# Patient Record
Sex: FEMALE | Race: BLACK OR AFRICAN AMERICAN | Employment: OTHER | URBAN - METROPOLITAN AREA
[De-identification: names, ages, dates, MRNs, and addresses within clinical notes are randomized per-mention and may not be internally consistent; named-entity substitution may affect disease eponyms.]

---

## 2017-01-01 ENCOUNTER — APPOINTMENT (OUTPATIENT)
Dept: GENERAL RADIOLOGY | Age: 82
DRG: 871 | End: 2017-01-01
Attending: HOSPITALIST
Payer: MEDICARE

## 2017-01-01 ENCOUNTER — APPOINTMENT (OUTPATIENT)
Dept: GENERAL RADIOLOGY | Age: 82
DRG: 871 | End: 2017-01-01
Attending: INTERNAL MEDICINE
Payer: MEDICARE

## 2017-01-01 ENCOUNTER — OFFICE VISIT (OUTPATIENT)
Dept: CARDIOLOGY CLINIC | Age: 82
End: 2017-01-01

## 2017-01-01 ENCOUNTER — HOME CARE VISIT (OUTPATIENT)
Dept: HOSPICE | Facility: HOSPICE | Age: 82
End: 2017-01-01
Payer: MEDICARE

## 2017-01-01 ENCOUNTER — CLINICAL SUPPORT (OUTPATIENT)
Dept: CARDIOLOGY CLINIC | Age: 82
End: 2017-01-01

## 2017-01-01 ENCOUNTER — APPOINTMENT (OUTPATIENT)
Dept: ULTRASOUND IMAGING | Age: 82
DRG: 871 | End: 2017-01-01
Attending: SPECIALIST
Payer: MEDICARE

## 2017-01-01 ENCOUNTER — APPOINTMENT (OUTPATIENT)
Dept: ULTRASOUND IMAGING | Age: 82
DRG: 871 | End: 2017-01-01
Attending: EMERGENCY MEDICINE
Payer: MEDICARE

## 2017-01-01 ENCOUNTER — APPOINTMENT (OUTPATIENT)
Dept: CT IMAGING | Age: 82
DRG: 871 | End: 2017-01-01
Attending: NURSE PRACTITIONER
Payer: MEDICARE

## 2017-01-01 ENCOUNTER — HOSPITAL ENCOUNTER (INPATIENT)
Age: 82
LOS: 3 days | DRG: 291 | End: 2017-04-17
Attending: INTERNAL MEDICINE | Admitting: INTERNAL MEDICINE
Payer: OTHER MISCELLANEOUS

## 2017-01-01 ENCOUNTER — APPOINTMENT (OUTPATIENT)
Dept: CT IMAGING | Age: 82
DRG: 871 | End: 2017-01-01
Attending: EMERGENCY MEDICINE
Payer: MEDICARE

## 2017-01-01 ENCOUNTER — APPOINTMENT (OUTPATIENT)
Dept: GENERAL RADIOLOGY | Age: 82
DRG: 871 | End: 2017-01-01
Attending: RADIOLOGY
Payer: MEDICARE

## 2017-01-01 ENCOUNTER — HOSPITAL ENCOUNTER (INPATIENT)
Age: 82
LOS: 37 days | Discharge: HOSPICE | DRG: 871 | End: 2017-04-14
Attending: EMERGENCY MEDICINE | Admitting: INTERNAL MEDICINE
Payer: MEDICARE

## 2017-01-01 ENCOUNTER — APPOINTMENT (OUTPATIENT)
Dept: GENERAL RADIOLOGY | Age: 82
DRG: 871 | End: 2017-01-01
Attending: NURSE PRACTITIONER
Payer: MEDICARE

## 2017-01-01 ENCOUNTER — APPOINTMENT (OUTPATIENT)
Dept: ULTRASOUND IMAGING | Age: 82
DRG: 871 | End: 2017-01-01
Attending: HOSPITALIST
Payer: MEDICARE

## 2017-01-01 ENCOUNTER — HOSPICE ADMISSION (OUTPATIENT)
Dept: HOSPICE | Facility: HOSPICE | Age: 82
End: 2017-01-01
Payer: MEDICARE

## 2017-01-01 ENCOUNTER — APPOINTMENT (OUTPATIENT)
Dept: GENERAL RADIOLOGY | Age: 82
DRG: 871 | End: 2017-01-01
Attending: STUDENT IN AN ORGANIZED HEALTH CARE EDUCATION/TRAINING PROGRAM
Payer: MEDICARE

## 2017-01-01 ENCOUNTER — APPOINTMENT (OUTPATIENT)
Dept: GENERAL RADIOLOGY | Age: 82
DRG: 871 | End: 2017-01-01
Attending: SPECIALIST
Payer: MEDICARE

## 2017-01-01 ENCOUNTER — APPOINTMENT (OUTPATIENT)
Dept: GENERAL RADIOLOGY | Age: 82
DRG: 871 | End: 2017-01-01
Attending: ANESTHESIOLOGY
Payer: MEDICARE

## 2017-01-01 ENCOUNTER — APPOINTMENT (OUTPATIENT)
Dept: ULTRASOUND IMAGING | Age: 82
DRG: 871 | End: 2017-01-01
Attending: INTERNAL MEDICINE
Payer: MEDICARE

## 2017-01-01 VITALS
DIASTOLIC BLOOD PRESSURE: 76 MMHG | HEIGHT: 66 IN | WEIGHT: 132.06 LBS | BODY MASS INDEX: 21.22 KG/M2 | RESPIRATION RATE: 24 BRPM | TEMPERATURE: 97.6 F | HEART RATE: 117 BPM | OXYGEN SATURATION: 100 % | SYSTOLIC BLOOD PRESSURE: 125 MMHG

## 2017-01-01 VITALS
HEART RATE: 68 BPM | DIASTOLIC BLOOD PRESSURE: 70 MMHG | BODY MASS INDEX: 24.11 KG/M2 | SYSTOLIC BLOOD PRESSURE: 102 MMHG | WEIGHT: 150 LBS | HEIGHT: 66 IN

## 2017-01-01 VITALS
WEIGHT: 146.2 LBS | BODY MASS INDEX: 23.5 KG/M2 | RESPIRATION RATE: 16 BRPM | HEIGHT: 66 IN | DIASTOLIC BLOOD PRESSURE: 70 MMHG | SYSTOLIC BLOOD PRESSURE: 122 MMHG | HEART RATE: 60 BPM

## 2017-01-01 VITALS
SYSTOLIC BLOOD PRESSURE: 109 MMHG | HEART RATE: 82 BPM | TEMPERATURE: 97.1 F | RESPIRATION RATE: 16 BRPM | DIASTOLIC BLOOD PRESSURE: 61 MMHG | OXYGEN SATURATION: 91 %

## 2017-01-01 DIAGNOSIS — I50.22 CHRONIC SYSTOLIC HEART FAILURE (HCC): Primary | ICD-10-CM

## 2017-01-01 DIAGNOSIS — Z95.810 PRESENCE OF AUTOMATIC CARDIOVERTER/DEFIBRILLATOR (AICD): Primary | ICD-10-CM

## 2017-01-01 DIAGNOSIS — R06.02 SHORTNESS OF BREATH: ICD-10-CM

## 2017-01-01 DIAGNOSIS — R60.9 EDEMA, UNSPECIFIED TYPE: ICD-10-CM

## 2017-01-01 DIAGNOSIS — Z95.810 BIVENTRICULAR ICD (IMPLANTABLE CARDIOVERTER-DEFIBRILLATOR) IN PLACE: Primary | ICD-10-CM

## 2017-01-01 DIAGNOSIS — I50.43 ACUTE ON CHRONIC COMBINED SYSTOLIC AND DIASTOLIC ACC/AHA STAGE C CONGESTIVE HEART FAILURE (HCC): ICD-10-CM

## 2017-01-01 DIAGNOSIS — D72.819 LEUKOPENIA, UNSPECIFIED TYPE: ICD-10-CM

## 2017-01-01 DIAGNOSIS — Z79.01 LONG TERM (CURRENT) USE OF ANTICOAGULANTS: ICD-10-CM

## 2017-01-01 DIAGNOSIS — I50.22 CHRONIC SYSTOLIC CHF, NYHA CLASS 2 AND ACA/AHA STAGE C: ICD-10-CM

## 2017-01-01 DIAGNOSIS — I48.0 PAF (PAROXYSMAL ATRIAL FIBRILLATION) (HCC): Primary | ICD-10-CM

## 2017-01-01 DIAGNOSIS — I36.1 NON-RHEUMATIC TRICUSPID VALVE INSUFFICIENCY: ICD-10-CM

## 2017-01-01 DIAGNOSIS — I50.22 CHRONIC SYSTOLIC HEART FAILURE (HCC): ICD-10-CM

## 2017-01-01 DIAGNOSIS — R63.30 FEEDING DIFFICULTIES: ICD-10-CM

## 2017-01-01 DIAGNOSIS — I48.0 PAF (PAROXYSMAL ATRIAL FIBRILLATION) (HCC): ICD-10-CM

## 2017-01-01 DIAGNOSIS — I95.9 HYPOTENSION, UNSPECIFIED HYPOTENSION TYPE: ICD-10-CM

## 2017-01-01 DIAGNOSIS — E87.70 HYPERVOLEMIA, UNSPECIFIED HYPERVOLEMIA TYPE: ICD-10-CM

## 2017-01-01 DIAGNOSIS — G93.41 METABOLIC ENCEPHALOPATHY: ICD-10-CM

## 2017-01-01 DIAGNOSIS — Z98.890 S/P MITRAL VALVE REPAIR: ICD-10-CM

## 2017-01-01 DIAGNOSIS — Z95.0 CARDIAC PACEMAKER: ICD-10-CM

## 2017-01-01 DIAGNOSIS — Z95.810 BIVENTRICULAR ICD (IMPLANTABLE CARDIOVERTER-DEFIBRILLATOR) IN PLACE: ICD-10-CM

## 2017-01-01 DIAGNOSIS — R10.13 ABDOMINAL PAIN, EPIGASTRIC: Primary | ICD-10-CM

## 2017-01-01 DIAGNOSIS — E16.2 HYPOGLYCEMIA: ICD-10-CM

## 2017-01-01 DIAGNOSIS — I34.0 NON-RHEUMATIC MITRAL REGURGITATION: ICD-10-CM

## 2017-01-01 DIAGNOSIS — I47.1 PAT (PAROXYSMAL ATRIAL TACHYCARDIA) (HCC): ICD-10-CM

## 2017-01-01 DIAGNOSIS — I11.0 MALIGNANT HYPERTENSIVE HEART DISEASE WITH CHF (HCC): ICD-10-CM

## 2017-01-01 DIAGNOSIS — I44.2 THIRD DEGREE AV BLOCK (HCC): ICD-10-CM

## 2017-01-01 DIAGNOSIS — Z71.89 GOALS OF CARE, COUNSELING/DISCUSSION: ICD-10-CM

## 2017-01-01 DIAGNOSIS — T68.XXXA HYPOTHERMIA, INITIAL ENCOUNTER: ICD-10-CM

## 2017-01-01 DIAGNOSIS — I51.1: ICD-10-CM

## 2017-01-01 DIAGNOSIS — R53.81 DEBILITY: ICD-10-CM

## 2017-01-01 DIAGNOSIS — Z79.01 ANTICOAGULATED ON COUMADIN: ICD-10-CM

## 2017-01-01 DIAGNOSIS — R53.83 FATIGUE, UNSPECIFIED TYPE: ICD-10-CM

## 2017-01-01 DIAGNOSIS — R53.83 OTHER FATIGUE: ICD-10-CM

## 2017-01-01 DIAGNOSIS — R53.83 LETHARGY: ICD-10-CM

## 2017-01-01 LAB
ABO + RH BLD: NORMAL
ABO + RH BLD: NORMAL
ALBUMIN SERPL BCP-MCNC: 2.1 G/DL (ref 3.5–5)
ALBUMIN SERPL BCP-MCNC: 2.2 G/DL (ref 3.5–5)
ALBUMIN SERPL BCP-MCNC: 2.2 G/DL (ref 3.5–5)
ALBUMIN SERPL BCP-MCNC: 2.3 G/DL (ref 3.5–5)
ALBUMIN SERPL BCP-MCNC: 2.4 G/DL (ref 3.5–5)
ALBUMIN SERPL BCP-MCNC: 2.6 G/DL (ref 3.5–5)
ALBUMIN SERPL BCP-MCNC: 2.7 G/DL (ref 3.5–5)
ALBUMIN SERPL BCP-MCNC: 3 G/DL (ref 3.5–5)
ALBUMIN SERPL BCP-MCNC: 3 G/DL (ref 3.5–5)
ALBUMIN SERPL BCP-MCNC: 3.3 G/DL (ref 3.5–5)
ALBUMIN SERPL BCP-MCNC: 3.4 G/DL (ref 3.5–5)
ALBUMIN/GLOB SERPL: 0.5 {RATIO} (ref 1.1–2.2)
ALBUMIN/GLOB SERPL: 0.6 {RATIO} (ref 1.1–2.2)
ALBUMIN/GLOB SERPL: 0.7 {RATIO} (ref 1.1–2.2)
ALBUMIN/GLOB SERPL: 0.8 {RATIO} (ref 1.1–2.2)
ALBUMIN/GLOB SERPL: 0.9 {RATIO} (ref 1.1–2.2)
ALP SERPL-CCNC: 42 U/L (ref 45–117)
ALP SERPL-CCNC: 45 U/L (ref 45–117)
ALP SERPL-CCNC: 47 U/L (ref 45–117)
ALP SERPL-CCNC: 47 U/L (ref 45–117)
ALP SERPL-CCNC: 50 U/L (ref 45–117)
ALP SERPL-CCNC: 50 U/L (ref 45–117)
ALP SERPL-CCNC: 51 U/L (ref 45–117)
ALP SERPL-CCNC: 52 U/L (ref 45–117)
ALP SERPL-CCNC: 55 U/L (ref 45–117)
ALP SERPL-CCNC: 55 U/L (ref 45–117)
ALP SERPL-CCNC: 57 U/L (ref 45–117)
ALP SERPL-CCNC: 60 U/L (ref 45–117)
ALP SERPL-CCNC: 60 U/L (ref 45–117)
ALP SERPL-CCNC: 61 U/L (ref 45–117)
ALP SERPL-CCNC: 63 U/L (ref 45–117)
ALP SERPL-CCNC: 65 U/L (ref 45–117)
ALP SERPL-CCNC: 66 U/L (ref 45–117)
ALP SERPL-CCNC: 70 U/L (ref 45–117)
ALP SERPL-CCNC: 87 U/L (ref 45–117)
ALT SERPL-CCNC: 11 U/L (ref 12–78)
ALT SERPL-CCNC: 11 U/L (ref 12–78)
ALT SERPL-CCNC: 112 U/L (ref 12–78)
ALT SERPL-CCNC: 12 U/L (ref 12–78)
ALT SERPL-CCNC: 121 U/L (ref 12–78)
ALT SERPL-CCNC: 13 U/L (ref 12–78)
ALT SERPL-CCNC: 135 U/L (ref 12–78)
ALT SERPL-CCNC: 14 U/L (ref 12–78)
ALT SERPL-CCNC: 15 U/L (ref 12–78)
ALT SERPL-CCNC: 153 U/L (ref 12–78)
ALT SERPL-CCNC: 16 U/L (ref 12–78)
ALT SERPL-CCNC: 172 U/L (ref 12–78)
ALT SERPL-CCNC: 173 U/L (ref 12–78)
ALT SERPL-CCNC: 22 U/L (ref 12–78)
ALT SERPL-CCNC: 34 U/L (ref 12–78)
ALT SERPL-CCNC: 65 U/L (ref 12–78)
ALT SERPL-CCNC: 92 U/L (ref 12–78)
AMMONIA PLAS-SCNC: 88 UMOL/L
AMORPH CRY URNS QL MICRO: ABNORMAL
ANION GAP BLD CALC-SCNC: 0 MMOL/L (ref 5–15)
ANION GAP BLD CALC-SCNC: 1 MMOL/L (ref 5–15)
ANION GAP BLD CALC-SCNC: 10 MMOL/L (ref 5–15)
ANION GAP BLD CALC-SCNC: 11 MMOL/L (ref 5–15)
ANION GAP BLD CALC-SCNC: 3 MMOL/L (ref 5–15)
ANION GAP BLD CALC-SCNC: 4 MMOL/L (ref 5–15)
ANION GAP BLD CALC-SCNC: 5 MMOL/L (ref 5–15)
ANION GAP BLD CALC-SCNC: 6 MMOL/L (ref 5–15)
ANION GAP BLD CALC-SCNC: 7 MMOL/L (ref 5–15)
ANION GAP BLD CALC-SCNC: 8 MMOL/L (ref 5–15)
ANION GAP BLD CALC-SCNC: 9 MMOL/L (ref 5–15)
ANTI-COMPLEMENT (C3B,C3D): NORMAL
APPEARANCE FLD: CLEAR
APPEARANCE UR: ABNORMAL
APPEARANCE UR: ABNORMAL
APTT PPP: 33.5 SEC (ref 22.1–32.5)
ARTERIAL PATENCY WRIST A: ABNORMAL
ARTERIAL PATENCY WRIST A: ABNORMAL
ARTERIAL PATENCY WRIST A: YES
AST SERPL W P-5'-P-CCNC: 115 U/L (ref 15–37)
AST SERPL W P-5'-P-CCNC: 16 U/L (ref 15–37)
AST SERPL W P-5'-P-CCNC: 168 U/L (ref 15–37)
AST SERPL W P-5'-P-CCNC: 17 U/L (ref 15–37)
AST SERPL W P-5'-P-CCNC: 19 U/L (ref 15–37)
AST SERPL W P-5'-P-CCNC: 19 U/L (ref 15–37)
AST SERPL W P-5'-P-CCNC: 20 U/L (ref 15–37)
AST SERPL W P-5'-P-CCNC: 21 U/L (ref 15–37)
AST SERPL W P-5'-P-CCNC: 217 U/L (ref 15–37)
AST SERPL W P-5'-P-CCNC: 22 U/L (ref 15–37)
AST SERPL W P-5'-P-CCNC: 22 U/L (ref 15–37)
AST SERPL W P-5'-P-CCNC: 24 U/L (ref 15–37)
AST SERPL W P-5'-P-CCNC: 26 U/L (ref 15–37)
AST SERPL W P-5'-P-CCNC: 306 U/L (ref 15–37)
AST SERPL W P-5'-P-CCNC: 384 U/L (ref 15–37)
AST SERPL W P-5'-P-CCNC: 40 U/L (ref 15–37)
AST SERPL W P-5'-P-CCNC: 402 U/L (ref 15–37)
AST SERPL W P-5'-P-CCNC: 50 U/L (ref 15–37)
AST SERPL W P-5'-P-CCNC: 69 U/L (ref 15–37)
ATRIAL RATE: 75 BPM
BACTERIA SPEC CULT: NORMAL
BACTERIA URNS QL MICRO: NEGATIVE /HPF
BACTERIA URNS QL MICRO: NEGATIVE /HPF
BASE DEFICIT BLD-SCNC: 2 MMOL/L
BASE DEFICIT BLD-SCNC: 2 MMOL/L
BASE DEFICIT BLD-SCNC: 3 MMOL/L
BASE DEFICIT BLD-SCNC: 5 MMOL/L
BASE EXCESS BLD CALC-SCNC: 0 MMOL/L
BASE EXCESS BLD CALC-SCNC: 1 MMOL/L
BASE EXCESS BLD CALC-SCNC: 1 MMOL/L
BASE EXCESS BLD CALC-SCNC: 10 MMOL/L
BASE EXCESS BLD CALC-SCNC: 12 MMOL/L
BASE EXCESS BLD CALC-SCNC: 13 MMOL/L
BASE EXCESS BLD CALC-SCNC: 14 MMOL/L
BASE EXCESS BLD CALC-SCNC: 16 MMOL/L
BASE EXCESS BLD CALC-SCNC: 17 MMOL/L
BASE EXCESS BLD CALC-SCNC: 18 MMOL/L
BASE EXCESS BLD CALC-SCNC: 18 MMOL/L
BASE EXCESS BLD CALC-SCNC: 21 MMOL/L
BASE EXCESS BLD CALC-SCNC: 3 MMOL/L
BASE EXCESS BLD CALC-SCNC: 5 MMOL/L
BASE EXCESS BLD CALC-SCNC: 6 MMOL/L
BASE EXCESS BLD CALC-SCNC: 6 MMOL/L
BASE EXCESS BLD CALC-SCNC: 8 MMOL/L
BASOPHILS # BLD AUTO: 0 K/UL (ref 0–0.1)
BASOPHILS # BLD: 0 % (ref 0–1)
BASOPHILS # BLD: 1 % (ref 0–1)
BASOPHILS # BLD: 2 % (ref 0–1)
BDY SITE: ABNORMAL
BILIRUB DIRECT SERPL-MCNC: 1.1 MG/DL (ref 0–0.2)
BILIRUB DIRECT SERPL-MCNC: 2.1 MG/DL (ref 0–0.2)
BILIRUB SERPL-MCNC: 0.8 MG/DL (ref 0.2–1)
BILIRUB SERPL-MCNC: 0.9 MG/DL (ref 0.2–1)
BILIRUB SERPL-MCNC: 1 MG/DL (ref 0.2–1)
BILIRUB SERPL-MCNC: 1.8 MG/DL (ref 0.2–1)
BILIRUB SERPL-MCNC: 1.9 MG/DL (ref 0.2–1)
BILIRUB SERPL-MCNC: 1.9 MG/DL (ref 0.2–1)
BILIRUB SERPL-MCNC: 2 MG/DL (ref 0.2–1)
BILIRUB SERPL-MCNC: 2.1 MG/DL (ref 0.2–1)
BILIRUB SERPL-MCNC: 2.2 MG/DL (ref 0.2–1)
BILIRUB SERPL-MCNC: 2.3 MG/DL (ref 0.2–1)
BILIRUB SERPL-MCNC: 2.5 MG/DL (ref 0.2–1)
BILIRUB SERPL-MCNC: 2.5 MG/DL (ref 0.2–1)
BILIRUB SERPL-MCNC: 2.7 MG/DL (ref 0.2–1)
BILIRUB SERPL-MCNC: 2.7 MG/DL (ref 0.2–1)
BILIRUB SERPL-MCNC: 3 MG/DL (ref 0.2–1)
BILIRUB SERPL-MCNC: 3.2 MG/DL (ref 0.2–1)
BILIRUB SERPL-MCNC: 3.5 MG/DL (ref 0.2–1)
BILIRUB UR QL: NEGATIVE
BILIRUB UR QL: NEGATIVE
BLD PROD TYP BPU: NORMAL
BLOOD BANK CMNT PATIENT-IMP: NORMAL
BLOOD GROUP ANTIBODIES SERPL: NORMAL
BLOOD GROUP ANTIBODIES SERPL: NORMAL
BNP SERPL-MCNC: 1006 PG/ML (ref 0–100)
BNP SERPL-MCNC: 1198 PG/ML (ref 0–100)
BNP SERPL-MCNC: 1238 PG/ML (ref 0–100)
BNP SERPL-MCNC: 1306 PG/ML (ref 0–100)
BNP SERPL-MCNC: 1335 PG/ML (ref 0–100)
BNP SERPL-MCNC: 1636 PG/ML (ref 0–100)
BNP SERPL-MCNC: 2321 PG/ML (ref 0–100)
BNP SERPL-MCNC: 380 PG/ML (ref 0–100)
BNP SERPL-MCNC: 602 PG/ML (ref 0–100)
BNP SERPL-MCNC: 770 PG/ML (ref 0–100)
BNP SERPL-MCNC: 782 PG/ML (ref 0–100)
BNP SERPL-MCNC: 972 PG/ML (ref 0–100)
BPU ID: NORMAL
BUN SERPL-MCNC: 10 MG/DL (ref 6–20)
BUN SERPL-MCNC: 11 MG/DL (ref 6–20)
BUN SERPL-MCNC: 12 MG/DL (ref 6–20)
BUN SERPL-MCNC: 13 MG/DL (ref 6–20)
BUN SERPL-MCNC: 14 MG/DL (ref 6–20)
BUN SERPL-MCNC: 15 MG/DL (ref 6–20)
BUN SERPL-MCNC: 15 MG/DL (ref 6–20)
BUN SERPL-MCNC: 16 MG/DL (ref 6–20)
BUN SERPL-MCNC: 18 MG/DL (ref 6–20)
BUN SERPL-MCNC: 21 MG/DL (ref 6–20)
BUN SERPL-MCNC: 22 MG/DL (ref 6–20)
BUN SERPL-MCNC: 23 MG/DL (ref 6–20)
BUN SERPL-MCNC: 26 MG/DL (ref 6–20)
BUN SERPL-MCNC: 26 MG/DL (ref 6–20)
BUN SERPL-MCNC: 27 MG/DL (ref 6–20)
BUN SERPL-MCNC: 28 MG/DL (ref 6–20)
BUN SERPL-MCNC: 30 MG/DL (ref 6–20)
BUN SERPL-MCNC: 30 MG/DL (ref 6–20)
BUN SERPL-MCNC: 35 MG/DL (ref 6–20)
BUN SERPL-MCNC: 45 MG/DL (ref 6–20)
BUN SERPL-MCNC: 58 MG/DL (ref 6–20)
BUN SERPL-MCNC: 8 MG/DL (ref 6–20)
BUN/CREAT SERPL: 15 (ref 12–20)
BUN/CREAT SERPL: 15 (ref 12–20)
BUN/CREAT SERPL: 18 (ref 12–20)
BUN/CREAT SERPL: 19 (ref 12–20)
BUN/CREAT SERPL: 19 (ref 12–20)
BUN/CREAT SERPL: 20 (ref 12–20)
BUN/CREAT SERPL: 21 (ref 12–20)
BUN/CREAT SERPL: 22 (ref 12–20)
BUN/CREAT SERPL: 23 (ref 12–20)
BUN/CREAT SERPL: 24 (ref 12–20)
BUN/CREAT SERPL: 25 (ref 12–20)
BUN/CREAT SERPL: 27 (ref 12–20)
BUN/CREAT SERPL: 30 (ref 12–20)
BUN/CREAT SERPL: 31 (ref 12–20)
BUN/CREAT SERPL: 31 (ref 12–20)
BUN/CREAT SERPL: 34 (ref 12–20)
BUN/CREAT SERPL: 36 (ref 12–20)
BUN/CREAT SERPL: 37 (ref 12–20)
BUN/CREAT SERPL: 38 (ref 12–20)
CA-I BLD-SCNC: 1.46 MMOL/L (ref 1.12–1.32)
CALCIUM SERPL-MCNC: 10 MG/DL (ref 8.5–10.1)
CALCIUM SERPL-MCNC: 10.4 MG/DL (ref 8.5–10.1)
CALCIUM SERPL-MCNC: 10.4 MG/DL (ref 8.5–10.1)
CALCIUM SERPL-MCNC: 10.5 MG/DL (ref 8.5–10.1)
CALCIUM SERPL-MCNC: 10.7 MG/DL (ref 8.5–10.1)
CALCIUM SERPL-MCNC: 10.8 MG/DL (ref 8.5–10.1)
CALCIUM SERPL-MCNC: 11.3 MG/DL (ref 8.5–10.1)
CALCIUM SERPL-MCNC: 12.4 MG/DL (ref 8.5–10.1)
CALCIUM SERPL-MCNC: 13.4 MG/DL (ref 8.5–10.1)
CALCIUM SERPL-MCNC: 8 MG/DL (ref 8.5–10.1)
CALCIUM SERPL-MCNC: 8 MG/DL (ref 8.5–10.1)
CALCIUM SERPL-MCNC: 8.1 MG/DL (ref 8.5–10.1)
CALCIUM SERPL-MCNC: 8.1 MG/DL (ref 8.5–10.1)
CALCIUM SERPL-MCNC: 8.2 MG/DL (ref 8.5–10.1)
CALCIUM SERPL-MCNC: 8.4 MG/DL (ref 8.5–10.1)
CALCIUM SERPL-MCNC: 8.5 MG/DL (ref 8.5–10.1)
CALCIUM SERPL-MCNC: 8.6 MG/DL (ref 8.5–10.1)
CALCIUM SERPL-MCNC: 8.7 MG/DL (ref 8.5–10.1)
CALCIUM SERPL-MCNC: 8.9 MG/DL (ref 8.5–10.1)
CALCIUM SERPL-MCNC: 9.1 MG/DL (ref 8.5–10.1)
CALCIUM SERPL-MCNC: 9.1 MG/DL (ref 8.5–10.1)
CALCIUM SERPL-MCNC: 9.2 MG/DL (ref 8.5–10.1)
CALCIUM SERPL-MCNC: 9.3 MG/DL (ref 8.5–10.1)
CALCIUM SERPL-MCNC: 9.4 MG/DL (ref 8.5–10.1)
CALCIUM SERPL-MCNC: 9.5 MG/DL (ref 8.5–10.1)
CALCIUM SERPL-MCNC: 9.6 MG/DL (ref 8.5–10.1)
CALCIUM SERPL-MCNC: 9.6 MG/DL (ref 8.5–10.1)
CALCIUM SERPL-MCNC: 9.8 MG/DL (ref 8.5–10.1)
CALCIUM SERPL-MCNC: 9.9 MG/DL (ref 8.5–10.1)
CALCIUM UR-MCNC: 9.9 MG/DL
CALCULATED R AXIS, ECG10: 158 DEGREES
CALCULATED T AXIS, ECG11: 5 DEGREES
CC UR VC: NORMAL
CHLORIDE SERPL-SCNC: 100 MMOL/L (ref 97–108)
CHLORIDE SERPL-SCNC: 100 MMOL/L (ref 97–108)
CHLORIDE SERPL-SCNC: 101 MMOL/L (ref 97–108)
CHLORIDE SERPL-SCNC: 102 MMOL/L (ref 97–108)
CHLORIDE SERPL-SCNC: 103 MMOL/L (ref 97–108)
CHLORIDE SERPL-SCNC: 104 MMOL/L (ref 97–108)
CHLORIDE SERPL-SCNC: 105 MMOL/L (ref 97–108)
CHLORIDE SERPL-SCNC: 105 MMOL/L (ref 97–108)
CHLORIDE SERPL-SCNC: 106 MMOL/L (ref 97–108)
CHLORIDE SERPL-SCNC: 107 MMOL/L (ref 97–108)
CHLORIDE SERPL-SCNC: 108 MMOL/L (ref 97–108)
CHLORIDE SERPL-SCNC: 108 MMOL/L (ref 97–108)
CHLORIDE SERPL-SCNC: 110 MMOL/L (ref 97–108)
CHLORIDE SERPL-SCNC: 110 MMOL/L (ref 97–108)
CHLORIDE SERPL-SCNC: 111 MMOL/L (ref 97–108)
CHLORIDE SERPL-SCNC: 120 MMOL/L (ref 97–108)
CHLORIDE SERPL-SCNC: 98 MMOL/L (ref 97–108)
CHLORIDE SERPL-SCNC: 98 MMOL/L (ref 97–108)
CHLORIDE SERPL-SCNC: 99 MMOL/L (ref 97–108)
CO2 SERPL-SCNC: 22 MMOL/L (ref 21–32)
CO2 SERPL-SCNC: 24 MMOL/L (ref 21–32)
CO2 SERPL-SCNC: 28 MMOL/L (ref 21–32)
CO2 SERPL-SCNC: 28 MMOL/L (ref 21–32)
CO2 SERPL-SCNC: 29 MMOL/L (ref 21–32)
CO2 SERPL-SCNC: 29 MMOL/L (ref 21–32)
CO2 SERPL-SCNC: 30 MMOL/L (ref 21–32)
CO2 SERPL-SCNC: 31 MMOL/L (ref 21–32)
CO2 SERPL-SCNC: 32 MMOL/L (ref 21–32)
CO2 SERPL-SCNC: 33 MMOL/L (ref 21–32)
CO2 SERPL-SCNC: 34 MMOL/L (ref 21–32)
CO2 SERPL-SCNC: 35 MMOL/L (ref 21–32)
CO2 SERPL-SCNC: 36 MMOL/L (ref 21–32)
CO2 SERPL-SCNC: 36 MMOL/L (ref 21–32)
CO2 SERPL-SCNC: 37 MMOL/L (ref 21–32)
CO2 SERPL-SCNC: 38 MMOL/L (ref 21–32)
CO2 SERPL-SCNC: 43 MMOL/L (ref 21–32)
COLOR FLD: YELLOW
COLOR UR: ABNORMAL
COLOR UR: ABNORMAL
CORTIS AM PEAK SERPL-MCNC: 40.1 UG/DL (ref 4.3–22.4)
CREAT SERPL-MCNC: 0.49 MG/DL (ref 0.55–1.02)
CREAT SERPL-MCNC: 0.49 MG/DL (ref 0.55–1.02)
CREAT SERPL-MCNC: 0.5 MG/DL (ref 0.55–1.02)
CREAT SERPL-MCNC: 0.51 MG/DL (ref 0.55–1.02)
CREAT SERPL-MCNC: 0.53 MG/DL (ref 0.55–1.02)
CREAT SERPL-MCNC: 0.53 MG/DL (ref 0.55–1.02)
CREAT SERPL-MCNC: 0.56 MG/DL (ref 0.55–1.02)
CREAT SERPL-MCNC: 0.56 MG/DL (ref 0.55–1.02)
CREAT SERPL-MCNC: 0.57 MG/DL (ref 0.55–1.02)
CREAT SERPL-MCNC: 0.58 MG/DL (ref 0.55–1.02)
CREAT SERPL-MCNC: 0.59 MG/DL (ref 0.55–1.02)
CREAT SERPL-MCNC: 0.6 MG/DL (ref 0.55–1.02)
CREAT SERPL-MCNC: 0.61 MG/DL (ref 0.55–1.02)
CREAT SERPL-MCNC: 0.61 MG/DL (ref 0.55–1.02)
CREAT SERPL-MCNC: 0.63 MG/DL (ref 0.55–1.02)
CREAT SERPL-MCNC: 0.72 MG/DL (ref 0.55–1.02)
CREAT SERPL-MCNC: 0.73 MG/DL (ref 0.55–1.02)
CREAT SERPL-MCNC: 0.75 MG/DL (ref 0.55–1.02)
CREAT SERPL-MCNC: 0.77 MG/DL (ref 0.55–1.02)
CREAT SERPL-MCNC: 0.79 MG/DL (ref 0.55–1.02)
CREAT SERPL-MCNC: 0.8 MG/DL (ref 0.55–1.02)
CREAT SERPL-MCNC: 0.83 MG/DL (ref 0.55–1.02)
CREAT SERPL-MCNC: 0.94 MG/DL (ref 0.55–1.02)
CREAT SERPL-MCNC: 0.96 MG/DL (ref 0.55–1.02)
CREAT SERPL-MCNC: 1.01 MG/DL (ref 0.55–1.02)
CREAT SERPL-MCNC: 1.05 MG/DL (ref 0.55–1.02)
CREAT SERPL-MCNC: 1.09 MG/DL (ref 0.55–1.02)
CREAT SERPL-MCNC: 1.14 MG/DL (ref 0.55–1.02)
CREAT SERPL-MCNC: 1.14 MG/DL (ref 0.55–1.02)
CREAT SERPL-MCNC: 1.17 MG/DL (ref 0.55–1.02)
CREAT SERPL-MCNC: 1.45 MG/DL (ref 0.55–1.02)
CREAT SERPL-MCNC: 1.69 MG/DL (ref 0.55–1.02)
CREAT SERPL-MCNC: 1.88 MG/DL (ref 0.55–1.02)
CROSSMATCH RESULT,%XM: NORMAL
CROSSMATCH RESULT,%XM: NORMAL
DAT IGG-SP REAG RBC QL: NORMAL
DAT POLY-SP REAG RBC QL: NORMAL
DATE LAST DOSE: ABNORMAL
DIAGNOSIS, 93000: NORMAL
DIFFERENTIAL METHOD BLD: ABNORMAL
EOSINOPHIL # BLD: 0 K/UL (ref 0–0.4)
EOSINOPHIL # BLD: 0.1 K/UL (ref 0–0.4)
EOSINOPHIL # BLD: 0.2 K/UL (ref 0–0.4)
EOSINOPHIL # BLD: 0.2 K/UL (ref 0–0.4)
EOSINOPHIL NFR BLD: 0 % (ref 0–7)
EOSINOPHIL NFR BLD: 0 % (ref 0–7)
EOSINOPHIL NFR BLD: 1 % (ref 0–7)
EOSINOPHIL NFR BLD: 2 % (ref 0–7)
EOSINOPHIL NFR BLD: 3 % (ref 0–7)
EOSINOPHIL NFR BLD: 4 % (ref 0–7)
EOSINOPHIL NFR BLD: 7 % (ref 0–7)
EPITH CASTS URNS QL MICRO: ABNORMAL /LPF
EPITH CASTS URNS QL MICRO: ABNORMAL /LPF
ERYTHROCYTE [DISTWIDTH] IN BLOOD BY AUTOMATED COUNT: 16.2 % (ref 11.5–14.5)
ERYTHROCYTE [DISTWIDTH] IN BLOOD BY AUTOMATED COUNT: 16.5 % (ref 11.5–14.5)
ERYTHROCYTE [DISTWIDTH] IN BLOOD BY AUTOMATED COUNT: 16.7 % (ref 11.5–14.5)
ERYTHROCYTE [DISTWIDTH] IN BLOOD BY AUTOMATED COUNT: 16.7 % (ref 11.5–14.5)
ERYTHROCYTE [DISTWIDTH] IN BLOOD BY AUTOMATED COUNT: 16.8 % (ref 11.5–14.5)
ERYTHROCYTE [DISTWIDTH] IN BLOOD BY AUTOMATED COUNT: 16.8 % (ref 11.5–14.5)
ERYTHROCYTE [DISTWIDTH] IN BLOOD BY AUTOMATED COUNT: 17 % (ref 11.5–14.5)
ERYTHROCYTE [DISTWIDTH] IN BLOOD BY AUTOMATED COUNT: 17 % (ref 11.5–14.5)
ERYTHROCYTE [DISTWIDTH] IN BLOOD BY AUTOMATED COUNT: 17.1 % (ref 11.5–14.5)
ERYTHROCYTE [DISTWIDTH] IN BLOOD BY AUTOMATED COUNT: 17.2 % (ref 11.5–14.5)
ERYTHROCYTE [DISTWIDTH] IN BLOOD BY AUTOMATED COUNT: 17.9 % (ref 11.5–14.5)
ERYTHROCYTE [DISTWIDTH] IN BLOOD BY AUTOMATED COUNT: 18.1 % (ref 11.5–14.5)
ERYTHROCYTE [DISTWIDTH] IN BLOOD BY AUTOMATED COUNT: 18.6 % (ref 11.5–14.5)
ERYTHROCYTE [DISTWIDTH] IN BLOOD BY AUTOMATED COUNT: 19 % (ref 11.5–14.5)
ERYTHROCYTE [DISTWIDTH] IN BLOOD BY AUTOMATED COUNT: 19.2 % (ref 11.5–14.5)
ERYTHROCYTE [DISTWIDTH] IN BLOOD BY AUTOMATED COUNT: 19.3 % (ref 11.5–14.5)
ERYTHROCYTE [DISTWIDTH] IN BLOOD BY AUTOMATED COUNT: 19.3 % (ref 11.5–14.5)
ERYTHROCYTE [DISTWIDTH] IN BLOOD BY AUTOMATED COUNT: 19.7 % (ref 11.5–14.5)
ERYTHROCYTE [DISTWIDTH] IN BLOOD BY AUTOMATED COUNT: 19.7 % (ref 11.5–14.5)
ERYTHROCYTE [DISTWIDTH] IN BLOOD BY AUTOMATED COUNT: 19.8 % (ref 11.5–14.5)
ERYTHROCYTE [DISTWIDTH] IN BLOOD BY AUTOMATED COUNT: 20 % (ref 11.5–14.5)
ERYTHROCYTE [DISTWIDTH] IN BLOOD BY AUTOMATED COUNT: 20.1 % (ref 11.5–14.5)
ERYTHROCYTE [DISTWIDTH] IN BLOOD BY AUTOMATED COUNT: 20.2 % (ref 11.5–14.5)
ERYTHROCYTE [DISTWIDTH] IN BLOOD BY AUTOMATED COUNT: 20.3 % (ref 11.5–14.5)
FOLATE SERPL-MCNC: 14.5 NG/ML (ref 5–21)
GAS FLOW.O2 O2 DELIVERY SYS: ABNORMAL L/MIN
GAS FLOW.O2 SETTING OXYMISER: 10 BPM
GAS FLOW.O2 SETTING OXYMISER: 10 L/M
GAS FLOW.O2 SETTING OXYMISER: 12 L/M
GAS FLOW.O2 SETTING OXYMISER: 14 BPM
GAS FLOW.O2 SETTING OXYMISER: 16 BPM
GAS FLOW.O2 SETTING OXYMISER: 18 BPM
GAS FLOW.O2 SETTING OXYMISER: 2 L/M
GAS FLOW.O2 SETTING OXYMISER: 2 L/M
GAS FLOW.O2 SETTING OXYMISER: 26 BPM
GAS FLOW.O2 SETTING OXYMISER: 26 BPM
GAS FLOW.O2 SETTING OXYMISER: 3 L/M
GAS FLOW.O2 SETTING OXYMISER: 6 L/M
GAS FLOW.O2 SETTING OXYMISER: 8 BPM
GAS FLOW.O2 SETTING OXYMISER: 8 BPM
GLOBULIN SER CALC-MCNC: 3.1 G/DL (ref 2–4)
GLOBULIN SER CALC-MCNC: 3.2 G/DL (ref 2–4)
GLOBULIN SER CALC-MCNC: 3.3 G/DL (ref 2–4)
GLOBULIN SER CALC-MCNC: 3.3 G/DL (ref 2–4)
GLOBULIN SER CALC-MCNC: 3.4 G/DL (ref 2–4)
GLOBULIN SER CALC-MCNC: 3.5 G/DL (ref 2–4)
GLOBULIN SER CALC-MCNC: 3.6 G/DL (ref 2–4)
GLOBULIN SER CALC-MCNC: 3.7 G/DL (ref 2–4)
GLOBULIN SER CALC-MCNC: 3.8 G/DL (ref 2–4)
GLOBULIN SER CALC-MCNC: 4.2 G/DL (ref 2–4)
GLUCOSE BLD STRIP.AUTO-MCNC: 101 MG/DL (ref 65–100)
GLUCOSE BLD STRIP.AUTO-MCNC: 101 MG/DL (ref 65–100)
GLUCOSE BLD STRIP.AUTO-MCNC: 102 MG/DL (ref 65–100)
GLUCOSE BLD STRIP.AUTO-MCNC: 102 MG/DL (ref 65–100)
GLUCOSE BLD STRIP.AUTO-MCNC: 103 MG/DL (ref 65–100)
GLUCOSE BLD STRIP.AUTO-MCNC: 105 MG/DL (ref 65–100)
GLUCOSE BLD STRIP.AUTO-MCNC: 105 MG/DL (ref 65–100)
GLUCOSE BLD STRIP.AUTO-MCNC: 106 MG/DL (ref 65–100)
GLUCOSE BLD STRIP.AUTO-MCNC: 108 MG/DL (ref 65–100)
GLUCOSE BLD STRIP.AUTO-MCNC: 110 MG/DL (ref 65–100)
GLUCOSE BLD STRIP.AUTO-MCNC: 114 MG/DL (ref 65–100)
GLUCOSE BLD STRIP.AUTO-MCNC: 115 MG/DL (ref 65–100)
GLUCOSE BLD STRIP.AUTO-MCNC: 115 MG/DL (ref 65–100)
GLUCOSE BLD STRIP.AUTO-MCNC: 116 MG/DL (ref 65–100)
GLUCOSE BLD STRIP.AUTO-MCNC: 118 MG/DL (ref 65–100)
GLUCOSE BLD STRIP.AUTO-MCNC: 119 MG/DL (ref 65–100)
GLUCOSE BLD STRIP.AUTO-MCNC: 121 MG/DL (ref 65–100)
GLUCOSE BLD STRIP.AUTO-MCNC: 122 MG/DL (ref 65–100)
GLUCOSE BLD STRIP.AUTO-MCNC: 122 MG/DL (ref 65–100)
GLUCOSE BLD STRIP.AUTO-MCNC: 124 MG/DL (ref 65–100)
GLUCOSE BLD STRIP.AUTO-MCNC: 124 MG/DL (ref 65–100)
GLUCOSE BLD STRIP.AUTO-MCNC: 128 MG/DL (ref 65–100)
GLUCOSE BLD STRIP.AUTO-MCNC: 129 MG/DL (ref 65–100)
GLUCOSE BLD STRIP.AUTO-MCNC: 129 MG/DL (ref 65–100)
GLUCOSE BLD STRIP.AUTO-MCNC: 130 MG/DL (ref 65–100)
GLUCOSE BLD STRIP.AUTO-MCNC: 131 MG/DL (ref 65–100)
GLUCOSE BLD STRIP.AUTO-MCNC: 132 MG/DL (ref 65–100)
GLUCOSE BLD STRIP.AUTO-MCNC: 133 MG/DL (ref 65–100)
GLUCOSE BLD STRIP.AUTO-MCNC: 134 MG/DL (ref 65–100)
GLUCOSE BLD STRIP.AUTO-MCNC: 134 MG/DL (ref 65–100)
GLUCOSE BLD STRIP.AUTO-MCNC: 136 MG/DL (ref 65–100)
GLUCOSE BLD STRIP.AUTO-MCNC: 137 MG/DL (ref 65–100)
GLUCOSE BLD STRIP.AUTO-MCNC: 138 MG/DL (ref 65–100)
GLUCOSE BLD STRIP.AUTO-MCNC: 139 MG/DL (ref 65–100)
GLUCOSE BLD STRIP.AUTO-MCNC: 139 MG/DL (ref 65–100)
GLUCOSE BLD STRIP.AUTO-MCNC: 140 MG/DL (ref 65–100)
GLUCOSE BLD STRIP.AUTO-MCNC: 140 MG/DL (ref 65–100)
GLUCOSE BLD STRIP.AUTO-MCNC: 142 MG/DL (ref 65–100)
GLUCOSE BLD STRIP.AUTO-MCNC: 144 MG/DL (ref 65–100)
GLUCOSE BLD STRIP.AUTO-MCNC: 145 MG/DL (ref 65–100)
GLUCOSE BLD STRIP.AUTO-MCNC: 145 MG/DL (ref 65–100)
GLUCOSE BLD STRIP.AUTO-MCNC: 146 MG/DL (ref 65–100)
GLUCOSE BLD STRIP.AUTO-MCNC: 146 MG/DL (ref 65–100)
GLUCOSE BLD STRIP.AUTO-MCNC: 147 MG/DL (ref 65–100)
GLUCOSE BLD STRIP.AUTO-MCNC: 148 MG/DL (ref 65–100)
GLUCOSE BLD STRIP.AUTO-MCNC: 149 MG/DL (ref 65–100)
GLUCOSE BLD STRIP.AUTO-MCNC: 149 MG/DL (ref 65–100)
GLUCOSE BLD STRIP.AUTO-MCNC: 150 MG/DL (ref 65–100)
GLUCOSE BLD STRIP.AUTO-MCNC: 151 MG/DL (ref 65–100)
GLUCOSE BLD STRIP.AUTO-MCNC: 152 MG/DL (ref 65–100)
GLUCOSE BLD STRIP.AUTO-MCNC: 152 MG/DL (ref 65–100)
GLUCOSE BLD STRIP.AUTO-MCNC: 153 MG/DL (ref 65–100)
GLUCOSE BLD STRIP.AUTO-MCNC: 154 MG/DL (ref 65–100)
GLUCOSE BLD STRIP.AUTO-MCNC: 156 MG/DL (ref 65–100)
GLUCOSE BLD STRIP.AUTO-MCNC: 160 MG/DL (ref 65–100)
GLUCOSE BLD STRIP.AUTO-MCNC: 161 MG/DL (ref 65–100)
GLUCOSE BLD STRIP.AUTO-MCNC: 162 MG/DL (ref 65–100)
GLUCOSE BLD STRIP.AUTO-MCNC: 163 MG/DL (ref 65–100)
GLUCOSE BLD STRIP.AUTO-MCNC: 164 MG/DL (ref 65–100)
GLUCOSE BLD STRIP.AUTO-MCNC: 165 MG/DL (ref 65–100)
GLUCOSE BLD STRIP.AUTO-MCNC: 165 MG/DL (ref 65–100)
GLUCOSE BLD STRIP.AUTO-MCNC: 169 MG/DL (ref 65–100)
GLUCOSE BLD STRIP.AUTO-MCNC: 170 MG/DL (ref 65–100)
GLUCOSE BLD STRIP.AUTO-MCNC: 170 MG/DL (ref 65–100)
GLUCOSE BLD STRIP.AUTO-MCNC: 171 MG/DL (ref 65–100)
GLUCOSE BLD STRIP.AUTO-MCNC: 172 MG/DL (ref 65–100)
GLUCOSE BLD STRIP.AUTO-MCNC: 173 MG/DL (ref 65–100)
GLUCOSE BLD STRIP.AUTO-MCNC: 175 MG/DL (ref 65–100)
GLUCOSE BLD STRIP.AUTO-MCNC: 176 MG/DL (ref 65–100)
GLUCOSE BLD STRIP.AUTO-MCNC: 178 MG/DL (ref 65–100)
GLUCOSE BLD STRIP.AUTO-MCNC: 179 MG/DL (ref 65–100)
GLUCOSE BLD STRIP.AUTO-MCNC: 181 MG/DL (ref 65–100)
GLUCOSE BLD STRIP.AUTO-MCNC: 181 MG/DL (ref 65–100)
GLUCOSE BLD STRIP.AUTO-MCNC: 187 MG/DL (ref 65–100)
GLUCOSE BLD STRIP.AUTO-MCNC: 187 MG/DL (ref 65–100)
GLUCOSE BLD STRIP.AUTO-MCNC: 191 MG/DL (ref 65–100)
GLUCOSE BLD STRIP.AUTO-MCNC: 192 MG/DL (ref 65–100)
GLUCOSE BLD STRIP.AUTO-MCNC: 193 MG/DL (ref 65–100)
GLUCOSE BLD STRIP.AUTO-MCNC: 193 MG/DL (ref 65–100)
GLUCOSE BLD STRIP.AUTO-MCNC: 195 MG/DL (ref 65–100)
GLUCOSE BLD STRIP.AUTO-MCNC: 196 MG/DL (ref 65–100)
GLUCOSE BLD STRIP.AUTO-MCNC: 198 MG/DL (ref 65–100)
GLUCOSE BLD STRIP.AUTO-MCNC: 201 MG/DL (ref 65–100)
GLUCOSE BLD STRIP.AUTO-MCNC: 203 MG/DL (ref 65–100)
GLUCOSE BLD STRIP.AUTO-MCNC: 205 MG/DL (ref 65–100)
GLUCOSE BLD STRIP.AUTO-MCNC: 205 MG/DL (ref 65–100)
GLUCOSE BLD STRIP.AUTO-MCNC: 210 MG/DL (ref 65–100)
GLUCOSE BLD STRIP.AUTO-MCNC: 215 MG/DL (ref 65–100)
GLUCOSE BLD STRIP.AUTO-MCNC: 216 MG/DL (ref 65–100)
GLUCOSE BLD STRIP.AUTO-MCNC: 224 MG/DL (ref 65–100)
GLUCOSE BLD STRIP.AUTO-MCNC: 24 MG/DL (ref 65–100)
GLUCOSE BLD STRIP.AUTO-MCNC: 286 MG/DL (ref 65–100)
GLUCOSE BLD STRIP.AUTO-MCNC: 45 MG/DL (ref 65–100)
GLUCOSE BLD STRIP.AUTO-MCNC: 46 MG/DL (ref 65–100)
GLUCOSE BLD STRIP.AUTO-MCNC: 47 MG/DL (ref 65–100)
GLUCOSE BLD STRIP.AUTO-MCNC: 60 MG/DL (ref 65–100)
GLUCOSE BLD STRIP.AUTO-MCNC: 71 MG/DL (ref 65–100)
GLUCOSE BLD STRIP.AUTO-MCNC: 76 MG/DL (ref 65–100)
GLUCOSE BLD STRIP.AUTO-MCNC: 79 MG/DL (ref 65–100)
GLUCOSE BLD STRIP.AUTO-MCNC: 79 MG/DL (ref 65–100)
GLUCOSE BLD STRIP.AUTO-MCNC: 81 MG/DL (ref 65–100)
GLUCOSE BLD STRIP.AUTO-MCNC: 83 MG/DL (ref 65–100)
GLUCOSE BLD STRIP.AUTO-MCNC: 83 MG/DL (ref 65–100)
GLUCOSE BLD STRIP.AUTO-MCNC: 86 MG/DL (ref 65–100)
GLUCOSE BLD STRIP.AUTO-MCNC: 87 MG/DL (ref 65–100)
GLUCOSE BLD STRIP.AUTO-MCNC: 88 MG/DL (ref 65–100)
GLUCOSE BLD STRIP.AUTO-MCNC: 88 MG/DL (ref 65–100)
GLUCOSE BLD STRIP.AUTO-MCNC: 89 MG/DL (ref 65–100)
GLUCOSE BLD STRIP.AUTO-MCNC: 90 MG/DL (ref 65–100)
GLUCOSE BLD STRIP.AUTO-MCNC: 92 MG/DL (ref 65–100)
GLUCOSE BLD STRIP.AUTO-MCNC: 96 MG/DL (ref 65–100)
GLUCOSE BLD STRIP.AUTO-MCNC: 96 MG/DL (ref 65–100)
GLUCOSE BLD STRIP.AUTO-MCNC: 97 MG/DL (ref 65–100)
GLUCOSE BLD STRIP.AUTO-MCNC: 98 MG/DL (ref 65–100)
GLUCOSE FLD-MCNC: 92 MG/DL
GLUCOSE SERPL-MCNC: 102 MG/DL (ref 65–100)
GLUCOSE SERPL-MCNC: 109 MG/DL (ref 65–100)
GLUCOSE SERPL-MCNC: 111 MG/DL (ref 65–100)
GLUCOSE SERPL-MCNC: 120 MG/DL (ref 65–100)
GLUCOSE SERPL-MCNC: 123 MG/DL (ref 65–100)
GLUCOSE SERPL-MCNC: 124 MG/DL (ref 65–100)
GLUCOSE SERPL-MCNC: 126 MG/DL (ref 65–100)
GLUCOSE SERPL-MCNC: 127 MG/DL (ref 65–100)
GLUCOSE SERPL-MCNC: 128 MG/DL (ref 65–100)
GLUCOSE SERPL-MCNC: 133 MG/DL (ref 65–100)
GLUCOSE SERPL-MCNC: 133 MG/DL (ref 65–100)
GLUCOSE SERPL-MCNC: 137 MG/DL (ref 65–100)
GLUCOSE SERPL-MCNC: 139 MG/DL (ref 65–100)
GLUCOSE SERPL-MCNC: 143 MG/DL (ref 65–100)
GLUCOSE SERPL-MCNC: 148 MG/DL (ref 65–100)
GLUCOSE SERPL-MCNC: 151 MG/DL (ref 65–100)
GLUCOSE SERPL-MCNC: 151 MG/DL (ref 65–100)
GLUCOSE SERPL-MCNC: 156 MG/DL (ref 65–100)
GLUCOSE SERPL-MCNC: 164 MG/DL (ref 65–100)
GLUCOSE SERPL-MCNC: 167 MG/DL (ref 65–100)
GLUCOSE SERPL-MCNC: 167 MG/DL (ref 65–100)
GLUCOSE SERPL-MCNC: 181 MG/DL (ref 65–100)
GLUCOSE SERPL-MCNC: 181 MG/DL (ref 65–100)
GLUCOSE SERPL-MCNC: 193 MG/DL (ref 65–100)
GLUCOSE SERPL-MCNC: 205 MG/DL (ref 65–100)
GLUCOSE SERPL-MCNC: 220 MG/DL (ref 65–100)
GLUCOSE SERPL-MCNC: 222 MG/DL (ref 65–100)
GLUCOSE SERPL-MCNC: 27 MG/DL (ref 65–100)
GLUCOSE SERPL-MCNC: 37 MG/DL (ref 65–100)
GLUCOSE SERPL-MCNC: 54 MG/DL (ref 65–100)
GLUCOSE SERPL-MCNC: 80 MG/DL (ref 65–100)
GLUCOSE SERPL-MCNC: 84 MG/DL (ref 65–100)
GLUCOSE SERPL-MCNC: 88 MG/DL (ref 65–100)
GLUCOSE SERPL-MCNC: 90 MG/DL (ref 65–100)
GLUCOSE SERPL-MCNC: 90 MG/DL (ref 65–100)
GLUCOSE SERPL-MCNC: 92 MG/DL (ref 65–100)
GLUCOSE SERPL-MCNC: 96 MG/DL (ref 65–100)
GLUCOSE UR STRIP.AUTO-MCNC: NEGATIVE MG/DL
GLUCOSE UR STRIP.AUTO-MCNC: NEGATIVE MG/DL
GRAM STN SPEC: NORMAL
GRAM STN SPEC: NORMAL
HAV IGM SERPL QL IA: NONREACTIVE
HBV CORE IGM SER QL: NONREACTIVE
HBV SURFACE AG SER QL: <0.1 INDEX
HBV SURFACE AG SER QL: NEGATIVE
HCO3 BLD-SCNC: 20.1 MMOL/L (ref 22–26)
HCO3 BLD-SCNC: 22.6 MMOL/L (ref 22–26)
HCO3 BLD-SCNC: 23.9 MMOL/L (ref 22–26)
HCO3 BLD-SCNC: 26.1 MMOL/L (ref 22–26)
HCO3 BLD-SCNC: 26.2 MMOL/L (ref 22–26)
HCO3 BLD-SCNC: 26.3 MMOL/L (ref 22–26)
HCO3 BLD-SCNC: 26.3 MMOL/L (ref 22–26)
HCO3 BLD-SCNC: 28.4 MMOL/L (ref 22–26)
HCO3 BLD-SCNC: 30 MMOL/L (ref 22–26)
HCO3 BLD-SCNC: 30.6 MMOL/L (ref 22–26)
HCO3 BLD-SCNC: 32.4 MMOL/L (ref 22–26)
HCO3 BLD-SCNC: 32.7 MMOL/L (ref 22–26)
HCO3 BLD-SCNC: 34.7 MMOL/L (ref 22–26)
HCO3 BLD-SCNC: 36.7 MMOL/L (ref 22–26)
HCO3 BLD-SCNC: 36.8 MMOL/L (ref 22–26)
HCO3 BLD-SCNC: 37.3 MMOL/L (ref 22–26)
HCO3 BLD-SCNC: 38.9 MMOL/L (ref 22–26)
HCO3 BLD-SCNC: 40.3 MMOL/L (ref 22–26)
HCO3 BLD-SCNC: 41.1 MMOL/L (ref 22–26)
HCO3 BLD-SCNC: 42.7 MMOL/L (ref 22–26)
HCO3 BLD-SCNC: 43.2 MMOL/L (ref 22–26)
HCT VFR BLD AUTO: 25.4 % (ref 35–47)
HCT VFR BLD AUTO: 26.2 % (ref 35–47)
HCT VFR BLD AUTO: 26.4 % (ref 35–47)
HCT VFR BLD AUTO: 27.2 % (ref 35–47)
HCT VFR BLD AUTO: 27.3 % (ref 35–47)
HCT VFR BLD AUTO: 27.6 % (ref 35–47)
HCT VFR BLD AUTO: 27.8 % (ref 35–47)
HCT VFR BLD AUTO: 28.1 % (ref 35–47)
HCT VFR BLD AUTO: 28.2 % (ref 35–47)
HCT VFR BLD AUTO: 29.2 % (ref 35–47)
HCT VFR BLD AUTO: 29.5 % (ref 35–47)
HCT VFR BLD AUTO: 29.8 % (ref 35–47)
HCT VFR BLD AUTO: 29.9 % (ref 35–47)
HCT VFR BLD AUTO: 30 % (ref 35–47)
HCT VFR BLD AUTO: 30 % (ref 35–47)
HCT VFR BLD AUTO: 30.2 % (ref 35–47)
HCT VFR BLD AUTO: 30.7 % (ref 35–47)
HCT VFR BLD AUTO: 30.9 % (ref 35–47)
HCT VFR BLD AUTO: 31 % (ref 35–47)
HCT VFR BLD AUTO: 31.1 % (ref 35–47)
HCT VFR BLD AUTO: 31.2 % (ref 35–47)
HCT VFR BLD AUTO: 31.6 % (ref 35–47)
HCT VFR BLD AUTO: 31.7 % (ref 35–47)
HCT VFR BLD AUTO: 31.7 % (ref 35–47)
HCT VFR BLD AUTO: 31.8 % (ref 35–47)
HCT VFR BLD AUTO: 32.3 % (ref 35–47)
HCT VFR BLD AUTO: 32.7 % (ref 35–47)
HCT VFR BLD AUTO: 33 % (ref 35–47)
HCT VFR BLD AUTO: 34.7 % (ref 35–47)
HCV AB SERPL QL IA: NONREACTIVE
HCV COMMENT,HCGAC: NORMAL
HGB BLD-MCNC: 10 G/DL (ref 11.5–16)
HGB BLD-MCNC: 10.1 G/DL (ref 11.5–16)
HGB BLD-MCNC: 10.1 G/DL (ref 11.5–16)
HGB BLD-MCNC: 10.2 G/DL (ref 11.5–16)
HGB BLD-MCNC: 10.4 G/DL (ref 11.5–16)
HGB BLD-MCNC: 10.7 G/DL (ref 11.5–16)
HGB BLD-MCNC: 7.8 G/DL (ref 11.5–16)
HGB BLD-MCNC: 8.2 G/DL (ref 11.5–16)
HGB BLD-MCNC: 8.4 G/DL (ref 11.5–16)
HGB BLD-MCNC: 8.5 G/DL (ref 11.5–16)
HGB BLD-MCNC: 8.5 G/DL (ref 11.5–16)
HGB BLD-MCNC: 8.6 G/DL (ref 11.5–16)
HGB BLD-MCNC: 8.8 G/DL (ref 11.5–16)
HGB BLD-MCNC: 9 G/DL (ref 11.5–16)
HGB BLD-MCNC: 9.1 G/DL (ref 11.5–16)
HGB BLD-MCNC: 9.4 G/DL (ref 11.5–16)
HGB BLD-MCNC: 9.5 G/DL (ref 11.5–16)
HGB BLD-MCNC: 9.6 G/DL (ref 11.5–16)
HGB BLD-MCNC: 9.6 G/DL (ref 11.5–16)
HGB BLD-MCNC: 9.7 G/DL (ref 11.5–16)
HGB BLD-MCNC: 9.8 G/DL (ref 11.5–16)
HGB BLD-MCNC: 9.8 G/DL (ref 11.5–16)
HGB BLD-MCNC: 9.9 G/DL (ref 11.5–16)
HGB UR QL STRIP: ABNORMAL
HGB UR QL STRIP: ABNORMAL
HYALINE CASTS URNS QL MICRO: ABNORMAL /LPF (ref 0–5)
INR BLD: NORMAL
INR PPP: 1.2 (ref 0.9–1.1)
INR PPP: 1.3 (ref 0.9–1.1)
INR PPP: 1.4 (ref 0.9–1.1)
INR PPP: 1.5 (ref 0.9–1.1)
INR PPP: 1.6 (ref 0.9–1.1)
INR PPP: 1.6 (ref 0.9–1.1)
INR PPP: 1.8 (ref 0.9–1.1)
INR PPP: 1.8 (ref 0.9–1.1)
INR PPP: 2.2 (ref 0.9–1.1)
INR PPP: 2.3 (ref 0.9–1.1)
INR PPP: 4.4 (ref 0.9–1.1)
INR PPP: 4.4 (ref 0.9–1.1)
INR PPP: 5.5 (ref 0.9–1.1)
INR, EXTERNAL: 2 (ref 2–3)
INSPIRATION.DURATION SETTING TIME VENT: 0.55 SEC
INSPIRATION.DURATION SETTING TIME VENT: 1 SEC
KETONES UR QL STRIP.AUTO: NEGATIVE MG/DL
KETONES UR QL STRIP.AUTO: NEGATIVE MG/DL
LACTATE SERPL-SCNC: 0.8 MMOL/L (ref 0.4–2)
LACTATE SERPL-SCNC: 1 MMOL/L (ref 0.4–2)
LACTATE SERPL-SCNC: 1.7 MMOL/L (ref 0.4–2)
LDH FLD L TO P-CCNC: 86 U/L
LEUKOCYTE ESTERASE UR QL STRIP.AUTO: ABNORMAL
LEUKOCYTE ESTERASE UR QL STRIP.AUTO: NEGATIVE
LIPASE SERPL-CCNC: 100 U/L (ref 73–393)
LIPASE SERPL-CCNC: 1109 U/L (ref 73–393)
LYMPHOCYTES # BLD AUTO: 10 % (ref 12–49)
LYMPHOCYTES # BLD AUTO: 11 % (ref 12–49)
LYMPHOCYTES # BLD AUTO: 12 % (ref 12–49)
LYMPHOCYTES # BLD AUTO: 12 % (ref 12–49)
LYMPHOCYTES # BLD AUTO: 13 % (ref 12–49)
LYMPHOCYTES # BLD AUTO: 13 % (ref 12–49)
LYMPHOCYTES # BLD AUTO: 14 % (ref 12–49)
LYMPHOCYTES # BLD AUTO: 14 % (ref 12–49)
LYMPHOCYTES # BLD AUTO: 15 % (ref 12–49)
LYMPHOCYTES # BLD AUTO: 16 % (ref 12–49)
LYMPHOCYTES # BLD AUTO: 16 % (ref 12–49)
LYMPHOCYTES # BLD AUTO: 17 % (ref 12–49)
LYMPHOCYTES # BLD AUTO: 20 % (ref 12–49)
LYMPHOCYTES # BLD AUTO: 21 % (ref 12–49)
LYMPHOCYTES # BLD AUTO: 21 % (ref 12–49)
LYMPHOCYTES # BLD AUTO: 23 % (ref 12–49)
LYMPHOCYTES # BLD AUTO: 27 % (ref 12–49)
LYMPHOCYTES # BLD AUTO: 7 % (ref 12–49)
LYMPHOCYTES # BLD AUTO: 7 % (ref 12–49)
LYMPHOCYTES # BLD AUTO: 9 % (ref 12–49)
LYMPHOCYTES # BLD: 0.2 K/UL (ref 0.8–3.5)
LYMPHOCYTES # BLD: 0.3 K/UL (ref 0.8–3.5)
LYMPHOCYTES # BLD: 0.4 K/UL (ref 0.8–3.5)
LYMPHOCYTES # BLD: 0.5 K/UL (ref 0.8–3.5)
LYMPHOCYTES # BLD: 0.6 K/UL (ref 0.8–3.5)
LYMPHOCYTES # BLD: 0.7 K/UL (ref 0.8–3.5)
LYMPHOCYTES # BLD: 0.8 K/UL (ref 0.8–3.5)
LYMPHOCYTES # BLD: 0.8 K/UL (ref 0.8–3.5)
LYMPHOCYTES # BLD: 1.1 K/UL (ref 0.8–3.5)
LYMPHOCYTES NFR FLD: 50 %
MAGNESIUM SERPL-MCNC: 1.6 MG/DL (ref 1.6–2.4)
MAGNESIUM SERPL-MCNC: 1.8 MG/DL (ref 1.6–2.4)
MAGNESIUM SERPL-MCNC: 1.9 MG/DL (ref 1.6–2.4)
MAGNESIUM SERPL-MCNC: 2 MG/DL (ref 1.6–2.4)
MAGNESIUM SERPL-MCNC: 2.1 MG/DL (ref 1.6–2.4)
MAGNESIUM SERPL-MCNC: 2.1 MG/DL (ref 1.6–2.4)
MAGNESIUM SERPL-MCNC: 2.2 MG/DL (ref 1.6–2.4)
MAGNESIUM SERPL-MCNC: 2.3 MG/DL (ref 1.6–2.4)
MAGNESIUM SERPL-MCNC: 2.4 MG/DL (ref 1.6–2.4)
MAGNESIUM SERPL-MCNC: 2.5 MG/DL (ref 1.6–2.4)
MAGNESIUM SERPL-MCNC: 2.7 MG/DL (ref 1.6–2.4)
MCH RBC QN AUTO: 21.9 PG (ref 26–34)
MCH RBC QN AUTO: 22.1 PG (ref 26–34)
MCH RBC QN AUTO: 22.1 PG (ref 26–34)
MCH RBC QN AUTO: 22.3 PG (ref 26–34)
MCH RBC QN AUTO: 22.4 PG (ref 26–34)
MCH RBC QN AUTO: 22.5 PG (ref 26–34)
MCH RBC QN AUTO: 22.7 PG (ref 26–34)
MCH RBC QN AUTO: 22.7 PG (ref 26–34)
MCH RBC QN AUTO: 22.9 PG (ref 26–34)
MCH RBC QN AUTO: 23 PG (ref 26–34)
MCH RBC QN AUTO: 23 PG (ref 26–34)
MCH RBC QN AUTO: 23.1 PG (ref 26–34)
MCH RBC QN AUTO: 23.2 PG (ref 26–34)
MCH RBC QN AUTO: 23.3 PG (ref 26–34)
MCH RBC QN AUTO: 23.4 PG (ref 26–34)
MCHC RBC AUTO-ENTMCNC: 29.8 G/DL (ref 30–36.5)
MCHC RBC AUTO-ENTMCNC: 30.2 G/DL (ref 30–36.5)
MCHC RBC AUTO-ENTMCNC: 30.4 G/DL (ref 30–36.5)
MCHC RBC AUTO-ENTMCNC: 30.5 G/DL (ref 30–36.5)
MCHC RBC AUTO-ENTMCNC: 30.6 G/DL (ref 30–36.5)
MCHC RBC AUTO-ENTMCNC: 30.7 G/DL (ref 30–36.5)
MCHC RBC AUTO-ENTMCNC: 30.8 G/DL (ref 30–36.5)
MCHC RBC AUTO-ENTMCNC: 30.8 G/DL (ref 30–36.5)
MCHC RBC AUTO-ENTMCNC: 30.9 G/DL (ref 30–36.5)
MCHC RBC AUTO-ENTMCNC: 31.1 G/DL (ref 30–36.5)
MCHC RBC AUTO-ENTMCNC: 31.1 G/DL (ref 30–36.5)
MCHC RBC AUTO-ENTMCNC: 31.3 G/DL (ref 30–36.5)
MCHC RBC AUTO-ENTMCNC: 31.3 G/DL (ref 30–36.5)
MCHC RBC AUTO-ENTMCNC: 31.4 G/DL (ref 30–36.5)
MCHC RBC AUTO-ENTMCNC: 31.5 G/DL (ref 30–36.5)
MCHC RBC AUTO-ENTMCNC: 31.6 G/DL (ref 30–36.5)
MCHC RBC AUTO-ENTMCNC: 31.7 G/DL (ref 30–36.5)
MCHC RBC AUTO-ENTMCNC: 31.8 G/DL (ref 30–36.5)
MCHC RBC AUTO-ENTMCNC: 31.9 G/DL (ref 30–36.5)
MCHC RBC AUTO-ENTMCNC: 32 G/DL (ref 30–36.5)
MCHC RBC AUTO-ENTMCNC: 32.1 G/DL (ref 30–36.5)
MCHC RBC AUTO-ENTMCNC: 32.2 G/DL (ref 30–36.5)
MCHC RBC AUTO-ENTMCNC: 32.4 G/DL (ref 30–36.5)
MCHC RBC AUTO-ENTMCNC: 32.7 G/DL (ref 30–36.5)
MCV RBC AUTO: 69.8 FL (ref 80–99)
MCV RBC AUTO: 70.2 FL (ref 80–99)
MCV RBC AUTO: 70.5 FL (ref 80–99)
MCV RBC AUTO: 70.6 FL (ref 80–99)
MCV RBC AUTO: 70.7 FL (ref 80–99)
MCV RBC AUTO: 70.8 FL (ref 80–99)
MCV RBC AUTO: 71.1 FL (ref 80–99)
MCV RBC AUTO: 71.6 FL (ref 80–99)
MCV RBC AUTO: 71.7 FL (ref 80–99)
MCV RBC AUTO: 71.7 FL (ref 80–99)
MCV RBC AUTO: 71.8 FL (ref 80–99)
MCV RBC AUTO: 72.2 FL (ref 80–99)
MCV RBC AUTO: 72.2 FL (ref 80–99)
MCV RBC AUTO: 72.6 FL (ref 80–99)
MCV RBC AUTO: 73 FL (ref 80–99)
MCV RBC AUTO: 73.4 FL (ref 80–99)
MCV RBC AUTO: 73.5 FL (ref 80–99)
MCV RBC AUTO: 73.6 FL (ref 80–99)
MCV RBC AUTO: 73.6 FL (ref 80–99)
MCV RBC AUTO: 74 FL (ref 80–99)
MCV RBC AUTO: 74 FL (ref 80–99)
MCV RBC AUTO: 74.3 FL (ref 80–99)
MCV RBC AUTO: 74.4 FL (ref 80–99)
MCV RBC AUTO: 74.4 FL (ref 80–99)
MCV RBC AUTO: 74.8 FL (ref 80–99)
MCV RBC AUTO: 75 FL (ref 80–99)
MCV RBC AUTO: 75.1 FL (ref 80–99)
MCV RBC AUTO: 75.4 FL (ref 80–99)
MCV RBC AUTO: 76 FL (ref 80–99)
MESOTHL CELL NFR FLD: 10 %
MONOCYTES # BLD: 0.1 K/UL (ref 0–1)
MONOCYTES # BLD: 0.2 K/UL (ref 0–1)
MONOCYTES # BLD: 0.3 K/UL (ref 0–1)
MONOCYTES # BLD: 0.4 K/UL (ref 0–1)
MONOCYTES # BLD: 0.5 K/UL (ref 0–1)
MONOCYTES # BLD: 0.6 K/UL (ref 0–1)
MONOCYTES # BLD: 0.7 K/UL (ref 0–1)
MONOCYTES # BLD: 0.9 K/UL (ref 0–1)
MONOCYTES # BLD: 1.2 K/UL (ref 0–1)
MONOCYTES NFR BLD AUTO: 10 % (ref 5–13)
MONOCYTES NFR BLD AUTO: 11 % (ref 5–13)
MONOCYTES NFR BLD AUTO: 12 % (ref 5–13)
MONOCYTES NFR BLD AUTO: 12 % (ref 5–13)
MONOCYTES NFR BLD AUTO: 14 % (ref 5–13)
MONOCYTES NFR BLD AUTO: 14 % (ref 5–13)
MONOCYTES NFR BLD AUTO: 15 % (ref 5–13)
MONOCYTES NFR BLD AUTO: 15 % (ref 5–13)
MONOCYTES NFR BLD AUTO: 16 % (ref 5–13)
MONOCYTES NFR BLD AUTO: 16 % (ref 5–13)
MONOCYTES NFR BLD AUTO: 17 % (ref 5–13)
MONOCYTES NFR BLD AUTO: 17 % (ref 5–13)
MONOCYTES NFR BLD AUTO: 18 % (ref 5–13)
MONOCYTES NFR BLD AUTO: 18 % (ref 5–13)
MONOCYTES NFR BLD AUTO: 24 % (ref 5–13)
MONOCYTES NFR BLD AUTO: 5 % (ref 5–13)
MONOCYTES NFR BLD AUTO: 6 % (ref 5–13)
MONOCYTES NFR BLD AUTO: 7 % (ref 5–13)
MONOCYTES NFR BLD AUTO: 7 % (ref 5–13)
MONOCYTES NFR BLD AUTO: 8 % (ref 5–13)
MONOCYTES NFR BLD AUTO: 8 % (ref 5–13)
MONOCYTES NFR BLD AUTO: 9 % (ref 5–13)
MONOS+MACROS NFR FLD: 12 %
NEUTS BAND NFR BLD MANUAL: 1 % (ref 0–6)
NEUTS SEG # BLD: 1 K/UL (ref 1.8–8)
NEUTS SEG # BLD: 1.1 K/UL (ref 1.8–8)
NEUTS SEG # BLD: 1.3 K/UL (ref 1.8–8)
NEUTS SEG # BLD: 1.5 K/UL (ref 1.8–8)
NEUTS SEG # BLD: 1.6 K/UL (ref 1.8–8)
NEUTS SEG # BLD: 1.8 K/UL (ref 1.8–8)
NEUTS SEG # BLD: 1.8 K/UL (ref 1.8–8)
NEUTS SEG # BLD: 2 K/UL (ref 1.8–8)
NEUTS SEG # BLD: 2 K/UL (ref 1.8–8)
NEUTS SEG # BLD: 2.2 K/UL (ref 1.8–8)
NEUTS SEG # BLD: 2.3 K/UL (ref 1.8–8)
NEUTS SEG # BLD: 2.8 K/UL (ref 1.8–8)
NEUTS SEG # BLD: 2.8 K/UL (ref 1.8–8)
NEUTS SEG # BLD: 2.9 K/UL (ref 1.8–8)
NEUTS SEG # BLD: 3 K/UL (ref 1.8–8)
NEUTS SEG # BLD: 3 K/UL (ref 1.8–8)
NEUTS SEG # BLD: 3.1 K/UL (ref 1.8–8)
NEUTS SEG # BLD: 3.5 K/UL (ref 1.8–8)
NEUTS SEG # BLD: 4.2 K/UL (ref 1.8–8)
NEUTS SEG # BLD: 4.9 K/UL (ref 1.8–8)
NEUTS SEG NFR BLD AUTO: 58 % (ref 32–75)
NEUTS SEG NFR BLD AUTO: 59 % (ref 32–75)
NEUTS SEG NFR BLD AUTO: 59 % (ref 32–75)
NEUTS SEG NFR BLD AUTO: 60 % (ref 32–75)
NEUTS SEG NFR BLD AUTO: 61 % (ref 32–75)
NEUTS SEG NFR BLD AUTO: 64 % (ref 32–75)
NEUTS SEG NFR BLD AUTO: 65 % (ref 32–75)
NEUTS SEG NFR BLD AUTO: 67 % (ref 32–75)
NEUTS SEG NFR BLD AUTO: 69 % (ref 32–75)
NEUTS SEG NFR BLD AUTO: 69 % (ref 32–75)
NEUTS SEG NFR BLD AUTO: 70 % (ref 32–75)
NEUTS SEG NFR BLD AUTO: 71 % (ref 32–75)
NEUTS SEG NFR BLD AUTO: 73 % (ref 32–75)
NEUTS SEG NFR BLD AUTO: 74 % (ref 32–75)
NEUTS SEG NFR BLD AUTO: 75 % (ref 32–75)
NEUTS SEG NFR BLD AUTO: 76 % (ref 32–75)
NEUTS SEG NFR BLD AUTO: 77 % (ref 32–75)
NEUTS SEG NFR BLD AUTO: 79 % (ref 32–75)
NEUTS SEG NFR BLD AUTO: 84 % (ref 32–75)
NEUTS SEG NFR BLD AUTO: 84 % (ref 32–75)
NEUTS SEG NFR FLD: 28 %
NITRITE UR QL STRIP.AUTO: NEGATIVE
NITRITE UR QL STRIP.AUTO: NEGATIVE
NRBC # BLD: 0.02 K/UL (ref 0–0.01)
NRBC # BLD: 0.03 K/UL (ref 0–0.01)
NRBC # BLD: 0.03 K/UL (ref 0–0.01)
NRBC # BLD: 0.04 K/UL (ref 0–0.01)
NRBC # BLD: 0.05 K/UL (ref 0–0.01)
NRBC # BLD: 0.06 K/UL (ref 0–0.01)
NRBC # BLD: 0.07 K/UL (ref 0–0.01)
NRBC # BLD: 0.08 K/UL (ref 0–0.01)
NRBC # BLD: 0.1 K/UL (ref 0–0.01)
NRBC # BLD: 0.13 K/UL (ref 0–0.01)
NRBC # BLD: 0.14 K/UL (ref 0–0.01)
NRBC # BLD: 0.15 K/UL (ref 0–0.01)
NRBC # BLD: 0.16 K/UL (ref 0–0.01)
NRBC # BLD: 0.17 K/UL (ref 0–0.01)
NRBC # BLD: 0.17 K/UL (ref 0–0.01)
NRBC # BLD: 0.19 K/UL (ref 0–0.01)
NRBC # BLD: 0.23 K/UL (ref 0–0.01)
NRBC # BLD: 0.26 K/UL (ref 0–0.01)
NRBC # BLD: 0.31 K/UL (ref 0–0.01)
NRBC # BLD: 0.99 K/UL (ref 0–0.01)
NRBC BLD-RTO: 0.5 PER 100 WBC
NRBC BLD-RTO: 0.6 PER 100 WBC
NRBC BLD-RTO: 0.8 PER 100 WBC
NRBC BLD-RTO: 1.1 PER 100 WBC
NRBC BLD-RTO: 1.2 PER 100 WBC
NRBC BLD-RTO: 1.3 PER 100 WBC
NRBC BLD-RTO: 1.3 PER 100 WBC
NRBC BLD-RTO: 1.5 PER 100 WBC
NRBC BLD-RTO: 1.6 PER 100 WBC
NRBC BLD-RTO: 14.3 PER 100 WBC
NRBC BLD-RTO: 2 PER 100 WBC
NRBC BLD-RTO: 2 PER 100 WBC
NRBC BLD-RTO: 2.7 PER 100 WBC
NRBC BLD-RTO: 3.2 PER 100 WBC
NRBC BLD-RTO: 3.4 PER 100 WBC
NRBC BLD-RTO: 3.4 PER 100 WBC
NRBC BLD-RTO: 3.5 PER 100 WBC
NRBC BLD-RTO: 4.5 PER 100 WBC
NRBC BLD-RTO: 4.9 PER 100 WBC
NRBC BLD-RTO: 5.6 PER 100 WBC
NRBC BLD-RTO: 5.8 PER 100 WBC
NRBC BLD-RTO: 6.2 PER 100 WBC
NRBC BLD-RTO: 6.5 PER 100 WBC
NRBC BLD-RTO: 6.6 PER 100 WBC
NRBC BLD-RTO: 9 PER 100 WBC
NUC CELL # FLD: 80 /CU MM (ref 0–5)
O2/TOTAL GAS SETTING VFR VENT: 0.5 %
O2/TOTAL GAS SETTING VFR VENT: 100 %
O2/TOTAL GAS SETTING VFR VENT: 100 %
O2/TOTAL GAS SETTING VFR VENT: 30 %
O2/TOTAL GAS SETTING VFR VENT: 35 %
O2/TOTAL GAS SETTING VFR VENT: 40 %
O2/TOTAL GAS SETTING VFR VENT: 50 %
O2/TOTAL GAS SETTING VFR VENT: 50 %
O2/TOTAL GAS SETTING VFR VENT: 60 %
PCO2 BLD: 26.2 MMHG (ref 35–45)
PCO2 BLD: 31.3 MMHG (ref 35–45)
PCO2 BLD: 32.5 MMHG (ref 35–45)
PCO2 BLD: 35.4 MMHG (ref 35–45)
PCO2 BLD: 37.2 MMHG (ref 35–45)
PCO2 BLD: 43 MMHG (ref 35–45)
PCO2 BLD: 44.9 MMHG (ref 35–45)
PCO2 BLD: 47.5 MMHG (ref 35–45)
PCO2 BLD: 53.2 MMHG (ref 35–45)
PCO2 BLD: 55.5 MMHG (ref 35–45)
PCO2 BLD: 55.9 MMHG (ref 35–45)
PCO2 BLD: 56.7 MMHG (ref 35–45)
PCO2 BLD: 57.2 MMHG (ref 35–45)
PCO2 BLD: 59.7 MMHG (ref 35–45)
PCO2 BLD: 61.6 MMHG (ref 35–45)
PCO2 BLD: 62 MMHG (ref 35–45)
PCO2 BLD: 62.5 MMHG (ref 35–45)
PCO2 BLD: 65.2 MMHG (ref 35–45)
PCO2 BLD: 69.8 MMHG (ref 35–45)
PCO2 BLD: 77 MMHG (ref 35–45)
PCO2 BLD: 78.3 MMHG (ref 35–45)
PCO2 BLD: >99 MMHG (ref 35–45)
PEEP RESPIRATORY: 5 CMH2O
PEEP RESPIRATORY: 6 CMH2O
PH BLD: 7 [PH] (ref 7.35–7.45)
PH BLD: 7.17 [PH] (ref 7.35–7.45)
PH BLD: 7.18 [PH] (ref 7.35–7.45)
PH BLD: 7.27 [PH] (ref 7.35–7.45)
PH BLD: 7.28 [PH] (ref 7.35–7.45)
PH BLD: 7.33 [PH] (ref 7.35–7.45)
PH BLD: 7.34 [PH] (ref 7.35–7.45)
PH BLD: 7.34 [PH] (ref 7.35–7.45)
PH BLD: 7.36 [PH] (ref 7.35–7.45)
PH BLD: 7.38 [PH] (ref 7.35–7.45)
PH BLD: 7.39 [PH] (ref 7.35–7.45)
PH BLD: 7.42 [PH] (ref 7.35–7.45)
PH BLD: 7.42 [PH] (ref 7.35–7.45)
PH BLD: 7.43 [PH] (ref 7.35–7.45)
PH BLD: 7.45 [PH] (ref 7.35–7.45)
PH BLD: 7.49 [PH] (ref 7.35–7.45)
PH BLD: 7.53 [PH] (ref 7.35–7.45)
PH BLD: 7.54 [PH] (ref 7.35–7.45)
PH BLD: 7.66 [PH] (ref 7.35–7.45)
PH BLD: 7.67 [PH] (ref 7.35–7.45)
PH UR STRIP: 5.5 [PH] (ref 5–8)
PH UR STRIP: 6 [PH] (ref 5–8)
PHOSPHATE SERPL-MCNC: 1.6 MG/DL (ref 2.6–4.7)
PHOSPHATE SERPL-MCNC: 1.6 MG/DL (ref 2.6–4.7)
PHOSPHATE SERPL-MCNC: 1.8 MG/DL (ref 2.6–4.7)
PHOSPHATE SERPL-MCNC: 1.9 MG/DL (ref 2.6–4.7)
PHOSPHATE SERPL-MCNC: 1.9 MG/DL (ref 2.6–4.7)
PHOSPHATE SERPL-MCNC: 2 MG/DL (ref 2.6–4.7)
PHOSPHATE SERPL-MCNC: 2.1 MG/DL (ref 2.6–4.7)
PHOSPHATE SERPL-MCNC: 2.2 MG/DL (ref 2.6–4.7)
PHOSPHATE SERPL-MCNC: 2.2 MG/DL (ref 2.6–4.7)
PHOSPHATE SERPL-MCNC: 2.3 MG/DL (ref 2.6–4.7)
PHOSPHATE SERPL-MCNC: 2.4 MG/DL (ref 2.6–4.7)
PHOSPHATE SERPL-MCNC: 2.5 MG/DL (ref 2.6–4.7)
PHOSPHATE SERPL-MCNC: 2.8 MG/DL (ref 2.6–4.7)
PHOSPHATE SERPL-MCNC: 3.2 MG/DL (ref 2.6–4.7)
PHOSPHATE SERPL-MCNC: 3.7 MG/DL (ref 2.6–4.7)
PHOSPHATE SERPL-MCNC: 4.4 MG/DL (ref 2.6–4.7)
PHOSPHATE SERPL-MCNC: 4.6 MG/DL (ref 2.6–4.7)
PIP ISTAT,IPIP: 10
PIP ISTAT,IPIP: 13
PIP ISTAT,IPIP: 14
PIP ISTAT,IPIP: 15
PIP ISTAT,IPIP: 20
PIP ISTAT,IPIP: 22
PIP ISTAT,IPIP: 24
PIP ISTAT,IPIP: 28
PIP ISTAT,IPIP: 28
PLATELET # BLD AUTO: 101 K/UL (ref 150–400)
PLATELET # BLD AUTO: 106 K/UL (ref 150–400)
PLATELET # BLD AUTO: 113 K/UL (ref 150–400)
PLATELET # BLD AUTO: 116 K/UL (ref 150–400)
PLATELET # BLD AUTO: 118 K/UL (ref 150–400)
PLATELET # BLD AUTO: 121 K/UL (ref 150–400)
PLATELET # BLD AUTO: 121 K/UL (ref 150–400)
PLATELET # BLD AUTO: 122 K/UL (ref 150–400)
PLATELET # BLD AUTO: 123 K/UL (ref 150–400)
PLATELET # BLD AUTO: 126 K/UL (ref 150–400)
PLATELET # BLD AUTO: 139 K/UL (ref 150–400)
PLATELET # BLD AUTO: 141 K/UL (ref 150–400)
PLATELET # BLD AUTO: 157 K/UL (ref 150–400)
PLATELET # BLD AUTO: 44 K/UL (ref 150–400)
PLATELET # BLD AUTO: 44 K/UL (ref 150–400)
PLATELET # BLD AUTO: 47 K/UL (ref 150–400)
PLATELET # BLD AUTO: 51 K/UL (ref 150–400)
PLATELET # BLD AUTO: 54 K/UL (ref 150–400)
PLATELET # BLD AUTO: 54 K/UL (ref 150–400)
PLATELET # BLD AUTO: 61 K/UL (ref 150–400)
PLATELET # BLD AUTO: 67 K/UL (ref 150–400)
PLATELET # BLD AUTO: 71 K/UL (ref 150–400)
PLATELET # BLD AUTO: 74 K/UL (ref 150–400)
PLATELET # BLD AUTO: 76 K/UL (ref 150–400)
PLATELET # BLD AUTO: 80 K/UL (ref 150–400)
PLATELET # BLD AUTO: 88 K/UL (ref 150–400)
PLATELET # BLD AUTO: 97 K/UL (ref 150–400)
PLATELET COMMENTS,PCOM: ABNORMAL
PO2 BLD: 105 MMHG (ref 80–100)
PO2 BLD: 107 MMHG (ref 80–100)
PO2 BLD: 139 MMHG (ref 80–100)
PO2 BLD: 188 MMHG (ref 80–100)
PO2 BLD: 227 MMHG (ref 80–100)
PO2 BLD: 342 MMHG (ref 80–100)
PO2 BLD: 38 MMHG (ref 80–100)
PO2 BLD: 58 MMHG (ref 80–100)
PO2 BLD: 69 MMHG (ref 80–100)
PO2 BLD: 70 MMHG (ref 80–100)
PO2 BLD: 70 MMHG (ref 80–100)
PO2 BLD: 73 MMHG (ref 80–100)
PO2 BLD: 75 MMHG (ref 80–100)
PO2 BLD: 75 MMHG (ref 80–100)
PO2 BLD: 78 MMHG (ref 80–100)
PO2 BLD: 81 MMHG (ref 80–100)
PO2 BLD: 85 MMHG (ref 80–100)
PO2 BLD: 87 MMHG (ref 80–100)
PO2 BLD: 94 MMHG (ref 80–100)
PO2 BLD: 95 MMHG (ref 80–100)
PO2 BLD: 97 MMHG (ref 80–100)
PO2 BLD: 98 MMHG (ref 80–100)
POTASSIUM SERPL-SCNC: 2.8 MMOL/L (ref 3.5–5.1)
POTASSIUM SERPL-SCNC: 2.9 MMOL/L (ref 3.5–5.1)
POTASSIUM SERPL-SCNC: 3 MMOL/L (ref 3.5–5.1)
POTASSIUM SERPL-SCNC: 3.1 MMOL/L (ref 3.5–5.1)
POTASSIUM SERPL-SCNC: 3.2 MMOL/L (ref 3.5–5.1)
POTASSIUM SERPL-SCNC: 3.2 MMOL/L (ref 3.5–5.1)
POTASSIUM SERPL-SCNC: 3.3 MMOL/L (ref 3.5–5.1)
POTASSIUM SERPL-SCNC: 3.4 MMOL/L (ref 3.5–5.1)
POTASSIUM SERPL-SCNC: 3.5 MMOL/L (ref 3.5–5.1)
POTASSIUM SERPL-SCNC: 3.7 MMOL/L (ref 3.5–5.1)
POTASSIUM SERPL-SCNC: 3.8 MMOL/L (ref 3.5–5.1)
POTASSIUM SERPL-SCNC: 3.8 MMOL/L (ref 3.5–5.1)
POTASSIUM SERPL-SCNC: 3.9 MMOL/L (ref 3.5–5.1)
POTASSIUM SERPL-SCNC: 4.1 MMOL/L (ref 3.5–5.1)
POTASSIUM SERPL-SCNC: 4.3 MMOL/L (ref 3.5–5.1)
POTASSIUM SERPL-SCNC: 4.4 MMOL/L (ref 3.5–5.1)
POTASSIUM SERPL-SCNC: 4.4 MMOL/L (ref 3.5–5.1)
POTASSIUM SERPL-SCNC: 4.5 MMOL/L (ref 3.5–5.1)
POTASSIUM SERPL-SCNC: 4.7 MMOL/L (ref 3.5–5.1)
POTASSIUM SERPL-SCNC: 5.7 MMOL/L (ref 3.5–5.1)
PRESSURE SUPPORT SETTING VENT: 5 CMH2O
PRESSURE SUPPORT SETTING VENT: 7 CMH2O
PRESSURE SUPPORT SETTING VENT: 8 CMH2O
PROCALCITONIN SERPL-MCNC: 0.06 NG/ML (ref 0–0.08)
PROT FLD-MCNC: 1.3 G/DL
PROT SERPL-MCNC: 5.4 G/DL (ref 6.4–8.2)
PROT SERPL-MCNC: 5.4 G/DL (ref 6.4–8.2)
PROT SERPL-MCNC: 5.5 G/DL (ref 6.4–8.2)
PROT SERPL-MCNC: 5.6 G/DL (ref 6.4–8.2)
PROT SERPL-MCNC: 5.7 G/DL (ref 6.4–8.2)
PROT SERPL-MCNC: 5.8 G/DL (ref 6.4–8.2)
PROT SERPL-MCNC: 5.8 G/DL (ref 6.4–8.2)
PROT SERPL-MCNC: 6 G/DL (ref 6.4–8.2)
PROT SERPL-MCNC: 6.1 G/DL (ref 6.4–8.2)
PROT SERPL-MCNC: 6.1 G/DL (ref 6.4–8.2)
PROT SERPL-MCNC: 6.2 G/DL (ref 6.4–8.2)
PROT SERPL-MCNC: 6.4 G/DL (ref 6.4–8.2)
PROT SERPL-MCNC: 6.4 G/DL (ref 6.4–8.2)
PROT SERPL-MCNC: 6.5 G/DL (ref 6.4–8.2)
PROT SERPL-MCNC: 6.9 G/DL (ref 6.4–8.2)
PROT SERPL-MCNC: 7 G/DL (ref 6.4–8.2)
PROT UR STRIP-MCNC: 100 MG/DL
PROT UR STRIP-MCNC: 100 MG/DL
PROTHROMBIN TIME: 12.6 SEC (ref 9–11.1)
PROTHROMBIN TIME: 12.9 SEC (ref 9–11.1)
PROTHROMBIN TIME: 13 SEC (ref 9–11.1)
PROTHROMBIN TIME: 13.4 SEC (ref 9–11.1)
PROTHROMBIN TIME: 13.6 SEC (ref 9–11.1)
PROTHROMBIN TIME: 14.2 SEC (ref 9–11.1)
PROTHROMBIN TIME: 14.4 SEC (ref 9–11.1)
PROTHROMBIN TIME: 14.8 SEC (ref 9–11.1)
PROTHROMBIN TIME: 14.8 SEC (ref 9–11.1)
PROTHROMBIN TIME: 16.4 SEC (ref 9–11.1)
PROTHROMBIN TIME: 16.5 SEC (ref 9–11.1)
PROTHROMBIN TIME: 17.9 SEC (ref 9–11.1)
PROTHROMBIN TIME: 18 SEC (ref 9–11.1)
PROTHROMBIN TIME: 22 SEC (ref 9–11.1)
PROTHROMBIN TIME: 23.1 SEC (ref 9–11.1)
PROTHROMBIN TIME: 46.7 SEC (ref 9–11.1)
PROTHROMBIN TIME: 46.9 SEC (ref 9–11.1)
PROTHROMBIN TIME: 58.7 SEC (ref 9–11.1)
PT POC: NORMAL SEC
PTH-INTACT SERPL-MCNC: 361.2 PG/ML (ref 14–72)
Q-T INTERVAL, ECG07: 480 MS
QRS DURATION, ECG06: 146 MS
QTC CALCULATION (BEZET), ECG08: 553 MS
RBC # BLD AUTO: 3.5 M/UL (ref 3.8–5.2)
RBC # BLD AUTO: 3.66 M/UL (ref 3.8–5.2)
RBC # BLD AUTO: 3.74 M/UL (ref 3.8–5.2)
RBC # BLD AUTO: 3.81 M/UL (ref 3.8–5.2)
RBC # BLD AUTO: 3.85 M/UL (ref 3.8–5.2)
RBC # BLD AUTO: 3.86 M/UL (ref 3.8–5.2)
RBC # BLD AUTO: 3.88 M/UL (ref 3.8–5.2)
RBC # BLD AUTO: 3.89 M/UL (ref 3.8–5.2)
RBC # BLD AUTO: 3.91 M/UL (ref 3.8–5.2)
RBC # BLD AUTO: 4.01 M/UL (ref 3.8–5.2)
RBC # BLD AUTO: 4.04 M/UL (ref 3.8–5.2)
RBC # BLD AUTO: 4.06 M/UL (ref 3.8–5.2)
RBC # BLD AUTO: 4.07 M/UL (ref 3.8–5.2)
RBC # BLD AUTO: 4.11 M/UL (ref 3.8–5.2)
RBC # BLD AUTO: 4.17 M/UL (ref 3.8–5.2)
RBC # BLD AUTO: 4.18 M/UL (ref 3.8–5.2)
RBC # BLD AUTO: 4.18 M/UL (ref 3.8–5.2)
RBC # BLD AUTO: 4.19 M/UL (ref 3.8–5.2)
RBC # BLD AUTO: 4.24 M/UL (ref 3.8–5.2)
RBC # BLD AUTO: 4.26 M/UL (ref 3.8–5.2)
RBC # BLD AUTO: 4.3 M/UL (ref 3.8–5.2)
RBC # BLD AUTO: 4.36 M/UL (ref 3.8–5.2)
RBC # BLD AUTO: 4.37 M/UL (ref 3.8–5.2)
RBC # BLD AUTO: 4.39 M/UL (ref 3.8–5.2)
RBC # BLD AUTO: 4.43 M/UL (ref 3.8–5.2)
RBC # BLD AUTO: 4.46 M/UL (ref 3.8–5.2)
RBC # BLD AUTO: 4.62 M/UL (ref 3.8–5.2)
RBC # FLD: >100 /CU MM
RBC #/AREA URNS HPF: >100 /HPF (ref 0–5)
RBC #/AREA URNS HPF: ABNORMAL /HPF (ref 0–5)
RBC MORPH BLD: ABNORMAL
REPORTED DOSE,DOSE: ABNORMAL UNITS
REPORTED DOSE/TIME,TMG: ABNORMAL
SAO2 % BLD: 100 % (ref 92–97)
SAO2 % BLD: 72 % (ref 92–97)
SAO2 % BLD: 90 % (ref 92–97)
SAO2 % BLD: 91 % (ref 92–97)
SAO2 % BLD: 93 % (ref 92–97)
SAO2 % BLD: 94 % (ref 92–97)
SAO2 % BLD: 95 % (ref 92–97)
SAO2 % BLD: 96 % (ref 92–97)
SAO2 % BLD: 97 % (ref 92–97)
SAO2 % BLD: 98 % (ref 92–97)
SAO2 % BLD: 99 % (ref 92–97)
SERVICE CMNT-IMP: ABNORMAL
SERVICE CMNT-IMP: NORMAL
SODIUM SERPL-SCNC: 135 MMOL/L (ref 136–145)
SODIUM SERPL-SCNC: 136 MMOL/L (ref 136–145)
SODIUM SERPL-SCNC: 136 MMOL/L (ref 136–145)
SODIUM SERPL-SCNC: 137 MMOL/L (ref 136–145)
SODIUM SERPL-SCNC: 139 MMOL/L (ref 136–145)
SODIUM SERPL-SCNC: 139 MMOL/L (ref 136–145)
SODIUM SERPL-SCNC: 140 MMOL/L (ref 136–145)
SODIUM SERPL-SCNC: 141 MMOL/L (ref 136–145)
SODIUM SERPL-SCNC: 141 MMOL/L (ref 136–145)
SODIUM SERPL-SCNC: 142 MMOL/L (ref 136–145)
SODIUM SERPL-SCNC: 143 MMOL/L (ref 136–145)
SODIUM SERPL-SCNC: 144 MMOL/L (ref 136–145)
SODIUM SERPL-SCNC: 145 MMOL/L (ref 136–145)
SODIUM SERPL-SCNC: 148 MMOL/L (ref 136–145)
SODIUM SERPL-SCNC: 149 MMOL/L (ref 136–145)
SODIUM SERPL-SCNC: 150 MMOL/L (ref 136–145)
SODIUM SERPL-SCNC: 150 MMOL/L (ref 136–145)
SODIUM SERPL-SCNC: 151 MMOL/L (ref 136–145)
SP GR UR REFRACTOMETRY: 1.02 (ref 1–1.03)
SP GR UR REFRACTOMETRY: 1.02 (ref 1–1.03)
SP1: NORMAL
SP2: NORMAL
SP3: NORMAL
SPECIMEN EXP DATE BLD: NORMAL
SPECIMEN SOURCE FLD: ABNORMAL
SPECIMEN SOURCE FLD: NORMAL
SPECIMEN TYPE: ABNORMAL
SPONTANEOUS TIMED, IST: YES
STATUS OF UNIT,%ST: NORMAL
T4 FREE SERPL-MCNC: 2.4 NG/DL (ref 0.8–1.5)
THERAPEUTIC RANGE,PTTT: ABNORMAL SECS (ref 58–77)
TOTAL RESP. RATE, ITRR: 12
TOTAL RESP. RATE, ITRR: 16
TOTAL RESP. RATE, ITRR: 18
TOTAL RESP. RATE, ITRR: 19
TOTAL RESP. RATE, ITRR: 19
TOTAL RESP. RATE, ITRR: 21
TOTAL RESP. RATE, ITRR: 22
TOTAL RESP. RATE, ITRR: 24
TOTAL RESP. RATE, ITRR: 26
TOTAL RESP. RATE, ITRR: 32
TOTAL RESP. RATE, ITRR: 33
TOTAL RESP. RATE, ITRR: 35
TOTAL RESP. RATE, ITRR: 8.6
TROPONIN I SERPL-MCNC: 0.11 NG/ML
TROPONIN I SERPL-MCNC: 0.12 NG/ML
TROPONIN I SERPL-MCNC: 0.2 NG/ML
TROPONIN I SERPL-MCNC: 0.26 NG/ML
TSH SERPL DL<=0.05 MIU/L-ACNC: 0.07 UIU/ML (ref 0.36–3.74)
TSH SERPL DL<=0.05 MIU/L-ACNC: 0.71 UIU/ML (ref 0.36–3.74)
UA: UC IF INDICATED,UAUC: ABNORMAL
UA: UC IF INDICATED,UAUC: ABNORMAL
UNIT DIVISION, %UDIV: 0
UROBILINOGEN UR QL STRIP.AUTO: 1 EU/DL (ref 0.2–1)
UROBILINOGEN UR QL STRIP.AUTO: 1 EU/DL (ref 0.2–1)
VALID INTERNAL CONTROL?: YES
VANCOMYCIN TROUGH SERPL-MCNC: 16 UG/ML (ref 5–10)
VANCOMYCIN TROUGH SERPL-MCNC: 18.1 UG/ML (ref 5–10)
VANCOMYCIN TROUGH SERPL-MCNC: 20.9 UG/ML (ref 5–10)
VENTILATION MODE VENT: ABNORMAL
VENTRICULAR RATE, ECG03: 80 BPM
VIT B12 SERPL-MCNC: 1023 PG/ML (ref 211–911)
VT SETTING VENT: 350 ML
VT SETTING VENT: 350 ML
VT SETTING VENT: 400 ML
VT SETTING VENT: 450 ML
VT SETTING VENT: 450 ML
WBC # BLD AUTO: 1.4 K/UL (ref 3.6–11)
WBC # BLD AUTO: 1.7 K/UL (ref 3.6–11)
WBC # BLD AUTO: 2.2 K/UL (ref 3.6–11)
WBC # BLD AUTO: 2.3 K/UL (ref 3.6–11)
WBC # BLD AUTO: 2.4 K/UL (ref 3.6–11)
WBC # BLD AUTO: 2.5 K/UL (ref 3.6–11)
WBC # BLD AUTO: 2.8 K/UL (ref 3.6–11)
WBC # BLD AUTO: 2.8 K/UL (ref 3.6–11)
WBC # BLD AUTO: 2.9 K/UL (ref 3.6–11)
WBC # BLD AUTO: 3.1 K/UL (ref 3.6–11)
WBC # BLD AUTO: 3.3 K/UL (ref 3.6–11)
WBC # BLD AUTO: 3.5 K/UL (ref 3.6–11)
WBC # BLD AUTO: 3.5 K/UL (ref 3.6–11)
WBC # BLD AUTO: 3.6 K/UL (ref 3.6–11)
WBC # BLD AUTO: 3.6 K/UL (ref 3.6–11)
WBC # BLD AUTO: 3.7 K/UL (ref 3.6–11)
WBC # BLD AUTO: 3.8 K/UL (ref 3.6–11)
WBC # BLD AUTO: 3.8 K/UL (ref 3.6–11)
WBC # BLD AUTO: 3.9 K/UL (ref 3.6–11)
WBC # BLD AUTO: 4 K/UL (ref 3.6–11)
WBC # BLD AUTO: 4.1 K/UL (ref 3.6–11)
WBC # BLD AUTO: 4.1 K/UL (ref 3.6–11)
WBC # BLD AUTO: 4.2 K/UL (ref 3.6–11)
WBC # BLD AUTO: 4.2 K/UL (ref 3.6–11)
WBC # BLD AUTO: 4.4 K/UL (ref 3.6–11)
WBC # BLD AUTO: 6.2 K/UL (ref 3.6–11)
WBC # BLD AUTO: 6.9 K/UL (ref 3.6–11)
WBC MORPH BLD: ABNORMAL
WBC NRBC COR # BLD: ABNORMAL 10*3/UL
WBC URNS QL MICRO: ABNORMAL /HPF (ref 0–4)
WBC URNS QL MICRO: ABNORMAL /HPF (ref 0–4)

## 2017-01-01 PROCEDURE — 65660000000 HC RM CCU STEPDOWN

## 2017-01-01 PROCEDURE — 74011000250 HC RX REV CODE- 250: Performed by: INTERNAL MEDICINE

## 2017-01-01 PROCEDURE — 74011250637 HC RX REV CODE- 250/637: Performed by: SURGERY

## 2017-01-01 PROCEDURE — 94003 VENT MGMT INPAT SUBQ DAY: CPT

## 2017-01-01 PROCEDURE — 74011250636 HC RX REV CODE- 250/636: Performed by: INTERNAL MEDICINE

## 2017-01-01 PROCEDURE — 97530 THERAPEUTIC ACTIVITIES: CPT

## 2017-01-01 PROCEDURE — 74011000250 HC RX REV CODE- 250: Performed by: NURSE PRACTITIONER

## 2017-01-01 PROCEDURE — 74011250636 HC RX REV CODE- 250/636: Performed by: NURSE PRACTITIONER

## 2017-01-01 PROCEDURE — 74011000258 HC RX REV CODE- 258: Performed by: INTERNAL MEDICINE

## 2017-01-01 PROCEDURE — 86880 COOMBS TEST DIRECT: CPT | Performed by: NURSE PRACTITIONER

## 2017-01-01 PROCEDURE — 80202 ASSAY OF VANCOMYCIN: CPT | Performed by: INTERNAL MEDICINE

## 2017-01-01 PROCEDURE — 94660 CPAP INITIATION&MGMT: CPT

## 2017-01-01 PROCEDURE — 94640 AIRWAY INHALATION TREATMENT: CPT

## 2017-01-01 PROCEDURE — 83735 ASSAY OF MAGNESIUM: CPT | Performed by: INTERNAL MEDICINE

## 2017-01-01 PROCEDURE — 71010 XR CHEST PORT: CPT

## 2017-01-01 PROCEDURE — 77030012879 HC MSK CPAP FLL FAC PHIL -B

## 2017-01-01 PROCEDURE — 74011250636 HC RX REV CODE- 250/636: Performed by: HOSPITALIST

## 2017-01-01 PROCEDURE — 74011250637 HC RX REV CODE- 250/637: Performed by: SPECIALIST

## 2017-01-01 PROCEDURE — 82962 GLUCOSE BLOOD TEST: CPT

## 2017-01-01 PROCEDURE — 74011636637 HC RX REV CODE- 636/637: Performed by: HOSPITALIST

## 2017-01-01 PROCEDURE — 83880 ASSAY OF NATRIURETIC PEPTIDE: CPT | Performed by: SPECIALIST

## 2017-01-01 PROCEDURE — 82803 BLOOD GASES ANY COMBINATION: CPT

## 2017-01-01 PROCEDURE — 74011250637 HC RX REV CODE- 250/637: Performed by: HOSPITALIST

## 2017-01-01 PROCEDURE — 85610 PROTHROMBIN TIME: CPT | Performed by: INTERNAL MEDICINE

## 2017-01-01 PROCEDURE — 77030004952 HC CATH ENTRL RADPQ VIAS -B

## 2017-01-01 PROCEDURE — 36415 COLL VENOUS BLD VENIPUNCTURE: CPT | Performed by: STUDENT IN AN ORGANIZED HEALTH CARE EDUCATION/TRAINING PROGRAM

## 2017-01-01 PROCEDURE — 86900 BLOOD TYPING SEROLOGIC ABO: CPT | Performed by: SPECIALIST

## 2017-01-01 PROCEDURE — 80053 COMPREHEN METABOLIC PANEL: CPT | Performed by: FAMILY MEDICINE

## 2017-01-01 PROCEDURE — C9113 INJ PANTOPRAZOLE SODIUM, VIA: HCPCS | Performed by: INTERNAL MEDICINE

## 2017-01-01 PROCEDURE — 82945 GLUCOSE OTHER FLUID: CPT | Performed by: INTERNAL MEDICINE

## 2017-01-01 PROCEDURE — 74011250637 HC RX REV CODE- 250/637: Performed by: INTERNAL MEDICINE

## 2017-01-01 PROCEDURE — G0299 HHS/HOSPICE OF RN EA 15 MIN: HCPCS

## 2017-01-01 PROCEDURE — 65610000006 HC RM INTENSIVE CARE

## 2017-01-01 PROCEDURE — 36415 COLL VENOUS BLD VENIPUNCTURE: CPT | Performed by: INTERNAL MEDICINE

## 2017-01-01 PROCEDURE — 85025 COMPLETE CBC W/AUTO DIFF WBC: CPT | Performed by: STUDENT IN AN ORGANIZED HEALTH CARE EDUCATION/TRAINING PROGRAM

## 2017-01-01 PROCEDURE — 0W9930Z DRAINAGE OF RIGHT PLEURAL CAVITY WITH DRAINAGE DEVICE, PERCUTANEOUS APPROACH: ICD-10-PCS | Performed by: RADIOLOGY

## 2017-01-01 PROCEDURE — 80048 BASIC METABOLIC PNL TOTAL CA: CPT | Performed by: INTERNAL MEDICINE

## 2017-01-01 PROCEDURE — 77030011256 HC DRSG MEPILEX <16IN NO BORD MOLN -A

## 2017-01-01 PROCEDURE — 74011250636 HC RX REV CODE- 250/636: Performed by: STUDENT IN AN ORGANIZED HEALTH CARE EDUCATION/TRAINING PROGRAM

## 2017-01-01 PROCEDURE — 82607 VITAMIN B-12: CPT | Performed by: INTERNAL MEDICINE

## 2017-01-01 PROCEDURE — 74011000250 HC RX REV CODE- 250: Performed by: EMERGENCY MEDICINE

## 2017-01-01 PROCEDURE — 84100 ASSAY OF PHOSPHORUS: CPT | Performed by: INTERNAL MEDICINE

## 2017-01-01 PROCEDURE — 36600 WITHDRAWAL OF ARTERIAL BLOOD: CPT

## 2017-01-01 PROCEDURE — 77030018719 HC DRSG PTCH ANTIMIC J&J -A

## 2017-01-01 PROCEDURE — 93005 ELECTROCARDIOGRAM TRACING: CPT

## 2017-01-01 PROCEDURE — 02HV33Z INSERTION OF INFUSION DEVICE INTO SUPERIOR VENA CAVA, PERCUTANEOUS APPROACH: ICD-10-PCS | Performed by: ANESTHESIOLOGY

## 2017-01-01 PROCEDURE — 74011250636 HC RX REV CODE- 250/636: Performed by: SPECIALIST

## 2017-01-01 PROCEDURE — 97165 OT EVAL LOW COMPLEX 30 MIN: CPT

## 2017-01-01 PROCEDURE — 76450000000

## 2017-01-01 PROCEDURE — 3E0G76Z INTRODUCTION OF NUTRITIONAL SUBSTANCE INTO UPPER GI, VIA NATURAL OR ARTIFICIAL OPENING: ICD-10-PCS | Performed by: HOSPITALIST

## 2017-01-01 PROCEDURE — 65270000029 HC RM PRIVATE

## 2017-01-01 PROCEDURE — 83605 ASSAY OF LACTIC ACID: CPT | Performed by: EMERGENCY MEDICINE

## 2017-01-01 PROCEDURE — 80048 BASIC METABOLIC PNL TOTAL CA: CPT | Performed by: NURSE PRACTITIONER

## 2017-01-01 PROCEDURE — 0651 HSPC ROUTINE HOME CARE

## 2017-01-01 PROCEDURE — 51798 US URINE CAPACITY MEASURE: CPT

## 2017-01-01 PROCEDURE — 97110 THERAPEUTIC EXERCISES: CPT

## 2017-01-01 PROCEDURE — 74011000258 HC RX REV CODE- 258: Performed by: FAMILY MEDICINE

## 2017-01-01 PROCEDURE — 83735 ASSAY OF MAGNESIUM: CPT | Performed by: NURSE PRACTITIONER

## 2017-01-01 PROCEDURE — 87205 SMEAR GRAM STAIN: CPT | Performed by: INTERNAL MEDICINE

## 2017-01-01 PROCEDURE — G8987 SELF CARE CURRENT STATUS: HCPCS

## 2017-01-01 PROCEDURE — P9016 RBC LEUKOCYTES REDUCED: HCPCS | Performed by: SPECIALIST

## 2017-01-01 PROCEDURE — 74011250637 HC RX REV CODE- 250/637: Performed by: NURSE PRACTITIONER

## 2017-01-01 PROCEDURE — 93312 ECHO TRANSESOPHAGEAL: CPT

## 2017-01-01 PROCEDURE — 36415 COLL VENOUS BLD VENIPUNCTURE: CPT | Performed by: EMERGENCY MEDICINE

## 2017-01-01 PROCEDURE — 70450 CT HEAD/BRAIN W/O DYE: CPT

## 2017-01-01 PROCEDURE — 77010033678 HC OXYGEN DAILY

## 2017-01-01 PROCEDURE — 36592 COLLECT BLOOD FROM PICC: CPT

## 2017-01-01 PROCEDURE — 74011250637 HC RX REV CODE- 250/637: Performed by: PHYSICIAN ASSISTANT

## 2017-01-01 PROCEDURE — 80076 HEPATIC FUNCTION PANEL: CPT | Performed by: INTERNAL MEDICINE

## 2017-01-01 PROCEDURE — 80053 COMPREHEN METABOLIC PANEL: CPT | Performed by: INTERNAL MEDICINE

## 2017-01-01 PROCEDURE — 96375 TX/PRO/DX INJ NEW DRUG ADDON: CPT

## 2017-01-01 PROCEDURE — 85610 PROTHROMBIN TIME: CPT | Performed by: HOSPITALIST

## 2017-01-01 PROCEDURE — 77030029684 HC NEB SM VOL KT MONA -A

## 2017-01-01 PROCEDURE — 36415 COLL VENOUS BLD VENIPUNCTURE: CPT | Performed by: FAMILY MEDICINE

## 2017-01-01 PROCEDURE — 74011250637 HC RX REV CODE- 250/637: Performed by: STUDENT IN AN ORGANIZED HEALTH CARE EDUCATION/TRAINING PROGRAM

## 2017-01-01 PROCEDURE — 80048 BASIC METABOLIC PNL TOTAL CA: CPT | Performed by: HOSPITALIST

## 2017-01-01 PROCEDURE — 74000 XR ABD PORT  1 V: CPT

## 2017-01-01 PROCEDURE — 65620000000 HC RM CCU GENERAL

## 2017-01-01 PROCEDURE — 74011000250 HC RX REV CODE- 250: Performed by: SURGERY

## 2017-01-01 PROCEDURE — 80053 COMPREHEN METABOLIC PANEL: CPT | Performed by: HOSPITALIST

## 2017-01-01 PROCEDURE — 96361 HYDRATE IV INFUSION ADD-ON: CPT

## 2017-01-01 PROCEDURE — 74011000250 HC RX REV CODE- 250: Performed by: HOSPITALIST

## 2017-01-01 PROCEDURE — 74011000250 HC RX REV CODE- 250: Performed by: ANESTHESIOLOGY

## 2017-01-01 PROCEDURE — 83735 ASSAY OF MAGNESIUM: CPT | Performed by: HOSPITALIST

## 2017-01-01 PROCEDURE — 87116 MYCOBACTERIA CULTURE: CPT | Performed by: INTERNAL MEDICINE

## 2017-01-01 PROCEDURE — 82746 ASSAY OF FOLIC ACID SERUM: CPT | Performed by: INTERNAL MEDICINE

## 2017-01-01 PROCEDURE — 77030018798 HC PMP KT ENTRL FED COVD -A

## 2017-01-01 PROCEDURE — 77030004950 HC CATH ENTRL NG COVD -A

## 2017-01-01 PROCEDURE — 36415 COLL VENOUS BLD VENIPUNCTURE: CPT | Performed by: HOSPITALIST

## 2017-01-01 PROCEDURE — 90686 IIV4 VACC NO PRSV 0.5 ML IM: CPT | Performed by: HOSPITALIST

## 2017-01-01 PROCEDURE — 74011000250 HC RX REV CODE- 250: Performed by: SPECIALIST

## 2017-01-01 PROCEDURE — 85730 THROMBOPLASTIN TIME PARTIAL: CPT | Performed by: INTERNAL MEDICINE

## 2017-01-01 PROCEDURE — 85025 COMPLETE CBC W/AUTO DIFF WBC: CPT | Performed by: INTERNAL MEDICINE

## 2017-01-01 PROCEDURE — P9047 ALBUMIN (HUMAN), 25%, 50ML: HCPCS | Performed by: INTERNAL MEDICINE

## 2017-01-01 PROCEDURE — 99233 SBSQ HOSP IP/OBS HIGH 50: CPT | Performed by: INTERNAL MEDICINE

## 2017-01-01 PROCEDURE — 77030011229 HC DIL VESL COON COOK -A

## 2017-01-01 PROCEDURE — 76604 US EXAM CHEST: CPT

## 2017-01-01 PROCEDURE — 80048 BASIC METABOLIC PNL TOTAL CA: CPT | Performed by: STUDENT IN AN ORGANIZED HEALTH CARE EDUCATION/TRAINING PROGRAM

## 2017-01-01 PROCEDURE — 84484 ASSAY OF TROPONIN QUANT: CPT | Performed by: FAMILY MEDICINE

## 2017-01-01 PROCEDURE — 84157 ASSAY OF PROTEIN OTHER: CPT | Performed by: INTERNAL MEDICINE

## 2017-01-01 PROCEDURE — 85025 COMPLETE CBC W/AUTO DIFF WBC: CPT | Performed by: HOSPITALIST

## 2017-01-01 PROCEDURE — 76937 US GUIDE VASCULAR ACCESS: CPT

## 2017-01-01 PROCEDURE — 84100 ASSAY OF PHOSPHORUS: CPT | Performed by: SPECIALIST

## 2017-01-01 PROCEDURE — 77030029131 HC ADMN ST IV BLD N DEHP ICUM -B

## 2017-01-01 PROCEDURE — 77030011943

## 2017-01-01 PROCEDURE — 5A1945Z RESPIRATORY VENTILATION, 24-96 CONSECUTIVE HOURS: ICD-10-PCS | Performed by: INTERNAL MEDICINE

## 2017-01-01 PROCEDURE — 3336500001 HSPC ELECTION

## 2017-01-01 PROCEDURE — 36569 INSJ PICC 5 YR+ W/O IMAGING: CPT | Performed by: INTERNAL MEDICINE

## 2017-01-01 PROCEDURE — 74011250636 HC RX REV CODE- 250/636: Performed by: FAMILY MEDICINE

## 2017-01-01 PROCEDURE — 77030032490 HC SLV COMPR SCD KNE COVD -B

## 2017-01-01 PROCEDURE — 92526 ORAL FUNCTION THERAPY: CPT | Performed by: SPEECH-LANGUAGE PATHOLOGIST

## 2017-01-01 PROCEDURE — 83880 ASSAY OF NATRIURETIC PEPTIDE: CPT | Performed by: INTERNAL MEDICINE

## 2017-01-01 PROCEDURE — 88112 CYTOPATH CELL ENHANCE TECH: CPT | Performed by: INTERNAL MEDICINE

## 2017-01-01 PROCEDURE — 83690 ASSAY OF LIPASE: CPT | Performed by: EMERGENCY MEDICINE

## 2017-01-01 PROCEDURE — P9059 PLASMA, FRZ BETWEEN 8-24HOUR: HCPCS | Performed by: INTERNAL MEDICINE

## 2017-01-01 PROCEDURE — 97535 SELF CARE MNGMENT TRAINING: CPT

## 2017-01-01 PROCEDURE — 77030034850

## 2017-01-01 PROCEDURE — C1751 CATH, INF, PER/CENT/MIDLINE: HCPCS

## 2017-01-01 PROCEDURE — 74000 XR ABD (KUB): CPT

## 2017-01-01 PROCEDURE — 89050 BODY FLUID CELL COUNT: CPT | Performed by: INTERNAL MEDICINE

## 2017-01-01 PROCEDURE — 97161 PT EVAL LOW COMPLEX 20 MIN: CPT

## 2017-01-01 PROCEDURE — 85027 COMPLETE CBC AUTOMATED: CPT | Performed by: HOSPITALIST

## 2017-01-01 PROCEDURE — 77030020365 HC SOL INJ SOD CL 0.9% 50ML

## 2017-01-01 PROCEDURE — 36430 TRANSFUSION BLD/BLD COMPNT: CPT

## 2017-01-01 PROCEDURE — 77030012793 HC CIRC VNTLTR FISP -B

## 2017-01-01 PROCEDURE — 80053 COMPREHEN METABOLIC PANEL: CPT | Performed by: SPECIALIST

## 2017-01-01 PROCEDURE — 84100 ASSAY OF PHOSPHORUS: CPT | Performed by: HOSPITALIST

## 2017-01-01 PROCEDURE — 77030018729 HC ELECTRD DEFIB PAD CARD -B

## 2017-01-01 PROCEDURE — 74011250636 HC RX REV CODE- 250/636: Performed by: ANESTHESIOLOGY

## 2017-01-01 PROCEDURE — 74011636320 HC RX REV CODE- 636/320: Performed by: EMERGENCY MEDICINE

## 2017-01-01 PROCEDURE — 81001 URINALYSIS AUTO W/SCOPE: CPT | Performed by: FAMILY MEDICINE

## 2017-01-01 PROCEDURE — 92610 EVALUATE SWALLOWING FUNCTION: CPT

## 2017-01-01 PROCEDURE — 65660000001 HC RM ICU INTERMED STEPDOWN

## 2017-01-01 PROCEDURE — 99223 1ST HOSP IP/OBS HIGH 75: CPT | Performed by: INTERNAL MEDICINE

## 2017-01-01 PROCEDURE — 97168 OT RE-EVAL EST PLAN CARE: CPT

## 2017-01-01 PROCEDURE — 74011250636 HC RX REV CODE- 250/636

## 2017-01-01 PROCEDURE — 85610 PROTHROMBIN TIME: CPT | Performed by: FAMILY MEDICINE

## 2017-01-01 PROCEDURE — 77030008477 HC STYL SATN SLP COVD -A

## 2017-01-01 PROCEDURE — 85027 COMPLETE CBC AUTOMATED: CPT | Performed by: INTERNAL MEDICINE

## 2017-01-01 PROCEDURE — 93306 TTE W/DOPPLER COMPLETE: CPT

## 2017-01-01 PROCEDURE — 77030008683 HC TU ET CUF COVD -A

## 2017-01-01 PROCEDURE — 85025 COMPLETE CBC W/AUTO DIFF WBC: CPT | Performed by: EMERGENCY MEDICINE

## 2017-01-01 PROCEDURE — 85025 COMPLETE CBC W/AUTO DIFF WBC: CPT | Performed by: FAMILY MEDICINE

## 2017-01-01 PROCEDURE — 74011000258 HC RX REV CODE- 258: Performed by: EMERGENCY MEDICINE

## 2017-01-01 PROCEDURE — 83880 ASSAY OF NATRIURETIC PEPTIDE: CPT | Performed by: NURSE PRACTITIONER

## 2017-01-01 PROCEDURE — 84100 ASSAY OF PHOSPHORUS: CPT | Performed by: NURSE PRACTITIONER

## 2017-01-01 PROCEDURE — 74177 CT ABD & PELVIS W/CONTRAST: CPT

## 2017-01-01 PROCEDURE — 83605 ASSAY OF LACTIC ACID: CPT | Performed by: FAMILY MEDICINE

## 2017-01-01 PROCEDURE — 80053 COMPREHEN METABOLIC PANEL: CPT | Performed by: EMERGENCY MEDICINE

## 2017-01-01 PROCEDURE — 80076 HEPATIC FUNCTION PANEL: CPT | Performed by: NURSE PRACTITIONER

## 2017-01-01 PROCEDURE — 32557 INSERT CATH PLEURA W/ IMAGE: CPT

## 2017-01-01 PROCEDURE — 99152 MOD SED SAME PHYS/QHP 5/>YRS: CPT

## 2017-01-01 PROCEDURE — 77030013140 HC MSK NEB VYRM -A

## 2017-01-01 PROCEDURE — 31500 INSERT EMERGENCY AIRWAY: CPT

## 2017-01-01 PROCEDURE — 86900 BLOOD TYPING SEROLOGIC ABO: CPT | Performed by: INTERNAL MEDICINE

## 2017-01-01 PROCEDURE — 84484 ASSAY OF TROPONIN QUANT: CPT | Performed by: NURSE PRACTITIONER

## 2017-01-01 PROCEDURE — 80048 BASIC METABOLIC PNL TOTAL CA: CPT | Performed by: SPECIALIST

## 2017-01-01 PROCEDURE — 87040 BLOOD CULTURE FOR BACTERIA: CPT | Performed by: EMERGENCY MEDICINE

## 2017-01-01 PROCEDURE — 74011636320 HC RX REV CODE- 636/320: Performed by: HOSPITALIST

## 2017-01-01 PROCEDURE — 90471 IMMUNIZATION ADMIN: CPT

## 2017-01-01 PROCEDURE — 87040 BLOOD CULTURE FOR BACTERIA: CPT | Performed by: INTERNAL MEDICINE

## 2017-01-01 PROCEDURE — 83690 ASSAY OF LIPASE: CPT | Performed by: INTERNAL MEDICINE

## 2017-01-01 PROCEDURE — C1769 GUIDE WIRE: HCPCS

## 2017-01-01 PROCEDURE — 85027 COMPLETE CBC AUTOMATED: CPT | Performed by: SPECIALIST

## 2017-01-01 PROCEDURE — 93970 EXTREMITY STUDY: CPT

## 2017-01-01 PROCEDURE — 83970 ASSAY OF PARATHORMONE: CPT | Performed by: NURSE PRACTITIONER

## 2017-01-01 PROCEDURE — 36415 COLL VENOUS BLD VENIPUNCTURE: CPT | Performed by: SPECIALIST

## 2017-01-01 PROCEDURE — 77030020847 HC STATLOK BARD -A

## 2017-01-01 PROCEDURE — 94002 VENT MGMT INPAT INIT DAY: CPT

## 2017-01-01 PROCEDURE — 82140 ASSAY OF AMMONIA: CPT | Performed by: INTERNAL MEDICINE

## 2017-01-01 PROCEDURE — 02HV33Z INSERTION OF INFUSION DEVICE INTO SUPERIOR VENA CAVA, PERCUTANEOUS APPROACH: ICD-10-PCS | Performed by: HOSPITALIST

## 2017-01-01 PROCEDURE — 84145 PROCALCITONIN (PCT): CPT | Performed by: INTERNAL MEDICINE

## 2017-01-01 PROCEDURE — 92610 EVALUATE SWALLOWING FUNCTION: CPT | Performed by: SPEECH-LANGUAGE PATHOLOGIST

## 2017-01-01 PROCEDURE — 30233K1 TRANSFUSION OF NONAUTOLOGOUS FROZEN PLASMA INTO PERIPHERAL VEIN, PERCUTANEOUS APPROACH: ICD-10-PCS | Performed by: INTERNAL MEDICINE

## 2017-01-01 PROCEDURE — 71275 CT ANGIOGRAPHY CHEST: CPT

## 2017-01-01 PROCEDURE — 36415 COLL VENOUS BLD VENIPUNCTURE: CPT | Performed by: NURSE PRACTITIONER

## 2017-01-01 PROCEDURE — 77030012390 HC DRN CHST BTL GTNG -B

## 2017-01-01 PROCEDURE — 86923 COMPATIBILITY TEST ELECTRIC: CPT | Performed by: SPECIALIST

## 2017-01-01 PROCEDURE — 99285 EMERGENCY DEPT VISIT HI MDM: CPT

## 2017-01-01 PROCEDURE — 82533 TOTAL CORTISOL: CPT | Performed by: INTERNAL MEDICINE

## 2017-01-01 PROCEDURE — 84443 ASSAY THYROID STIM HORMONE: CPT | Performed by: NURSE PRACTITIONER

## 2017-01-01 PROCEDURE — P9045 ALBUMIN (HUMAN), 5%, 250 ML: HCPCS | Performed by: EMERGENCY MEDICINE

## 2017-01-01 PROCEDURE — 82310 ASSAY OF CALCIUM: CPT | Performed by: NURSE PRACTITIONER

## 2017-01-01 PROCEDURE — 74011250636 HC RX REV CODE- 250/636: Performed by: EMERGENCY MEDICINE

## 2017-01-01 PROCEDURE — 99232 SBSQ HOSP IP/OBS MODERATE 35: CPT | Performed by: INTERNAL MEDICINE

## 2017-01-01 PROCEDURE — 87086 URINE CULTURE/COLONY COUNT: CPT | Performed by: FAMILY MEDICINE

## 2017-01-01 PROCEDURE — 74011000258 HC RX REV CODE- 258: Performed by: HOSPITALIST

## 2017-01-01 PROCEDURE — C1729 CATH, DRAINAGE: HCPCS

## 2017-01-01 PROCEDURE — 74011000250 HC RX REV CODE- 250

## 2017-01-01 PROCEDURE — 84443 ASSAY THYROID STIM HORMONE: CPT | Performed by: INTERNAL MEDICINE

## 2017-01-01 PROCEDURE — 77030018836 HC SOL IRR NACL ICUM -A

## 2017-01-01 PROCEDURE — 96374 THER/PROPH/DIAG INJ IV PUSH: CPT

## 2017-01-01 PROCEDURE — 76705 ECHO EXAM OF ABDOMEN: CPT

## 2017-01-01 PROCEDURE — 81001 URINALYSIS AUTO W/SCOPE: CPT | Performed by: EMERGENCY MEDICINE

## 2017-01-01 PROCEDURE — 83615 LACTATE (LD) (LDH) ENZYME: CPT | Performed by: INTERNAL MEDICINE

## 2017-01-01 PROCEDURE — 84484 ASSAY OF TROPONIN QUANT: CPT | Performed by: INTERNAL MEDICINE

## 2017-01-01 PROCEDURE — 83735 ASSAY OF MAGNESIUM: CPT | Performed by: SPECIALIST

## 2017-01-01 PROCEDURE — 0BH17EZ INSERTION OF ENDOTRACHEAL AIRWAY INTO TRACHEA, VIA NATURAL OR ARTIFICIAL OPENING: ICD-10-PCS | Performed by: ANESTHESIOLOGY

## 2017-01-01 PROCEDURE — G8988 SELF CARE GOAL STATUS: HCPCS

## 2017-01-01 PROCEDURE — 84439 ASSAY OF FREE THYROXINE: CPT | Performed by: NURSE PRACTITIONER

## 2017-01-01 PROCEDURE — 77030005208 HC CATH HLDR GLWC -A

## 2017-01-01 PROCEDURE — 83605 ASSAY OF LACTIC ACID: CPT | Performed by: NURSE PRACTITIONER

## 2017-01-01 PROCEDURE — 80074 ACUTE HEPATITIS PANEL: CPT | Performed by: INTERNAL MEDICINE

## 2017-01-01 PROCEDURE — 30233N1 TRANSFUSION OF NONAUTOLOGOUS RED BLOOD CELLS INTO PERIPHERAL VEIN, PERCUTANEOUS APPROACH: ICD-10-PCS | Performed by: SPECIALIST

## 2017-01-01 PROCEDURE — 83880 ASSAY OF NATRIURETIC PEPTIDE: CPT | Performed by: HOSPITALIST

## 2017-01-01 PROCEDURE — B24BZZ4 ULTRASONOGRAPHY OF HEART WITH AORTA, TRANSESOPHAGEAL: ICD-10-PCS | Performed by: SPECIALIST

## 2017-01-01 RX ORDER — SODIUM CHLORIDE 0.9 % (FLUSH) 0.9 %
5-10 SYRINGE (ML) INJECTION EVERY 8 HOURS
Status: DISCONTINUED | OUTPATIENT
Start: 2017-01-01 | End: 2017-01-01

## 2017-01-01 RX ORDER — IPRATROPIUM BROMIDE AND ALBUTEROL SULFATE 2.5; .5 MG/3ML; MG/3ML
SOLUTION RESPIRATORY (INHALATION)
Status: DISPENSED
Start: 2017-01-01 | End: 2017-01-01

## 2017-01-01 RX ORDER — BUMETANIDE 0.25 MG/ML
2 INJECTION INTRAMUSCULAR; INTRAVENOUS 3 TIMES DAILY
Status: DISCONTINUED | OUTPATIENT
Start: 2017-01-01 | End: 2017-01-01

## 2017-01-01 RX ORDER — SUCCINYLCHOLINE CHLORIDE 20 MG/ML
INJECTION INTRAMUSCULAR; INTRAVENOUS
Status: COMPLETED
Start: 2017-01-01 | End: 2017-01-01

## 2017-01-01 RX ORDER — NALOXONE HYDROCHLORIDE 0.4 MG/ML
INJECTION, SOLUTION INTRAMUSCULAR; INTRAVENOUS; SUBCUTANEOUS
Status: DISPENSED
Start: 2017-01-01 | End: 2017-01-01

## 2017-01-01 RX ORDER — FACIAL-BODY WIPES
10 EACH TOPICAL DAILY PRN
Status: DISCONTINUED | OUTPATIENT
Start: 2017-01-01 | End: 2017-01-01 | Stop reason: HOSPADM

## 2017-01-01 RX ORDER — ONDANSETRON 2 MG/ML
4 INJECTION INTRAMUSCULAR; INTRAVENOUS
Status: DISCONTINUED | OUTPATIENT
Start: 2017-01-01 | End: 2017-01-01

## 2017-01-01 RX ORDER — GLYCOPYRROLATE 0.2 MG/ML
0.2 INJECTION INTRAMUSCULAR; INTRAVENOUS
Status: DISCONTINUED | OUTPATIENT
Start: 2017-01-01 | End: 2017-01-01 | Stop reason: HOSPADM

## 2017-01-01 RX ORDER — FENTANYL CITRATE 50 UG/ML
25-200 INJECTION, SOLUTION INTRAMUSCULAR; INTRAVENOUS
Status: DISCONTINUED | OUTPATIENT
Start: 2017-01-01 | End: 2017-01-01

## 2017-01-01 RX ORDER — DOCUSATE SODIUM 100 MG/1
100 CAPSULE, LIQUID FILLED ORAL 2 TIMES DAILY
Status: DISCONTINUED | OUTPATIENT
Start: 2017-01-01 | End: 2017-01-01

## 2017-01-01 RX ORDER — BUMETANIDE 0.25 MG/ML
2 INJECTION INTRAMUSCULAR; INTRAVENOUS 2 TIMES DAILY
Status: DISCONTINUED | OUTPATIENT
Start: 2017-01-01 | End: 2017-01-01

## 2017-01-01 RX ORDER — SODIUM CHLORIDE 0.9 % (FLUSH) 0.9 %
5-10 SYRINGE (ML) INJECTION
Status: DISCONTINUED | OUTPATIENT
Start: 2017-01-01 | End: 2017-01-01 | Stop reason: HOSPADM

## 2017-01-01 RX ORDER — ATROPINE SULFATE 0.1 MG/ML
.5-1 INJECTION INTRAVENOUS
Status: DISCONTINUED | OUTPATIENT
Start: 2017-01-01 | End: 2017-01-01

## 2017-01-01 RX ORDER — IPRATROPIUM BROMIDE AND ALBUTEROL SULFATE 2.5; .5 MG/3ML; MG/3ML
3 SOLUTION RESPIRATORY (INHALATION) 3 TIMES DAILY
Status: DISCONTINUED | OUTPATIENT
Start: 2017-01-01 | End: 2017-01-01

## 2017-01-01 RX ORDER — SODIUM CHLORIDE 0.9 % (FLUSH) 0.9 %
5 SYRINGE (ML) INJECTION AS NEEDED
Status: DISCONTINUED | OUTPATIENT
Start: 2017-01-01 | End: 2017-01-01 | Stop reason: HOSPADM

## 2017-01-01 RX ORDER — MORPHINE SULFATE 2 MG/ML
2 INJECTION, SOLUTION INTRAMUSCULAR; INTRAVENOUS EVERY 4 HOURS
Status: DISCONTINUED | OUTPATIENT
Start: 2017-01-01 | End: 2017-01-01 | Stop reason: HOSPADM

## 2017-01-01 RX ORDER — FUROSEMIDE 10 MG/ML
40 INJECTION INTRAMUSCULAR; INTRAVENOUS DAILY
Status: COMPLETED | OUTPATIENT
Start: 2017-01-01 | End: 2017-01-01

## 2017-01-01 RX ORDER — SODIUM CHLORIDE 0.9 % (FLUSH) 0.9 %
5-10 SYRINGE (ML) INJECTION AS NEEDED
Status: DISCONTINUED | OUTPATIENT
Start: 2017-01-01 | End: 2017-01-01 | Stop reason: HOSPADM

## 2017-01-01 RX ORDER — ACETAMINOPHEN 325 MG/1
650 TABLET ORAL
Status: DISCONTINUED | OUTPATIENT
Start: 2017-01-01 | End: 2017-01-01 | Stop reason: HOSPADM

## 2017-01-01 RX ORDER — POTASSIUM CHLORIDE 29.8 MG/ML
20 INJECTION INTRAVENOUS ONCE
Status: COMPLETED | OUTPATIENT
Start: 2017-01-01 | End: 2017-01-01

## 2017-01-01 RX ORDER — SODIUM CHLORIDE 0.9 % (FLUSH) 0.9 %
10 SYRINGE (ML) INJECTION
Status: COMPLETED | OUTPATIENT
Start: 2017-01-01 | End: 2017-01-01

## 2017-01-01 RX ORDER — POTASSIUM CHLORIDE 29.8 MG/ML
20 INJECTION INTRAVENOUS
Status: COMPLETED | OUTPATIENT
Start: 2017-01-01 | End: 2017-01-01

## 2017-01-01 RX ORDER — PANTOPRAZOLE SODIUM 40 MG/1
40 TABLET, DELAYED RELEASE ORAL
Status: DISCONTINUED | OUTPATIENT
Start: 2017-01-01 | End: 2017-01-01

## 2017-01-01 RX ORDER — IPRATROPIUM BROMIDE AND ALBUTEROL SULFATE 2.5; .5 MG/3ML; MG/3ML
3 SOLUTION RESPIRATORY (INHALATION)
Status: DISCONTINUED | OUTPATIENT
Start: 2017-01-01 | End: 2017-01-01

## 2017-01-01 RX ORDER — POTASSIUM CHLORIDE 750 MG/1
20 TABLET, FILM COATED, EXTENDED RELEASE ORAL ONCE
Status: COMPLETED | OUTPATIENT
Start: 2017-01-01 | End: 2017-01-01

## 2017-01-01 RX ORDER — DEXTROSE MONOHYDRATE 100 MG/ML
30 INJECTION, SOLUTION INTRAVENOUS CONTINUOUS
Status: DISCONTINUED | OUTPATIENT
Start: 2017-01-01 | End: 2017-01-01

## 2017-01-01 RX ORDER — POTASSIUM CHLORIDE 7.45 MG/ML
10 INJECTION INTRAVENOUS
Status: COMPLETED | OUTPATIENT
Start: 2017-01-01 | End: 2017-01-01

## 2017-01-01 RX ORDER — CHLORHEXIDINE GLUCONATE 1.2 MG/ML
15 RINSE ORAL EVERY 12 HOURS
Status: DISCONTINUED | OUTPATIENT
Start: 2017-01-01 | End: 2017-01-01

## 2017-01-01 RX ORDER — POTASSIUM CHLORIDE 20MEQ/15ML
20 LIQUID (ML) ORAL ONCE
Status: COMPLETED | OUTPATIENT
Start: 2017-01-01 | End: 2017-01-01

## 2017-01-01 RX ORDER — MIDAZOLAM HYDROCHLORIDE 1 MG/ML
1-10 INJECTION, SOLUTION INTRAMUSCULAR; INTRAVENOUS
Status: DISCONTINUED | OUTPATIENT
Start: 2017-01-01 | End: 2017-01-01

## 2017-01-01 RX ORDER — POTASSIUM CHLORIDE 7.45 MG/ML
10 INJECTION INTRAVENOUS
Status: DISCONTINUED | OUTPATIENT
Start: 2017-01-01 | End: 2017-01-01

## 2017-01-01 RX ORDER — BUMETANIDE 0.25 MG/ML
2 INJECTION INTRAMUSCULAR; INTRAVENOUS ONCE
Status: COMPLETED | OUTPATIENT
Start: 2017-01-01 | End: 2017-01-01

## 2017-01-01 RX ORDER — ALBUMIN HUMAN 250 G/1000ML
12.5 SOLUTION INTRAVENOUS
Status: DISCONTINUED | OUTPATIENT
Start: 2017-01-01 | End: 2017-01-01

## 2017-01-01 RX ORDER — ETOMIDATE 2 MG/ML
INJECTION INTRAVENOUS
Status: COMPLETED
Start: 2017-01-01 | End: 2017-01-01

## 2017-01-01 RX ORDER — ETOMIDATE 2 MG/ML
10 INJECTION INTRAVENOUS ONCE
Status: COMPLETED | OUTPATIENT
Start: 2017-01-01 | End: 2017-01-01

## 2017-01-01 RX ORDER — ACETAMINOPHEN 650 MG/1
650 SUPPOSITORY RECTAL
Status: DISCONTINUED | OUTPATIENT
Start: 2017-01-01 | End: 2017-01-01 | Stop reason: HOSPADM

## 2017-01-01 RX ORDER — LORAZEPAM 0.5 MG/1
0.5 TABLET ORAL ONCE
Status: COMPLETED | OUTPATIENT
Start: 2017-01-01 | End: 2017-01-01

## 2017-01-01 RX ORDER — INSULIN LISPRO 100 [IU]/ML
INJECTION, SOLUTION INTRAVENOUS; SUBCUTANEOUS EVERY 6 HOURS
Status: DISCONTINUED | OUTPATIENT
Start: 2017-01-01 | End: 2017-01-01

## 2017-01-01 RX ORDER — MORPHINE SULFATE 2 MG/ML
2 INJECTION, SOLUTION INTRAMUSCULAR; INTRAVENOUS
Status: DISCONTINUED | OUTPATIENT
Start: 2017-01-01 | End: 2017-01-01 | Stop reason: HOSPADM

## 2017-01-01 RX ORDER — HALOPERIDOL 5 MG/ML
2 INJECTION INTRAMUSCULAR
Status: DISCONTINUED | OUTPATIENT
Start: 2017-01-01 | End: 2017-01-01 | Stop reason: HOSPADM

## 2017-01-01 RX ORDER — ENALAPRIL MALEATE 5 MG/1
2.5 TABLET ORAL 2 TIMES DAILY
Status: DISCONTINUED | OUTPATIENT
Start: 2017-01-01 | End: 2017-01-01

## 2017-01-01 RX ORDER — ONDANSETRON 2 MG/ML
4 INJECTION INTRAMUSCULAR; INTRAVENOUS
Status: DISCONTINUED | OUTPATIENT
Start: 2017-01-01 | End: 2017-01-01 | Stop reason: HOSPADM

## 2017-01-01 RX ORDER — PROPOFOL 10 MG/ML
5-50 VIAL (ML) INTRAVENOUS
Status: DISCONTINUED | OUTPATIENT
Start: 2017-01-01 | End: 2017-01-01

## 2017-01-01 RX ORDER — BUMETANIDE 0.25 MG/ML
1 INJECTION INTRAMUSCULAR; INTRAVENOUS 2 TIMES DAILY
Status: DISCONTINUED | OUTPATIENT
Start: 2017-01-01 | End: 2017-01-01 | Stop reason: SDUPTHER

## 2017-01-01 RX ORDER — DEXTROSE MONOHYDRATE AND SODIUM CHLORIDE 5; .45 G/100ML; G/100ML
55 INJECTION, SOLUTION INTRAVENOUS CONTINUOUS
Status: DISCONTINUED | OUTPATIENT
Start: 2017-01-01 | End: 2017-01-01

## 2017-01-01 RX ORDER — POTASSIUM CHLORIDE 750 MG/1
40 TABLET, FILM COATED, EXTENDED RELEASE ORAL 2 TIMES DAILY
Status: COMPLETED | OUTPATIENT
Start: 2017-01-01 | End: 2017-01-01

## 2017-01-01 RX ORDER — POTASSIUM CHLORIDE 1500 MG/1
TABLET, EXTENDED RELEASE ORAL
Qty: 60 TAB | Refills: 0 | Status: SHIPPED | OUTPATIENT
Start: 2017-01-01 | End: 2017-01-01 | Stop reason: SDUPTHER

## 2017-01-01 RX ORDER — ALBUMIN HUMAN 50 G/1000ML
25 SOLUTION INTRAVENOUS ONCE
Status: DISCONTINUED | OUTPATIENT
Start: 2017-01-01 | End: 2017-01-01

## 2017-01-01 RX ORDER — MILRINONE LACTATE 0.2 MG/ML
0.3 INJECTION, SOLUTION INTRAVENOUS CONTINUOUS
Status: DISCONTINUED | OUTPATIENT
Start: 2017-01-01 | End: 2017-01-01

## 2017-01-01 RX ORDER — MAGNESIUM SULFATE HEPTAHYDRATE 40 MG/ML
2 INJECTION, SOLUTION INTRAVENOUS ONCE
Status: COMPLETED | OUTPATIENT
Start: 2017-01-01 | End: 2017-01-01

## 2017-01-01 RX ORDER — POLYVINYL ALCOHOL 14 MG/ML
2 SOLUTION/ DROPS OPHTHALMIC 3 TIMES DAILY
Status: DISCONTINUED | OUTPATIENT
Start: 2017-01-01 | End: 2017-01-01

## 2017-01-01 RX ORDER — BUMETANIDE 0.25 MG/ML
1 INJECTION INTRAMUSCULAR; INTRAVENOUS 2 TIMES DAILY
Status: DISCONTINUED | OUTPATIENT
Start: 2017-01-01 | End: 2017-01-01

## 2017-01-01 RX ORDER — POTASSIUM CHLORIDE 1500 MG/1
TABLET, EXTENDED RELEASE ORAL
Qty: 60 TAB | Refills: 0 | Status: SHIPPED | OUTPATIENT
Start: 2017-01-01

## 2017-01-01 RX ORDER — SPIRONOLACTONE 25 MG/1
25 TABLET ORAL DAILY
Status: DISCONTINUED | OUTPATIENT
Start: 2017-01-01 | End: 2017-01-01

## 2017-01-01 RX ORDER — PANTOPRAZOLE SODIUM 40 MG/10ML
40 INJECTION, POWDER, LYOPHILIZED, FOR SOLUTION INTRAVENOUS DAILY
Status: DISCONTINUED | OUTPATIENT
Start: 2017-01-01 | End: 2017-01-01 | Stop reason: CLARIF

## 2017-01-01 RX ORDER — MILRINONE LACTATE 0.2 MG/ML
0.2 INJECTION, SOLUTION INTRAVENOUS CONTINUOUS
Status: DISCONTINUED | OUTPATIENT
Start: 2017-01-01 | End: 2017-01-01

## 2017-01-01 RX ORDER — SODIUM CHLORIDE 9 MG/ML
250 INJECTION, SOLUTION INTRAVENOUS AS NEEDED
Status: DISCONTINUED | OUTPATIENT
Start: 2017-01-01 | End: 2017-01-01 | Stop reason: ALTCHOICE

## 2017-01-01 RX ORDER — ALBUMIN HUMAN 250 G/1000ML
12.5 SOLUTION INTRAVENOUS ONCE
Status: COMPLETED | OUTPATIENT
Start: 2017-01-01 | End: 2017-01-01

## 2017-01-01 RX ORDER — FENTANYL CITRATE 50 UG/ML
INJECTION, SOLUTION INTRAMUSCULAR; INTRAVENOUS
Status: COMPLETED
Start: 2017-01-01 | End: 2017-01-01

## 2017-01-01 RX ORDER — ATROPINE SULFATE 0.1 MG/ML
INJECTION INTRAVENOUS
Status: DISPENSED
Start: 2017-01-01 | End: 2017-01-01

## 2017-01-01 RX ORDER — VANCOMYCIN/0.9 % SOD CHLORIDE 1.5G/250ML
1500 PLASTIC BAG, INJECTION (ML) INTRAVENOUS ONCE
Status: COMPLETED | OUTPATIENT
Start: 2017-01-01 | End: 2017-01-01

## 2017-01-01 RX ORDER — FENTANYL CITRATE 50 UG/ML
50 INJECTION, SOLUTION INTRAMUSCULAR; INTRAVENOUS ONCE
Status: COMPLETED | OUTPATIENT
Start: 2017-01-01 | End: 2017-01-01

## 2017-01-01 RX ORDER — FUROSEMIDE 10 MG/ML
80 INJECTION INTRAMUSCULAR; INTRAVENOUS ONCE
Status: COMPLETED | OUTPATIENT
Start: 2017-01-01 | End: 2017-01-01

## 2017-01-01 RX ORDER — LORAZEPAM 2 MG/ML
0.5 INJECTION INTRAMUSCULAR
Status: DISCONTINUED | OUTPATIENT
Start: 2017-01-01 | End: 2017-01-01 | Stop reason: HOSPADM

## 2017-01-01 RX ORDER — MAGNESIUM SULFATE 1 G/100ML
1 INJECTION INTRAVENOUS ONCE
Status: COMPLETED | OUTPATIENT
Start: 2017-01-01 | End: 2017-01-01

## 2017-01-01 RX ORDER — FLUMAZENIL 0.1 MG/ML
INJECTION INTRAVENOUS
Status: DISPENSED
Start: 2017-01-01 | End: 2017-01-01

## 2017-01-01 RX ORDER — BUMETANIDE 0.25 MG/ML
2 INJECTION INTRAMUSCULAR; INTRAVENOUS EVERY 8 HOURS
Status: DISCONTINUED | OUTPATIENT
Start: 2017-01-01 | End: 2017-01-01

## 2017-01-01 RX ORDER — SODIUM CHLORIDE 0.9 % (FLUSH) 0.9 %
5-10 SYRINGE (ML) INJECTION
Status: DISCONTINUED | OUTPATIENT
Start: 2017-01-01 | End: 2017-01-01

## 2017-01-01 RX ORDER — MORPHINE SULFATE 10 MG/ML
10 INJECTION, SOLUTION INTRAMUSCULAR; INTRAVENOUS
Status: DISCONTINUED | OUTPATIENT
Start: 2017-01-01 | End: 2017-01-01 | Stop reason: HOSPADM

## 2017-01-01 RX ORDER — POTASSIUM CHLORIDE 14.9 MG/ML
10 INJECTION INTRAVENOUS
Status: DISCONTINUED | OUTPATIENT
Start: 2017-01-01 | End: 2017-01-01

## 2017-01-01 RX ORDER — DEXTROSE 50 % IN WATER (D50W) INTRAVENOUS SYRINGE
12.5-25 AS NEEDED
Status: DISCONTINUED | OUTPATIENT
Start: 2017-01-01 | End: 2017-01-01

## 2017-01-01 RX ORDER — BUMETANIDE 0.25 MG/ML
2 INJECTION INTRAMUSCULAR; INTRAVENOUS EVERY 12 HOURS
Status: DISCONTINUED | OUTPATIENT
Start: 2017-01-01 | End: 2017-01-01

## 2017-01-01 RX ORDER — IPRATROPIUM BROMIDE AND ALBUTEROL SULFATE 2.5; .5 MG/3ML; MG/3ML
3 SOLUTION RESPIRATORY (INHALATION)
Status: DISCONTINUED | OUTPATIENT
Start: 2017-01-01 | End: 2017-01-01 | Stop reason: HOSPADM

## 2017-01-01 RX ORDER — BUMETANIDE 0.25 MG/ML
0.5 INJECTION INTRAMUSCULAR; INTRAVENOUS 2 TIMES DAILY
Status: DISPENSED | OUTPATIENT
Start: 2017-01-01 | End: 2017-01-01

## 2017-01-01 RX ORDER — FUROSEMIDE 80 MG/1
TABLET ORAL
Qty: 60 TAB | Refills: 0 | Status: SHIPPED | OUTPATIENT
Start: 2017-01-01

## 2017-01-01 RX ORDER — MAGNESIUM SULFATE 100 %
4 CRYSTALS MISCELLANEOUS AS NEEDED
Status: DISCONTINUED | OUTPATIENT
Start: 2017-01-01 | End: 2017-01-01

## 2017-01-01 RX ORDER — BACITRACIN 500 UNIT/G
1 PACKET (EA) TOPICAL AS NEEDED
Status: DISCONTINUED | OUTPATIENT
Start: 2017-01-01 | End: 2017-01-01 | Stop reason: HOSPADM

## 2017-01-01 RX ORDER — ALBUMIN HUMAN 50 G/1000ML
25 SOLUTION INTRAVENOUS ONCE
Status: COMPLETED | OUTPATIENT
Start: 2017-01-01 | End: 2017-01-01

## 2017-01-01 RX ORDER — LORAZEPAM 2 MG/ML
1 INJECTION INTRAMUSCULAR
Status: DISCONTINUED | OUTPATIENT
Start: 2017-01-01 | End: 2017-01-01 | Stop reason: HOSPADM

## 2017-01-01 RX ORDER — FUROSEMIDE 10 MG/ML
40 INJECTION INTRAMUSCULAR; INTRAVENOUS DAILY
Status: DISCONTINUED | OUTPATIENT
Start: 2017-01-01 | End: 2017-01-01

## 2017-01-01 RX ORDER — INSULIN LISPRO 100 [IU]/ML
INJECTION, SOLUTION INTRAVENOUS; SUBCUTANEOUS
Status: DISCONTINUED | OUTPATIENT
Start: 2017-01-01 | End: 2017-01-01

## 2017-01-01 RX ORDER — POTASSIUM CHLORIDE 29.8 MG/ML
20 INJECTION INTRAVENOUS
Status: DISCONTINUED | OUTPATIENT
Start: 2017-01-01 | End: 2017-01-01

## 2017-01-01 RX ORDER — SODIUM CHLORIDE 0.9 % (FLUSH) 0.9 %
10 SYRINGE (ML) INJECTION EVERY 24 HOURS
Status: DISCONTINUED | OUTPATIENT
Start: 2017-01-01 | End: 2017-01-01

## 2017-01-01 RX ORDER — NOREPINEPHRINE BITARTRATE/D5W 8 MG/250ML
2-30 PLASTIC BAG, INJECTION (ML) INTRAVENOUS
Status: DISCONTINUED | OUTPATIENT
Start: 2017-01-01 | End: 2017-01-01

## 2017-01-01 RX ORDER — SCOLOPAMINE TRANSDERMAL SYSTEM 1 MG/1
1.5 PATCH, EXTENDED RELEASE TRANSDERMAL
Status: DISCONTINUED | OUTPATIENT
Start: 2017-01-01 | End: 2017-01-01 | Stop reason: HOSPADM

## 2017-01-01 RX ORDER — ALBUTEROL SULFATE 0.83 MG/ML
2.5 SOLUTION RESPIRATORY (INHALATION)
Status: COMPLETED | OUTPATIENT
Start: 2017-01-01 | End: 2017-01-01

## 2017-01-01 RX ORDER — BUMETANIDE 0.25 MG/ML
1 INJECTION INTRAMUSCULAR; INTRAVENOUS 3 TIMES DAILY
Status: DISCONTINUED | OUTPATIENT
Start: 2017-01-01 | End: 2017-01-01

## 2017-01-01 RX ORDER — POTASSIUM CHLORIDE 20MEQ/15ML
40 LIQUID (ML) ORAL 2 TIMES DAILY WITH MEALS
Status: DISCONTINUED | OUTPATIENT
Start: 2017-01-01 | End: 2017-01-01

## 2017-01-01 RX ORDER — DEXTROSE 50 % IN WATER (D50W) INTRAVENOUS SYRINGE
Status: DISPENSED
Start: 2017-01-01 | End: 2017-01-01

## 2017-01-01 RX ORDER — CHLORHEXIDINE GLUCONATE 1.2 MG/ML
10 RINSE ORAL EVERY 12 HOURS
Status: DISCONTINUED | OUTPATIENT
Start: 2017-01-01 | End: 2017-01-01

## 2017-01-01 RX ORDER — POTASSIUM CHLORIDE 29.8 MG/ML
INJECTION INTRAVENOUS
Status: DISPENSED
Start: 2017-01-01 | End: 2017-01-01

## 2017-01-01 RX ORDER — SODIUM CHLORIDE 9 MG/ML
50 INJECTION, SOLUTION INTRAVENOUS CONTINUOUS
Status: DISCONTINUED | OUTPATIENT
Start: 2017-01-01 | End: 2017-01-01

## 2017-01-01 RX ORDER — SODIUM CHLORIDE 0.9 % (FLUSH) 0.9 %
10-40 SYRINGE (ML) INJECTION EVERY 8 HOURS
Status: DISCONTINUED | OUTPATIENT
Start: 2017-01-01 | End: 2017-01-01

## 2017-01-01 RX ORDER — PROPOFOL 10 MG/ML
INJECTION, EMULSION INTRAVENOUS
Status: DISPENSED
Start: 2017-01-01 | End: 2017-01-01

## 2017-01-01 RX ORDER — DEXTROSE 50 % IN WATER (D50W) INTRAVENOUS SYRINGE
50
Status: COMPLETED | OUTPATIENT
Start: 2017-01-01 | End: 2017-01-01

## 2017-01-01 RX ORDER — LANOLIN ALCOHOL/MO/W.PET/CERES
100 CREAM (GRAM) TOPICAL DAILY
Status: DISPENSED | OUTPATIENT
Start: 2017-01-01 | End: 2017-01-01

## 2017-01-01 RX ORDER — ALBUTEROL SULFATE 0.83 MG/ML
SOLUTION RESPIRATORY (INHALATION)
Status: COMPLETED
Start: 2017-01-01 | End: 2017-01-01

## 2017-01-01 RX ORDER — FUROSEMIDE 40 MG/1
80 TABLET ORAL 2 TIMES DAILY
Status: DISCONTINUED | OUTPATIENT
Start: 2017-01-01 | End: 2017-01-01

## 2017-01-01 RX ORDER — DILTIAZEM HYDROCHLORIDE 5 MG/ML
20 INJECTION INTRAVENOUS ONCE
Status: COMPLETED | OUTPATIENT
Start: 2017-01-01 | End: 2017-01-01

## 2017-01-01 RX ORDER — HEPARIN SODIUM 5000 [USP'U]/ML
5000 INJECTION, SOLUTION INTRAVENOUS; SUBCUTANEOUS EVERY 8 HOURS
Status: DISCONTINUED | OUTPATIENT
Start: 2017-01-01 | End: 2017-01-01

## 2017-01-01 RX ORDER — POTASSIUM CHLORIDE 750 MG/1
20 TABLET, FILM COATED, EXTENDED RELEASE ORAL DAILY
Status: DISCONTINUED | OUTPATIENT
Start: 2017-01-01 | End: 2017-01-01

## 2017-01-01 RX ORDER — POTASSIUM CHLORIDE 20MEQ/15ML
40 LIQUID (ML) ORAL DAILY
Status: DISCONTINUED | OUTPATIENT
Start: 2017-01-01 | End: 2017-01-01

## 2017-01-01 RX ORDER — SODIUM CHLORIDE 0.9 % (FLUSH) 0.9 %
10 SYRINGE (ML) INJECTION AS NEEDED
Status: DISCONTINUED | OUTPATIENT
Start: 2017-01-01 | End: 2017-01-01 | Stop reason: HOSPADM

## 2017-01-01 RX ORDER — SUCCINYLCHOLINE CHLORIDE 20 MG/ML
100 INJECTION INTRAMUSCULAR; INTRAVENOUS
Status: COMPLETED | OUTPATIENT
Start: 2017-01-01 | End: 2017-01-01

## 2017-01-01 RX ORDER — BUMETANIDE 0.25 MG/ML
2 INJECTION INTRAMUSCULAR; INTRAVENOUS 2 TIMES DAILY
Status: DISCONTINUED | OUTPATIENT
Start: 2017-01-01 | End: 2017-01-01 | Stop reason: CLARIF

## 2017-01-01 RX ORDER — PROPOFOL 10 MG/ML
INJECTION, EMULSION INTRAVENOUS
Status: COMPLETED
Start: 2017-01-01 | End: 2017-01-01

## 2017-01-01 RX ADMIN — IPRATROPIUM BROMIDE AND ALBUTEROL SULFATE 3 ML: .5; 3 SOLUTION RESPIRATORY (INHALATION) at 14:32

## 2017-01-01 RX ADMIN — CASTOR OIL AND BALSAM, PERU: 788; 87 OINTMENT TOPICAL at 13:05

## 2017-01-01 RX ADMIN — Medication 1 TABLET: at 17:06

## 2017-01-01 RX ADMIN — POTASSIUM CHLORIDE 10 MEQ: 10 INJECTION, SOLUTION INTRAVENOUS at 12:54

## 2017-01-01 RX ADMIN — POTASSIUM CHLORIDE 20 MEQ: 750 TABLET, FILM COATED, EXTENDED RELEASE ORAL at 08:41

## 2017-01-01 RX ADMIN — PHENYLEPHRINE HYDROCHLORIDE 250 MCG/MIN: 10 INJECTION INTRAVENOUS at 21:00

## 2017-01-01 RX ADMIN — FUROSEMIDE 40 MG: 10 INJECTION, SOLUTION INTRAMUSCULAR; INTRAVENOUS at 08:30

## 2017-01-01 RX ADMIN — Medication 10 ML: at 13:30

## 2017-01-01 RX ADMIN — Medication 1 TABLET: at 08:31

## 2017-01-01 RX ADMIN — BUMETANIDE 2 MG: 0.25 INJECTION INTRAMUSCULAR; INTRAVENOUS at 09:37

## 2017-01-01 RX ADMIN — Medication 10 ML: at 10:27

## 2017-01-01 RX ADMIN — APIXABAN 2.5 MG: 2.5 TABLET, FILM COATED ORAL at 20:21

## 2017-01-01 RX ADMIN — ACETAZOLAMIDE 250 MG: 500 INJECTION, POWDER, LYOPHILIZED, FOR SOLUTION INTRAVENOUS at 13:20

## 2017-01-01 RX ADMIN — IPRATROPIUM BROMIDE AND ALBUTEROL SULFATE 3 ML: .5; 3 SOLUTION RESPIRATORY (INHALATION) at 01:14

## 2017-01-01 RX ADMIN — Medication 10 ML: at 11:54

## 2017-01-01 RX ADMIN — DOCUSATE SODIUM 100 MG: 100 CAPSULE, LIQUID FILLED ORAL at 17:56

## 2017-01-01 RX ADMIN — Medication 1 TABLET: at 17:55

## 2017-01-01 RX ADMIN — PROPOFOL 15 MCG/KG/MIN: 10 INJECTION, EMULSION INTRAVENOUS at 08:53

## 2017-01-01 RX ADMIN — Medication 10 ML: at 01:02

## 2017-01-01 RX ADMIN — Medication 10 ML: at 06:35

## 2017-01-01 RX ADMIN — BUMETANIDE 2 MG: 0.25 INJECTION INTRAMUSCULAR; INTRAVENOUS at 17:50

## 2017-01-01 RX ADMIN — SPIRONOLACTONE 25 MG: 25 TABLET, FILM COATED ORAL at 09:34

## 2017-01-01 RX ADMIN — PIPERACILLIN SODIUM,TAZOBACTAM SODIUM 3.38 G: 3; .375 INJECTION, POWDER, FOR SOLUTION INTRAVENOUS at 05:41

## 2017-01-01 RX ADMIN — ALBUMIN (HUMAN) 12.5 G: 0.25 INJECTION, SOLUTION INTRAVENOUS at 16:51

## 2017-01-01 RX ADMIN — POLYVINYL ALCOHOL 2 DROP: 14 SOLUTION/ DROPS OPHTHALMIC at 10:34

## 2017-01-01 RX ADMIN — ENALAPRIL MALEATE 2.5 MG: 5 TABLET ORAL at 17:41

## 2017-01-01 RX ADMIN — POLYVINYL ALCOHOL 2 DROP: 14 SOLUTION/ DROPS OPHTHALMIC at 09:08

## 2017-01-01 RX ADMIN — IPRATROPIUM BROMIDE AND ALBUTEROL SULFATE 3 ML: .5; 3 SOLUTION RESPIRATORY (INHALATION) at 09:07

## 2017-01-01 RX ADMIN — BUMETANIDE 2 MG: 0.25 INJECTION INTRAMUSCULAR; INTRAVENOUS at 21:18

## 2017-01-01 RX ADMIN — POLYVINYL ALCOHOL 2 DROP: 14 SOLUTION/ DROPS OPHTHALMIC at 23:21

## 2017-01-01 RX ADMIN — POLYVINYL ALCOHOL 2 DROP: 14 SOLUTION/ DROPS OPHTHALMIC at 21:22

## 2017-01-01 RX ADMIN — Medication 10 ML: at 13:34

## 2017-01-01 RX ADMIN — Medication 10 ML: at 05:42

## 2017-01-01 RX ADMIN — DOCUSATE SODIUM 100 MG: 100 CAPSULE, LIQUID FILLED ORAL at 17:41

## 2017-01-01 RX ADMIN — BUMETANIDE 2 MG: 0.25 INJECTION INTRAMUSCULAR; INTRAVENOUS at 09:43

## 2017-01-01 RX ADMIN — Medication 10 ML: at 22:27

## 2017-01-01 RX ADMIN — PHENYLEPHRINE HYDROCHLORIDE 275 MCG/MIN: 10 INJECTION INTRAVENOUS at 14:40

## 2017-01-01 RX ADMIN — MILRINONE LACTATE 0.38 MCG/KG/MIN: 200 INJECTION, SOLUTION INTRAVENOUS at 12:19

## 2017-01-01 RX ADMIN — CHLORHEXIDINE GLUCONATE 10 ML: 1.2 RINSE ORAL at 23:45

## 2017-01-01 RX ADMIN — POLYVINYL ALCOHOL 2 DROP: 14 SOLUTION/ DROPS OPHTHALMIC at 16:07

## 2017-01-01 RX ADMIN — POTASSIUM CHLORIDE 20 MEQ: 400 INJECTION, SOLUTION INTRAVENOUS at 12:42

## 2017-01-01 RX ADMIN — PIPERACILLIN SODIUM,TAZOBACTAM SODIUM 3.38 G: 3; .375 INJECTION, POWDER, FOR SOLUTION INTRAVENOUS at 21:36

## 2017-01-01 RX ADMIN — Medication 10 ML: at 07:22

## 2017-01-01 RX ADMIN — FUROSEMIDE 80 MG: 40 TABLET ORAL at 18:25

## 2017-01-01 RX ADMIN — POLYVINYL ALCOHOL 2 DROP: 14 SOLUTION/ DROPS OPHTHALMIC at 16:00

## 2017-01-01 RX ADMIN — IPRATROPIUM BROMIDE AND ALBUTEROL SULFATE 3 ML: .5; 3 SOLUTION RESPIRATORY (INHALATION) at 07:51

## 2017-01-01 RX ADMIN — APIXABAN 2.5 MG: 2.5 TABLET, FILM COATED ORAL at 10:03

## 2017-01-01 RX ADMIN — POTASSIUM CHLORIDE 40 MEQ: 40 SOLUTION ORAL at 08:17

## 2017-01-01 RX ADMIN — POTASSIUM CHLORIDE 20 MEQ: 750 TABLET, FILM COATED, EXTENDED RELEASE ORAL at 15:41

## 2017-01-01 RX ADMIN — Medication 10 ML: at 05:12

## 2017-01-01 RX ADMIN — Medication 1 TABLET: at 09:44

## 2017-01-01 RX ADMIN — PHENYLEPHRINE HYDROCHLORIDE 200 MCG/MIN: 10 INJECTION INTRAVENOUS at 05:52

## 2017-01-01 RX ADMIN — CASTOR OIL AND BALSAM, PERU: 788; 87 OINTMENT TOPICAL at 17:51

## 2017-01-01 RX ADMIN — CHLORHEXIDINE GLUCONATE 10 ML: 1.2 RINSE ORAL at 09:08

## 2017-01-01 RX ADMIN — IPRATROPIUM BROMIDE AND ALBUTEROL SULFATE 3 ML: .5; 3 SOLUTION RESPIRATORY (INHALATION) at 13:38

## 2017-01-01 RX ADMIN — POLYVINYL ALCOHOL 2 DROP: 14 SOLUTION/ DROPS OPHTHALMIC at 21:40

## 2017-01-01 RX ADMIN — Medication 10 ML: at 22:17

## 2017-01-01 RX ADMIN — Medication 10 ML: at 06:52

## 2017-01-01 RX ADMIN — MILRINONE LACTATE 0.38 MCG/KG/MIN: 200 INJECTION, SOLUTION INTRAVENOUS at 05:37

## 2017-01-01 RX ADMIN — APIXABAN 2.5 MG: 2.5 TABLET, FILM COATED ORAL at 09:06

## 2017-01-01 RX ADMIN — Medication 20 ML: at 22:38

## 2017-01-01 RX ADMIN — IPRATROPIUM BROMIDE AND ALBUTEROL SULFATE 3 ML: .5; 3 SOLUTION RESPIRATORY (INHALATION) at 13:02

## 2017-01-01 RX ADMIN — DOCUSATE SODIUM 100 MG: 100 CAPSULE, LIQUID FILLED ORAL at 09:11

## 2017-01-01 RX ADMIN — SODIUM CHLORIDE 10 ML/HR: 900 INJECTION, SOLUTION INTRAVENOUS at 01:32

## 2017-01-01 RX ADMIN — Medication 100 MG: at 09:01

## 2017-01-01 RX ADMIN — BUMETANIDE 2 MG: 0.25 INJECTION, SOLUTION INTRAMUSCULAR; INTRAVENOUS at 02:55

## 2017-01-01 RX ADMIN — IPRATROPIUM BROMIDE AND ALBUTEROL SULFATE 3 ML: .5; 3 SOLUTION RESPIRATORY (INHALATION) at 17:22

## 2017-01-01 RX ADMIN — APIXABAN 2.5 MG: 2.5 TABLET, FILM COATED ORAL at 18:25

## 2017-01-01 RX ADMIN — CASTOR OIL AND BALSAM, PERU: 788; 87 OINTMENT TOPICAL at 09:04

## 2017-01-01 RX ADMIN — Medication 2 MG: at 14:13

## 2017-01-01 RX ADMIN — APIXABAN 2.5 MG: 2.5 TABLET, FILM COATED ORAL at 08:07

## 2017-01-01 RX ADMIN — Medication 10 ML: at 05:25

## 2017-01-01 RX ADMIN — MILRINONE LACTATE 0.2 MCG/KG/MIN: 200 INJECTION, SOLUTION INTRAVENOUS at 06:55

## 2017-01-01 RX ADMIN — PANTOPRAZOLE SODIUM 40 MG: 40 TABLET, DELAYED RELEASE ORAL at 07:09

## 2017-01-01 RX ADMIN — BUMETANIDE 1 MG: 0.25 INJECTION, SOLUTION INTRAMUSCULAR; INTRAVENOUS at 08:46

## 2017-01-01 RX ADMIN — Medication 1 TABLET: at 08:42

## 2017-01-01 RX ADMIN — POLYVINYL ALCOHOL 2 DROP: 14 SOLUTION/ DROPS OPHTHALMIC at 08:35

## 2017-01-01 RX ADMIN — PANTOPRAZOLE SODIUM 40 MG: 40 TABLET, DELAYED RELEASE ORAL at 06:35

## 2017-01-01 RX ADMIN — Medication 10 ML: at 21:05

## 2017-01-01 RX ADMIN — POLYVINYL ALCOHOL 2 DROP: 14 SOLUTION/ DROPS OPHTHALMIC at 21:42

## 2017-01-01 RX ADMIN — Medication 10 ML: at 14:00

## 2017-01-01 RX ADMIN — ENALAPRIL MALEATE 2.5 MG: 5 TABLET ORAL at 17:19

## 2017-01-01 RX ADMIN — CASTOR OIL AND BALSAM, PERU: 788; 87 OINTMENT TOPICAL at 09:09

## 2017-01-01 RX ADMIN — POTASSIUM CHLORIDE 20 MEQ: 400 INJECTION, SOLUTION INTRAVENOUS at 05:45

## 2017-01-01 RX ADMIN — POLYVINYL ALCOHOL 2 DROP: 14 SOLUTION/ DROPS OPHTHALMIC at 22:03

## 2017-01-01 RX ADMIN — ALBUMIN (HUMAN) 25 G: 12.5 INJECTION, SOLUTION INTRAVENOUS at 22:35

## 2017-01-01 RX ADMIN — PANTOPRAZOLE SODIUM 40 MG: 40 TABLET, DELAYED RELEASE ORAL at 06:52

## 2017-01-01 RX ADMIN — POLYVINYL ALCOHOL 2 DROP: 14 SOLUTION/ DROPS OPHTHALMIC at 17:26

## 2017-01-01 RX ADMIN — Medication 10 ML: at 09:04

## 2017-01-01 RX ADMIN — Medication 10 ML: at 21:10

## 2017-01-01 RX ADMIN — VANCOMYCIN HYDROCHLORIDE 1000 MG: 1 INJECTION, POWDER, LYOPHILIZED, FOR SOLUTION INTRAVENOUS at 10:23

## 2017-01-01 RX ADMIN — Medication 10 ML: at 15:12

## 2017-01-01 RX ADMIN — CHLORHEXIDINE GLUCONATE 10 ML: 1.2 RINSE ORAL at 21:00

## 2017-01-01 RX ADMIN — BUMETANIDE 2 MG: 0.25 INJECTION INTRAMUSCULAR; INTRAVENOUS at 00:12

## 2017-01-01 RX ADMIN — BUMETANIDE 2 MG: 0.25 INJECTION, SOLUTION INTRAMUSCULAR; INTRAVENOUS at 21:23

## 2017-01-01 RX ADMIN — POTASSIUM CHLORIDE 40 MEQ: 40 SOLUTION ORAL at 08:00

## 2017-01-01 RX ADMIN — Medication 10 ML: at 20:23

## 2017-01-01 RX ADMIN — PIPERACILLIN SODIUM,TAZOBACTAM SODIUM 3.38 G: 3; .375 INJECTION, POWDER, FOR SOLUTION INTRAVENOUS at 22:22

## 2017-01-01 RX ADMIN — IPRATROPIUM BROMIDE AND ALBUTEROL SULFATE 3 ML: .5; 3 SOLUTION RESPIRATORY (INHALATION) at 08:32

## 2017-01-01 RX ADMIN — DOCUSATE SODIUM 100 MG: 100 CAPSULE, LIQUID FILLED ORAL at 17:27

## 2017-01-01 RX ADMIN — ENALAPRIL MALEATE 2.5 MG: 5 TABLET ORAL at 10:03

## 2017-01-01 RX ADMIN — PIPERACILLIN SODIUM,TAZOBACTAM SODIUM 3.38 G: 3; .375 INJECTION, POWDER, FOR SOLUTION INTRAVENOUS at 05:22

## 2017-01-01 RX ADMIN — CASTOR OIL AND BALSAM, PERU: 788; 87 OINTMENT TOPICAL at 08:43

## 2017-01-01 RX ADMIN — POTASSIUM CHLORIDE 40 MEQ: 40 SOLUTION ORAL at 16:54

## 2017-01-01 RX ADMIN — SODIUM CHLORIDE 100 ML: 900 INJECTION, SOLUTION INTRAVENOUS at 21:10

## 2017-01-01 RX ADMIN — APIXABAN 2.5 MG: 2.5 TABLET, FILM COATED ORAL at 08:32

## 2017-01-01 RX ADMIN — MILRINONE LACTATE 0.3 MCG/KG/MIN: 200 INJECTION, SOLUTION INTRAVENOUS at 18:30

## 2017-01-01 RX ADMIN — MILRINONE LACTATE 0.38 MCG/KG/MIN: 200 INJECTION, SOLUTION INTRAVENOUS at 14:56

## 2017-01-01 RX ADMIN — PANTOPRAZOLE SODIUM 40 MG: 40 TABLET, DELAYED RELEASE ORAL at 06:08

## 2017-01-01 RX ADMIN — Medication 10 ML: at 14:24

## 2017-01-01 RX ADMIN — POTASSIUM CHLORIDE 40 MEQ: 750 TABLET, FILM COATED, EXTENDED RELEASE ORAL at 17:24

## 2017-01-01 RX ADMIN — IOPAMIDOL 100 ML: 755 INJECTION, SOLUTION INTRAVENOUS at 21:10

## 2017-01-01 RX ADMIN — Medication 10 ML: at 15:24

## 2017-01-01 RX ADMIN — POLYVINYL ALCOHOL 2 DROP: 14 SOLUTION/ DROPS OPHTHALMIC at 22:16

## 2017-01-01 RX ADMIN — DILTIAZEM HYDROCHLORIDE 20 MG: 5 INJECTION INTRAVENOUS at 19:07

## 2017-01-01 RX ADMIN — CHLORHEXIDINE GLUCONATE 10 ML: 1.2 RINSE ORAL at 08:17

## 2017-01-01 RX ADMIN — MILRINONE LACTATE 0.38 MCG/KG/MIN: 200 INJECTION, SOLUTION INTRAVENOUS at 01:05

## 2017-01-01 RX ADMIN — DEXTROSE MONOHYDRATE 50 ML/HR: 10 INJECTION, SOLUTION INTRAVENOUS at 09:26

## 2017-01-01 RX ADMIN — CHLORHEXIDINE GLUCONATE 10 ML: 1.2 RINSE ORAL at 22:07

## 2017-01-01 RX ADMIN — SODIUM CHLORIDE 40 MG: 9 INJECTION INTRAMUSCULAR; INTRAVENOUS; SUBCUTANEOUS at 01:02

## 2017-01-01 RX ADMIN — Medication 10 ML: at 23:38

## 2017-01-01 RX ADMIN — POLYVINYL ALCOHOL 2 DROP: 14 SOLUTION/ DROPS OPHTHALMIC at 09:58

## 2017-01-01 RX ADMIN — BUMETANIDE 2 MG: 0.25 INJECTION INTRAMUSCULAR; INTRAVENOUS at 10:06

## 2017-01-01 RX ADMIN — VANCOMYCIN HYDROCHLORIDE 1000 MG: 1 INJECTION, POWDER, LYOPHILIZED, FOR SOLUTION INTRAVENOUS at 22:22

## 2017-01-01 RX ADMIN — DOCUSATE SODIUM 100 MG: 100 CAPSULE, LIQUID FILLED ORAL at 08:41

## 2017-01-01 RX ADMIN — POLYVINYL ALCOHOL 2 DROP: 14 SOLUTION/ DROPS OPHTHALMIC at 15:29

## 2017-01-01 RX ADMIN — BISACODYL 10 MG: 10 SUPPOSITORY RECTAL at 15:28

## 2017-01-01 RX ADMIN — POTASSIUM CHLORIDE 40 MEQ: 40 SOLUTION ORAL at 09:07

## 2017-01-01 RX ADMIN — POLYVINYL ALCOHOL 2 DROP: 14 SOLUTION/ DROPS OPHTHALMIC at 17:59

## 2017-01-01 RX ADMIN — CHLORHEXIDINE GLUCONATE 10 ML: 1.2 RINSE ORAL at 10:34

## 2017-01-01 RX ADMIN — DOCUSATE SODIUM 100 MG: 100 CAPSULE, LIQUID FILLED ORAL at 10:08

## 2017-01-01 RX ADMIN — POTASSIUM CHLORIDE 40 MEQ: 40 SOLUTION ORAL at 16:25

## 2017-01-01 RX ADMIN — DOCUSATE SODIUM 100 MG: 100 CAPSULE, LIQUID FILLED ORAL at 19:01

## 2017-01-01 RX ADMIN — MILRINONE LACTATE 0.38 MCG/KG/MIN: 200 INJECTION, SOLUTION INTRAVENOUS at 19:23

## 2017-01-01 RX ADMIN — APIXABAN 2.5 MG: 2.5 TABLET, FILM COATED ORAL at 18:39

## 2017-01-01 RX ADMIN — POTASSIUM PHOSPHATE, MONOBASIC AND POTASSIUM PHOSPHATE, DIBASIC: 224; 236 INJECTION, SOLUTION INTRAVENOUS at 08:19

## 2017-01-01 RX ADMIN — BUMETANIDE 2 MG: 0.25 INJECTION, SOLUTION INTRAMUSCULAR; INTRAVENOUS at 08:00

## 2017-01-01 RX ADMIN — IPRATROPIUM BROMIDE AND ALBUTEROL SULFATE 3 ML: .5; 3 SOLUTION RESPIRATORY (INHALATION) at 14:49

## 2017-01-01 RX ADMIN — POTASSIUM CHLORIDE 20 MEQ: 400 INJECTION, SOLUTION INTRAVENOUS at 03:52

## 2017-01-01 RX ADMIN — Medication 100 MG: at 10:33

## 2017-01-01 RX ADMIN — FUROSEMIDE 80 MG: 40 TABLET ORAL at 17:57

## 2017-01-01 RX ADMIN — POTASSIUM CHLORIDE 40 MEQ: 40 SOLUTION ORAL at 10:32

## 2017-01-01 RX ADMIN — DEXTROSE MONOHYDRATE 75 ML/HR: 10 INJECTION, SOLUTION INTRAVENOUS at 14:46

## 2017-01-01 RX ADMIN — MIDAZOLAM HYDROCHLORIDE 0.5 MG: 1 INJECTION, SOLUTION INTRAMUSCULAR; INTRAVENOUS at 13:22

## 2017-01-01 RX ADMIN — POLYVINYL ALCOHOL 2 DROP: 14 SOLUTION/ DROPS OPHTHALMIC at 21:36

## 2017-01-01 RX ADMIN — ETOMIDATE 10 MG: 2 INJECTION INTRAVENOUS at 00:00

## 2017-01-01 RX ADMIN — Medication 6 MCG/MIN: at 16:22

## 2017-01-01 RX ADMIN — HEPARIN SODIUM 5000 UNITS: 5000 INJECTION, SOLUTION INTRAVENOUS; SUBCUTANEOUS at 14:41

## 2017-01-01 RX ADMIN — WATER 500 MG: 1 INJECTION INTRAMUSCULAR; INTRAVENOUS; SUBCUTANEOUS at 12:35

## 2017-01-01 RX ADMIN — Medication 1 TABLET: at 17:27

## 2017-01-01 RX ADMIN — PANTOPRAZOLE SODIUM 40 MG: 40 TABLET, DELAYED RELEASE ORAL at 08:05

## 2017-01-01 RX ADMIN — DEXTROSE MONOHYDRATE AND SODIUM CHLORIDE 55 ML/HR: 5; .45 INJECTION, SOLUTION INTRAVENOUS at 06:23

## 2017-01-01 RX ADMIN — Medication 1 TABLET: at 19:58

## 2017-01-01 RX ADMIN — DOCUSATE SODIUM 100 MG: 100 CAPSULE, LIQUID FILLED ORAL at 10:32

## 2017-01-01 RX ADMIN — CHLORHEXIDINE GLUCONATE 10 ML: 1.2 RINSE ORAL at 08:01

## 2017-01-01 RX ADMIN — Medication 1 TABLET: at 09:06

## 2017-01-01 RX ADMIN — PHENYLEPHRINE HYDROCHLORIDE 240 MCG/MIN: 10 INJECTION INTRAVENOUS at 18:44

## 2017-01-01 RX ADMIN — BUMETANIDE 2 MG: 0.25 INJECTION INTRAMUSCULAR; INTRAVENOUS at 16:06

## 2017-01-01 RX ADMIN — Medication 10 ML: at 22:00

## 2017-01-01 RX ADMIN — Medication 10 ML: at 05:48

## 2017-01-01 RX ADMIN — IPRATROPIUM BROMIDE AND ALBUTEROL SULFATE 3 ML: .5; 3 SOLUTION RESPIRATORY (INHALATION) at 07:53

## 2017-01-01 RX ADMIN — ALTEPLASE 1 MG: 2.2 INJECTION, POWDER, LYOPHILIZED, FOR SOLUTION INTRAVENOUS at 10:42

## 2017-01-01 RX ADMIN — Medication 10 ML: at 10:00

## 2017-01-01 RX ADMIN — APIXABAN 2.5 MG: 2.5 TABLET, FILM COATED ORAL at 12:29

## 2017-01-01 RX ADMIN — POLYVINYL ALCOHOL 2 DROP: 14 SOLUTION/ DROPS OPHTHALMIC at 18:15

## 2017-01-01 RX ADMIN — Medication 10 ML: at 06:31

## 2017-01-01 RX ADMIN — BUMETANIDE 2 MG: 0.25 INJECTION INTRAMUSCULAR; INTRAVENOUS at 10:32

## 2017-01-01 RX ADMIN — POTASSIUM CHLORIDE 20 MEQ: 400 INJECTION, SOLUTION INTRAVENOUS at 11:04

## 2017-01-01 RX ADMIN — SODIUM PHOSPHATE, MONOBASIC, MONOHYDRATE: 276; 142 INJECTION, SOLUTION INTRAVENOUS at 05:47

## 2017-01-01 RX ADMIN — HEPARIN SODIUM 5000 UNITS: 5000 INJECTION, SOLUTION INTRAVENOUS; SUBCUTANEOUS at 21:24

## 2017-01-01 RX ADMIN — Medication 10 ML: at 05:18

## 2017-01-01 RX ADMIN — POTASSIUM CHLORIDE 10 MEQ: 10 INJECTION, SOLUTION INTRAVENOUS at 10:46

## 2017-01-01 RX ADMIN — IPRATROPIUM BROMIDE AND ALBUTEROL SULFATE 3 ML: .5; 3 SOLUTION RESPIRATORY (INHALATION) at 01:19

## 2017-01-01 RX ADMIN — POLYVINYL ALCOHOL 2 DROP: 14 SOLUTION/ DROPS OPHTHALMIC at 17:57

## 2017-01-01 RX ADMIN — Medication 10 ML: at 10:41

## 2017-01-01 RX ADMIN — HEPARIN SODIUM 5000 UNITS: 5000 INJECTION, SOLUTION INTRAVENOUS; SUBCUTANEOUS at 22:39

## 2017-01-01 RX ADMIN — POTASSIUM CHLORIDE 20 MEQ: 750 TABLET, FILM COATED, EXTENDED RELEASE ORAL at 10:21

## 2017-01-01 RX ADMIN — Medication 10 ML: at 22:26

## 2017-01-01 RX ADMIN — LORAZEPAM 0.5 MG: 2 INJECTION INTRAMUSCULAR; INTRAVENOUS at 21:21

## 2017-01-01 RX ADMIN — BUMETANIDE 0.5 MG: 0.25 INJECTION, SOLUTION INTRAMUSCULAR; INTRAVENOUS at 11:52

## 2017-01-01 RX ADMIN — PIPERACILLIN SODIUM,TAZOBACTAM SODIUM 3.38 G: 3; .375 INJECTION, POWDER, FOR SOLUTION INTRAVENOUS at 14:01

## 2017-01-01 RX ADMIN — Medication 2 MG: at 23:17

## 2017-01-01 RX ADMIN — CASTOR OIL AND BALSAM, PERU: 788; 87 OINTMENT TOPICAL at 09:58

## 2017-01-01 RX ADMIN — IPRATROPIUM BROMIDE AND ALBUTEROL SULFATE 3 ML: .5; 3 SOLUTION RESPIRATORY (INHALATION) at 19:59

## 2017-01-01 RX ADMIN — MILRINONE LACTATE 0.3 MCG/KG/MIN: 200 INJECTION, SOLUTION INTRAVENOUS at 22:34

## 2017-01-01 RX ADMIN — Medication 10 ML: at 21:06

## 2017-01-01 RX ADMIN — Medication 1 TABLET: at 17:57

## 2017-01-01 RX ADMIN — PHENYLEPHRINE HYDROCHLORIDE 210 MCG/MIN: 10 INJECTION INTRAVENOUS at 03:20

## 2017-01-01 RX ADMIN — APIXABAN 2.5 MG: 2.5 TABLET, FILM COATED ORAL at 21:06

## 2017-01-01 RX ADMIN — Medication 1 TABLET: at 19:07

## 2017-01-01 RX ADMIN — POLYVINYL ALCOHOL 2 DROP: 14 SOLUTION/ DROPS OPHTHALMIC at 10:15

## 2017-01-01 RX ADMIN — DOCUSATE SODIUM 100 MG: 100 CAPSULE, LIQUID FILLED ORAL at 17:55

## 2017-01-01 RX ADMIN — DOCUSATE SODIUM 100 MG: 100 CAPSULE, LIQUID FILLED ORAL at 19:58

## 2017-01-01 RX ADMIN — FENTANYL CITRATE 50 MCG: 50 INJECTION, SOLUTION INTRAMUSCULAR; INTRAVENOUS at 19:50

## 2017-01-01 RX ADMIN — DEXTROSE MONOHYDRATE 30 ML/HR: 10 INJECTION, SOLUTION INTRAVENOUS at 11:10

## 2017-01-01 RX ADMIN — BUMETANIDE 2 MG: 0.25 INJECTION, SOLUTION INTRAMUSCULAR; INTRAVENOUS at 17:28

## 2017-01-01 RX ADMIN — BUMETANIDE 2 MG: 0.25 INJECTION INTRAMUSCULAR; INTRAVENOUS at 19:03

## 2017-01-01 RX ADMIN — BUMETANIDE 1 MG: 0.25 INJECTION, SOLUTION INTRAMUSCULAR; INTRAVENOUS at 17:47

## 2017-01-01 RX ADMIN — Medication 5 ML: at 01:12

## 2017-01-01 RX ADMIN — Medication 10 ML: at 05:55

## 2017-01-01 RX ADMIN — DOCUSATE SODIUM 100 MG: 100 CAPSULE, LIQUID FILLED ORAL at 17:57

## 2017-01-01 RX ADMIN — Medication 2 MG: at 18:13

## 2017-01-01 RX ADMIN — Medication 10 ML: at 14:41

## 2017-01-01 RX ADMIN — Medication 100 MG: at 09:09

## 2017-01-01 RX ADMIN — ACETAZOLAMIDE 500 MG: 500 INJECTION, POWDER, LYOPHILIZED, FOR SOLUTION INTRAVENOUS at 12:45

## 2017-01-01 RX ADMIN — CHLORHEXIDINE GLUCONATE 10 ML: 1.2 RINSE ORAL at 23:20

## 2017-01-01 RX ADMIN — GLYCOPYRROLATE 0.2 MG: 0.2 INJECTION, SOLUTION INTRAMUSCULAR; INTRAVENOUS at 14:13

## 2017-01-01 RX ADMIN — SUCCINYLCHOLINE CHLORIDE 10 MG: 20 INJECTION, SOLUTION INTRAMUSCULAR; INTRAVENOUS at 00:00

## 2017-01-01 RX ADMIN — IPRATROPIUM BROMIDE AND ALBUTEROL SULFATE 3 ML: .5; 3 SOLUTION RESPIRATORY (INHALATION) at 21:59

## 2017-01-01 RX ADMIN — POLYVINYL ALCOHOL 2 DROP: 14 SOLUTION/ DROPS OPHTHALMIC at 15:17

## 2017-01-01 RX ADMIN — Medication 1 TABLET: at 09:55

## 2017-01-01 RX ADMIN — PANTOPRAZOLE SODIUM 40 MG: 40 TABLET, DELAYED RELEASE ORAL at 07:23

## 2017-01-01 RX ADMIN — VANCOMYCIN HYDROCHLORIDE 1000 MG: 1 INJECTION, POWDER, LYOPHILIZED, FOR SOLUTION INTRAVENOUS at 10:43

## 2017-01-01 RX ADMIN — SODIUM CHLORIDE: 900 INJECTION, SOLUTION INTRAVENOUS at 09:07

## 2017-01-01 RX ADMIN — IPRATROPIUM BROMIDE AND ALBUTEROL SULFATE 3 ML: .5; 3 SOLUTION RESPIRATORY (INHALATION) at 15:47

## 2017-01-01 RX ADMIN — POTASSIUM CHLORIDE 20 MEQ: 400 INJECTION, SOLUTION INTRAVENOUS at 11:07

## 2017-01-01 RX ADMIN — SUCCINYLCHOLINE CHLORIDE 100 MG: 20 INJECTION, SOLUTION INTRAMUSCULAR; INTRAVENOUS at 23:21

## 2017-01-01 RX ADMIN — POLYVINYL ALCOHOL 2 DROP: 14 SOLUTION/ DROPS OPHTHALMIC at 17:17

## 2017-01-01 RX ADMIN — CASTOR OIL AND BALSAM, PERU: 788; 87 OINTMENT TOPICAL at 09:01

## 2017-01-01 RX ADMIN — FUROSEMIDE 80 MG: 40 TABLET ORAL at 10:03

## 2017-01-01 RX ADMIN — VANCOMYCIN HYDROCHLORIDE 1000 MG: 1 INJECTION, POWDER, LYOPHILIZED, FOR SOLUTION INTRAVENOUS at 09:00

## 2017-01-01 RX ADMIN — IPRATROPIUM BROMIDE AND ALBUTEROL SULFATE 3 ML: .5; 3 SOLUTION RESPIRATORY (INHALATION) at 09:59

## 2017-01-01 RX ADMIN — MILRINONE LACTATE 0.2 MCG/KG/MIN: 200 INJECTION, SOLUTION INTRAVENOUS at 19:36

## 2017-01-01 RX ADMIN — HEPARIN SODIUM 5000 UNITS: 5000 INJECTION, SOLUTION INTRAVENOUS; SUBCUTANEOUS at 22:40

## 2017-01-01 RX ADMIN — SPIRONOLACTONE 25 MG: 25 TABLET, FILM COATED ORAL at 10:32

## 2017-01-01 RX ADMIN — BUMETANIDE 2 MG: 0.25 INJECTION, SOLUTION INTRAMUSCULAR; INTRAVENOUS at 09:00

## 2017-01-01 RX ADMIN — POLYVINYL ALCOHOL 2 DROP: 14 SOLUTION/ DROPS OPHTHALMIC at 10:08

## 2017-01-01 RX ADMIN — DEXTROSE MONOHYDRATE 30 ML/HR: 10 INJECTION, SOLUTION INTRAVENOUS at 16:48

## 2017-01-01 RX ADMIN — PROPOFOL 15 MCG/KG/MIN: 10 INJECTION, EMULSION INTRAVENOUS at 21:35

## 2017-01-01 RX ADMIN — PROPOFOL 10 MCG/KG/MIN: 10 INJECTION, EMULSION INTRAVENOUS at 15:41

## 2017-01-01 RX ADMIN — BUMETANIDE 2 MG: 0.25 INJECTION INTRAMUSCULAR; INTRAVENOUS at 18:35

## 2017-01-01 RX ADMIN — PANTOPRAZOLE SODIUM 40 MG: 40 TABLET, DELAYED RELEASE ORAL at 10:07

## 2017-01-01 RX ADMIN — POLYVINYL ALCOHOL 2 DROP: 14 SOLUTION/ DROPS OPHTHALMIC at 17:55

## 2017-01-01 RX ADMIN — VANCOMYCIN HYDROCHLORIDE 1000 MG: 1 INJECTION, POWDER, LYOPHILIZED, FOR SOLUTION INTRAVENOUS at 10:27

## 2017-01-01 RX ADMIN — IPRATROPIUM BROMIDE AND ALBUTEROL SULFATE 3 ML: .5; 3 SOLUTION RESPIRATORY (INHALATION) at 13:44

## 2017-01-01 RX ADMIN — SODIUM CHLORIDE 1000 ML: 900 INJECTION, SOLUTION INTRAVENOUS at 20:54

## 2017-01-01 RX ADMIN — POTASSIUM CHLORIDE 20 MEQ: 400 INJECTION, SOLUTION INTRAVENOUS at 10:45

## 2017-01-01 RX ADMIN — Medication 10 ML: at 11:46

## 2017-01-01 RX ADMIN — POTASSIUM CHLORIDE 40 MEQ: 40 SOLUTION ORAL at 16:42

## 2017-01-01 RX ADMIN — MILRINONE LACTATE 0.38 MCG/KG/MIN: 200 INJECTION, SOLUTION INTRAVENOUS at 09:52

## 2017-01-01 RX ADMIN — ENALAPRIL MALEATE 2.5 MG: 5 TABLET ORAL at 19:17

## 2017-01-01 RX ADMIN — POLYVINYL ALCOHOL 2 DROP: 14 SOLUTION/ DROPS OPHTHALMIC at 21:50

## 2017-01-01 RX ADMIN — POLYVINYL ALCOHOL 2 DROP: 14 SOLUTION/ DROPS OPHTHALMIC at 01:10

## 2017-01-01 RX ADMIN — CHLORHEXIDINE GLUCONATE 10 ML: 1.2 RINSE ORAL at 08:09

## 2017-01-01 RX ADMIN — PROPOFOL 30 MCG/KG/MIN: 10 INJECTION, EMULSION INTRAVENOUS at 07:40

## 2017-01-01 RX ADMIN — PANTOPRAZOLE SODIUM 40 MG: 40 TABLET, DELAYED RELEASE ORAL at 09:36

## 2017-01-01 RX ADMIN — POTASSIUM CHLORIDE 20 MEQ: 400 INJECTION, SOLUTION INTRAVENOUS at 11:51

## 2017-01-01 RX ADMIN — BUMETANIDE 2 MG: 0.25 INJECTION, SOLUTION INTRAMUSCULAR; INTRAVENOUS at 22:38

## 2017-01-01 RX ADMIN — CASTOR OIL AND BALSAM, PERU: 788; 87 OINTMENT TOPICAL at 18:12

## 2017-01-01 RX ADMIN — CASTOR OIL AND BALSAM, PERU: 788; 87 OINTMENT TOPICAL at 09:42

## 2017-01-01 RX ADMIN — APIXABAN 2.5 MG: 2.5 TABLET, FILM COATED ORAL at 21:39

## 2017-01-01 RX ADMIN — Medication 10 ML: at 10:05

## 2017-01-01 RX ADMIN — POTASSIUM CHLORIDE 20 MEQ: 400 INJECTION, SOLUTION INTRAVENOUS at 06:31

## 2017-01-01 RX ADMIN — CHLORHEXIDINE GLUCONATE 10 ML: 1.2 RINSE ORAL at 21:39

## 2017-01-01 RX ADMIN — POLYVINYL ALCOHOL 2 DROP: 14 SOLUTION/ DROPS OPHTHALMIC at 22:29

## 2017-01-01 RX ADMIN — POLYVINYL ALCOHOL 2 DROP: 14 SOLUTION/ DROPS OPHTHALMIC at 20:22

## 2017-01-01 RX ADMIN — POLYVINYL ALCOHOL 2 DROP: 14 SOLUTION/ DROPS OPHTHALMIC at 15:44

## 2017-01-01 RX ADMIN — FUROSEMIDE 80 MG: 10 INJECTION, SOLUTION INTRAMUSCULAR; INTRAVENOUS at 01:11

## 2017-01-01 RX ADMIN — HEPARIN SODIUM 5000 UNITS: 5000 INJECTION, SOLUTION INTRAVENOUS; SUBCUTANEOUS at 14:33

## 2017-01-01 RX ADMIN — IPRATROPIUM BROMIDE AND ALBUTEROL SULFATE 3 ML: .5; 3 SOLUTION RESPIRATORY (INHALATION) at 03:14

## 2017-01-01 RX ADMIN — CASTOR OIL AND BALSAM, PERU: 788; 87 OINTMENT TOPICAL at 19:05

## 2017-01-01 RX ADMIN — Medication 10 ML: at 10:14

## 2017-01-01 RX ADMIN — DEXTROSE MONOHYDRATE AND SODIUM CHLORIDE 55 ML/HR: 5; .45 INJECTION, SOLUTION INTRAVENOUS at 02:34

## 2017-01-01 RX ADMIN — CHLORHEXIDINE GLUCONATE 10 ML: 1.2 RINSE ORAL at 08:15

## 2017-01-01 RX ADMIN — SPIRONOLACTONE 25 MG: 25 TABLET, FILM COATED ORAL at 08:17

## 2017-01-01 RX ADMIN — IPRATROPIUM BROMIDE AND ALBUTEROL SULFATE 3 ML: .5; 3 SOLUTION RESPIRATORY (INHALATION) at 08:40

## 2017-01-01 RX ADMIN — VANCOMYCIN HYDROCHLORIDE 1500 MG: 10 INJECTION, POWDER, LYOPHILIZED, FOR SOLUTION INTRAVENOUS at 23:34

## 2017-01-01 RX ADMIN — Medication 10 ML: at 22:07

## 2017-01-01 RX ADMIN — APIXABAN 2.5 MG: 2.5 TABLET, FILM COATED ORAL at 23:39

## 2017-01-01 RX ADMIN — HEPARIN SODIUM 5000 UNITS: 5000 INJECTION, SOLUTION INTRAVENOUS; SUBCUTANEOUS at 13:17

## 2017-01-01 RX ADMIN — DEXTROSE MONOHYDRATE 25 G: 25 INJECTION, SOLUTION INTRAVENOUS at 04:30

## 2017-01-01 RX ADMIN — VANCOMYCIN HYDROCHLORIDE 1000 MG: 1 INJECTION, POWDER, LYOPHILIZED, FOR SOLUTION INTRAVENOUS at 10:00

## 2017-01-01 RX ADMIN — BUMETANIDE 1 MG: 0.25 INJECTION, SOLUTION INTRAMUSCULAR; INTRAVENOUS at 08:33

## 2017-01-01 RX ADMIN — APIXABAN 2.5 MG: 2.5 TABLET, FILM COATED ORAL at 08:00

## 2017-01-01 RX ADMIN — Medication 10 ML: at 21:56

## 2017-01-01 RX ADMIN — PIPERACILLIN SODIUM,TAZOBACTAM SODIUM 3.38 G: 3; .375 INJECTION, POWDER, FOR SOLUTION INTRAVENOUS at 13:17

## 2017-01-01 RX ADMIN — DEXTROSE MONOHYDRATE 50 ML/HR: 10 INJECTION, SOLUTION INTRAVENOUS at 06:46

## 2017-01-01 RX ADMIN — IPRATROPIUM BROMIDE AND ALBUTEROL SULFATE 3 ML: .5; 3 SOLUTION RESPIRATORY (INHALATION) at 14:01

## 2017-01-01 RX ADMIN — BUMETANIDE 2 MG: 0.25 INJECTION, SOLUTION INTRAMUSCULAR; INTRAVENOUS at 01:36

## 2017-01-01 RX ADMIN — DEXTROSE MONOHYDRATE 75 ML/HR: 10 INJECTION, SOLUTION INTRAVENOUS at 00:01

## 2017-01-01 RX ADMIN — Medication 10 ML: at 14:29

## 2017-01-01 RX ADMIN — Medication 1 TABLET: at 09:34

## 2017-01-01 RX ADMIN — SODIUM CHLORIDE: 900 INJECTION, SOLUTION INTRAVENOUS at 10:04

## 2017-01-01 RX ADMIN — CHLORHEXIDINE GLUCONATE 15 ML: 1.2 RINSE ORAL at 20:28

## 2017-01-01 RX ADMIN — VANCOMYCIN HYDROCHLORIDE 1000 MG: 1 INJECTION, POWDER, LYOPHILIZED, FOR SOLUTION INTRAVENOUS at 10:38

## 2017-01-01 RX ADMIN — POLYVINYL ALCOHOL 2 DROP: 14 SOLUTION/ DROPS OPHTHALMIC at 22:20

## 2017-01-01 RX ADMIN — MIDAZOLAM HYDROCHLORIDE 0.5 MG: 1 INJECTION, SOLUTION INTRAMUSCULAR; INTRAVENOUS at 13:12

## 2017-01-01 RX ADMIN — CASTOR OIL AND BALSAM, PERU: 788; 87 OINTMENT TOPICAL at 18:38

## 2017-01-01 RX ADMIN — FUROSEMIDE 80 MG: 40 TABLET ORAL at 10:08

## 2017-01-01 RX ADMIN — Medication 5 ML: at 06:34

## 2017-01-01 RX ADMIN — INSULIN LISPRO 2 UNITS: 100 INJECTION, SOLUTION INTRAVENOUS; SUBCUTANEOUS at 12:15

## 2017-01-01 RX ADMIN — PHENYLEPHRINE HYDROCHLORIDE 30 MCG/MIN: 10 INJECTION INTRAVENOUS at 00:21

## 2017-01-01 RX ADMIN — CHLORHEXIDINE GLUCONATE 10 ML: 1.2 RINSE ORAL at 09:45

## 2017-01-01 RX ADMIN — Medication 10 ML: at 21:27

## 2017-01-01 RX ADMIN — Medication 1 TABLET: at 17:50

## 2017-01-01 RX ADMIN — Medication 1 TABLET: at 08:08

## 2017-01-01 RX ADMIN — Medication 10 ML: at 22:39

## 2017-01-01 RX ADMIN — POTASSIUM CHLORIDE 20 MEQ: 400 INJECTION, SOLUTION INTRAVENOUS at 13:43

## 2017-01-01 RX ADMIN — BUMETANIDE 2 MG: 0.25 INJECTION, SOLUTION INTRAMUSCULAR; INTRAVENOUS at 04:10

## 2017-01-01 RX ADMIN — POLYVINYL ALCOHOL 2 DROP: 14 SOLUTION/ DROPS OPHTHALMIC at 23:39

## 2017-01-01 RX ADMIN — CASTOR OIL AND BALSAM, PERU: 788; 87 OINTMENT TOPICAL at 08:32

## 2017-01-01 RX ADMIN — BUMETANIDE 2 MG: 0.25 INJECTION, SOLUTION INTRAMUSCULAR; INTRAVENOUS at 17:00

## 2017-01-01 RX ADMIN — Medication 2 MG: at 14:51

## 2017-01-01 RX ADMIN — Medication 10 ML: at 09:08

## 2017-01-01 RX ADMIN — Medication 10 ML: at 21:40

## 2017-01-01 RX ADMIN — Medication 10 ML: at 05:28

## 2017-01-01 RX ADMIN — Medication 10 ML: at 06:28

## 2017-01-01 RX ADMIN — BUMETANIDE 2 MG: 0.25 INJECTION, SOLUTION INTRAMUSCULAR; INTRAVENOUS at 03:44

## 2017-01-01 RX ADMIN — POTASSIUM CHLORIDE 20 MEQ: 400 INJECTION, SOLUTION INTRAVENOUS at 07:31

## 2017-01-01 RX ADMIN — DOCUSATE SODIUM 100 MG: 100 CAPSULE, LIQUID FILLED ORAL at 09:01

## 2017-01-01 RX ADMIN — CHLORHEXIDINE GLUCONATE 10 ML: 1.2 RINSE ORAL at 21:22

## 2017-01-01 RX ADMIN — POTASSIUM CHLORIDE 20 MEQ: 400 INJECTION, SOLUTION INTRAVENOUS at 12:00

## 2017-01-01 RX ADMIN — CASTOR OIL AND BALSAM, PERU: 788; 87 OINTMENT TOPICAL at 08:56

## 2017-01-01 RX ADMIN — POTASSIUM CHLORIDE 20 MEQ: 400 INJECTION, SOLUTION INTRAVENOUS at 10:42

## 2017-01-01 RX ADMIN — POLYVINYL ALCOHOL 2 DROP: 14 SOLUTION/ DROPS OPHTHALMIC at 08:05

## 2017-01-01 RX ADMIN — DOCUSATE SODIUM 100 MG: 100 CAPSULE, LIQUID FILLED ORAL at 17:31

## 2017-01-01 RX ADMIN — HEPARIN SODIUM 5000 UNITS: 5000 INJECTION, SOLUTION INTRAVENOUS; SUBCUTANEOUS at 15:24

## 2017-01-01 RX ADMIN — SODIUM CHLORIDE 1000 ML: 900 INJECTION, SOLUTION INTRAVENOUS at 20:06

## 2017-01-01 RX ADMIN — IPRATROPIUM BROMIDE AND ALBUTEROL SULFATE 3 ML: .5; 3 SOLUTION RESPIRATORY (INHALATION) at 19:51

## 2017-01-01 RX ADMIN — PHENYLEPHRINE HYDROCHLORIDE 250 MCG/MIN: 10 INJECTION INTRAVENOUS at 22:44

## 2017-01-01 RX ADMIN — IPRATROPIUM BROMIDE AND ALBUTEROL SULFATE 3 ML: .5; 3 SOLUTION RESPIRATORY (INHALATION) at 03:41

## 2017-01-01 RX ADMIN — PIPERACILLIN SODIUM,TAZOBACTAM SODIUM 3.38 G: 3; .375 INJECTION, POWDER, FOR SOLUTION INTRAVENOUS at 21:58

## 2017-01-01 RX ADMIN — VANCOMYCIN HYDROCHLORIDE 1000 MG: 1 INJECTION, POWDER, LYOPHILIZED, FOR SOLUTION INTRAVENOUS at 10:08

## 2017-01-01 RX ADMIN — BUMETANIDE 2 MG: 0.25 INJECTION, SOLUTION INTRAMUSCULAR; INTRAVENOUS at 12:32

## 2017-01-01 RX ADMIN — HEPARIN SODIUM 5000 UNITS: 5000 INJECTION, SOLUTION INTRAVENOUS; SUBCUTANEOUS at 05:53

## 2017-01-01 RX ADMIN — POTASSIUM CHLORIDE 40 MEQ: 40 SOLUTION ORAL at 08:32

## 2017-01-01 RX ADMIN — VANCOMYCIN HYDROCHLORIDE 1000 MG: 1 INJECTION, POWDER, LYOPHILIZED, FOR SOLUTION INTRAVENOUS at 09:05

## 2017-01-01 RX ADMIN — Medication 10 ML: at 16:18

## 2017-01-01 RX ADMIN — APIXABAN 2.5 MG: 2.5 TABLET, FILM COATED ORAL at 21:16

## 2017-01-01 RX ADMIN — DEXTROSE MONOHYDRATE 25 G: 25 INJECTION, SOLUTION INTRAVENOUS at 23:41

## 2017-01-01 RX ADMIN — POLYVINYL ALCOHOL 2 DROP: 14 SOLUTION/ DROPS OPHTHALMIC at 22:00

## 2017-01-01 RX ADMIN — POLYVINYL ALCOHOL 2 DROP: 14 SOLUTION/ DROPS OPHTHALMIC at 22:07

## 2017-01-01 RX ADMIN — POLYVINYL ALCOHOL 2 DROP: 14 SOLUTION/ DROPS OPHTHALMIC at 17:46

## 2017-01-01 RX ADMIN — PIPERACILLIN SODIUM,TAZOBACTAM SODIUM 3.38 G: 3; .375 INJECTION, POWDER, FOR SOLUTION INTRAVENOUS at 05:28

## 2017-01-01 RX ADMIN — CHLORHEXIDINE GLUCONATE 10 ML: 1.2 RINSE ORAL at 21:20

## 2017-01-01 RX ADMIN — PHENYLEPHRINE HYDROCHLORIDE 190 MCG/MIN: 10 INJECTION INTRAVENOUS at 02:56

## 2017-01-01 RX ADMIN — Medication 10 ML: at 13:38

## 2017-01-01 RX ADMIN — POTASSIUM CHLORIDE 10 MEQ: 10 INJECTION, SOLUTION INTRAVENOUS at 09:34

## 2017-01-01 RX ADMIN — POLYVINYL ALCOHOL 2 DROP: 14 SOLUTION/ DROPS OPHTHALMIC at 22:02

## 2017-01-01 RX ADMIN — APIXABAN 2.5 MG: 2.5 TABLET, FILM COATED ORAL at 10:22

## 2017-01-01 RX ADMIN — POTASSIUM CHLORIDE 20 MEQ: 400 INJECTION, SOLUTION INTRAVENOUS at 06:22

## 2017-01-01 RX ADMIN — POTASSIUM CHLORIDE 40 MEQ: 750 TABLET, FILM COATED, EXTENDED RELEASE ORAL at 08:51

## 2017-01-01 RX ADMIN — CHLORHEXIDINE GLUCONATE 10 ML: 1.2 RINSE ORAL at 20:00

## 2017-01-01 RX ADMIN — Medication 1 TABLET: at 18:10

## 2017-01-01 RX ADMIN — PHENYLEPHRINE HYDROCHLORIDE 200 MCG/MIN: 10 INJECTION INTRAVENOUS at 08:00

## 2017-01-01 RX ADMIN — SODIUM CHLORIDE 40 MG: 9 INJECTION INTRAMUSCULAR; INTRAVENOUS; SUBCUTANEOUS at 00:17

## 2017-01-01 RX ADMIN — POTASSIUM CHLORIDE 20 MEQ: 400 INJECTION, SOLUTION INTRAVENOUS at 07:25

## 2017-01-01 RX ADMIN — IPRATROPIUM BROMIDE AND ALBUTEROL SULFATE 3 ML: .5; 3 SOLUTION RESPIRATORY (INHALATION) at 20:13

## 2017-01-01 RX ADMIN — ENALAPRIL MALEATE 2.5 MG: 5 TABLET ORAL at 08:42

## 2017-01-01 RX ADMIN — Medication 10 ML: at 22:19

## 2017-01-01 RX ADMIN — Medication 1 TABLET: at 08:00

## 2017-01-01 RX ADMIN — Medication 2 MG: at 18:19

## 2017-01-01 RX ADMIN — DOCUSATE SODIUM 100 MG: 100 CAPSULE, LIQUID FILLED ORAL at 17:01

## 2017-01-01 RX ADMIN — BUMETANIDE 2 MG: 0.25 INJECTION, SOLUTION INTRAMUSCULAR; INTRAVENOUS at 17:25

## 2017-01-01 RX ADMIN — BUMETANIDE 1 MG: 0.25 INJECTION, SOLUTION INTRAMUSCULAR; INTRAVENOUS at 17:01

## 2017-01-01 RX ADMIN — Medication 10 ML: at 21:49

## 2017-01-01 RX ADMIN — Medication 1 TABLET: at 17:01

## 2017-01-01 RX ADMIN — Medication 1 TABLET: at 17:15

## 2017-01-01 RX ADMIN — PIPERACILLIN SODIUM,TAZOBACTAM SODIUM 3.38 G: 3; .375 INJECTION, POWDER, FOR SOLUTION INTRAVENOUS at 05:54

## 2017-01-01 RX ADMIN — POTASSIUM CHLORIDE 40 MEQ: 40 SOLUTION ORAL at 09:34

## 2017-01-01 RX ADMIN — PANTOPRAZOLE SODIUM 40 MG: 40 TABLET, DELAYED RELEASE ORAL at 06:45

## 2017-01-01 RX ADMIN — SODIUM CHLORIDE: 900 INJECTION, SOLUTION INTRAVENOUS at 10:08

## 2017-01-01 RX ADMIN — Medication 10 ML: at 10:59

## 2017-01-01 RX ADMIN — PHENYLEPHRINE HYDROCHLORIDE 250 MCG/MIN: 10 INJECTION INTRAVENOUS at 03:54

## 2017-01-01 RX ADMIN — POLYVINYL ALCOHOL 2 DROP: 14 SOLUTION/ DROPS OPHTHALMIC at 15:05

## 2017-01-01 RX ADMIN — POLYVINYL ALCOHOL 2 DROP: 14 SOLUTION/ DROPS OPHTHALMIC at 21:39

## 2017-01-01 RX ADMIN — IPRATROPIUM BROMIDE AND ALBUTEROL SULFATE 3 ML: .5; 3 SOLUTION RESPIRATORY (INHALATION) at 08:29

## 2017-01-01 RX ADMIN — POLYVINYL ALCOHOL 2 DROP: 14 SOLUTION/ DROPS OPHTHALMIC at 15:20

## 2017-01-01 RX ADMIN — PIPERACILLIN SODIUM,TAZOBACTAM SODIUM 3.38 G: 3; .375 INJECTION, POWDER, FOR SOLUTION INTRAVENOUS at 05:18

## 2017-01-01 RX ADMIN — POLYVINYL ALCOHOL 2 DROP: 14 SOLUTION/ DROPS OPHTHALMIC at 09:02

## 2017-01-01 RX ADMIN — ACETAZOLAMIDE 250 MG: 500 INJECTION, POWDER, LYOPHILIZED, FOR SOLUTION INTRAVENOUS at 11:38

## 2017-01-01 RX ADMIN — APIXABAN 2.5 MG: 2.5 TABLET, FILM COATED ORAL at 09:00

## 2017-01-01 RX ADMIN — PIPERACILLIN SODIUM,TAZOBACTAM SODIUM 3.38 G: 3; .375 INJECTION, POWDER, FOR SOLUTION INTRAVENOUS at 14:14

## 2017-01-01 RX ADMIN — PIPERACILLIN SODIUM,TAZOBACTAM SODIUM 3.38 G: 3; .375 INJECTION, POWDER, FOR SOLUTION INTRAVENOUS at 05:14

## 2017-01-01 RX ADMIN — IPRATROPIUM BROMIDE AND ALBUTEROL SULFATE 3 ML: .5; 3 SOLUTION RESPIRATORY (INHALATION) at 07:41

## 2017-01-01 RX ADMIN — POLYVINYL ALCOHOL 2 DROP: 14 SOLUTION/ DROPS OPHTHALMIC at 21:13

## 2017-01-01 RX ADMIN — Medication 10 ML: at 13:33

## 2017-01-01 RX ADMIN — IPRATROPIUM BROMIDE AND ALBUTEROL SULFATE 3 ML: .5; 3 SOLUTION RESPIRATORY (INHALATION) at 01:40

## 2017-01-01 RX ADMIN — ENALAPRIL MALEATE 2.5 MG: 5 TABLET ORAL at 11:42

## 2017-01-01 RX ADMIN — DEXTROSE MONOHYDRATE 12.5 G: 25 INJECTION, SOLUTION INTRAVENOUS at 07:27

## 2017-01-01 RX ADMIN — Medication 10 ML: at 22:33

## 2017-01-01 RX ADMIN — IPRATROPIUM BROMIDE AND ALBUTEROL SULFATE 3 ML: .5; 3 SOLUTION RESPIRATORY (INHALATION) at 03:02

## 2017-01-01 RX ADMIN — POLYVINYL ALCOHOL 2 DROP: 14 SOLUTION/ DROPS OPHTHALMIC at 16:31

## 2017-01-01 RX ADMIN — CASTOR OIL AND BALSAM, PERU: 788; 87 OINTMENT TOPICAL at 17:17

## 2017-01-01 RX ADMIN — PIPERACILLIN SODIUM,TAZOBACTAM SODIUM 3.38 G: 3; .375 INJECTION, POWDER, FOR SOLUTION INTRAVENOUS at 14:42

## 2017-01-01 RX ADMIN — Medication 30 ML: at 13:18

## 2017-01-01 RX ADMIN — APIXABAN 2.5 MG: 2.5 TABLET, FILM COATED ORAL at 09:07

## 2017-01-01 RX ADMIN — DEXTROSE MONOHYDRATE AND SODIUM CHLORIDE 55 ML/HR: 5; .45 INJECTION, SOLUTION INTRAVENOUS at 14:24

## 2017-01-01 RX ADMIN — APIXABAN 2.5 MG: 2.5 TABLET, FILM COATED ORAL at 08:17

## 2017-01-01 RX ADMIN — POLYVINYL ALCOHOL 2 DROP: 14 SOLUTION/ DROPS OPHTHALMIC at 22:28

## 2017-01-01 RX ADMIN — INSULIN LISPRO 2 UNITS: 100 INJECTION, SOLUTION INTRAVENOUS; SUBCUTANEOUS at 17:42

## 2017-01-01 RX ADMIN — POLYVINYL ALCOHOL 2 DROP: 14 SOLUTION/ DROPS OPHTHALMIC at 00:38

## 2017-01-01 RX ADMIN — CASTOR OIL AND BALSAM, PERU: 788; 87 OINTMENT TOPICAL at 11:58

## 2017-01-01 RX ADMIN — DEXTROSE MONOHYDRATE 30 ML/HR: 10 INJECTION, SOLUTION INTRAVENOUS at 05:15

## 2017-01-01 RX ADMIN — FUROSEMIDE 40 MG: 10 INJECTION, SOLUTION INTRAMUSCULAR; INTRAVENOUS at 09:10

## 2017-01-01 RX ADMIN — WATER 250 MG: 1 INJECTION INTRAMUSCULAR; INTRAVENOUS; SUBCUTANEOUS at 11:04

## 2017-01-01 RX ADMIN — POTASSIUM CHLORIDE 20 MEQ: 400 INJECTION, SOLUTION INTRAVENOUS at 11:32

## 2017-01-01 RX ADMIN — CHLORHEXIDINE GLUCONATE 15 ML: 1.2 RINSE ORAL at 20:41

## 2017-01-01 RX ADMIN — SODIUM CHLORIDE 10 ML/HR: 900 INJECTION, SOLUTION INTRAVENOUS at 16:18

## 2017-01-01 RX ADMIN — BUMETANIDE 2 MG: 0.25 INJECTION INTRAMUSCULAR; INTRAVENOUS at 16:09

## 2017-01-01 RX ADMIN — Medication 2 MG: at 09:39

## 2017-01-01 RX ADMIN — Medication 10 ML: at 14:39

## 2017-01-01 RX ADMIN — POTASSIUM CHLORIDE 20 MEQ: 400 INJECTION, SOLUTION INTRAVENOUS at 10:00

## 2017-01-01 RX ADMIN — SUCCINYLCHOLINE CHLORIDE 100 MG: 20 INJECTION, SOLUTION INTRAMUSCULAR; INTRAVENOUS at 21:41

## 2017-01-01 RX ADMIN — BACITRACIN ZINC 1 PACKET: 500 OINTMENT TOPICAL at 21:36

## 2017-01-01 RX ADMIN — PANTOPRAZOLE SODIUM 40 MG: 40 TABLET, DELAYED RELEASE ORAL at 06:34

## 2017-01-01 RX ADMIN — BUMETANIDE 2 MG: 0.25 INJECTION INTRAMUSCULAR; INTRAVENOUS at 08:39

## 2017-01-01 RX ADMIN — Medication 1 TABLET: at 17:24

## 2017-01-01 RX ADMIN — FUROSEMIDE 80 MG: 40 TABLET ORAL at 08:34

## 2017-01-01 RX ADMIN — POLYVINYL ALCOHOL 2 DROP: 14 SOLUTION/ DROPS OPHTHALMIC at 08:47

## 2017-01-01 RX ADMIN — MILRINONE LACTATE 0.38 MCG/KG/MIN: 200 INJECTION, SOLUTION INTRAVENOUS at 13:15

## 2017-01-01 RX ADMIN — Medication 1 TABLET: at 17:18

## 2017-01-01 RX ADMIN — IPRATROPIUM BROMIDE AND ALBUTEROL SULFATE 3 ML: .5; 3 SOLUTION RESPIRATORY (INHALATION) at 16:40

## 2017-01-01 RX ADMIN — POTASSIUM CHLORIDE 40 MEQ: 40 SOLUTION ORAL at 16:18

## 2017-01-01 RX ADMIN — POLYVINYL ALCOHOL 2 DROP: 14 SOLUTION/ DROPS OPHTHALMIC at 22:33

## 2017-01-01 RX ADMIN — Medication 10 ML: at 13:05

## 2017-01-01 RX ADMIN — LORAZEPAM 0.5 MG: 0.5 TABLET ORAL at 03:39

## 2017-01-01 RX ADMIN — ENALAPRIL MALEATE 2.5 MG: 5 TABLET ORAL at 09:12

## 2017-01-01 RX ADMIN — APIXABAN 2.5 MG: 2.5 TABLET, FILM COATED ORAL at 08:34

## 2017-01-01 RX ADMIN — Medication 10 ML: at 07:24

## 2017-01-01 RX ADMIN — IPRATROPIUM BROMIDE AND ALBUTEROL SULFATE 3 ML: .5; 3 SOLUTION RESPIRATORY (INHALATION) at 19:36

## 2017-01-01 RX ADMIN — APIXABAN 2.5 MG: 2.5 TABLET, FILM COATED ORAL at 19:56

## 2017-01-01 RX ADMIN — BUMETANIDE 2 MG: 0.25 INJECTION INTRAMUSCULAR; INTRAVENOUS at 23:24

## 2017-01-01 RX ADMIN — Medication 10 ML: at 09:25

## 2017-01-01 RX ADMIN — DEXTROSE MONOHYDRATE 12.5 G: 25 INJECTION, SOLUTION INTRAVENOUS at 01:00

## 2017-01-01 RX ADMIN — POTASSIUM CHLORIDE 10 MEQ: 10 INJECTION, SOLUTION INTRAVENOUS at 10:33

## 2017-01-01 RX ADMIN — Medication 2 MG: at 01:37

## 2017-01-01 RX ADMIN — POLYVINYL ALCOHOL 2 DROP: 14 SOLUTION/ DROPS OPHTHALMIC at 09:55

## 2017-01-01 RX ADMIN — ENALAPRIL MALEATE 2.5 MG: 5 TABLET ORAL at 13:03

## 2017-01-01 RX ADMIN — PIPERACILLIN SODIUM,TAZOBACTAM SODIUM 3.38 G: 3; .375 INJECTION, POWDER, FOR SOLUTION INTRAVENOUS at 06:11

## 2017-01-01 RX ADMIN — BUMETANIDE 2 MG: 0.25 INJECTION INTRAMUSCULAR; INTRAVENOUS at 09:56

## 2017-01-01 RX ADMIN — PANTOPRAZOLE SODIUM 40 MG: 40 TABLET, DELAYED RELEASE ORAL at 07:03

## 2017-01-01 RX ADMIN — PHENYLEPHRINE HYDROCHLORIDE 175 MCG/MIN: 10 INJECTION INTRAVENOUS at 08:33

## 2017-01-01 RX ADMIN — IPRATROPIUM BROMIDE AND ALBUTEROL SULFATE 3 ML: .5; 3 SOLUTION RESPIRATORY (INHALATION) at 13:15

## 2017-01-01 RX ADMIN — POTASSIUM CHLORIDE 20 MEQ: 40 SOLUTION ORAL at 08:08

## 2017-01-01 RX ADMIN — BUMETANIDE 1 MG: 0.25 INJECTION, SOLUTION INTRAMUSCULAR; INTRAVENOUS at 18:18

## 2017-01-01 RX ADMIN — PANTOPRAZOLE SODIUM 40 MG: 40 TABLET, DELAYED RELEASE ORAL at 07:14

## 2017-01-01 RX ADMIN — MAGNESIUM SULFATE HEPTAHYDRATE 1 G: 1 INJECTION, SOLUTION INTRAVENOUS at 07:30

## 2017-01-01 RX ADMIN — PROPOFOL 30 MCG/KG/MIN: 10 INJECTION, EMULSION INTRAVENOUS at 20:30

## 2017-01-01 RX ADMIN — SODIUM CHLORIDE 40 MG: 9 INJECTION INTRAMUSCULAR; INTRAVENOUS; SUBCUTANEOUS at 00:39

## 2017-01-01 RX ADMIN — Medication 10 ML: at 22:02

## 2017-01-01 RX ADMIN — Medication 2 MG: at 01:08

## 2017-01-01 RX ADMIN — IPRATROPIUM BROMIDE AND ALBUTEROL SULFATE 3 ML: .5; 3 SOLUTION RESPIRATORY (INHALATION) at 08:24

## 2017-01-01 RX ADMIN — APIXABAN 2.5 MG: 2.5 TABLET, FILM COATED ORAL at 10:06

## 2017-01-01 RX ADMIN — CHLORHEXIDINE GLUCONATE 10 ML: 1.2 RINSE ORAL at 22:16

## 2017-01-01 RX ADMIN — PANTOPRAZOLE SODIUM 40 MG: 40 TABLET, DELAYED RELEASE ORAL at 08:00

## 2017-01-01 RX ADMIN — Medication 6 MCG/MIN: at 11:24

## 2017-01-01 RX ADMIN — POTASSIUM CHLORIDE 20 MEQ: 400 INJECTION, SOLUTION INTRAVENOUS at 10:24

## 2017-01-01 RX ADMIN — POLYVINYL ALCOHOL 2 DROP: 14 SOLUTION/ DROPS OPHTHALMIC at 09:44

## 2017-01-01 RX ADMIN — MAGNESIUM SULFATE HEPTAHYDRATE 2 G: 40 INJECTION, SOLUTION INTRAVENOUS at 09:26

## 2017-01-01 RX ADMIN — PHENYLEPHRINE HYDROCHLORIDE 200 MCG/MIN: 10 INJECTION INTRAVENOUS at 10:03

## 2017-01-01 RX ADMIN — VANCOMYCIN HYDROCHLORIDE 1000 MG: 1 INJECTION, POWDER, LYOPHILIZED, FOR SOLUTION INTRAVENOUS at 10:34

## 2017-01-01 RX ADMIN — Medication 10 ML: at 21:35

## 2017-01-01 RX ADMIN — MILRINONE LACTATE 0.2 MCG/KG/MIN: 200 INJECTION, SOLUTION INTRAVENOUS at 05:11

## 2017-01-01 RX ADMIN — Medication 10 ML: at 05:22

## 2017-01-01 RX ADMIN — PANTOPRAZOLE SODIUM 40 MG: 40 TABLET, DELAYED RELEASE ORAL at 09:00

## 2017-01-01 RX ADMIN — POTASSIUM CHLORIDE 10 MEQ: 10 INJECTION, SOLUTION INTRAVENOUS at 14:45

## 2017-01-01 RX ADMIN — Medication 10 ML: at 14:52

## 2017-01-01 RX ADMIN — Medication 10 ML: at 10:15

## 2017-01-01 RX ADMIN — SODIUM CHLORIDE 100 ML: 900 INJECTION, SOLUTION INTRAVENOUS at 15:23

## 2017-01-01 RX ADMIN — SPIRONOLACTONE 25 MG: 25 TABLET, FILM COATED ORAL at 08:00

## 2017-01-01 RX ADMIN — Medication 10 ML: at 06:32

## 2017-01-01 RX ADMIN — POLYVINYL ALCOHOL 2 DROP: 14 SOLUTION/ DROPS OPHTHALMIC at 08:32

## 2017-01-01 RX ADMIN — PIPERACILLIN SODIUM,TAZOBACTAM SODIUM 3.38 G: 3; .375 INJECTION, POWDER, FOR SOLUTION INTRAVENOUS at 22:40

## 2017-01-01 RX ADMIN — ETOMIDATE 10 MG: 2 INJECTION INTRAVENOUS at 23:21

## 2017-01-01 RX ADMIN — PIPERACILLIN SODIUM,TAZOBACTAM SODIUM 3.38 G: 3; .375 INJECTION, POWDER, FOR SOLUTION INTRAVENOUS at 22:39

## 2017-01-01 RX ADMIN — PIPERACILLIN SODIUM,TAZOBACTAM SODIUM 3.38 G: 3; .375 INJECTION, POWDER, FOR SOLUTION INTRAVENOUS at 14:27

## 2017-01-01 RX ADMIN — Medication 2 MG: at 18:02

## 2017-01-01 RX ADMIN — PANTOPRAZOLE SODIUM 40 MG: 40 TABLET, DELAYED RELEASE ORAL at 08:17

## 2017-01-01 RX ADMIN — POTASSIUM CHLORIDE 40 MEQ: 40 SOLUTION ORAL at 09:37

## 2017-01-01 RX ADMIN — VANCOMYCIN HYDROCHLORIDE 1000 MG: 1 INJECTION, POWDER, LYOPHILIZED, FOR SOLUTION INTRAVENOUS at 10:21

## 2017-01-01 RX ADMIN — CASTOR OIL AND BALSAM, PERU: 788; 87 OINTMENT TOPICAL at 18:00

## 2017-01-01 RX ADMIN — MIDAZOLAM HYDROCHLORIDE 0.5 MG: 1 INJECTION, SOLUTION INTRAMUSCULAR; INTRAVENOUS at 13:18

## 2017-01-01 RX ADMIN — POLYVINYL ALCOHOL 2 DROP: 14 SOLUTION/ DROPS OPHTHALMIC at 21:14

## 2017-01-01 RX ADMIN — ETOMIDATE 5 MG: 2 INJECTION INTRAVENOUS at 21:40

## 2017-01-01 RX ADMIN — CHLORHEXIDINE GLUCONATE 10 ML: 1.2 RINSE ORAL at 08:00

## 2017-01-01 RX ADMIN — POTASSIUM CHLORIDE 20 MEQ: 400 INJECTION, SOLUTION INTRAVENOUS at 11:41

## 2017-01-01 RX ADMIN — IPRATROPIUM BROMIDE AND ALBUTEROL SULFATE 3 ML: .5; 3 SOLUTION RESPIRATORY (INHALATION) at 20:32

## 2017-01-01 RX ADMIN — POTASSIUM CHLORIDE 20 MEQ: 400 INJECTION, SOLUTION INTRAVENOUS at 12:08

## 2017-01-01 RX ADMIN — DOCUSATE SODIUM 100 MG: 100 CAPSULE, LIQUID FILLED ORAL at 08:14

## 2017-01-01 RX ADMIN — CASTOR OIL AND BALSAM, PERU: 788; 87 OINTMENT TOPICAL at 17:34

## 2017-01-01 RX ADMIN — PHENYLEPHRINE HYDROCHLORIDE 150 MCG/MIN: 10 INJECTION INTRAVENOUS at 06:53

## 2017-01-01 RX ADMIN — POLYVINYL ALCOHOL 2 DROP: 14 SOLUTION/ DROPS OPHTHALMIC at 10:20

## 2017-01-01 RX ADMIN — POLYVINYL ALCOHOL 2 DROP: 14 SOLUTION/ DROPS OPHTHALMIC at 11:42

## 2017-01-01 RX ADMIN — ALBUTEROL SULFATE 2.5 MG: 2.5 SOLUTION RESPIRATORY (INHALATION) at 00:03

## 2017-01-01 RX ADMIN — Medication 10 ML: at 06:00

## 2017-01-01 RX ADMIN — APIXABAN 2.5 MG: 2.5 TABLET, FILM COATED ORAL at 10:09

## 2017-01-01 RX ADMIN — MIDAZOLAM HYDROCHLORIDE 1.5 MG: 1 INJECTION, SOLUTION INTRAMUSCULAR; INTRAVENOUS at 13:57

## 2017-01-01 RX ADMIN — HEPARIN SODIUM 5000 UNITS: 5000 INJECTION, SOLUTION INTRAVENOUS; SUBCUTANEOUS at 06:52

## 2017-01-01 RX ADMIN — BUMETANIDE 2 MG: 0.25 INJECTION INTRAMUSCULAR; INTRAVENOUS at 23:18

## 2017-01-01 RX ADMIN — IPRATROPIUM BROMIDE AND ALBUTEROL SULFATE 3 ML: .5; 3 SOLUTION RESPIRATORY (INHALATION) at 19:39

## 2017-01-01 RX ADMIN — APIXABAN 2.5 MG: 2.5 TABLET, FILM COATED ORAL at 08:42

## 2017-01-01 RX ADMIN — Medication 1 TABLET: at 08:18

## 2017-01-01 RX ADMIN — POLYVINYL ALCOHOL 2 DROP: 14 SOLUTION/ DROPS OPHTHALMIC at 17:06

## 2017-01-01 RX ADMIN — APIXABAN 2.5 MG: 2.5 TABLET, FILM COATED ORAL at 20:20

## 2017-01-01 RX ADMIN — BUMETANIDE 2 MG: 0.25 INJECTION, SOLUTION INTRAMUSCULAR; INTRAVENOUS at 09:10

## 2017-01-01 RX ADMIN — CHLORHEXIDINE GLUCONATE 10 ML: 1.2 RINSE ORAL at 09:10

## 2017-01-01 RX ADMIN — CASTOR OIL AND BALSAM, PERU: 788; 87 OINTMENT TOPICAL at 17:59

## 2017-01-01 RX ADMIN — PANTOPRAZOLE SODIUM 40 MG: 40 TABLET, DELAYED RELEASE ORAL at 06:48

## 2017-01-01 RX ADMIN — POLYVINYL ALCOHOL 2 DROP: 14 SOLUTION/ DROPS OPHTHALMIC at 00:45

## 2017-01-01 RX ADMIN — ALBUMIN (HUMAN) 12.5 G: 0.25 INJECTION, SOLUTION INTRAVENOUS at 17:10

## 2017-01-01 RX ADMIN — PIPERACILLIN SODIUM,TAZOBACTAM SODIUM 3.38 G: 3; .375 INJECTION, POWDER, FOR SOLUTION INTRAVENOUS at 14:26

## 2017-01-01 RX ADMIN — CHLORHEXIDINE GLUCONATE 15 ML: 1.2 RINSE ORAL at 08:02

## 2017-01-01 RX ADMIN — POTASSIUM CHLORIDE 40 MEQ: 40 SOLUTION ORAL at 11:40

## 2017-01-01 RX ADMIN — BUMETANIDE 2 MG: 0.25 INJECTION, SOLUTION INTRAMUSCULAR; INTRAVENOUS at 09:01

## 2017-01-01 RX ADMIN — DOCUSATE SODIUM 100 MG: 100 CAPSULE, LIQUID FILLED ORAL at 08:00

## 2017-01-01 RX ADMIN — POTASSIUM CHLORIDE 20 MEQ: 400 INJECTION, SOLUTION INTRAVENOUS at 08:35

## 2017-01-01 RX ADMIN — Medication 2 MG: at 18:15

## 2017-01-01 RX ADMIN — BUMETANIDE 1 MG: 0.25 INJECTION, SOLUTION INTRAMUSCULAR; INTRAVENOUS at 18:48

## 2017-01-01 RX ADMIN — CHLORHEXIDINE GLUCONATE 10 ML: 1.2 RINSE ORAL at 20:15

## 2017-01-01 RX ADMIN — Medication 1 TABLET: at 10:33

## 2017-01-01 RX ADMIN — BUMETANIDE 1 MG: 0.25 INJECTION, SOLUTION INTRAMUSCULAR; INTRAVENOUS at 09:26

## 2017-01-01 RX ADMIN — BUMETANIDE 2 MG: 0.25 INJECTION INTRAMUSCULAR; INTRAVENOUS at 17:06

## 2017-01-01 RX ADMIN — FUROSEMIDE 80 MG: 40 TABLET ORAL at 17:18

## 2017-01-01 RX ADMIN — POTASSIUM CHLORIDE 20 MEQ: 400 INJECTION, SOLUTION INTRAVENOUS at 12:36

## 2017-01-01 RX ADMIN — PIPERACILLIN SODIUM,TAZOBACTAM SODIUM 3.38 G: 3; .375 INJECTION, POWDER, FOR SOLUTION INTRAVENOUS at 13:27

## 2017-01-01 RX ADMIN — DOCUSATE SODIUM 100 MG: 100 CAPSULE, LIQUID FILLED ORAL at 18:39

## 2017-01-01 RX ADMIN — POLYVINYL ALCOHOL 2 DROP: 14 SOLUTION/ DROPS OPHTHALMIC at 09:42

## 2017-01-01 RX ADMIN — FUROSEMIDE 80 MG: 40 TABLET ORAL at 09:11

## 2017-01-01 RX ADMIN — POTASSIUM CHLORIDE 20 MEQ: 400 INJECTION, SOLUTION INTRAVENOUS at 06:15

## 2017-01-01 RX ADMIN — INFLUENZA VIRUS VACCINE 0.5 ML: 15; 15; 15; 15 SUSPENSION INTRAMUSCULAR at 12:46

## 2017-01-01 RX ADMIN — CASTOR OIL AND BALSAM, PERU: 788; 87 OINTMENT TOPICAL at 09:11

## 2017-01-01 RX ADMIN — DEXTROSE MONOHYDRATE 25 G: 25 INJECTION, SOLUTION INTRAVENOUS at 09:17

## 2017-01-01 RX ADMIN — Medication 2 MG: at 19:04

## 2017-01-01 RX ADMIN — DOCUSATE SODIUM 100 MG: 100 CAPSULE, LIQUID FILLED ORAL at 09:07

## 2017-01-01 RX ADMIN — Medication 10 ML: at 13:45

## 2017-01-01 RX ADMIN — APIXABAN 2.5 MG: 2.5 TABLET, FILM COATED ORAL at 08:14

## 2017-01-01 RX ADMIN — PIPERACILLIN SODIUM,TAZOBACTAM SODIUM 3.38 G: 3; .375 INJECTION, POWDER, FOR SOLUTION INTRAVENOUS at 06:06

## 2017-01-01 RX ADMIN — POTASSIUM CHLORIDE 40 MEQ: 40 SOLUTION ORAL at 17:28

## 2017-01-01 RX ADMIN — POLYVINYL ALCOHOL 2 DROP: 14 SOLUTION/ DROPS OPHTHALMIC at 09:36

## 2017-01-01 RX ADMIN — POTASSIUM CHLORIDE 40 MEQ: 40 SOLUTION ORAL at 09:56

## 2017-01-01 RX ADMIN — POLYVINYL ALCOHOL 2 DROP: 14 SOLUTION/ DROPS OPHTHALMIC at 10:07

## 2017-01-01 RX ADMIN — POLYVINYL ALCOHOL 2 DROP: 14 SOLUTION/ DROPS OPHTHALMIC at 08:42

## 2017-01-01 RX ADMIN — APIXABAN 2.5 MG: 2.5 TABLET, FILM COATED ORAL at 22:16

## 2017-01-01 RX ADMIN — BUMETANIDE 2 MG: 0.25 INJECTION, SOLUTION INTRAMUSCULAR; INTRAVENOUS at 11:38

## 2017-01-01 RX ADMIN — INSULIN LISPRO 2 UNITS: 100 INJECTION, SOLUTION INTRAVENOUS; SUBCUTANEOUS at 21:16

## 2017-01-01 RX ADMIN — DOCUSATE SODIUM 100 MG: 100 CAPSULE, LIQUID FILLED ORAL at 09:59

## 2017-01-01 RX ADMIN — POLYVINYL ALCOHOL 2 DROP: 14 SOLUTION/ DROPS OPHTHALMIC at 09:11

## 2017-01-01 RX ADMIN — POLYVINYL ALCOHOL 2 DROP: 14 SOLUTION/ DROPS OPHTHALMIC at 08:18

## 2017-01-01 RX ADMIN — CHLORHEXIDINE GLUCONATE 10 ML: 1.2 RINSE ORAL at 20:22

## 2017-01-01 RX ADMIN — POTASSIUM CHLORIDE 20 MEQ: 400 INJECTION, SOLUTION INTRAVENOUS at 09:39

## 2017-01-01 RX ADMIN — FUROSEMIDE 80 MG: 40 TABLET ORAL at 08:41

## 2017-01-01 RX ADMIN — PANTOPRAZOLE SODIUM 40 MG: 40 TABLET, DELAYED RELEASE ORAL at 06:31

## 2017-01-01 RX ADMIN — Medication 10 ML: at 06:47

## 2017-01-01 RX ADMIN — Medication 10 ML: at 22:23

## 2017-01-01 RX ADMIN — PHENYLEPHRINE HYDROCHLORIDE 250 MCG/MIN: 10 INJECTION INTRAVENOUS at 18:57

## 2017-01-01 RX ADMIN — PHENYLEPHRINE HYDROCHLORIDE 210 MCG/MIN: 10 INJECTION INTRAVENOUS at 22:03

## 2017-01-01 RX ADMIN — PIPERACILLIN SODIUM,TAZOBACTAM SODIUM 3.38 G: 3; .375 INJECTION, POWDER, FOR SOLUTION INTRAVENOUS at 22:02

## 2017-01-01 RX ADMIN — POTASSIUM CHLORIDE 20 MEQ: 400 INJECTION, SOLUTION INTRAVENOUS at 03:45

## 2017-01-01 RX ADMIN — FUROSEMIDE 80 MG: 40 TABLET ORAL at 10:32

## 2017-01-01 RX ADMIN — PHENYLEPHRINE HYDROCHLORIDE 270 MCG/MIN: 10 INJECTION INTRAVENOUS at 16:34

## 2017-01-01 RX ADMIN — Medication 2 MG: at 22:20

## 2017-01-01 RX ADMIN — PHENYLEPHRINE HYDROCHLORIDE 250 MCG/MIN: 10 INJECTION INTRAVENOUS at 05:30

## 2017-01-01 RX ADMIN — SPIRONOLACTONE 25 MG: 25 TABLET, FILM COATED ORAL at 08:08

## 2017-01-01 RX ADMIN — ENALAPRIL MALEATE 2.5 MG: 5 TABLET ORAL at 17:31

## 2017-01-01 RX ADMIN — BUMETANIDE 2 MG: 0.25 INJECTION, SOLUTION INTRAMUSCULAR; INTRAVENOUS at 08:15

## 2017-01-01 RX ADMIN — DEXTROSE MONOHYDRATE 12.5 G: 25 INJECTION, SOLUTION INTRAVENOUS at 05:37

## 2017-01-01 RX ADMIN — ALBUTEROL SULFATE 2.5 MG: 2.5 SOLUTION RESPIRATORY (INHALATION) at 08:11

## 2017-01-01 RX ADMIN — Medication 10 ML: at 15:21

## 2017-01-01 RX ADMIN — POLYVINYL ALCOHOL 2 DROP: 14 SOLUTION/ DROPS OPHTHALMIC at 08:08

## 2017-01-01 RX ADMIN — DOCUSATE SODIUM 100 MG: 100 CAPSULE, LIQUID FILLED ORAL at 08:32

## 2017-01-01 RX ADMIN — APIXABAN 2.5 MG: 2.5 TABLET, FILM COATED ORAL at 17:40

## 2017-01-01 RX ADMIN — PANTOPRAZOLE SODIUM 40 MG: 40 TABLET, DELAYED RELEASE ORAL at 06:46

## 2017-01-01 RX ADMIN — Medication 1 TABLET: at 18:25

## 2017-01-01 RX ADMIN — PIPERACILLIN SODIUM,TAZOBACTAM SODIUM 3.38 G: 3; .375 INJECTION, POWDER, FOR SOLUTION INTRAVENOUS at 06:46

## 2017-01-01 RX ADMIN — HEPARIN SODIUM 5000 UNITS: 5000 INJECTION, SOLUTION INTRAVENOUS; SUBCUTANEOUS at 05:41

## 2017-01-01 RX ADMIN — IPRATROPIUM BROMIDE AND ALBUTEROL SULFATE 3 ML: .5; 3 SOLUTION RESPIRATORY (INHALATION) at 19:15

## 2017-01-01 RX ADMIN — SODIUM CHLORIDE 40 MG: 9 INJECTION INTRAMUSCULAR; INTRAVENOUS; SUBCUTANEOUS at 00:13

## 2017-01-01 RX ADMIN — PANTOPRAZOLE SODIUM 40 MG: 40 TABLET, DELAYED RELEASE ORAL at 06:23

## 2017-01-01 RX ADMIN — Medication 10 ML: at 09:05

## 2017-01-01 RX ADMIN — SODIUM CHLORIDE 50 ML/HR: 900 INJECTION, SOLUTION INTRAVENOUS at 04:46

## 2017-01-01 RX ADMIN — Medication 1 TABLET: at 08:32

## 2017-01-01 RX ADMIN — DOCUSATE SODIUM 100 MG: 100 CAPSULE, LIQUID FILLED ORAL at 18:06

## 2017-01-01 RX ADMIN — ONDANSETRON 4 MG: 2 INJECTION INTRAMUSCULAR; INTRAVENOUS at 15:26

## 2017-01-01 RX ADMIN — DOCUSATE SODIUM 100 MG: 100 CAPSULE, LIQUID FILLED ORAL at 08:31

## 2017-01-01 RX ADMIN — BUMETANIDE 2 MG: 0.25 INJECTION INTRAMUSCULAR; INTRAVENOUS at 08:07

## 2017-01-01 RX ADMIN — PANTOPRAZOLE SODIUM 40 MG: 40 TABLET, DELAYED RELEASE ORAL at 06:44

## 2017-01-01 RX ADMIN — VANCOMYCIN HYDROCHLORIDE 1000 MG: 1 INJECTION, POWDER, LYOPHILIZED, FOR SOLUTION INTRAVENOUS at 16:29

## 2017-01-01 RX ADMIN — POLYVINYL ALCOHOL 2 DROP: 14 SOLUTION/ DROPS OPHTHALMIC at 17:01

## 2017-01-01 RX ADMIN — CASTOR OIL AND BALSAM, PERU: 788; 87 OINTMENT TOPICAL at 10:15

## 2017-01-01 RX ADMIN — POLYVINYL ALCOHOL 2 DROP: 14 SOLUTION/ DROPS OPHTHALMIC at 16:14

## 2017-01-01 RX ADMIN — CASTOR OIL AND BALSAM, PERU: 788; 87 OINTMENT TOPICAL at 10:46

## 2017-01-01 RX ADMIN — DOCUSATE SODIUM 100 MG: 100 CAPSULE, LIQUID FILLED ORAL at 08:51

## 2017-01-01 RX ADMIN — CHLORHEXIDINE GLUCONATE 10 ML: 1.2 RINSE ORAL at 09:55

## 2017-01-01 RX ADMIN — Medication 10 ML: at 14:11

## 2017-01-01 RX ADMIN — APIXABAN 2.5 MG: 2.5 TABLET, FILM COATED ORAL at 21:41

## 2017-01-01 RX ADMIN — SPIRONOLACTONE 25 MG: 25 TABLET, FILM COATED ORAL at 15:21

## 2017-01-01 RX ADMIN — Medication 2 MG: at 19:36

## 2017-01-01 RX ADMIN — IPRATROPIUM BROMIDE AND ALBUTEROL SULFATE 3 ML: .5; 3 SOLUTION RESPIRATORY (INHALATION) at 03:43

## 2017-01-01 RX ADMIN — PANTOPRAZOLE SODIUM 40 MG: 40 TABLET, DELAYED RELEASE ORAL at 06:54

## 2017-01-01 RX ADMIN — MILRINONE LACTATE 0.2 MCG/KG/MIN: 200 INJECTION, SOLUTION INTRAVENOUS at 11:46

## 2017-01-01 RX ADMIN — IPRATROPIUM BROMIDE AND ALBUTEROL SULFATE 3 ML: .5; 3 SOLUTION RESPIRATORY (INHALATION) at 16:26

## 2017-01-01 RX ADMIN — HEPARIN SODIUM 5000 UNITS: 5000 INJECTION, SOLUTION INTRAVENOUS; SUBCUTANEOUS at 13:37

## 2017-01-01 RX ADMIN — POTASSIUM CHLORIDE 10 MEQ: 10 INJECTION, SOLUTION INTRAVENOUS at 11:58

## 2017-01-01 RX ADMIN — Medication 1 TABLET: at 17:42

## 2017-01-01 RX ADMIN — LORAZEPAM 0.5 MG: 2 INJECTION INTRAMUSCULAR; INTRAVENOUS at 19:35

## 2017-01-01 RX ADMIN — PROPOFOL 30 MCG/KG/MIN: 10 INJECTION, EMULSION INTRAVENOUS at 01:03

## 2017-01-01 RX ADMIN — GLYCOPYRROLATE 0.2 MG: 0.2 INJECTION, SOLUTION INTRAMUSCULAR; INTRAVENOUS at 23:26

## 2017-01-01 RX ADMIN — PHENYLEPHRINE HYDROCHLORIDE 150 MCG/MIN: 10 INJECTION INTRAVENOUS at 11:59

## 2017-01-01 RX ADMIN — IPRATROPIUM BROMIDE AND ALBUTEROL SULFATE 3 ML: .5; 3 SOLUTION RESPIRATORY (INHALATION) at 08:55

## 2017-01-01 RX ADMIN — CHLORHEXIDINE GLUCONATE 10 ML: 1.2 RINSE ORAL at 08:39

## 2017-01-01 RX ADMIN — BUMETANIDE 2 MG: 0.25 INJECTION INTRAMUSCULAR; INTRAVENOUS at 21:16

## 2017-01-01 RX ADMIN — POTASSIUM CHLORIDE 20 MEQ: 400 INJECTION, SOLUTION INTRAVENOUS at 06:25

## 2017-01-01 RX ADMIN — PIPERACILLIN SODIUM,TAZOBACTAM SODIUM 3.38 G: 3; .375 INJECTION, POWDER, FOR SOLUTION INTRAVENOUS at 06:51

## 2017-01-01 RX ADMIN — POLYVINYL ALCOHOL 2 DROP: 14 SOLUTION/ DROPS OPHTHALMIC at 18:36

## 2017-01-01 RX ADMIN — APIXABAN 2.5 MG: 2.5 TABLET, FILM COATED ORAL at 10:00

## 2017-01-01 RX ADMIN — IPRATROPIUM BROMIDE AND ALBUTEROL SULFATE 3 ML: .5; 3 SOLUTION RESPIRATORY (INHALATION) at 14:50

## 2017-01-01 RX ADMIN — INSULIN LISPRO 2 UNITS: 100 INJECTION, SOLUTION INTRAVENOUS; SUBCUTANEOUS at 12:27

## 2017-01-01 RX ADMIN — Medication 1 TABLET: at 08:14

## 2017-01-01 RX ADMIN — APIXABAN 2.5 MG: 2.5 TABLET, FILM COATED ORAL at 09:37

## 2017-01-01 RX ADMIN — CHLORHEXIDINE GLUCONATE 15 ML: 1.2 RINSE ORAL at 10:07

## 2017-01-01 RX ADMIN — Medication 1 TABLET: at 09:07

## 2017-01-01 RX ADMIN — DOCUSATE SODIUM 100 MG: 100 CAPSULE, LIQUID FILLED ORAL at 09:06

## 2017-01-01 RX ADMIN — POLYVINYL ALCOHOL 2 DROP: 14 SOLUTION/ DROPS OPHTHALMIC at 23:35

## 2017-01-01 RX ADMIN — DEXTROSE MONOHYDRATE AND SODIUM CHLORIDE 55 ML/HR: 5; .45 INJECTION, SOLUTION INTRAVENOUS at 20:00

## 2017-01-01 RX ADMIN — DOCUSATE SODIUM 100 MG: 100 CAPSULE, LIQUID FILLED ORAL at 08:17

## 2017-01-01 RX ADMIN — ENALAPRIL MALEATE 2.5 MG: 5 TABLET ORAL at 10:33

## 2017-01-01 RX ADMIN — Medication 100 MG: at 08:51

## 2017-01-01 RX ADMIN — CASTOR OIL AND BALSAM, PERU: 788; 87 OINTMENT TOPICAL at 10:03

## 2017-01-01 RX ADMIN — POTASSIUM CHLORIDE 20 MEQ: 400 INJECTION, SOLUTION INTRAVENOUS at 05:11

## 2017-01-01 RX ADMIN — IPRATROPIUM BROMIDE AND ALBUTEROL SULFATE 3 ML: .5; 3 SOLUTION RESPIRATORY (INHALATION) at 07:27

## 2017-01-01 RX ADMIN — Medication 10 ML: at 21:28

## 2017-01-01 RX ADMIN — PIPERACILLIN SODIUM,TAZOBACTAM SODIUM 3.38 G: 3; .375 INJECTION, POWDER, FOR SOLUTION INTRAVENOUS at 14:41

## 2017-01-01 RX ADMIN — Medication 10 ML: at 22:38

## 2017-01-01 RX ADMIN — CHLORHEXIDINE GLUCONATE 10 ML: 1.2 RINSE ORAL at 21:42

## 2017-01-01 RX ADMIN — DOCUSATE SODIUM 100 MG: 100 CAPSULE, LIQUID FILLED ORAL at 18:10

## 2017-01-01 RX ADMIN — IPRATROPIUM BROMIDE AND ALBUTEROL SULFATE 3 ML: .5; 3 SOLUTION RESPIRATORY (INHALATION) at 01:17

## 2017-01-01 RX ADMIN — DOCUSATE SODIUM 100 MG: 100 CAPSULE, LIQUID FILLED ORAL at 09:44

## 2017-01-01 RX ADMIN — DOCUSATE SODIUM 100 MG: 100 CAPSULE, LIQUID FILLED ORAL at 17:24

## 2017-01-01 RX ADMIN — SODIUM CHLORIDE 500 ML: 900 INJECTION, SOLUTION INTRAVENOUS at 03:35

## 2017-01-01 RX ADMIN — Medication 10 ML: at 06:08

## 2017-01-01 RX ADMIN — Medication 1 TABLET: at 09:10

## 2017-01-01 RX ADMIN — CHLORHEXIDINE GLUCONATE 10 ML: 1.2 RINSE ORAL at 09:42

## 2017-01-01 RX ADMIN — CHLORHEXIDINE GLUCONATE 10 ML: 1.2 RINSE ORAL at 01:10

## 2017-01-01 RX ADMIN — Medication 10 ML: at 23:45

## 2017-01-01 RX ADMIN — DOCUSATE SODIUM 100 MG: 100 CAPSULE, LIQUID FILLED ORAL at 18:12

## 2017-01-01 RX ADMIN — POLYVINYL ALCOHOL 2 DROP: 14 SOLUTION/ DROPS OPHTHALMIC at 12:00

## 2017-01-01 RX ADMIN — Medication 1 TABLET: at 09:00

## 2017-01-01 RX ADMIN — Medication 1 TABLET: at 10:08

## 2017-01-01 RX ADMIN — PANTOPRAZOLE SODIUM 40 MG: 40 TABLET, DELAYED RELEASE ORAL at 08:06

## 2017-01-01 RX ADMIN — HEPARIN SODIUM 5000 UNITS: 5000 INJECTION, SOLUTION INTRAVENOUS; SUBCUTANEOUS at 22:19

## 2017-01-01 RX ADMIN — IPRATROPIUM BROMIDE AND ALBUTEROL SULFATE 3 ML: .5; 3 SOLUTION RESPIRATORY (INHALATION) at 16:38

## 2017-01-01 RX ADMIN — HEPARIN SODIUM 5000 UNITS: 5000 INJECTION, SOLUTION INTRAVENOUS; SUBCUTANEOUS at 17:02

## 2017-01-01 RX ADMIN — GLYCOPYRROLATE 0.2 MG: 0.2 INJECTION, SOLUTION INTRAMUSCULAR; INTRAVENOUS at 19:05

## 2017-01-01 RX ADMIN — Medication 20 ML: at 06:00

## 2017-01-01 RX ADMIN — PHENYLEPHRINE HYDROCHLORIDE 20 MCG/MIN: 10 INJECTION INTRAVENOUS at 15:16

## 2017-01-01 RX ADMIN — SODIUM CHLORIDE 40 MG: 9 INJECTION INTRAMUSCULAR; INTRAVENOUS; SUBCUTANEOUS at 01:00

## 2017-01-01 RX ADMIN — SPIRONOLACTONE 25 MG: 25 TABLET, FILM COATED ORAL at 08:14

## 2017-01-01 RX ADMIN — APIXABAN 2.5 MG: 2.5 TABLET, FILM COATED ORAL at 21:23

## 2017-01-01 RX ADMIN — Medication 10 ML: at 18:11

## 2017-01-01 RX ADMIN — BUMETANIDE 2 MG: 0.25 INJECTION INTRAMUSCULAR; INTRAVENOUS at 22:16

## 2017-01-01 RX ADMIN — CHLORHEXIDINE GLUCONATE 10 ML: 1.2 RINSE ORAL at 10:20

## 2017-01-01 RX ADMIN — FUROSEMIDE 80 MG: 40 TABLET ORAL at 18:38

## 2017-01-01 RX ADMIN — POTASSIUM CHLORIDE 40 MEQ: 40 SOLUTION ORAL at 18:11

## 2017-01-01 RX ADMIN — BUMETANIDE 2 MG: 0.25 INJECTION INTRAMUSCULAR; INTRAVENOUS at 16:28

## 2017-01-01 RX ADMIN — Medication 1 TABLET: at 18:00

## 2017-01-01 RX ADMIN — Medication 1 TABLET: at 10:03

## 2017-01-01 RX ADMIN — DEXTROSE MONOHYDRATE 50 ML/HR: 10 INJECTION, SOLUTION INTRAVENOUS at 19:33

## 2017-01-01 RX ADMIN — DEXTROSE MONOHYDRATE 25 G: 25 INJECTION, SOLUTION INTRAVENOUS at 20:46

## 2017-01-01 RX ADMIN — POTASSIUM CHLORIDE 40 MEQ: 40 SOLUTION ORAL at 08:08

## 2017-01-01 RX ADMIN — POLYVINYL ALCOHOL 2 DROP: 14 SOLUTION/ DROPS OPHTHALMIC at 08:15

## 2017-01-01 RX ADMIN — Medication 10 MCG/MIN: at 22:47

## 2017-01-01 RX ADMIN — CASTOR OIL AND BALSAM, PERU: 788; 87 OINTMENT TOPICAL at 17:23

## 2017-01-01 RX ADMIN — IPRATROPIUM BROMIDE AND ALBUTEROL SULFATE 3 ML: .5; 3 SOLUTION RESPIRATORY (INHALATION) at 01:38

## 2017-01-01 RX ADMIN — POLYVINYL ALCOHOL 2 DROP: 14 SOLUTION/ DROPS OPHTHALMIC at 16:12

## 2017-01-01 RX ADMIN — BUMETANIDE 2 MG: 0.25 INJECTION INTRAMUSCULAR; INTRAVENOUS at 08:00

## 2017-01-01 RX ADMIN — PIPERACILLIN SODIUM,TAZOBACTAM SODIUM 3.38 G: 3; .375 INJECTION, POWDER, FOR SOLUTION INTRAVENOUS at 15:24

## 2017-01-01 RX ADMIN — Medication 1 TABLET: at 17:00

## 2017-01-01 RX ADMIN — POLYVINYL ALCOHOL 2 DROP: 14 SOLUTION/ DROPS OPHTHALMIC at 17:23

## 2017-01-01 RX ADMIN — HEPARIN SODIUM 5000 UNITS: 5000 INJECTION, SOLUTION INTRAVENOUS; SUBCUTANEOUS at 05:55

## 2017-01-01 RX ADMIN — Medication 10 ML: at 15:13

## 2017-01-01 RX ADMIN — IPRATROPIUM BROMIDE AND ALBUTEROL SULFATE 3 ML: .5; 3 SOLUTION RESPIRATORY (INHALATION) at 08:38

## 2017-01-01 RX ADMIN — POLYVINYL ALCOHOL 2 DROP: 14 SOLUTION/ DROPS OPHTHALMIC at 08:00

## 2017-01-01 RX ADMIN — Medication 1 TABLET: at 18:39

## 2017-01-01 RX ADMIN — POTASSIUM CHLORIDE 20 MEQ: 400 INJECTION, SOLUTION INTRAVENOUS at 09:26

## 2017-01-01 RX ADMIN — SODIUM CHLORIDE 40 MG: 9 INJECTION INTRAMUSCULAR; INTRAVENOUS; SUBCUTANEOUS at 00:04

## 2017-01-01 RX ADMIN — CASTOR OIL AND BALSAM, PERU: 788; 87 OINTMENT TOPICAL at 08:07

## 2017-01-01 RX ADMIN — MILRINONE LACTATE 0.2 MCG/KG/MIN: 200 INJECTION, SOLUTION INTRAVENOUS at 16:18

## 2017-01-01 RX ADMIN — PHENYLEPHRINE HYDROCHLORIDE 30 MCG/MIN: 10 INJECTION INTRAVENOUS at 13:11

## 2017-01-01 RX ADMIN — CASTOR OIL AND BALSAM, PERU: 788; 87 OINTMENT TOPICAL at 08:35

## 2017-01-01 RX ADMIN — Medication 10 ML: at 09:01

## 2017-01-01 RX ADMIN — CASTOR OIL AND BALSAM, PERU: 788; 87 OINTMENT TOPICAL at 17:57

## 2017-01-01 RX ADMIN — PIPERACILLIN SODIUM,TAZOBACTAM SODIUM 3.38 G: 3; .375 INJECTION, POWDER, FOR SOLUTION INTRAVENOUS at 21:50

## 2017-01-01 RX ADMIN — POLYVINYL ALCOHOL 2 DROP: 14 SOLUTION/ DROPS OPHTHALMIC at 17:00

## 2017-01-01 RX ADMIN — SPIRONOLACTONE 25 MG: 25 TABLET, FILM COATED ORAL at 09:59

## 2017-01-01 RX ADMIN — LORAZEPAM 0.5 MG: 2 INJECTION INTRAMUSCULAR; INTRAVENOUS at 19:05

## 2017-01-01 RX ADMIN — Medication 10 ML: at 06:36

## 2017-01-01 RX ADMIN — CASTOR OIL AND BALSAM, PERU: 788; 87 OINTMENT TOPICAL at 17:46

## 2017-01-01 RX ADMIN — PIPERACILLIN SODIUM,TAZOBACTAM SODIUM 3.38 G: 3; .375 INJECTION, POWDER, FOR SOLUTION INTRAVENOUS at 13:45

## 2017-01-01 RX ADMIN — Medication 1 TABLET: at 09:04

## 2017-01-01 RX ADMIN — HEPARIN SODIUM 5000 UNITS: 5000 INJECTION, SOLUTION INTRAVENOUS; SUBCUTANEOUS at 06:32

## 2017-01-01 RX ADMIN — IPRATROPIUM BROMIDE AND ALBUTEROL SULFATE 3 ML: .5; 3 SOLUTION RESPIRATORY (INHALATION) at 20:27

## 2017-01-01 RX ADMIN — IPRATROPIUM BROMIDE AND ALBUTEROL SULFATE 3 ML: .5; 3 SOLUTION RESPIRATORY (INHALATION) at 08:03

## 2017-01-01 RX ADMIN — Medication 1 TABLET: at 18:17

## 2017-01-01 RX ADMIN — POLYVINYL ALCOHOL 2 DROP: 14 SOLUTION/ DROPS OPHTHALMIC at 17:51

## 2017-01-01 RX ADMIN — POTASSIUM CHLORIDE 20 MEQ: 400 INJECTION, SOLUTION INTRAVENOUS at 10:09

## 2017-01-01 RX ADMIN — PIPERACILLIN SODIUM,TAZOBACTAM SODIUM 3.38 G: 3; .375 INJECTION, POWDER, FOR SOLUTION INTRAVENOUS at 21:38

## 2017-01-01 RX ADMIN — ENALAPRIL MALEATE 2.5 MG: 5 TABLET ORAL at 18:39

## 2017-01-01 RX ADMIN — APIXABAN 2.5 MG: 2.5 TABLET, FILM COATED ORAL at 17:18

## 2017-01-01 RX ADMIN — IPRATROPIUM BROMIDE AND ALBUTEROL SULFATE 3 ML: .5; 3 SOLUTION RESPIRATORY (INHALATION) at 20:55

## 2017-01-01 RX ADMIN — PHENYLEPHRINE HYDROCHLORIDE 20 MCG/MIN: 10 INJECTION INTRAVENOUS at 17:51

## 2017-01-01 RX ADMIN — IPRATROPIUM BROMIDE AND ALBUTEROL SULFATE 3 ML: .5; 3 SOLUTION RESPIRATORY (INHALATION) at 02:41

## 2017-01-01 RX ADMIN — Medication 10 ML: at 06:12

## 2017-01-01 RX ADMIN — Medication 10 ML: at 05:57

## 2017-01-01 RX ADMIN — Medication 10 ML: at 09:07

## 2017-01-01 RX ADMIN — DOCUSATE SODIUM 100 MG: 100 CAPSULE, LIQUID FILLED ORAL at 10:03

## 2017-01-01 RX ADMIN — ENALAPRIL MALEATE 2.5 MG: 5 TABLET ORAL at 18:25

## 2017-01-01 RX ADMIN — POLYVINYL ALCOHOL 2 DROP: 14 SOLUTION/ DROPS OPHTHALMIC at 09:10

## 2017-01-01 RX ADMIN — MILRINONE LACTATE 0.38 MCG/KG/MIN: 200 INJECTION, SOLUTION INTRAVENOUS at 02:16

## 2017-01-01 RX ADMIN — POLYVINYL ALCOHOL 2 DROP: 14 SOLUTION/ DROPS OPHTHALMIC at 18:12

## 2017-01-01 RX ADMIN — VANCOMYCIN HYDROCHLORIDE 1000 MG: 1 INJECTION, POWDER, LYOPHILIZED, FOR SOLUTION INTRAVENOUS at 10:06

## 2017-01-01 RX ADMIN — IPRATROPIUM BROMIDE AND ALBUTEROL SULFATE 3 ML: .5; 3 SOLUTION RESPIRATORY (INHALATION) at 19:20

## 2017-01-01 RX ADMIN — BUMETANIDE 2 MG: 0.25 INJECTION INTRAMUSCULAR; INTRAVENOUS at 09:35

## 2017-01-01 RX ADMIN — PIPERACILLIN SODIUM,TAZOBACTAM SODIUM 3.38 G: 3; .375 INJECTION, POWDER, FOR SOLUTION INTRAVENOUS at 13:37

## 2017-01-01 RX ADMIN — PIPERACILLIN SODIUM,TAZOBACTAM SODIUM 3.38 G: 3; .375 INJECTION, POWDER, FOR SOLUTION INTRAVENOUS at 22:18

## 2017-01-01 RX ADMIN — ENALAPRIL MALEATE 2.5 MG: 5 TABLET ORAL at 08:34

## 2017-01-01 RX ADMIN — CASTOR OIL AND BALSAM, PERU: 788; 87 OINTMENT TOPICAL at 09:00

## 2017-01-01 RX ADMIN — BUMETANIDE 1 MG: 0.25 INJECTION, SOLUTION INTRAMUSCULAR; INTRAVENOUS at 10:27

## 2017-01-01 RX ADMIN — PHENYLEPHRINE HYDROCHLORIDE 250 MCG/MIN: 10 INJECTION INTRAVENOUS at 01:33

## 2017-01-01 RX ADMIN — Medication 2 MCG/MIN: at 18:26

## 2017-01-01 RX ADMIN — IPRATROPIUM BROMIDE AND ALBUTEROL SULFATE 3 ML: .5; 3 SOLUTION RESPIRATORY (INHALATION) at 14:51

## 2017-01-01 RX ADMIN — CHLORHEXIDINE GLUCONATE 10 ML: 1.2 RINSE ORAL at 08:08

## 2017-01-01 RX ADMIN — Medication 10 ML: at 05:11

## 2017-01-01 RX ADMIN — DOCUSATE SODIUM 100 MG: 100 CAPSULE, LIQUID FILLED ORAL at 09:09

## 2017-01-01 RX ADMIN — MILRINONE LACTATE 0.2 MCG/KG/MIN: 200 INJECTION, SOLUTION INTRAVENOUS at 06:30

## 2017-01-01 RX ADMIN — CASTOR OIL AND BALSAM, PERU: 788; 87 OINTMENT TOPICAL at 10:08

## 2017-01-01 RX ADMIN — IPRATROPIUM BROMIDE AND ALBUTEROL SULFATE 3 ML: .5; 3 SOLUTION RESPIRATORY (INHALATION) at 19:42

## 2017-01-01 RX ADMIN — BUMETANIDE 1 MG: 0.25 INJECTION, SOLUTION INTRAMUSCULAR; INTRAVENOUS at 12:51

## 2017-01-01 RX ADMIN — DOCUSATE SODIUM 100 MG: 100 CAPSULE, LIQUID FILLED ORAL at 17:50

## 2017-01-01 RX ADMIN — SODIUM CHLORIDE 250 ML: 900 INJECTION, SOLUTION INTRAVENOUS at 17:20

## 2017-01-01 RX ADMIN — CHLORHEXIDINE GLUCONATE 15 ML: 1.2 RINSE ORAL at 12:00

## 2017-01-01 RX ADMIN — FUROSEMIDE 80 MG: 40 TABLET ORAL at 17:31

## 2017-01-01 RX ADMIN — CASTOR OIL AND BALSAM, PERU: 788; 87 OINTMENT TOPICAL at 17:55

## 2017-01-01 RX ADMIN — APIXABAN 2.5 MG: 2.5 TABLET, FILM COATED ORAL at 21:00

## 2017-01-01 RX ADMIN — Medication 10 ML: at 13:06

## 2017-01-01 RX ADMIN — POLYVINYL ALCOHOL 2 DROP: 14 SOLUTION/ DROPS OPHTHALMIC at 08:02

## 2017-01-01 RX ADMIN — PIPERACILLIN SODIUM,TAZOBACTAM SODIUM 3.38 G: 3; .375 INJECTION, POWDER, FOR SOLUTION INTRAVENOUS at 21:28

## 2017-01-01 RX ADMIN — BUMETANIDE 2 MG: 0.25 INJECTION, SOLUTION INTRAMUSCULAR; INTRAVENOUS at 10:08

## 2017-01-01 RX ADMIN — POLYVINYL ALCOHOL 2 DROP: 14 SOLUTION/ DROPS OPHTHALMIC at 10:05

## 2017-01-01 RX ADMIN — PIPERACILLIN SODIUM,TAZOBACTAM SODIUM 3.38 G: 3; .375 INJECTION, POWDER, FOR SOLUTION INTRAVENOUS at 05:42

## 2017-01-01 RX ADMIN — PIPERACILLIN SODIUM,TAZOBACTAM SODIUM 3.38 G: 3; .375 INJECTION, POWDER, FOR SOLUTION INTRAVENOUS at 21:05

## 2017-01-01 RX ADMIN — CHLORHEXIDINE GLUCONATE 15 ML: 1.2 RINSE ORAL at 20:50

## 2017-01-01 RX ADMIN — POTASSIUM CHLORIDE 40 MEQ: 40 SOLUTION ORAL at 10:06

## 2017-01-01 RX ADMIN — BUMETANIDE 2 MG: 0.25 INJECTION, SOLUTION INTRAMUSCULAR; INTRAVENOUS at 20:46

## 2017-01-01 RX ADMIN — CASTOR OIL AND BALSAM, PERU: 788; 87 OINTMENT TOPICAL at 20:30

## 2017-01-01 RX ADMIN — BUMETANIDE 2 MG: 0.25 INJECTION INTRAMUSCULAR; INTRAVENOUS at 16:42

## 2017-01-01 RX ADMIN — POTASSIUM CHLORIDE 20 MEQ: 400 INJECTION, SOLUTION INTRAVENOUS at 05:47

## 2017-01-01 RX ADMIN — BUMETANIDE 2 MG: 0.25 INJECTION, SOLUTION INTRAMUSCULAR; INTRAVENOUS at 17:53

## 2017-01-01 RX ADMIN — PIPERACILLIN SODIUM,TAZOBACTAM SODIUM 3.38 G: 3; .375 INJECTION, POWDER, FOR SOLUTION INTRAVENOUS at 06:34

## 2017-01-01 RX ADMIN — SODIUM CHLORIDE: 900 INJECTION, SOLUTION INTRAVENOUS at 09:21

## 2017-01-01 RX ADMIN — POTASSIUM CHLORIDE 20 MEQ: 400 INJECTION, SOLUTION INTRAVENOUS at 09:52

## 2017-01-01 RX ADMIN — POLYVINYL ALCOHOL 2 DROP: 14 SOLUTION/ DROPS OPHTHALMIC at 15:16

## 2017-01-01 RX ADMIN — SODIUM CHLORIDE 250 ML: 900 INJECTION, SOLUTION INTRAVENOUS at 14:44

## 2017-01-01 RX ADMIN — HEPARIN SODIUM 5000 UNITS: 5000 INJECTION, SOLUTION INTRAVENOUS; SUBCUTANEOUS at 21:57

## 2017-01-01 RX ADMIN — DOCUSATE SODIUM 100 MG: 100 CAPSULE, LIQUID FILLED ORAL at 09:34

## 2017-01-01 RX ADMIN — FUROSEMIDE 80 MG: 40 TABLET ORAL at 09:07

## 2017-01-01 RX ADMIN — POTASSIUM CHLORIDE 40 MEQ: 40 SOLUTION ORAL at 09:42

## 2017-01-01 RX ADMIN — POLYVINYL ALCOHOL 2 DROP: 14 SOLUTION/ DROPS OPHTHALMIC at 10:03

## 2017-01-01 RX ADMIN — IPRATROPIUM BROMIDE AND ALBUTEROL SULFATE 3 ML: .5; 3 SOLUTION RESPIRATORY (INHALATION) at 07:44

## 2017-01-01 RX ADMIN — DOCUSATE SODIUM 100 MG: 100 CAPSULE, LIQUID FILLED ORAL at 17:18

## 2017-01-01 RX ADMIN — BUMETANIDE 2 MG: 0.25 INJECTION, SOLUTION INTRAMUSCULAR; INTRAVENOUS at 19:01

## 2017-01-01 RX ADMIN — POTASSIUM CHLORIDE 40 MEQ: 40 SOLUTION ORAL at 16:57

## 2017-01-01 RX ADMIN — Medication 2 MG: at 10:42

## 2017-01-01 RX ADMIN — Medication 10 ML: at 06:29

## 2017-01-01 RX ADMIN — Medication 1 TABLET: at 09:37

## 2017-01-01 RX ADMIN — POLYVINYL ALCOHOL 2 DROP: 14 SOLUTION/ DROPS OPHTHALMIC at 16:43

## 2017-01-01 RX ADMIN — BUMETANIDE 2 MG: 0.25 INJECTION, SOLUTION INTRAMUSCULAR; INTRAVENOUS at 17:56

## 2017-01-01 RX ADMIN — POLYVINYL ALCOHOL 2 DROP: 14 SOLUTION/ DROPS OPHTHALMIC at 21:20

## 2017-01-01 RX ADMIN — POLYVINYL ALCOHOL 2 DROP: 14 SOLUTION/ DROPS OPHTHALMIC at 00:14

## 2017-01-01 RX ADMIN — Medication 10 ML: at 05:26

## 2017-01-01 RX ADMIN — Medication 7 MCG/MIN: at 09:56

## 2017-01-01 RX ADMIN — APIXABAN 2.5 MG: 2.5 TABLET, FILM COATED ORAL at 22:06

## 2017-01-01 RX ADMIN — APIXABAN 2.5 MG: 2.5 TABLET, FILM COATED ORAL at 09:11

## 2017-01-01 RX ADMIN — DOCUSATE SODIUM 100 MG: 100 CAPSULE, LIQUID FILLED ORAL at 18:25

## 2017-01-01 RX ADMIN — PHENYLEPHRINE HYDROCHLORIDE 200 MCG/MIN: 10 INJECTION INTRAVENOUS at 14:15

## 2017-01-01 RX ADMIN — POTASSIUM CHLORIDE 40 MEQ: 40 SOLUTION ORAL at 16:15

## 2017-01-01 RX ADMIN — DOCUSATE SODIUM 100 MG: 100 CAPSULE, LIQUID FILLED ORAL at 08:07

## 2017-01-01 RX ADMIN — IPRATROPIUM BROMIDE AND ALBUTEROL SULFATE 3 ML: .5; 3 SOLUTION RESPIRATORY (INHALATION) at 08:21

## 2017-01-01 RX ADMIN — Medication 1 TABLET: at 08:34

## 2017-01-01 RX ADMIN — DOCUSATE SODIUM 100 MG: 100 CAPSULE, LIQUID FILLED ORAL at 17:00

## 2017-01-01 RX ADMIN — POLYVINYL ALCOHOL 2 DROP: 14 SOLUTION/ DROPS OPHTHALMIC at 08:09

## 2017-01-01 RX ADMIN — PHENYLEPHRINE HYDROCHLORIDE 30 MCG/MIN: 10 INJECTION INTRAVENOUS at 00:31

## 2017-01-01 RX ADMIN — Medication 2 MG: at 06:46

## 2017-01-01 RX ADMIN — IOPAMIDOL 100 ML: 755 INJECTION, SOLUTION INTRAVENOUS at 15:23

## 2017-01-01 RX ADMIN — APIXABAN 2.5 MG: 2.5 TABLET, FILM COATED ORAL at 09:34

## 2017-01-01 RX ADMIN — SPIRONOLACTONE 25 MG: 25 TABLET, FILM COATED ORAL at 09:44

## 2017-01-01 RX ADMIN — Medication 10 ML: at 06:07

## 2017-01-01 RX ADMIN — IPRATROPIUM BROMIDE AND ALBUTEROL SULFATE 3 ML: .5; 3 SOLUTION RESPIRATORY (INHALATION) at 14:16

## 2017-01-01 RX ADMIN — DEXTROSE MONOHYDRATE: 5 INJECTION, SOLUTION INTRAVENOUS at 01:34

## 2017-01-01 RX ADMIN — BUMETANIDE 2 MG: 0.25 INJECTION, SOLUTION INTRAMUSCULAR; INTRAVENOUS at 08:47

## 2017-01-01 RX ADMIN — CHLORHEXIDINE GLUCONATE 10 ML: 1.2 RINSE ORAL at 10:06

## 2017-01-01 RX ADMIN — CASTOR OIL AND BALSAM, PERU: 788; 87 OINTMENT TOPICAL at 19:02

## 2017-01-01 RX ADMIN — DEXTROSE MONOHYDRATE 75 ML/HR: 10 INJECTION, SOLUTION INTRAVENOUS at 05:27

## 2017-01-01 RX ADMIN — Medication 1 TABLET: at 17:31

## 2017-01-01 RX ADMIN — SPIRONOLACTONE 25 MG: 25 TABLET, FILM COATED ORAL at 08:01

## 2017-01-01 RX ADMIN — POTASSIUM CHLORIDE 20 MEQ: 400 INJECTION, SOLUTION INTRAVENOUS at 07:59

## 2017-01-01 RX ADMIN — PHYTONADIONE 2.5 MG: 10 INJECTION, EMULSION INTRAMUSCULAR; INTRAVENOUS; SUBCUTANEOUS at 03:20

## 2017-01-01 RX ADMIN — PROPOFOL 15 MCG/KG/MIN: 10 INJECTION, EMULSION INTRAVENOUS at 00:00

## 2017-01-01 RX ADMIN — FUROSEMIDE 80 MG: 40 TABLET ORAL at 17:41

## 2017-01-01 RX ADMIN — IPRATROPIUM BROMIDE AND ALBUTEROL SULFATE 3 ML: .5; 3 SOLUTION RESPIRATORY (INHALATION) at 01:00

## 2017-01-01 RX ADMIN — PHENYLEPHRINE HYDROCHLORIDE 220 MCG/MIN: 10 INJECTION INTRAVENOUS at 16:47

## 2017-01-01 RX ADMIN — FUROSEMIDE 40 MG: 10 INJECTION, SOLUTION INTRAMUSCULAR; INTRAVENOUS at 09:06

## 2017-01-01 RX ADMIN — SODIUM CHLORIDE 40 MG: 9 INJECTION INTRAMUSCULAR; INTRAVENOUS; SUBCUTANEOUS at 00:26

## 2017-01-01 RX ADMIN — POTASSIUM CHLORIDE 40 MEQ: 40 SOLUTION ORAL at 08:14

## 2017-01-01 RX ADMIN — SODIUM CHLORIDE 500 ML: 900 INJECTION, SOLUTION INTRAVENOUS at 11:00

## 2017-01-01 RX ADMIN — SODIUM CHLORIDE 40 MG: 9 INJECTION INTRAMUSCULAR; INTRAVENOUS; SUBCUTANEOUS at 00:36

## 2017-01-01 RX ADMIN — CASTOR OIL AND BALSAM, PERU: 788; 87 OINTMENT TOPICAL at 09:08

## 2017-01-01 RX ADMIN — CHLORHEXIDINE GLUCONATE 10 ML: 1.2 RINSE ORAL at 21:14

## 2017-01-01 RX ADMIN — CASTOR OIL AND BALSAM, PERU: 788; 87 OINTMENT TOPICAL at 10:20

## 2017-01-01 RX ADMIN — Medication 10 ML: at 23:35

## 2017-01-01 RX ADMIN — POTASSIUM CHLORIDE 20 MEQ: 400 INJECTION, SOLUTION INTRAVENOUS at 08:42

## 2017-01-01 RX ADMIN — PANTOPRAZOLE SODIUM 40 MG: 40 TABLET, DELAYED RELEASE ORAL at 08:30

## 2017-01-01 RX ADMIN — Medication 10 ML: at 20:22

## 2017-01-01 RX ADMIN — SPIRONOLACTONE 25 MG: 25 TABLET, FILM COATED ORAL at 08:32

## 2017-01-01 RX ADMIN — Medication 1 TABLET: at 10:06

## 2017-01-01 RX ADMIN — IPRATROPIUM BROMIDE AND ALBUTEROL SULFATE 3 ML: .5; 3 SOLUTION RESPIRATORY (INHALATION) at 12:46

## 2017-01-01 RX ADMIN — IPRATROPIUM BROMIDE AND ALBUTEROL SULFATE 3 ML: .5; 3 SOLUTION RESPIRATORY (INHALATION) at 15:58

## 2017-01-01 RX ADMIN — PHENYLEPHRINE HYDROCHLORIDE 250 MCG/MIN: 10 INJECTION INTRAVENOUS at 23:21

## 2017-01-01 RX ADMIN — DOCUSATE SODIUM 100 MG: 100 CAPSULE, LIQUID FILLED ORAL at 08:35

## 2017-01-01 RX ADMIN — MILRINONE LACTATE 0.38 MCG/KG/MIN: 200 INJECTION, SOLUTION INTRAVENOUS at 16:39

## 2017-01-01 RX ADMIN — POTASSIUM CHLORIDE 20 MEQ: 750 TABLET, FILM COATED, EXTENDED RELEASE ORAL at 08:34

## 2017-01-01 RX ADMIN — APIXABAN 2.5 MG: 2.5 TABLET, FILM COATED ORAL at 17:31

## 2017-01-01 RX ADMIN — Medication 10 ML: at 10:16

## 2017-01-01 RX ADMIN — PIPERACILLIN SODIUM,TAZOBACTAM SODIUM 3.38 G: 3; .375 INJECTION, POWDER, FOR SOLUTION INTRAVENOUS at 13:33

## 2017-01-01 RX ADMIN — SODIUM CHLORIDE 40 MG: 9 INJECTION INTRAMUSCULAR; INTRAVENOUS; SUBCUTANEOUS at 00:40

## 2017-01-01 RX ADMIN — POLYVINYL ALCOHOL 2 DROP: 14 SOLUTION/ DROPS OPHTHALMIC at 17:56

## 2017-01-01 RX ADMIN — Medication 2 MG: at 21:12

## 2017-01-01 RX ADMIN — POTASSIUM CHLORIDE 20 MEQ: 400 INJECTION, SOLUTION INTRAVENOUS at 09:21

## 2017-01-01 RX ADMIN — Medication 1 TABLET: at 10:27

## 2017-01-01 RX ADMIN — APIXABAN 2.5 MG: 2.5 TABLET, FILM COATED ORAL at 17:57

## 2017-01-04 NOTE — MR AVS SNAPSHOT
Visit Information Date & Time Provider Department Dept. Phone Encounter #  
 1/4/2017 10:00 AM Leena Crane MD [facility] Stephanie Cervantes [phone] [id number] Follow-up Instructions Return in about 5 months (around 6/4/2017). Follow-up and Disposition History Your Appointments 1/10/2017 11:40 AM  
ESTABLISHED PATIENT with Ariel Canseco MD  
[facility] (JO-ANN SCHEDULING) Appt Note: 2 week follow up  
 [address]  
[address] [place] [address]  
  
    
 1/24/2017 10:40 AM  
COUMADIN CLINIC with KRISTOFER CROWE [facility] (JO-ANN SCHEDULING) Appt Note: 1 month  
 [address]  
[address] [place] Manuel  
  
    
 4/3/2017 10:40 AM  
ESTABLISHED PATIENT with Ariel Canseco MD  
[facility] (JO-ANN SCHEDULING) Appt Note: 6 month follow up per dr Severo Gell [address]  
[phone]  
  
    
 5/24/2017 10:40 AM  
ESTABLISHED PATIENT with Leena Crane MD  
[facility] ([facility]) Appt Note: one year follow up  
 [address] [facility] [address]  
[address] [address]  
  
    
 1/29/2018  1:00 AM  
PACEMAKER with Moon Funez [facility] (JO-ANN SCHEDULING) Appt Note: sjm bi-v icd, rc, annual Jake Melendrez  $45SP  sung 1/4/17  
 [address]  
[address] [place] Manuel  
  
    
 1/29/2018  1:00 PM  
ESTABLISHED PATIENT with Leena Crane MD  
[facility] ([facility]) Appt Note: annual  
 [address] [place] [address] [address] [phone]  
  
    
  
 4/11/2017 11:15 AM  
REMOTE OFFICE VISIT with [facility] (JO-ANN SCHEDULING) Appt Note: madalyn bi-v icd, rc  
 [address] [facility] [address] [address]  
  
    
 7/18/2017 10:15 AM  
REMOTE OFFICE VISIT with [facility] (JO-ANN SCHEDULING) Appt Note: bonniem icd, rc  
 [address] [facility] [address]  
[phone]  
  
    
 10/24/2017  9:45 AM  
REMOTE OFFICE VISIT with [facility] (JO-ANN SCHEDULING) Appt Note: madalyn icd, rc  
 [address] [facility] [address]  
[phone] Upcoming Health Maintenance Date Due HEMOGLOBIN A1C Q6M 2/25/1930 FOOT EXAM Q1 2/25/1940 MICROALBUMIN Q1 2/25/1940 EYE EXAM RETINAL OR DILATED Q1 2/25/1940 DTaP/Tdap/Td series (1 - Tdap) 2/25/1951 ZOSTER VACCINE AGE 60> 2/25/1990 GLAUCOMA SCREENING Q2Y 2/25/1995 OSTEOPOROSIS SCREENING (DEXA) 2/25/1995 Pneumococcal 65+ Low/Medium Risk (1 of 2 - PCV13) 2/25/1995 MEDICARE YEARLY EXAM 2/25/1995 LIPID PANEL Q1 9/28/2012 INFLUENZA AGE 9 TO ADULT 8/1/2016 Allergies as of 1/4/2017  Review Complete On: 1/4/2017 By: Trudy Lee LPN No Known Allergies Current Immunizations  Never Reviewed No immunizations on file. Not reviewed this visit You Were Diagnosed With   
  
 Codes Comments Biventricular ICD (implantable cardioverter-defibrillator) in place    -  Primary ICD-10-CM: Z95.810 ICD-9-CM: V45.02 Chronic systolic CHF, NYHA class 2 and CODY/AHA stage C (HCC)     ICD-10-CM: I50.22 ICD-9-CM: 428.22, 428.0 PAF (paroxysmal atrial fibrillation) (HCC)     ICD-10-CM: I48.0 ICD-9-CM: 427.31   
 PAT (paroxysmal atrial tachycardia) (HCC)     ICD-10-CM: I47.1 ICD-9-CM: 427.0 Third degree AV block (HCC)     ICD-10-CM: I44.2 ICD-9-CM: 426.0 Anticoagulated on Coumadin     ICD-10-CM: Z51.81, Z79.01 
ICD-9-CM: V58.83, V58.61 Vitals BP Pulse Height(growth percentile) Weight(growth percentile) BMI OB Status 102/70 (BP 1 Location: Left arm, BP Patient Position: Sitting) 68 5' 6\" (1.676 m) 150 lb (68 kg) 24.21 kg/m2 Postmenopausal  
 Smoking Status Never Smoker Vitals History BMI and BSA Data Body Mass Index Body Surface Area  
 24.21 kg/m 2 1.78 m 2 Preferred Pharmacy Pharmacy Name Phone [id number] [address] [phone] Your Updated Medication List  
  
   
This list is accurate as of: 1/4/17 11:29 AM.  Always use your most recent med list.  
  
  
  
  
 ACCU-CHEK MARQUITA PLUS TEST STRP strip Generic drug:  glucose blood VI test strips BD INSULIN PEN NEEDLE UF SHORT 31 gauge x 5/16\" Ndle Generic drug:  Insulin Needles (Disposable)  
  
 carvedilol 25 mg tablet Commonly known as:  COREG  
TAKE 1 TAB BY MOUTH TWO (2) TIMES DAILY (WITH MEALS). dicyclomine 10 mg capsule Commonly known as:  BENTYL Take 10 mg by mouth 4 times daily (before meals and nightly). enalapril 10 mg tablet Commonly known as:  Dagoberto Reginaldo Take 5 mg by mouth daily. furosemide 80 mg tablet Commonly known as:  LASIX Take 1 Tab by mouth two (2) times a day. LANTUS SOLOSTAR 100 unit/mL (3 mL) pen Generic drug:  insulin glargine 22 Units by SubCUTAneous route daily. potassium chloride 20 mEq tablet Commonly known as:  K-DUR, KLOR-CON Take 2 Tabs by mouth two (2) times a day. TYLENOL 325 mg tablet Generic drug:  acetaminophen Take  by mouth every four (4) hours as needed. warfarin 5 mg tablet Commonly known as:  COUMADIN Take half tablet or 2.5 on Tues and Thursdays, whole tab or 5 mg on all other days or as directed by Coumadin clinic Follow-up Instructions Return in about 5 months (around 6/4/2017). Patient Instructions Follow up with Dr. Caroline Jernigan as scheduled. Future Appointments Date Time Provider Delia Wills 1/10/2017 11:40 AM Herbert Eagle  [address]  
1/24/2017 10:40 AM COUMADIN, KRISTOFER CARPENTER JO-ANN SCHED  
4/3/2017 10:40 AM MD JAYDEN Alvarez JO-ANN SCHED  
4/11/2017 11:15 AM REMOTE1, KRISTOFER BUSCH SCHED  
5/24/2017 10:40 AM Martin Rodríguez  [address]  
7/18/2017 10:15 AM REMOTE1, [place] Estrada JO-ANN SCHED  
10/24/2017 9:45 AM REMOTE1, [address]  
1/29/2018 1:00 AM PACEMAKER3, [address]  
1/29/2018 1:00 PM Martin Rodríguez  [address] [facility]! [facility] introduces [employer] patient portal. Now you can access parts of your medical record, email your doctor's office, and request medication refills online. 1. In your internet browser, go to [email or web address] [email or web address] 2. Click on the First Time User? Click Here link in the Sign In box. You will see the New Member Sign Up page. 3. Enter your [employer] Access Code exactly as it appears below. You will not need to use this code after youve completed the sign-up process. If you do not sign up before the expiration date, you must request a new code. · [employer] Access Code: [email or web address] Expires: 3/27/2017  3:12 PM 
 
4. Enter the last four digits of your Social Security Number (xxxx) and Date of Birth (mm/dd/yyyy) as indicated and click Submit. You will be taken to the next sign-up page. 5. Create a [employer] ID. This will be your [employer] login ID and cannot be changed, so think of one that is secure and easy to remember. 6. Create a MyChart password. You can change your password at any time. 7. Enter your Password Reset Question and Answer.  This can be used at a later time if you forget your password. 8. Enter your e-mail address. You will receive e-mail notification when new information is available in [address] 9. Click Sign Up. You can now view and download portions of your medical record. 10. Click the Download Summary menu link to download a portable copy of your medical information. If you have questions, please visit the Frequently Asked Questions section of the [employer] website. Remember, [employer] is NOT to be used for urgent needs. For medical emergencies, dial 911. Now available from your iPhone and Android! Please provide this summary of care documentation to your next provider. Your primary care clinician is listed as Peter Necessary. If you have any questions after today's visit, please call [phone].

## 2017-01-04 NOTE — PATIENT INSTRUCTIONS
Follow up with Dr. Moon Lucas as scheduled.     Future Appointments  Date Time Provider Delia Wills   1/10/2017 11:40 AM Karine Hylton  [address]   1/24/2017 10:40 AM COUMADIN, KRISTOFER CARPENTER JO-ANN SCHED   4/3/2017 10:40 AM MD JAYDEN Hutchins SCHED   4/11/2017 11:15 AM REMOTE1, KRISTOFER BUSCH SCHED   5/24/2017 10:40 AM Mark Conn  [address]   7/18/2017 10:15 AM REMOTE1Disha JO-ANN SCHED   10/24/2017 9:45 AM REMOTE1, [address]   1/29/2018 1:00 AM PACEMAKER3, [address]   1/29/2018 1:00 PM Mark Conn  [address]

## 2017-01-04 NOTE — PROGRESS NOTES
Chief Complaint   Patient presents with    CHF    Valvular Heart Disease    Follow-up     3 month follow up     Denies chest pain, shortness of breath, and palpitations. Complains of LE edema.

## 2017-01-04 NOTE — PROGRESS NOTES
Cardiac Electrophysiology Office Note     Subjective:      Foreign Saldana is an 80 y. o.woman who had BIV ICD upgraded in September  She said she is feeling well, no DICK  Over the holidays she had more ankle edema but better now after PCP adjusted medication  She has atrial tachycardia and slow ventricular rate paroxysmal atrial fibrillation in the past.   Prior cardiac cath was done which showed no coronary disease,  The pacemaker was implanted on 10/25/12 was a [employer] Vivian CASSIDY, model # [id number], serial [id number]. Hx of chronic leg edema and arthritis. Prior cardiac testing    1/2014: LVEF 35 % to 40%. Mild diffuse hypokinesis. LA markedly dilated. Mitral valve: There was mild thickening. Mitral valve repair 7/29/1996 with a #32 Turner-Manuel ring. Prior repair procedures: surgical  repair A 32 mm Turner-Manuel ring prosthesis was present. Mild MR. Mild AR. Moderate to severeregurgitation. Moderate to severe pulmonary hypertension. CATH 10-24-12= Normal cors and EF 50 %. -- A annular ring mitral position. 1. Mitral valve repair on July 29, 1996, for mitral regurgitation with a #32 Turner-Manuel ring. Repeat echo 2016 showed LVEF 35%    Problem List  Date Reviewed: 1/4/2017          Codes Class Noted    Biventricular ICD (implantable cardioverter-defibrillator) in place ICD-10-CM: Z95.810  ICD-9-CM: V45.02  9/9/2016    Overview Signed 9/9/2016  5:30 PM by Sp Sam MD     Upgrade dual chamber pacemaker to BIV ICD  DFT <= 25J             Third degree AV block ([facility] [address] ICD-10-CM: I44.2  ICD-9-CM: 426.0  9/9/2016        Cardiac pacemaker ICD-10-CM: Z95.0  ICD-9-CM: V45.01  11/8/2012    Overview Signed 11/8/2012  9:58 PM by Sp Sam MD     The pacemaker is a [employer] Vivian CASSIDY, model # [id number], serial [id number].  DOI 10/25/2012 for sick sinus and AV block               Mobitz I AV block, second degree ICD-10-CM: I44.1  ICD-9-CM: 426.13  10/25/2012        PAT (paroxysmal atrial tachycardia) with slow ventricular response ICD-10-CM: I47.1  ICD-9-CM: 427.0  10/25/2012        Sick sinus syndrome ([email or web address] 75[facility] ICD-10-CM: I49.5  ICD-9-CM: 427.81  10/25/2012    Overview Signed 11/8/2012  9:57 PM by Mirna Fontana MD     4.95 second pause             Thyroid mass ICD-10-CM: E07.9  ICD-9-CM: 246.9  10/1/2012        Mitral regurgitation ICD-10-CM: I34.0  ICD-9-CM: 424.0  Unknown        S/P mitral valve repair ICD-10-CM: X61.134  ICD-9-CM: V45.89  Unknown    Overview Signed 9/27/2011 10:19 AM by Yusuf Luna MD     # 32 Turner-Manuel             TR (tricuspid regurgitation) ICD-10-CM: I07.1  ICD-9-CM: 397.0  Unknown    Overview Signed 9/27/2011 10:19 AM by Yusuf Luna MD     moderate to severe echo 2009             Malignant hypertensive heart disease with CHF ([facility] [[email or web address]] 75.) ICD-10-CM: I11.0  ICD-9-CM: 402.01, 428.0  Unknown        PAF (paroxysmal atrial fibrillation) ([facility] [[email or web address] 75.) ICD-10-CM: I48.0  ICD-9-CM: 427.31  Unknown        Diabetes mellitus type 2, insulin dependent ([facility] [[email or web address] 75.) ICD-10-CM: E11.9, Z79.4  ICD-9-CM: 250.00, V58.67  Unknown        Chronic systolic heart failure ([facility] [[email or web address]] 75.) ICD-10-CM: I50.22  ICD-9-CM: 428.22  9/24/2010        Rupture of chordae tendineae (HCC) ICD-10-CM: I51.1  ICD-9-CM: 429.5  9/24/2010                Current Outpatient Prescriptions   Medication Sig Dispense Refill    dicyclomine (BENTYL) 10 mg capsule Take 10 mg by mouth 4 times daily (before meals and nightly).  enalapril (VASOTEC) 10 mg tablet Take 5 mg by mouth daily.  furosemide (LASIX) 80 mg tablet Take 1 Tab by mouth two (2) times a day. 60 Tab 0    potassium chloride (K-DUR, KLOR-CON) 20 mEq tablet Take 2 Tabs by mouth two (2) times a day. 60 Tab 0    carvedilol (COREG) 25 mg tablet TAKE 1 TAB BY MOUTH TWO (2) TIMES DAILY (WITH MEALS).  180 Tab 1    warfarin (COUMADIN) 5 mg tablet Take half tablet or 2.5 on Tues and Thursdays, whole tab or 5 mg on all other days or as directed by Coumadin clinic 90 Tab 3    insulin glargine (LANTUS SOLOSTAR) 100 unit/mL (3 mL) pen 22 Units by SubCUTAneous route daily.  acetaminophen (TYLENOL) 325 mg tablet Take  by mouth every four (4) hours as needed.  ACCU-CHEK MARQUITA PLUS strip       BD INSULIN PEN NEEDLE UF SHORT 31 X 5/16 \" Ndle        No Known Allergies  Past Medical History   Diagnosis Date    Atrial fibrillation (HCC)     Chronic systolic heart failure (HCC)      ef 35-40%; hold ACE due to lower bp    Diabetes mellitus type 2, insulin dependent ([address]     Hypertension     Long term (current) use of anticoagulants     Malignant hypertensive heart disease with CHF ([facility] [address]     Mitral regurgitation     Pacemaker 10/25/2012     The pacemaker is a [employer] DR, model # [id number], serial [id number]. 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, 1996     # 28 Turner-Manuel    Thyroid disease     TR (tricuspid regurgitation)      moderate to severe echo 2009     Past Surgical History   Procedure Laterality Date    Hx heart valve surgery      Pr cardiac surg procedure unlist  1996     valve replacement    Ins ppm/icd led dual only  10/25/2012          Hx pacemaker      Hx heart catheterization  10/4/2012     normal cors, LVEF 50%    Ins ppm/icd led dual only  9/9/2016           Family History   Problem Relation Age of Onset    Cancer Sister      breast    Heart Disease Brother     Heart Disease Brother     Diabetes Son     Stroke Son      Social History   Substance Use Topics    Smoking status: Never Smoker    Smokeless tobacco: Never Used    Alcohol use No        Review of Systems:   Constitutional: Negative for fever, chills, weight loss, malaise/fatigue. HEENT: Negative for nosebleeds, vision changes. Respiratory: Negative for cough, hemoptysis, sputum production, and wheezing.    Cardiovascular: Negative for chest pain, palpitations, orthopnea, claudication, syncope, and PND. + leg edema  Gastrointestinal: Negative for nausea, vomiting, blood in stool and melena. Genitourinary: Negative for dysuria, and hematuria. Musculoskeletal: +arthralgia. Skin: Negative for rash. Heme: Does bruise easily. Neurological: Negative for speech change and focal weakness     Objective:     Visit Vitals    /70 (BP 1 Location: Left arm, BP Patient Position: Sitting)    Pulse 68    Ht 5' 6\" (1.676 m)    Wt 150 lb (68 kg)    BMI 24.21 kg/m2      Physical Exam:   Constitutional: well-developed and well-nourished. No distress. Head: Normocephalic and atraumatic. Eyes: Pupils are equal, round  Neck: supple. No JVD present. Cardiovascular: Normal rate, regular rhythm and 2/6 systolic LLSB murmur heard. Pulmonary/Chest: Effort normal and breath sounds normal. No wheezes. Abdominal: Soft, no tenderness. Musculoskeletal: 2 + edema L > R. she is using stokings  Neurological: alert,oriented. Skin: Skin is warm and dry, left BIV pacemaker AICD site is clean and healed  Psychiatric: normal mood and affect. Behavior is normal. Judgment and thought content normal.           Assessment/Plan:       ICD-10-CM ICD-9-CM    1. Biventricular ICD (implantable cardioverter-defibrillator) in place Z95.810 V45.02    2. Chronic systolic CHF, NYHA class 2 and CODY/AHA stage C ([facility]) I50.22 428.22      428.0    3. PAF (paroxysmal atrial fibrillation) ([facility]) I48.0 427.31    4. PAT (paroxysmal atrial tachycardia) ([facility]) I47.1 427.0    5. Third degree AV block ([facility]) I44.2 426.0    6. Anticoagulated on Coumadin Z51.81 V58.83     Z79.01 V58.61    NYHA class II. She is on appropriate medications for CHF. biv ventricular pacing is 94% and she has known PAT   [employer] BIV ICD was checked today. Leads functions are good   She wants to see coumadin clinic and Dr Blessing Ozuna next week rather than in April as her niece has to drive her     Follow-up Disposition:  Return in about 5 months (around 6/4/2017).   She is trying to sync clinics together as she is driven by family      Thank you for involving me in this patient's care and please call with further concerns or questions. Wolfgang Merlin, M.D.   Electrophysiology/Cardiology  [facility] [place]  [facility] [address], [facility] [address] [facility], [address] Duarte [address] [place] [address]  [phone]

## 2017-01-04 NOTE — PROGRESS NOTES
Chief Complaint   Patient presents with    CHF    Valvular Heart Disease    Follow-up     3 month follow up     Denies chest pain, shortness of breath, and palpitations. Tried to verify medications but did not bring a list or bottles. Notified to bring a list or bottles with next appointment.

## 2017-01-10 NOTE — PATIENT INSTRUCTIONS
Hold Coumadin for 3 days and have INR rechecked on Friday    Follow up with Dr. Jere Smith in 4 months

## 2017-01-10 NOTE — MR AVS SNAPSHOT
Visit Information Date & Time Provider Department Dept. Phone Encounter #  
 1/10/2017 11:40 AM Sarahi Saxena MD [facility]nell [phone] [id number] Your Appointments 4/3/2017 10:40 AM  
ESTABLISHED PATIENT with Sarahi Saxena MD  
[facility] ([facility]-ANN SCHEDULING) Appt Note: 6 month follow up per dr Shireen Molina [address]  
[address]  
  
    
 5/24/2017 10:40 AM  
ESTABLISHED PATIENT with Candy Banegas MD  
[facility] ([address]) Appt Note: one year follow up  
 [address] [facility] [address]  
[address] [address]  
  
    
 1/29/2018  1:00 AM  
PACEMAKER with Eve Cockayne [facility] (JO-ANN SCHEDULING) Appt Note: sjm bi-v icd, rc, annual Melita Loud  $45SP  sung 1/4/17  
 [address]  
[address]  
  
    
 1/29/2018  1:00 PM  
ESTABLISHED PATIENT with Candy Banegas MD  
[facility] ([address]) Appt Note: annual  
 [address]  
[phone]  
  
    
  
 4/11/2017 11:15 AM  
REMOTE OFFICE VISIT with Umm Rosas [facility] (JO-ANN SCHEDULING) Appt Note: sjm bi-v icd, rc  
 [address] [facility] [address]  
[address] [address]  
  
    
 7/18/2017 10:15 AM  
REMOTE OFFICE VISIT with Umm Rosas [facility] (JO-ANN SCHEDULING) Appt Note: sjm icd, rc  
 [address] [facility] [address]  
[phone]  
  
    
 10/24/2017  9:45 AM  
REMOTE OFFICE VISIT with Umm Rosas  
 [facility] (JO-ANN ELISEO) Appt Note: sjm icd, rc  
 [address] [facility] [address]  
[phone] Upcoming Health Maintenance Date Due HEMOGLOBIN A1C Q6M 2/25/1930 FOOT EXAM Q1 2/25/1940 MICROALBUMIN Q1 2/25/1940 EYE EXAM RETINAL OR DILATED Q1 2/25/1940 DTaP/Tdap/Td series (1 - Tdap) 2/25/1951 ZOSTER VACCINE AGE 60> 2/25/1990 GLAUCOMA SCREENING Q2Y 2/25/1995 OSTEOPOROSIS SCREENING (DEXA) 2/25/1995 Pneumococcal 65+ Low/Medium Risk (1 of 2 - PCV13) 2/25/1995 MEDICARE YEARLY EXAM 2/25/1995 LIPID PANEL Q1 9/28/2012 INFLUENZA AGE 9 TO ADULT 8/1/2016 Allergies as of 1/10/2017  Review Complete On: 1/10/2017 By: Jenifer Mahoney No Known Allergies Current Immunizations  Never Reviewed No immunizations on file. Not reviewed this visit Vitals BP Pulse Resp Height(growth percentile) Weight(growth percentile) BMI  
 122/70 (BP 1 Location: Right arm, BP Patient Position: Sitting) 60 16 5' 6\" (1.676 m) 146 lb 3.2 oz (66.3 kg) 23.6 kg/m2 OB Status Smoking Status Postmenopausal Never Smoker Vitals History BMI and BSA Data Body Mass Index Body Surface Area  
 23.6 kg/m 2 1.76 m 2 Preferred Pharmacy Pharmacy Name Phone [id number], [address] [place] [address] [phone] Your Updated Medication List  
  
   
This list is accurate as of: 1/10/17 11:50 AM.  Always use your most recent med list.  
  
  
  
  
 ACCU-CHEK MARQUITA PLUS TEST STRP strip Generic drug:  glucose blood VI test strips BD INSULIN PEN NEEDLE UF SHORT 31 gauge x 5/16\" Ndle Generic drug:  Insulin Needles (Disposable)  
  
 carvedilol 25 mg tablet Commonly known as:  COREG  
TAKE 1 TAB BY MOUTH TWO (2) TIMES DAILY (WITH MEALS). enalapril 10 mg tablet Commonly known as:  Francenia Big Take 5 mg by mouth daily. furosemide 80 mg tablet Commonly known as:  LASIX Take 1 Tab by mouth two (2) times a day. LANTUS SOLOSTAR 100 unit/mL (3 mL) pen Generic drug:  insulin glargine 22 Units by SubCUTAneous route daily. potassium chloride 20 mEq tablet Commonly known as:  K-DUR, KLOR-CON Take 2 Tabs by mouth two (2) times a day. TYLENOL 325 mg tablet Generic drug:  acetaminophen Take  by mouth every four (4) hours as needed. warfarin 5 mg tablet Commonly known as:  COUMADIN Take half tablet or 2.5 on Tues and Thursdays, whole tab or 5 mg on all other days or as directed by Coumadin clinic Patient Instructions Hold Coumadin for 3 days and have INR rechecked on Friday Follow up with Dr. Jere Smith in 4 months Introducing [facility]! Estelita Mayberry introduces [employer] patient portal. Now you can access parts of your medical record, email your doctor's office, and request medication refills online. 1. In your internet browser, go to [email or web address] 2. Click on the First Time User? Click Here link in the Sign In box. You will see the New Member Sign Up page. 3. Enter your Be[employer] Access Code exactly as it appears below. You will not need to use this code after youve completed the sign-up process. If you do not sign up before the expiration date, you must request a new code. · Be[employer] Access Code: [email or web address] Expires: 3/27/2017  3:12 PM 
 
4. Enter the last four digits of your Social Security Number (xxxx) and Date of Birth (mm/dd/yyyy) as indicated and click Submit. You will be taken to the next sign-up page. 5. Create a Send the [employer]rend[employer] ID. This will be your Be[employer] login ID and cannot be changed, so think of one that is secure and easy to remember. 6. Create a [employer] password. You can change your password at any time. 7. Enter your Password Reset Question and Answer. This can be used at a later time if you forget your password. 8. Enter your e-mail address. You will receive e-mail notification when new information is available in [address] 9. Click Sign Up. You can now view and download portions of your medical record. 10. Click the Download Summary menu link to download a portable copy of your medical information. If you have questions, please visit the Frequently Asked Questions section of the [employer] website. Remember, [employer] is NOT to be used for urgent needs. For medical emergencies, dial 911. Now available from your iPhone and Android! Please provide this summary of care documentation to your next provider. Your primary care clinician is listed as Wicho Flores. If you have any questions after today's visit, please call [phone].

## 2017-01-12 PROBLEM — I34.0 NON-RHEUMATIC MITRAL REGURGITATION: Status: ACTIVE | Noted: 2017-01-01

## 2017-02-03 NOTE — PROGRESS NOTES

## 2017-03-08 PROBLEM — T68.XXXA HYPOTHERMIA: Status: ACTIVE | Noted: 2017-01-01

## 2017-03-09 NOTE — ROUTINE PROCESS
TRANSFER - OUT REPORT:    Verbal report given to Linda Montoya RN(name) on Javon  being transferred to ICU 10(unit) for routine progression of care       Report consisted of patients Situation, Background, Assessment and   Recommendations(SBAR). Information from the following report(s) SBAR, Kardex and MAR was reviewed with the receiving nurse. Lines:   Peripheral IV 03/08/17 Left Antecubital (Active)       Peripheral IV 03/08/17 Right Antecubital (Active)   Site Assessment Clean, dry, & intact 3/8/2017  7:53 PM   Phlebitis Assessment 0 3/8/2017  7:53 PM   Infiltration Assessment 0 3/8/2017  7:53 PM   Dressing Status Clean, dry, & intact 3/8/2017  7:53 PM   Dressing Type Transparent 3/8/2017  7:53 PM   Hub Color/Line Status Pink;Patent;Capped 3/8/2017  7:53 PM   Action Taken Blood drawn 3/8/2017  7:53 PM        Opportunity for questions and clarification was provided.       Patient transported with:   Registered Nurse

## 2017-03-09 NOTE — PROGRESS NOTES
2325: TRANSFER - IN REPORT:    [facility] on Javon  being received from ED (unit) for routine progression of care      Report consisted of patients Situation, Background, Assessment and   Recommendations(SBAR). Information from the following report(s) SBAR, Kardex, ED Summary, Intake/Output, MAR, Accordion, Recent Results and Cardiac Rhythm NSR was reviewed with the receiving nurse. Opportunity for questions and clarification was provided. Assessment completed upon patients arrival to unit and care assumed. 3/9  0000: Assessment and bath completed. Pt A&Ox4. Rectal probe temp 94.4 F. Anatoliy hugger in place    0030: Maxi gtt started @ 30 mcg/min, goal SBP >90    0100: Maxi increased to 60 mcg/min. Pt's groin temp 96.1 F, rectal probe T 94.6 F    0300: Pt restless and confused, oriented to self only, continually removing anatoliy hugger. Rectal probe T 93.1. Sitter at bedside to keep warming blanket in place    0400: Reassessment completed. Rectal probe temp 93.8, groin T 94.1. Maxi maxed @100 mcg/min. Orders obtained from Dr. Ollie Douglas for central line placement, levo gtt, and increased maxi dosage via CL if BP does not improve    0423: Blood glucose 27 per  Misti (blood drawn 0323)    0430: POC BG test 24, 25 g IV D5 administered    0446: POC . Dr Ollie Douglas informed, no orders received. 36: Telephone consent for CL placement obtained from Giselle Jade (pt's daughter), witnessed by MECHELLE Lobato and MECHELLE Neumann    0600: Anesthesiologist at bedside for CL placement. Pt tolerated procedure well. CXR ordered to confirm placement and bilateral soft wrist restraints in place per MD order    0650: CL placement confirmed. Maxi infusing @150 mcg/min. Levo on standby      0730: Bedside and Verbal shift change report given to Ann Whitlock RN (oncoming nurse) by Carlene Lobato RN (offgoing nurse).  Report included the following information SBAR, Kardex, ED Summary, Procedure Summary, Intake/Output, MAR, Accordion, Recent Results, Med Rec Status, Cardiac Rhythm paced and Alarm Parameters .

## 2017-03-09 NOTE — H&P
[address]   [facility], [address] [place] [address]   HISTORY AND PHYSICAL       Name:  Ant Morelos   MR#:  [id number]   :  1930   Account #:  [de-identified]        Date of Adm:  2017       CHIEF COMPLAINT: Epigastric pain. HISTORY OF PRESENT ILLNESS: This is an 26-year-old female with   history of chronic systolic congestive heart failure, atrial fibrillation,   diabetes mellitus, hypertension, chronic anticoagulation use, history of   pacemaker and ICD. She was sent from her primary care physician to   the emergency room to be evaluated. History from the patient and her   daughters at the bedside, also from the records. Per the daughter, she   has been having on and off abdominal pain, epigastric pain for a while,   over the last 3 days has been mostly pronounced. She said her pain at   times is 9/10 in severity, it is nonradiating, associated with some   nausea, but no vomiting. She denies any diarrhea. At her PCP's office,   they could not get her temperature, so they sent her to the emergency   room. In the ER she has been hypothermic, her temperature was 91.4   on initial presentation. Also she was hypotensive. Her blood pressure   systolic was 51/33, so she was given about 1.5 liters of IV fluid, and   now she is getting 1 bottle of albumin. She was placed on warming   blanket, Lizzy Hugger. She denies any dysuria or frequency. Denies any   shortness of breath, chest pain or cough. No recent hospitalization. She has chronic leg edema, which is unchanged. In the ER, her CT of   the abdomen showed anasarca with bilateral pleural effusion and   ascites, cardiomegaly and cholelithiasis. She was ordered to have an   ultrasound, which showed cholelithiasis, no other   sonographic suggests acute cholecystitis. Also, she had   hematogenous liver with irregular border, raised possibility of cirrhosis. In the ER also she was hypoglycemic.  She was treated with   D50 glucose, and empirically she was started on IV Zosyn after blood   culture was obtained. Her lactic acid was 1. Lipase was normal.    PAST MEDICAL HISTORY   1. Atrial fibrillation. 2. Chronic systolic congestive heart failure. Her most recent EF is 30%   to 35%. 3. Type 2 diabetes mellitus. 4. Hypertension. 5. Chronic anticoagulation use. 6. Status post pacemaker and ICD. 7. Status post mitral valve repair. FAMILY HISTORY: Brother had heart disease. Sister had breast   cancer. SOCIAL HISTORY: She does not smoke or drink. She ambulates   without an aid. She lives with her family. REVIEW OF SYSTEMS   Pertinent review of systems as mentioned in the HPI, all other systems   are reviewed and were negative. HOME MEDICATIONS: She is on   1. Coreg. 2. Furosemide. 3. Lantus. 4. Potassium chloride. 5. Warfarin. ALLERGIES: NO KNOWN DRUG ALLERGY. PHYSICAL EXAMINATION   VITAL SIGNS: Temperature is 93.8, pulse is 74, respiratory rate 23,   blood pressure is 88/50, and saturation 98% on 2 liters nasal cannula. GENERAL APPEARANCE: Elderly -American female who is   alert, oriented. Does not appear to be in acute distress. HEENT: Head normocephalic, atraumatic. Pupils equal in size and   reactive to light. She had pink conjunctivae. Moist oral mucosa. NECK: Supple. No JVD appreciated. HEART: S1, S2 regular. No murmur or thrill appreciated. CHEST: Clear to auscultation bilateral anteriorly. ABDOMEN: Soft, tender in the epigastric area. No rebound or rigidity. Bowel sounds present. EXTREMITIES: She has 2-3+ bilateral pitting leg edema. Nontender   calves. NEUROLOGIC: She was following commands, moving extremities. PSYCHIATRY: The patient had fair insight. She was alert and   coherent. SKIN: No rash or erythema noted. LABORATORY DATA: INR 4.4. Urinalysis showed small blood,   moderate epithelial cells, 100 of protein.  Sodium 135, potassium 4.5,   chloride 99, CO2 of 36, glucose 37, BUN 30, creatinine 1.01, calcium   8.9, albumin 3.3. Rest of liver function tests within normal range. Lipase 100. Lactic acid 1. White cell count 4.7, hemoglobin 9.6, and   platelets 74. ASSESSMENT   1. Hypothermia and hypotension. 2. Hypoglycemia/diabetes mellitus. 3. Epigastric pain. 4. Coagulopathy. 5. Chronic systolic congestive heart failure/anasarca. 6. Pancytopenia. 7. Cholelithiasis. PLAN: Admit the patient to the hospital for further evaluation and   treatment. 1. Hypothermia and hypotension. Concern for sepsis. Will admit to the   intensive care unit. She is getting IV albumin. If no improvement, will   start her on pressor. Empiric IV antibiotics, started on Zosyn in the ER. Will add vancomycin. Followup on blood culture results. Continue Lizzy   Hugger for her hypothermia. Also will check TSH and cortisol level. Check chest X ray. 2. Hypoglycemia/diabetes mellitus. Hold her Lantus. Will put her on   hypoglycemia protocol. Her blood sugar improved after D50. Will   monitor. 3. Epigastric pain and tenderness. Ultrasound as mentioned above in the HPI. Question of any peptic ulcer disease. Her lipase was normal. I would   just keep her n.p.o. and consult Gastrointestinal. Will place her on IV   proton pump inhibitor for now. 4. Coagulopathy. Hold her Coumadin and monitor daily INR and restart   her Coumadin when INR improved. 5. Chronic systolic congestive heart failure with anasarca. I am holding   her Lasix  because of low blood pressure, and also her beta blocker. She   has anasarca on her imaging. Will consult her cardiologist for further   recommendation. 6. Pancytopenia. Appears to be chronic. However, patient and family   unaware of her previous lab results. I suspect this is from possibility of   liver cirrhosis noted on imaging, which could be related to her   congestive heart failure. Will monitor her labs closely.    7. Cholelithiasis: No signs of cholecystitis on her ultrasound scan. 8. For deep venous thrombosis prophylaxis, she is already   anticoagulated and her INR is 4.4. I have discussed the plan and recommendation with the patient and   her daughter at the bedside in the ER.         MD Jl Fagan / Jacquelin Lott   D:  03/08/2017   23:32   T:  03/09/2017   01:52   Job #:  [id number]

## 2017-03-09 NOTE — CONSULTS
Raynette Councilman MD   [facility] [address]    Office [phone]  Mobile [phone]   Cardiology Consultation:    Luis Mccarty is a 80 y.o. female admitted on 3/8/2017  7:11 PM for Hypothermia. I am asked by the hospitalist to evaluate for CHF, shock. IMPRESSION:   1: Progressive chronic CHF, systolic, POA:      Massive fluid overload with ascites, edema, pleural effusions      Possible element of third spacing related to sepsis. 2: Cardiomyopathy, non-ischemic, valvular heart disease s/p Mitral annuloplasty      Echo dec 2016: LVEF 35%, BiAE, mod MR, mod-sev TR and severe pulm HTN, pleural effusion. AF, BiVICD, anticoagulated, high INR (?from sepsis)  3: Hypotension, presumably from sepsis, possible cardiogenic element. Has not responded to fluids and in fact is fluid overloaded; now on Maxi drip. 4: Hypothermia without history of exposure, ?sepsis, no obvious source  5: Pancytopenia, ?sepsis  6: DM  7: Abdominal pain POA, ?ascites, ?other primary GI problem? RECOMMENDATIONS / PLAN:   Echo update reasonable. Getting antibiotics and pressors. Consider diuretics for fluid overload since does not appear volume depleted, even for presence of possible sepsis. History of present illness:  Hx cardiomyopathy, BiVICD. Recently ambulatory. Admitted with abdominal pain, leg edema hypothermia, patient not fully articulate on specifics. Has been hypotensive, now on pressors, has remained hypothermic. Past Medical History:   Diagnosis Date    Atrial fibrillation (HCC)     Chronic systolic heart failure (HCC)     ef 35-40%; hold ACE due to lower bp    Diabetes mellitus type 2, insulin dependent ([address]     Hypertension     Long term (current) use of anticoagulants     Malignant hypertensive heart disease with CHF ([address]     Mitral regurgitation     Pacemaker 10/25/2012    The pacemaker is a [employer] CASSIDY, model # [id number], serial [id number].    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, 1996    # 28 [place]    Thyroid disease     TR (tricuspid regurgitation)     moderate to severe echo 2009      Past Surgical History:   Procedure Laterality Date    CARDIAC SURG PROCEDURE UNLIST  1996    valve replacement    HX HEART CATHETERIZATION  10/4/2012    normal cors, LVEF 50%    HX HEART VALVE SURGERY      HX PACEMAKER      INS PPM/ICD LED DUAL ONLY  10/25/2012         INS PPM/ICD LED DUAL ONLY  9/9/2016          Family History   Problem Relation Age of Onset    Cancer Sister      breast    Heart Disease Brother     Heart Disease Brother     Diabetes Son     Stroke Son       Social History   Substance Use Topics    Smoking status: Never Smoker    Smokeless tobacco: Never Used    Alcohol use No       Primary care physician:  Emelyn May MD     Medications before admission:  Prescriptions Prior to Admission   Medication Sig    KLOR-CON M20 20 mEq tablet TAKE 2 TABS BY MOUTH TWO (2) TIMES A DAY.  furosemide (LASIX) 80 mg tablet TAKE 1 TAB BY MOUTH TWO (2) TIMES A DAY.  enalapril (VASOTEC) 10 mg tablet Take 5 mg by mouth daily.  carvedilol (COREG) 25 mg tablet TAKE 1 TAB BY MOUTH TWO (2) TIMES DAILY (WITH MEALS).  warfarin (COUMADIN) 5 mg tablet Take half tablet or 2.5 on Tues and Thursdays, whole tab or 5 mg on all other days or as directed by Coumadin clinic    insulin glargine (LANTUS SOLOSTAR) 100 unit/mL (3 mL) pen 22 Units by SubCUTAneous route daily.  acetaminophen (TYLENOL) 325 mg tablet Take  by mouth every four (4) hours as needed.     ACCU-CHEK MARQUITA PLUS strip     BD INSULIN PEN NEEDLE UF SHORT 31 X 5/16 \" Ndle        Review of systems:  Not reliably obtainable    Physical Exam:  Visit Vitals    BP (!) 85/62    Pulse 80    Temp (!) 93.8 °F (34.3 °C)    Resp (!) 32    Ht 5' 6\" (1.676 m)    Wt 169 lb 8.5 oz (76.9 kg)    SpO2 96%    BMI 27.36 kg/m2        Appearance: normal habitus    Cardiovascular: femoral pulses faint, other leg pulses not palpable. Heart sounds normal without murmurs    Lungs: decreased BS, limited exam    Abdomen: distended, ?tender    Extremities: moderate pitting edema       Laboratory and Imaging have been personally reviewed and are notable for:    EKG: V-paced, ? AF underlying    X Ray: bilateral pleural effusions    Labs:    Recent Labs      03/09/17   0323   NA  137   K  4.7   CL  103   BUN  30*   CREA  0.96   GLU  27*   PHOS  4.4   CA  8.5     Recent Labs      03/09/17   0323   WBC  1.4*   HGB  9.4*   HCT  30.7*   PLT  113*

## 2017-03-09 NOTE — PROGRESS NOTES
Spiritual Care Assessment/Progress Notes    Carissa Doe [id number]  [id number]    2/25/1930  80 y.o.  female    Patient Telephone Number: [phone] (home)   Christianity Affiliation: [facility]   Language: English   Extended Emergency Contact Information  Primary Emergency Contact: Bouchra Jj Phone: [phone]  Relation: Daughter  Secondary Emergency Contact: [address] [place] [address] Phone: [phone]  Relation: Daughter   Patient Active Problem List    Diagnosis Date Noted    Hypothermia 03/08/2017    Non-rheumatic mitral regurgitation 01/12/2017    Biventricular ICD (implantable cardioverter-defibrillator) in place 09/09/2016    Third degree AV block ([address] 09/09/2016    Cardiac pacemaker 11/08/2012    Mobitz I AV block, second degree 10/25/2012    PAT (paroxysmal atrial tachycardia) with slow ventricular response 10/25/2012    Sick sinus syndrome ([address] 10/25/2012    Thyroid mass 10/01/2012    Mitral regurgitation     S/P mitral valve repair     TR (tricuspid regurgitation)     Malignant hypertensive heart disease with CHF ([address]     PAF (paroxysmal atrial fibrillation) ([address]     Diabetes mellitus type 2, insulin dependent ([address]     Chronic systolic heart failure ([address] 09/24/2010    Rupture of chordae tendineae ([address] 09/24/2010        Date: 3/9/2017       Level of Christianity/Spiritual Activity Per daughter:  [x]         Involved in tien tradition/spiritual practice    []         Not involved in tien tradition/spiritual practice  [x]         Spiritually oriented    []         Claims no spiritual orientation    []         seeking spiritual identity  []         Feels alienated from Samaritan practice/tradition  []         Feels angry about Samaritan practice/tradition  [x]         Spirituality/Samaritan TIEN IS a resource for coping at this time.   []         Not able to assess due to medical condition    Services Provided Today:  [] crisis intervention    []         reading Scriptures  [x]         spiritual assessment    []         prayer  [x]         empathic listening/emotional support  []         rites and rituals (cite in comments)  []         life review     []         Rastafarian support  []         theological development   []         advocacy  []         ethical dialog     []         blessing  []         bereavement support    [x]         support to family  [x]         anticipatory grief support   []         help with AMD  []         spiritual guidance    []         meditation      Spiritual Care Needs  []         Emotional Support  []         Spiritual/Rastafari Care  []         Loss/Adjustment  []         Advocacy/Referral                /Ethics  []         No needs expressed at               this time  [x]         Other: (note in               comments)  Spiritual Care Plan  []         Follow up visits with               pt/family  []         Provide materials  []         Schedule sacraments  []         Contact Community               Clergy  [x]         Follow up as needed  []         Other: (note in               comments)     Comments: Responded to request fro RN for a  Visit. Miss Jose Gaona gave a big smile as I greeted her, a sitter was in the room with her as well as an RN. Her daughter Jude George was also present. We stepped out of the room and went to the Consult room. Yoly garrison was taken off guard when the Doctor mentioned intubation. Shared information about and the differences of an DNR, and withdraw of medical support. Explained that those were Doctor's orders made in conversation with the family. Encouraged family to ask the doctor questions as they consider the options. The family has very strong Adventist beliefs. Jude George  shared how she knows that God is right here with her, her mother and her family.  Daughter shared she has 5 siblings, two of her sisters are on the way to the hospital.  She shared some anticipatory grief at the thought of her mother's death. Provided spiritual presence and assurance of prayer.   Visited by: [id number] [facility] Tania ([id number])

## 2017-03-09 NOTE — ED TRIAGE NOTES
Pt is coming from a medical centre and was sent here by her Dr for a distended abdomen, pain to her epigastric area. Pt states that she feels nauseated and had a formed BM today. Unable to obtain an oral temperature.

## 2017-03-09 NOTE — PROGRESS NOTES
Central Line Procedure Note    Indication: Need for vasopressors    Risks, benefits, and alternatives explained and patient agrees to proceed. Patient positioned in Trendelenburg position. 7-Step Sterility Protocol followed. (cap, mask, sterile gown, sterile gloves, large sterile sheet, hand hygiene, 2% chlorhexidine for cutaneous antisepsis)  Local with 5 mL 1% Lidocaine injected at insertion site. Right internal jugular cannulated x 1 attempt(s) utilizing sterile Seldinger technique. Venous cannulation confirmed with column drop test.    Catheter secured & Biopatch applied. Sterile Tegaderm placed. CXR pending.

## 2017-03-09 NOTE — PROGRESS NOTES
Day #2 of Vancomycin  Indication:  Sepsis (hypotensive and hypothermia)  Current regimen:  Loading dose of 1500 mg  Abx regimen:  Vancomycin + pipercillin/tazobactam  ID Following ?: NO  Concomitant nephrotoxic drugs (requires more frequent monitoring): Vasopressors  Frequency of BMP?: Every other day    Recent Labs      17   0323  17   1945   WBC  1.4*  1.7*   CREA  0.96  1.01   BUN  30*  30*     Est CrCl: ~43 ml/min; UO: n/a ml/kg/hr  Temp (24hrs), Av.1 °F (34.5 °C), Min:91.4 °F (33 °C), Max:96.1 °F (35.6 °C)    Cultures:   3/8 blood: pending    Goal trough = 15 - 20 mcg/mL    Recent trough history (date/time/level/dose/action taken):  None    Plan: Initiate maintenance dose of 1000 mg q 18 hours. Pharmacy will continue to monitor and make dose adjustments based on renal function.

## 2017-03-09 NOTE — PROGRESS NOTES
Pharmacist Note - Vancomycin Dosing    Consult provided for this 80 y.o. female for indication of sepsis unknown. Antibiotic regimen(s): zosyn x 1    Recent Labs      17   1945   WBC  1.7*   CREA  1.01   BUN  30*     Frequency of BMP: every other day  Height: 166 cm  Weight: 68 kg  Est CrCl: 37 ml/min  Temp (24hrs), Av.5 °F (33.6 °C), Min:91.4 °F (33 °C), Max:93.8 °F (34.3 °C)    Cultures:pending    Goal trough = 15 - 20 mcg/mL    Therapy will be initiated with a loading dose of 1500 mg IV x 1. Dosing will likely be q24h. Maintenance dose to be calculated after AM labs  Pharmacy to follow patient daily and order levels / make dose adjustments as appropriate.

## 2017-03-09 NOTE — CONSULTS
Name: Janina Lyme: [address]   : 1930 Admit Date: 3/8/2017   Phone: [phone]  Room: [address] [place] [address]   PCP: Jackie Smith MD  MRN: [id number]   Date: 3/9/2017  Code: Full Code        HPI:    Chart and notes reviewed. Data reviewed. I review the patient's current medications in the medical record at each encounter. I have evaluated and examined the patient. 11:07 AM       History was obtained from daughter and nurse at bedside. I was asked by Beto Kapoor MD to see Jyothi Winston in consultation for a chief complaint of shock, hypothermia. History of Present Illness:  79 y/o AAF with h/o HFrEF (EF 30-35% with BiV PM/ICD), a-fib (on warfarin), IDDM2, HTN admitted on 3/8 after presenting to PCP office hypotensive and hypothermic (in ED SBP 80s; temp 91.4 F). Found BG to be 37. She was given 1.5L IVF and albumin 5% 25g. Started on vanc and Zosyn emprically. CT abd/pelvis shows anasarca, small amount of ascites, cholelithiasis, bilateral pleural effusions R>L. Abd US negative for CBD dilation, negative Kan's; shows nodular liver consistent with cirrhotic morphology. Labs revealed pancytopenia (chronic, but WBC and plts lower than previous). Lactate 1. CVC placed in ICU--started on phenylephrine--currently at 175mcg. Lizzy mas brought temp up to 98. 2F this AM.  She is altered, complaining of abdominal pain. Denies constipation, N/V, diarrhea. Has not voided, no Richardson catheter placed. Images:  See HPI. Past Medical History:   Diagnosis Date    Atrial fibrillation (HCC)     Chronic systolic heart failure (HCC)     ef 35-40%; hold ACE due to lower bp    Diabetes mellitus type 2, insulin dependent ([address]     Hypertension     Long term (current) use of anticoagulants     Malignant hypertensive heart disease with CHF ([address]     Mitral regurgitation     Pacemaker 10/25/2012    The pacemaker is a [employer] CASSIDY, model # [id number], serial [id number].    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, 1996    # 28 Turner-Manuel    Thyroid disease     TR (tricuspid regurgitation)     moderate to severe echo 2009       Past Surgical History:   Procedure Laterality Date    CARDIAC SURG PROCEDURE UNLIST  1996    valve replacement    HX HEART CATHETERIZATION  10/4/2012    normal cors, LVEF 50%    HX HEART VALVE SURGERY      HX PACEMAKER      INS PPM/ICD LED DUAL ONLY  10/25/2012         INS PPM/ICD LED DUAL ONLY  9/9/2016            Family History   Problem Relation Age of Onset    Cancer Sister      breast    Heart Disease Brother     Heart Disease Brother     Diabetes Son     Stroke Son        Social History   Substance Use Topics    Smoking status: Never Smoker    Smokeless tobacco: Never Used    Alcohol use No       No Known Allergies    Current Facility-Administered Medications   Medication Dose Route Frequency    NOREPINephrine (LEVOPHED) 8,000 mcg in dextrose 5% 250 mL infusion  2-30 mcg/min IntraVENous TITRATE    PHENYLephrine (NEOSYNEPHRINE) 30,000 mcg in 0.9% sodium chloride 250 mL infusion   mcg/min IntraVENous TITRATE    dextrose 10% infusion  50 mL/hr IntraVENous CONTINUOUS    vancomycin (VANCOCIN) 1,000 mg in 0.9% sodium chloride (MBP/ADV) 250 mL  1,000 mg IntraVENous Q18H    glucose chewable tablet 16 g  4 Tab Oral PRN    dextrose (D50W) injection syrg 12.5-25 g  12.5-25 g IntraVENous PRN    glucagon (GLUCAGEN) injection 1 mg  1 mg IntraMUSCular PRN    sodium chloride (NS) flush 5-10 mL  5-10 mL IntraVENous Q8H    sodium chloride (NS) flush 5-10 mL  5-10 mL IntraVENous PRN    vancomycin dosing per pharmacy   Other Rx Dosing/Monitoring    pantoprazole (PROTONIX) 40 mg in sodium chloride 0.9 % 10 mL injection  40 mg IntraVENous Q24H    piperacillin-tazobactam (ZOSYN) 3.375 g in 0.9% sodium chloride (MBP/ADV) 100 mL  3.375 g IntraVENous Q8H         REVIEW OF SYSTEMS   Negative except as stated in the HPI.       Physical Exam:   Visit Vitals    BP (!) 85/62    Pulse 80    Temp (!) 93.8 °F (34.3 °C)    Resp (!) 32    Ht 5' 6\" (1.676 m)    Wt 76.9 kg (169 lb 8.5 oz)    SpO2 96%    BMI 27.36 kg/m2       General:  Arousable to voice and answers simple questions; appears confused and slightly increased WOB, but not in severe distress   Head:  Normocephalic, atraumatic. Eyes:  Conjunctivae/corneas clear. Nose: Nares normal. Septum midline. Mucosa normal.    Throat: Edentulous, mucosa dry. Neck: RIJ TLC, supple, symmetrical, trachea midline, no adenopathy. Lungs:   Diminished on R; rales, no wheezing. Chest wall:  ICD/PM left upper chest wall, nontender. Heart:  Distant, regular rate, S1, S2 normal.   Abdomen:   Soft, non-tender. Bowel sounds normal. No masses,  No organomegaly. Extremities: Extremities normal, atraumatic, 2+BLE pitting edema. Pulses: 2+ and symmetric all extremities. Skin: Warm, skin color, texture, turgor normal. No rashes or lesions   Neurologic: Grossly nonfocal       Lab Results   Component Value Date/Time    Sodium 137 03/09/2017 03:23 AM    Potassium 4.7 03/09/2017 03:23 AM    Chloride 103 03/09/2017 03:23 AM    CO2 31 03/09/2017 03:23 AM    BUN 30 03/09/2017 03:23 AM    Creatinine 0.96 03/09/2017 03:23 AM    Glucose 27 03/09/2017 03:23 AM    Calcium 8.5 03/09/2017 03:23 AM    Magnesium 2.3 03/09/2017 03:23 AM    Phosphorus 4.4 03/09/2017 03:23 AM    Lactic acid 1.0 03/08/2017 07:45 PM       Lab Results   Component Value Date/Time    WBC 1.4 03/09/2017 03:23 AM    HGB 9.4 03/09/2017 03:23 AM    PLATELET 385 40/04/3209 03:23 AM    MCV 73.4 03/09/2017 03:23 AM       Lab Results   Component Value Date/Time    INR 4.4 03/09/2017 03:23 AM    aPTT 37 08/15/2016 12:00 AM    AST (SGOT) 26 03/09/2017 03:23 AM    Alk.  phosphatase 70 03/09/2017 03:23 AM    Protein, total 7.0 03/09/2017 03:23 AM    Albumin 3.4 03/09/2017 03:23 AM    Globulin 3.6 03/09/2017 03:23 AM       No results found for: IRON, FE, TIBC, IBCT, PSAT, FERR    Lab Results   Component Value Date/Time    TSH 0.71 03/08/2017 07:45 PM        No results found for: PH, PHI, PCO2, PCO2I, PO2, PO2I, HCO3, HCO3I, FIO2, FIO2I    Lab Results   Component Value Date/Time    CK 88 09/01/2012 04:00 AM    CK-MB Index 2.5 09/01/2012 04:00 AM    Troponin-I, Qt. 0.06 09/01/2012 04:00 AM    Troponin-I, Qt. 0.06 09/01/2012 04:00 AM     08/30/2012 08:30 PM        Lab Results   Component Value Date/Time    Culture result:  07/16/2009 11:15 AM     KLEBSIELLA PNEUMONIAE (PREDOMINATING)  STREPTOCOCCI, BETA HEMOLYTIC GROUP B       No results found for: TOXA1, RPR, HBCM, HBSAG, HAAB, HCAB, HCAB1, HAAT, G6PD, CRYAC, HIVGT, HIVR, HIV1, HIV12, HIVPC, HIVRPI    Lab Results   Component Value Date/Time    CK 88 09/01/2012 04:00 AM     08/31/2012 05:00 PM       Lab Results   Component Value Date/Time    Color DARK YELLOW 03/08/2017 08:54 PM    Appearance CLOUDY 03/08/2017 08:54 PM    pH (UA) 5.5 03/08/2017 08:54 PM    Protein 100 03/08/2017 08:54 PM    Glucose NEGATIVE  03/08/2017 08:54 PM    Ketone NEGATIVE  03/08/2017 08:54 PM    Bilirubin NEGATIVE  03/08/2017 08:54 PM    Blood SMALL 03/08/2017 08:54 PM    Urobilinogen 1.0 03/08/2017 08:54 PM    Nitrites NEGATIVE  03/08/2017 08:54 PM    Leukocyte Esterase NEGATIVE  03/08/2017 08:54 PM    WBC 0-4 03/08/2017 08:54 PM    RBC 0-5 03/08/2017 08:54 PM    Bacteria NEGATIVE  03/08/2017 08:54 PM       IMPRESSION  · Shock--suspect septic; possibly cardiogenic  · Pancytopenia  · Acute hypoxic respiratory failure  · Chronic HFrEF with BiV ICD, mitral annular ring  · 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  · Acute encephalopathy  · IDDM2 currently with hypoglycemia  · A-fib on warfarin with supratheraputic INR    PLAN  · ABG now  · Insert galarza for accurate I/Os  · Check CVP  · Continue vasopressors to maintain MAP >65  · TTE  · Check ammonia, cardiac enzymes  · Continue broad spectrum abx; f/u cultures  · Supplemental O2; discussed with daughter that she may require vent support if WOB worsens (full code currently)  · Consider head CT if mental status worsens  · Holding warfarin, check daily INR  · Frequent BG monitoring--D10W gtt    DVT ppx: supratheraputic INR  GI ppx: PPI    Critically ill requiring ICU level of care due to shock and hypothermia.   CCT 33 minutes    Cris Bray MD

## 2017-03-09 NOTE — ED NOTES
Pt repositioned for comfort. V/S stable, no distress noted. Will continue to assess and monitor closely. Family at bedside. Call bell in reach.

## 2017-03-09 NOTE — PROGRESS NOTES
Responded to request for  visit. Mor family had arrived for Miss Grimes Ip. Family is continuing to process possible decisions on intubation or not as well as DNR or not. Present as hospitalist spoke with family that was present about code status. Encouraged them to continue the conversation about what their mom would want, perhaps even including her mother in the conversation if the opportunity arises. Provided spiritual presence and prayer.   Visited by: Koby Santo [address] [facility] Tania ([id number])

## 2017-03-09 NOTE — PROGRESS NOTES
Primary Nurse Mary Gallegos RN performed a dual skin assessment on this patient No impairment noted  Stalney score is 14

## 2017-03-09 NOTE — ED NOTES
Pt repositioned for comfort. V/S stable, no distress noted. Will continue to assess and monitor closely. Pt updated on plan of care. Pt denies any complaints. Family at bedside. Call bell in reach. Bear hugger in place.

## 2017-03-09 NOTE — ED NOTES
Dr. Trina Reddy at bedside. Family at bedside. Per MD, only give 12.5 g of Albumin. Lizzy mas on pt. No obvious signs of distress. Will continue to closely monitor.

## 2017-03-09 NOTE — PROGRESS NOTES
3487: Blood sugar 46. MD at bedside; updating daughter on patient's condition. 0573: Dextrose 25 g.   0923: Blood sugar 215  0926: D10 gtt started. Rectal temp 98.1. Bear hugger removed. 1000: cardiology consult placed. - Charocarmina; Daughter very emotional regarding needing to make end of life decisions with family. Ciara paged. 1010: Gi consult placed. - Becky Rosario; Galarza placed. Urine output 250.  1115: ECHO  1300: Dr. Teddy Boeck at bedside updating family. 1545: Dr. Guillermo Paula updated on Troponin and BNP. No new orders received. GI is okay with advancing diet as necessary. Shift Summary: Patient had galarza placed for strict I/O. Urine output today 350 mL. Central line in place for Maxi. Maxi gtt increased through out the day. Currently infusing at 275 mcg/min. Blood sugar dropped this am again to 46. Patient placed on D10. Nasal cannula at 2L. Family discussing long term plan of care, talked to ciara a lot today and Dr. Teddy Boeck. Cardiology, and GI consulted. ECHO completed.

## 2017-03-09 NOTE — ED PROVIDER NOTES
HPI Comments: 80 y.o. female with past medical history significant for mitral regurgitation, mitral valve repair, chronic systolic heart failure, tricuspid regurgitation, a-fib, CHF, DM type 2, long-term use of anticoagulants, thyroid disease, HTN, pacemaker in place, cardiac catheterization who presents accompanied by daughters with chief complaint of abdominal pain. Patient states she had onset of epigastric abdominal pain ~3 days ago that became worse today. She reports associated generalized weakness and fatigue. Patient reports hx of similar abdominal pains for the past ~3-4 months for which she has been seen in ED x2. Patient had CT scan ~4 months ago showing gall stones. In ED, patient claims her pain is \"9/10\". Patient is on Lizzy Paw warming system due to initial temperature of ~91F. Patient notes she ambulates with stick used as cane at baseline. She denies any fever, chills or dysuria. There are no other acute medical concerns at this time. Social hx: non-smoker. Denies ETOH use. Resides with friend who is 80years old. 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 PCP: Amna Viera MD    Note written by Amaury Yu, as dictated by Charmaine Osei MD 8:05 PM      The history is provided by the patient and a relative. Past Medical History:   Diagnosis Date    Atrial fibrillation (HCC)     Chronic systolic heart failure (HCC)     ef 35-40%; hold ACE due to lower bp    Diabetes mellitus type 2, insulin dependent ([address]     Hypertension     Long term (current) use of anticoagulants     Malignant hypertensive heart disease with CHF ([address]     Mitral regurgitation     Pacemaker 10/25/2012    The pacemaker is a [employer] , model # [id number], serial [id number].    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, 1996    # 28 Turner-Manuel    Thyroid disease     TR (tricuspid regurgitation)     moderate to severe echo 2009       Past Surgical History:   Procedure Laterality Date    CARDIAC SURG PROCEDURE UNLIST  1996    valve replacement    HX HEART CATHETERIZATION  10/4/2012    normal cors, LVEF 50%    HX HEART VALVE SURGERY      HX PACEMAKER      INS PPM/ICD LED DUAL ONLY  10/25/2012         INS PPM/ICD LED DUAL ONLY  9/9/2016              Family History:   Problem Relation Age of Onset    Cancer Sister      breast    Heart Disease Brother     Heart Disease Brother     Diabetes Son     Stroke Son        Social History     Social History    Marital status:      Spouse name: N/A    Number of children: N/A    Years of education: N/A     Occupational History    Not on file. Social History Main Topics    Smoking status: Never Smoker    Smokeless tobacco: Never Used    Alcohol use No    Drug use: No    Sexual activity: Not on file     Other Topics Concern    Not on file     Social History Narrative         ALLERGIES: Review of patient's allergies indicates no known allergies. Review of Systems   Constitutional: Positive for fatigue. Negative for appetite change, chills and fever. HENT: Negative for rhinorrhea, sore throat and trouble swallowing. Eyes: Negative for photophobia. Respiratory: Negative for cough and shortness of breath. Cardiovascular: Negative for chest pain and palpitations. Gastrointestinal: Positive for abdominal pain (epigastric). Negative for nausea and vomiting. Genitourinary: Negative for dysuria, frequency and hematuria. Musculoskeletal: Negative for arthralgias. Neurological: Positive for weakness (generalized ). Negative for dizziness and syncope. Psychiatric/Behavioral: Negative for behavioral problems. The patient is not nervous/anxious. All other systems reviewed and are negative.       Vitals:    03/08/17 1929 03/08/17 2019 03/08/17 2032 03/08/17 2038   BP:  (!) 82/56     Pulse:  79     Resp:  18     Temp: (!) 91.4 °F (33 °C)  97.5 °F (36.4 °C)    SpO2:  92%  91%   Weight: Height:                Physical Exam   Constitutional: She appears well-developed and well-nourished. HENT:   Head: Normocephalic and atraumatic. Mouth/Throat: Oropharynx is clear and moist.   Eyes: EOM are normal. Pupils are equal, round, and reactive to light. Neck: Normal range of motion. Neck supple. Cardiovascular: Normal rate, regular rhythm, normal heart sounds and intact distal pulses. Exam reveals no gallop and no friction rub. No murmur heard. Pulmonary/Chest: Effort normal. No respiratory distress. She has no wheezes. She has no rales. Pacemaker in left upper chest wall. Abdominal: Soft. She exhibits distension. There is tenderness in the epigastric area. There is no rebound. protuberant abdomen. Epigastric abdominal pain. Mass or liver edge palpable in RUQ. Musculoskeletal: Normal range of motion. She exhibits no tenderness. Neurological: She is alert. No cranial nerve deficit. Motor; symmetric   Skin: No erythema. Sternotomy scar. Psychiatric: She has a normal mood and affect. Her behavior is normal.   Nursing note and vitals reviewed. Note written by Arnaud Parry, as dictated by Armando Agarwal MD 8:06 PM      MDM  Number of Diagnoses or Management Options  Critical Care  Total time providing critical care: 30-74 minutes  Total critical care time spend exclusive of procedures:  45 minutes    ED Course       Procedures    CONSULT NOTE:  9:45 PM Armando Agarwal MD spoke with Dr. Mahnaz Patton, Consult for Hospitalist.  Discussed available diagnostic tests and clinical findings. He is in agreement with care plans as outlined. Dr. Mahnaz Patton will admit. ED EKG interpretation:  Rhythm: paced; and regular . Rate (approx.): 80; Axis: normal; P wave: ; QRS interval: prolonged; ST/T wave: non-specific changes; in  Lead: ; Other findings: . This EKG was interpreted by Armando Agarwal MD,ED Provider.  11:13 PM

## 2017-03-09 NOTE — CONSULTS
[address] NOTE  Yeny Avila, [facility]  [phone] office  [phone] NP in-hospital cell phone M-F until 4:30  After 5pm or on weekends, please call  for physician on call        NAME:  Chase Al   :   1930   MRN:   [id number]       Referring Physician: Rip Spears    Consult Date: 3/9/2017 11:50 AM    Chief Complaint: epigastric pain     History of Present Illness:  Patient is a 80 y.o. who is seen in consultation at the request of Dr. Enzo Domingo for epigastric pain. Pt PMH as below. Pt presented to PCP office and found to by hypotensive and hypothermic. While here, pt is complaining of epigastric pain - when asked to describe and offered adjectives, she states mostly nausea. Pt states she has had this before and it just kind of comes and goes. Pt denies any black/bloody stools, unintentional weight loss, vomiting, dysphagia, radiating pain, heartburn, reflux, alcohol use, NSAIDs, smoking. No change in stools, constipation or diarrhea. I have reviewed the emergency room note, hospital admission note, notes by all other physicians who have seen the patient during this hospitalization to date. I have reviewed the problem list and the reason for this hospitalization. I have reviewed the allergies and the medications the patient was taking at home prior to this hospitalization. PMH:  Past Medical History:   Diagnosis Date    Atrial fibrillation ([address]     Chronic systolic heart failure (HCC)     ef 35-40%; hold ACE due to lower bp    Diabetes mellitus type 2, insulin dependent ([address]     Hypertension     Long term (current) use of anticoagulants     Malignant hypertensive heart disease with CHF ([address]     Mitral regurgitation     Pacemaker 10/25/2012    The pacemaker is a [employer] CASSIDY, model # [id number], serial [id number].     S/P mitral valve repair 1996    # 32 [address]    Thyroid disease     TR (tricuspid regurgitation)     moderate to severe echo        PSH:  Past Surgical History:   Procedure Laterality Date    CARDIAC SURG PROCEDURE UNLIST      valve replacement    HX HEART CATHETERIZATION  10/4/2012    normal cors, LVEF 50%    HX HEART VALVE SURGERY      HX PACEMAKER      INS PPM/ICD LED DUAL ONLY  10/25/2012         INS PPM/ICD LED DUAL ONLY  2016            Allergies:  No Known Allergies    Home Medications:  Prior to Admission Medications   Prescriptions Last Dose Informant Patient Reported? Taking? ACCU-CHEK MARQUITA PLUS strip   Yes No   BD INSULIN PEN NEEDLE UF SHORT 31 X 5/16 \" Ndle   Yes No   KLOR-CON M20 20 mEq tablet   No No   Sig: TAKE 2 TABS BY MOUTH TWO (2) TIMES A DAY. acetaminophen (TYLENOL) 325 mg tablet   Yes No   Sig: Take  by mouth every four (4) hours as needed. carvedilol (COREG) 25 mg tablet   No No   Sig: TAKE 1 TAB BY MOUTH TWO (2) TIMES DAILY (WITH MEALS). enalapril (VASOTEC) 10 mg tablet   Yes No   Sig: Take 5 mg by mouth daily. furosemide (LASIX) 80 mg tablet   No No   Sig: TAKE 1 TAB BY MOUTH TWO (2) TIMES A DAY. insulin glargine (LANTUS SOLOSTAR) 100 unit/mL (3 mL) pen   Yes No   Si Units by SubCUTAneous route daily.    warfarin (COUMADIN) 5 mg tablet   No No   Sig: Take half tablet or 2.5 on Tues and , whole tab or 5 mg on all other days or as directed by Coumadin clinic      Facility-Administered Medications: None       Hospital Medications:  Current Facility-Administered Medications   Medication Dose Route Frequency    NOREPINephrine (LEVOPHED) 8,000 mcg in dextrose 5% 250 mL infusion  2-30 mcg/min IntraVENous TITRATE    PHENYLephrine (NEOSYNEPHRINE) 30,000 mcg in 0.9% sodium chloride 250 mL infusion   mcg/min IntraVENous TITRATE    dextrose 10% infusion  50 mL/hr IntraVENous CONTINUOUS    vancomycin (VANCOCIN) 1,000 mg in 0.9% sodium chloride (MBP/ADV) 250 mL  1,000 mg IntraVENous Q18H    glucose chewable tablet 16 g  4 Tab Oral PRN    dextrose (D50W) injection syrg 12.5-25 g  12.5-25 g IntraVENous PRN    glucagon (GLUCAGEN) injection 1 mg  1 mg IntraMUSCular PRN    sodium chloride (NS) flush 5-10 mL  5-10 mL IntraVENous Q8H    sodium chloride (NS) flush 5-10 mL  5-10 mL IntraVENous PRN    vancomycin dosing per pharmacy   Other Rx Dosing/Monitoring    pantoprazole (PROTONIX) 40 mg in sodium chloride 0.9 % 10 mL injection  40 mg IntraVENous Q24H    piperacillin-tazobactam (ZOSYN) 3.375 g in 0.9% sodium chloride (MBP/ADV) 100 mL  3.375 g IntraVENous Q8H       Social History:  Social History   Substance Use Topics    Smoking status: Never Smoker    Smokeless tobacco: Never Used    Alcohol use No       Family History:  Family History   Problem Relation Age of Onset    Cancer Sister      breast    Heart Disease Brother     Heart Disease Brother     Diabetes Son     Stroke Son        Review of Systems:  Constitutional: negative fever, negative chills, negative weight loss  Eyes:   negative visual changes  ENT:   negative sore throat, tongue or lip swelling  Respiratory:  negative cough, negative dyspnea  Cards:  negative for chest pain, palpitations, lower extremity edema  GI:   See HPI  :  negative for frequency, dysuria  Integument:  negative for rash and pruritus  Heme:  negative for easy bruising and gum/nose bleeding  Musculoskel: negative for myalgias, back pain and muscle weakness  Neuro: negative for headaches, dizziness, vertigo  Psych:  negative for feelings of anxiety, depression     Objective:   Patient Vitals for the past 8 hrs:   BP Temp Pulse Resp SpO2   03/09/17 1130 - 97.1 °F (36.2 °C) - - -   03/09/17 1115 98/62 - 80 (!) 33 100 %   03/09/17 1100 - - 80 (!) 37 97 %   03/09/17 1045 95/66 - 80 (!) 32 99 %   03/09/17 1030 99/68 - 80 27 99 %   03/09/17 1015 (!) 68/56 - 80 (!) 38 98 %   03/09/17 1000 (!) 121/105 - 80 30 96 %   03/09/17 0945 96/61 - 81 29 99 %   03/09/17 0930 92/61 98.1 °F (36.7 °C) 80 28 98 %   03/09/17 0915 (!) 80/49 - 81 (!) 35 96 %   03/09/17 0900 (!) 78/59 - 83 26 98 %   03/09/17 0845 (!) 70/41 - 80 25 95 %   03/09/17 0830 (!) 68/45 - 80 22 93 %   03/09/17 0815 - - 80 16 98 %   03/09/17 0800 90/60 97.1 °F (36.2 °C) 80 25 98 %   03/09/17 0745 (!) 83/61 - 80 28 98 %   03/09/17 0730 (!) 85/62 - 80 (!) 32 96 %   03/09/17 0715 97/62 - 80 24 -   03/09/17 0700 (!) 76/58 - 80 26 96 %   03/09/17 0630 (!) 75/54 - 80 21 90 %   03/09/17 0600 (!) 77/63 - 80 23 95 %   03/09/17 0532 (!) 71/53 - 80 20 91 %   03/09/17 0500 - - 80 22 (!) 87 %   03/09/17 0430 (!) 87/64 - 80 24 92 %   03/09/17 0415 (!) 87/64 - 80 20 98 %   03/09/17 0400 90/70 (!) 93.8 °F (34.3 °C) 80 26 96 %     03/09 0701 - 03/09 1900  In: -   Out: 250 [Urine:250]  03/07 1901 - 03/09 0700  In: 753.9 [I.V.:753.9]  Out: -     EXAM:     CONST:  Elderly female in bed in no acute distress.  Family at bedside   NEURO:  alert and oriented x 3   HEENT: EOMI, no scleral icterus, RIJ TLC   LUNGS: Diminished, miko right, + increased work of breathing, (-) wheeze   CARD:  100% paced, regular rate and rhythm, S1 S2   ABD:  soft, no tenderness, no rebound, bowel sounds (+) all 4 quadrants, no masses, + distended   EXT:  edema, warm   PSYCH: not anxious     Data Review     Recent Labs      03/09/17 0323 03/08/17 1945   WBC  1.4*  1.7*   HGB  9.4*  9.6*   HCT  30.7*  31.6*   PLT  113*  74*     Recent Labs      03/09/17 0323 03/08/17 1945   NA  137  135*   K  4.7  4.5   CL  103  99   CO2  31  36*   BUN  30*  30*   CREA  0.96  1.01   GLU  27*  37*   PHOS  4.4   --    CA  8.5  8.9     Recent Labs      03/09/17 0323 03/08/17 1945   SGOT  26  24   AP  70  87   TP  7.0  6.9   ALB  3.4*  3.3*   GLOB  3.6  3.6   LPSE   --   100     Recent Labs      03/09/17 0323 03/08/17 1945   INR  4.4*  4.4*   PTP  46.7*  46.9*          Assessment:   · Epigastric pain: pt presented with epigastric pain, more like nausea - could be result of CHF exacerbation/cardiogenic shock. CT showed gallstones but no ductal dilation, labs do not reflect an obstruction  · Cirrhotic-appearing liver  · Pancytopenic, chronic but worsening  · Supratherapeutic INR: on coumadin for a-fib  · Ascites: mild to moderate on CT scan     Patient Active Problem List   Diagnosis Code    Chronic systolic heart failure (HCC) I50.22    Rupture of chordae tendineae (HCC) I51.1    Mitral regurgitation I34.0    S/P mitral valve repair Z98.890    TR (tricuspid regurgitation) I07.1    Malignant hypertensive heart disease with CHF ([facility] I11.0    PAF (paroxysmal atrial fibrillation) (HCC) I48.0    Diabetes mellitus type 2, 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 XXXA     Plan:   · Likely will need EGD to screen for varices but may do that as an outpatient when shock as resolved  · Ultrasound guided removal of ascites if continues with increased WOB - seems to be more anasarca than ascites on CT  · Trend LFTs   · Agree with PPI - empirically treat for ulcer/PUD versus EGD now. · Thank you for allowing me to participate in care of Chelsea MCDONALD [employer]     Signed By: Tisha Riley. Lucille Lindsey NP     3/9/2017  11:50 AM       Patient seen and examined, agree with plans, I do not think we are dealing with primary GI issue. I suspect CHF plus possible sepsis is the problem. Nothing to be done from GI standpoint. Consider EGD as outpatient abdominal pain continues.     Argelia Barger MD

## 2017-03-09 NOTE — PROGRESS NOTES
Care Management: Chart reviewed. Patient seen by PCP's fellow MD in office on 3/8/17 & sent to ED for abdominal pain. Admitted to ICU with hypothermia & hypotension, with concerns for sepsis. PMH: Mitral regurgitation, mitral valve repair, chronic systolic heart failure, tricuspid regurgitation, afib, CHF, DM type 2, thyroid disease, HTN, pacemaker & ICD, & chronic anticoagulation. I spoke to family at patient's bedside, including 2 sisters, daughter-in-law, & granddaughter to explain role of Care Management in transitions of care. Confirmed address, phone, PCP, & insurance. Patient does not have an advanced directive. Prescriptions are filled at the [facility] on [facility]. Living Situation: Patient lives alone in a one story home, with 4 entry steps. Each night, her daughter-in-law, Shannon Reese at [phone], comes over to take her next door to her house and keeps her overnight. She assists patient back home each evening. Ms. Aleah Sousa continues to work, cleaning an office 2 days/week. No previous DME or Home Health services needed. Per family, she has been independent with ADL's & IADL's. Disposition Planning: Uncertain regarding safe disposition plan & await further recommendations from nursing & possibly PT/OT, as patient is sleeping soundly in ICU, as medical work-up continues. Care Management will continue to follow and assist with disposition planning as indicated. Do RN BSN CCM     Care Management Interventions  PCP Verified by CM: Yes (PCP: Dr. Ny Ennis at [phone].  )  Last Visit to PCP: 03/08/17  Palliative Care Consult (Criteria: CHF and RRAT>21): No  Reason for No Palliative Care Consult: Other (see comment) (RRAT at 25 and patient has dx of chronic systolic heart failure. Will leave [facility] for Attending. )  Mode of Transport at Discharge:  Other (see comment) (Family will provide transportation at discharge. )  Transition of Care Consult (CM Consult):  (TBD.)  Jackson Signup: No  Discharge Durable Medical Equipment: No  Physical Therapy Consult: No  Occupational Therapy Consult: No  Speech Therapy Consult: No  Current Support Network: Other (Patient lives alone in one story home, with 4 entry steps. Every night, patient's daughter-in-law, Zion Camp at [phone], come from next door to take her to her home to stay overnight.   Then she returns with patient in the morning, back home.)  Confirm Follow Up Transport: Family (Family will transport at discharge, as appropriate.  )  Discharge Location  Discharge Placement:  (TBD.)

## 2017-03-09 NOTE — DIABETES MGMT
DTC Progress Note    Recommendations/ Comments: Pt's chart reviewed due to hypoglycemia. Pt with a blood sugars of 24 mg/dl at 0429 this morning and 47 at 0911 this morning. Noted pt's home dose of Lantus is being held. Also note patient is NPO with dextrose running. DTC to continue to follow. Chart reviewed on [employer]. Patient is a 80 y.o. female with known diabetes on Lantus 22 units daily at home. A1c:   No results found for: HBA1C, HGBE8, ZYZ2XZVC    Recent Glucose Results:   Lab Results   Component Value Date/Time    GLU 27 (LL) 03/09/2017 03:23 AM    GLU 37 (LL) 03/08/2017 07:45 PM    GLUCPOC 215 (H) 03/09/2017 09:23 AM    GLUCPOC 46 (LL) 03/09/2017 09:13 AM    GLUCPOC 47 (LL) 03/09/2017 09:11 AM        Lab Results   Component Value Date/Time    Creatinine 0.96 03/09/2017 03:23 AM       Active Orders   Diet    DIET NPO        PO intake: No data found. Current hospital DM medication: none    Will continue to follow as needed.     Thank you  Kulwant Hanson, JASON, CDE

## 2017-03-09 NOTE — PROGRESS NOTES
Hospitalist Progress Note  Denise Marcano MD  Office: [phone]  Cell: [phone]      Date of Service:  3/9/2017  NAME:  Luis Alberto Cooper  :  1930  MRN:  [id number]      Admission Summary:   24-year-old female with history of chronic systolic congestive heart failure, atrial fibrillation, diabetes mellitus, hypertension, chronic anticoagulation use, history of pacemaker and ICD. She was sent from her primary care physician to the emergency room to be evaluated. History from the patient and her daughters at the bedside, also from the records. Per the daughter, she has been having on and off abdominal pain, epigastric pain for a while, over the last 3 days has been mostly pronounced. She said her pain at times is 9/10 in severity, it is nonradiating, associated with some nausea, but no vomiting. She denies any diarrhea. At her PCP's office,   they could not get her temperature, so they sent her to the emergency room. In the ER she has been hypothermic, her temperature was 91.4 on initial presentation. Also she was hypotensive. Her blood pressure   systolic was 04/01, so she was given about 1.5 liters of IV fluid, and now she is getting 1 bottle of albumin. She was placed on warming blanket, Lizzy Hugger. She denies any dysuria or frequency. Interval history / Subjective:     No new     Assessment & Plan:     1. Hypothermia and hypotension. Concern for sepsis. On empiric IV antibiotics, started on Zosyn and vancomycin. Followup on blood culture results NGSF. Off Lizzy Hugger for her hypothermia. 2. Hypoglycemia/diabetes mellitus. Holding her Lantus and on hypoglycemia protocol. Her blood sugar improved after D50. Will monitor. 3. Epigastric pain and tenderness. Consulted GI. Plans for EGD when stable or outpatient. Continue proton pump inhibitor for now. 4. Coagulopathy.  Hold her Coumadin and monitor daily INR and restart her Coumadin when INR improved. 5. Chronic systolic congestive heart failure with anasarca. Lasix and BB were held because of low blood pressure. Cardiologist for further recommendation. 6. Pancytopenia. Appears to be chronic. However, patient and family unaware of her previous lab results. Will monitor her labs closely. 7. Cholelithiasis: No signs of obstruction or cholecystitis on her ultrasound scan. 8. For deep venous thrombosis prophylaxis, she is already   anticoagulated and her INR is 4.4.  9. Palliative care helping with care decisions. Code status: Full    Care Plan discussed with: Patient/Family and Nurse  Disposition: TBD     Hospital Problems  Date Reviewed: 3/8/2017          Codes Class Noted POA    * (Principal)Hypothermia ICD-10-CM: T68. XXXA  ICD-9-CM: 991.6  3/8/2017 Yes                Review of Systems:   A comprehensive review of systems was negative. Vital Signs:    Last 24hrs VS reviewed since prior progress note. Most recent are:  Visit Vitals    BP 97/59    Pulse 80    Temp 98.1 °F (36.7 °C)    Resp 22    Ht 5' 6\" (1.676 m)    Wt 76.9 kg (169 lb 8.5 oz)    SpO2 99%    BMI 27.36 kg/m2         Intake/Output Summary (Last 24 hours) at 03/09/17 1721  Last data filed at 03/09/17 1600   Gross per 24 hour   Intake          2199.75 ml   Output              465 ml   Net          1734.75 ml        Physical Examination:             Constitutional:  No acute distress, cooperative, pleasant    ENT:  Oral mucous moist, oropharynx benign. Neck supple,    Resp:  CTA bilaterally. No wheezing/rhonchi/rales. No accessory muscle use   CV:  Regular rhythm, normal rate, no murmurs, gallops, rubs    GI:  Soft, non distended, non tender. normoactive bowel sounds, no hepatosplenomegaly     Musculoskeletal:  No edema, warm, 2+ pulses throughout    Neurologic:  Moves all extremities.   AAOx3,             Data Review:    Review and/or order of clinical lab test      Labs:     Recent Labs      03/09/17   0323 03/08/17 1945   WBC  1.4*  1.7*   HGB  9.4*  9.6*   HCT  30.7*  31.6*   PLT  113*  74*     Recent Labs      03/09/17 0323 03/08/17 1945   NA  137  135*   K  4.7  4.5   CL  103  99   CO2  31  36*   BUN  30*  30*   CREA  0.96  1.01   GLU  27*  37*   CA  8.5  8.9   MG  2.3   --    PHOS  4.4   --      Recent Labs      03/09/17 0323 03/08/17 1945   SGOT  26  24   ALT  14  16   AP  70  87   TBILI  1.0  0.8   TP  7.0  6.9   ALB  3.4*  3.3*   GLOB  3.6  3.6   LPSE   --   100     Recent Labs      03/09/17 0323 03/08/17 1945   INR  4.4*  4.4*   PTP  46.7*  46.9*      No results for input(s): FE, TIBC, PSAT, FERR in the last 72 hours. Lab Results   Component Value Date/Time    Folate 14.5 03/09/2017 03:23 AM      No results for input(s): PH, PCO2, PO2 in the last 72 hours.   Recent Labs      03/09/17   1250   TROIQ  0.11*     Lab Results   Component Value Date/Time    Cholesterol, total 138 09/28/2011 09:13 AM    HDL Cholesterol 50 09/28/2011 09:13 AM    LDL, calculated 74 09/28/2011 09:13 AM    Triglyceride 69 09/28/2011 09:13 AM     Lab Results   Component Value Date/Time    Glucose (POC) 89 03/09/2017 04:46 PM    Glucose (POC) 106 03/09/2017 12:46 PM    Glucose (POC) 215 03/09/2017 09:23 AM    Glucose (POC) 46 03/09/2017 09:13 AM    Glucose (POC) 47 03/09/2017 09:11 AM     Lab Results   Component Value Date/Time    Color DARK YELLOW 03/08/2017 08:54 PM    Appearance CLOUDY 03/08/2017 08:54 PM    Specific gravity 1.016 03/08/2017 08:54 PM    pH (UA) 5.5 03/08/2017 08:54 PM    Protein 100 03/08/2017 08:54 PM    Glucose NEGATIVE  03/08/2017 08:54 PM    Ketone NEGATIVE  03/08/2017 08:54 PM    Bilirubin NEGATIVE  03/08/2017 08:54 PM    Urobilinogen 1.0 03/08/2017 08:54 PM    Nitrites NEGATIVE  03/08/2017 08:54 PM    Leukocyte Esterase NEGATIVE  03/08/2017 08:54 PM    Epithelial cells MODERATE 03/08/2017 08:54 PM    Bacteria NEGATIVE  03/08/2017 08:54 PM    WBC 0-4 03/08/2017 08:54 PM    RBC 0-5 03/08/2017 08:54 PM         Medications Reviewed:     Current Facility-Administered Medications   Medication Dose Route Frequency    NOREPINephrine (LEVOPHED) 8,000 mcg in dextrose 5% 250 mL infusion  2-30 mcg/min IntraVENous TITRATE    PHENYLephrine (NEOSYNEPHRINE) 30,000 mcg in 0.9% sodium chloride 250 mL infusion   mcg/min IntraVENous TITRATE    dextrose 10% infusion  50 mL/hr IntraVENous CONTINUOUS    vancomycin (VANCOCIN) 1,000 mg in 0.9% sodium chloride (MBP/ADV) 250 mL  1,000 mg IntraVENous Q18H    glucose chewable tablet 16 g  4 Tab Oral PRN    dextrose (D50W) injection syrg 12.5-25 g  12.5-25 g IntraVENous PRN    glucagon (GLUCAGEN) injection 1 mg  1 mg IntraMUSCular PRN    sodium chloride (NS) flush 5-10 mL  5-10 mL IntraVENous Q8H    sodium chloride (NS) flush 5-10 mL  5-10 mL IntraVENous PRN    vancomycin dosing per pharmacy   Other Rx Dosing/Monitoring    pantoprazole (PROTONIX) 40 mg in sodium chloride 0.9 % 10 mL injection  40 mg IntraVENous Q24H    piperacillin-tazobactam (ZOSYN) 3.375 g in 0.9% sodium chloride (MBP/ADV) 100 mL  3.375 g IntraVENous Q8H     ______________________________________________________________________  EXPECTED LENGTH OF STAY: 3d 4h  ACTUAL LENGTH OF STAY:          1                 Santiago Carter MD

## 2017-03-10 NOTE — PROGRESS NOTES
Hospitalist Progress Note  Lazarus Verdugo MD  Office: [phone]  Cell: [phone]      Date of Service:  3/10/2017  NAME:  Misael Sandhu  :  1930  MRN:  [id number]      Admission Summary:   60-year-old female with history of chronic systolic congestive heart failure, atrial fibrillation, diabetes mellitus, hypertension, chronic anticoagulation use, history of pacemaker and ICD. She was sent from her primary care physician to the emergency room to be evaluated. History from the patient and her daughters at the bedside, also from the records. Per the daughter, she has been having on and off abdominal pain, epigastric pain for a while, over the last 3 days has been mostly pronounced. She said her pain at times is 9/10 in severity, it is nonradiating, associated with some nausea, but no vomiting. She denies any diarrhea. At her PCP's office,   they could not get her temperature, so they sent her to the emergency room. In the ER she has been hypothermic, her temperature was 91.4 on initial presentation. Also she was hypotensive. Her blood pressure   systolic was 90/98, so she was given about 1.5 liters of IV fluid, and now she is getting 1 bottle of albumin. She was placed on warming blanket, Lizzy Hugger. She denies any dysuria or frequency. Interval history / Subjective:     No new     Assessment & Plan:     1. Hypothermia and hypotension. Concern for sepsis. On empiric IV antibiotics, started on Zosyn and vancomycin. Followup on blood culture results NGSF. Off and on Lizzy Hugger for her hypothermia. 2. Hypoglycemia/diabetes mellitus. Holding her Lantus and on hypoglycemia protocol. Her blood sugar improved after D50. Will monitor. 3. Epigastric pain and tenderness. Consulted GI. Plans for EGD when stable or outpatient. Continue proton pump inhibitor for now. 4. Coagulopathy.  Holding her Coumadin and monitor daily INR and restart her Coumadin when INR improved. INR increased, she received Vitamin K x 1. With hematuria will give FFP and monitor INR. 5. Chronic systolic congestive heart failure with anasarca. Lasix and BB were held because of low blood pressure. Cardiologist following, noted aortic valve finding on echo ? Bacterial endocarditis. Currently on abx will follow further recs. May need AMMON. 6. Pancytopenia. Appears to be chronic. However, patient and family unaware of her previous lab results. Will monitor her labs closely. 7. Cholelithiasis: No signs of obstruction or cholecystitis on her ultrasound scan. 8. For deep venous thrombosis prophylaxis, she is already   anticoagulated and her INR is 5.5.  9. Palliative care helping with care decisions. Code status: Full    Care Plan discussed with: Patient/Family and Nurse  Disposition: TBD     Hospital Problems  Date Reviewed: 3/8/2017          Codes Class Noted POA    * (Principal)Hypothermia ICD-10-CM: T68. XXXA  ICD-9-CM: 991.6  3/8/2017 Yes                Review of Systems:   A comprehensive review of systems was negative. Vital Signs:    Last 24hrs VS reviewed since prior progress note. Most recent are:  Visit Vitals    BP (!) 82/47    Pulse 82    Temp 96.7 °F (35.9 °C)    Resp 28    Ht 5' 6\" (1.676 m)    Wt 79.6 kg (175 lb 7.8 oz)    SpO2 96%    BMI 28.32 kg/m2         Intake/Output Summary (Last 24 hours) at 03/10/17 1122  Last data filed at 03/10/17 1000   Gross per 24 hour   Intake          3712.33 ml   Output              625 ml   Net          3087.33 ml        Physical Examination:             Constitutional:  No acute distress, cooperative, pleasant    ENT:  Oral mucous moist, oropharynx benign. Neck supple,    Resp:  CTA bilaterally. No wheezing/rhonchi/rales. No accessory muscle use   CV:  Regular rhythm, normal rate, no murmurs, gallops, rubs    GI:  Soft, non distended, non tender.  normoactive bowel sounds, no hepatosplenomegaly     Musculoskeletal: No edema, warm, 2+ pulses throughout    Neurologic:  Moves all extremities. AAOx3,             Data Review:    Review and/or order of clinical lab test      Labs:     Recent Labs      03/10/17   0055  03/09/17   0323   WBC  3.5*  1.4*   HGB  9.1*  9.4*   HCT  29.8*  30.7*   PLT  80*  113*     Recent Labs      03/10/17   0055  03/09/17   0323  03/08/17   1945   NA  140  137  135*   K  4.4  4.7  4.5   CL  106  103  99   CO2  31  31  36*   BUN  27*  30*  30*   CREA  1.09*  0.96  1.01   GLU  54*  27*  37*   CA  8.0*  8.5  8.9   MG   --   2.3   --    PHOS   --   4.4   --      Recent Labs      03/10/17   0055  03/09/17   0323  03/08/17   1945   SGOT  50*  26  24   ALT  22  14  16   AP  65  70  87   TBILI  0.9  1.0  0.8   TP  6.4  7.0  6.9   ALB  3.0*  3.4*  3.3*   GLOB  3.4  3.6  3.6   LPSE   --    --   100     Recent Labs      03/10/17   0055  03/09/17   0323  03/08/17   1945   INR  5.5*  4.4*  4.4*   PTP  58.7*  46.7*  46.9*      No results for input(s): FE, TIBC, PSAT, FERR in the last 72 hours. Lab Results   Component Value Date/Time    Folate 14.5 03/09/2017 03:23 AM      No results for input(s): PH, PCO2, PO2 in the last 72 hours.   Recent Labs      03/09/17   1250   TROIQ  0.11*     Lab Results   Component Value Date/Time    Cholesterol, total 138 09/28/2011 09:13 AM    HDL Cholesterol 50 09/28/2011 09:13 AM    LDL, calculated 74 09/28/2011 09:13 AM    Triglyceride 69 09/28/2011 09:13 AM     Lab Results   Component Value Date/Time    Glucose (POC) 103 03/10/2017 09:46 AM    Glucose (POC) 153 03/10/2017 05:58 AM    Glucose (POC) 79 03/10/2017 05:36 AM    Glucose (POC) 131 03/10/2017 01:20 AM    Glucose (POC) 60 03/10/2017 12:55 AM     Lab Results   Component Value Date/Time    Color RED 03/10/2017 02:30 AM    Appearance BLOODY 03/10/2017 02:30 AM    Specific gravity 1.020 03/10/2017 02:30 AM    Specific gravity 1.016 03/08/2017 08:54 PM    pH (UA) 6.0 03/10/2017 02:30 AM    Protein 100 03/10/2017 02:30 AM Glucose NEGATIVE  03/10/2017 02:30 AM    Ketone NEGATIVE  03/10/2017 02:30 AM    Bilirubin NEGATIVE  03/10/2017 02:30 AM    Urobilinogen 1.0 03/10/2017 02:30 AM    Nitrites NEGATIVE  03/10/2017 02:30 AM    Leukocyte Esterase TRACE 03/10/2017 02:30 AM    Epithelial cells FEW 03/10/2017 02:30 AM    Bacteria NEGATIVE  03/10/2017 02:30 AM    WBC 20-50 03/10/2017 02:30 AM    RBC >100 03/10/2017 02:30 AM         Medications Reviewed:     Current Facility-Administered Medications   Medication Dose Route Frequency    bumetanide (BUMEX) injection 0.5 mg  0.5 mg IntraVENous BID    sodium chloride (NS) flush 10 mL  10 mL InterCATHeter Q24H    sodium chloride (NS) flush 10 mL  10 mL InterCATHeter PRN    sodium chloride (NS) flush 10-40 mL  10-40 mL InterCATHeter Q8H    alteplase (CATHFLO) 1 mg in sterile water (preservative free) 1 mL injection  1 mg InterCATHeter PRN    bacitracin 500 unit/gram packet 1 Packet  1 Packet Topical PRN    sodium chloride 0.9 % bolus infusion 500 mL  500 mL IntraVENous ONCE    NOREPINephrine (LEVOPHED) 8,000 mcg in dextrose 5% 250 mL infusion  2-30 mcg/min IntraVENous TITRATE    PHENYLephrine (NEOSYNEPHRINE) 30,000 mcg in 0.9% sodium chloride 250 mL infusion   mcg/min IntraVENous TITRATE    dextrose 10% infusion  75 mL/hr IntraVENous CONTINUOUS    vancomycin (VANCOCIN) 1,000 mg in 0.9% sodium chloride (MBP/ADV) 250 mL  1,000 mg IntraVENous Q18H    glucose chewable tablet 16 g  4 Tab Oral PRN    dextrose (D50W) injection syrg 12.5-25 g  12.5-25 g IntraVENous PRN    glucagon (GLUCAGEN) injection 1 mg  1 mg IntraMUSCular PRN    sodium chloride (NS) flush 5-10 mL  5-10 mL IntraVENous Q8H    sodium chloride (NS) flush 5-10 mL  5-10 mL IntraVENous PRN    vancomycin dosing per pharmacy   Other Rx Dosing/Monitoring    pantoprazole (PROTONIX) 40 mg in sodium chloride 0.9 % 10 mL injection  40 mg IntraVENous Q24H    piperacillin-tazobactam (ZOSYN) 3.375 g in 0.9% sodium chloride (MBP/ADV) 100 mL  3.375 g IntraVENous Q8H     ______________________________________________________________________  EXPECTED LENGTH OF STAY: 3d 4h  ACTUAL LENGTH OF STAY:          2                 Bharati De La Torre MD

## 2017-03-10 NOTE — PALLIATIVE CARE
Palliative Medicine Social Work    Ms. Angel Lind is an 80year old woman with a history significant for HTN, systolic CHF (EF 52%) s/p BiVICD, pulmonary HTN, mitral valve regurgitation and repair, Afib on AC, DM2 and thyroid disease. She was admitted on 3/8 from MD office with worsening abdominal distention, epigastric pain and hypothermia. Patient also with hypotension on pressors, hypoglycemia and elevated ammonia. Further work-up  revealed anasarca, b/l pleural effusions, ascites and hematogenous liver suspicious for cirrhosis. There is also some concern for vegetation on heart valve, though cultures negative so far. Per chart, patient has had abdominal distention and discomfort for the last 3-4 months. A past CT revealed gallstones. Nursing reports today that patient's urine output is decreased. I checked in on patient and family today. Patient was intermittently awake/alert during my visit. Family present included three daughters; Triny Manley [phone]), Vanesa Valdivia ([phone]), and Ariel Schaefer ([phone]) and three granddaughters. Introduced our services and how we might be helpful. Family feels they are getting good information about her condition. They confirm patient has been complaining of abdominal distention and discomfort for about 4 months. She was, however, able to continue with her daily routine and care. Patient lives alone. 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 She spends each night at her son's home next door. Patient is  and has 7 children. She has not completed an AMD, but family present tends to defer to daughters, Red Nichole ([phone]) and Ramin Colbert who are most involved in her care. Attempted to arrange family meeting for today, but Amy Juarez not present. 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   All agreed for family meeting on Monday at 3:30pm with Fely Kaur NP. Thank you for the opportunity to be involved in the care of Ms. Kaitlin Schwartz. Andria Vogel, PEDROW, [facility]-  Palliative Medicine   [employer]ing Choices ® ACP Facilitator   [phone]

## 2017-03-10 NOTE — PROGRESS NOTES
1930: Bedside and Verbal shift change report given to Donnie Ojeda RN (oncoming nurse) by Hamilton Verdugo RN (offgoing nurse). Report included the following information SBAR, Kardex, Intake/Output, MAR, Accordion, Recent Results, Cardiac Rhythm paced and Alarm Parameters . 2000: Assessment completed    3/9  0000: Reassessment completed    0045: Paged hospitalist about change in urine output (pink-tinged). Orders received    9727: BG 60    0100: 12.5 g D50W IV administered    0120:     0145: Paged hospitalist regarding critical INR 5.5. Orders received    0400: Reassessment completed    0536: BG 79    0537: 12.5 g D50W IV administered    0558:       0730: Bedside and Verbal shift change report given to Corinne De Jesus RN (oncoming nurse) by Donnie Ojeda RN (offgoing nurse). Report included the following information SBAR, Kardex, Intake/Output, MAR, Accordion, Recent Results, Cardiac Rhythm paced and Alarm Parameters .

## 2017-03-10 NOTE — PROGRESS NOTES
Pulmonary    Remains on pressors with Maxi  BS continue to fall despite D10 (cortisol level was OK)  Echo with EF 30% concern for vegetation on aortic valve - blood cx negative so far  Ammonia elevated  INR higher today - vitamin K given - no obvious bleeding  Had to go back on warming blanket over night    Patient Vitals for the past 4 hrs:   BP Temp Pulse Resp SpO2   03/10/17 1000 (!) 82/47 - 82 28 96 %   03/10/17 0930 - - 84 25 -   03/10/17 0900 98/65 - 81 28 99 %   03/10/17 0830 104/68 - 80 28 (!) 81 %   03/10/17 0800 106/63 96.7 °F (35.9 °C) 80 25 -   03/10/17 0730 103/73 - 81 (!) 31 100 %   03/10/17 0715 107/77 - 80 (!) 31 99 %   03/10/17 0700 101/61 - 86 25 100 %   Temp (24hrs), Av.7 °F (36.5 °C), Min:96.7 °F (35.9 °C), Max:98.3 °F (36.8 °C)    Intake/Output Summary (Last 24 hours) at 03/10/17 1030  Last data filed at 03/10/17 1000   Gross per 24 hour   Intake          3862.33 ml   Output              625 ml   Net          3237.33 ml       She appears in no distress  diminshed bs at bases  RRR   Soft warm and dry    Recent Labs      03/10/17   0055  17   0323  17   WBC  3.5*  1.4*  1.7*   HGB  9.1*  9.4*  9.6*   HCT  29.8*  30.7*  31.6*   PLT  80*  113*  74*     Recent Labs      03/10/17   0055  17   0323  17   NA  140  137  135*   K  4.4  4.7  4.5   CL  106  103  99   CO2  31  31  36*   GLU  54*  27*  37*   BUN  27*  30*  30*   CREA  1.09*  0.96  1.01   CA  8.0*  8.5  8.9   MG   --   2.3   --    PHOS   --   4.4   --    ALB  3.0*  3.4*  3.3*   SGOT  50*  26  24   ALT  22  14  16   INR  5.5*  4.4*  4.4*       Recent Labs      17   1250   TROIQ  0.11*     All Micro Results     Procedure Component Value Units Date/Time    CULTURE, URINE [[id number]] Collected:  03/10/17 0230    Order Status:  Completed Updated:  03/10/17 0632    CULTURE, BLOOD, PAIRED [[id number]] Collected:  17 1945    Order Status:  Completed Specimen:  Blood Updated:  03/10/17 0533 Special Requests: NO SPECIAL REQUESTS        Culture result: NO GROWTH 2 DAYS           Impression  1. Shock - suspect septic / cardiogenic   2. EF 35% - systolic CHF - concern for possible vegetation on aortic valve - blood cx no growth so far  3. Hypoglycemia on D10 secondary to sepsis and liver insufficiency  4. Liver insufficiency - ? From chronic heart failure - cirrhotic appearing liver  5.  IDDM - hx off meds on D10  6. afib was on coumadin - INR > 5 - vitamin K given    --continue abx and follow up blood cx  --D10  --diuresis per cardiology  --wean Maxi as able  --Palliative care shabnam Barahona MD

## 2017-03-10 NOTE — PROGRESS NOTES
1600:  Into speak to family about their concern for her having a diet. Spoke to Dr. Palak Perez prior to entering the room and based on pt's current status, would like to hold off feeding her until am and re-evaluate. Explained reasons with family and everyone in room on board and understood. 1800:  BP w/in parameters and able to aggressively wean Maxi gtt down. Shift summary:  Pt remains pleasantly confused. Family at bedside. Maxi gtt weaned from 270mcg/min to 210mcg/min with stable BP and MAP consistently above 65.

## 2017-03-10 NOTE — DIABETES MGMT
DTC Progress Note    Recommendations/ Comments: Pt's chart reviewed due to hypoglycemia. Pt with a blood sugars of 79 mg/dl at 0536. Noted patient is NPO with dextrose running. DTC to continue to follow. Chart reviewed on [employer]. Patient is a 80 y.o. female with known diabetes on Lantus 22 units daily at home. A1c:   No results found for: HBA1C, HGBE8, JLG3KLFM, JZK9VSUD    Recent Glucose Results:   Lab Results   Component Value Date/Time    GLU 54 (L) 03/10/2017 12:55 AM    GLUCPOC 153 (H) 03/10/2017 05:58 AM    GLUCPOC 79 03/10/2017 05:36 AM    GLUCPOC 131 (H) 03/10/2017 01:20 AM        Lab Results   Component Value Date/Time    Creatinine 1.09 03/10/2017 12:55 AM       Active Orders   Diet    DIET NPO        PO intake: No data found. Current hospital DM medication: none    Will continue to follow as needed.     Thank you  Sheila Martin RD, CDE

## 2017-03-10 NOTE — PROGRESS NOTES
Admission Medication Reconciliation:    Information obtained from: Rx Query, Patient family and medication bottles, and [facility] pharmacy,     Significant PMH/Disease States:   Past Medical History:   Diagnosis Date    Atrial fibrillation [facility]     Chronic systolic heart failure (HCC)     ef 35-40%; hold ACE due to lower bp    Diabetes mellitus type 2, insulin dependent ([facility]     Hypertension     Long term (current) use of anticoagulants     Malignant hypertensive heart disease with CHF ([facility]     Mitral regurgitation     Pacemaker 10/25/2012    The pacemaker is a [employer], model # [id number], serial [id number].  S/P mitral valve repair 1996    # 28 [place]    Thyroid disease     TR (tricuspid regurgitation)     moderate to severe echo        Chief Complaint for this Admission:  Epigastric pain    Allergies:  Review of patient's allergies indicates no known allergies. Prior to Admission Medications:   Prior to Admission Medications   Prescriptions Last Dose Informant Patient Reported? Taking? KLOR-CON M20 20 mEq tablet   No No   Sig: TAKE 2 TABS BY MOUTH TWO (2) TIMES A DAY. acetaminophen (TYLENOL) 325 mg tablet   Yes No   Sig: Take  by mouth every four (4) hours as needed. carvedilol (COREG) 25 mg tablet   No No   Sig: TAKE 1 TAB BY MOUTH TWO (2) TIMES DAILY (WITH MEALS). enalapril (VASOTEC) 10 mg tablet   Yes No   Sig: Take 5 mg by mouth daily. furosemide (LASIX) 80 mg tablet   No No   Sig: TAKE 1 TAB BY MOUTH TWO (2) TIMES A DAY. insulin glargine (LANTUS SOLOSTAR) 100 unit/mL (3 mL) pen   Yes No   Si Units by SubCUTAneous route daily.    warfarin (COUMADIN) 5 mg tablet   No No   Sig: Take half tablet or 2.5 on Tues and , whole tab or 5 mg on all other days or as directed by Coumadin clinic      Facility-Administered Medications: None         Comments/Recommendations: Medication list medication bottles provided by family members, Rx Query, and [facility] pharmacy where she fills her prescriptions. Removed: Diabetes supplies    Note: There was a bottle for enalapril as stated above; the fill date was back in Sept 2016, and when I called to verify last fill, [facility] verified the same. One of the family members confirmed that she take 1/2 of the tablet daily. The bottle was still pretty full, so unsure of patient's adherence to medication. It is noted that The medical records stated that she was on digoxin. Per [facility], last time she filled was in Sept 2016, but not a current medication (Not include with other medication bottles). When I asked the family member who provided the bottle, she stated the medications bottles that were given to me were her most updated medications.      Enedina Bone, PharmD  PGY1 Pharmacy Resident

## 2017-03-10 NOTE — PROGRESS NOTES
Problem: Non-Violent Restraints  Goal: *Patient Specific Goal (EDIT GOAL, INSERT TEXT)  Outcome: Resolved/Met Date Met:  03/10/17  Restraints removed

## 2017-03-10 NOTE — PROGRESS NOTES
1600 received verbal bedside report from Kiet rn using sbar alarms and drips checked  1700 pt drowsy - unable to eat to drowsy- urine output still poor and bp low dunia increased - pt also complaining of cramping in stomach- call placed to Dr Mandi Good in regard to the above 0 order placed for ns bolus-updated the family  [phone] call placed to Dr Mandi Good per his instructions to updated him after the ns bolus- bp still low and  Only 10 cc of urine output- new orders given- to hold bumex for now no bolus or albumin at this time- may hang levophed  1839 shift summary: pt now on levophed and dunia drips  Richardson in place with poor urine output- bp map 42/39 but systolic in upper 64'A and low 90's - pt drowsy - family present  1900 Bedside and Verbal shift change report given to manoj newsome (oncoming nurse) by Sneha Washington (offgoing nurse). Report included the following information SBAR, Kardex, Intake/Output and MAR.

## 2017-03-10 NOTE — NURSE NAVIGATOR
Chart reviewed by Heart Failure Nurse Navigator. Heart Failure database completed. EF 30%. ACEi/ARB: hold d/t hypotension. BB: Coreg 25mg bid-currently on hold d/t hypotension. CRT BiV ICD implanted 9/9/16. NYHA Functional Class not assigned. Documentation requested for NYHA Functional Class via Provider message on Hotlist    Heart Failure Teach Back in Patient Education. Heart Failure Avoiding Triggers on Discharge Instructions. Followed by Dr. Brady Silvestre for cardiology.   CAMILA Drake/Mata notified of adm

## 2017-03-10 NOTE — PROGRESS NOTES
Laurel Schaefer MD   [facility] [address]    Office [phone]  Mobile [phone]                          Cardiology progress note    IMPRESSION:   1: Progressive chronic CHF, systolic, POA:  Moderate fluid overload with ascites, edema, pleural effusions  Possible element of third spacing related to sepsis. Positive fluid balance, no diuretics, since admission. 2: Cardiomyopathy, non-ischemic, valvular heart disease s/p Mitral annuloplasty  Echo yesterday: LVEF 30%, BiAE, mod MR, mod TR and mild pulm HTN, pleural effusion. AF, BiVICD, anticoagulated, high INR (?from sepsis)  3: Hypotension, presumably from sepsis, possible cardiogenic element. Has not responded to fluids and in fact is fluid overloaded; now on Maxi drip. 4: Hypothermia without history of exposure, ?sepsis, no obvious source, ?recurrent off Lizzy hugger? 5: Pancytopenia, ?sepsis      Possible vegetation on aortic valve, not previously mentioned  6: DM  7: Abdominal pain POA, ?ascites, ?other primary GI problem? RECOMMENDATIONS / PLANS   Await Blood Cultures re: significance of Ao valve finding. Suspected BE likely would prolong duration of antibiotics, if she recovers. Diuretics probably advisable, as long as no obvious negative impact on BP and renal function. Ordered two doses IV. Agree with Vit K. For a patient with \"septic looking labs and hemodynamics\" and high ammonia, she is surprisingly alert. Subjective:     No complaints        Objective:    Physical Exam:    Visit Vitals    /77    Pulse 80    Temp 97.8 °F (36.6 °C)    Resp (!) 31    Ht 5' 6\" (1.676 m)    Wt 175 lb 7.8 oz (79.6 kg)    SpO2 99%    BMI 28.32 kg/m2      Cardiovascular: no murmur    Lungs: decreased BS     Abdomen: distended, ?tender    Extremities: moderate pitting edema      Laboratory and Imaging have been personally reviewed and are notable for:    EKG: Telem V-paced, ? AF underlying    X Ray: persistent effusions and vascular congestion    Labs:    Recent Labs      03/10/17   0055  03/09/17   0323   NA  140  137   K  4.4  4.7   CL  106  103   BUN  27*  30*   CREA  1.09*  0.96   GLU  54*  27*   PHOS   --   4.4   CA  8.0*  8.5     Recent Labs      03/10/17   0055   WBC  3.5*   HGB  9.1*   HCT  29.8*   PLT  80*

## 2017-03-10 NOTE — ROUTINE PROCESS
1000: urine output average 10 ml/hr. 1030: 500 NS bolus to infuse to 2 hours. 1130: Dr. Rex Schwartz at bedside. 1220: Patient passed STAND. Diet ordered. 1225: Patient vomited up what she was given during STAND. 1415: First unit FFP started. 1440: Dr. Rex Schwartz updated on patient's urine output, and patient vomiting after STAND. Order received for 250 NS bolus,   1515: Second unit of FFP started. Shift SUmmary: urine output decreased. Total urine output today was 75 ml. 0.5 mg bumex given. D10 gtt increased to 75 ml/hr. Maxi still infusing, currently going at 200 mcg/min. Patient received 2 units of FFP.

## 2017-03-10 NOTE — PROGRESS NOTES
[address]  [address], [address]       GI PROGRESS NOTE  Cory Yousif, [facility]  [phone] office  [phone] NP in-hospital cell phone M-F until 4:30  After 5pm or on weekends, please call  for physician on call      NAME: Jyothi Winston   :  1930   MRN:  [id number]       Subjective:         Objective:     VITALS:   Last 24hrs VS reviewed since prior progress note. Most recent are:  Visit Vitals    BP (!) 82/47    Pulse 82    Temp 96.7 °F (35.9 °C)    Resp 28    Ht 5' 6\" (1.676 m)    Wt 79.6 kg (175 lb 7.8 oz)    SpO2 96%    BMI 28.32 kg/m2       PHYSICAL EXAM:  General: Cooperative, no acute distress    Neurologic:  Alert, sleeping at present but arouses. HEENT: EOMI. Lungs:  Diminished. No wheezing  Heart:  100% V-paced, S1 S2, regular rhythm, no murmur   Abdomen: Soft, non distended, non tender. +Bowel sounds  Extremities: edema  Psych:   Not anxious or agitated.     Lab Data Reviewed:     Recent Results (from the past 24 hour(s))   GLUCOSE, POC    Collection Time: 17 12:46 PM   Result Value Ref Range    Glucose (POC) 106 (H) 65 - 100 mg/dL    Performed by Connie Terry    TROPONIN I    Collection Time: 17 12:50 PM   Result Value Ref Range    Troponin-I, Qt. 0.11 (H) <0.05 ng/mL   BNP    Collection Time: 17 12:50 PM   Result Value Ref Range    BNP 1335 (H) 0 - 100 pg/mL   GLUCOSE, POC    Collection Time: 17  4:46 PM   Result Value Ref Range    Glucose (POC) 89 65 - 100 mg/dL    Performed by John Mayo    GLUCOSE, POC    Collection Time: 17  8:55 PM   Result Value Ref Range    Glucose (POC) 86 65 - 100 mg/dL    Performed by [address], COMPREHENSIVE    Collection Time: 03/10/17 12:55 AM   Result Value Ref Range    Sodium 140 136 - 145 mmol/L    Potassium 4.4 3.5 - 5.1 mmol/L    Chloride 106 97 - 108 mmol/L    CO2 31 21 - 32 mmol/L    Anion gap 3 (L) 5 - 15 mmol/L    Glucose 54 (L) 65 - 100 mg/dL    BUN 27 (H) 6 - 20 MG/DL    Creatinine 1.09 (H) 0.55 - 1.02 MG/DL    BUN/Creatinine ratio 25 (H) 12 - 20      GFR est AA 58 (L) >60 ml/min/1.73m2    GFR est non-AA 47 (L) >60 ml/min/1.73m2    Calcium 8.0 (L) 8.5 - 10.1 MG/DL    Bilirubin, total 0.9 0.2 - 1.0 MG/DL    ALT (SGPT) 22 12 - 78 U/L    AST (SGOT) 50 (H) 15 - 37 U/L    Alk. phosphatase 65 45 - 117 U/L    Protein, total 6.4 6.4 - 8.2 g/dL    Albumin 3.0 (L) 3.5 - 5.0 g/dL    Globulin 3.4 2.0 - 4.0 g/dL    A-G Ratio 0.9 (L) 1.1 - 2.2     CBC WITH AUTOMATED DIFF    Collection Time: 03/10/17 12:55 AM   Result Value Ref Range    WBC 3.5 (L) 3.6 - 11.0 K/uL    RBC 4.01 3.80 - 5.20 M/uL    HGB 9.1 (L) 11.5 - 16.0 g/dL    HCT 29.8 (L) 35.0 - 47.0 %    MCV 74.3 (L) 80.0 - 99.0 FL    MCH 22.7 (L) 26.0 - 34.0 PG    MCHC 30.5 30.0 - 36.5 g/dL    RDW 16.8 (H) 11.5 - 14.5 %    PLATELET 80 (L) 909 - 400 K/uL    NEUTROPHILS 64 32 - 75 %    LYMPHOCYTES 16 12 - 49 %    MONOCYTES 17 (H) 5 - 13 %    EOSINOPHILS 3 0 - 7 %    BASOPHILS 0 0 - 1 %    ABS. NEUTROPHILS 2.2 1.8 - 8.0 K/UL    ABS. LYMPHOCYTES 0.6 (L) 0.8 - 3.5 K/UL    ABS. MONOCYTES 0.6 0.0 - 1.0 K/UL    ABS. EOSINOPHILS 0.1 0.0 - 0.4 K/UL    ABS.  BASOPHILS 0.0 0.0 - 0.1 K/UL    DF MANUAL      PLATELET COMMENTS LARGE PLATELETS      RBC COMMENTS ANISOCYTOSIS  1+        RBC COMMENTS TARGET CELLS  PRESENT        RBC COMMENTS POLYCHROMASIA  1+       LACTIC ACID, PLASMA    Collection Time: 03/10/17 12:55 AM   Result Value Ref Range    Lactic acid 0.8 0.4 - 2.0 MMOL/L   GLUCOSE, POC    Collection Time: 03/10/17 12:55 AM   Result Value Ref Range    Glucose (POC) 60 (L) 65 - 100 mg/dL    Performed by Pat Lopez    PROTHROMBIN TIME + INR    Collection Time: 03/10/17 12:55 AM   Result Value Ref Range    INR 5.5 (HH) 0.9 - 1.1      Prothrombin time 58.7 (H) 9.0 - 11.1 sec   NUCLEATED RBC    Collection Time: 03/10/17 12:55 AM   Result Value Ref Range    NRBC 4.9 (H) 0  WBC    ABSOLUTE NRBC 0.17 (H) 0.00 - 0.01 K/uL    WBC CORRECTED FOR NR ADJUSTED FOR NUCLEATED RBC'S     GLUCOSE, POC    Collection Time: 03/10/17  1:20 AM   Result Value Ref Range    Glucose (POC) 131 (H) 65 - 100 mg/dL    Performed by   [address] W/ REFLEX CULTURE    Collection Time: 03/10/17  2:30 AM   Result Value Ref Range    Color RED      Appearance BLOODY (A) CLEAR      Specific gravity 1.020 1.003 - 1.030      pH (UA) 6.0 5.0 - 8.0      Protein 100 (A) NEG mg/dL    Glucose NEGATIVE  NEG mg/dL    Ketone NEGATIVE  NEG mg/dL    Bilirubin NEGATIVE  NEG      Blood LARGE (A) NEG      Urobilinogen 1.0 0.2 - 1.0 EU/dL    Nitrites NEGATIVE  NEG      Leukocyte Esterase TRACE (A) NEG      WBC 20-50 0 - 4 /hpf    RBC >100 (H) 0 - 5 /hpf    Epithelial cells FEW FEW /lpf    Bacteria NEGATIVE  NEG /hpf    UA:UC IF INDICATED URINE CULTURE ORDERED (A) CNI     GLUCOSE, POC    Collection Time: 03/10/17  5:36 AM   Result Value Ref Range    Glucose (POC) 79 65 - 100 mg/dL    Performed by [address], POC    Collection Time: 03/10/17  5:58 AM   Result Value Ref Range    Glucose (POC) 153 (H) 65 - 100 mg/dL    Performed by Heather Muñoz    GLUCOSE, POC    Collection Time: 03/10/17  9:46 AM   Result Value Ref Range    Glucose (POC) 103 (H) 65 - 100 mg/dL    Performed by Lakesha Lerma          ________________________________________________________________________       Assessment:   · Epigastric pain: pt no longer complaining of pain  · Cardiogenic shock: on dunia and vegetation on valves via TTE     Patient Active Problem List   Diagnosis Code    Chronic systolic heart failure (HCC) I50.22    Rupture of chordae tendineae ([facility]) I51.1    Mitral regurgitation I34.0    S/P mitral valve repair Z98.890    TR (tricuspid regurgitation) I07.1    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 XXXA     Plan:   · GI will sign off  · Please call back if we can be of assistance. Signed By: Tyshawn Yates. Romario Mullins NP     3/10/2017  10:57 AM       Addendum:  Pt independently seen and examined. No symptoms present that would require endoscopic intervention urgently. Will sign off. Please call with additional concerns.

## 2017-03-10 NOTE — PROGRESS NOTES
NUTRITION       Consult received during interdisciplinary rounds for tube feeding recommendations (unable to advance diet d/t AMS). Patient admitted for Hypothermia. NPO since admission. History of abdominal and epigastric pain-worse for past few days. GI consulted-recommended outpatient EGD. Ammonia elevated. Mentation improved during the day. Patient passed STAND this morning but RN noted pt vomited after consuming a few bites of applesauce and sips of water. RD visited with patient who is hungry and would like to eat. Diet advanced for dinner. Will hold off on nutrition support. RD to follow and assist as needed. Thank you. Estimated Nutrition Needs:   Kcals/day: 1450 Kcals/day (4014-7302 (MSJ x 1.3-1.4))  Protein: 79 g (1.2g/kg)  Fluid:  (1 ml/kcal)  Based On:  [facility] Used:  (Office wt 1/10/17  66.4 kg)        Luba Self RD [facility]

## 2017-03-10 NOTE — ROUTINE PROCESS
Bedside and Verbal shift change report given to Debbi SCHROEDER (oncoming nurse) by Sherri Salas (offgoing nurse). Report included the following information SBAR, Kardex, ED Summary, Procedure Summary, Intake/Output, MAR and Recent Results. Bedside and Verbal shift change report given to Rip Yao (oncoming nurse) by Bri Yadva (offgoing nurse). Report included the following information SBAR, Kardex, ED Summary, Intake/Output, MAR and Recent Results.

## 2017-03-11 NOTE — PROGRESS NOTES
Problem: Heart Failure: Day 3  Goal: Treatments/Interventions/Procedures  Outcome: Not Progressing Towards Goal  Pt on ventilator  Goal: *Hemodynamically stable  Outcome: Not Progressing Towards Goal  Pt BP being maintained with medication to keep MAP>65

## 2017-03-11 NOTE — CONSULTS
[address] [facility] [address]    [facility]       Name:  Karlee Husbands   MR#:  [id number]   :  1930   Account #:  [de-identified]    Date of Consultation:  2017   Date of Adm:  2017       REQUESTING PHYSICIAN: Vin Michaud NP    REASON FOR CONSULTATION: Evaluation and management of   acute renal failure. SOURCE OF INFORMATION: The patient was seen and examined. History is obtained from chart review. The patient is intubated and   unable to give any history. The patient's daughter-in-law at the   bedside. HISTORY OF PRESENT ILLNESS: The patient is an 72-year-old   female with past medical history significant for diabetes, hypertension,   AFib, chronic systolic CHF, history of pacemaker and AICD, was sent   in by PCP for evaluation of recurrent abdominal pain, which was   nonradiating, associated with some nausea but no significant vomiting. On admission, she was found to be hypothermic, and also   hypotensive. She has been resuscitated with IV fluids and IV   antibiotics. She developed hematuria in the last 24 hours, which was   suspected from Richardson trauma. Now, she has developed a drop in urine output and rising creatinine,   for which Nephrology is consulted. The patient is 10 L positive on her   I's and O's, and weight is also up by more than 10 kg. Her urine output   dropped overnight. She was hypotensive yesterday evening. She is   currently on pressors, besides bicarbonate drip. Her cultures have   been so far negative. Baseline kidney function is normal.    PAST MEDICAL HISTORY: As noted in HPI. In addition, status post   mitral valve repair and chronic anticoagulation. FAMILY HISTORY: Brother has heart disease. Sister with history of   breast cancer. SOCIAL HISTORY: No tobacco or alcohol use. She lives with her   family. REVIEW OF SYSTEMS: As noted in HPI, otherwise unobtainable.     ALLERGIES: NO KNOWN DRUG ALLERGY. HOME MEDICATIONS INCLUDE   1. Coreg. 2. Furosemide. 4. Lantus. 5. Potassium. 6. Coumadin. PHYSICAL EXAMINATION   GENERAL: Elderly female who is intubated and sedated. VITAL SIGNS: Temp is 99, pulse 71-83, -609 systolic, 57-85   diastolic, respiration of 18, saturating 100% on 40% FIO2. HENT: Head is atraumatic, normocephalic. Endotracheal tube is in   place. LUNGS: Have diminished breath sounds. Few coarse crackles. Bilateral wheezing. HEART: Irregular rhythm, without any tachycardia. ABDOMEN: Soft, slightly tense, distended, with some abdominal wall   edema. GENITOURINARY: Richardson catheter in place with pink-tinged urine,   which is clearing up, per nurse. EXTREMITIES: There is 1+ edema bilaterally. It is up to mid thigh. CENTRAL NERVOUS SYSTEM: She is sedated on vent. LABORATORY DATA: BUN is 28, creatinine is up to 1.8 today,   compared to 1.09 yesterday, and normal day prior to that. Urinalysis on   admission was negative for any significant blood. Only had small blood   on the dipstick, no pyuria, protein was 100. Repeat UA yesterday   showed a protein of 100, pyuria, as well as hematuria. INR was initially   high at 5.5, now down to 2.2. LFTs, which were nearly normal, rising   with AST up from 50 to 384 today, ALT 22 to 121 today, bilirubin is 1.8. Blood and urine cultures are so far negative. DIAGNOSTIC DATA: She had a CT of the abdomen with contrast on   03/03/2017, which showed anasarca, with bilateral pleural effusion and   ascites. Cholelithiasis and cardiomegaly. Ultrasound of the abdomen   did not show any hydronephrosis of the right kidney. There was   cholelithiasis, as well as heterogeneous liver, with irregular borders,   raising the possibility of cirrhosis. Right pleural effusion. Echocardiogram 2 days ago showed an EF of 30%. Moderate diffuse   hypokinesis. Moderately reduced LV systolic function.  Left atrium was   moderately to severely dilated. Aortic wall had echodensity about 5   mm in diameter. Questionable vegetation. RV systolic hypertension. Moderate MR.    ASSESSMENT   1. Acute kidney injury. 2. Chronic systolic congestive heart failure. 3. Pleural effusion. 4. Fluid overload. 5. Ischemic hepatitis. 6. Moderate mitral regurgitation, right ventricular systolic dysfunction,   as well as enlarged left atrium. This patient has pretty advanced cardiomyopathy, which is ischemic,   as well as valvular. She suffered hypotension in the last 24 hours. Now, has a rising creatinine, as well as LFTs, which points towards   ATN as the most likely cause. In addition, she received IV contrast 3   days ago, and may have a component of contrast nephropathy. Acute   interstitial nephritis would be a diagnosis of exclusion. Obstruction is   unlikely, and at least the right kidney did not show any evidence of   hydronephrosis on abdominal ultrasound. She did have brief hematuria   in the setting of coagulopathy, but urine seems to be clearing up, and   do not suspect any obstruction from a blood clot at present. She is significantly fluid overloaded. She has peripheral edema,   ascites, abdominal wall edema and pleural effusions. She is more than   10 kg above her admission weight, as well as 10 L positive on her I's   and O's. She needs diuretics. RECOMMENDATIONS   1. Supportive care with vent, O2.   2. Wean off pressors to keep MAP greater than 65 mmHg. 3. Reduce the bicarbonate drip to 50 mL/hour. 4. Start Bumex 1 mg IV b.i.d.   5. Monitor I's and O's, avoid nephrotoxins and follow daily labs. 6. Adjust doses of medications for GFR less than 10 mL/min. 7. Avoid any repeat IV contrast procedures unless absolutely indicated. 8. IV antibiotics as per primary team.    Thanks for the renal consultation. We will follow the patient with you.         Oralia Velasquez MD      BP / [facility]   D:  03/11/2017   11:44   T:  03/11/2017   12:29 Job #:  [id number]

## 2017-03-11 NOTE — PROGRESS NOTES
Cardiology progress note    IMPRESSION:   1: Acute on chronic CHF, systolic, NYHA III/IV  Respiratory failure overnight. Agree with IV diuresis. 2: Cardiomyopathy, non-ischemic, valvular heart disease s/p Mitral annuloplasty  Echo 3/9: LVEF 30%, BiAE, mod MR, mod TR and mild pulm HTN, pleural effusion. AF, BiVICD, anticoagulated, high INR (?from sepsis)  3: Hypotension, presumably from sepsis, possible cardiogenic element. 4: Hypothermia without history of exposure, ?sepsis, no obvious source, ?recurrent off Lizzy hugger? 5: Pancytopenia, ?sepsis      Possible vegetation on aortic valve. Blood cx have been negative  6: DM  7: Abdominal pain POA, ?ascites, ?other primary GI problem? Subjective:     Reviewed overnight events. CO2 retaining and acidotic; was intubated.        Objective:    Physical Exam:    Visit Vitals    /79    Pulse 71    Temp 99 °F (37.2 °C)    Resp 18    Ht 5' 6\" (1.676 m)    Wt 177 lb 11.1 oz (80.6 kg)    SpO2 100%    BMI 28.68 kg/m2      Cardiovascular: no murmur    Lungs: vent, decreased BS bases    Abdomen: distended, non tender    Extremities: moderate pitting edema      Laboratory and Imaging have been personally reviewed and are notable for:    EKG: Telem V-paced    X Ray: persistent effusions and vascular congestion    Labs:    Recent Labs      03/11/17   0435   NA  135*   K  4.4   CL  102   BUN  28*   CREA  1.88*   GLU  222*   PHOS  3.2   CA  8.1*     Recent Labs      03/11/17   0435   WBC  6.9   HGB  9.4*   HCT  31.1*   PLT  67*

## 2017-03-11 NOTE — PROGRESS NOTES
Hospitalist Progress Note  Corazon Suarez MD  Office: [phone]        Date of Service:  3/11/2017  NAME:  Elizabeth Stratton  :  1930  MRN:  [id number]      Admission Summary:   71-year-old female with history of chronic systolic congestive heart failure, atrial fibrillation, diabetes mellitus, hypertension, chronic anticoagulation use, history of pacemaker and ICD. She was sent from her primary care physician to the emergency room to be evaluated. History from the patient and her daughters at the bedside, also from the records. Per the daughter, she has been having on and off abdominal pain, epigastric pain for a while, over the last 3 days has been mostly pronounced. She said her pain at times is 9/10 in severity, it is nonradiating, associated with some nausea, but no vomiting. She denies any diarrhea. At her PCP's office,   they could not get her temperature, so they sent her to the emergency room. In the ER she has been hypothermic, her temperature was 91.4 on initial presentation. Also she was hypotensive. Her blood pressure   systolic was 78/09, so she was given about 1.5 liters of IV fluid, and now she is getting 1 bottle of albumin. She was placed on warming blanket, Lizzy Hugger. She denies any dysuria or frequency. Interval history / Subjective:   Pt intubated, on pressors per RN. Poor urine output overnight, with 10mls/hr. Hematuria in galarza. Assessment & Plan:     1. Hypothermia and hypotension. Concern for sepsis. On empiric IV antibiotics, started on Zosyn and vancomycin. Followup on blood culture results NGSF. Off and on Lizzy Hugger for her hypothermia. -  Pleural effusions, bilateral: improving on CXR, continue antibiotics, may be source of infection    - Sepsis:  Unknown source, most likely lung with bilateral pleural effusions, possibly infectious. She had caren of leukocytes to 1.9, improving.   She was afebrile overnight, low BP requiring pressors. Weaning dunia, continuing levofed. Continue pip tazo, vanco. Monitor cultures, preliminary blood, urine cultures no growth so far. - Hematuria: may be related to traumatic galarza placement with INR max 5.5, today 2.2. Urine noted on UA as yellow on 3/8, Galarza placed 3/9 and hematuria reported 3/10 UA. -  JUAN. Low urine output overnight, consult nephrology. - Hypoglycemia/diabetes mellitus. Resolved. Holding her Lantus and on hypoglycemia protocol. Her blood sugar improved after D50. Stable, continue on D10 IVF without hypoglycemia.     -   Epigastric pain and tenderness. Consulted GI. Plans for EGD when stable or outpatient. Continue proton pump inhibitor for now. 4. Coagulopathy. Holding her Coumadin and monitor daily INR and restart her Coumadin when INR improved. INR increased, she received Vitamin K x 1. With hematuria will give FFP and monitor INR. 5. Chronic systolic congestive heart failure with anasarca. Lasix and BB were held because of low blood pressure. Cardiologist following, noted aortic valve finding on echo ? Bacterial endocarditis. Currently on abx will follow further recs. May need AMMON. 6. Pancytopenia. Appears to be chronic. However, patient and family unaware of her previous lab results. Will monitor her labs closely. 7. Cholelithiasis: No signs of obstruction or cholecystitis on her ultrasound scan. 8. For deep venous thrombosis prophylaxis, she is already   anticoagulated and her INR is 5.5.  9. Palliative care helping with care decisions. Code status: Full    Care Plan discussed with: Patient/Family and Nurse  Disposition: TBD     Hospital Problems  Date Reviewed: 3/8/2017          Codes Class Noted POA    * (Principal)Hypothermia ICD-10-CM: T68. XXXA  ICD-9-CM: 991.6  3/8/2017 Yes                Review of Systems:   A comprehensive review of systems was negative.        Vital Signs:    Last 24hrs VS reviewed since prior progress note. Most recent are:  Visit Vitals    BP (!) 142/93    Pulse 81    Temp 99 °F (37.2 °C)    Resp 18    Ht 5' 6\" (1.676 m)    Wt 80.6 kg (177 lb 11.1 oz)    SpO2 100%    BMI 28.68 kg/m2         Intake/Output Summary (Last 24 hours) at 03/11/17 0930  Last data filed at 03/11/17 0800   Gross per 24 hour   Intake          6724.24 ml   Output              235 ml   Net          6489.24 ml        Physical Examination:             Constitutional:  Intubated. ENT:   Neck supple,    Resp:  Intubated, Present  Wheezing bilaterally. Scattered course crackles. No rales. CV:  Regular rhythm, normal rate, no murmurs, gallops, rubs    GI:  Soft, non distended,  normoactive bowel sounds, no hepatosplenomegaly     Musculoskeletal:  No edema, warm, 2+ pulses throughout. SCDs on. Neurologic:  sedated. Data Review:    Review and/or order of clinical lab test      Labs:     Recent Labs      03/11/17   0435  03/10/17   0055   WBC  6.9  3.5*   HGB  9.4*  9.1*   HCT  31.1*  29.8*   PLT  67*  80*     Recent Labs      03/11/17   0435  03/10/17   0055  03/09/17   0323   NA  135*  140  137   K  4.4  4.4  4.7   CL  102  106  103   CO2  22  31  31   BUN  28*  27*  30*   CREA  1.88*  1.09*  0.96   GLU  222*  54*  27*   CA  8.1*  8.0*  8.5   MG  1.9   --   2.3   PHOS  3.2   --   4.4     Recent Labs      03/11/17   0435  03/10/17   0055  03/09/17   0323  03/08/17   1945   SGOT  384*  50*  26  24   ALT  121*  22  14  16   AP  63  65  70  87   TBILI  1.8*  0.9  1.0  0.8   TP  6.4  6.4  7.0  6.9   ALB  3.0*  3.0*  3.4*  3.3*   GLOB  3.4  3.4  3.6  3.6   LPSE   --    --    --   100     Recent Labs      03/11/17   0435  03/10/17   0055  03/09/17   0323   INR  2.2*  5.5*  4.4*   PTP  22.0*  58.7*  46.7*      No results for input(s): FE, TIBC, PSAT, FERR in the last 72 hours.    Lab Results   Component Value Date/Time    Folate 14.5 03/09/2017 03:23 AM      No results for input(s): PH, PCO2, PO2 in the last 72 hours.   Recent Labs      03/09/17   1250   TROIQ  0.11*     Lab Results   Component Value Date/Time    Cholesterol, total 138 09/28/2011 09:13 AM    HDL Cholesterol 50 09/28/2011 09:13 AM    LDL, calculated 74 09/28/2011 09:13 AM    Triglyceride 69 09/28/2011 09:13 AM     Lab Results   Component Value Date/Time    Glucose (POC) 210 03/11/2017 05:39 AM    Glucose (POC) 130 03/10/2017 09:58 PM    Glucose (POC) 115 03/10/2017 05:46 PM    Glucose (POC) 147 03/10/2017 02:43 PM    Glucose (POC) 103 03/10/2017 09:46 AM     Lab Results   Component Value Date/Time    Color RED 03/10/2017 02:30 AM    Appearance BLOODY 03/10/2017 02:30 AM    Specific gravity 1.020 03/10/2017 02:30 AM    Specific gravity 1.016 03/08/2017 08:54 PM    pH (UA) 6.0 03/10/2017 02:30 AM    Protein 100 03/10/2017 02:30 AM    Glucose NEGATIVE  03/10/2017 02:30 AM    Ketone NEGATIVE  03/10/2017 02:30 AM    Bilirubin NEGATIVE  03/10/2017 02:30 AM    Urobilinogen 1.0 03/10/2017 02:30 AM    Nitrites NEGATIVE  03/10/2017 02:30 AM    Leukocyte Esterase TRACE 03/10/2017 02:30 AM    Epithelial cells FEW 03/10/2017 02:30 AM    Bacteria NEGATIVE  03/10/2017 02:30 AM    WBC 20-50 03/10/2017 02:30 AM    RBC >100 03/10/2017 02:30 AM         Medications Reviewed:     Current Facility-Administered Medications   Medication Dose Route Frequency    sodium bicarbonate (8.4%) 100 mEq in dextrose 5% 1,000 mL infusion   IntraVENous CONTINUOUS    sodium chloride (NS) flush 10 mL  10 mL InterCATHeter Q24H    sodium chloride (NS) flush 10 mL  10 mL InterCATHeter PRN    sodium chloride (NS) flush 10-40 mL  10-40 mL InterCATHeter Q8H    alteplase (CATHFLO) 1 mg in sterile water (preservative free) 1 mL injection  1 mg InterCATHeter PRN    bacitracin 500 unit/gram packet 1 Packet  1 Packet Topical PRN    0.9% sodium chloride infusion 250 mL  250 mL IntraVENous PRN    ondansetron (ZOFRAN) injection 4 mg  4 mg IntraVENous Q8H PRN    propofol (DIPRIVAN) 10 mg/mL injection  propofol (DIPRIVAN) infusion  5-50 mcg/kg/min IntraVENous TITRATE    propofol (DIPRIVAN) 10 mg/mL injection        NOREPINephrine (LEVOPHED) 8,000 mcg in dextrose 5% 250 mL infusion  2-30 mcg/min IntraVENous TITRATE    PHENYLephrine (NEOSYNEPHRINE) 30,000 mcg in 0.9% sodium chloride 250 mL infusion   mcg/min IntraVENous TITRATE    dextrose 10% infusion  75 mL/hr IntraVENous CONTINUOUS    vancomycin (VANCOCIN) 1,000 mg in 0.9% sodium chloride (MBP/ADV) 250 mL  1,000 mg IntraVENous Q18H    glucose chewable tablet 16 g  4 Tab Oral PRN    dextrose (D50W) injection syrg 12.5-25 g  12.5-25 g IntraVENous PRN    glucagon (GLUCAGEN) injection 1 mg  1 mg IntraMUSCular PRN    sodium chloride (NS) flush 5-10 mL  5-10 mL IntraVENous Q8H    sodium chloride (NS) flush 5-10 mL  5-10 mL IntraVENous PRN    vancomycin dosing per pharmacy   Other Rx Dosing/Monitoring    pantoprazole (PROTONIX) 40 mg in sodium chloride 0.9 % 10 mL injection  40 mg IntraVENous Q24H    piperacillin-tazobactam (ZOSYN) 3.375 g in 0.9% sodium chloride (MBP/ADV) 100 mL  3.375 g IntraVENous Q8H     ______________________________________________________________________  EXPECTED LENGTH OF STAY: 3d 4h  ACTUAL LENGTH OF STAY:          3                 Jasmeet Elkins MD

## 2017-03-11 NOTE — CONSULTS
[facility] ([facility])         Nephrology and Hypertension         Patient seen and examined. Full consult dictated ([id number])    ASSESSMENT:  JUAN- likely ATN in the setting of hypotension/also recent IV Contrast on 3/8. Background of sepsis/CHF. AIN would be diagnosis of exclusion. Obstruction unlikely  Chronic systolic CHF (EF 92%)/HSR pleural effusions/enlarged LA/RV Systolic HTN/Mod MR  Fluid overload- has peripheral edema, ascites, pleural effusion. Wt is up by 12 kg since admit and IOs are also 10 L positive. Sepsis- ?source  Acidosis- resolved  Elevated LFTs- likely ischemic hepatitis    PLAN:  Ct IVF- but reduce HCO3 drip to 50 ml/hr  Pressors to keep MAP >65 mm Hg. Maxi being weaned off; on Levo  Start IV Bumex 1 mg BID  Monitor IOs, avoid nephrotoxins  ABx per primary team    D/W pts family and RN  Thanks for Renal consult. We will follow patient with you.     Signed by:  Trice Pride MD  Nephrology and HTN

## 2017-03-11 NOTE — PROGRESS NOTES
Intubation Note    Called to bedside secondary to  respiratory failure. Patient pre-oxygenated with 100% oxygen. Smooth RSI with Etomidate 10 mg + Succinylcholine 60 mg IV. DVL x 1    7.0, 7.5 ETT taped and secured at 22 cm at the teeth.    + Bilateral BS, + Chest rise, + ETCO2    VSS. CXR pending.     Care turned over to covering hospitalist.

## 2017-03-11 NOTE — PROGRESS NOTES
Pulmonary    3/11  Seen and examined earlier today. Intubated overnight with worsening resp failure. In net +++ fluid balance. Resp acidosis has improved. Has been on bicarb drip which will be stopped. Pressors are being weaned down and she will need diuretic soon. Vent bundle and bronchodilators will be started. Needs repeat blood cultures. On antibiotics.      3/10  Remains on pressors with Maxi  BS continue to fall despite D10 (cortisol level was OK)  Echo with EF 30% concern for vegetation on aortic valve - blood cx negative so far  Ammonia elevated  INR higher today - vitamin K given - no obvious bleeding  Had to go back on warming blanket over night    Patient Vitals for the past 4 hrs:   BP Temp Pulse Resp SpO2   17 1417 - - 84 18 100 %   17 1416 - - - - 100 %   17 1200 - 99.2 °F (37.3 °C) - - -   17 1100 124/79 - 71 18 100 %   Temp (24hrs), Av.9 °F (36.6 °C), Min:97.2 °F (36.2 °C), Max:99.2 °F (37.3 °C)      Intake/Output Summary (Last 24 hours) at 17 1433  Last data filed at 17 1400   Gross per 24 hour   Intake          6123.06 ml   Output              595 ml   Net          5528.06 ml       She appears in no distress  diminshed bs at bases  RRR   Soft warm and dry    Recent Labs      17   0435  03/10/17   0055  17   0323   WBC  6.9  3.5*  1.4*   HGB  9.4*  9.1*  9.4*   HCT  31.1*  29.8*  30.7*   PLT  67*  80*  113*     Recent Labs      17   0435  03/10/17   0055  17   0323   NA  135*  140  137   K  4.4  4.4  4.7   CL  102  106  103   CO2  22  31  31   GLU  222*  54*  27*   BUN  28*  27*  30*   CREA  1.88*  1.09*  0.96   CA  8.1*  8.0*  8.5   MG  1.9   --   2.3   PHOS  3.2   --   4.4   ALB  3.0*  3.0*  3.4*   SGOT  384*  50*  26   ALT  121*  22  14   INR  2.2*  5.5*  4.4*       Recent Labs      17   1250   TROIQ  0.11*     All Micro Results     Procedure Component Value Units Date/Time    CULTURE, BLOOD, PAIRED [[id number]] Collected: 03/11/17 1235    Order Status:  Completed Specimen:  Blood Updated:  03/11/17 1338    CULTURE, BLOOD [[id number]]     Order Status:  Sent Specimen:  Blood from Blood     CULTURE, BLOOD [[id number]]     Order Status:  Sent Specimen:  Blood from Blood     CULTURE, URINE [[id number]] Collected:  03/10/17 0230    Order Status:  Completed Specimen:  Urine Updated:  03/11/17 0711     Special Requests: --        NO SPECIAL REQUESTS  Reflexed from Fausto Colindres <1,000 COLONIES/mL        Culture result: NO GROWTH 1 DAY       CULTURE, BLOOD, PAIRED [[id number]] Collected:  03/08/17 1945    Order Status:  Completed Specimen:  Blood Updated:  03/11/17 0624     Special Requests: NO SPECIAL REQUESTS        Culture result: NO GROWTH 3 DAYS           Impression  1. Acute hypoxic and hypercarbic miko failure, intubated 3/10  2. Shock - suspect septic / cardiogenic   3. EF 06% - systolic CHF - concern for possible vegetation on aortic valve - blood cx no growth so far  4. Hypoglycemia on D10 secondary to sepsis and liver insufficiency  5. Liver insufficiency - ? From chronic heart failure - cirrhotic appearing liver  6. IDDM - hx off meds on D10  7. afib was on coumadin - INR > 5 - vitamin K given    -- VACV, vent bundle  -- continue abx and follow up blood cx  --D10  -- DC bicarb  --diuresis per cardiology  --wean Maxi as able  --Palliative care eval-- at age 80 with multisystem organ failure, chances of survival will be poor   -- Critically ill, at risk for further decline.  CCT 35'    Macarena Van MD

## 2017-03-12 NOTE — PROGRESS NOTES
Cardiology progress note    IMPRESSION:   1: Acute on chronic CHF, systolic, NYHA III/IV  Respiratory failure. Continue IV diuresis. Dr. Poon [place] to follow. 2: Cardiomyopathy, non-ischemic, valvular heart disease s/p Mitral annuloplasty  Echo 3/9: LVEF 30%, BiAE, mod MR, mod TR and mild pulm HTN, pleural effusion. AF, BiVICD, anticoagulated, high INR (?from sepsis)  3: Hypotension, presumably from sepsis, possible cardiogenic element. 4: Hypothermia without history of exposure, ?sepsis, no obvious source, ?recurrent off Lizzy hugger? 5: Pancytopenia, ?sepsis      Possible vegetation on aortic valve. Blood cx have been negative  6: DM  7: Abdominal pain POA, ?ascites, ?other primary GI problem? Subjective:      Intubated, sedated       Objective:    Physical Exam:    Visit Vitals    BP 95/66    Pulse 82    Temp 97.5 °F (36.4 °C)    Resp 14    Ht 5' 6\" (1.676 m)    Wt 186 lb 4.6 oz (84.5 kg)    SpO2 98%    BMI 30.07 kg/m2      Cardiovascular: no murmur    Lungs: vent, decreased BS bases    Abdomen: distended, non tender    Extremities: moderate pitting edema      Laboratory and Imaging have been personally reviewed and are notable for:    EKG: Telem V-paced    X Ray: persistent effusions and vascular congestion    Labs:    Recent Labs      03/12/17   0330   NA  140   K  3.1*   CL  104   BUN  26*   CREA  1.45*   GLU  181*   PHOS  1.6*   CA  8.0*     Recent Labs      03/12/17   0330   WBC  4.0   HGB  9.4*   HCT  29.9*   PLT  71*

## 2017-03-12 NOTE — PROGRESS NOTES
Hospitalist Progress Note  Erica Flannery MD  Office: [phone]        Date of Service:  3/12/2017  NAME:  Marcelo Anderson  :  1930  MRN:  [id number]      Admission Summary:   80-year-old female with history of chronic systolic congestive heart failure, atrial fibrillation, diabetes mellitus, hypertension, chronic anticoagulation use, history of pacemaker and ICD. She was sent from her primary care physician to the emergency room to be evaluated. History from the patient and her daughters at the bedside, also from the records. Per the daughter, she has been having on and off abdominal pain, epigastric pain for a while, over the last 3 days has been mostly pronounced. She said her pain at times is 9/10 in severity, it is nonradiating, associated with some nausea, but no vomiting. She denies any diarrhea. At her PCP's office,   they could not get her temperature, so they sent her to the emergency room. In the ER she has been hypothermic, her temperature was 91.4 on initial presentation. Also she was hypotensive. Her blood pressure   systolic was 82/62, so she was given about 1.5 liters of IV fluid, and now she is getting 1 bottle of albumin. She was placed on warming blanket, Lizzy Hugger. She denies any dysuria or frequency. Interval history / Subjective:   Pt intubated,  Sedated. Assessment & Plan:     -  Hypothermia and hypotension. Concern for sepsis. On empiric IV antibiotics, started on Zosyn and vancomycin. Followup on blood culture results NGSF. Off and on Lizzy Hugger for her hypothermia.     - Respiratory acidosis, hypercapneic respiratory failure:  Ph on ABg initial 7.0 with PCO2 >99. Continue mech vent. -  Pleural effusions, bilateral: improving on CXR, continue antibiotics, may be source of infection +/-  CHF volume overload.      - Sepsis:  Unknown source, most likely lung with bilateral pleural effusions, possibly infectious. She had caren of leukocytes to 1.9, improving. She was afebrile overnight, low BP requiring pressors. Weaning dunia, continuing levofed. Continue pip tazo, vanco. Monitor cultures, preliminary blood, urine cultures no growth so far. - Systolic congestive heart failure, chronic: NYHA III/IV. With fluid overload. Echo 3/9 with 30% EF, diffuse hypokinesis. Bumex added, strict I/O, monitor weight.   - History of pacer.   - Valvular disease: Moderate mitral regurgitation, right ventricular systolic dysfunction,   as well as enlarged left atrium.  -  Valvular vegetation on aortic valve, questionable: Blood cultures no growth preliminary, consider  AMMON. Cardiology consult appreciated. - Hematuria: may be related to traumatic galarza placement with INR max 5.5, today 2.2. Urine noted on UA as yellow on 3/8, Galarza placed 3/9 and hematuria reported 3/10 UA. -  JUAN:  From  ATN and contrast nephropathy most likely etiology. Ischemic heart disease, hypotension; and exposure to IV contrast. Appreciate nephro consult recommendations. Bumex, avoid nephrotoxins, I/Os,       - Hypoglycemia/diabetes mellitus. Resolved. Holding her Lantus and on hypoglycemia protocol. Her blood sugar improved after D50. Stable, continue on D10 IVF without hypoglycemia.     -   Ischemic hepatitis: Epigastric pain and tenderness. Consulted GI. Plans for EGD when stable or outpatient. Continue proton pump inhibitor for now. 4. Coagulopathy. Holding her Coumadin and monitor daily INR and restart her Coumadin when INR improved. INR increased, she received Vitamin K x 1. With hematuria will give FFP and monitor INR. 5. Chronic systolic congestive heart failure with anasarca. Lasix and BB were held because of low blood pressure. Cardiologist following, noted aortic valve finding on echo ? Bacterial endocarditis. Currently on abx will follow further recs. May need AMMON. BNP 1300    6. Pancytopenia. Appears to be chronic. However, patient and family unaware of her previous lab results. Will monitor her labs closely. 7. Cholelithiasis: No signs of obstruction or cholecystitis on her ultrasound scan. 8. For deep venous thrombosis prophylaxis, she is already   anticoagulated and her INR is 5.5.  9. Palliative care helping with care decisions. Code status: Full    Care Plan discussed with: Patient/Family and Nurse  Disposition: TBD     Hospital Problems  Date Reviewed: 3/8/2017          Codes Class Noted POA    * (Principal)Hypothermia ICD-10-CM: T68. XXXA  ICD-9-CM: 991.6  3/8/2017 Yes                Review of Systems:   A comprehensive review of systems was negative. Vital Signs:    Last 24hrs VS reviewed since prior progress note. Most recent are:  Visit Vitals    /62    Pulse 84    Temp 97.5 °F (36.4 °C)    Resp 16    Ht 5' 6\" (1.676 m)    Wt 84.5 kg (186 lb 4.6 oz)    SpO2 97%    BMI 30.07 kg/m2         Intake/Output Summary (Last 24 hours) at 03/12/17 1008  Last data filed at 03/12/17 0900   Gross per 24 hour   Intake          1758.95 ml   Output             4825 ml   Net         -3066.05 ml        Physical Examination:             Constitutional:  Intubated. ENT:   Neck supple,    Resp:  Intubated, Present  Wheezing, rhonchi bilaterally. No rales. CV:  Regular rhythm, normal rate, no murmurs, gallops, rubs    GI:  Soft, non distended,  normoactive bowel sounds, no hepatosplenomegaly     Musculoskeletal:  No edema, warm, 2+ pulses throughout. SCDs on. Neurologic:  sedated.              Data Review:    Review and/or order of clinical lab test      Labs:     Recent Labs      03/12/17 0330  03/11/17 0435   WBC  4.0  6.9   HGB  9.4*  9.4*   HCT  29.9*  31.1*   PLT  71*  67*     Recent Labs      03/12/17   0330  03/11/17   0435  03/10/17   0055   NA  140  135*  140   K  3.1*  4.4  4.4   CL  104  102  106   CO2  29  22  31   BUN  26*  28*  27*   CREA  1.45*  1.88*  1.09*   GLU  181*  222*  54*   CA 8. 0*  8.1*  8.0*   MG  1.8  1.9   --    PHOS  1.6*  3.2   --      Recent Labs      03/12/17 0330 03/11/17 0435  03/10/17   0055   SGOT  402*  384*  50*   ALT  172*  121*  22   AP  60  63  65   TBILI  2.5*  1.8*  0.9   TP  5.5*  6.4  6.4   ALB  2.4*  3.0*  3.0*   GLOB  3.1  3.4  3.4     Recent Labs      03/12/17   0330  03/11/17   0435  03/10/17   0055   INR  2.3*  2.2*  5.5*   PTP  23.1*  22.0*  58.7*      No results for input(s): FE, TIBC, PSAT, FERR in the last 72 hours. Lab Results   Component Value Date/Time    Folate 14.5 03/09/2017 03:23 AM      No results for input(s): PH, PCO2, PO2 in the last 72 hours.   Recent Labs      03/09/17   1250   TROIQ  0.11*     Lab Results   Component Value Date/Time    Cholesterol, total 138 09/28/2011 09:13 AM    HDL Cholesterol 50 09/28/2011 09:13 AM    LDL, calculated 74 09/28/2011 09:13 AM    Triglyceride 69 09/28/2011 09:13 AM     Lab Results   Component Value Date/Time    Glucose (POC) 191 03/12/2017 06:04 AM    Glucose (POC) 187 03/12/2017 12:09 AM    Glucose (POC) 224 03/11/2017 05:21 PM    Glucose (POC) 286 03/11/2017 11:33 AM    Glucose (POC) 210 03/11/2017 05:39 AM     Lab Results   Component Value Date/Time    Color RED 03/10/2017 02:30 AM    Appearance BLOODY 03/10/2017 02:30 AM    Specific gravity 1.020 03/10/2017 02:30 AM    Specific gravity 1.016 03/08/2017 08:54 PM    pH (UA) 6.0 03/10/2017 02:30 AM    Protein 100 03/10/2017 02:30 AM    Glucose NEGATIVE  03/10/2017 02:30 AM    Ketone NEGATIVE  03/10/2017 02:30 AM    Bilirubin NEGATIVE  03/10/2017 02:30 AM    Urobilinogen 1.0 03/10/2017 02:30 AM    Nitrites NEGATIVE  03/10/2017 02:30 AM    Leukocyte Esterase TRACE 03/10/2017 02:30 AM    Epithelial cells FEW 03/10/2017 02:30 AM    Bacteria NEGATIVE  03/10/2017 02:30 AM    WBC 20-50 03/10/2017 02:30 AM    RBC >100 03/10/2017 02:30 AM         Medications Reviewed:     Current Facility-Administered Medications   Medication Dose Route Frequency    vancomycin (VANCOCIN) 1,000 mg in 0.9% sodium chloride (MBP/ADV) 250 mL  1,000 mg IntraVENous Q24H    albuterol-ipratropium (DUO-NEB) 2.5 MG-0.5 MG/3 ML  3 mL Nebulization Q6H RT    chlorhexidine (PERIDEX) 0.12 % mouthwash 15 mL  15 mL Oral Q12H    polyvinyl alcohol (LIQUIFILM TEARS) 1.4 % ophthalmic solution 2 Drop  2 Drop Both Eyes TID    bumetanide (BUMEX) injection 1 mg  1 mg IntraVENous BID    sodium chloride (NS) flush 10 mL  10 mL InterCATHeter Q24H    sodium chloride (NS) flush 10 mL  10 mL InterCATHeter PRN    sodium chloride (NS) flush 10-40 mL  10-40 mL InterCATHeter Q8H    alteplase (CATHFLO) 1 mg in sterile water (preservative free) 1 mL injection  1 mg InterCATHeter PRN    bacitracin 500 unit/gram packet 1 Packet  1 Packet Topical PRN    0.9% sodium chloride infusion 250 mL  250 mL IntraVENous PRN    ondansetron (ZOFRAN) injection 4 mg  4 mg IntraVENous Q8H PRN    propofol (DIPRIVAN) infusion  5-50 mcg/kg/min IntraVENous TITRATE    NOREPINephrine (LEVOPHED) 8,000 mcg in dextrose 5% 250 mL infusion  2-30 mcg/min IntraVENous TITRATE    PHENYLephrine (NEOSYNEPHRINE) 30,000 mcg in 0.9% sodium chloride 250 mL infusion   mcg/min IntraVENous TITRATE    dextrose 10% infusion  30 mL/hr IntraVENous CONTINUOUS    glucose chewable tablet 16 g  4 Tab Oral PRN    dextrose (D50W) injection syrg 12.5-25 g  12.5-25 g IntraVENous PRN    glucagon (GLUCAGEN) injection 1 mg  1 mg IntraMUSCular PRN    sodium chloride (NS) flush 5-10 mL  5-10 mL IntraVENous Q8H    sodium chloride (NS) flush 5-10 mL  5-10 mL IntraVENous PRN    vancomycin dosing per pharmacy   Other Rx Dosing/Monitoring    pantoprazole (PROTONIX) 40 mg in sodium chloride 0.9 % 10 mL injection  40 mg IntraVENous Q24H    piperacillin-tazobactam (ZOSYN) 3.375 g in 0.9% sodium chloride (MBP/ADV) 100 mL  3.375 g IntraVENous Q8H     ______________________________________________________________________  EXPECTED LENGTH OF STAY: 3d 4h  ACTUAL LENGTH OF STAY:          4                 Anton Gross MD

## 2017-03-12 NOTE — PROGRESS NOTES
1930 - Bedside and Verbal shift change report given to Litzy Gonzalez RN (oncoming nurse) by Briana Salgado RN (offgoing nurse). Report included the following information SBAR, Kardex, OR Summary, Procedure Summary, Intake/Output, MAR, Recent Results and Cardiac Rhythm Paced. Shift Report  Pt intubated, light propofol sedation, not alert, withdrawals from pain. Dual chamber paced with frequent missed capture of A-pace. Hypotension being maintained MAP>65 with levophed.   Pt being diuresed, galarza with adequate output between 100-500/hr.     2100 - pt son called, updated him on pt condition  2311 - 7 beat run of [employer]  2325 - pt daughter called, updated her on pt condition  0130 - 6 beat run of [employer]

## 2017-03-12 NOTE — PROGRESS NOTES
0730- Bedside and Verbal shift change report given to Sanjeev Ward 90 (oncoming nurse) by Meryle Loupe RN (offgoing nurse). Report included the following information SBAR, Kardex, Intake/Output, MAR, Recent Results and Cardiac Rhythm paced. 2 rings from left hand given to patient's daughter Kevin to take home dt swelling  1930- Bedside and Verbal shift change report given to Lara Law 8 (oncoming nurse) by Vince Dyer RN (offgoing nurse). Report included the following information SBAR, Kardex, ED Summary, Intake/Output, MAR, Recent Results and Cardiac Rhythm paced.

## 2017-03-12 NOTE — PROGRESS NOTES
Pulmonary    3/12  Seen and examined earlier today. Failed SBT. ABG with combined resp and metabolic alkalosis    Seen and examined earlier today. Intubated overnight with worsening resp failure. In net +++ fluid balance. Resp acidosis has improved. Has been on bicarb drip which will be stopped. Pressors are being weaned down and she will need diuretic soon. Vent bundle and bronchodilators will be started. Needs repeat blood cultures. On antibiotics.      3/10  Remains on pressors with Maxi  BS continue to fall despite D10 (cortisol level was OK)  Echo with EF 30% concern for vegetation on aortic valve - blood cx negative so far  Ammonia elevated  INR higher today - vitamin K given - no obvious bleeding  Had to go back on warming blanket over night    Patient Vitals for the past 4 hrs:   BP Temp Pulse Resp SpO2   17 1200 90/70 97.1 °F (36.2 °C) 82 14 100 %   17 1113 - - 82 14 98 %   17 1100 95/66 - 80 16 97 %   17 1000 103/70 - 84 16 100 %   17 0900 104/62 - 84 16 97 %   Temp (24hrs), Av °F (36.7 °C), Min:97.1 °F (36.2 °C), Max:99 °F (37.2 °C)      Intake/Output Summary (Last 24 hours) at 17 1235  Last data filed at 17 1200   Gross per 24 hour   Intake          1511.33 ml   Output             5680 ml   Net         -4168.67 ml       She appears in no distress  diminshed bs at bases  RRR   Soft warm and dry    Recent Labs      17   0330  17   0435  03/10/17   0055   WBC  4.0  6.9  3.5*   HGB  9.4*  9.4*  9.1*   HCT  29.9*  31.1*  29.8*   PLT  71*  67*  80*     Recent Labs      17   0330  17   0435  03/10/17   0055   NA  140  135*  140   K  3.1*  4.4  4.4   CL  104  102  106   CO2  29  22  31   GLU  181*  222*  54*   BUN  26*  28*  27*   CREA  1.45*  1.88*  1.09*   CA  8.0*  8.1*  8.0*   MG  1.8  1.9   --    PHOS  1.6*  3.2   --    ALB  2.4*  3.0*  3.0*   SGOT  402*  384*  50*   ALT  172*  121*  22   INR  2.3*  2.2*  5.5*       Recent Labs 03/12/17   0330  03/09/17   1250   TROIQ  0.26*  0.11*     All Micro Results     Procedure Component Value Units Date/Time    CULTURE, BLOOD, PAIRED [[id number]] Collected:  03/11/17 1235    Order Status:  Completed Specimen:  Blood Updated:  03/12/17 0710     Special Requests: NO SPECIAL REQUESTS        Culture result: NO GROWTH AFTER 17 HOURS       CULTURE, BLOOD, PAIRED [[id number]] Collected:  03/08/17 1945    Order Status:  Completed Specimen:  Blood Updated:  03/12/17 0710     Special Requests: NO SPECIAL REQUESTS        Culture result: NO GROWTH 4 DAYS       CULTURE, BLOOD [[id number]]     Order Status:  Sent Specimen:  Blood from Blood     CULTURE, BLOOD [[id number]]     Order Status:  Sent Specimen:  Blood from Blood     CULTURE, URINE [[id number]] Collected:  03/10/17 0230    Order Status:  Completed Specimen:  Urine Updated:  03/11/17 0711     Special Requests: --        NO SPECIAL REQUESTS  Reflexed from Fausto Babb [address] <1,000 COLONIES/mL        Culture result: NO GROWTH 1 DAY           Impression  1. Acute hypoxic and hypercarbic miko failure, intubated 3/10  2. Shock - suspect septic / cardiogenic   3. EF 92% - systolic CHF - concern for possible vegetation on aortic valve - blood cx no growth so far  4. Hypoglycemia on D10 secondary to sepsis and liver insufficiency  5. Liver insufficiency - ? From chronic heart failure - cirrhotic appearing liver  6. IDDM - hx off meds on D10  7. afib was on coumadin - INR > 5 - vitamin K given    -- VACV, vent bundle  -- continue abx and follow up blood cx  -- D10  -- DC bicarb  --diuresis per cardiology  -- Diamox today  --wean pressors as able  --Palliative care eval-- at age 80 with multisystem organ failure, chances of survival will be poor   -- Critically ill, at risk for further decline.  CCT 35'    Eric Lopes MD

## 2017-03-12 NOTE — PROGRESS NOTES
Day #5 of Vancomycin  Indication:  Sepsis (hypotensive and hypothermia)  Current regimen:  1000 mg q 18 hours (on hold, last given 3/11 @1038)  Abx regimen:  Vancomycin + pipercillin/tazobactam  ID Following?: NO  Concomitant nephrotoxic drugs (requires more frequent monitoring): Vasopressors, IV contrast (3/8)  Frequency of BMP?: Every other day    Recent Labs      17   0330  17   0435  03/10/17   0055   WBC  4.0  6.9  3.5*   CREA  1.45*  1.88*  1.09*   BUN  26*  28*  27*     Est CrCl: ~30 ml/min; UO: >1 ml/kg/hr  Temp (24hrs), Av.6 °F (37 °C), Min:97.8 °F (36.6 °C), Max:99.2 °F (37.3 °C)    Cultures:   3/8 blood: NGTD  3/10 urine: NG, final  3/11 blood: NGTD    Goal trough = 15 - 20 mcg/mL    Recent trough history (date/time/level/dose/action taken):   3/12 @0330 = 20.9 mcg/mL (~17 hours post dose, extrapolated ~20.2 mcg/mL) on 1000 mg IV Q18H    Plan: Will adjust to vancomycin 1000 mg IV every 24 hours. Pharmacy will continue to monitor daily and adjust as necessary.       Mita Dobbs, PharmD, [address], BCPS

## 2017-03-12 NOTE — PROGRESS NOTES
Name: Carissa Doe   MRN: [id number]  : 1930      ASSESSMENT:  JUAN- likely ATN in the setting of hypotension/also recent IV Contrast on 3/8. Background of sepsis/CHF. Chronic systolic CHF (EF 73%)/TQG pleural effusions/enlarged LA/RV Systolic HTN/Mod MR  Fluid overload- has peripheral edema, ascites, pleural effusion. Wt is up by 12 kg since admit and IOs are also 10 L positive. Sepsis- ?source  Mixed resp acidosis/Met alkalosis  Elevated LFTs- likely ischemic hepatitis    JUAN is improving. She is diuresing well. Low K from diuresis. Alkalotic from previous HCO3 drip and some from contraction alkalosis/Compensation from resp acidosis. LFTs still rising.     PLAN:  Ct IV Bumex 2 mg BID  Replace KCL- may need more. Told RN to give 20 meq extra (total 60 meq today)  Monitor IOs, avoid nephrotoxins  ABx per primary team  Diamox ordered by PCCM- will mostly need briefly    Subjective: Intubated. Sedated.      ROS:   UTO    Exam:  Visit Vitals    BP 90/70    Pulse 82    Temp 97.1 °F (36.2 °C)    Resp 14    Ht 5' 6\" (1.676 m)    Wt 84.5 kg (186 lb 4.6 oz)    SpO2 100%    BMI 30.07 kg/m2     Intubated, sedated  +ETT  Dec BS at bases  RRR  Soft, distended, NT  +edema  Richardson+    Current Facility-Administered Medications   Medication Dose Route Frequency Last Dose    vancomycin (VANCOCIN) 1,000 mg in 0.9% sodium chloride (MBP/ADV) 250 mL  1,000 mg IntraVENous Q24H 1,000 mg at 17 1027    potassium chloride 20 mEq in 50 ml IVPB  20 mEq IntraVENous Q1H 20 mEq at 17 1236    potassium chloride 20 mEq in 50 ml IVPB  20 mEq IntraVENous ONCE      albuterol-ipratropium (DUO-NEB) 2.5 MG-0.5 MG/3 ML  3 mL Nebulization Q6H RT 3 mL at 17 0727    chlorhexidine (PERIDEX) 0.12 % mouthwash 15 mL  15 mL Oral Q12H 15 mL at 17 0802    polyvinyl alcohol (LIQUIFILM TEARS) 1.4 % ophthalmic solution 2 Drop  2 Drop Both Eyes TID 2 Drop at 03/12/17 0802    bumetanide (BUMEX) injection 1 mg  1 mg IntraVENous BID 1 mg at 03/12/17 1027    sodium chloride (NS) flush 10 mL  10 mL InterCATHeter Q24H 10 mL at 03/12/17 1027    sodium chloride (NS) flush 10 mL  10 mL InterCATHeter PRN      sodium chloride (NS) flush 10-40 mL  10-40 mL InterCATHeter Q8H 10 mL at 03/12/17 0511    alteplase (CATHFLO) 1 mg in sterile water (preservative free) 1 mL injection  1 mg InterCATHeter PRN      bacitracin 500 unit/gram packet 1 Packet  1 Packet Topical PRN      0.9% sodium chloride infusion 250 mL  250 mL IntraVENous PRN      ondansetron (ZOFRAN) injection 4 mg  4 mg IntraVENous Q8H PRN 4 mg at 03/10/17 1526    propofol (DIPRIVAN) infusion  5-50 mcg/kg/min IntraVENous TITRATE 10 mcg/kg/min at 03/12/17 0829    NOREPINephrine (LEVOPHED) 8,000 mcg in dextrose 5% 250 mL infusion  2-30 mcg/min IntraVENous TITRATE 6 mcg/min at 03/12/17 1124    PHENYLephrine (NEOSYNEPHRINE) 30,000 mcg in 0.9% sodium chloride 250 mL infusion   mcg/min IntraVENous TITRATE Stopped at 03/11/17 1648    dextrose 10% infusion  30 mL/hr IntraVENous CONTINUOUS 30 mL/hr at 03/12/17 1110    glucose chewable tablet 16 g  4 Tab Oral PRN      dextrose (D50W) injection syrg 12.5-25 g  12.5-25 g IntraVENous PRN 12.5 g at 03/10/17 0537    glucagon (GLUCAGEN) injection 1 mg  1 mg IntraMUSCular PRN      sodium chloride (NS) flush 5-10 mL  5-10 mL IntraVENous Q8H 10 mL at 03/12/17 0512    sodium chloride (NS) flush 5-10 mL  5-10 mL IntraVENous PRN      vancomycin dosing per pharmacy   Other Rx Dosing/Monitoring      pantoprazole (PROTONIX) 40 mg in sodium chloride 0.9 % 10 mL injection  40 mg IntraVENous Q24H 40 mg at 03/12/17 0013    piperacillin-tazobactam (ZOSYN) 3.375 g in 0.9% sodium chloride (MBP/ADV) 100 mL  3.375 g IntraVENous Q8H 3.375 g at 03/12/17 6528       Labs/Data:    Lab Results   Component Value Date/Time    WBC 4.0 03/12/2017 03:30 AM    HGB 9.4 03/12/2017 03:30 AM    HCT 29.9 03/12/2017 03:30 AM    PLATELET 71 59/92/0395 03:30 AM    MCV 70.2 03/12/2017 03:30 AM       Lab Results   Component Value Date/Time    Sodium 140 03/12/2017 03:30 AM    Potassium 3.1 03/12/2017 03:30 AM    Chloride 104 03/12/2017 03:30 AM    CO2 29 03/12/2017 03:30 AM    Anion gap 7 03/12/2017 03:30 AM    Glucose 181 03/12/2017 03:30 AM    BUN 26 03/12/2017 03:30 AM    Creatinine 1.45 03/12/2017 03:30 AM    BUN/Creatinine ratio 18 03/12/2017 03:30 AM    GFR est AA 41 03/12/2017 03:30 AM    GFR est non-AA 34 03/12/2017 03:30 AM    Calcium 8.0 03/12/2017 03:30 AM       Wt Readings from Last 3 Encounters:   03/12/17 84.5 kg (186 lb 4.6 oz)   01/10/17 66.3 kg (146 lb 3.2 oz)   01/04/17 68 kg (150 lb)         Intake/Output Summary (Last 24 hours) at 03/12/17 1312  Last data filed at 03/12/17 1200   Gross per 24 hour   Intake          1400.33 ml   Output             5680 ml   Net         -4279.67 ml       Patient seen and examined. Chart reviewed. Labs, data and other pertinent notes reviewed in last 24 hrs.     PMH/SH/FH reviewed and unchanged compared to H&P    Discussed with MECHELLE Fleming MD

## 2017-03-13 NOTE — CONSULTS
Palliative Medicine Consult  [place]: [phone] ([phone])    Patient Name: Buddy Sender  YOB: 1930    Date of Initial Consult: 3/13/17  Reason for Consult: Care decisions   Requesting Provider: Rolf  Primary Care Physician: Akanksha Riggins MD      SUMMARY:   Buddy Sender \"Warner\" is a 80 y.o. with a past history of systolic CHF (EF 77% on 4/5/61), AICD, DM, HTN, a fib who was admitted on 3/8/2017 from her PCP office w/ epigastric pain and nausea, in ED was hypothermic to 91.4 degrees and hypotensive. Remains in ICU on empiric abx, echo concerning for possible aortic valve vegetation but BCx x 3 NGTD. May need AMMON if stable. Being diuresed. CT abd showing gallstones, no  Cholecystitis obstruction. GI has signed off but plan on seeing for EGD when stable. Pt was intubated 3/10-3/12, had large mucous plug. Renal following for JUAN. Current medical issues leading to Palliative Medicine involvement include:care decisions. Social: Pt was 1 of 13 children, she has 7 children and lots of grandchildren. Very involved in the [facility] in [place] where she lives and is very loved. Before admission was alert/oriented and high functioning. No AMD but family tends to defer to Kwesi Stephens ([phone]) (dtr). PALLIATIVE DIAGNOSES:   1. Shortness of breath on BIPAP  2. Lethargy   3. Hypothermia and hypotension   4. Edema        PLAN:   1. Pt unable to give hx. Speak to granddtr Salina Fleming first and then dtr Tia Hair is Coby's dtr). 2. LCSW [place] helped to set up a meeting and discussed palliative medicine as a service on Friday- however speaking to Salina Fleming, there is concern within the family about meeting w/ our team because they want to have more medical information first. Christoph Gaytanuge her that my goal is not to have any decisions made, but that I think it would be helpful to meet since there are so many specialists and so many family members.  She states that family is getting good info from the current doctors, but at the same time states that everyone is hearing from different doctors since they are here at different times. 3. Meet w/ Man Bobo and discuss the same things- she is more open to meeting, because although there are still unknowns she realizes that her mother is very sick. Spoke to araseli Armijo about care decisions and code status- did not think that pt was sick enough to talk about DNR status. Explain that there are many unknowns still and we are hoping for recovery, but she has chronic conditions and mult organ systems affected. Feel that it is helpful to talk about the \"what ifs\". Man Bobo is feeling a bit burdened with dispersing information, agrees that somethings get lost in communication. 4. Support provided. Man Bobo and family having a hard time grasping why pt had such a sudden decline. 5. Family meeting today canceled per family request, but especially Man Bobo seems open to meeting. They have my contact info and can reach out, otherwise I will continue to follow closely and see if a meeting can occur later this week. 6. Initial consult note routed to primary continuity provider  7. Will cont to follow. 8. Communicated plan of care with: Palliative IDT; Deandre Escalera RN and ICU team        GOALS OF CARE / TREATMENT PREFERENCES:   [====Goals of Care====]  GOALS OF CARE:  Patient / health care proxy stated goals: recovery as possible       TREATMENT PREFERENCES:   Code Status: Full Code    Advance Care Planning:  Advance Care Planning 3/10/2017   Patient's Healthcare Decision Maker is: Legal Next of Kin   Confirm Advance Directive None   Patient Would Like to Complete Advance Directive No       Other:    The palliative care team has discussed with patient / health care proxy about goals of care / treatment preferences for patient.  [====Goals of Care====]    Family incl dtrs Mariya Francois, Davian Kim and bessie Martinez.      HISTORY:     History obtained from: family, staff, chart     CHIEF COMPLAINT: Cannot obtain due to patient factors    HPI/SUBJECTIVE:    The patient is:   [] Verbal and participatory  [x] Non-participatory due to: medical condition     Pt on BIPAP, not answering questions. Clinical Pain Assessment (nonverbal scale for severity on nonverbal patients):   [++++ Clinical Pain Assessment++++]  [++++Pain Severity++++]: Pain: 0  [++++Pain Character++++]:   [++++Pain Duration++++]:   [++++Pain Effect++++]:   [++++Pain Factors++++]:   [++++Pain Frequency++++]:   [++++Pain Location++++]:   [++++ Clinical Pain Assessment++++]     FUNCTIONAL ASSESSMENT:     Palliative Performance Scale (PPS):  PPS: 30       PSYCHOSOCIAL/SPIRITUAL SCREENING:     Advance Care Planning:  Advance Care Planning 3/10/2017   Patient's Healthcare Decision Maker is: Legal Next of Kin   Confirm Advance Directive None   Patient Would Like to Complete Advance Directive No        Any spiritual / Confucianist concerns:  [] Yes /  [x] No    Caregiver Burnout:  [] Yes /  [x] No /  [] No Caregiver Present      Anticipatory grief assessment:   [x] Normal  / [] Maladaptive       ESAS Anxiety:   Cannot obtain due to patient factors    ESAS Depression:   Cannot obtain due to patient factors       REVIEW OF SYSTEMS:     Positive and pertinent negative findings in ROS are noted above in HPI. The following systems were [] reviewed / [x] unable to be reviewed as noted in HPI  Other findings are noted below. Systems: constitutional, ears/nose/mouth/throat, respiratory, gastrointestinal, genitourinary, musculoskeletal, integumentary, neurologic, psychiatric, endocrine. Positive findings noted below.   Modified ESAS Completed by: provider   Fatigue: 8 Drowsiness: 8     Pain: 0           Dyspnea: 0           Stool Occurrence(s): 1        PHYSICAL EXAM:     From RN flowsheet:  Wt Readings from Last 3 Encounters:   03/13/17 176 lb 5.9 oz (80 kg)   01/10/17 146 lb 3.2 oz (66.3 kg)   01/04/17 150 lb (68 kg) Blood pressure 96/60, pulse 80, temperature 97.3 °F (36.3 °C), resp. rate 24, height 5' 6\" (1.676 m), weight 176 lb 5.9 oz (80 kg), SpO2 96 %. Pain Scale 1: Adult Nonverbal Pain Scale  Pain Intensity 1: 0     Pain Location 1: Abdomen  Pain Orientation 1: Mid     Pain Intervention(s) 1: Medication (see MAR)    Constitutional: lethargic, opens eyes   Eyes: pupils equal, anicteric  ENMT: no nasal discharge, moist mucous membranes  Cardiovascular: regular rhythm  Respiratory: breathing not labored, on BIPAP  Gastrointestinal: a bit tense, +BS  Musculoskeletal: no deformity, no tenderness to palpation  Skin: warm, dry  Neurologic:lethargic        HISTORY:     Principal Problem:    Hypothermia (3/8/2017)      Past Medical History:   Diagnosis Date    Atrial fibrillation (HCC)     Chronic systolic heart failure (HCC)     ef 35-40%; hold ACE due to lower bp    Diabetes mellitus type 2, insulin dependent ([facility] [address]     Hypertension     Long term (current) use of anticoagulants     Malignant hypertensive heart disease with CHF ([facility] [address]     Mitral regurgitation     Pacemaker 10/25/2012    The pacemaker is a [employer], model # [id number], serial [id number]. 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, 1996    # 28 [place]-Manuel    Thyroid disease     TR (tricuspid regurgitation)     moderate to severe echo 2009      Past Surgical History:   Procedure Laterality Date    CARDIAC SURG PROCEDURE UNLIST  1996    valve replacement    HX HEART CATHETERIZATION  10/4/2012    normal cors, LVEF 50%    HX HEART VALVE SURGERY      HX PACEMAKER      INS PPM/ICD LED DUAL ONLY  10/25/2012         INS PPM/ICD LED DUAL ONLY  9/9/2016           Family History   Problem Relation Age of Onset    Cancer Sister      breast    Heart Disease Brother     Heart Disease Brother     Diabetes Son     Stroke Son       History reviewed, no pertinent family history.   Social History   Substance Use Topics    Smoking status: Never Smoker  Smokeless tobacco: Never Used    Alcohol use No     No Known Allergies   Current Facility-Administered Medications   Medication Dose Route Frequency    potassium chloride 20 mEq in 50 ml IVPB  20 mEq IntraVENous Q1H    vancomycin (VANCOCIN) 1,000 mg in 0.9% sodium chloride (MBP/ADV) 250 mL  1,000 mg IntraVENous Q24H    albuterol-ipratropium (DUO-NEB) 2.5 MG-0.5 MG/3 ML  3 mL Nebulization Q6H RT    chlorhexidine (PERIDEX) 0.12 % mouthwash 15 mL  15 mL Oral Q12H    polyvinyl alcohol (LIQUIFILM TEARS) 1.4 % ophthalmic solution 2 Drop  2 Drop Both Eyes TID    bumetanide (BUMEX) injection 1 mg  1 mg IntraVENous BID    sodium chloride (NS) flush 10 mL  10 mL InterCATHeter Q24H    sodium chloride (NS) flush 10 mL  10 mL InterCATHeter PRN    sodium chloride (NS) flush 10-40 mL  10-40 mL InterCATHeter Q8H    alteplase (CATHFLO) 1 mg in sterile water (preservative free) 1 mL injection  1 mg InterCATHeter PRN    bacitracin 500 unit/gram packet 1 Packet  1 Packet Topical PRN    0.9% sodium chloride infusion 250 mL  250 mL IntraVENous PRN    ondansetron (ZOFRAN) injection 4 mg  4 mg IntraVENous Q8H PRN    NOREPINephrine (LEVOPHED) 8,000 mcg in dextrose 5% 250 mL infusion  2-30 mcg/min IntraVENous TITRATE    glucose chewable tablet 16 g  4 Tab Oral PRN    dextrose (D50W) injection syrg 12.5-25 g  12.5-25 g IntraVENous PRN    glucagon (GLUCAGEN) injection 1 mg  1 mg IntraMUSCular PRN    sodium chloride (NS) flush 5-10 mL  5-10 mL IntraVENous Q8H    sodium chloride (NS) flush 5-10 mL  5-10 mL IntraVENous PRN    vancomycin dosing per pharmacy   Other Rx Dosing/Monitoring    pantoprazole (PROTONIX) 40 mg in sodium chloride 0.9 % 10 mL injection  40 mg IntraVENous Q24H    piperacillin-tazobactam (ZOSYN) 3.375 g in 0.9% sodium chloride (MBP/ADV) 100 mL  3.375 g IntraVENous Q8H          LAB AND IMAGING FINDINGS:     Lab Results   Component Value Date/Time    WBC 4.2 03/13/2017 03:09 AM    HGB 9.0 03/13/2017 03:09 AM    PLATELET 54 30/79/8801 03:09 AM     Lab Results   Component Value Date/Time    Sodium 142 03/13/2017 03:09 AM    Potassium 3.3 03/13/2017 03:09 AM    Chloride 106 03/13/2017 03:09 AM    CO2 30 03/13/2017 03:09 AM    BUN 21 03/13/2017 03:09 AM    Creatinine 1.17 03/13/2017 03:09 AM    Calcium 8.1 03/13/2017 03:09 AM    Magnesium 1.6 03/13/2017 03:09 AM    Phosphorus 2.4 03/13/2017 03:09 AM      Lab Results   Component Value Date/Time    AST (SGOT) 306 03/13/2017 03:09 AM    Alk. phosphatase 60 03/13/2017 03:09 AM    Protein, total 5.4 03/13/2017 03:09 AM    Albumin 2.3 03/13/2017 03:09 AM    Globulin 3.1 03/13/2017 03:09 AM     Lab Results   Component Value Date/Time    INR 1.8 03/13/2017 03:09 AM    Prothrombin time 17.9 03/13/2017 03:09 AM    aPTT 37 08/15/2016 12:00 AM      No results found for: IRON, FE, TIBC, IBCT, PSAT, FERR   No results found for: PH, PCO2, PO2  No components found for: [place]   Lab Results   Component Value Date/Time    CK 88 09/01/2012 04:00 AM    CK - MB 2.2 09/01/2012 04:00 AM                Total time: 70 min   Counseling / coordination time: 50 min   > 50% counseling / coordination?: yes    Prolonged service was provided for  []30 min   []75 min in face to face time in the presence of the patient. Time Start:   Time End:   Note: this can only be billed with 64277 (initial) or 02920 (follow up). If multiple start / stop times, list each separately.

## 2017-03-13 NOTE — PROGRESS NOTES
2035 Pt was not pulling any volume in ventilator. Tried in-line suction catheter and could not pass through ET tube, and hard on ambu bagging. While ambuing, there was not any breath sound in auscultation. ET was pulled out and it there was a dark black solid mucus blocked the tip of ET tube. Pt was placed in 6L nasal cannula. ABG's pending.

## 2017-03-13 NOTE — PROGRESS NOTES
Following IDR connected with patient's daughter Reagan Tran who was sitting in room accompanying patient. Led in processing and commended for support and presence. Ms. Reagan Tran became tearful and expressed a sense of feeling overwhelmed about pending decisions regarding patient's care. Patient has strong support from large family but Reagan Tran worried about sense of responsibility regarding EOL decisions. Not sure if patient would want to be hooked up to what she's connected to at present time. Provided empathic listening, reassurance and advised of pastoral support as needed. Asked RN if palliative had been consulted as Reagan Tran had made mention of wanting to speak to physicians about next steps and wanting to come to a better understanding of patient's status and condition. RN confirmed Palliative was being contacted. Will follow as needed. AIDEN Britton

## 2017-03-13 NOTE — PROGRESS NOTES
Cardiology Progress Note            Admit Date: 3/8/2017  Admit Diagnosis: Hypothermia  Date: 3/13/2017     Time: 9:29 AM  Assessment/Plan/Discussion:Cardiology Attending:     Patient seen earlier today and examined  and agree with Advance Practice Provider (ANDI, NP,PA)  assessment and plans. Chase Al is a 80 y.o. female     Well known to me for many years  Had MV repair with C-E ring in 1996 and CHF since 2009 along with afib  I last saw her in office in January with some edema  Had ER Visits in November and December with abdominal bloating and pancreatic cyst and gallstones  Now CT with no acute abdominal findings  EF was 36%    Family in room this morning reports the patient had abdominal pain last Wednesday night   ,INR was high on admit this time  Since then has been septic with unclear source  i have reviewed echo films, agree that there is possible vegetation on AV but we can also see this as calcium coming off the AV in older patients, thus far [facility] are negative-not sure how this fits into current scenario, unlikely to have BC negative endocarditis, HACEK group infections    More concerning, she is obtunded, off vent on bipap but not responding to voice  If she wakes up then I would be more aggressive with abdomen source and possible AMMON  But if obtunded then perhaps CT head to see if stroke and comfort care, we will see if she wakes up  Next 24-48 hours will be critical, current prognosis is poor  Family and I saw for a long time this am going over all this        Maria Del Rosario Forbes MD 3/13/2017           HPI:  49-year-old female with history of chronic systolic congestive heart failure, mitral annuloplasty,  atrial fibrillation, diabetes mellitus, hypertension, chronic anticoagulation use, history of pacemaker and ICD.  She was admitted on 3/8 with abdominal pain, nausea, hypothermia and hypotension, found to have sepsis but no definitive source. Blood cultures have been negative to date. She was intubated during hospital course but extubated overnight and now on biPAP. Vasopressors continue for hypotension. Nephrology has been following for JUAN. Cardiology is on consult for acute on chronic CHF. Assessment and Plan     1: Acute on chronic CHF, systolic, NYHA III/IV:  TTE 3/9 EF 30%. CXR today with mild pulmonary edema. BNP trending down (770 today from 1335 3/9). Pt net -3322 in 24 hours but + 4833 net since admission. Has AICD/BiV pacer   -Bumex 1 mg IV BID   -Beta blocker on hold due to hypotension, on vasopressor   -ACE-I held due to hypotension, JUAN   -Continue daily weights and I/Os   -Not appropriate at this time for IP card rehab cx per guidelines. 2.Respiratory failure. Hypercarbic respiratory failure. PCO2 62.5 today on biPAP 40%. -Continue IV diuresis. -BiPap, nebs, per pulmonary    3. Non-ischemic Cardiomyopathy, valvular heart disease s/p Mitral annuloplasty  Echo 3/9: LVEF 30%, BiAE, mod MR, mod TR and mild pulm HTN, pleural effusion. 4. Hx atrial fibrillation:    -[id number]= 6 (age, CHF, HTN, female, DM). On coumadin at home. Coumadin held here due to elevated INR-  INR now 1.8-    -No beta blocker due to hypotension on pressors.   -Has BiV pacer    5  Hypotension, presumably from sepsis, possible cardiogenic element.   -Continue neosynephrine as needed    6. Possible vegetation on aortic valve. -Blood cx 3/11/17- N growth x 2 days. BC 3/8 negative.   -Consider AMMON- will d/w Dr. Rachel Rico    7. JUAN- creatinine improving- now 1.17 from 1.53 yesterday. EGFR 53. Likely from MARTHA and hypotension.   -Nephrology following. 8: Hypothermia without history of exposure, -?sepsis, no obvious source    9: Thrombocytopenia- now 54 from 70 yesterday. ?sepsis    10. Hypokalemia- K=3.3. Repleted by primary team    11. Hx of DM- glucoses 156-181.  Management per primary team.    12: Elevated LFTs:  CT abd on admission with cholethiasis, ascites. LFTs trending down , , Alk phos 60. 13.  Advanced care planning: Pt is full code status. Palliative care consult pending. Subjective:   Chelsea Cardona does not respond verbally. Opens eyes to voice but does not follow commands. Extubated overnight due to occlusive mucous plug in ET and has been on biPap since extubation. Objective:      Physical Exam:                Visit Vitals    BP 93/58    Pulse 82    Temp 97.3 °F (36.3 °C)    Resp 17    Ht 5' 6\" (1.676 m)    Wt 80 kg (176 lb 5.9 oz)    SpO2 96%    BMI 28.47 kg/m2          General Appearance:   well developed female, weak   Ears/Nose/Mouth/Throat: On full face biPAP mask. Neck:  Supple. Chest:    Lungs course rhonchi bilaterally, on BiPAP. Cardiovascular:   Regular rate and rhythm, S1, S2 normal, no murmur. Abdomen:    Soft, distended, no grimacing to palpation,  bowel sounds are active. Extremities:  pitting edema bilaterally. + BUE edema. 2+ DP pulses bilaterally. Skin:  Warm and dry. Neuro:  Opens eyes to voice but does not follow commands.      Telemetry: Ventricular paced          Data Review:    Labs:    Recent Results (from the past 24 hour(s))   GLUCOSE, POC    Collection Time: 03/12/17 11:27 AM   Result Value Ref Range    Glucose (POC) 169 (H) 65 - 100 mg/dL    Performed by [facility]    GLUCOSE, POC    Collection Time: 03/12/17  5:55 PM   Result Value Ref Range    Glucose (POC) 181 (H) 65 - 100 mg/dL    Performed by [facility]    POC G3 - PUL    Collection Time: 03/12/17  9:09 PM   Result Value Ref Range    pH (POC) 7.281 (L) 7.35 - 7.45      pCO2 (POC) 55.9 (H) 35.0 - 45.0 MMHG    pO2 (POC) 94 80 - 100 MMHG    HCO3 (POC) 26.3 (H) 22 - 26 MMOL/L    sO2 (POC) 96 92 - 97 %    Base excess (POC) 0 mmol/L    Site RIGHT RADIAL      Device: NASAL CANNULA      Flow rate (POC) 6 L/M    Allens test (POC) YES      Specimen type (POC) ARTERIAL Total resp. rate 33     GLUCOSE, POC    Collection Time: 03/12/17 10:21 PM   Result Value Ref Range    Glucose (POC) 160 (H) 65 - 100 mg/dL    Performed by [address] [place] [address], POC    Collection Time: 03/13/17 12:00 AM   Result Value Ref Range    Glucose (POC) 156 (H) 65 - 100 mg/dL    Performed by Mi Fitzpatrick    BNP    Collection Time: 03/13/17  3:09 AM   Result Value Ref Range     (H) 0 - 100 pg/mL   CBC WITH AUTOMATED DIFF    Collection Time: 03/13/17  3:09 AM   Result Value Ref Range    WBC 4.2 3.6 - 11.0 K/uL    RBC 4.04 3.80 - 5.20 M/uL    HGB 9.0 (L) 11.5 - 16.0 g/dL    HCT 28.2 (L) 35.0 - 47.0 %    MCV 69.8 (L) 80.0 - 99.0 FL    MCH 22.3 (L) 26.0 - 34.0 PG    MCHC 31.9 30.0 - 36.5 g/dL    RDW 16.2 (H) 11.5 - 14.5 %    PLATELET 54 (L) 781 - 400 K/uL    NEUTROPHILS 69 32 - 75 %    LYMPHOCYTES 11 (L) 12 - 49 %    MONOCYTES 16 (H) 5 - 13 %    EOSINOPHILS 3 0 - 7 %    BASOPHILS 1 0 - 1 %    ABS. NEUTROPHILS 2.9 1.8 - 8.0 K/UL    ABS. LYMPHOCYTES 0.5 (L) 0.8 - 3.5 K/UL    ABS. MONOCYTES 0.7 0.0 - 1.0 K/UL    ABS. EOSINOPHILS 0.1 0.0 - 0.4 K/UL    ABS.  BASOPHILS 0.0 0.0 - 0.1 K/UL    DF MANUAL      RBC COMMENTS TARGET CELLS  2+        RBC COMMENTS ANISOCYTOSIS  1+        RBC COMMENTS MICROCYTOSIS  1+        RBC COMMENTS POLYCHROMASIA  1+        RBC COMMENTS OVALOCYTES  PRESENT        RBC COMMENTS TEARDROP CELLS  PRESENT       METABOLIC PANEL, COMPREHENSIVE    Collection Time: 03/13/17  3:09 AM   Result Value Ref Range    Sodium 142 136 - 145 mmol/L    Potassium 3.3 (L) 3.5 - 5.1 mmol/L    Chloride 106 97 - 108 mmol/L    CO2 30 21 - 32 mmol/L    Anion gap 6 5 - 15 mmol/L    Glucose 156 (H) 65 - 100 mg/dL    BUN 21 (H) 6 - 20 MG/DL    Creatinine 1.17 (H) 0.55 - 1.02 MG/DL    BUN/Creatinine ratio 18 12 - 20      GFR est AA 53 (L) >60 ml/min/1.73m2    GFR est non-AA 44 (L) >60 ml/min/1.73m2    Calcium 8.1 (L) 8.5 - 10.1 MG/DL    Bilirubin, total 2.7 (H) 0.2 - 1.0 MG/DL    ALT (SGPT) 173 (H) 12 - 78 U/L AST (SGOT) 306 (H) 15 - 37 U/L    Alk. phosphatase 60 45 - 117 U/L    Protein, total 5.4 (L) 6.4 - 8.2 g/dL    Albumin 2.3 (L) 3.5 - 5.0 g/dL    Globulin 3.1 2.0 - 4.0 g/dL    A-G Ratio 0.7 (L) 1.1 - 2.2     MAGNESIUM    Collection Time: 03/13/17  3:09 AM   Result Value Ref Range    Magnesium 1.6 1.6 - 2.4 mg/dL   PHOSPHORUS    Collection Time: 03/13/17  3:09 AM   Result Value Ref Range    Phosphorus 2.4 (L) 2.6 - 4.7 MG/DL   PROTHROMBIN TIME + INR    Collection Time: 03/13/17  3:09 AM   Result Value Ref Range    INR 1.8 (H) 0.9 - 1.1      Prothrombin time 17.9 (H) 9.0 - 11.1 sec   NUCLEATED RBC    Collection Time: 03/13/17  3:09 AM   Result Value Ref Range    NRBC 3.5 (H) 0  WBC    ABSOLUTE NRBC 0.15 (H) 0.00 - 0.01 K/uL    WBC CORRECTED FOR NR ADJUSTED FOR NUCLEATED RBC'S     GLUCOSE, POC    Collection Time: 03/13/17  3:31 AM   Result Value Ref Range    Glucose (POC) 169 (H) 65 - 100 mg/dL    Performed by Wilfredo Kilgore    POC G3 - PUL    Collection Time: 03/13/17  8:32 AM   Result Value Ref Range    FIO2 (POC) 40 %    pH (POC) 7.266 (L) 7.35 - 7.45      pCO2 (POC) 62.5 (H) 35.0 - 45.0 MMHG    pO2 (POC) 98 80 - 100 MMHG    HCO3 (POC) 28.4 (H) 22 - 26 MMOL/L    sO2 (POC) 96 92 - 97 %    Base excess (POC) 1 mmol/L    Site RIGHT RADIAL      Device: BIPAP      PEEP/CPAP (POC) 5 cmH2O    PIP (POC) 13      Pressure support 8 cmH2O    Allens test (POC) YES      Specimen type (POC) ARTERIAL      Total resp.  rate 24            Radiology:        Current Facility-Administered Medications   Medication Dose Route Frequency    potassium chloride 20 mEq in 50 ml IVPB  20 mEq IntraVENous Q1H    magnesium sulfate 2 g/50 ml IVPB (premix or compounded)  2 g IntraVENous ONCE    vancomycin (VANCOCIN) 1,000 mg in 0.9% sodium chloride (MBP/ADV) 250 mL  1,000 mg IntraVENous Q24H    albuterol-ipratropium (DUO-NEB) 2.5 MG-0.5 MG/3 ML  3 mL Nebulization Q6H RT    chlorhexidine (PERIDEX) 0.12 % mouthwash 15 mL  15 mL Oral Q12H    polyvinyl alcohol (LIQUIFILM TEARS) 1.4 % ophthalmic solution 2 Drop  2 Drop Both Eyes TID    bumetanide (BUMEX) injection 1 mg  1 mg IntraVENous BID    sodium chloride (NS) flush 10 mL  10 mL InterCATHeter Q24H    sodium chloride (NS) flush 10 mL  10 mL InterCATHeter PRN    sodium chloride (NS) flush 10-40 mL  10-40 mL InterCATHeter Q8H    alteplase (CATHFLO) 1 mg in sterile water (preservative free) 1 mL injection  1 mg InterCATHeter PRN    bacitracin 500 unit/gram packet 1 Packet  1 Packet Topical PRN    0.9% sodium chloride infusion 250 mL  250 mL IntraVENous PRN    ondansetron (ZOFRAN) injection 4 mg  4 mg IntraVENous Q8H PRN    propofol (DIPRIVAN) infusion  5-50 mcg/kg/min IntraVENous TITRATE    NOREPINephrine (LEVOPHED) 8,000 mcg in dextrose 5% 250 mL infusion  2-30 mcg/min IntraVENous TITRATE    PHENYLephrine (NEOSYNEPHRINE) 30,000 mcg in 0.9% sodium chloride 250 mL infusion   mcg/min IntraVENous TITRATE    dextrose 10% infusion  30 mL/hr IntraVENous CONTINUOUS    glucose chewable tablet 16 g  4 Tab Oral PRN    dextrose (D50W) injection syrg 12.5-25 g  12.5-25 g IntraVENous PRN    glucagon (GLUCAGEN) injection 1 mg  1 mg IntraMUSCular PRN    sodium chloride (NS) flush 5-10 mL  5-10 mL IntraVENous Q8H    sodium chloride (NS) flush 5-10 mL  5-10 mL IntraVENous PRN    vancomycin dosing per pharmacy   Other Rx Dosing/Monitoring    pantoprazole (PROTONIX) 40 mg in sodium chloride 0.9 % 10 mL injection  40 mg IntraVENous Q24H    piperacillin-tazobactam (ZOSYN) 3.375 g in 0.9% sodium chloride (MBP/ADV) 100 mL  3.375 g IntraVENous Q8H          [employer]  IRLANDA Wilkinson     [facility] [address] [place] [address], [address] [place] [address] [address]   Najma Malone   [phone]

## 2017-03-13 NOTE — PROGRESS NOTES
Shift Summary:    0800: Assumed care of patient. Initial assessment completed. Patient responds to voice and opens eyes. Pupils equal and reactive. Brisk and round. Does not follow command, but withdraw in all extremities. BiPAP settings: 13/5 and  FiO2 40%. ABGs done. RT changed settings to 15/5. Afebrile. Levophed running at 7mcg/min. 0830: RN updated Dr. Jono Torres on pt's status. 1130: Dr. Narayan Howell from [address] Hernandez at bedside with family. 1200: Reassessment completed. No changes. Will continue to monitor. 1330: Dr. Jono Torres at bedside with family. 1400: Dr. Rell Joy at bedside with family. 1600: Reassessment completed. No changes. Will continue to monitor. 1620: RT at bedside. BiPAP settings now at 15/5 and FiO2 at 30%. 1830: Pt remains on Levo at 7mcg/min. Family remains at bedside. Remainder of shift uneventful.

## 2017-03-13 NOTE — PROGRESS NOTES
Well known to me for many years  Had MV repair with C-E ring in 1996 and CHF since 2009 along with afib  I last saw her in office in January with some edema  Had ER Visits in November and December with abdominal bloating and pancreatic cyst and gallstones  Now CT with no acute abdominal findings  EF was 36%    Family in room this morning reports the patient had abdominal pain last Wednesday night   INR was high on admit this time  Since then has been septic with unclear source  i have reviewed echo films, agree that there is possible vegetation on AV but we can also see this as calcium coming off the AV in older patients, thus far [facility] are negative-not sure how this fits into current scenario, unlikely to have BC negative endocarditis, HACEK group infections    More concerning, she is obtunded, off vent on bipap but not responding to voice  If she wakes up then I would be more aggressive with abdomen source and possible AMMON  But if obtunded then perhaps CT head to see if stroke and comfort care, we will see if she wakes up  Next 24-48 hours will be critical, current prognosis is poor  Would not yet restart warfarin given AMS, Sepsis  Family and I sat  for a long time this am going over all this

## 2017-03-13 NOTE — PROGRESS NOTES
Pulmonary  3/13  Seen and examined. Last [facility] events noted. On bipap. On levophed. Diuresing well    3/12  Seen and examined earlier today. Failed SBT. ABG with combined resp and metabolic alkalosis    Seen and examined earlier today. Intubated overnight with worsening resp failure. In net +++ fluid balance. Resp acidosis has improved. Has been on bicarb drip which will be stopped. Pressors are being weaned down and she will need diuretic soon. Vent bundle and bronchodilators will be started. Needs repeat blood cultures. On antibiotics.      3/10  Remains on pressors with Maxi  BS continue to fall despite D10 (cortisol level was OK)  Echo with EF 30% concern for vegetation on aortic valve - blood cx negative so far  Ammonia elevated  INR higher today - vitamin K given - no obvious bleeding  Had to go back on warming blanket over night    Patient Vitals for the past 4 hrs:   BP Temp Pulse Resp SpO2   17 1345 - - - - 98 %   17 1300 94/78 - 82 20 -   17 1200 94/75 97.6 °F (36.4 °C) 78 23 95 %   17 1100 91/63 - 85 26 94 %   Temp (24hrs), Av.5 °F (36.4 °C), Min:97.2 °F (36.2 °C), Max:98 °F (36.7 °C)      Intake/Output Summary (Last 24 hours) at 17 1404  Last data filed at 17 1300   Gross per 24 hour   Intake           1898.4 ml   Output             3780 ml   Net          -1881.6 ml       She appears in no distress  diminshed bs at bases  RRR   Soft warm and dry    Recent Labs      17   0309  17   0330  17   0435   WBC  4.2  4.0  6.9   HGB  9.0*  9.4*  9.4*   HCT  28.2*  29.9*  31.1*   PLT  54*  71*  67*     Recent Labs      17   0309  17   0330  17   0435   NA  142  140  135*   K  3.3*  3.1*  4.4   CL  106  104  102   CO2  30  29  22   GLU  156*  181*  222*   BUN  21*  26*  28*   CREA  1.17*  1.45*  1.88*   CA  8.1*  8.0*  8.1*   MG  1.6  1.8  1.9   PHOS  2.4*  1.6*  3.2   ALB  2.3*  2.4*  3.0*   SGOT  306*  402*  384*   ALT  173*  172*  121* INR  1.8*  2.3*  2.2*       Recent Labs      03/13/17   0309  03/12/17   0330   TROIQ  0.20*  0.26*     All Micro Results     Procedure Component Value Units Date/Time    CULTURE, BLOOD, PAIRED [[id number]] Collected:  03/11/17 1235    Order Status:  Completed Specimen:  Blood Updated:  03/13/17 0723     Special Requests: NO SPECIAL REQUESTS        Culture result: NO GROWTH 2 DAYS       CULTURE, BLOOD, PAIRED [[id number]] Collected:  03/08/17 1945    Order Status:  Completed Specimen:  Blood Updated:  03/13/17 0723     Special Requests: NO SPECIAL REQUESTS        Culture result: NO GROWTH 5 DAYS       CULTURE, BLOOD [[id number]]     Order Status:  Canceled Specimen:  Blood from Blood     CULTURE, BLOOD [[id number]]     Order Status:  Canceled Specimen:  Blood from Blood     CULTURE, URINE [[id number]] Collected:  03/10/17 0230    Order Status:  Completed Specimen:  Urine Updated:  03/11/17 0711     Special Requests: --        NO SPECIAL REQUESTS  Reflexed from Fausto Colindres <1,000 COLONIES/mL        Culture result: NO GROWTH 1 DAY           Impression  1. Acute hypoxic and hypercarbic miko failure, intubated 3/10, extubated 3/12  2. Shock - suspect septic / cardiogenic   3. EF 46% - systolic CHF - concern for possible vegetation on aortic valve - blood cx no growth so far  4. Hypoglycemia on D10 secondary to sepsis and liver insufficiency  5. Liver insufficiency - ? From chronic heart failure - cirrhotic appearing liver  6.  IDDM - hx off meds on D10  7. afib was on coumadin - INR > 5 - vitamin K given    -- Bipap, monitor gas exchange, hopefully can avoid reintubation  -- continue abx and follow up blood cx  --diuresis per cardiology  -- AMMON high risk without reintubation  --wean pressors as able  --Palliative care following--- at age 80 with multisystem organ failure, chances of survival will be poor : explained medical problems to family at bedside and recommended no CPR in the event of cardiac arrest. AMD packet to be given. -- Critically ill, at risk for further decline.  CCT 45'    Aicha Gagnon MD

## 2017-03-13 NOTE — PROGRESS NOTES
Support offered to two daughters and a granddaughter following their conversation with Dr. Terald Kehr. Pt case discussed with Palliative IDT earlier today and with Dr. Terald Kehr prior to my visit. Palliative family meeting that was originally scheduled for today has been cancelled/postponed per family's request.      Family is expressing anticipatory grief as they feel that pt may be approaching end of life. They expressed their concern about suffering and seem to be processing conversation about code status. Offered emotional and spiritual support. Will discuss pt's case again with Dr. Shahida Ceja and chaplains will continue to support family as able/needed.     Misti Newman, Palliative

## 2017-03-13 NOTE — PROGRESS NOTES
Name: Jessica Fletcher   MRN: [id number]  : 1930      ASSESSMENT:  JUAN- likely ATN in the setting of hypotension/also recent IV Contrast on 3/8. Background of sepsis/CHF. Nearly resolved  Chronic systolic CHF (EF 15%)/SGF pleural effusions/enlarged LA/RV Systolic HTN/Mod MR  Fluid overload- has peripheral edema, ascites, pleural effusion. Wt was up by 12 kg since admit  Sepsis- ?source  Mixed resp acidosis/Met alkalosis  Elevated LFTs- likely ischemic hepatitis    JUAN is improving and nearly resolved. She is diuresing well. Low K from diuresis. LFTs seems to have plateaued. Alkalosis better too. Off HCO3 drip.     PLAN:  Ct IV Bumex 1-2 more day and then would transition to PO  Replace KCL PRN      Will sign off at this point.  Please call us back with any questions/concerns    Subjective:  Extubated overnight>>BiPAP this am    ROS:   UTO    Exam:  Visit Vitals    BP 96/60    Pulse 80    Temp 97.3 °F (36.3 °C)    Resp 24    Ht 5' 6\" (1.676 m)    Wt 80 kg (176 lb 5.9 oz)    SpO2 96%    BMI 28.47 kg/m2     +BiPAP mask on  Dec BS at bases  RRR  Soft, distended, NT  +edema-improved  Richardson+    Current Facility-Administered Medications   Medication Dose Route Frequency Last Dose    potassium chloride 20 mEq in 50 ml IVPB  20 mEq IntraVENous Q1H 20 mEq at 17 0926    vancomycin (VANCOCIN) 1,000 mg in 0.9% sodium chloride (MBP/ADV) 250 mL  1,000 mg IntraVENous Q24H 1,000 mg at 17 1027    albuterol-ipratropium (DUO-NEB) 2.5 MG-0.5 MG/3 ML  3 mL Nebulization Q6H RT 3 mL at 17 0824    chlorhexidine (PERIDEX) 0.12 % mouthwash 15 mL  15 mL Oral Q12H 15 mL at 17 1007    polyvinyl alcohol (LIQUIFILM TEARS) 1.4 % ophthalmic solution 2 Drop  2 Drop Both Eyes TID 2 Drop at 17 1008    bumetanide (BUMEX) injection 1 mg  1 mg IntraVENous BID 1 mg at 03/13/17 0926    sodium chloride (NS) flush 10 mL  10 mL InterCATHeter Q24H 10 mL at 03/13/17 1014    sodium chloride (NS) flush 10 mL  10 mL InterCATHeter PRN      sodium chloride (NS) flush 10-40 mL  10-40 mL InterCATHeter Q8H 10 mL at 03/13/17 0522    alteplase (CATHFLO) 1 mg in sterile water (preservative free) 1 mL injection  1 mg InterCATHeter PRN      bacitracin 500 unit/gram packet 1 Packet  1 Packet Topical PRN      0.9% sodium chloride infusion 250 mL  250 mL IntraVENous PRN      ondansetron (ZOFRAN) injection 4 mg  4 mg IntraVENous Q8H PRN 4 mg at 03/10/17 1526    propofol (DIPRIVAN) infusion  5-50 mcg/kg/min IntraVENous TITRATE Stopped at 03/12/17 2027    NOREPINephrine (LEVOPHED) 8,000 mcg in dextrose 5% 250 mL infusion  2-30 mcg/min IntraVENous TITRATE 7 mcg/min at 03/13/17 0956    PHENYLephrine (NEOSYNEPHRINE) 30,000 mcg in 0.9% sodium chloride 250 mL infusion   mcg/min IntraVENous TITRATE Stopped at 03/11/17 1648    dextrose 10% infusion  30 mL/hr IntraVENous CONTINUOUS 30 mL/hr at 03/13/17 0515    glucose chewable tablet 16 g  4 Tab Oral PRN      dextrose (D50W) injection syrg 12.5-25 g  12.5-25 g IntraVENous PRN 12.5 g at 03/10/17 0537    glucagon (GLUCAGEN) injection 1 mg  1 mg IntraMUSCular PRN      sodium chloride (NS) flush 5-10 mL  5-10 mL IntraVENous Q8H 10 mL at 03/13/17 0522    sodium chloride (NS) flush 5-10 mL  5-10 mL IntraVENous PRN      vancomycin dosing per pharmacy   Other Rx Dosing/Monitoring      pantoprazole (PROTONIX) 40 mg in sodium chloride 0.9 % 10 mL injection  40 mg IntraVENous Q24H 40 mg at 03/13/17 0004    piperacillin-tazobactam (ZOSYN) 3.375 g in 0.9% sodium chloride (MBP/ADV) 100 mL  3.375 g IntraVENous Q8H 3.375 g at 03/13/17 0518       Labs/Data:    Lab Results   Component Value Date/Time    WBC 4.2 03/13/2017 03:09 AM    HGB 9.0 03/13/2017 03:09 AM    HCT 28.2 03/13/2017 03:09 AM    PLATELET 54 86/93/5846 03:09 AM    MCV 69.8 03/13/2017 03:09 AM       Lab Results   Component Value Date/Time    Sodium 142 03/13/2017 03:09 AM    Potassium 3.3 03/13/2017 03:09 AM    Chloride 106 03/13/2017 03:09 AM    CO2 30 03/13/2017 03:09 AM    Anion gap 6 03/13/2017 03:09 AM    Glucose 156 03/13/2017 03:09 AM    BUN 21 03/13/2017 03:09 AM    Creatinine 1.17 03/13/2017 03:09 AM    BUN/Creatinine ratio 18 03/13/2017 03:09 AM    GFR est AA 53 03/13/2017 03:09 AM    GFR est non-AA 44 03/13/2017 03:09 AM    Calcium 8.1 03/13/2017 03:09 AM       Wt Readings from Last 3 Encounters:   03/13/17 80 kg (176 lb 5.9 oz)   01/10/17 66.3 kg (146 lb 3.2 oz)   01/04/17 68 kg (150 lb)         Intake/Output Summary (Last 24 hours) at 03/13/17 1044  Last data filed at 03/13/17 1000   Gross per 24 hour   Intake          1590.45 ml   Output             4525 ml   Net         -2934.55 ml       Patient seen and examined. Chart reviewed. Labs, data and other pertinent notes reviewed in last 24 hrs.     PMH/SH/FH reviewed and unchanged compared to H&P    Discussed with RN      Fausto Powell MD

## 2017-03-13 NOTE — PROGRESS NOTES
Problem: Falls - Risk of  Goal: *Knowledge of fall prevention  Variance: Patient Condition  Comments: Pt nonverbal        Problem: Heart Failure: Day 5  Goal: Activity/Safety  Outcome: Not Progressing Towards Goal  Variance: Patient Condition  Comments: Pt remains on bedrest and is on BiPAP at this time. Goal: Nutrition/Diet  Outcome: Not Progressing Towards Goal  Variance: Patient Condition  Comments: Pt remains NPO at this time. Problem: Non-Violent Restraints  Goal: *Patient Specific Goal (EDIT GOAL, INSERT TEXT)  Outcome: Not Progressing Towards Goal  Variance: Patient Condition  Comments: Pt nonverbal at this time.

## 2017-03-13 NOTE — PROGRESS NOTES
Hospitalist Progress Note  Kate Anderson MD  Office: [phone]        Date of Service:  3/13/2017  NAME:  Jyothi Winston  :  1930  MRN:  [id number]      Admission Summary:   80-year-old female with history of chronic systolic congestive heart failure, atrial fibrillation, diabetes mellitus, hypertension, chronic anticoagulation use, history of pacemaker and ICD. She was sent from her primary care physician to the emergency room to be evaluated. History from the patient and her daughters at the bedside, also from the records. Per the daughter, she has been having on and off abdominal pain, epigastric pain for a while, over the last 3 days has been mostly pronounced. She said her pain at times is 9/10 in severity, it is nonradiating, associated with some nausea, but no vomiting. She denies any diarrhea. At her PCP's office,   they could not get her temperature, so they sent her to the emergency room. In the ER she has been hypothermic, her temperature was 91.4 on initial presentation. Also she was hypotensive. Her blood pressure   systolic was 08/83, so she was given about 1.5 liters of IV fluid, and now she is getting 1 bottle of albumin. She was placed on warming blanket, Lizzy Hugger. She denies any dysuria or frequency. Interval history / Subjective:   Pt intubated,  Sedated. Assessment & Plan:     -  Hypothermia and hypotension. Concern for sepsis. On empiric IV antibiotics, started on Zosyn and vancomycin. Followup on blood culture results NGSF. Off and on Lizzy Hugger for her hypothermia.     -Acute Respiratory failure with Hypercapnia  Continue BIPAP, nebs   PCCM input aprpeciated    -  Pleural effusions, bilateral: improving on CXR, continue antibiotics, may be source of infection +/-  CHF volume overload. - Sepsis:  Unknown source, most likely lung with bilateral pleural effusions, possibly infectious.   She had caren of leukocytes to 1.9, improving. She was afebrile overnight, low BP requiring pressors. Weaning dunia. Continue zosyn, vanco. Monitor cultures, preliminary blood, urine cultures no growth so far. - Acute on Chronic Systolic congestive heart failure NYHA III/IV. With fluid overload. Echo 3/9 with 30% EF, diffuse hypokinesis. Bumex added, strict I/O, monitor weight.   - History of pacer.   - Valvular disease: Moderate mitral regurgitation, right ventricular systolic dysfunction,   as well as enlarged left atrium.  -  Valvular vegetation on aortic valve, questionable: Blood cultures no growth preliminary, consider  AMMON. Cardiology consult appreciated. - Diuresis per nephrology     - Hematuria: may be related to traumatic galarza placement with INR max 5.5, today 2.2. Urine noted on UA as yellow on 3/8, Galarza placed 3/9 and hematuria reported 3/10 UA. -  JUAN:  From  ATN and contrast nephropathy/Hypotension most likely etiology. Ischemic heart disease, hypotension; and exposure to IV contrast. Appreciate nephro consult recommendations. Bumex, avoid nephrotoxins, I/Os,       - Hypoglycemia/diabetes mellitus. Resolved. Holding her Lantus and on hypoglycemia protocol. Her blood sugar improved after D50. Stable, off D10 now    -   Ischemic hepatitis: Epigastric pain and tenderness. Consulted GI. Plans for EGD when stable or outpatient. Continue proton pump inhibitor for now. 4. Coagulopathy. Holding her Coumadin and monitor daily INR and restart her Coumadin when INR improved. INR increased, she received Vitamin K x 1. With hematuria will give FFP and monitor INR. 5.  Pancytopenia. Appears to be chronic. However, patient and family unaware of her previous lab results. Will monitor her labs closely. 6. Cholelithiasis: No signs of obstruction or cholecystitis on her ultrasound scan. 7. deep venous thrombosis prophylaxis,   8. Palliative care helping with care decisions.       Code status: Full    Care Plan discussed with: Patient/Family and Nurse  Disposition: [facility]     Hospital Problems  Date Reviewed: 3/8/2017          Codes Class Noted POA    * (Principal)Hypothermia ICD-10-CM: T68. XXXA  ICD-9-CM: 991.6  3/8/2017 Yes                Review of Systems:   A comprehensive review of systems was negative. Vital Signs:    Last 24hrs VS reviewed since prior progress note.  Most recent are:  Visit Vitals    BP 96/60    Pulse 80    Temp 97.3 °F (36.3 °C)    Resp 24    Ht 5' 6\" (1.676 m)    Wt 80 kg (176 lb 5.9 oz)    SpO2 96%    BMI 28.47 kg/m2         Intake/Output Summary (Last 24 hours) at 03/13/17 1054  Last data filed at 03/13/17 1000   Gross per 24 hour   Intake          1590.45 ml   Output             4525 ml   Net         -2934.55 ml        Physical Examination:             Constitutional:   On BIPAP, drowsy    ENT:   Neck supple,    Resp:  coarse BS bilaterally with rales   CV:  irregular    GI:  Soft, non distended,  normoactive bowel sounds, no hepatosplenomegaly     Musculoskeletal:  2+ Pedal edema    Neurologic:  on BIPAP, drowsy, moves all 4 extremities             Data Review:    Review and/or order of clinical lab test      Labs:     Recent Labs      03/13/17 0309 03/12/17   0330   WBC  4.2  4.0   HGB  9.0*  9.4*   HCT  28.2*  29.9*   PLT  54*  71*     Recent Labs      03/13/17 0309 03/12/17   0330  03/11/17   0435   NA  142  140  135*   K  3.3*  3.1*  4.4   CL  106  104  102   CO2  30  29  22   BUN  21*  26*  28*   CREA  1.17*  1.45*  1.88*   GLU  156*  181*  222*   CA  8.1*  8.0*  8.1*   MG  1.6  1.8  1.9   PHOS  2.4*  1.6*  3.2     Recent Labs      03/13/17 0309 03/12/17   0330  03/11/17   0435   SGOT  306*  402*  384*   ALT  173*  172*  121*   AP  60  60  63   TBILI  2.7*  2.5*  1.8*   TP  5.4*  5.5*  6.4   ALB  2.3*  2.4*  3.0*   GLOB  3.1  3.1  3.4     Recent Labs      03/13/17   0309  03/12/17   0330  03/11/17   0435   INR  1.8*  2.3*  2.2*   PTP  17.9*  23.1*  22.0*      No results for input(s): FE, TIBC, PSAT, FERR in the last 72 hours. Lab Results   Component Value Date/Time    Folate 14.5 03/09/2017 03:23 AM      No results for input(s): PH, PCO2, PO2 in the last 72 hours.   Recent Labs      03/13/17   0309  03/12/17   0330   TROIQ  0.20*  0.26*     Lab Results   Component Value Date/Time    Cholesterol, total 138 09/28/2011 09:13 AM    HDL Cholesterol 50 09/28/2011 09:13 AM    LDL, calculated 74 09/28/2011 09:13 AM    Triglyceride 69 09/28/2011 09:13 AM     Lab Results   Component Value Date/Time    Glucose (POC) 169 03/13/2017 03:31 AM    Glucose (POC) 156 03/13/2017 12:00 AM    Glucose (POC) 160 03/12/2017 10:21 PM    Glucose (POC) 181 03/12/2017 05:55 PM    Glucose (POC) 169 03/12/2017 11:27 AM     Lab Results   Component Value Date/Time    Color RED 03/10/2017 02:30 AM    Appearance BLOODY 03/10/2017 02:30 AM    Specific gravity 1.020 03/10/2017 02:30 AM    Specific gravity 1.016 03/08/2017 08:54 PM    pH (UA) 6.0 03/10/2017 02:30 AM    Protein 100 03/10/2017 02:30 AM    Glucose NEGATIVE  03/10/2017 02:30 AM    Ketone NEGATIVE  03/10/2017 02:30 AM    Bilirubin NEGATIVE  03/10/2017 02:30 AM    Urobilinogen 1.0 03/10/2017 02:30 AM    Nitrites NEGATIVE  03/10/2017 02:30 AM    Leukocyte Esterase TRACE 03/10/2017 02:30 AM    Epithelial cells FEW 03/10/2017 02:30 AM    Bacteria NEGATIVE  03/10/2017 02:30 AM    WBC 20-50 03/10/2017 02:30 AM    RBC >100 03/10/2017 02:30 AM         Medications Reviewed:     Current Facility-Administered Medications   Medication Dose Route Frequency    potassium chloride 20 mEq in 50 ml IVPB  20 mEq IntraVENous Q1H    vancomycin (VANCOCIN) 1,000 mg in 0.9% sodium chloride (MBP/ADV) 250 mL  1,000 mg IntraVENous Q24H    albuterol-ipratropium (DUO-NEB) 2.5 MG-0.5 MG/3 ML  3 mL Nebulization Q6H RT    chlorhexidine (PERIDEX) 0.12 % mouthwash 15 mL  15 mL Oral Q12H    polyvinyl alcohol (LIQUIFILM TEARS) 1.4 % ophthalmic solution 2 Drop  2 Drop Both Eyes TID    bumetanide (BUMEX) injection 1 mg  1 mg IntraVENous BID    sodium chloride (NS) flush 10 mL  10 mL InterCATHeter Q24H    sodium chloride (NS) flush 10 mL  10 mL InterCATHeter PRN    sodium chloride (NS) flush 10-40 mL  10-40 mL InterCATHeter Q8H    alteplase (CATHFLO) 1 mg in sterile water (preservative free) 1 mL injection  1 mg InterCATHeter PRN    bacitracin 500 unit/gram packet 1 Packet  1 Packet Topical PRN    0.9% sodium chloride infusion 250 mL  250 mL IntraVENous PRN    ondansetron (ZOFRAN) injection 4 mg  4 mg IntraVENous Q8H PRN    NOREPINephrine (LEVOPHED) 8,000 mcg in dextrose 5% 250 mL infusion  2-30 mcg/min IntraVENous TITRATE    glucose chewable tablet 16 g  4 Tab Oral PRN    dextrose (D50W) injection syrg 12.5-25 g  12.5-25 g IntraVENous PRN    glucagon (GLUCAGEN) injection 1 mg  1 mg IntraMUSCular PRN    sodium chloride (NS) flush 5-10 mL  5-10 mL IntraVENous Q8H    sodium chloride (NS) flush 5-10 mL  5-10 mL IntraVENous PRN    vancomycin dosing per pharmacy   Other Rx Dosing/Monitoring    pantoprazole (PROTONIX) 40 mg in sodium chloride 0.9 % 10 mL injection  40 mg IntraVENous Q24H    piperacillin-tazobactam (ZOSYN) 3.375 g in 0.9% sodium chloride (MBP/ADV) 100 mL  3.375 g IntraVENous Q8H     ______________________________________________________________________  EXPECTED LENGTH OF STAY: 3d 4h  ACTUAL LENGTH OF STAY:          Gera Marie MD

## 2017-03-13 NOTE — PROGRESS NOTES
0730: Bedside and Verbal shift change report given to Tiana Steel RN and Cayetano Hollis RN (oncoming nurse) by Jennifer Tamayo RN (offgoing nurse). Report included the following information SBAR, Kardex, ED Summary, Intake/Output, MAR, Recent Results and Cardiac Rhythm AV Paced. Primary Nurse Taz Wall RN and Cayetano Hollis RN performed a dual skin assessment on this patient No impairment noted  Stanley score is 12    Interdisciplinary team rounds were held 3/13/2017 with the following team members:Care Management, Nursing, Nutrition, Pharmacy, Physical Therapy, Physician and Respiratory Therapy and the patient and child(milagros). Plan of care discussed. See clinical pathway and/or care plan for interventions and desired outcomes. 1930: Bedside and Verbal shift change report given to Thong Pearce RN (oncoming nurse) by Tiana Steel RN and Cayetano Hollis RN (offgoing nurse). Report included the following information SBAR, Kardex, ED Summary, Procedure Summary, Intake/Output, MAR, Recent Results, Cardiac Rhythm AV Paced and Alarm Parameters .

## 2017-03-14 NOTE — PROGRESS NOTES
Physical Therapy  3/14/17    Order received. Patient chart reviewed. Patient and family currently meeting with physician. Spoke with RN. Patient unable to tolerate coming off BiPAP at this time. Will defer mobility assessment at this time and follow up as appropriate. Bharati Lowery, PT, DPT

## 2017-03-14 NOTE — PROGRESS NOTES
Consulted with pt's nurse prior to visit. Pt's daughter was in ICU waiting room on the phone; offered brief support. Daughter shared that some other family members were on their way to the hospital as well. Will follow-up as able for family support.     Misti Wheat, Palliative

## 2017-03-14 NOTE — PROGRESS NOTES
Pulmonary  3/14  Seen and examined. Lethargic but was able to open eyes and follow simple commands earlier today. ABG checked off bipap showed worsening resp acidosis. CXR still shows pulmonary edema and effusions    3/13  Seen and examined. Last nihgt events noted. On bipap. On levophed. Diuresing well    3/12  Seen and examined earlier today. Failed SBT. ABG with combined resp and metabolic alkalosis    Seen and examined earlier today. Intubated overnight with worsening resp failure. In net +++ fluid balance. Resp acidosis has improved. Has been on bicarb drip which will be stopped. Pressors are being weaned down and she will need diuretic soon. Vent bundle and bronchodilators will be started. Needs repeat blood cultures. On antibiotics.      3/10  Remains on pressors with Maxi  BS continue to fall despite D10 (cortisol level was OK)  Echo with EF 30% concern for vegetation on aortic valve - blood cx negative so far  Ammonia elevated  INR higher today - vitamin K given - no obvious bleeding  Had to go back on warming blanket over night    Patient Vitals for the past 4 hrs:   BP Pulse Resp SpO2 Height Weight   17 1154 - - - 97 % - -   17 1107 - - - - 5' 6\" (1.676 m) 82.7 kg (182 lb 5.1 oz)   17 1000 (!) 79/59 79 25 - - -   17 0900 (!) 84/55 80 27 98 % - -   Temp (24hrs), Av.1 °F (36.7 °C), Min:97.4 °F (36.3 °C), Max:98.5 °F (36.9 °C)      Intake/Output Summary (Last 24 hours) at 17 1212  Last data filed at 17 0900   Gross per 24 hour   Intake           505.17 ml   Output             1100 ml   Net          -594.83 ml       She appears in no distress  diminshed bs at bases  RRR   Soft warm and dry    Recent Labs      17   0458  17   0309  17   0330   WBC  2.9*  4.2  4.0   HGB  8.5*  9.0*  9.4*   HCT  27.6*  28.2*  29.9*   PLT  54*  54*  71*     Recent Labs      17   0458  17   0309  17   0330   NA  141  142  140   K  3.7  3.3*  3.1*   CL 107  106  104   CO2  28  30  29   GLU  111*  156*  181*   BUN  23*  21*  26*   CREA  1.17*  1.17*  1.45*   CA  8.7  8.1*  8.0*   MG  1.9  1.6  1.8   PHOS  2.2*  2.4*  1.6*   ALB  2.4*  2.3*  2.4*   SGOT  217*  306*  402*   ALT  153*  173*  172*   INR  1.6*  1.8*  2.3*       Recent Labs      03/13/17   0309  03/12/17   0330   TROIQ  0.20*  0.26*     All Micro Results     Procedure Component Value Units Date/Time    CULTURE, BLOOD, PAIRED [[id number]] Collected:  03/11/17 1235    Order Status:  Completed Specimen:  Blood Updated:  03/14/17 0931     Special Requests: NO SPECIAL REQUESTS        Culture result: NO GROWTH 3 DAYS       CULTURE, BLOOD, PAIRED [[id number]] Collected:  03/08/17 1945    Order Status:  Completed Specimen:  Blood Updated:  03/13/17 0723     Special Requests: NO SPECIAL REQUESTS        Culture result: NO GROWTH 5 DAYS       CULTURE, BLOOD [[id number]]     Order Status:  Canceled Specimen:  Blood from Blood     CULTURE, BLOOD [[id number]]     Order Status:  Canceled Specimen:  Blood from Blood     CULTURE, URINE [[id number]] Collected:  03/10/17 0230    Order Status:  Completed Specimen:  Urine Updated:  03/11/17 0711     Special Requests: --        NO SPECIAL REQUESTS  Reflexed from Fausto Babb 91 <1,000 COLONIES/mL        Culture result: NO GROWTH 1 DAY           Impression  1. Acute hypoxic and hypercarbic miko failure, intubated 3/10, extubated 3/12  2. Shock - suspect septic / cardiogenic   3. EF 41% - systolic CHF - concern for possible vegetation on aortic valve - blood cx no growth so far  4. Hypoglycemia on D10 secondary to sepsis and liver insufficiency  5. Liver insufficiency - ? From chronic heart failure - cirrhotic appearing liver  6.  IDDM - hx off meds on D10  7. afib was on coumadin - INR > 5 - vitamin K given    -- Bipap, monitor gas exchange, hopefully can avoid reintubation  -- continue abx and follow up blood cx  -- Intensify diuretics  -- AMMON high risk without reintubation  -- wean pressors as able  --Palliative care following--- at age 80 with multisystem organ failure, chances of survival will be poor : explained medical problems to family at bedside and recommended no CPR in the event of cardiac arrest. AMD packet to be given. -- D/W Dr. Petit Kingdom-- at high risk for decline and may need intubation. Clarification of advance directives would be helpful. Overall prognosis is poor  -- Critically ill, at risk for further decline.  CCT 35Florence Funes MD

## 2017-03-14 NOTE — PROGRESS NOTES
Hospitalist Progress Note  Shruti Edmondson MD  Office: [phone]        Date of Service:  3/14/2017  NAME:  Carito Tapia  :  1930  MRN:  [id number]      Admission Summary:   71-year-old female with history of chronic systolic congestive heart failure, atrial fibrillation, diabetes mellitus, hypertension, chronic anticoagulation use, history of pacemaker and ICD. She was sent from her primary care physician to the emergency room to be evaluated. History from the patient and her daughters at the bedside, also from the records. Per the daughter, she has been having on and off abdominal pain, epigastric pain for a while, over the last 3 days has been mostly pronounced. She said her pain at times is 9/10 in severity, it is nonradiating, associated with some nausea, but no vomiting. She denies any diarrhea. At her PCP's office,   they could not get her temperature, so they sent her to the emergency room. In the ER she has been hypothermic, her temperature was 91.4 on initial presentation. Also she was hypotensive. Her blood pressure   systolic was 49/33, so she was given about 1.5 liters of IV fluid, and now she is getting 1 bottle of albumin. She was placed on warming blanket, Lizzy Hugger. She denies any dysuria or frequency. Interval history / Subjective:   Pt seen and examined  Awake, taken off bipap this AM  Follows simple commands, moves all 4 extremities       Assessment & Plan:     -  Hypothermia and hypotension. Concern for sepsis. On empiric IV antibiotics, started on Zosyn and vancomycin. Followup on blood culture results NGSF.  Off and on Lizzy Hugger for her hypothermia.     -Acute Respiratory failure with Hypercapnia due to Pleural effusion/atelectasis  Continue BIPAP, nebs   PCCM input aprpeciated  Attempted off BIPAP but abg showed respiratory acidosis, BIPAP replaced    -  Pleural effusions, bilateral: improving on CXR, continue antibiotics, may be source of infection +/-  CHF volume overload. - Sepsis:  Unknown source, most likely lung with bilateral pleural effusions, possibly infectious. She had caren of leukocytes to 1.9, improving. She was afebrile overnight, low BP requiring pressors. Weaning dunia. Continue zosyn, vanco. Monitor cultures, preliminary blood, urine cultures no growth so far. - Acute on Chronic Systolic congestive heart failure NYHA III/IV. With fluid overload. Echo 3/9 with 30% EF, diffuse hypokinesis. Bumex added, strict I/O, monitor weight.   - History of pacer.   - Valvular disease: Moderate mitral regurgitation, right ventricular systolic dysfunction,   as well as enlarged left atrium.  -  Valvular vegetation on aortic valve, questionable: Blood cultures no growth preliminary. AMMON later this week  - Diuresis per nephrology     - Hematuria: may be related to traumatic galarza placement with INR max 5.5, today 2.2. Urine noted on UA as yellow on 3/8, Galarza placed 3/9 and hematuria reported 3/10 UA. -  JUAN:  From  ATN and contrast nephropathy/Hypotension most likely etiology. Ischemic heart disease, hypotension; and exposure to IV contrast. Appreciate nephro consult recommendations. Bumex, avoid nephrotoxins, I/Os,       - Hypoglycemia/diabetes mellitus. Resolved. Holding her Lantus and on hypoglycemia protocol. Her blood sugar improved after D50. Stable, off D10 now    -   Ischemic hepatitis: Epigastric pain and tenderness. Consulted GI. Plans for EGD when stable or outpatient. Continue proton pump inhibitor for now. Coagulopathy.  - Holding her Coumadin and monitor daily INR and restart her Coumadin when INR improved. INR increased, she received Vitamin K x 1. With hematuria will give FFP and monitor INR. Pancytopenia. -Appears to be chronic. However, patient and family unaware of her previous lab results. Will monitor her labs closely.          Code status: Full    Care Plan discussed with: Patient/Family and Nurse  Disposition: [facility]     Hospital Problems  Date Reviewed: 3/8/2017          Codes Class Noted POA    * (Principal)Hypothermia ICD-10-CM: T68. XXXA  ICD-9-CM: 991.6  3/8/2017 Yes                Review of Systems:   A comprehensive review of systems was negative. Vital Signs:    Last 24hrs VS reviewed since prior progress note.  Most recent are:  Visit Vitals    BP 96/66    Pulse 81    Temp 97.6 °F (36.4 °C)    Resp 23    Ht 5' 6\" (1.676 m)    Wt 82.7 kg (182 lb 5.1 oz)    SpO2 100%    BMI 29.43 kg/m2         Intake/Output Summary (Last 24 hours) at 03/14/17 1534  Last data filed at 03/14/17 1400   Gross per 24 hour   Intake           615.87 ml   Output             1345 ml   Net          -729.13 ml        Physical Examination:             Constitutional:   On BIPAP, drowsy    ENT:   Neck supple,    Resp:  coarse BS bilaterally with rales   CV:  irregular    GI:  Soft, non distended,  normoactive bowel sounds, no hepatosplenomegaly     Musculoskeletal:  2+ Pedal edema    Neurologic:   drowsy, moves all 4 extremities             Data Review:    Review and/or order of clinical lab test      Labs:     Recent Labs      03/14/17 0458 03/13/17   0309   WBC  2.9*  4.2   HGB  8.5*  9.0*   HCT  27.6*  28.2*   PLT  54*  54*     Recent Labs      03/14/17 0458 03/13/17 0309 03/12/17   0330   NA  141  142  140   K  3.7  3.3*  3.1*   CL  107  106  104   CO2  28  30  29   BUN  23*  21*  26*   CREA  1.17*  1.17*  1.45*   GLU  111*  156*  181*   CA  8.7  8.1*  8.0*   MG  1.9  1.6  1.8   PHOS  2.2*  2.4*  1.6*     Recent Labs      03/14/17 0458 03/13/17   0309 03/12/17   0330   SGOT  217*  306*  402*   ALT  153*  173*  172*   AP  61  60  60   TBILI  2.7*  2.7*  2.5*   TP  5.5*  5.4*  5.5*   ALB  2.4*  2.3*  2.4*   GLOB  3.1  3.1  3.1     Recent Labs      03/14/17   0458  03/13/17   0309  03/12/17   0330   INR  1.6*  1.8*  2.3*   PTP  16.5*  17.9*  23.1*      No results for input(s): FE, TIBC, PSAT, FERR in the last 72 hours. Lab Results   Component Value Date/Time    Folate 14.5 03/09/2017 03:23 AM      No results for input(s): PH, PCO2, PO2 in the last 72 hours. Recent Labs      03/13/17   0309  03/12/17   0330   TROIQ  0.20*  0.26*     Lab Results   Component Value Date/Time    Cholesterol, total 138 09/28/2011 09:13 AM    HDL Cholesterol 50 09/28/2011 09:13 AM    LDL, calculated 74 09/28/2011 09:13 AM    Triglyceride 69 09/28/2011 09:13 AM     Lab Results   Component Value Date/Time    Glucose (POC) 102 03/14/2017 08:11 AM    Glucose (POC) 116 03/14/2017 04:55 AM    Glucose (POC) 116 03/14/2017 12:23 AM    Glucose (POC) 122 03/13/2017 09:37 PM    Glucose (POC) 129 03/13/2017 04:28 PM     Lab Results   Component Value Date/Time    Color RED 03/10/2017 02:30 AM    Appearance BLOODY 03/10/2017 02:30 AM    Specific gravity 1.020 03/10/2017 02:30 AM    Specific gravity 1.016 03/08/2017 08:54 PM    pH (UA) 6.0 03/10/2017 02:30 AM    Protein 100 03/10/2017 02:30 AM    Glucose NEGATIVE  03/10/2017 02:30 AM    Ketone NEGATIVE  03/10/2017 02:30 AM    Bilirubin NEGATIVE  03/10/2017 02:30 AM    Urobilinogen 1.0 03/10/2017 02:30 AM    Nitrites NEGATIVE  03/10/2017 02:30 AM    Leukocyte Esterase TRACE 03/10/2017 02:30 AM    Epithelial cells FEW 03/10/2017 02:30 AM    Bacteria NEGATIVE  03/10/2017 02:30 AM    WBC 20-50 03/10/2017 02:30 AM    RBC >100 03/10/2017 02:30 AM         Medications Reviewed:     Current Facility-Administered Medications   Medication Dose Route Frequency    NOREPINephrine (LEVOPHED) 8,000 mcg in dextrose 5% 250 mL infusion  2-30 mcg/min IntraVENous TITRATE    thiamine (B-1) tablet 100 mg  100 mg Oral DAILY    bumetanide (BUMEX) injection 2 mg  2 mg IntraVENous Q12H    [START ON 3/15/2017] Vancomycin trough to be drawn before 10:00 dose on 3/15. Thanks!   1 Each Other ONCE    vancomycin (VANCOCIN) 1,000 mg in 0.9% sodium chloride (MBP/ADV) 250 mL  1,000 mg IntraVENous Q24H    albuterol-ipratropium (DUO-NEB) 2.5 MG-0.5 MG/3 ML  3 mL Nebulization Q6H RT    polyvinyl alcohol (LIQUIFILM TEARS) 1.4 % ophthalmic solution 2 Drop  2 Drop Both Eyes TID    sodium chloride (NS) flush 10 mL  10 mL InterCATHeter Q24H    sodium chloride (NS) flush 10 mL  10 mL InterCATHeter PRN    sodium chloride (NS) flush 10-40 mL  10-40 mL InterCATHeter Q8H    alteplase (CATHFLO) 1 mg in sterile water (preservative free) 1 mL injection  1 mg InterCATHeter PRN    bacitracin 500 unit/gram packet 1 Packet  1 Packet Topical PRN    0.9% sodium chloride infusion 250 mL  250 mL IntraVENous PRN    ondansetron (ZOFRAN) injection 4 mg  4 mg IntraVENous Q8H PRN    glucose chewable tablet 16 g  4 Tab Oral PRN    dextrose (D50W) injection syrg 12.5-25 g  12.5-25 g IntraVENous PRN    glucagon (GLUCAGEN) injection 1 mg  1 mg IntraMUSCular PRN    sodium chloride (NS) flush 5-10 mL  5-10 mL IntraVENous Q8H    sodium chloride (NS) flush 5-10 mL  5-10 mL IntraVENous PRN    vancomycin dosing per pharmacy   Other Rx Dosing/Monitoring    pantoprazole (PROTONIX) 40 mg in sodium chloride 0.9 % 10 mL injection  40 mg IntraVENous Q24H    piperacillin-tazobactam (ZOSYN) 3.375 g in 0.9% sodium chloride (MBP/ADV) 100 mL  3.375 g IntraVENous Q8H     ______________________________________________________________________  EXPECTED LENGTH OF STAY: 4d 21h  ACTUAL LENGTH OF STAY:          6                 Alfonzo Guerrero MD

## 2017-03-14 NOTE — PROGRESS NOTES
[facility]  -Progress Note     Harman Clark [phone]   3/14/2017      Martha Hamilton M.D. , F.A.C.C.   --------PCP:-Bc Kelley MD   -----Subjective:   . Jacek Lopez is a 80 y.o. female   1:52 AM  In ICU , opens eyes this morning but does not move or squeeze hand  bp improved, on single pressor Levo at 7  bp getting better    Patient Vitals for the past 12 hrs:   Temp Pulse Resp BP SpO2   03/14/17 0114 - - - - 96 %   03/14/17 0100 - 84 26 99/62 97 %   03/14/17 0000 98.3 °F (36.8 °C) 82 15 97/72 93 %   03/13/17 2352 - 84 26 - 96 %   03/13/17 2300 - 84 27 97/62 96 %   03/13/17 2200 - 82 27 91/69 96 %   03/13/17 2100 - 77 26 94/67 (!) 89 %   03/13/17 2000 98.5 °F (36.9 °C) 87 19 92/73 90 %   03/13/17 1940 - 78 26 - 94 %   03/13/17 1939 - - - - 94 %   03/13/17 1915 - 74 26 93/68 90 %   03/13/17 1800 - 85 22 98/64 90 %   03/13/17 1700 - 81 26 101/64 99 %   03/13/17 1619 - - - - 99 %   03/13/17 1600 98.1 °F (36.7 °C) 78 26 102/65 -   03/13/17 1500 - 88 20 107/66 92 %   03/13/17 1400 - 81 19 97/70 92 %        Discussion/Plans/Recs    Respiratory failure with sepsis shock, hypothermia, transaminitis,JUAN  ? AV mass, vegetation-negative blood cultures  Chronic systolic CHF-continue IV bumex, no ACE due to JUAN, no BB due to low bp  Mitral annuloplasty  Atrial fibrillation with pacer  Hold warfarin until CT head done for bleed if remains obtunded    Well known to me for many years  Had MV repair with C-E ring in 1996 and CHF since 2009 along with afib  I last saw her in office in January with some edema  Had ER Visits in November and December with abdominal bloating and pancreatic cyst and gallstones  Now CT with no acute abdominal findings  EF was 36%  septic with unclear source  Pacer pocket looks fine, no induration, swelling etc  i have reviewed echo films, agree that there is possible vegetation on AV but we can also see this as calcium coming off the AV in older patients, thus far [facility] are negative-not sure how this fits into current scenario, unlikely to have BC negative endocarditis, HACEK group infections     More concerning, she is obtunded, off vent on bipap   But in last day there is improvement in that she is breathing on her own and she opens her eyes  Suggest CT head at some point, see if Stroke  If airway becomes safer , AMMON later in week  Remains critically ill       Cardiac Studies/Hx:  ECHO: 3/9/17: EF 30%. Mod diffuse hyypokinesis, Wall motion is dyssynchronous,  RA mildly dilated. LA mod-severe dilated, Mod MR, AV: In the parasternal long axis view, faintly seen spherical echodensity about 5mms in diameter that seems to move in  relation to the anterior aortic valve leaflet, on the aortic side. Could represent a vegetation. Mod TR, mild RV systolic hypertension. 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 PA pressure of 61 with moderate pulmonary hypertension. 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 Moderate to severe TR, moderate to severe pulmonary hypertension. EF 35-40%, marked LAE, mild JOAN    PACER check 2-11-13 39,288 episodes of atrial tach/flutter and 11 with high rates  1/2014 noises noted on pacer, 260 thousands AMS , some are noises and some are AT, some PMT (adjustment made, Dr Abeba Bowie discussed with pacer clinic)  Pacer check in May 2013 showed NSVT and AF the latter as long as 1 day with occasional RVR  Pacer check 9/9/13: persistent AT with V paced.  92%  (asymptomatic with AT)    CATH 10-24-12= Normal cors and EF 50 %. -- A annular ring mitral position. Mitral valve repair on July 29, 1996, for mitral regurgitation with a #32 Turner-Manuel ring. Chronic systolic CHF, reduced ejection fraction. NYHA 1-2  . ..admit 7/16/09 mild chf exacerbation  #32 Turner Manuel ring MV repair 1996         Past Medical History:   Diagnosis Date    Atrial fibrillation (HCC)     Chronic systolic heart failure (HCC)     ef 35-40%; hold ACE due to lower bp    Diabetes mellitus type 2, insulin dependent ([address]     Hypertension     Long term (current) use of anticoagulants     Malignant hypertensive heart disease with CHF ([address]     Mitral regurgitation     Pacemaker 10/25/2012    The pacemaker is a [employer], model # [id number], serial [id number]. 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 Results for orders placed or performed during the hospital encounter of 03/08/17   EKG, 12 LEAD, INITIAL   Result Value Ref Range    Ventricular Rate 80 BPM    Atrial Rate 75 BPM    QRS Duration 146 ms    Q-T Interval 480 ms    QTC Calculation (Bezet) 553 ms    Calculated R Axis 158 degrees    Calculated T Axis 5 degrees    Diagnosis       Ventricular-paced rhythm  When compared with ECG of 09-SEP-2016 17:36,  Vent. rate has decreased BY  30 BPM  Confirmed by Jefry Askew M.D., [facility] ([id number]) on 3/9/2017 10:15:16 AM     Results for orders placed or performed in visit on 03/27/12   AMB POC EKG ROUTINE W/ 12 LEADS, INTER & REP    Narrative    See scanned report.         Vitals:    03/13/17 2352 03/14/17 0000 03/14/17 0100 03/14/17 0114   BP:  97/72 99/62    BP 1 Location:  Right arm     BP Patient Position:  At rest     Pulse: 84 82 84    Resp: 26 15 26    Temp:  98.3 °F (36.8 °C)     SpO2: 96% 93% 97% 96%   Weight:  182 lb 5.1 oz (82.7 kg)     Height:           Objective:    Physical Exam:   Patient Vitals for the past 12 hrs:   Temp Pulse Resp BP SpO2   03/14/17 0114 - - - - 96 %   03/14/17 0100 - 84 26 99/62 97 %   03/14/17 0000 98.3 °F (36.8 °C) 82 15 97/72 93 %   03/13/17 2352 - 84 26 - 96 %   03/13/17 2300 - 84 27 97/62 96 %   03/13/17 2200 - 82 27 91/69 96 %   03/13/17 2100 - 77 26 94/67 (!) 89 %   03/13/17 2000 98.5 °F (36.9 °C) 87 19 92/73 90 %   03/13/17 1940 - 78 26 - 94 %   03/13/17 1939 - - - - 94 %   03/13/17 1915 - 74 26 93/68 90 %   03/13/17 1800 - 85 22 98/64 90 %   03/13/17 1700 - 81 26 101/64 99 %   03/13/17 1619 - - - - 99 %   03/13/17 1600 98.1 °F (36.7 °C) 78 26 102/65 -   03/13/17 1500 - 88 20 107/66 92 %   03/13/17 1400 - 81 19 97/70 92 %      General:  Obtunded, opens eyes to command, little else   ENT, Neck:  no jvd   Chest Wall: Symmetric, no flail   Lung: Coarse bilateral breath sounds   Heart:  normal rate and regular rhythm, no gallops noted, systolic murmur 2/6 at 2nd right intercostal space, at lower left sternal border and at apex, no JVD   Abdomen: nondistended   Extremities: edema 1= non pitting diffuse     Last 24hr Input/Output:    Intake/Output Summary (Last 24 hours) at 03/14/17 0151  Last data filed at 03/14/17 0100   Gross per 24 hour   Intake           1583.8 ml   Output             1050 ml   Net            533.8 ml        Data Review:   Recent Results (from the past 24 hour(s))   BNP    Collection Time: 03/13/17  3:09 AM   Result Value Ref Range     (H) 0 - 100 pg/mL   CBC WITH AUTOMATED DIFF    Collection Time: 03/13/17  3:09 AM   Result Value Ref Range    WBC 4.2 3.6 - 11.0 K/uL    RBC 4.04 3.80 - 5.20 M/uL    HGB 9.0 (L) 11.5 - 16.0 g/dL    HCT 28.2 (L) 35.0 - 47.0 %    MCV 69.8 (L) 80.0 - 99.0 FL    MCH 22.3 (L) 26.0 - 34.0 PG    MCHC 31.9 30.0 - 36.5 g/dL RDW 16.2 (H) 11.5 - 14.5 %    PLATELET 54 (L) 579 - 400 K/uL    NEUTROPHILS 69 32 - 75 %    LYMPHOCYTES 11 (L) 12 - 49 %    MONOCYTES 16 (H) 5 - 13 %    EOSINOPHILS 3 0 - 7 %    BASOPHILS 1 0 - 1 %    ABS. NEUTROPHILS 2.9 1.8 - 8.0 K/UL    ABS. LYMPHOCYTES 0.5 (L) 0.8 - 3.5 K/UL    ABS. MONOCYTES 0.7 0.0 - 1.0 K/UL    ABS. EOSINOPHILS 0.1 0.0 - 0.4 K/UL    ABS. BASOPHILS 0.0 0.0 - 0.1 K/UL    DF MANUAL      RBC COMMENTS TARGET CELLS  2+        RBC COMMENTS ANISOCYTOSIS  1+        RBC COMMENTS MICROCYTOSIS  1+        RBC COMMENTS POLYCHROMASIA  1+        RBC COMMENTS OVALOCYTES  PRESENT        RBC COMMENTS TEARDROP CELLS  PRESENT       METABOLIC PANEL, COMPREHENSIVE    Collection Time: 03/13/17  3:09 AM   Result Value Ref Range    Sodium 142 136 - 145 mmol/L    Potassium 3.3 (L) 3.5 - 5.1 mmol/L    Chloride 106 97 - 108 mmol/L    CO2 30 21 - 32 mmol/L    Anion gap 6 5 - 15 mmol/L    Glucose 156 (H) 65 - 100 mg/dL    BUN 21 (H) 6 - 20 MG/DL    Creatinine 1.17 (H) 0.55 - 1.02 MG/DL    BUN/Creatinine ratio 18 12 - 20      GFR est AA 53 (L) >60 ml/min/1.73m2    GFR est non-AA 44 (L) >60 ml/min/1.73m2    Calcium 8.1 (L) 8.5 - 10.1 MG/DL    Bilirubin, total 2.7 (H) 0.2 - 1.0 MG/DL    ALT (SGPT) 173 (H) 12 - 78 U/L    AST (SGOT) 306 (H) 15 - 37 U/L    Alk.  phosphatase 60 45 - 117 U/L    Protein, total 5.4 (L) 6.4 - 8.2 g/dL    Albumin 2.3 (L) 3.5 - 5.0 g/dL    Globulin 3.1 2.0 - 4.0 g/dL    A-G Ratio 0.7 (L) 1.1 - 2.2     MAGNESIUM    Collection Time: 03/13/17  3:09 AM   Result Value Ref Range    Magnesium 1.6 1.6 - 2.4 mg/dL   PHOSPHORUS    Collection Time: 03/13/17  3:09 AM   Result Value Ref Range    Phosphorus 2.4 (L) 2.6 - 4.7 MG/DL   PROTHROMBIN TIME + INR    Collection Time: 03/13/17  3:09 AM   Result Value Ref Range    INR 1.8 (H) 0.9 - 1.1      Prothrombin time 17.9 (H) 9.0 - 11.1 sec   NUCLEATED RBC    Collection Time: 03/13/17  3:09 AM   Result Value Ref Range    NRBC 3.5 (H) 0  WBC    ABSOLUTE NRBC 0.15 (H) 0.00 - 0.01 K/uL    WBC CORRECTED FOR NR ADJUSTED FOR NUCLEATED RBC'S     TROPONIN I    Collection Time: 03/13/17  3:09 AM   Result Value Ref Range    Troponin-I, Qt. 0.20 (H) <0.05 ng/mL   GLUCOSE, POC    Collection Time: 03/13/17  3:31 AM   Result Value Ref Range    Glucose (POC) 169 (H) 65 - 100 mg/dL    Performed by Brooke Hinton    POC G3 - PUL    Collection Time: 03/13/17  8:32 AM   Result Value Ref Range    FIO2 (POC) 40 %    pH (POC) 7.266 (L) 7.35 - 7.45      pCO2 (POC) 62.5 (H) 35.0 - 45.0 MMHG    pO2 (POC) 98 80 - 100 MMHG    HCO3 (POC) 28.4 (H) 22 - 26 MMOL/L    sO2 (POC) 96 92 - 97 %    Base excess (POC) 1 mmol/L    Site RIGHT RADIAL      Device: BIPAP      PEEP/CPAP (POC) 5 cmH2O    PIP (POC) 13      Pressure support 8 cmH2O    Allens test (POC) YES      Specimen type (POC) ARTERIAL      Total resp.  rate 24     GLUCOSE, POC    Collection Time: 03/13/17 11:48 AM   Result Value Ref Range    Glucose (POC) 118 (H) 65 - 100 mg/dL    Performed by Collette Rio, POC    Collection Time: 03/13/17  4:28 PM   Result Value Ref Range    Glucose (POC) 129 (H) 65 - 100 mg/dL    Performed by Leti Cai    GLUCOSE, POC    Collection Time: 03/13/17  9:37 PM   Result Value Ref Range    Glucose (POC) 122 (H) 65 - 100 mg/dL    Performed by [address] [place] [address], POC    Collection Time: 03/14/17 12:23 AM   Result Value Ref Range    Glucose (POC) 116 (H) 65 - 100 mg/dL    Performed by Jt Sebastian MD 3/14/2017

## 2017-03-14 NOTE — CERTIFICATE OF TERMINAL ILLNESS
NUTRITION COMPLETE ASSESSMENT    RECOMMENDATIONS:   Therapeutic MVI     ? Recheck ammonia  Interventions/Plan:   Food/Nutrient Delivery: Initiate enteral nutrition with Glucerna 1.2 (see goal below)    Assessment:   Reason for Assessment:   [x] Provider Consult    Diet: NPO  Nutritionally Significant Medications: [x] Reviewed & Includes: Bumex, B1, Sodium phosphate  Meal Intake: No data found. Subjective:  She hasn't had anything in almost a week (daughter). Objective:  Ms Daniela Higuera was admitted with Hypothermia. Noted: Cardiogenic and sepsis shock-now off pressors; CM-EF 30%, possible vegetation but unable to do AMMON d/t respiratory failure; intubated 3/10 for acute respiratory failure-extubated yesterday; JUAN resolved. PMHx: CHF, DM 2, HTN, others noted. Weight up 5.8 kg since admission; continues with edema (BLE edema POA-suspect chronic d/t CHF). Phosphorus replaced today. BUN and creatinine trending down d/t resolution of renal failure. Noted ammonia high last week-patient has cirrhotic liver per GI. Question need to recheck lab-could this be contributing to encephalopathy? Total bilirubin trending up-?d/t shock. Ms Daniela Higuera has been without adequate nutrient intake since admission. Recommend enteral nutrition support via DHT and giving 100 mg Thiamine x 7 days as well as daily MVI d/t risk of refeeding syndrome. Discussed with daughter, nursing and during interdisciplinary rounds. Suggested tube feeding: Glucerna 1.2 @ 55 ml/hr with 80 ml water flush q 4 hr. This will provide 1200 ml, 1440 calories, 79 gm protein and 1450 ml free water (tube feeding/flush) per day. Estimated Nutrition Needs:   Kcals/day: 1450 Kcals/day ([id number] (MSJ x 1.3-1.4))  Protein: 79 g (1.2g/kg)  Fluid:  (1 ml/kcal)  Based On: Jareth López  Weight Used:  (Office wt 1/10/17  66.4 kg)    Pt expected to meet estimated nutrient needs:  []   Yes     []  No  [x] Unable to predict at this time    Nutrition Diagnosis:   1. Inadequate oral food/beverage intake related to septic/cardiogenic shock and acute respiratory failure as evidenced by NPO d/t encephalopathy and brief intubation. Goals: Tolerate tube feeding at goal in next 24-48 hr.     Monitoring & Evaluation:    - Enteral/parenteral nutrition intake   - Electrolyte and renal profile, Weight/weight change, Glucose profile    Previous Nutrition Goals Met:  N/A  Previous Recommendations:      N/A    Education & Discharge Needs:   [x] None Identified   [] Identified and addressed    [x] Participated in care plan, discharge planning, and/or interdisciplinary rounds        Cultural, Quaker and ethnic food preferences identified:   None    Skin Integrity: [x]Intact  []Other  Edema: []None [x] Trace BUE's, 1+pitting BLE's  Last BM: 3/11  Food Allergies: [x]None []Other    Anthropometrics:    Weight Loss Metrics 3/14/2017 3/8/2017 1/10/2017 1/4/2017 12/27/2016 12/16/2016 11/12/2016   Today's Wt 182 lb 5.1 oz - 146 lb 3.2 oz 150 lb 157 lb 12.8 oz 152 lb 149 lb 6 oz   BMI - 29.43 kg/m2 23.6 kg/m2 24.21 kg/m2 25.47 kg/m2 24.53 kg/m2 24.11 kg/m2      Last 3 Recorded Weights in this Encounter    03/13/17 0524 03/14/17 0000 03/14/17 1107   Weight: 80 kg (176 lb 5.9 oz) 82.7 kg (182 lb 5.1 oz) 82.7 kg (182 lb 5.1 oz)      Weight Source: Bed  Height: 5' 6\" (167.6 cm),    Body mass index is 29.43 kg/(m^2).   IBW : 59 kg (130 lb), % IBW (Calculated): 140.25 %   ,      Labs:    Lab Results   Component Value Date/Time    Sodium 141 03/14/2017 04:58 AM    Potassium 3.7 03/14/2017 04:58 AM    Chloride 107 03/14/2017 04:58 AM    CO2 28 03/14/2017 04:58 AM    Glucose 111 03/14/2017 04:58 AM    BUN 23 03/14/2017 04:58 AM    Creatinine 1.17 03/14/2017 04:58 AM    Calcium 8.7 03/14/2017 04:58 AM    Magnesium 1.9 03/14/2017 04:58 AM    Phosphorus 2.2 03/14/2017 04:58 AM    Albumin 2.4 03/14/2017 04:58 AM     Lab Results   Component Value Date/Time    Glucose (POC) 102 03/14/2017 08:11 AM      Lab Results   Component Value Date/Time    ALT (SGPT) 153 03/14/2017 04:58 AM    AST (SGOT) 217 03/14/2017 04:58 AM    Alk.  phosphatase 61 03/14/2017 04:58 AM    Bilirubin, total 2.7 03/14/2017 04:58 AM        [address] [facility]

## 2017-03-14 NOTE — PROGRESS NOTES
1930: Bedside and Verbal shift change report given to Pina Shine RN (oncoming nurse) by Frank Gregory RN and Sofia Giraldo RN (offgoing nurse). Report included the following information SBAR, Kardex, ED Summary, Procedure Summary, Intake/Output, MAR, Accordion, Recent Results and Med Rec Status. 2000: Assessment complete. Patient does not follow commands on Bi-pap. Does open eyes to voice. On Levo gtt for MAP >65. No pain or complaints. 0150: Dr. Chriss Richred in to see patient. Modified Levo order to Keep MAP >60.  0500: Levo gtt turned off.    0730: Bedside and Verbal shift change report given to Maxime Manuel (oncoming nurse) by Luciana Tellez (offgoing nurse). Report included the following information SBAR, Kardex, ED Summary, Procedure Summary, Intake/Output, MAR, Accordion, Recent Results and Med Rec Status. Shift Summary: Patient remains on Bi-pap. Neuro remains the same, responding to voice but no command following. Patients MAP >60 with Levo gtt turned off. Richardson patent. Blood sugars remains WDL. Phosphate being replaced per protocol.  QUINTON cam.

## 2017-03-14 NOTE — PROGRESS NOTES
Name: Sheridan Diaz   MRN: [id number]  : 1930      ASSESSMENT/PLAN:    JUAN- likely ATN in the setting of hypotension/also recent IV Contrast on 3/8. Creatinine slowly improving. Chronic systolic CHF (EF 77%)/VVZ pleural effusions/enlarged LA/RV Systolic HTN/Mod MR    Fluid overload- diuresing    Elevated LFTs- likely ischemic hepatitis      Hypophos - replete IV    Subjective:  Extubated overnight>>BiPAP this am    ROS:   UTO    Exam:  Visit Vitals    BP (!) 79/59    Pulse 79    Temp 97.4 °F (36.3 °C)    Resp 25    Ht 5' 6\" (1.676 m)    Wt 82.7 kg (182 lb 5.1 oz)  Comment: RN weighed    SpO2 98%    BMI 29.43 kg/m2     Poorly arousable.   Dec BS at bases  RRR  Soft, distended, NT  +edema-improved  Richardson+    Current Facility-Administered Medications   Medication Dose Route Frequency Last Dose    NOREPINephrine (LEVOPHED) 8,000 mcg in dextrose 5% 250 mL infusion  2-30 mcg/min IntraVENous TITRATE Stopped at 17 0459    sodium phosphate 6.63 mmol in 0.9% sodium chloride 250 mL infusion   IntraVENous PRN      thiamine (B-1) tablet 100 mg  100 mg Oral DAILY      vancomycin (VANCOCIN) 1,000 mg in 0.9% sodium chloride (MBP/ADV) 250 mL  1,000 mg IntraVENous Q24H 1,000 mg at 17 1000    albuterol-ipratropium (DUO-NEB) 2.5 MG-0.5 MG/3 ML  3 mL Nebulization Q6H RT 3 mL at 17 0741    polyvinyl alcohol (LIQUIFILM TEARS) 1.4 % ophthalmic solution 2 Drop  2 Drop Both Eyes TID 2 Drop at 17 1005    bumetanide (BUMEX) injection 1 mg  1 mg IntraVENous BID 1 mg at 17 0833    sodium chloride (NS) flush 10 mL  10 mL InterCATHeter Q24H 10 mL at 17 1005    sodium chloride (NS) flush 10 mL  10 mL InterCATHeter PRN      sodium chloride (NS) flush 10-40 mL  10-40 mL InterCATHeter Q8H 10 mL at 17 0612    alteplase (CATHFLO) 1 mg in sterile water (preservative free) 1 mL injection  1 mg InterCATHeter PRN      bacitracin 500 unit/gram packet 1 Packet  1 Packet Topical PRN      0.9% sodium chloride infusion 250 mL  250 mL IntraVENous PRN      ondansetron (ZOFRAN) injection 4 mg  4 mg IntraVENous Q8H PRN 4 mg at 03/10/17 1526    glucose chewable tablet 16 g  4 Tab Oral PRN      dextrose (D50W) injection syrg 12.5-25 g  12.5-25 g IntraVENous PRN 12.5 g at 03/10/17 0537    glucagon (GLUCAGEN) injection 1 mg  1 mg IntraMUSCular PRN      sodium chloride (NS) flush 5-10 mL  5-10 mL IntraVENous Q8H 10 mL at 03/14/17 0612    sodium chloride (NS) flush 5-10 mL  5-10 mL IntraVENous PRN      vancomycin dosing per pharmacy   Other Rx Dosing/Monitoring      pantoprazole (PROTONIX) 40 mg in sodium chloride 0.9 % 10 mL injection  40 mg IntraVENous Q24H 40 mg at 03/14/17 0026    piperacillin-tazobactam (ZOSYN) 3.375 g in 0.9% sodium chloride (MBP/ADV) 100 mL  3.375 g IntraVENous Q8H 3.375 g at 03/14/17 7543       Labs/Data:    Lab Results   Component Value Date/Time    WBC 2.9 03/14/2017 04:58 AM    HGB 8.5 03/14/2017 04:58 AM    HCT 27.6 03/14/2017 04:58 AM    PLATELET 54 35/30/4951 04:58 AM    MCV 71.1 03/14/2017 04:58 AM       Lab Results   Component Value Date/Time    Sodium 141 03/14/2017 04:58 AM    Potassium 3.7 03/14/2017 04:58 AM    Chloride 107 03/14/2017 04:58 AM    CO2 28 03/14/2017 04:58 AM    Anion gap 6 03/14/2017 04:58 AM    Glucose 111 03/14/2017 04:58 AM    BUN 23 03/14/2017 04:58 AM    Creatinine 1.17 03/14/2017 04:58 AM    BUN/Creatinine ratio 20 03/14/2017 04:58 AM    GFR est AA 53 03/14/2017 04:58 AM    GFR est non-AA 44 03/14/2017 04:58 AM    Calcium 8.7 03/14/2017 04:58 AM       Wt Readings from Last 3 Encounters:   03/14/17 82.7 kg (182 lb 5.1 oz)   01/10/17 66.3 kg (146 lb 3.2 oz)   01/04/17 68 kg (150 lb)         Intake/Output Summary (Last 24 hours) at 03/14/17 1144  Last data filed at 03/14/17 0900   Gross per 24 hour Intake           568.27 ml   Output             1100 ml   Net          -531.73 ml       Patient seen and examined. Chart reviewed. Labs, data and other pertinent notes reviewed in last 24 hrs.     PMH/SH/FH reviewed and unchanged compared to H&P    Discussed with MECHELLE Escoto MD

## 2017-03-14 NOTE — PROGRESS NOTES
Occupational Therapy Note  3/14/2017    Orders acknowledged, chart reviewed. Pt and family currently meeting with physician at bedside. Spoke with PT; per RN, pt unable to tolerate coming off BiPAP at this time. Not appropriate to participate with therapy. Will hold and follow-up tomorrow as medically appropriate.     Misti Caballero, OTR/L

## 2017-03-14 NOTE — PROGRESS NOTES
0730:  Bedside shift change report given to Linton Lefort, RN/ Rod Mayer RN (oncoming nurse) by Shay Lewis RN (offgoing nurse). Report included the following information SBAR, Kardex, Intake/Output, MAR, Recent Results, Cardiac Rhythm A fib/atrial paced/PVC's and Alarm Parameters . Shift Summary: Patient on Bi-pap to correct blood gases however, tolerated 4L-6L on NC for 2 hours in the am. Patient responding to voice with some command following. Richardson patent. Blood sugars remains WDL. Dobhoff placement unsuccessful, radiologist recommended to place in IR when stable enough to travel.

## 2017-03-14 NOTE — PROGRESS NOTES
Palliative Medicine Consult  Juwan: [phone]    Patient Name: Chase Al  YOB: 1930    Date of Initial Consult: 3/13/17  Reason for Consult: Care decisions   Requesting Provider: Rolf  Primary Care Physician: Kristine Yuen MD      SUMMARY:   Chase Al \"Warner\" is a 80 y.o. with a past history of systolic CHF (EF 77% on 9/7/89), AICD, DM, HTN, a fib who was admitted on 3/8/2017 from her PCP office w/ epigastric pain and nausea, in ED was hypothermic to 91.4 degrees and hypotensive. Remains in ICU on empiric abx, echo concerning for possible aortic valve vegetation but BCx x 3 NGTD. May need AMMON if stable. Being diuresed. CT abd showing gallstones, no  Cholecystitis obstruction. GI has signed off but plan on seeing for EGD when stable. Pt was intubated 3/10-3/12, had large mucous plug. Renal following for JUAN. Pulm managing BIPAP w/ resp acidosis this AM. Known well to Dr Alissa Raya, no CT head has been able to be done yet. Current medical issues leading to Palliative Medicine involvement include:care decisions. Social: Pt was 1 of 13 children, she has 7 children and about 20 grandchildren, and great grands. Very involved in the [facility] in [facility] where she lives and is very loved. Before admission was alert/oriented and high functioning. No AMD but family tends to defer to Akbar Nagy ([phone]) (dtr). PALLIATIVE DIAGNOSES:   1. Shortness of breath on BIPAP  2. Lethargy   3. Hypothermia and hypotension   4. Edema   5. Feeding difficulties due to mental status, dobhoff placed- to be re-evaluated       PLAN:   1. Along w/ Misti arciniega, meet w/ pt, granddtrs Krysten Jewell, dtrs Tushar (by phone) and their Ahsan Abbott.    2. Family has been getting good information and Dr Deidre Goldberg was able to speak to the granddtrs today, however others have a lot of questions about mental status, etc. Answer the questions to the best of the providers' knowledge, discussing that there are some unknowns at this time and there will be more tests as she stabilizes. 3. I think this shows that a meeting w/ at least the majority of the children and the grandkids who are most involved w/ be beneficial. Lovemariana Danielle agrees, and family will get back w/ me as far as a time that works. 4. Dr Naty Zamora broached code status a bit w/ granddtrs and this is something that can be explained more at meeting. 5. Goals are clear for recovery as possible, and pt is making gains- so the object of meeting will not be to talk about shifting care at this time, but what if pt does not improve in the way that we hope? Prior to admission pt wast highly functional.   6. Spiritual care important to pt and family so will ensure pt's  or  will be present. 7. Asked family to appoint a 'point person' so to speak, which may make communication easier given the large number of people that are involved and love Warner.  8. Will cont to eval mental status- sepsis, high CO2, other metabolic. 9. Will cont to follow. Family meeting pending. 10. Communicated plan of care with: Palliative IDT; Sharyle Go RN; Dr Erick Julien / TREATMENT PREFERENCES:   [====Goals of Care====]  GOALS OF CARE:  Patient / health care proxy stated goals: recovery as possible       TREATMENT PREFERENCES:   Code Status: Full Code    Advance Care Planning:  Advance Care Planning 3/10/2017   Patient's Healthcare Decision Maker is: Legal Next of Kin   Confirm Advance Directive None   Patient Would Like to Complete Advance Directive No       Other:    The palliative care team has discussed with patient / health care proxy about goals of care / treatment preferences for patient.  [====Goals of Care====]    Family incl dtrs Destinee Druze and graddtr Michelle.      HISTORY:         HPI/SUBJECTIVE:    The patient is:   [] Verbal and participatory  [x] Non-participatory due to: medical condition Pt on BIPAP again, was off on NC earlier today but was acidotic on ABG. Dobhoff placed but there is concern about placement. BCx remain NGTD. Clinical Pain Assessment (nonverbal scale for severity on nonverbal patients):   [++++ Clinical Pain Assessment++++]  [++++Pain Severity++++]: Pain: 0  [++++Pain Character++++]:   [++++Pain Duration++++]:   [++++Pain Effect++++]:   [++++Pain Factors++++]:   [++++Pain Frequency++++]:   [++++Pain Location++++]:   [++++ Clinical Pain Assessment++++]     FUNCTIONAL ASSESSMENT:     Palliative Performance Scale (PPS):  PPS: 30       PSYCHOSOCIAL/SPIRITUAL SCREENING:     Advance Care Planning:  Advance Care Planning 3/10/2017   Patient's Healthcare Decision Maker is: Legal Next of Kin   Confirm Advance Directive None   Patient Would Like to Complete Advance Directive No        Any spiritual / Restorationism concerns:  [] Yes /  [x] No    Caregiver Burnout:  [] Yes /  [x] No /  [] No Caregiver Present      Anticipatory grief assessment:   [x] Normal  / [] Maladaptive       ESAS Anxiety:   Cannot obtain due to patient factors    ESAS Depression:   Cannot obtain due to patient factors       REVIEW OF SYSTEMS:     Positive and pertinent negative findings in ROS are noted above in HPI. The following systems were [] reviewed / [x] unable to be reviewed as noted in HPI  Other findings are noted below. Systems: constitutional, ears/nose/mouth/throat, respiratory, gastrointestinal, genitourinary, musculoskeletal, integumentary, neurologic, psychiatric, endocrine. Positive findings noted below. Modified ESAS Completed by: provider   Fatigue: 8 Drowsiness: 8     Pain: 0           Dyspnea: 0           Stool Occurrence(s): 1        PHYSICAL EXAM:     From RN flowsheet:  Wt Readings from Last 3 Encounters:   03/14/17 182 lb 5.1 oz (82.7 kg)   01/10/17 146 lb 3.2 oz (66.3 kg)   01/04/17 150 lb (68 kg)     Blood pressure 96/66, pulse 81, temperature 97.6 °F (36.4 °C), resp.  rate 23, height 5' 6\" (1.676 m), weight 182 lb 5.1 oz (82.7 kg), SpO2 100 %. Pain Scale 1: Adult Nonverbal Pain Scale  Pain Intensity 1: 0     Pain Location 1: Abdomen  Pain Orientation 1: Mid     Pain Intervention(s) 1: Medication (see MAR)    Constitutional: lethargic, opens eyes, not following commands   Eyes: pupils equal, anicteric  ENMT: no nasal discharge, moist mucous membranes  Cardiovascular: regular rhythm  Respiratory: breathing not labored, on BIPAP  Gastrointestinal: a bit tense, +BS  Musculoskeletal: no deformity, no tenderness to palpation  Skin: warm, dry  Neurologic:lethargic        HISTORY:     Principal Problem:    Hypothermia (3/8/2017)      Past Medical History:   Diagnosis Date    Atrial fibrillation (HCC)     Chronic systolic heart failure (HCC)     ef 35-40%; hold ACE due to lower bp    Diabetes mellitus type 2, insulin dependent ([facility] [address]     Hypertension     Long term (current) use of anticoagulants     Malignant hypertensive heart disease with CHF ([facility] [address]     Mitral regurgitation     Pacemaker 10/25/2012    The pacemaker is a [employer] , model # [id number], serial [id number]. 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, 1996    # 28 Turner-Manuel    Thyroid disease     TR (tricuspid regurgitation)     moderate to severe echo 2009      Past Surgical History:   Procedure Laterality Date    CARDIAC SURG PROCEDURE UNLIST  1996    valve replacement    HX HEART CATHETERIZATION  10/4/2012    normal cors, LVEF 50%    HX HEART VALVE SURGERY      HX PACEMAKER      INS PPM/ICD LED DUAL ONLY  10/25/2012         INS PPM/ICD LED DUAL ONLY  9/9/2016           Family History   Problem Relation Age of Onset    Cancer Sister      breast    Heart Disease Brother     Heart Disease Brother     Diabetes Son     Stroke Son       History reviewed, no pertinent family history.   Social History   Substance Use Topics    Smoking status: Never Smoker    Smokeless tobacco: Never Used    Alcohol use No     No Known Allergies   Current Facility-Administered Medications   Medication Dose Route Frequency    NOREPINephrine (LEVOPHED) 8,000 mcg in dextrose 5% 250 mL infusion  2-30 mcg/min IntraVENous TITRATE    thiamine (B-1) tablet 100 mg  100 mg Oral DAILY    bumetanide (BUMEX) injection 2 mg  2 mg IntraVENous Q12H    [START ON 3/15/2017] Vancomycin trough to be drawn before 10:00 dose on 3/15. Thanks!   1 Each Other ONCE    vancomycin (VANCOCIN) 1,000 mg in 0.9% sodium chloride (MBP/ADV) 250 mL  1,000 mg IntraVENous Q24H    albuterol-ipratropium (DUO-NEB) 2.5 MG-0.5 MG/3 ML  3 mL Nebulization Q6H RT    polyvinyl alcohol (LIQUIFILM TEARS) 1.4 % ophthalmic solution 2 Drop  2 Drop Both Eyes TID    sodium chloride (NS) flush 10 mL  10 mL InterCATHeter Q24H    sodium chloride (NS) flush 10 mL  10 mL InterCATHeter PRN    sodium chloride (NS) flush 10-40 mL  10-40 mL InterCATHeter Q8H    alteplase (CATHFLO) 1 mg in sterile water (preservative free) 1 mL injection  1 mg InterCATHeter PRN    bacitracin 500 unit/gram packet 1 Packet  1 Packet Topical PRN    0.9% sodium chloride infusion 250 mL  250 mL IntraVENous PRN    ondansetron (ZOFRAN) injection 4 mg  4 mg IntraVENous Q8H PRN    glucose chewable tablet 16 g  4 Tab Oral PRN    dextrose (D50W) injection syrg 12.5-25 g  12.5-25 g IntraVENous PRN    glucagon (GLUCAGEN) injection 1 mg  1 mg IntraMUSCular PRN    sodium chloride (NS) flush 5-10 mL  5-10 mL IntraVENous Q8H    sodium chloride (NS) flush 5-10 mL  5-10 mL IntraVENous PRN    vancomycin dosing per pharmacy   Other Rx Dosing/Monitoring    pantoprazole (PROTONIX) 40 mg in sodium chloride 0.9 % 10 mL injection  40 mg IntraVENous Q24H    piperacillin-tazobactam (ZOSYN) 3.375 g in 0.9% sodium chloride (MBP/ADV) 100 mL  3.375 g IntraVENous Q8H          LAB AND IMAGING FINDINGS:     Lab Results   Component Value Date/Time    WBC 2.9 03/14/2017 04:58 AM    HGB 8.5 03/14/2017 04:58 AM    PLATELET 54 41/81/7272 04:58 AM     Lab Results   Component Value Date/Time    Sodium 141 03/14/2017 04:58 AM    Potassium 3.7 03/14/2017 04:58 AM    Chloride 107 03/14/2017 04:58 AM    CO2 28 03/14/2017 04:58 AM    BUN 23 03/14/2017 04:58 AM    Creatinine 1.17 03/14/2017 04:58 AM    Calcium 8.7 03/14/2017 04:58 AM    Magnesium 1.9 03/14/2017 04:58 AM    Phosphorus 2.2 03/14/2017 04:58 AM      Lab Results   Component Value Date/Time    AST (SGOT) 217 03/14/2017 04:58 AM    Alk. phosphatase 61 03/14/2017 04:58 AM    Protein, total 5.5 03/14/2017 04:58 AM    Albumin 2.4 03/14/2017 04:58 AM    Globulin 3.1 03/14/2017 04:58 AM     Lab Results   Component Value Date/Time    INR 1.6 03/14/2017 04:58 AM    Prothrombin time 16.5 03/14/2017 04:58 AM    aPTT 37 08/15/2016 12:00 AM      No results found for: IRON, FE, TIBC, IBCT, PSAT, FERR   No results found for: PH, PCO2, PO2  No components found for: [place]   Lab Results   Component Value Date/Time    CK 88 09/01/2012 04:00 AM    CK - MB 2.2 09/01/2012 04:00 AM                Total time: 35min   Counseling / coordination time: 30 min   > 50% counseling / coordination?: yes    Prolonged service was provided for  []30 min   []75 min in face to face time in the presence of the patient. Time Start:   Time End:   Note: this can only be billed with 40831 (initial) or 75184 (follow up). If multiple start / stop times, list each separately.

## 2017-03-14 NOTE — PROGRESS NOTES
Met with pt's family - pt's daughter Vester Lipoma and daughter Nathan Antonio (by phone), and granddaughters Zoey Paul and Isiah Tony. Pt's  who was visiting also joined us at bedside. Pt occasionally opened her eyes, but interaction with pt was limited due to her condition. Dr Jefferson Horn spoke with family and offered again to meet with family (especially with pt's 7 children, even if some needed to join by phone). Family who was present appeared receptive and expressed an interest in having good communication related to pt's medical condition and her care. Family appears very loving and supportive of patient. Pt's granddaughter Isiah Tony shares a birthday with patient, and showed us pictures of her and patient on their birthday this past February. Family plans to let Dr Jefferson Horn know of a time for a family meeting. Pt and family appears to have spiritual support from pt's  [facility], [facility] in [place]). They have been appreciative of  visits as well. Assured  and family of availability of  support as needed/desired.     Misti Goldberg, Palliative

## 2017-03-15 NOTE — PROGRESS NOTES
Name: Adebayo Ward   MRN: [id number]  : 1930      ASSESSMENT/PLAN:    JUAN- likely ATN in the setting of hypotension/also recent IV Contrast on 3/8. Creatinine slowly improving. Chronic systolic CHF (EF 76%)/GII pleural effusions/enlarged LA/RV Systolic HTN/Mod MR    Fluid overload- diuresing    Elevated LFTs- likely ischemic hepatitis      Hypophos/hypokalemia - replete IV    Subjective:    Awake. Not talking. ROS:   UTO    Exam:  Visit Vitals    /65    Pulse 81    Temp 97.5 °F (36.4 °C)    Resp 22    Ht 5' 6\" (1.676 m)    Wt 80.9 kg (178 lb 5.6 oz)    SpO2 100%    BMI 28.79 kg/m2     NAD  Dec BS at bases  RRR  Soft, distended, NT  +edema-improved  Richardson+    Current Facility-Administered Medications   Medication Dose Route Frequency Last Dose    potassium chloride 20 mEq in 50 ml IVPB  20 mEq IntraVENous Q1H      potassium phosphate 20 mmol in 0.9% sodium chloride 250 mL infusion   IntraVENous ONCE      thiamine (B-1) tablet 100 mg  100 mg Oral DAILY Stopped at 17 1200    bumetanide (BUMEX) injection 2 mg  2 mg IntraVENous Q12H 2 mg at 176    Vancomycin trough to be drawn before 10:00 dose on 3/15. Thanks!   1 Each Other ONCE      vancomycin (VANCOCIN) 1,000 mg in 0.9% sodium chloride (MBP/ADV) 250 mL  1,000 mg IntraVENous Q24H 1,000 mg at 17 1000    albuterol-ipratropium (DUO-NEB) 2.5 MG-0.5 MG/3 ML  3 mL Nebulization Q6H RT 3 mL at 03/15/17 0959    polyvinyl alcohol (LIQUIFILM TEARS) 1.4 % ophthalmic solution 2 Drop  2 Drop Both Eyes TID 2 Drop at 03/15/17 1008    sodium chloride (NS) flush 10 mL  10 mL InterCATHeter Q24H 10 mL at 17 1005    sodium chloride (NS) flush 10 mL  10 mL InterCATHeter PRN      sodium chloride (NS) flush 10-40 mL  10-40 mL InterCATHeter Q8H 10 mL at 03/15/17 0607    alteplase (CATHFLO) 1 mg in sterile water (preservative free) 1 mL injection  1 mg InterCATHeter PRN      bacitracin 500 unit/gram packet 1 Packet  1 Packet Topical PRN      0.9% sodium chloride infusion 250 mL  250 mL IntraVENous PRN      ondansetron (ZOFRAN) injection 4 mg  4 mg IntraVENous Q8H PRN 4 mg at 03/10/17 1526    glucose chewable tablet 16 g  4 Tab Oral PRN      dextrose (D50W) injection syrg 12.5-25 g  12.5-25 g IntraVENous PRN 12.5 g at 03/10/17 0537    glucagon (GLUCAGEN) injection 1 mg  1 mg IntraMUSCular PRN      sodium chloride (NS) flush 5-10 mL  5-10 mL IntraVENous Q8H 10 mL at 03/15/17 0608    sodium chloride (NS) flush 5-10 mL  5-10 mL IntraVENous PRN      vancomycin dosing per pharmacy   Other Rx Dosing/Monitoring      pantoprazole (PROTONIX) 40 mg in sodium chloride 0.9 % 10 mL injection  40 mg IntraVENous Q24H 40 mg at 03/15/17 0039    piperacillin-tazobactam (ZOSYN) 3.375 g in 0.9% sodium chloride (MBP/ADV) 100 mL  3.375 g IntraVENous Q8H 3.375 g at 03/15/17 0606       Labs/Data:    Lab Results   Component Value Date/Time    WBC 2.4 03/15/2017 04:43 AM    HGB 8.6 03/15/2017 04:43 AM    HCT 27.2 03/15/2017 04:43 AM    PLATELET 44 90/40/9777 04:43 AM    MCV 70.5 03/15/2017 04:43 AM       Lab Results   Component Value Date/Time    Sodium 145 03/15/2017 04:43 AM    Potassium 3.1 03/15/2017 04:43 AM    Chloride 108 03/15/2017 04:43 AM    CO2 29 03/15/2017 04:43 AM    Anion gap 8 03/15/2017 04:43 AM    Glucose 90 03/15/2017 04:43 AM    BUN 23 03/15/2017 04:43 AM    Creatinine 1.14 03/15/2017 04:43 AM    BUN/Creatinine ratio 20 03/15/2017 04:43 AM    GFR est AA 55 03/15/2017 04:43 AM    GFR est non-AA 45 03/15/2017 04:43 AM    Calcium 8.6 03/15/2017 04:43 AM       Wt Readings from Last 3 Encounters:   03/15/17 80.9 kg (178 lb 5.6 oz)   01/10/17 66.3 kg (146 lb 3.2 oz)   01/04/17 68 kg (150 lb)         Intake/Output Summary (Last 24 hours) at 03/15/17 1011  Last data filed at 03/15/17 0700   Gross per 24 hour   Intake              550 ml   Output             2710 ml   Net            -2160 ml       Patient seen and examined. Chart reviewed. Labs, data and other pertinent notes reviewed in last 24 hrs.     PMH/SH/FH reviewed and unchanged compared to H&P    Discussed with MECHELLE Good MD

## 2017-03-15 NOTE — PROCEDURES
PICC Placement Note    PRE-PROCEDURE VERIFICATION  Correct Procedure: yes  Correct Site:  yes  Temperature: Temp: 97.7 °F (36.5 °C), Temperature Source: Temp Source: Axillary  Recent Labs      03/15/17   0443   BUN  23*   CREA  1.14*   PLT  44*   INR  1.4*   WBC  2.4*     Allergies: Review of patient's allergies indicates no known allergies. Education materials, including PICC Booklet, for PICC Care given to patient: yes. See Patient Education activity for further details. PROCEDURE DETAIL  A triple lumen PICC line was started for antibiotic therapy. The following documentation is in addition to the PICC properties in the lines/airways flowsheet :  Lot #: [id number]  Was xylocaine 1% used intradermally:  yes  Catheter Length: 41 (cm); 0 cm externally  Vein Selection for PICC:right basilic  Central Line Bundle followed yes  Complication Related to Insertion: none      PICC placement verified by chest xray. Results:  A right upper extremity PICC line tip  is in the region of the superior vena cava. PICC may be used.     Report given to nurse, Rosalio Ennis RN   Vascular Access Team

## 2017-03-15 NOTE — PROGRESS NOTES
Pharmacist Note - Vancomycin Dosing  Therapy day 8  Indication: Sepsis (hypotensive and hypothermia) with concerns of vegetation on aortic valve  Current regimen: Vancomycin and zosyn    A Trough Level resulted at 18.1 mcg/mL which was obtained ~12 hrs post-dose. Goal trough: 15 - 20 mcg/mL       Plan: Continue current regimen. Pharmacy will continue to monitor this patient daily for changes in clinical status and renal function.        Chuy Turner, PharmD  PGY1 Pharmacy Resident

## 2017-03-15 NOTE — PROGRESS NOTES
1930: Bedside and Verbal shift change report given to Babar Aleman RN (oncoming nurse) by [address] [place] [address] and April Louise RN (offgoing nurse). Report included the following information SBAR, Kardex, ED Summary, Procedure Summary, Intake/Output, MAR, Accordion, Recent Results and Med Rec Status. 2000: Assessment complete. Patient in bed, responding to voice. Patient is following simple commands and withdraws in all four extremities. Patient on bi-pap. Richardson patent. 0400: Patient back from CT. No problems traveling. Patient more awake this AM. Following commands. 0600: Critical platelets of 44 called to Team 6. No orders received. 0730: Bedside and Verbal shift change report given to Delia Anne (oncoming nurse) by Addison Dickens (offgoing nurse). Report included the following information SBAR, Kardex, ED Summary, Procedure Summary, Intake/Output, MAR, Accordion, Recent Results and Med Rec Status.

## 2017-03-15 NOTE — PROGRESS NOTES
0730: Bedside and Verbal shift change report given to Carlene Irving RN (oncoming nurse) by Ha Almanza RN (offgoing nurse). Report included the following information SBAR, Kardex, Procedure Summary, Intake/Output, MAR, Recent Results, Med Rec Status, Cardiac Rhythm AFib/Paced and Alarm Parameters . Shift Summary: Uneventful shift, VSS, patient oriented to person, intermittent confusion, right PICC placed. Tolerated multiple breaks from BiPap on 4 L nasal cannula. No complaints of pain. Family support provided. 1930: Bedside and Verbal shift change report given to Ha Almanza RN (oncoming nurse) by Carlene Irving RN (offgoing nurse). Report included the following information SBAR, Kardex, Procedure Summary, MAR, Recent Results, Med Rec Status, Cardiac Rhythm Afib/Paced and Alarm Parameters .

## 2017-03-15 NOTE — PROGRESS NOTES
Physical Therapy  3/15/17    Patient chart reviewed. Continues to present with respiratory distress and on BiPAP. Will follow as able/appropriate. Bharati Lowery, PT, DPT

## 2017-03-15 NOTE — PROGRESS NOTES
Occupational Therapy    Chart reviewed. Pt continues to be in respiratory distress, on BiPAP. Will defer evaluation, follow up as able and appropriate.  Tyler England OTR/L

## 2017-03-15 NOTE — PROGRESS NOTES
Hospitalist Progress Note  Melina Vaughn MD  Office: [phone]        Date of Service:  3/15/2017  NAME:  Adebayo Ward  :  1930  MRN:  [id number]      Admission Summary:   80-year-old female with history of chronic systolic congestive heart failure, atrial fibrillation, diabetes mellitus, hypertension, chronic anticoagulation use, history of pacemaker and ICD. She was sent from her primary care physician to the emergency room to be evaluated. History from the patient and her daughters at the bedside, also from the records. Per the daughter, she has been having on and off abdominal pain, epigastric pain for a while, over the last 3 days has been mostly pronounced. She said her pain at times is 9/10 in severity, it is nonradiating, associated with some nausea, but no vomiting. She denies any diarrhea. At her PCP's office,   they could not get her temperature, so they sent her to the emergency room. In the ER she has been hypothermic, her temperature was 91.4 on initial presentation. Also she was hypotensive. Her blood pressure   systolic was 04/02, so she was given about 1.5 liters of IV fluid, and now she is getting 1 bottle of albumin. She was placed on warming blanket, Lizzy Hugger. She denies any dysuria or frequency. Interval history / Subjective:   Pt seen and examined  Awake, taken off bipap this AM  Follows simple commands, moves all 4 extremities  More awake today     Assessment & Plan:     -  Hypothermia and hypotension. Concern for sepsis. On empiric IV antibiotics, started on Zosyn and vancomycin. Followup on blood culture results NGSF.  Off and on Lizzy Hugger for her hypothermia.     -Acute Respiratory failure with Hypercapnia due to Pleural effusion/atelectasis  Continue BIPAP, nebs   PCCM input aprpeciated  Attempted off BIPAP but abg showed respiratory acidosis, BIPAP replaced  Diuresis     -  Pleural effusions, bilateral: improving on CXR, continue antibiotics, may be source of infection +/-  CHF volume overload. - Sepsis:  Unknown source, most likely lung with bilateral pleural effusions, possibly infectious. She had caren of leukocytes to 1.9, improving. She was afebrile overnight, low BP requiring pressors. Weaning dunia. Continue zosyn, vanco. Monitor cultures, preliminary blood, urine cultures no growth so far. - Acute on Chronic Systolic congestive heart failure NYHA III/IV. With fluid overload. Echo 3/9 with 30% EF, diffuse hypokinesis. Bumex added, strict I/O, monitor weight.   - History of pacer.   - Valvular disease: Moderate mitral regurgitation, right ventricular systolic dysfunction,   as well as enlarged left atrium.  -  Valvular vegetation on aortic valve, questionable: Blood cultures no growth preliminary. AMMON later this week  - Diuresis per nephrology     - Hematuria: may be related to traumatic galarza placement with INR max 5.5, today 2.2. Urine noted on UA as yellow on 3/8, Galarza placed 3/9 and hematuria reported 3/10 UA. -  JUAN:  From  ATN and contrast nephropathy/Hypotension most likely etiology. Ischemic heart disease, hypotension; and exposure to IV contrast. Appreciate nephro consult recommendations. Bumex, avoid nephrotoxins, I/Os,       - Hypoglycemia/diabetes mellitus. Resolved. Holding her Lantus and on hypoglycemia protocol. Her blood sugar improved after D50. Stable, off D10 now - resolved    -   Ischemic hepatitis: Epigastric pain and tenderness. Consulted GI. Plans for EGD when stable or outpatient. Continue proton pump inhibitor for now. Coagulopathy.  - Holding her Coumadin and monitor daily INR and restart her Coumadin when INR improved. INR increased, she received Vitamin K x 1. With hematuria will give FFP and monitor INR. Pancytopenia. -Appears to be chronic. However, patient and family unaware of her previous lab results. Will monitor her labs closely. Code status: Full    Care Plan discussed with: Patient/Family and Nurse  Disposition: TBD     Hospital Problems  Date Reviewed: 3/8/2017          Codes Class Noted POA    * (Principal)Hypothermia ICD-10-CM: T68. XXXA  ICD-9-CM: 991.6  3/8/2017 Yes                Review of Systems:   A comprehensive review of systems was negative. Vital Signs:    Last 24hrs VS reviewed since prior progress note.  Most recent are:  Visit Vitals    BP 98/65    Pulse 83    Temp 97.4 °F (36.3 °C)    Resp 26    Ht 5' 6\" (1.676 m)    Wt 80.9 kg (178 lb 5.6 oz)    SpO2 100%    BMI 28.79 kg/m2         Intake/Output Summary (Last 24 hours) at 03/15/17 1027  Last data filed at 03/15/17 0700   Gross per 24 hour   Intake              550 ml   Output             2710 ml   Net            -2160 ml        Physical Examination:             Constitutional:   On BIPAP, awake, follows commands   ENT:   Neck supple,    Resp:  improved air entry   CV:  irregular    GI:  Soft, non distended,  normoactive bowel sounds, no hepatosplenomegaly     Musculoskeletal:  1+ edema    Neurologic:   awake, moves all 4 extremities             Data Review:    Review and/or order of clinical lab test      Labs:     Recent Labs      03/15/17   0443  03/14/17   0458   WBC  2.4*  2.9*   HGB  8.6*  8.5*   HCT  27.2*  27.6*   PLT  44*  54*     Recent Labs      03/15/17   0443  03/14/17   0458  03/13/17   0309   NA  145  141  142   K  3.1*  3.7  3.3*   CL  108  107  106   CO2  29  28  30   BUN  23*  23*  21*   CREA  1.14*  1.17*  1.17*   GLU  90  111*  156*   CA  8.6  8.7  8.1*   MG  2.0  1.9  1.6   PHOS  2.4*  2.2*  2.4*     Recent Labs      03/15/17   0443  03/14/17   0458  03/13/17   0309   SGOT  168*  217*  306*   ALT  135*  153*  173*   AP  57  61  60   TBILI  3.2*  2.7*  2.7*   TP  5.7*  5.5*  5.4*   ALB  2.4*  2.4*  2.3*   GLOB  3.3  3.1  3.1     Recent Labs      03/15/17   0443  03/14/17   0458  03/13/17   0309   INR  1.4*  1.6*  1.8*   PTP  14.8* 16.5*  17.9*      No results for input(s): FE, TIBC, PSAT, FERR in the last 72 hours. Lab Results   Component Value Date/Time    Folate 14.5 03/09/2017 03:23 AM      No results for input(s): PH, PCO2, PO2 in the last 72 hours.   Recent Labs      03/13/17   0309   TROIQ  0.20*     Lab Results   Component Value Date/Time    Cholesterol, total 138 09/28/2011 09:13 AM    HDL Cholesterol 50 09/28/2011 09:13 AM    LDL, calculated 74 09/28/2011 09:13 AM    Triglyceride 69 09/28/2011 09:13 AM     Lab Results   Component Value Date/Time    Glucose (POC) 98 03/15/2017 04:40 AM    Glucose (POC) 92 03/15/2017 12:32 AM    Glucose (POC) 101 03/14/2017 04:13 PM    Glucose (POC) 102 03/14/2017 08:11 AM    Glucose (POC) 116 03/14/2017 04:55 AM     Lab Results   Component Value Date/Time    Color RED 03/10/2017 02:30 AM    Appearance BLOODY 03/10/2017 02:30 AM    Specific gravity 1.020 03/10/2017 02:30 AM    Specific gravity 1.016 03/08/2017 08:54 PM    pH (UA) 6.0 03/10/2017 02:30 AM    Protein 100 03/10/2017 02:30 AM    Glucose NEGATIVE  03/10/2017 02:30 AM    Ketone NEGATIVE  03/10/2017 02:30 AM    Bilirubin NEGATIVE  03/10/2017 02:30 AM    Urobilinogen 1.0 03/10/2017 02:30 AM    Nitrites NEGATIVE  03/10/2017 02:30 AM    Leukocyte Esterase TRACE 03/10/2017 02:30 AM    Epithelial cells FEW 03/10/2017 02:30 AM    Bacteria NEGATIVE  03/10/2017 02:30 AM    WBC 20-50 03/10/2017 02:30 AM    RBC >100 03/10/2017 02:30 AM         Medications Reviewed:     Current Facility-Administered Medications   Medication Dose Route Frequency    potassium chloride 20 mEq in 50 ml IVPB  20 mEq IntraVENous Q1H    potassium phosphate 20 mmol in 0.9% sodium chloride 250 mL infusion   IntraVENous ONCE    thiamine (B-1) tablet 100 mg  100 mg Oral DAILY    bumetanide (BUMEX) injection 2 mg  2 mg IntraVENous Q12H    vancomycin (VANCOCIN) 1,000 mg in 0.9% sodium chloride (MBP/ADV) 250 mL  1,000 mg IntraVENous Q24H    albuterol-ipratropium (DUO-NEB) 2.5 MG-0.5 MG/3 ML  3 mL Nebulization Q6H RT    polyvinyl alcohol (LIQUIFILM TEARS) 1.4 % ophthalmic solution 2 Drop  2 Drop Both Eyes TID    sodium chloride (NS) flush 10 mL  10 mL InterCATHeter Q24H    sodium chloride (NS) flush 10 mL  10 mL InterCATHeter PRN    sodium chloride (NS) flush 10-40 mL  10-40 mL InterCATHeter Q8H    alteplase (CATHFLO) 1 mg in sterile water (preservative free) 1 mL injection  1 mg InterCATHeter PRN    bacitracin 500 unit/gram packet 1 Packet  1 Packet Topical PRN    0.9% sodium chloride infusion 250 mL  250 mL IntraVENous PRN    ondansetron (ZOFRAN) injection 4 mg  4 mg IntraVENous Q8H PRN    glucose chewable tablet 16 g  4 Tab Oral PRN    dextrose (D50W) injection syrg 12.5-25 g  12.5-25 g IntraVENous PRN    glucagon (GLUCAGEN) injection 1 mg  1 mg IntraMUSCular PRN    sodium chloride (NS) flush 5-10 mL  5-10 mL IntraVENous Q8H    sodium chloride (NS) flush 5-10 mL  5-10 mL IntraVENous PRN    vancomycin dosing per pharmacy   Other Rx Dosing/Monitoring    pantoprazole (PROTONIX) 40 mg in sodium chloride 0.9 % 10 mL injection  40 mg IntraVENous Q24H    piperacillin-tazobactam (ZOSYN) 3.375 g in 0.9% sodium chloride (MBP/ADV) 100 mL  3.375 g IntraVENous Q8H     ______________________________________________________________________  EXPECTED LENGTH OF STAY: 4d 21h  ACTUAL LENGTH OF STAY:          Sigrid Chandra MD

## 2017-03-15 NOTE — PROGRESS NOTES
Palliative Medicine:    Family meeting has been requested tmrw, Thursday at [facility]. Will try to have as many family members as possible, they think about 8-10 people. They request that Pulmonary be be present as well, others too if possible (for a few minutes in the beginning).  Have notified Gela Mijares and Ani Serrato.

## 2017-03-16 NOTE — PROGRESS NOTES
Doing much better   Family at bedside  the patient can move all extremities and soft spoken but conversant  Visit Vitals    BP 98/64    Pulse 79    Temp 97.3 °F (36.3 °C)    Resp 19    Ht 5' 6\" (1.676 m)    Wt 176 lb 9.4 oz (80.1 kg)    SpO2 100%    BMI 28.5 kg/m2    clear  RRR 2/6 LIZET apex  No edema    Infection and sepsis with resp failure improving  Off pressors, breathing well  No OAC or warfarin with low platelets, risks outweighs benefits, low dose heparin is fine for now  CHF and valve are stable except for this question of AV  Plan AMMON Monday if airway defense continues to improve  I Do not plan it for tomorrow due to aspiration risk  thanks

## 2017-03-16 NOTE — PROGRESS NOTES
Care Management: Chart reviewed. Patient off BIPAP & on O2 NC. Up to chair today. Palliative care met with large number of family members today to discuss patient's medical status. Per MD notes, surgery & GI following for abnormal bilirubin & U/S findings to clarify whether or not patient has acute cholecystitis & in consideration of cholecystostomy tube to relieve stomach pain, which prompted patient's admission. Per surgery patient is not strong enough for surgery at this time. Met with one of patient's daughters & granddaughters this afternoon and advised that Care Management would continue to follow for disposition planning. Discussed rehab & SNF criteria for their consideration & that once PT/OT was able to work with patient more actively, they would be able to recommend appropriate level of care.   MECHELLE Lei BSN CCM

## 2017-03-16 NOTE — PROGRESS NOTES
Problem: Dysphagia (Adult)  Goal: *Acute Goals and Plan of Care (Insert Text)  Speech therapy goals  Initiated 3/16/2017   1. Patient will tolerate full liquid diet without s/s of aspiration within 7 days   2. Patient will tolerate solid trials with timely and complete mastication to determine safety for diet upgrade within 7 days   [address] EVALUATION  Patient: Qasim Jenkins (80 y.o. female)  Date: 3/16/2017  Primary Diagnosis: Hypothermia        Precautions: aspiration, fall         ASSESSMENT :  Based on the objective data described below, the patient presents with mild/moderate oral and mild pharyngeal dysphagia. Oral dysphagia characterized by prolonged and effortful mastication of solids, decreased bolus formation and control with large or successive sips with anterior spillage noted, mildly delayed swallow initiation, and mildly reduced hyolaryngeal elevation/excursion via palpation. Much improved tolerance of single sips of liquids (straw or cup) with no anterior spillage and overall improved coordination. No s/s of aspiration observed, however increased risk related to fatigue and overall respiratory status. Endurance will be a limiting factor in PO intake. Patient will benefit from skilled intervention to address the above impairments. Patients rehabilitation potential is considered to be Fair  Factors which may influence rehabilitation potential include:   [ ]            None noted  [ ]            Mental ability/status  [X]            Medical condition  [ ]            Home/family situation and support systems  [ ]            Safety awareness  [ ]            Pain tolerance/management  [ ]            Other:        PLAN :  Recommendations and Planned Interventions:  --recommend full liquid diet. SINGLE SIPS. Straws ok. meds as tolerated. --SLP to follow for diet tolerance and upgrade as overall strength and endurance continue to improve. Frequency/Duration: Patient will be followed by speech-language pathology 3 times a week to address goals. Discharge Recommendations: To Be Determined       SUBJECTIVE:   Patient stated oh that tastes so good! . Patient with periods of decreased intelligibility. Alert and appropriate. Grandson present at bedside. Dentures placed by SLP, however only top plate present. Patient reports she often eats with only her top plate. OBJECTIVE:       Past Medical History:   Diagnosis Date    Atrial fibrillation ([address]      Chronic systolic heart failure (HCC)       ef 35-40%; hold ACE due to lower bp    Diabetes mellitus type 2, insulin dependent ([address]      Hypertension      Long term (current) use of anticoagulants      Malignant hypertensive heart disease with CHF ([address]      Mitral regurgitation      Pacemaker 10/25/2012     The pacemaker is a [employer], model # [id number], serial [id number].    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, 1996     # 28 Turner-Manuel    Thyroid disease      TR (tricuspid regurgitation)       moderate to severe echo 2009     Past Surgical History:   Procedure Laterality Date    CARDIAC SURG PROCEDURE UNLIST   1996     valve replacement    HX HEART CATHETERIZATION   10/4/2012     normal cors, LVEF 50%    HX HEART VALVE SURGERY        HX PACEMAKER        INS PPM/ICD LED DUAL ONLY   10/25/2012          INS PPM/ICD LED DUAL ONLY   9/9/2016           Prior Level of Function/Home Situation:   Home Situation  Home Environment: Private residence  One/Two Story Residence: One story  Living Alone: No  Support Systems: Child(milagros), Confucianism / lib[employer] community  Patient Expects to be Discharged to[de-identified] Private residence  Current DME Used/Available at Home: None  Diet prior to admission: regular   Current Diet:  NPO    Cognitive and Communication Status:  Neurologic State: Alert  Orientation Level: Oriented to person, Oriented to place, Disoriented to situation, Disoriented to time  Cognition: Follows commands, Decreased attention/concentration  Perception: Appears intact  Perseveration: No perseveration noted  Safety/Judgement: Not assessed (difficult to understand patient's reponses at times )  Oral Assessment:  Oral Assessment  Labial: No impairment  Dentition: Upper dentures (placed by SLP, lower not present)  Oral Hygiene: dry mucosa   Lingual: No impairment  Velum: Unable to visualize  Mandible: No impairment  P.O. Trials:  Patient Position: upright in bed   Vocal quality prior to P.O.: Low volume  Consistency Presented: Thin liquid;Puree; Solid; Ice chips, med given whole with water with nsg. How Presented: Self-fed/presented;SLP-fed/presented;Cup/sip;Cup/gulp;Straw;Successive swallows;Spoon     Bolus Acceptance: No impairment  Bolus Formation/Control: Impaired  Type of Impairment: Delayed;Mastication; Anterior;Spillage (significantly prolonged mastication, anterior spillage)  Propulsion: Delayed (# of seconds) (mild )  Oral Residue: None  Initiation of Swallow: Delayed (# of seconds) (suspect mild delay )  Laryngeal Elevation: Decreased (mildly )  Aspiration Signs/Symptoms: None  Pharyngeal Phase Characteristics: Other (comment) (suspected mildly delayed initiation, mildly reduced strength)  Effective Modifications: Small sips and bites (elimitated anterior spillage)  Cues for Modifications: Minimal  Comments: with large sips of thins, bolus spilled from mouth.  with small single sips, no spillage and improved coordination noted. fatigue is a limiting factor in intake     Oral Phase Severity: Mild-moderate  Pharyngeal Phase Severity : Mild           G Codes: In compliance with CMSs Claims Based Outcome Reporting, the following G-code set was chosen for this patient based the use of the NOMS functional outcome to quantify this patient's level of swallowing impairment.      Using the NOMS, the patient was determined to be at level 3 for swallow function which correlates with the CL= 60-79% level of severity. Based on the objective assessment provided within this note, the current, goal, and discharge g-codes are as follows:     Swallow  Swallowing:   Swallow Current Status CL= 60-79%   Swallow Goal Status CI= 1-19%         NOMS Swallowing Levels:  Level 1 (CN): NPO  Level 2 (CM): NPO but takes consistency in therapy  Level 3 (CL): Takes less than 50% of nutrition p.o. and continues with nonoral feedings; and/or safe with mod cues; and/or max diet restriction  Level 4 (CK): Safe swallow but needs mod cues; and/or mod diet restriction; and/or still requires some nonoral feeding/supplements  Level 5 (CJ): Safe swallow with min diet restriction; and/or needs min cues  Level 6 (CI): Independent with p.o.; rare cues; usually self cues; may need to avoid some foods or needs extra time  Level 7 ([address] [place] [address]): Independent for all p.o.  JEFFERSON. (2003). National Outcomes Measurement System (NOMS): Adult Speech-Language Pathology User's Guide. Pain:  Pain Scale 1: Numeric (0 - 10)  Pain Intensity 1: 0     After treatment:   [ ]            Patient left in no apparent distress sitting up in chair  [X]            Patient left in no apparent distress in bed  [X]            Call bell left within reach  [X]            Nursing notified  [X]            Caregiver present  [ ]            Bed alarm activated      COMMUNICATION/EDUCATION:   The patients plan of care including recommendations, planned interventions, and recommended diet changes were discussed with: Registered Nurse, Physician and family during palliative care meeting . [X]            Patient/family have participated as able in goal setting and plan of care. [X]            Patient/family agree to work toward stated goals and plan of care. [ ]            Patient understands intent and goals of therapy, but is neutral about his/her participation.   [ ]            Patient is unable to participate in goal setting and plan of care.     Thank you for this referral.  Sary Ballesteros M.CD.  CCC-SLP   Time Calculation: 18 mins

## 2017-03-16 NOTE — PROGRESS NOTES
Palliative Medicine Consult  Juwan: [phone] ([phone])    Patient Name: Bossman Ortez  YOB: 1930    Date of Initial Consult: 3/13/17  Reason for Consult: Care decisions   Requesting Provider: Rolf  Primary Care Physician: Ruiz Harden MD      SUMMARY:   Bossman Ortez \"Warner\" is a 80 y.o. with a past history of systolic CHF (EF 30% on 0/9/05), AICD, DM, HTN, a fib who was admitted on 3/8/2017 from her PCP office w/ epigastric pain and nausea, in ED was hypothermic to 91.4 degrees and hypotensive. Remains in ICU on empiric abx, echo concerning for possible aortic valve vegetation but BCx x 3 NGTD. May need AMMON if stable. Being diuresed. CT abd showing gallstones, no  Cholecystitis obstruction. GI has signed off but plan on seeing for EGD when stable. Pt was intubated 3/10-3/12, had large mucous plug. Renal following for JUAN. Known well to Dr Addie Leyva. CT head wnl. Current medical issues leading to Palliative Medicine involvement include:care decisions. Social: Pt was 1 of 13 children, she has 7 children and about 20 grandchildren, and great grands. Very involved in the Sh[employer] in [place] where she lives and is very loved. Before admission was alert/oriented and high functioning. No AMD but family tends to defer to Ryan Berg ([phone]) (dtr). PALLIATIVE DIAGNOSES:   1. Shortness of breath on intermittent BIPAP  2. Lethargy - improved   3. Hypothermia and hypotension -resolved   4. Feeding difficulties - now improved, SLP worked w/ pt today       PLAN:   1. Along w/ Misti arciniega and Dr Sheridan Castaneda meet w/ pt, granddtrs Radames Gabriel and Rk, dtrs Kevin and Roslava Yi, and about 6 other family members and their [place]. Please see Misti Jones for everyone's name. 2. As some people have been here in the hospital more often than not, I give detailed description of what has been going on- see below for what I printed out for family. They ask good questions.    3. At this point pt is making good gains and is alert and worked w/ SLP today. Still some confusion, but is interacting with me. Pleased and hopeful for continued improvement for recovery. Honest w/ family that while we are hopeful, she has mult medical issues and is very debilitated after her ICU course so recovery will be slow and she is at risk for complications along the way. Family has questions about where recovery will take place, and discuss different levels of care in the hospital and that she will likely need rehab. Prior to admission pt was living alone- although spent the day at one of her son and dtr-in-laws houses. 4. Go over code status in detail- and also explain how a DNR is different the intubation she had for resp distress. Also clear that this does not mean 'do not treat.' Discuss the importance of AMD.   5. Family was thankful for meeting and to have everyone here the same information- knowing that things can change daily. They will talk about code status and now that Warner is awake- I hope to engage her in these conversations. Right now explain that she is still confused, but perhaps next week our team can help facilitate these conversations and help complete AMD with pt.   6. Goals are for recovery as possible, family and pt will discuss code status (involve pt as her mental status improves). Remains full code. Support family. We will follow. 7. Communicated plan of care with: Palliative IDT; Noble Harrington RN; Dr Trae Burnett SLP, Dr Eddie Zuluaga, Dr Kathe Abebe         Given to family:    Franki Mo:  -GI: Had nausea and belly pain on admission. CT scan did not show much. Ultrasound on 3/15 showed that gallbladder was a bit distended. We will watch how you do for now, but if symptoms come back then we will ask surgical and GI team to evaluate. Today speech therapy will work with you to make sure it is safe for you to swallow. -Mental status: You were confused during this stay.  CT head was normal. We think this was from all of your acute medical issues.    -Lungs: You had to be on a ventilator this stay for a few days (tube down your throat). You also required a mask to help you breathe in and out to make sure your carbon dioxide level was normal.  Xrays show that you have fluid in your lungs that could be an infection as well as fluid because of your heart failure. You were on medications to help decrease this fluid, which has helped you breathe better.     -Low blood pressure and body temperature: We were concerned for infection, although there has not been an exact source. You have been on strong antibiotics since your admission which would cover most any infection. You had to be on a medication to keep up your blood pressure. We have tested your blood for infection and it has been negative for infection.     -Heart: You have chronic heart failure and an irregular heart beat (atrial fibrillation). Dr Jalil Rasmussen follows you for this. The ejection fraction of your heart is about 30% which is lower than normal. You are currently off a blood thinner for the atrial fibrillation. On the echocardiogram there was concern for a vegetation on one of the heart valves. Dr Jalil Rasmussen does not think this needs another echocardiogram at this time.     -Kidneys: We have had to watch your kidney function closely, since the medications that you have received can affect them. They are stable.      GOALS OF CARE / TREATMENT PREFERENCES:   [====Goals of Care====]  GOALS OF CARE:  Patient / health care proxy stated goals: recovery as possible       TREATMENT PREFERENCES:   Code Status: Full Code    Advance Care Planning:  Advance Care Planning 3/10/2017   Patient's Healthcare Decision Maker is: Legal Next of Kin   Confirm Advance Directive None   Patient Would Like to Complete Advance Directive No       Other:    The palliative care team has discussed with patient / health care proxy about goals of care / treatment preferences for patient.  [====Goals of Care====]    Family incl dtrs Cesar Chan, Abeba Nyhan and graddtr [place]. HISTORY:         HPI/SUBJECTIVE:    The patient is:   [x] Verbal and participatory  [] Non-participatory due to: medical condition     Pt able to come off BIPAP at 7am and remains off when I see her. She is answering questions for me, still a bit confused but recognizes all family. Asks for water and pleased that SLP coming in. Clinical Pain Assessment (nonverbal scale for severity on nonverbal patients):   [++++ Clinical Pain Assessment++++]  [++++Pain Severity++++]: Pain: 0  [++++Pain Character++++]:   [++++Pain Duration++++]:   [++++Pain Effect++++]:   [++++Pain Factors++++]:   [++++Pain Frequency++++]:   [++++Pain Location++++]:   [++++ Clinical Pain Assessment++++]     FUNCTIONAL ASSESSMENT:     Palliative Performance Scale (PPS):  PPS: 40       PSYCHOSOCIAL/SPIRITUAL SCREENING:     Advance Care Planning:  Advance Care Planning 3/10/2017   Patient's Healthcare Decision Maker is: Legal Next of Kin   Confirm Advance Directive None   Patient Would Like to Complete Advance Directive No        Any spiritual / Protestant concerns:  [] Yes /  [x] No    Caregiver Burnout:  [] Yes /  [x] No /  [] No Caregiver Present      Anticipatory grief assessment:   [x] Normal  / [] Maladaptive       ESAS Anxiety:   Cannot obtain due to patient factors    ESAS Depression:   Cannot obtain due to patient factors       REVIEW OF SYSTEMS:     Positive and pertinent negative findings in ROS are noted above in HPI. The following systems were [] reviewed / [x] unable to be reviewed as noted in HPI  Other findings are noted below. Systems: constitutional, ears/nose/mouth/throat, respiratory, gastrointestinal, genitourinary, musculoskeletal, integumentary, neurologic, psychiatric, endocrine. Positive findings noted below.   Modified ESAS Completed by: provider   Fatigue: 6 Drowsiness: 1     Pain: 0         Anorexia: 0 Dyspnea: 0           Stool Occurrence(s): 1        PHYSICAL EXAM:     From RN flowsheet:  Wt Readings from Last 3 Encounters:   03/16/17 176 lb 9.4 oz (80.1 kg)   01/10/17 146 lb 3.2 oz (66.3 kg)   01/04/17 150 lb (68 kg)     Blood pressure 98/65, pulse 78, temperature 97.3 °F (36.3 °C), resp. rate 20, height 5' 6\" (1.676 m), weight 176 lb 9.4 oz (80.1 kg), SpO2 100 %. Pain Scale 1: Numeric (0 - 10)  Pain Intensity 1: 0     Pain Location 1: Abdomen  Pain Orientation 1: Mid     Pain Intervention(s) 1: Medication (see MAR)    Constitutional: fatigued but alert and smiling  Eyes: pupils equal, anicteric  ENMT: no nasal discharge, moist mucous membranes  Respiratory: breathing not labored, on NC  Gastrointestinal: a bit tense, +BS  Musculoskeletal: no deformity, no tenderness to palpation  Skin: warm, dry  Neurologic:following commands        HISTORY:     Principal Problem:    Hypothermia (3/8/2017)      Past Medical History:   Diagnosis Date    Atrial fibrillation (HCC)     Chronic systolic heart failure (HCC)     ef 35-40%; hold ACE due to lower bp    Diabetes mellitus type 2, insulin dependent ([address]     Hypertension     Long term (current) use of anticoagulants     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, model # [id number], serial [id number].    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, 1996    # 28 Turner-Manuel    Thyroid disease     TR (tricuspid regurgitation)     moderate to severe echo 2009      Past Surgical History:   Procedure Laterality Date    CARDIAC SURG PROCEDURE UNLIST  1996    valve replacement    HX HEART CATHETERIZATION  10/4/2012    normal cors, LVEF 50%    HX HEART VALVE SURGERY      HX PACEMAKER      INS PPM/ICD LED DUAL ONLY  10/25/2012         INS PPM/ICD LED DUAL ONLY  9/9/2016           Family History   Problem Relation Age of Onset    Cancer Sister      breast    Heart Disease Brother     Heart Disease Brother     Diabetes Son     Stroke Son       History reviewed, no pertinent family history.   Social History   Substance Use Topics    Smoking status: Never Smoker    Smokeless tobacco: Never Used    Alcohol use No     No Known Allergies   Current Facility-Administered Medications   Medication Dose Route Frequency    potassium chloride 10 mEq in 100 ml IVPB  10 mEq IntraVENous Q1H    balsam peru-castor oil (VENELEX)  mg/gram ointment   Topical BID    albumin human 25% (BUMINATE) solution 12.5 g  12.5 g IntraVENous Q12H PRN    heparin (porcine) injection 5,000 Units  5,000 Units SubCUTAneous Q8H    thiamine (B-1) tablet 100 mg  100 mg Oral DAILY    bumetanide (BUMEX) injection 2 mg  2 mg IntraVENous Q12H    vancomycin (VANCOCIN) 1,000 mg in 0.9% sodium chloride (MBP/ADV) 250 mL  1,000 mg IntraVENous Q24H    albuterol-ipratropium (DUO-NEB) 2.5 MG-0.5 MG/3 ML  3 mL Nebulization Q6H RT    polyvinyl alcohol (LIQUIFILM TEARS) 1.4 % ophthalmic solution 2 Drop  2 Drop Both Eyes TID    sodium chloride (NS) flush 10 mL  10 mL InterCATHeter Q24H    sodium chloride (NS) flush 10 mL  10 mL InterCATHeter PRN    sodium chloride (NS) flush 10-40 mL  10-40 mL InterCATHeter Q8H    alteplase (CATHFLO) 1 mg in sterile water (preservative free) 1 mL injection  1 mg InterCATHeter PRN    bacitracin 500 unit/gram packet 1 Packet  1 Packet Topical PRN    0.9% sodium chloride infusion 250 mL  250 mL IntraVENous PRN    ondansetron (ZOFRAN) injection 4 mg  4 mg IntraVENous Q8H PRN    glucose chewable tablet 16 g  4 Tab Oral PRN    dextrose (D50W) injection syrg 12.5-25 g  12.5-25 g IntraVENous PRN    glucagon (GLUCAGEN) injection 1 mg  1 mg IntraMUSCular PRN    sodium chloride (NS) flush 5-10 mL  5-10 mL IntraVENous Q8H    sodium chloride (NS) flush 5-10 mL  5-10 mL IntraVENous PRN    vancomycin dosing per pharmacy   Other Rx Dosing/Monitoring    pantoprazole (PROTONIX) 40 mg in sodium chloride 0.9 % 10 mL injection  40 mg IntraVENous Q24H    piperacillin-tazobactam (ZOSYN) 3.375 g in 0.9% sodium chloride (MBP/ADV) 100 mL  3.375 g IntraVENous Q8H          LAB AND IMAGING FINDINGS:     Lab Results   Component Value Date/Time    WBC 2.3 03/16/2017 04:57 AM    HGB 8.6 03/16/2017 04:57 AM    PLATELET 44 99/62/5789 04:57 AM     Lab Results   Component Value Date/Time    Sodium 148 03/16/2017 04:57 AM    Potassium 3.3 03/16/2017 04:57 AM    Chloride 111 03/16/2017 04:57 AM    CO2 28 03/16/2017 04:57 AM    BUN 28 03/16/2017 04:57 AM    Creatinine 1.14 03/16/2017 04:57 AM    Calcium 8.7 03/16/2017 04:57 AM    Magnesium 2.3 03/16/2017 04:57 AM    Phosphorus 3.7 03/16/2017 04:57 AM      Lab Results   Component Value Date/Time    AST (SGOT) 115 03/16/2017 04:57 AM    Alk. phosphatase 51 03/16/2017 04:57 AM    Protein, total 6.1 03/16/2017 04:57 AM    Albumin 2.6 03/16/2017 04:57 AM    Globulin 3.5 03/16/2017 04:57 AM     Lab Results   Component Value Date/Time    INR 1.4 03/15/2017 04:43 AM    Prothrombin time 14.8 03/15/2017 04:43 AM    aPTT 37 08/15/2016 12:00 AM      No results found for: IRON, FE, TIBC, IBCT, PSAT, FERR   No results found for: PH, PCO2, PO2  No components found for: [place]   Lab Results   Component Value Date/Time    CK 88 09/01/2012 04:00 AM    CK - MB 2.2 09/01/2012 04:00 AM                Total time: 80 min   Counseling / coordination time: 60 min   > 50% counseling / coordination?: yes    Prolonged service was provided for  [x]30 min   []75 min in face to face time in the presence of the patient. Time Start:  10am  Time End: 11am  Note: this can only be billed with  (initial) or 21 [phone] (follow up). If multiple start / stop times, list each separately.

## 2017-03-16 NOTE — PROGRESS NOTES
0730: Bedside and Verbal shift change report given to Simona Cosme RN (oncoming nurse) by Iva Mendosa RN (offgoing nurse). Report included the following information SBAR, Kardex, Intake/Output, MAR, Accordion, Recent Results, Med Rec Status, Cardiac Rhythm AFib and Alarm Parameters . 0800: VSS, alert, oriented to person & place, 4 L nasal cannula, BiPap overnight, no complaints of pain, right PICC, Richardson. 1045: Speech Pathologist assessed patient, diet advanced to full liquid. 1200: VSS, assessment unchanged, patient tolerating nasal cannula, oriented x4. 1225: PT & OT at bedside, patient up in chair eating lunch. Family at bedside. 1525: Patient bathed & returned to bed on nasal cannula. 1600: VSS, assessment unchanged. Patient remains on 4 L nasal cannula. 2000: Patient in bed sleeping, no complaints of pain, VSS.    2330: Bedside and Verbal shift change report given to Anaya Lay RN (oncoming nurse) by Simona Cosme RN (offgoing nurse). Report included the following information SBAR, Kardex, Procedure Summary, Intake/Output, MAR, Recent Results, Cardiac Rhythm AFib/Paced and Alarm Parameters . Shift Summary: Uneventful shift, VSS, alert, oriented x3, no complaints of pain. Patient tolerated 4 L nasal cannula from approximately 700-2030. Patient on BiPap for sleep. Right PICC, peripheral IV capped, Richardson. Seen by Juana Bunn, Cardiology, Gastroenterology, General Surgery, Palliative, Nephrology, Wound Care, PT & OT. Family support provided.

## 2017-03-16 NOTE — PROGRESS NOTES
Participated in a family meeting with Palliative Dr. Narayan Howell and Dr. Fredy Lira. Family members included pt's children: Devin Bill and her  Patrina Harada( by phone), and daughter in law Bronwyn Donnelly who is  to son Tan Howell. Several grandchildren were also present: Arley Pace, and Mercedes. Pt's , Rev. Emily Sparks, was present as opened meeting with prayer. This  provided support to pt's family at bedside prior to meeting. Briefly interacted with pt who was interacting with her family and asking for water. (Speech therapist was able to evaluate pt while we were meeting and provided an update to family). This  was present throughout family meeting, and as meeting ended, affirmed our ongoing care and prayers for pt and family. Pt has a large supportive family and good support from her . Family expressed appreciation for the opportunity to discuss pt's medical concerns and her care, and they are hopeful that pt will continue to improve. Dr Narayan Howell also spoke with them about the importance of advance care planning;  hopefully patient will be able to participate in these conversations as appropriate during this hospitalization. Chaplains will continue to follow for support as able/needed.     Misti Green, Palliative

## 2017-03-16 NOTE — PROGRESS NOTES
Problem: Self Care Deficits Care Plan (Adult)  Goal: *Acute Goals and Plan of Care (Insert Text)  Occupational Therapy Goals  Initiated 3/16/2017  1. Patient will perform grooming seated EOB for 5 minutes with supervision/set-up within 7 day(s). 2. Patient will perform UB bathing with minimal assistance/contact guard assist within 7 day(s). 3. Patient will perform lower body dressing with moderate assistance within 7 day(s). 4. Patient will perform toilet transfers with moderate assistance within 7 day(s). 5. Patient will perform all aspects of toileting with moderate assistance within 7 day(s). 6. Patient will participate in upper extremity therapeutic exercise/activities with minimal assistance/contact guard assist for 10 minutes within 7 day(s). 7. Patient will utilize energy conservation techniques during functional activities with verbal cues within 7 day(s). OCCUPATIONAL THERAPY EVALUATION  Patient: Qasim Jenkins (64 y.o. female)  Date: 3/16/2017  Primary Diagnosis: Hypothermia        Precautions:   Fall      ASSESSMENT :  Based on the objective data described below, the patient presents with decreased functional strength, impaired cardiopulmonary tolerance, DICK, BLE swelling, standing tolerance <1 minute, decreased functional reach to feet, mildly impaired attention, limiting her independence and safety with ADL routine. Pt is overall supervision/set-up to totalA for ADLs; modAx2 for bed mobility and maxAx2 to transfer OOB in prep for ADL tasks. Pt and family report she lives with sister and is IND-Darron at baseline with no AD for mobility. Pt on 5L NC during session and sats >98% during session, increased RR (25-30bpm), 2 episodes of v-tach on monitor with HR highest at 121. Family at bedside and education on importance of OOB and progression of acute therapy. Pt may need rehab, skilled nursing, prior to returning home, will continue to follow.  Suggestions for next session include sitting at EOB to complete ADL to address strength and tolerance. Patient will benefit from skilled intervention to address the above impairments. Patients rehabilitation potential is considered to be Good  Factors which may influence rehabilitation potential include:   [ ]             None noted  [ ]             Mental ability/status  [X]             Medical condition  [ ]             Home/family situation and support systems  [ ]             Safety awareness  [ ]             Pain tolerance/management  [ ]             Other:        PLAN :  Recommendations and Planned Interventions:  [X]               Self Care Training                  [X]        Therapeutic Activities  [X]               Functional Mobility Training    [ ]        Cognitive Retraining  [X]               Therapeutic Exercises           [X]        Endurance Activities  [X]               Balance Training                   [ ]        Neuromuscular Re-Education  [ ]               Visual/Perceptual Training     [X]   Home Safety Training  [X]               Patient Education                 [X]        Family Training/Education  [ ]               Other (comment):     Frequency/Duration: Patient will be followed by occupational therapy 5 times a week to address goals. Discharge Recommendations: Rehab  Further Equipment Recommendations for Discharge: TBD       SUBJECTIVE:   Patient stated I live with my sister.       OBJECTIVE DATA SUMMARY:   HISTORY:   Past Medical History:   Diagnosis Date    [facility]      Chronic systolic heart failure (HCC)       ef 35-40%; hold ACE due to lower bp    Diabetes mellitus type 2, insulin dependent ([facility]      Hypertension      Long term (current) use of anticoagulants      Malignant hypertensive heart disease with CHF ([facility]      Mitral regurgitation      Pacemaker 10/25/2012     The pacemaker is a [place] [employer] CASSIDY, model # [id number], serial [id number].    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, 1996     # 32 [place]-Manuel    Thyroid disease      TR (tricuspid regurgitation)       moderate to severe echo 2009     Past Surgical History:   Procedure Laterality Date    CARDIAC SURG PROCEDURE UNLIST   1996     valve replacement    HX HEART CATHETERIZATION   10/4/2012     normal cors, LVEF 50%    HX HEART VALVE SURGERY        HX PACEMAKER        INS PPM/ICD LED DUAL ONLY   10/25/2012          INS PPM/ICD LED DUAL ONLY   9/9/2016              Prior Level of Function/Home Situation: Pt reports she lives with sister, is IND-Darron with ADLs, does not drive. Uses no AD for mobility. Expanded or extensive additional review of patient history:      Home Situation  Home Environment: Private residence  # Steps to Enter: 4  Rails to Enter: Yes  Hand Rails : Bilateral  One/Two Story Residence: One story  Living Alone: No  Support Systems: Child(milagros), Family member(s), [employer] / [employer] community  Patient Expects to be Discharged to[de-identified] Unknown  Current DME Used/Available at Home: None  Tub or Shower Type: Tub/Shower combination  [X]  Right hand dominant             [ ]  Left hand dominant     EXAMINATION OF PERFORMANCE DEFICITS:  Cognitive/Behavioral Status:  Neurologic State: Alert  Orientation Level: Oriented X4  Cognition: Follows commands;Decreased attention/concentration  Perception: Appears intact  Perseveration: No perseveration noted  Safety/Judgement: Awareness of environment;Decreased insight into deficits  Skin: appears intact  Edema: none noted in BUEs  Hearing:   Auditory  Auditory Impairment: None  Vision/Perceptual:    Tracking: Able to track stimulus in all quadrants w/o difficulty                 Diplopia: No    Acuity: Able to read clock/calendar on wall without difficulty    Corrective Lenses: Glasses  Range of Motion:     AROM: Generally decreased, functional  PROM: Within functional limits                    Strength:     Strength: Generally decreased, functional              Coordination:  Coordination: Generally decreased, functional  Fine Motor Skills-Upper: Left Intact; Right Intact    Gross Motor Skills-Upper: Left Intact; Right Intact  Tone & Sensation:     Tone: Normal  Sensation: Intact                       Balance:  Sitting: Impaired  Sitting - Static: Good (unsupported)  Sitting - Dynamic: Fair (occasional)  Standing: Impaired; With support  Standing - Static: Poor;Constant support  Standing - Dynamic : Poor     Functional Mobility and Transfers for ADLs:  Bed Mobility:  Supine to Sit: Moderate assistance;Assist x2  Scooting: Maximum assistance     Transfers:  Sit to Stand: Maximum assistance;Assist x2  Stand to Sit: Maximum assistance;Assist x2  Bed to Chair: Maximum assistance;Assist x2  Toilet Transfer : Maximum assistance;Assist x2     ADL Assessment:  Feeding: Minimum assistance; Additional time     Oral Facial Hygiene/Grooming: Minimum assistance; Additional time     Bathing: Maximum assistance; Additional time;Assist x1;Adaptive equipment     Upper Body Dressing: Maximum assistance; Additional time; Adaptive equipment;Assist x1     Lower Body Dressing: Total assistance; Adaptive equipment; Additional time     Toileting: Total assistance; Adaptive equipment; Additional time                 ADL Intervention and task modifications:                                            Cognitive Retraining  Safety/Judgement: Awareness of environment;Decreased insight into deficits        Functional Measure:  Barthel Index:      Bathin  Bladder: 0  Bowels: 5  Groomin  Dressin  Feedin  Mobility: 0  Stairs: 0  Toilet Use: 0  Transfer (Bed to Chair and Back): 5  Total: 15         Barthel and G-code impairment scale:  Percentage of impairment CH  0% CI  1-19% CJ  20-39% CK  40-59% CL  60-79% CM  80-99% CN  100%   Barthel Score 0-100 100 99-80 79-60 59-40 20-39 1-19    0   Barthel Score 0-20 20 17-19 13-16 9-12 5-8 1-4 0      The Barthel ADL Index: Guidelines  1.  The index should be used as a record of what a patient does, not as a record of what a patient could do. 2. The main aim is to establish degree of independence from any help, physical or verbal, however minor and for whatever reason. 3. The need for supervision renders the patient not independent. 4. A patient's performance should be established using the best available evidence. Asking the patient, friends/relatives and nurses are the usual sources, but direct observation and common sense are also important. However direct testing is not needed. 5. Usually the patient's performance over the preceding 24-48 hours is important, but occasionally longer periods will be relevant. 6. Middle categories imply that the patient supplies over 50 per cent of the effort. 7. Use of aids to be independent is allowed. Licha Calixto., Barthel, D.W. (9838). Functional evaluation: the Barthel Index. [facility] (14)2. Cristina Rollins atul JD Guaman, Ronak Peña., Maria Ines Presley., [place], [address] [place] [address] (1999). Measuring the change indisability after inpatient rehabilitation; comparison of the responsiveness of the Barthel Index and Functional [place] Measure. Journal of Neurology, Neurosurgery, and Psychiatry, 66(4), 468-857. Jovita Aceves, N.J.A, SKY Mina.MELLISSA, & Robyn Haskins, M.A. (2004.) Assessment of post-stroke quality of life in cost-effectiveness studies: The usefulness of the Barthel Index and the EuroQoL-5D. Quality of Life Research, 13, 677-87            G codes: In compliance with CMSs Claims Based Outcome Reporting, the following G-code set was chosen for this patient based on their primary functional limitation being treated: The outcome measure chosen to determine the severity of the functional limitation was the Barthel Index with a score of 15/100 which was correlated with the impairment scale.       · Self Care:               - CURRENT STATUS:    CM - 80%-99% impaired, limited or restricted               - GOAL STATUS:           CK - 40%-59% impaired, limited or restricted               - D/C STATUS:                       ---------------To be determined---------------      Occupational Therapy Evaluation Charge Determination   History Examination Decision-Making   LOW Complexity : Brief history review  MEDIUM Complexity : 3-5 performance deficits relating to physical, cognitive , or psychosocial skils that result in activity limitations and / or participation restrictions MEDIUM Complexity : Patient may present with comorbidities that affect occupational performnce. Miniml to moderate modification of tasks or assistance (eg, physical or verbal ) with assesment(s) is necessary to enable patient to complete evaluation       Based on the above components, the patient evaluation is determined to be of the following complexity level: LOW   Pain:  Pain Scale 1: Numeric (0 - 10)  Pain Intensity 1: 0              Activity Tolerance:   Fair tolerance     Please refer to the flowsheet for vital signs taken during this treatment. After treatment:   [X] Patient left in no apparent distress sitting up in chair  [ ] Patient left in no apparent distress in bed  [X] Call bell left within reach  [X] Nursing notified  [X] Caregiver present  [ ] Bed alarm activated      COMMUNICATION/EDUCATION:   The patients plan of care was discussed with: Physical Therapist and Registered Nurse.  [X] Home safety education was provided and the patient/caregiver indicated understanding. [X] Patient/family have participated as able in goal setting and plan of care. [X] Patient/family agree to work toward stated goals and plan of care. [ ] Patient understands intent and goals of therapy, but is neutral about his/her participation. [ ] Patient is unable to participate in goal setting and plan of care. This patients plan of care is appropriate for delegation to [facility].      Thank you for this referral.  Shahana Weiner OT  Time Calculation: 25 mins

## 2017-03-16 NOTE — PROGRESS NOTES
Made follow-up visit to see Ms Kristin Boudreaux and family after consulting with [address]. Ms Kristin Boudreaux was sitting up in bed talking with her multiple family members, who were present at the bedside. Provided ministry of presence as family and patient shared that they were doing well. Family stated that the Palliative meeting had been very helpful and they were appreciative of all the support they had been receiving. Reassured them of continued prayers on their behalf and of  availability for support. : Rev. Licha Rao.  Star a[facility]kley; [facility], to contact [address] call: [phone]

## 2017-03-16 NOTE — PROGRESS NOTES
Hospitalist Progress Note  Valeria Bran MD  Office: [phone]        Date of Service:  3/16/2017  NAME:  Luis Alberto Cooper  :  1930  MRN:  [id number]      Admission Summary:   59-year-old female with history of chronic systolic congestive heart failure, atrial fibrillation, diabetes mellitus, hypertension, chronic anticoagulation use, history of pacemaker and ICD. She was sent from her primary care physician to the emergency room to be evaluated. History from the patient and her daughters at the bedside, also from the records. Per the daughter, she has been having on and off abdominal pain, epigastric pain for a while, over the last 3 days has been mostly pronounced. She said her pain at times is 9/10 in severity, it is nonradiating, associated with some nausea, but no vomiting. She denies any diarrhea. At her PCP's office,   they could not get her temperature, so they sent her to the emergency room. In the ER she has been hypothermic, her temperature was 91.4 on initial presentation. Also she was hypotensive. Her blood pressure   systolic was 67/40, so she was given about 1.5 liters of IV fluid, and now she is getting 1 bottle of albumin. She was placed on warming blanket, Lizzy Hugger. She denies any dysuria or frequency. Interval history / Subjective:   Pt seen and examined  Awake and follows commands  Alert  C/o mild abdominal pain     Assessment & Plan:     -  Hypothermia and hypotension. Concern for sepsis. On empiric IV antibiotics, on Zosyn and vancomycin. Followup on blood culture results NGSF. Off and on Lizzy Hugger for her hypothermia.     -Acute Respiratory failure with Hypercapnia due to Pleural effusion/atelectasis  BIPAP PRN  PCCM input aprpeciated  Diuresis , on NC now    -  Pleural effusions, bilateral: improving on CXR, continue antibiotics, may be source of infection +/-  CHF volume overload.      - Sepsis:  Unknown source, most likely lung with bilateral pleural effusions, possibly infectious. She had caren of leukocytes to 1.9, improving. She was afebrile overnight, low BP requiring pressors. Weaning dunia. Continue zosyn, vanco. Monitor cultures, preliminary blood, urine cultures no growth so far. - Acute on Chronic Systolic congestive heart failure NYHA III/IV. With fluid overload. Echo 3/9 with 30% EF, diffuse hypokinesis. Bumex added, strict I/O, monitor weight.   - History of pacer.   - Valvular disease: Moderate mitral regurgitation, right ventricular systolic dysfunction,   as well as enlarged left atrium.  -  Valvular vegetation on aortic valve, questionable: Blood cultures no growth preliminary. AMMON on hold until respiratory status permits  - Diuresis per nephrology     - Hematuria: may be related to traumatic galarza placement with INR max 5.5, today 2.2. Urine noted on UA as yellow on 3/8, Galarza placed 3/9 and hematuria reported 3/10 UA. -  JUAN:  From  ATN and contrast nephropathy/Hypotension most likely etiology. Ischemic heart disease, hypotension; and exposure to IV contrast. Appreciate nephro consult recommendations. Bumex, avoid nephrotoxins, I/Os,     - Hypoglycemia/diabetes mellitus. Resolved. Holding her Lantus and on hypoglycemia protocol. Her blood sugar improved after D50. Stable, off D10 now - resolved    -   Ischemic hepatitis:. Consulted GI. Plans for EGD when stable or outpatient. Continue proton pump inhibitor for now. US with distended gallbladder with stone and sludge, will get gen surg eval     Coagulopathy.  - Holding her Coumadin and monitor daily INR and restart her Coumadin when INR improved. INR increased, she received Vitamin K x 1. With hematuria will give FFP and monitor INR. Pancytopenia. -Appears to be chronic. However, patient and family unaware of her previous lab results. Will monitor her labs closely.          Code status: Full    Care Plan discussed with: Patient/Family and Nurse  Disposition: TBD     Hospital Problems  Date Reviewed: 3/8/2017          Codes Class Noted POA    * (Principal)Hypothermia ICD-10-CM: T68. XXXA  ICD-9-CM: 991.6  3/8/2017 Yes                Review of Systems:   A comprehensive review of systems was negative. Vital Signs:    Last 24hrs VS reviewed since prior progress note. Most recent are:  Visit Vitals    BP 96/61 (BP 1 Location: Left arm, BP Patient Position: At rest)    Pulse 77    Temp 97.3 °F (36.3 °C)    Resp 19    Ht 5' 6\" (1.676 m)    Wt 80.1 kg (176 lb 9.4 oz)    SpO2 100%    BMI 28.5 kg/m2         Intake/Output Summary (Last 24 hours) at 03/16/17 1245  Last data filed at 03/16/17 1158   Gross per 24 hour   Intake              550 ml   Output             1895 ml   Net            -1345 ml        Physical Examination:             Constitutional:   Awake, on NC, alert and oriented to person and place   ENT:   Neck supple,    Resp:  improved air entry   CV:  irregular    GI:  Soft, +RUQ tenderness, normoactive bowel sounds, no hepatosplenomegaly     Musculoskeletal:  1+ edema    Neurologic:   awake, moves all 4 extremities             Data Review:    Review and/or order of clinical lab test   US abd:  IMPRESSION:  1. Distended gallbladder with sludge and a stone with moderate wall thickening of the gallbladder wall. 2. Dilated IVC suggesting right heart failure. 3. Small amount of free fluid surrounding the liver.   4. Small right pleural effusion      Labs:     Recent Labs      03/16/17   0457  03/15/17   0443   WBC  2.3*  2.4*   HGB  8.6*  8.6*   HCT  27.8*  27.2*   PLT  44*  44*     Recent Labs      03/16/17   0457  03/15/17   0443  03/14/17   0458   NA  148*  145  141   K  3.3*  3.1*  3.7   CL  111*  108  107   CO2  28  29  28   BUN  28*  23*  23*   CREA  1.14*  1.14*  1.17*   GLU  88  90  111*   CA  8.7  8.6  8.7   MG  2.3  2.0  1.9   PHOS  3.7  2.4*  2.2*     Recent Labs      03/16/17   0457  03/15/17   0443 03/14/17   0458   SGOT  115*  168*  217*   ALT  112*  135*  153*   AP  51  57  61   TBILI  3.5*  3.2*  2.7*   TP  6.1*  5.7*  5.5*   ALB  2.6*  2.4*  2.4*   GLOB  3.5  3.3  3.1     Recent Labs      03/15/17   0443  03/14/17   0458   INR  1.4*  1.6*   PTP  14.8*  16.5*      No results for input(s): FE, TIBC, PSAT, FERR in the last 72 hours. Lab Results   Component Value Date/Time    Folate 14.5 03/09/2017 03:23 AM      No results for input(s): PH, PCO2, PO2 in the last 72 hours. No results for input(s): CPK, CKNDX, TROIQ in the last 72 hours.     No lab exists for component: CPKMB  Lab Results   Component Value Date/Time    Cholesterol, total 138 09/28/2011 09:13 AM    HDL Cholesterol 50 09/28/2011 09:13 AM    LDL, calculated 74 09/28/2011 09:13 AM    Triglyceride 69 09/28/2011 09:13 AM     Lab Results   Component Value Date/Time    Glucose (POC) 110 03/16/2017 11:54 AM    Glucose (POC) 87 03/16/2017 12:37 AM    Glucose (POC) 90 03/15/2017 08:46 PM    Glucose (POC) 98 03/15/2017 04:40 AM    Glucose (POC) 92 03/15/2017 12:32 AM     Lab Results   Component Value Date/Time    Color RED 03/10/2017 02:30 AM    Appearance BLOODY 03/10/2017 02:30 AM    Specific gravity 1.020 03/10/2017 02:30 AM    Specific gravity 1.016 03/08/2017 08:54 PM    pH (UA) 6.0 03/10/2017 02:30 AM    Protein 100 03/10/2017 02:30 AM    Glucose NEGATIVE  03/10/2017 02:30 AM    Ketone NEGATIVE  03/10/2017 02:30 AM    Bilirubin NEGATIVE  03/10/2017 02:30 AM    Urobilinogen 1.0 03/10/2017 02:30 AM    Nitrites NEGATIVE  03/10/2017 02:30 AM    Leukocyte Esterase TRACE 03/10/2017 02:30 AM    Epithelial cells FEW 03/10/2017 02:30 AM    Bacteria NEGATIVE  03/10/2017 02:30 AM    WBC 20-50 03/10/2017 02:30 AM    RBC >100 03/10/2017 02:30 AM         Medications Reviewed:     Current Facility-Administered Medications   Medication Dose Route Frequency    potassium chloride 10 mEq in 100 ml IVPB  10 mEq IntraVENous Q1H    balsam peru-castor oil (VENELEX)  mg/gram ointment   Topical BID    albumin human 25% (BUMINATE) solution 12.5 g  12.5 g IntraVENous Q12H PRN    heparin (porcine) injection 5,000 Units  5,000 Units SubCUTAneous Q8H    thiamine (B-1) tablet 100 mg  100 mg Oral DAILY    bumetanide (BUMEX) injection 2 mg  2 mg IntraVENous Q12H    vancomycin (VANCOCIN) 1,000 mg in 0.9% sodium chloride (MBP/ADV) 250 mL  1,000 mg IntraVENous Q24H    albuterol-ipratropium (DUO-NEB) 2.5 MG-0.5 MG/3 ML  3 mL Nebulization Q6H RT    polyvinyl alcohol (LIQUIFILM TEARS) 1.4 % ophthalmic solution 2 Drop  2 Drop Both Eyes TID    sodium chloride (NS) flush 10 mL  10 mL InterCATHeter Q24H    sodium chloride (NS) flush 10 mL  10 mL InterCATHeter PRN    sodium chloride (NS) flush 10-40 mL  10-40 mL InterCATHeter Q8H    alteplase (CATHFLO) 1 mg in sterile water (preservative free) 1 mL injection  1 mg InterCATHeter PRN    bacitracin 500 unit/gram packet 1 Packet  1 Packet Topical PRN    0.9% sodium chloride infusion 250 mL  250 mL IntraVENous PRN    ondansetron (ZOFRAN) injection 4 mg  4 mg IntraVENous Q8H PRN    glucose chewable tablet 16 g  4 Tab Oral PRN    dextrose (D50W) injection syrg 12.5-25 g  12.5-25 g IntraVENous PRN    glucagon (GLUCAGEN) injection 1 mg  1 mg IntraMUSCular PRN    sodium chloride (NS) flush 5-10 mL  5-10 mL IntraVENous Q8H    sodium chloride (NS) flush 5-10 mL  5-10 mL IntraVENous PRN    vancomycin dosing per pharmacy   Other Rx Dosing/Monitoring    pantoprazole (PROTONIX) 40 mg in sodium chloride 0.9 % 10 mL injection  40 mg IntraVENous Q24H    piperacillin-tazobactam (ZOSYN) 3.375 g in 0.9% sodium chloride (MBP/ADV) 100 mL  3.375 g IntraVENous Q8H     ______________________________________________________________________  EXPECTED LENGTH OF STAY: 4d 21h  ACTUAL LENGTH OF STAY:          [address], MD

## 2017-03-16 NOTE — PROGRESS NOTES
Pulmonary  3/16  Seen examined earlier today awake and conversant. Diuresing well on higher dose of bumex. Off bipap    3/15  Awake this morning. CXR/ABG better. Finally in net negative balance. BP borderline low    3/14  Seen and examined. Lethargic but was able to open eyes and follow simple commands earlier today. ABG checked off bipap showed worsening resp acidosis. CXR still shows pulmonary edema and effusions    3/13  Seen and examined. Last nihgt events noted. On bipap. On levophed. Diuresing well    3/12  Seen and examined earlier today. Failed SBT. ABG with combined resp and metabolic alkalosis    Seen and examined earlier today. Intubated overnight with worsening resp failure. In net +++ fluid balance. Resp acidosis has improved. Has been on bicarb drip which will be stopped. Pressors are being weaned down and she will need diuretic soon. Vent bundle and bronchodilators will be started. Needs repeat blood cultures. On antibiotics.      3/10  Remains on pressors with Maxi  BS continue to fall despite D10 (cortisol level was OK)  Echo with EF 30% concern for vegetation on aortic valve - blood cx negative so far  Ammonia elevated  INR higher today - vitamin K given - no obvious bleeding  Had to go back on warming blanket over night    Patient Vitals for the past 4 hrs:   BP Temp Pulse Resp SpO2   17 1530 96/68 - 78 22 100 %   17 1500 102/64 - 77 21 100 %   17 1430 - - 80 20 100 %   17 1400 100/61 - 80 18 100 %   17 1330 103/64 - 78 18 100 %   17 1300 99/60 - 80 20 100 %   17 1230 94/66 - 70 18 100 %   17 1200 96/61 97.7 °F (36.5 °C) 77 19 100 %   Temp (24hrs), Av.3 °F (36.3 °C), Min:96.5 °F (35.8 °C), Max:97.7 °F (36.5 °C)      Intake/Output Summary (Last 24 hours) at 17 1557  Last data filed at 17 1524   Gross per 24 hour   Intake             1230 ml   Output             1605 ml   Net             -375 ml       She appears in no distress  diminshed bs at bases  RRR   Soft warm and dry    Recent Labs      03/16/17   0457  03/15/17   0443  03/14/17   0458   WBC  2.3*  2.4*  2.9*   HGB  8.6*  8.6*  8.5*   HCT  27.8*  27.2*  27.6*   PLT  44*  44*  54*     Recent Labs      03/16/17   0457  03/15/17   0443  03/14/17   0458   NA  148*  145  141   K  3.3*  3.1*  3.7   CL  111*  108  107   CO2  28  29  28   GLU  88  90  111*   BUN  28*  23*  23*   CREA  1.14*  1.14*  1.17*   CA  8.7  8.6  8.7   MG  2.3  2.0  1.9   PHOS  3.7  2.4*  2.2*   ALB  2.6*  2.4*  2.4*   SGOT  115*  168*  217*   ALT  112*  135*  153*   INR   --   1.4*  1.6*       No results for input(s): TROIQ, CPK, CKMB in the last 72 hours. All Micro Results     Procedure Component Value Units Date/Time    CULTURE, BLOOD, PAIRED [[id number]] Collected:  03/11/17 1235    Order Status:  Completed Specimen:  Blood Updated:  03/16/17 0458     Special Requests: NO SPECIAL REQUESTS        Culture result: NO GROWTH 5 DAYS       CULTURE, BLOOD, PAIRED [[id number]] Collected:  03/08/17 1945    Order Status:  Completed Specimen:  Blood Updated:  03/13/17 0723     Special Requests: NO SPECIAL REQUESTS        Culture result: NO GROWTH 5 DAYS       CULTURE, BLOOD [[id number]]     Order Status:  Canceled Specimen:  Blood from Blood     CULTURE, BLOOD [[id number]]     Order Status:  Canceled Specimen:  Blood from Blood     CULTURE, URINE [[id number]] Collected:  03/10/17 0230    Order Status:  Completed Specimen:  Urine Updated:  03/11/17 0711     Special Requests: --        NO SPECIAL REQUESTS  Reflexed from Fausto Colindres <1,000 COLONIES/mL        Culture result: NO GROWTH 1 DAY           Impression  1. Acute hypoxic and hypercarbic miko failure, intubated 3/10, extubated 3/12  2. S/P Shock - suspect septic (cultures are negative) / cardiogenic   3. EF 66% - systolic CHF - concern for possible vegetation on aortic valve - blood cx no growth so far  4.  Hypoglycemia on D10 secondary to sepsis and liver insufficiency  5. Liver insufficiency - ? From chronic heart failure - cirrhotic appearing liver  6. IDDM  7. afib was on coumadin - INR > 5 - vitamin K given  8. Cholelithiasis- surgery consulted    -- Oxygen  -- continue abx and follow up blood cx-- no positive cultures  -- Noted plans for [facility] next week  -- Continue diuretics- Further adjustment per Nephrology  --Palliative care following--- Overall prognosis seems to be poor but needs to be determined from cardiology standpoint.  Respiratory failure is from pulmonary edema and volume overload and not terminal lung disease  -- May be able to transfer to [facility] in am    Gray Sapp MD

## 2017-03-16 NOTE — PROGRESS NOTES
Name: Janel Cash   MRN: [id number]  : 1930      ASSESSMENT/PLAN:    JUAN- likely ATN in the setting of hypotension/also recent IV Contrast on 3/8. Creatinine just about the same. Chronic systolic CHF (EF 86%)/KJS pleural effusions/enlarged LA/RV Systolic HTN/Mod MR    Fluid overload- diuresing    Elevated LFTs- likely ischemic hepatitis      Hypophos/hypokalemia - replete prn    Hypernatremia - push po fluids. Subjective:    Awake. Not talking.     ROS:   UTO    Exam:  Visit Vitals    BP 94/66    Pulse 70    Temp 97.3 °F (36.3 °C)    Resp 18    Ht 5' 6\" (1.676 m)    Wt 80.1 kg (176 lb 9.4 oz)    SpO2 100%    BMI 28.5 kg/m2     NAD  Dec BS at bases  RRR  Soft, distended, NT  +edema-improved  Richardson+    Current Facility-Administered Medications   Medication Dose Route Frequency Last Dose    balsam peru-castor oil (VENELEX)  mg/gram ointment   Topical BID      albumin human 25% (BUMINATE) solution 12.5 g  12.5 g IntraVENous Q12H PRN      heparin (porcine) injection 5,000 Units  5,000 Units SubCUTAneous Q8H 5,000 Units at 17 0555    thiamine (B-1) tablet 100 mg  100 mg Oral DAILY 100 mg at 17 1033    bumetanide (BUMEX) injection 2 mg  2 mg IntraVENous Q12H 2 mg at 17 0847    vancomycin (VANCOCIN) 1,000 mg in 0.9% sodium chloride (MBP/ADV) 250 mL  1,000 mg IntraVENous Q24H 1,000 mg at 17 1034    albuterol-ipratropium (DUO-NEB) 2.5 MG-0.5 MG/3 ML  3 mL Nebulization Q6H RT 3 mL at 17 0803    polyvinyl alcohol (LIQUIFILM TEARS) 1.4 % ophthalmic solution 2 Drop  2 Drop Both Eyes TID 2 Drop at 17 0847    sodium chloride (NS) flush 10 mL  10 mL InterCATHeter Q24H 10 mL at 17 1041    sodium chloride (NS) flush 10 mL  10 mL InterCATHeter PRN      sodium chloride (NS) flush 10-40 mL  10-40 mL InterCATHeter Q8H 10 mL at 03/16/17 0557    alteplase (CATHFLO) 1 mg in sterile water (preservative free) 1 mL injection  1 mg InterCATHeter PRN      bacitracin 500 unit/gram packet 1 Packet  1 Packet Topical PRN 1 Packet at 03/15/17 2136    0.9% sodium chloride infusion 250 mL  250 mL IntraVENous PRN      ondansetron (ZOFRAN) injection 4 mg  4 mg IntraVENous Q8H PRN 4 mg at 03/10/17 1526    glucose chewable tablet 16 g  4 Tab Oral PRN      dextrose (D50W) injection syrg 12.5-25 g  12.5-25 g IntraVENous PRN 12.5 g at 03/10/17 0537    glucagon (GLUCAGEN) injection 1 mg  1 mg IntraMUSCular PRN      sodium chloride (NS) flush 5-10 mL  5-10 mL IntraVENous Q8H 10 mL at 03/16/17 0557    sodium chloride (NS) flush 5-10 mL  5-10 mL IntraVENous PRN      vancomycin dosing per pharmacy   Other Rx Dosing/Monitoring      pantoprazole (PROTONIX) 40 mg in sodium chloride 0.9 % 10 mL injection  40 mg IntraVENous Q24H 40 mg at 03/16/17 0040    piperacillin-tazobactam (ZOSYN) 3.375 g in 0.9% sodium chloride (MBP/ADV) 100 mL  3.375 g IntraVENous Q8H 3.375 g at 03/16/17 0554       Labs/Data:    Lab Results   Component Value Date/Time    WBC 2.3 03/16/2017 04:57 AM    HGB 8.6 03/16/2017 04:57 AM    HCT 27.8 03/16/2017 04:57 AM    PLATELET 44 56/68/1454 04:57 AM    MCV 72.2 03/16/2017 04:57 AM       Lab Results   Component Value Date/Time    Sodium 148 03/16/2017 04:57 AM    Potassium 3.3 03/16/2017 04:57 AM    Chloride 111 03/16/2017 04:57 AM    CO2 28 03/16/2017 04:57 AM    Anion gap 9 03/16/2017 04:57 AM    Glucose 88 03/16/2017 04:57 AM    BUN 28 03/16/2017 04:57 AM    Creatinine 1.14 03/16/2017 04:57 AM    BUN/Creatinine ratio 25 03/16/2017 04:57 AM    GFR est AA 55 03/16/2017 04:57 AM    GFR est non-AA 45 03/16/2017 04:57 AM    Calcium 8.7 03/16/2017 04:57 AM       Wt Readings from Last 3 Encounters:   03/16/17 80.1 kg (176 lb 9.4 oz)   01/10/17 66.3 kg (146 lb 3.2 oz)   01/04/17 68 kg (150 lb)         Intake/Output Summary (Last 24 hours) at 03/16/17 1431  Last data filed at 03/16/17 1158   Gross per 24 hour   Intake              450 ml   Output             1680 ml   Net            -1230 ml       Patient seen and examined. Chart reviewed. Labs, data and other pertinent notes reviewed in last 24 hrs.     PMH/SH/FH reviewed and unchanged compared to H&P    Discussed with MECHELLE Mckinley MD

## 2017-03-16 NOTE — CONSULTS
[id number] [facility] at [address] [facility] Consultation    Admit Date: 3/8/2017  Reason for Consultation: epigastric tenderness, worsening LFTs and abnormal US. HPI:  Nacho Becerra is a 80 y.o. female with MMP including CHF, A fib, DM, HTN, chronic anticoagulation, Hx of pacemaker, mitral valve repair, and ICD. We are asked to see in consultation to evaluate RUQ pain. Patient is not able to provide much history, so information is primarily gleaned from family at the bedside and chart review. She began having \"stomach problems\" in September 2016 and has been seen in the ED x 3 for symptoms of pain and nausea. She was admitted on 3/8/17 with 3 day history of abdominal pain 8/10 and nausea. She was hypothermic with temp of 91.4, hypoglycemia, and hypotensive. WBC 2.3, Hgb 8.6, T Bili 3.5, , , alk phos 51. Blood cultures are NGTD, Urine cultures is NGTD. CXR 3/16/17:  Impression:  Unchanged bilateral pleural effusions and bibasilar opacities. Pneumonia is not  excluded. US 3/15/17:  IMPRESSION:  1. Distended gallbladder with sludge and a stone with moderate wall thickening  of the gallbladder wall. 2. Dilated IVC suggesting right heart failure. 3. Small amount of free fluid surrounding the liver. 4. Small right pleural effusion    CT 3/8/17: IMPRESSION  impression: Anasarca with bilateral pleural effusion and ascites. Cholelithiasis. Cardiomegaly        At present she denies abdominal pain.       Patient Active Problem List    Diagnosis Date Noted    Hypothermia 03/08/2017    Non-rheumatic mitral regurgitation 01/12/2017    Biventricular ICD (implantable cardioverter-defibrillator) in place 09/09/2016    Third degree AV block (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)     PAF (paroxysmal atrial fibrillation) (HCC)     Diabetes mellitus type 2, insulin dependent (HCC)     Chronic systolic heart failure ([facility] [address] 09/24/2010    Rupture of chordae tendineae ([facility] [address] 09/24/2010     Past Medical History:   Diagnosis Date    Atrial fibrillation (HCC)     Chronic systolic heart failure (HCC)     ef 35-40%; hold ACE due to lower bp    Diabetes mellitus type 2, insulin dependent ([address]     Hypertension     Long term (current) use of anticoagulants     Malignant hypertensive heart disease with CHF ([facility] [address]     Mitral regurgitation     Pacemaker 10/25/2012    The pacemaker is a [employer], model # [id number], serial [id number]. 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, 1996    # 28 [employer]    Thyroid disease     TR (tricuspid regurgitation)     moderate to severe echo 2009      Past Surgical History:   Procedure Laterality Date    CARDIAC SURG PROCEDURE UNLIST  1996    valve replacement    HX HEART CATHETERIZATION  10/4/2012    normal cors, LVEF 50%    HX HEART VALVE SURGERY      HX PACEMAKER      INS PPM/ICD LED DUAL ONLY  10/25/2012         INS PPM/ICD LED DUAL ONLY  9/9/2016           Social History   Substance Use Topics    Smoking status: Never Smoker    Smokeless tobacco: Never Used    Alcohol use No      Family History   Problem Relation Age of Onset    Cancer Sister      breast    Heart Disease Brother     Heart Disease Brother     Diabetes Son     Stroke Son       Prior to Admission medications    Medication Sig Start Date End Date Taking?  Authorizing Provider   KLOR-CON M20 20 mEq tablet TAKE 2 TABS BY MOUTH TWO (2) TIMES A DAY. 2/15/17   Milka Gonzalez MD   furosemide (LASIX) 80 mg tablet TAKE 1 TAB BY MOUTH TWO (2) TIMES A DAY. 1/10/17   Milka Gonzalez MD   enalapril (VASOTEC) 10 mg tablet Take 5 mg by mouth daily. Historical Provider   carvedilol (COREG) 25 mg tablet TAKE 1 TAB BY MOUTH TWO (2) TIMES DAILY (WITH MEALS). 12/1/16   Varinder Chan MD   warfarin (COUMADIN) 5 mg tablet Take half tablet or 2.5 on Tues and Thursdays, whole tab or 5 mg on all other days or as directed by Coumadin clinic 9/1/15   Varinder Chan MD   insulin glargine (LANTUS SOLOSTAR) 100 unit/mL (3 mL) pen 22 Units by SubCUTAneous route daily. Historical Provider   acetaminophen (TYLENOL) 325 mg tablet Take  by mouth every four (4) hours as needed.     Historical Provider     Current Facility-Administered Medications   Medication Dose Route Frequency    potassium chloride 10 mEq in 100 ml IVPB  10 mEq IntraVENous Q1H    balsam peru-castor oil (VENELEX)  mg/gram ointment   Topical BID    albumin human 25% (BUMINATE) solution 12.5 g  12.5 g IntraVENous Q12H PRN    heparin (porcine) injection 5,000 Units  5,000 Units SubCUTAneous Q8H    thiamine (B-1) tablet 100 mg  100 mg Oral DAILY    bumetanide (BUMEX) injection 2 mg  2 mg IntraVENous Q12H    vancomycin (VANCOCIN) 1,000 mg in 0.9% sodium chloride (MBP/ADV) 250 mL  1,000 mg IntraVENous Q24H    albuterol-ipratropium (DUO-NEB) 2.5 MG-0.5 MG/3 ML  3 mL Nebulization Q6H RT    polyvinyl alcohol (LIQUIFILM TEARS) 1.4 % ophthalmic solution 2 Drop  2 Drop Both Eyes TID    sodium chloride (NS) flush 10 mL  10 mL InterCATHeter Q24H    sodium chloride (NS) flush 10 mL  10 mL InterCATHeter PRN    sodium chloride (NS) flush 10-40 mL  10-40 mL InterCATHeter Q8H    alteplase (CATHFLO) 1 mg in sterile water (preservative free) 1 mL injection  1 mg InterCATHeter PRN    bacitracin 500 unit/gram packet 1 Packet  1 Packet Topical PRN    0.9% sodium chloride infusion 250 mL  250 mL IntraVENous PRN    ondansetron (ZOFRAN) injection 4 mg  4 mg IntraVENous Q8H PRN    glucose chewable tablet 16 g  4 Tab Oral PRN    dextrose (D50W) injection syrg 12.5-25 g  12.5-25 g IntraVENous PRN    glucagon (GLUCAGEN) injection 1 mg  1 mg IntraMUSCular PRN    sodium chloride (NS) flush 5-10 mL  5-10 mL IntraVENous Q8H    sodium chloride (NS) flush 5-10 mL  5-10 mL IntraVENous PRN    vancomycin dosing per pharmacy   Other Rx Dosing/Monitoring    pantoprazole (PROTONIX) 40 mg in sodium chloride 0.9 % 10 mL injection  40 mg IntraVENous Q24H    piperacillin-tazobactam (ZOSYN) 3.375 g in 0.9% sodium chloride (MBP/ADV) 100 mL  3.375 g IntraVENous Q8H     No Known Allergies       Subjective:     Review of Systems:    A comprehensive review of systems was negative except for that written in the History of Present Illness. Objective:     Blood pressure 94/66, pulse 70, temperature 97.3 °F (36.3 °C), resp. rate 18, height 5' 6\" (1.676 m), weight 80.1 kg (176 lb 9.4 oz), SpO2 100 %. Temp (24hrs), Av.2 °F (36.2 °C), Min:96.5 °F (35.8 °C), Max:97.7 °F (36.5 °C)      Recent Labs      03/16/17   0457  03/15/17   0443  03/14/17   045   WBC  2.3*  2.4*  2.9*   HGB  8.6*  8.6*  8.5*   HCT  27.8*  27.2*  27.6*   PLT  44*  44*  54*     Recent Labs      03/16/17   0457  03/15/17   0443  03/14/17   0458   NA  148*  145  141   K  3.3*  3.1*  3.7   CL  111*  108  107   CO2  28  29  28   GLU  88  90  111*   BUN  28*  23*  23*   CREA  1.14*  1.14*  1.17*   CA  8.7  8.6  8.7   MG  2.3  2.0  1.9   PHOS  3.7  2.4*  2.2*   ALB  2.6*  2.4*  2.4*   TBILI  3.5*  3.2*  2.7*   SGOT  115*  168*  217*   ALT  112*  135*  153*   INR   --   1.4*  1.6*     No results for input(s): AML, LPSE in the last 72 hours.       Intake/Output Summary (Last 24 hours) at 17 1353  Last data filed at 17 1158   Gross per 24 hour   Intake              550 ml   Output             1795 ml   Net            -1245 ml       Date 17 0700 - 17 0659   Shift 3549-6702 [id number] 24 Hour Total   I  N  T  A  K  E   I.V.  (mL/kg/hr) 450   450    Shift Total  (mL/kg) 450  (5.6)   450  (5.6)   O  U  T  P  U  T Urine  (mL/kg/hr) 120   120    Shift Total  (mL/kg) 120  (1.5)   120  (1.5)   Weight (kg) 80.1 80.1 80.1 80.1     _____________________  Physical Exam:     General:  Alert, cooperative, no distress, appears stated age. Eyes:   PERRL, EOMs intact. Sclera clear. Throat: Lips, mucosa, and tongue normal.   Neck: Supple, symmetrical, trachea midline. Lungs:   Clear to auscultation bilaterally. Heart:  Paced, Regular rate    Abdomen:   High pitched bowel sounds, distended with tympany, soft, NT. Extremities: Extremities normal, atraumatic, no cyanosis    Skin: Skin color, texture, turgor normal. No rashes or lesions. Assessment:   Principal Problem:    Hypothermia (3/8/2017)    cholelithiasis with mild wall thickening. Abnormal LFTs        Plan:     Unclear if she has acute cholecystitis as she denies pain and is not tender on exam.    Recommend MRCP once stable enough for imaging. If acute cholecystitis demonstrated, would proceed with cholecystostomy tube placement as she he is not a surgical candidate at this time. Recommend re-consult GI for hyperbilirubinemia  Further plan per Dr. Alia Hook  Following      Thank you for allowing us to participate in the care of this patient. Total time spent with patient: 33 minutes. Signed By: Alejandra Gottlieb NP     March 16, 2017            ADDENDUM:  Cass Abreu MD  Agree with NP assessment and plan. Pt denied any abdominal pain. No nausea or vomiting. Pt denied any flatus or BM. Starting some po today. Recommend MRCP to evaluate for biliary obstruction when pt is clinically stable for test.  Given multiple ongoing issues, pt is not currently a candidate for lap cholecystectomy. Recommend cholecystostomy tube placement if pt decompensates. Can follow-up in Gen Surgery clinic in 6 weeks to discuss cholecystectomy once medical issues have resolved. Recommend reconsulting GI for elevated liver enzymes. Diet as tolerated. Bowel regiment.

## 2017-03-16 NOTE — PROGRESS NOTES
[address]  [address], [address]       GI PROGRESS NOTE  [facility]  [phone]    NAME: Luis Alberto Cooper   :  1930   MRN:  [id number]       Subjective:     Family is at the bedside, they report she is doing better. Was up in the chair earlier this afternoon. Tolerating diet thus far. She has not been complaining of abdominal pain or nausea. We have been asked to see regarding increasing bilirubin level. Surgery has reevaluated for gallbladder and the US taken yesterday again suspicious for cholecystitis. Objective:     VITALS:   Last 24hrs VS reviewed since prior progress note. Most recent are:  Visit Vitals    BP 96/68    Pulse 78    Temp 97.7 °F (36.5 °C)    Resp 22    Ht 5' 6\" (1.676 m)    Wt 80.1 kg (176 lb 9.4 oz)    SpO2 100%    BMI 28.5 kg/m2       PHYSICAL EXAM:  General: No acute distress    Neurologic:  Resting, awakens easily  HEENT: No scleral icteric   Lungs:  No distress  Heart:  s1 s2  Abdomen: Soft, mildly distended, Non tender.  +Bowel sounds      Lab Data Reviewed:     Recent Results (from the past 24 hour(s))   GLUCOSE, POC    Collection Time: 03/15/17  8:46 PM   Result Value Ref Range    Glucose (POC) 90 65 - 100 mg/dL    Performed by MOE XAVIER    GLUCOSE, POC    Collection Time: 17 12:37 AM   Result Value Ref Range    Glucose (POC) 87 65 - 100 mg/dL    Performed by MOE XAVIER    CBC WITH AUTOMATED DIFF    Collection Time: 17  4:57 AM   Result Value Ref Range    WBC 2.3 (L) 3.6 - 11.0 K/uL    RBC 3.85 3.80 - 5.20 M/uL    HGB 8.6 (L) 11.5 - 16.0 g/dL    HCT 27.8 (L) 35.0 - 47.0 %    MCV 72.2 (L) 80.0 - 99.0 FL    MCH 22.3 (L) 26.0 - 34.0 PG    MCHC 30.9 30.0 - 36.5 g/dL    RDW 17.2 (H) 11.5 - 14.5 %    PLATELET 44 (LL) 984 - 400 K/uL    NEUTROPHILS 67 32 - 75 %    LYMPHOCYTES 16 12 - 49 %    MONOCYTES 15 (H) 5 - 13 %    EOSINOPHILS 2 0 - 7 %    BASOPHILS 0 0 - 1 %    ABS.  NEUTROPHILS 1.6 (L) 1.8 - 8.0 K/UL    ABS. LYMPHOCYTES 0.4 (L) 0.8 - 3.5 K/UL    ABS. MONOCYTES 0.3 0.0 - 1.0 K/UL    ABS. EOSINOPHILS 0.0 0.0 - 0.4 K/UL    ABS. BASOPHILS 0.0 0.0 - 0.1 K/UL    DF SMEAR SCANNED      PLATELET COMMENTS LARGE PLATELETS      RBC COMMENTS ANISOCYTOSIS  1+        RBC COMMENTS MICROCYTOSIS  1+        RBC COMMENTS TARGET CELLS  PRESENT        RBC COMMENTS HYPOCHROMIA  2+        RBC COMMENTS POLYCHROMASIA  PRESENT        RBC COMMENTS RBC FRAGMENTS  PRESENT  OVALOCYTES  PRESENT       METABOLIC PANEL, COMPREHENSIVE    Collection Time: 03/16/17  4:57 AM   Result Value Ref Range    Sodium 148 (H) 136 - 145 mmol/L    Potassium 3.3 (L) 3.5 - 5.1 mmol/L    Chloride 111 (H) 97 - 108 mmol/L    CO2 28 21 - 32 mmol/L    Anion gap 9 5 - 15 mmol/L    Glucose 88 65 - 100 mg/dL    BUN 28 (H) 6 - 20 MG/DL    Creatinine 1.14 (H) 0.55 - 1.02 MG/DL    BUN/Creatinine ratio 25 (H) 12 - 20      GFR est AA 55 (L) >60 ml/min/1.73m2    GFR est non-AA 45 (L) >60 ml/min/1.73m2    Calcium 8.7 8.5 - 10.1 MG/DL    Bilirubin, total 3.5 (H) 0.2 - 1.0 MG/DL    ALT (SGPT) 112 (H) 12 - 78 U/L    AST (SGOT) 115 (H) 15 - 37 U/L    Alk.  phosphatase 51 45 - 117 U/L    Protein, total 6.1 (L) 6.4 - 8.2 g/dL    Albumin 2.6 (L) 3.5 - 5.0 g/dL    Globulin 3.5 2.0 - 4.0 g/dL    A-G Ratio 0.7 (L) 1.1 - 2.2     MAGNESIUM    Collection Time: 03/16/17  4:57 AM   Result Value Ref Range    Magnesium 2.3 1.6 - 2.4 mg/dL   PHOSPHORUS    Collection Time: 03/16/17  4:57 AM   Result Value Ref Range    Phosphorus 3.7 2.6 - 4.7 MG/DL   BNP    Collection Time: 03/16/17  4:57 AM   Result Value Ref Range    BNP 1306 (H) 0 - 100 pg/mL   NUCLEATED RBC    Collection Time: 03/16/17  4:57 AM   Result Value Ref Range    NRBC 5.6 (H) 0  WBC    ABSOLUTE NRBC 0.13 (H) 0.00 - 0.01 K/uL    WBC CORRECTED FOR NR ADJUSTED FOR NUCLEATED RBC'S     GLUCOSE, POC    Collection Time: 03/16/17 11:54 AM   Result Value Ref Range    Glucose (POC) 110 (H) 65 - 100 mg/dL    Performed by Ramin Stafford Ghulam Rolon          ________________________________________________________________________       Assessment:   · Increasing Bilirubin - trending up, today value of 3.5,  The alk phos has been steadily decreasing along with other transaminases. Agree that an MRCP would be very helpful in this situation but she cannot have one (pacemaker) US does not mention the CBD at all. With hypotension this could be resolving poor perfusion  · ? Cholecystitis - h/o epigastric pain on admission - surgery is following and considering a cholecystostomy tube     Patient Active Problem List   Diagnosis Code    Chronic systolic heart failure (HCC) I50.22    Rupture of chordae tendineae (HCC) I51.1    Mitral regurgitation I34.0    S/P mitral valve repair Z98.890    TR (tricuspid regurgitation) I07.1    Malignant hypertensive heart disease with CHF ([address] I11.0    PAF (paroxysmal atrial fibrillation) (HCC) I48.0    Diabetes mellitus type 2, 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 XXXA     Plan:   · Discussed with Dr Maria Del Rosario Coburn - he went back through the [address] [facility] [address] report and was able to view the CBD- it was 6mm without obvious stones seen.     · Fractionate bilirubin in am  · Continue abx    Thank you for the consult     Signed By: Bebeto Paula NP     3/16/2017  4:13 PM

## 2017-03-16 NOTE — PROGRESS NOTES
1930: Bedside and Verbal shift change report given to Agnes (oncoming nurse) by Dulce Hernández RN (offgoing nurse). Report included the following information SBAR, Kardex, ED Summary, Procedure Summary, Intake/Output, MAR, Accordion, Recent Results and Med Rec Status. 2000: Assessment complete. Patient resting on NC 4L. Awake and alert, oriented to self. Moves all 4. Richardson patent. No pain. VSS.  2100: Patient placed back on bi-pap.  2145: Central line removed, no bleeding, dressing applied. 0730: Bedside and Verbal shift change report given to Delia Anne (oncoming nurse) by Agnes (offgoing nurse). Report included the following information SBAR, Kardex, ED Summary, Procedure Summary, Intake/Output, MAR, Accordion and Recent Results.

## 2017-03-16 NOTE — WOUND CARE
WOCN Note:     New consult placed by RN for nose breakdown. Chart shows:  Admitted for hypothermia & respiratory failure, sepsis; history of CHF, DM, a-fib, pacer  WBC = 2.3  On vancomycin and zosyn    Assessment:   Patient is alert & communicative, has a Richardson and requires assists in repositioning - helped by Panda Castaneda, RN. Bed: total care  Patient reports no pain. Bilateral heel, buttocks, and sacral skin intact and without erythema. Perianal and gluteal cleft with intact smooth dyschromia. 1. Left cheek where it meets the nose pressure ulcer/injury, stage 2 = 1.5 x 0.5 x 0.1 cm  100% moist pink/red. No exudate. Periwound intact & without erythema. Some residual [place] covering this wound. Right cheek where it meets the nose with linear jac = 3 x 0.7 x 0 cm of 80% non-blanching deep purple and 20% red perimeter. Fully intact. Tender to touch. Residual [place] also covering this area. Patient repositioned on right side. Heels floated on pillow. Recommendations:    Venelex to both areas on face twice daily. Skin Care & Pressure Prevention:  1. Minimize layers of linen/pads under patient to optimize support surface. 2. Turn/reposition approximately every 2 hours and offload heels. Discussed above plan with patient, her family in room, Dr. Prerna Parry, Panda Castaneda.     Transition of Care: Plan to follow as needed while admitted to hospital.    NERY Tilley, RN, [facility]  Certified Wound, Ostomy, Continence Nurse  office [phone]  pager [phone] or call  to page

## 2017-03-16 NOTE — PROGRESS NOTES
Cardiology Progress Note            Admit Date: 3/8/2017  Admit Diagnosis: Hypothermia  Date: 3/16/2017     Time: 0800 AM  Assessment/Plan/Discussion:Cardiology Attending:     Patient seen earlier today and examined  and agree with Advance Practice Provider (ANDI, NP,PA)  assessment and plans. Brady Yusuf is a 80 y.o. female   Doing much better   Family at bedside  the patient can move all extremities and soft spoken but conversant       Visit Vitals    BP 98/64    Pulse 79    Temp 97.3 °F (36.3 °C)    Resp 19    Ht 5' 6\" (1.676 m)    Wt 176 lb 9.4 oz (80.1 kg)    SpO2 100%    BMI 28.5 kg/m2    clear  RRR 2/6 LIZET apex  No edema     Infection and sepsis with resp failure improving  Off pressors, breathing well  No OAC or warfarin with low platelets, risks outweighs benefits, low dose heparin is fine for now  CHF and valve are stable except for this question of AV  Plan AMMON Monday if airway defense continues to improve  I Do not plan it for tomorrow due to aspiration risk  thanks             Tyshawn Langford MD 3/16/2017              Assessment and Plan     1. Lorenso Frankel Respiratory failure with possible septic shock:  Pt off and on biPap- currently on NC. Improved mentation. Off pressors.  -Continue NC/BiPAP as needed- pulmonary following/managing    2: Chronic systolic CHF, systolic TTE 3/9 EF 99%. BNP 1306 from 770 3/13/17 Pt net -1650 in 24 hours but + 1034 since admission per record. Has AICD/BiV pacer   -Bumex 2 mg IV q 12 hr.   -Beta blocker on hold due to hypotension, on vasopressor   -ACE-I held due to hypotension, JUAN   -Continue daily weights and I/Os   -Not appropriate at this time for IP card rehab cx per guidelines. 3. Non-ischemic Cardiomyopathy, valvular heart disease s/p Mitral annuloplasty  Echo 3/9: LVEF 30%, BiAE, mod MR, mod TR and mild pulm HTN, pleural effusion.     4. Hx atrial fibrillation:    -[id number]= 6 (age, CHF, HTN, female, DM). Holding coumadin. Continue heparin 5000 units subq q 8 hr.   -Rate controlled in 80's. No beta blocker due to BP in 74'A systolic.   -Has BiV pacer    5. Possible vegetation on aortic valve. -Blood cx 3/11/17- No growth- final results. .  BC 3/8 negative.   -Will tentatively plan for AMMON on Monday pending pt status. 6.  JUAN- creatinine improving- now 1.14 last 2 days from 1.17 on 3.13.17. .   -Nephrology following. 7. Thrombocytopenia- plts 44 last 2 days from 47 yesterday. Will not advance to therapeutic anticoagulation yet. 8.  Hypokalemia- K=3.3. Replete and recheck potassium    9.. Hx of DM- . Management per primary team.    10. Elevated LFTs- LFTs trending down. Subjective:   Chelsea Waters more awake today. Denies SOB and pain. States she is in hospital and that it is 2017. Objective:      Physical Exam:                Visit Vitals    BP 98/64    Pulse 79    Temp 97.3 °F (36.3 °C)    Resp 19    Ht 5' 6\" (1.676 m)    Wt 80.1 kg (176 lb 9.4 oz)    SpO2 100%    BMI 28.5 kg/m2          General Appearance:   Awake, alert, cooperative, weak   Ears/Nose/Mouth/Throat: On Neb treatment and NC         Neck:  Supple. Chest:    Lungs faint wheeze upper lung field, otherwise clear. Cardiovascular:   Regular rate and rhythm, S1, S2 normal, no murmur. Abdomen:    Soft, distended, no grimacing to palpation,  bowel sounds are active. Extremities:   + BUE, 2+ ankle edema   2+ DP pulses bilaterally. Skin:  Warm and dry. Neuro: Follows commands. Telemetry: Ventricular paced- 1 8 beat run of VT noted.           Data Review:    Labs:    Recent Results (from the past 24 hour(s))   GLUCOSE, POC    Collection Time: 03/15/17  8:46 PM   Result Value Ref Range    Glucose (POC) 90 65 - 100 mg/dL    Performed by [address] [place] [address], POC    Collection Time: 03/16/17 12:37 AM   Result Value Ref Range    Glucose (POC) 87 65 - 100 mg/dL    Performed by MOE MORENITA    CBC WITH AUTOMATED DIFF    Collection Time: 03/16/17  4:57 AM   Result Value Ref Range    WBC 2.3 (L) 3.6 - 11.0 K/uL    RBC 3.85 3.80 - 5.20 M/uL    HGB 8.6 (L) 11.5 - 16.0 g/dL    HCT 27.8 (L) 35.0 - 47.0 %    MCV 72.2 (L) 80.0 - 99.0 FL    MCH 22.3 (L) 26.0 - 34.0 PG    MCHC 30.9 30.0 - 36.5 g/dL    RDW 17.2 (H) 11.5 - 14.5 %    PLATELET 44 (LL) 188 - 400 K/uL    NEUTROPHILS 67 32 - 75 %    LYMPHOCYTES 16 12 - 49 %    MONOCYTES 15 (H) 5 - 13 %    EOSINOPHILS 2 0 - 7 %    BASOPHILS 0 0 - 1 %    ABS. NEUTROPHILS 1.6 (L) 1.8 - 8.0 K/UL    ABS. LYMPHOCYTES 0.4 (L) 0.8 - 3.5 K/UL    ABS. MONOCYTES 0.3 0.0 - 1.0 K/UL    ABS. EOSINOPHILS 0.0 0.0 - 0.4 K/UL    ABS. BASOPHILS 0.0 0.0 - 0.1 K/UL    DF SMEAR SCANNED      PLATELET COMMENTS LARGE PLATELETS      RBC COMMENTS ANISOCYTOSIS  1+        RBC COMMENTS MICROCYTOSIS  1+        RBC COMMENTS TARGET CELLS  PRESENT        RBC COMMENTS HYPOCHROMIA  2+        RBC COMMENTS POLYCHROMASIA  PRESENT        RBC COMMENTS RBC FRAGMENTS  PRESENT  OVALOCYTES  PRESENT       METABOLIC PANEL, COMPREHENSIVE    Collection Time: 03/16/17  4:57 AM   Result Value Ref Range    Sodium 148 (H) 136 - 145 mmol/L    Potassium 3.3 (L) 3.5 - 5.1 mmol/L    Chloride 111 (H) 97 - 108 mmol/L    CO2 28 21 - 32 mmol/L    Anion gap 9 5 - 15 mmol/L    Glucose 88 65 - 100 mg/dL    BUN 28 (H) 6 - 20 MG/DL    Creatinine 1.14 (H) 0.55 - 1.02 MG/DL    BUN/Creatinine ratio 25 (H) 12 - 20      GFR est AA 55 (L) >60 ml/min/1.73m2    GFR est non-AA 45 (L) >60 ml/min/1.73m2    Calcium 8.7 8.5 - 10.1 MG/DL    Bilirubin, total 3.5 (H) 0.2 - 1.0 MG/DL    ALT (SGPT) 112 (H) 12 - 78 U/L    AST (SGOT) 115 (H) 15 - 37 U/L    Alk.  phosphatase 51 45 - 117 U/L    Protein, total 6.1 (L) 6.4 - 8.2 g/dL    Albumin 2.6 (L) 3.5 - 5.0 g/dL    Globulin 3.5 2.0 - 4.0 g/dL    A-G Ratio 0.7 (L) 1.1 - 2.2     MAGNESIUM    Collection Time: 03/16/17  4:57 AM   Result Value Ref Range    Magnesium 2.3 1.6 - 2.4 mg/dL PHOSPHORUS    Collection Time: 03/16/17  4:57 AM   Result Value Ref Range    Phosphorus 3.7 2.6 - 4.7 MG/DL   BNP    Collection Time: 03/16/17  4:57 AM   Result Value Ref Range    BNP 1306 (H) 0 - 100 pg/mL   NUCLEATED RBC    Collection Time: 03/16/17  4:57 AM   Result Value Ref Range    NRBC 5.6 (H) 0  WBC    ABSOLUTE NRBC 0.13 (H) 0.00 - 0.01 K/uL    WBC CORRECTED FOR NR ADJUSTED FOR NUCLEATED RBC'S     GLUCOSE, POC    Collection Time: 03/16/17 11:54 AM   Result Value Ref Range    Glucose (POC) 110 (H) 65 - 100 mg/dL    Performed by Deyanira Smith           Radiology:        Current Facility-Administered Medications   Medication Dose Route Frequency    potassium chloride 10 mEq in 100 ml IVPB  10 mEq IntraVENous Q1H    balsam peru-castor oil (VENELEX)  mg/gram ointment   Topical BID    albumin human 25% (BUMINATE) solution 12.5 g  12.5 g IntraVENous Q12H PRN    heparin (porcine) injection 5,000 Units  5,000 Units SubCUTAneous Q8H    thiamine (B-1) tablet 100 mg  100 mg Oral DAILY    bumetanide (BUMEX) injection 2 mg  2 mg IntraVENous Q12H    vancomycin (VANCOCIN) 1,000 mg in 0.9% sodium chloride (MBP/ADV) 250 mL  1,000 mg IntraVENous Q24H    albuterol-ipratropium (DUO-NEB) 2.5 MG-0.5 MG/3 ML  3 mL Nebulization Q6H RT    polyvinyl alcohol (LIQUIFILM TEARS) 1.4 % ophthalmic solution 2 Drop  2 Drop Both Eyes TID    sodium chloride (NS) flush 10 mL  10 mL InterCATHeter Q24H    sodium chloride (NS) flush 10 mL  10 mL InterCATHeter PRN    sodium chloride (NS) flush 10-40 mL  10-40 mL InterCATHeter Q8H    alteplase (CATHFLO) 1 mg in sterile water (preservative free) 1 mL injection  1 mg InterCATHeter PRN    bacitracin 500 unit/gram packet 1 Packet  1 Packet Topical PRN    0.9% sodium chloride infusion 250 mL  250 mL IntraVENous PRN    ondansetron (ZOFRAN) injection 4 mg  4 mg IntraVENous Q8H PRN    glucose chewable tablet 16 g  4 Tab Oral PRN    dextrose (D50W) injection syrg 12.5-25 g 12.5-25 g IntraVENous PRN    glucagon (GLUCAGEN) injection 1 mg  1 mg IntraMUSCular PRN    sodium chloride (NS) flush 5-10 mL  5-10 mL IntraVENous Q8H    sodium chloride (NS) flush 5-10 mL  5-10 mL IntraVENous PRN    vancomycin dosing per pharmacy   Other Rx Dosing/Monitoring    pantoprazole (PROTONIX) 40 mg in sodium chloride 0.9 % 10 mL injection  40 mg IntraVENous Q24H    piperacillin-tazobactam (ZOSYN) 3.375 g in 0.9% sodium chloride (MBP/ADV) 100 mL  3.375 g IntraVENous Q8H          Dmitri Donovan.  IRLANDA Wilkinson     [facility] [address] [place] [address], [address] [place] [address] [address]   Najma Malone   [phone]

## 2017-03-16 NOTE — PROGRESS NOTES
Problem: Mobility Impaired (Adult and Pediatric)  Goal: *Acute Goals and Plan of Care (Insert Text)  Physical Therapy Goals  Initiated 3/16/2017  1. Patient will move from supine to sit and sit to supine , scoot up and down and roll side to side in bed with minimal assistance within 7 day(s). 2. Patient will transfer from bed to chair and chair to bed with moderate assistance using the least restrictive device within 7 day(s). 3. Patient will perform sit to stand with moderate assistance within 7 day(s). 4. Patient will ambulate with moderate assistance for 10 feet with the least restrictive device within 7 day(s). PHYSICAL THERAPY EVALUATION  Patient: Nacho Becerra (20 y.o. female)  Date: 3/16/2017  Primary Diagnosis: Hypothermia        Precautions:   Fall      ASSESSMENT :  Based on the objective data described below, the patient presents with decreased independence with mobility compared to her baseline level of function limited by decreased strength, decreased cardiopulmonary tolerance, DICK, BLE swelling, standing tolerance <1 minute, and  mildly impaired attention. This date, patient overall modAx2 for bed mobility and maxAx2 to transfer from bed to chair. Patient unable to achieve full upright standing. Provided manual cue to sacrum for anterior pelvic shift to facilitate improved posture. Uncontrolled decent into chair. Pt on 5L NC during session and sats >98% during session, increased RR (25-30bpm), 2 episodes of v-tach on monitor with HR highest at 121. Recommend  Rehab  vs skilled nursing at d/c prior to returning home. Patient independent PTA. Will continue to follow. Will continue to address LE strengthening and standing tolerance next session. Patient will benefit from skilled intervention to address the above impairments.   Patients rehabilitation potential is considered to be Good  Factors which may influence rehabilitation potential include:   [ ]         None noted  [ ]         Mental ability/status  [X]         Medical condition  [ ]         Home/family situation and support systems  [ ]         Safety awareness  [ ]         Pain tolerance/management  [ ]         Other:        PLAN :  Recommendations and Planned Interventions:  [X]           Bed Mobility Training             [ ]    Neuromuscular Re-Education  [X]           Transfer Training                   [ ]    Orthotic/Prosthetic Training  [X]           Gait Training                         [ ]    Modalities  [X]           Therapeutic Exercises           [ ]    Edema Management/Control  [X]           Therapeutic Activities            [ ]    Patient and Family Training/Education  [ ]           Other (comment):     Frequency/Duration: Patient will be followed by physical therapy  5 times a week to address goals. Discharge Recommendations: Rehab and Skilled Nursing Facility  Further Equipment Recommendations for Discharge: tbd       SUBJECTIVE:   Patient stated I live with my sister.       OBJECTIVE DATA SUMMARY:   HISTORY:    Past Medical History:   Diagnosis Date    [facility]rial fibrillation [facility]      Chronic systolic heart failure (HCC)       ef 35-40%; hold ACE due to lower bp    Diabetes mellitus type 2, insulin dependent ([facility]      Hypertension      Long term (current) use of anticoagulants      Malignant hypertensive heart disease with CHF ([facility]      Mitral regurgitation      Pacemaker 10/25/2012     The pacemaker is a [employer] , model # [id number], serial [id number].    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, 1996     # 28 [place]-Manuel    Thyroid disease      TR (tricuspid regurgitation)       moderate to severe echo 2009     Past Surgical History:   Procedure Laterality Date    CARDIAC SURG PROCEDURE UNLIST   1996     valve replacement    HX HEART CATHETERIZATION   10/4/2012     normal cors, LVEF 50%    HX HEART VALVE SURGERY        HX PACEMAKER        INS PPM/ICD LED DUAL ONLY   10/25/2012          INS PPM/ICD LED DUAL ONLY   9/9/2016           Prior Level of Function/Home Situation: Independent. Lives with sister. Personal factors and/or comorbidities impacting plan of care:      Home Situation  Home Environment: Private residence  # Steps to Enter: 4  Rails to Enter: Yes  Hand Rails : Bilateral  One/Two Story Residence: One story  Living Alone: No  Support Systems: Child(milagros), Family member(s), [employer] / [employer] community  Patient Expects to be Discharged to[de-identified] Unknown  Current DME Used/Available at Home: None  Tub or Shower Type: Tub/Shower combination     EXAMINATION/PRESENTATION/DECISION MAKING:   Critical Behavior:  Neurologic State: Alert  Orientation Level: Oriented X4  Cognition: Follows commands, Decreased attention/concentration  Safety/Judgement: Awareness of environment, Decreased insight into deficits  Hearing: Auditory  Auditory Impairment: None     Range Of Motion:  AROM: Generally decreased, functional           PROM: Within functional limits           Strength:    Strength: Generally decreased, functional                    Tone & Sensation:   Tone: Normal              Sensation: Intact               Coordination:  Coordination: Generally decreased, functional  Vision:   Tracking: Able to track stimulus in all quadrants w/o difficulty  Diplopia: No  Acuity: Able to read clock/calendar on wall without difficulty  Corrective Lenses: Glasses  Functional Mobility:  Bed Mobility:     Supine to Sit: Moderate assistance;Assist x2     Scooting: Maximum assistance  Transfers:  Sit to Stand: Maximum assistance;Assist x2  Stand to Sit: Maximum assistance;Assist x2        Bed to Chair: Maximum assistance;Assist x2              Balance:   Sitting: Impaired  Sitting - Static: Good (unsupported)  Sitting - Dynamic: Fair (occasional)  Standing: Impaired; With support  Standing - Static: Poor;Constant support  Standing - Dynamic : Poor  Ambulation/Gait Training:                                            Functional Measure:  Timed up and go:      Timed Get Up And Go Test: 0 (unable)      Timed Up and Go and G-code impairment scale:  Percentage of Impairment CH     0%    CI     1-19% CJ     20-39% CK     40-59% CL     60-79% CM     80-99% CN      100%   Timed   Score 0-56 10 11-12 13-14 15-16 17-18 19 20          < than 10 seconds=Normal  Greater then 13.5 seconds (in elderly)=Increased fall risk   Tevin Jacobs [place] M. Predicting the probability for falls in community dwelling older adults using the Timed Up and Go Test. Phys Ther. 2000;80:896-903. G codes: In compliance with CMSs Claims Based Outcome Reporting, the following G-code set was chosen for this patient based on their primary functional limitation being treated: The outcome measure chosen to determine the severity of the functional limitation was the TUG with a score of 0/unable which was correlated with the impairment scale. · Mobility - Walking and Moving Around:               - CURRENT STATUS:    CN - 100% impaired, limited or restricted               - GOAL STATUS:           CM - 80%-99% impaired, limited or restricted               - D/C STATUS:                       ---------------To be determined---------------         Pain:  Pain Scale 1: Numeric (0 - 10)  Pain Intensity 1: 0              Activity Tolerance:   Decreased endurance. Please refer to the flowsheet for vital signs taken during this treatment.   After treatment:   [X]         Patient left in no apparent distress sitting up in chair  [ ]         Patient left in no apparent distress in bed  [X]         Call bell left within reach  [X]         Nursing notified  [X]         Caregiver present  [X]         Bed alarm activated      COMMUNICATION/EDUCATION:   The patients plan of care was discussed with: Occupational Therapist and Registered Nurse.  [X]         Fall prevention education was provided and the patient/caregiver indicated understanding. [X]         Patient/family have participated as able in goal setting and plan of care. [X]         Patient/family agree to work toward stated goals and plan of care. [ ]         Patient understands intent and goals of therapy, but is neutral about his/her participation. [ ]         Patient is unable to participate in goal setting and plan of care.      Thank you for this referral.  Susie Dai, PT , DPT   Time Calculation: 20 mins

## 2017-03-17 NOTE — PROGRESS NOTES
Cardiology Progress Note            Admit Date: 3/8/2017  Admit Diagnosis: Hypothermia  Date: 3/17/2017     Time: 1030       Assessment and Plan     1. [place] Halt Respiratory failure s/p shock, suspected septic:  Pt off and on biPap-  Remains Off pressors.  -Continue NC/BiPAP as needed- pulmonary following/managing    2: Chronic systolic CHF, systolic: CXR today unchanged- pulm. Edema, pleural effusions. TTE 3/9 EF 30%. BNP 1306 on 3/16 from 770 3/13/17 Pt net -+325 /24 hours + 1359 since admission per record. Has AICD/BiV pacer   -Bumex 2 mg IV q 12 hr.   -Beta blocker on hold due to hypotension   -ACE-I held due to hypotension, JUAN   -Continue daily weights and I/Os     3. Non-ischemic Cardiomyopathy, valvular heart disease s/p Mitral annuloplasty. Echo 3/9: LVEF 30%, BiAE, mod MR, mod TR and mild pulm HTN, pleural effusion. 4. Hx atrial fibrillation:    -[id number]= 6 (age, CHF, HTN, female, DM). Holding coumadin. No OAC due to low platelets, (INR 1.8) (risk>benefits) Continue heparin 5000 units subq q 8 hr for now. -Rate controlled in 70's- 80's. No beta blocker due to BP 80's- low 286'H systolic. .   -Has BiV pacer    5. Possible vegetation on aortic valve. (noted on AMMON 3/9) -Negative blood cultures 3/8 and 3/11.   -Will tentatively plan for AMMON on Monday (12 Noon) pending pt status      6. JUAN- likely ATN in the setting of hypotension/also recent IV Contrast on 3/8. Creatinine 1.05 today from 1.14   -Nephrology following. 7. Thrombocytopenia- plts 47 today from 40 yesterday. No therapeutic anticoagulation at this time. 8.  Hypokalemia- K=3.4  Repleted    9. Elevated LFTs- ALT 92, AST 69 (trending down). Bilirubin 3 (from 3.5). Lipase 1109   -GI following- no gallstones on Dr. Fausto Allen review of US.     -General surgery on consult also- imaging suggest cholecystitis (?passed stones)- considering cholecystostomy tube- No MRCP d/t PPM/AICD  .    10. [place] Halt Hx of DM- . Management per primary team.  I have seen and examined the patient and agree with N.P. assessment. Much better today no complaints and stabilized for now      Subjective:   Chelsea Lopez denies chest pain and SOB. Denies abdominal pain. Objective:      Physical Exam:                Visit Vitals    BP 96/61    Pulse 81    Temp 97.6 °F (36.4 °C)    Resp 20    Ht 5' 6\" (1.676 m)    Wt 72.8 kg (160 lb 7.9 oz)    SpO2 100%    BMI 25.9 kg/m2          General Appearance:   Awake, alert, cooperative, weak   Ears/Nose/Mouth/Throat:    On BiPAP         Neck:  Supple. Chest:    Clear, diminished bases   Cardiovascular:   Regular rate and rhythm, S1, S2 normal, Systolic Murmur at apex. Abdomen:    Distended, no ttp. bowel sounds are present. Extremities:   + BUE, 2+ ankle edema   2+ DP pulses bilaterally. Skin:  Warm and dry. Neuro: Follows commands. Telemetry: Ventricular paced- no VT noted overnight or this a.m. Data Review:    Labs:    Recent Results (from the past 24 hour(s))   GLUCOSE, POC    Collection Time: 03/16/17  6:05 PM   Result Value Ref Range    Glucose (POC) 176 (H) 65 - 100 mg/dL    Performed by Jaye Lockeing    CBC WITH AUTOMATED DIFF    Collection Time: 03/17/17  3:42 AM   Result Value Ref Range    WBC 2.8 (L) 3.6 - 11.0 K/uL    RBC 3.89 3.80 - 5.20 M/uL    HGB 8.6 (L) 11.5 - 16.0 g/dL    HCT 28.1 (L) 35.0 - 47.0 %    MCV 72.2 (L) 80.0 - 99.0 FL    MCH 22.1 (L) 26.0 - 34.0 PG    MCHC 30.6 30.0 - 36.5 g/dL    RDW 17.2 (H) 11.5 - 14.5 %    PLATELET 47 (LL) 260 - 400 K/uL    NEUTROPHILS 70 32 - 75 %    LYMPHOCYTES 12 12 - 49 %    MONOCYTES 17 (H) 5 - 13 %    EOSINOPHILS 1 0 - 7 %    BASOPHILS 0 0 - 1 %    ABS. NEUTROPHILS 2.0 1.8 - 8.0 K/UL    ABS. LYMPHOCYTES 0.3 (L) 0.8 - 3.5 K/UL    ABS. MONOCYTES 0.5 0.0 - 1.0 K/UL    ABS. EOSINOPHILS 0.0 0.0 - 0.4 K/UL    ABS.  BASOPHILS 0.0 0.0 - 0.1 K/UL    DF SMEAR SCANNED      PLATELET COMMENTS LARGE PLATELETS      RBC COMMENTS ANISOCYTOSIS  1+        RBC COMMENTS HYPOCHROMIA  2+        RBC COMMENTS POLYCHROMASIA  PRESENT        RBC COMMENTS RBC FRAGMENTS  MICROCYTOSIS  1+        RBC COMMENTS TARGET CELLS  PRESENT       PHOSPHORUS    Collection Time: 03/17/17  3:42 AM   Result Value Ref Range    Phosphorus 1.9 (L) 2.6 - 4.7 MG/DL   METABOLIC PANEL, BASIC    Collection Time: 03/17/17  3:42 AM   Result Value Ref Range    Sodium 145 136 - 145 mmol/L    Potassium 3.4 (L) 3.5 - 5.1 mmol/L    Chloride 106 97 - 108 mmol/L    CO2 31 21 - 32 mmol/L    Anion gap 8 5 - 15 mmol/L    Glucose 164 (H) 65 - 100 mg/dL    BUN 28 (H) 6 - 20 MG/DL    Creatinine 1.05 (H) 0.55 - 1.02 MG/DL    BUN/Creatinine ratio 27 (H) 12 - 20      GFR est AA >60 >60 ml/min/1.73m2    GFR est non-AA 50 (L) >60 ml/min/1.73m2    Calcium 8.7 8.5 - 10.1 MG/DL   MAGNESIUM    Collection Time: 03/17/17  3:42 AM   Result Value Ref Range    Magnesium 2.2 1.6 - 2.4 mg/dL   DIRECT & INDIRECT RANDALL    Collection Time: 03/17/17  3:42 AM   Result Value Ref Range    CAL Poly POS     Antibody screen NEG     CAL IgG POS     CAL C3b/C3d NEG    HEPATIC FUNCTION PANEL    Collection Time: 03/17/17  3:42 AM   Result Value Ref Range    Protein, total 6.1 (L) 6.4 - 8.2 g/dL    Albumin 2.7 (L) 3.5 - 5.0 g/dL    Globulin 3.4 2.0 - 4.0 g/dL    A-G Ratio 0.8 (L) 1.1 - 2.2      Bilirubin, total 3.0 (H) 0.2 - 1.0 MG/DL    Bilirubin, direct 2.1 (H) 0.0 - 0.2 MG/DL    Alk.  phosphatase 52 45 - 117 U/L    AST (SGOT) 69 (H) 15 - 37 U/L    ALT (SGPT) 92 (H) 12 - 78 U/L   LIPASE    Collection Time: 03/17/17  3:42 AM   Result Value Ref Range    Lipase 1109 (H) 73 - 393 U/L   PROTHROMBIN TIME + INR    Collection Time: 03/17/17  3:42 AM   Result Value Ref Range    INR 1.8 (H) 0.9 - 1.1      Prothrombin time 18.0 (H) 9.0 - 11.1 sec   NUCLEATED RBC    Collection Time: 03/17/17  3:42 AM   Result Value Ref Range    NRBC 6.2 (H) 0  WBC    ABSOLUTE NRBC 0.17 (H) 0.00 - 0.01 K/uL WBC CORRECTED FOR NR ADJUSTED FOR NUCLEATED RBC'S     GLUCOSE, POC    Collection Time: 03/17/17  8:44 AM   Result Value Ref Range    Glucose (POC) 137 (H) 65 - 100 mg/dL    Performed by Martha Carvajal., POC    Collection Time: 03/17/17 12:04 PM   Result Value Ref Range    Glucose (POC) 144 (H) 65 - 100 mg/dL    Performed by Jude Hull           Radiology:        Current Facility-Administered Medications   Medication Dose Route Frequency    bumetanide (BUMEX) injection 2 mg  2 mg IntraVENous Q8H    albumin human 25% (BUMINATE) solution 12.5 g  12.5 g IntraVENous Q8H PRN    [START ON 3/18/2017] Vancomycin trough to be drawn before dose @ 10:00 on 3/18. Thanks!   1 Each Other ONCE    [START ON 3/18/2017] pantoprazole (PROTONIX) tablet 40 mg  40 mg Oral ACB    balsam peru-castor oil (VENELEX)  mg/gram ointment   Topical BID    docusate sodium (COLACE) capsule 100 mg  100 mg Oral BID    heparin (porcine) injection 5,000 Units  5,000 Units SubCUTAneous Q8H    thiamine (B-1) tablet 100 mg  100 mg Oral DAILY    vancomycin (VANCOCIN) 1,000 mg in 0.9% sodium chloride (MBP/ADV) 250 mL  1,000 mg IntraVENous Q24H    albuterol-ipratropium (DUO-NEB) 2.5 MG-0.5 MG/3 ML  3 mL Nebulization Q6H RT    polyvinyl alcohol (LIQUIFILM TEARS) 1.4 % ophthalmic solution 2 Drop  2 Drop Both Eyes TID    sodium chloride (NS) flush 10 mL  10 mL InterCATHeter Q24H    sodium chloride (NS) flush 10 mL  10 mL InterCATHeter PRN    sodium chloride (NS) flush 10-40 mL  10-40 mL InterCATHeter Q8H    alteplase (CATHFLO) 1 mg in sterile water (preservative free) 1 mL injection  1 mg InterCATHeter PRN    bacitracin 500 unit/gram packet 1 Packet  1 Packet Topical PRN    0.9% sodium chloride infusion 250 mL  250 mL IntraVENous PRN    ondansetron (ZOFRAN) injection 4 mg  4 mg IntraVENous Q8H PRN    glucose chewable tablet 16 g  4 Tab Oral PRN    dextrose (D50W) injection syrg 12.5-25 g  12.5-25 g IntraVENous PRN    glucagon (GLUCAGEN) injection 1 mg  1 mg IntraMUSCular PRN    sodium chloride (NS) flush 5-10 mL  5-10 mL IntraVENous Q8H    sodium chloride (NS) flush 5-10 mL  5-10 mL IntraVENous PRN    vancomycin dosing per pharmacy   Other Rx Dosing/Monitoring    piperacillin-tazobactam (ZOSYN) 3.375 g in 0.9% sodium chloride (MBP/ADV) 100 mL  3.375 g IntraVENous Q8H          Chica Brock MD     [facility] [address] [facility] [address]   [facility]   [phone]

## 2017-03-17 NOTE — PROGRESS NOTES
0800: Report received from Evelina Johnson RN using [employer]. Care assumed. Pt resting in bed, denies any complaints. See doc flow sheets for assessment. 1045: Interdisciplinary team rounds were held 3/17/2017 with the following team members:Care Management, Nursing, Nutrition, Pharmacy, Physician and Clinical Coordinator and the patient. Plan of care discussed. See clinical pathway and/or care plan for interventions and desired outcomes. 1930: Report given to Evelina Johnson RN using SBAR format.

## 2017-03-17 NOTE — PROGRESS NOTES
Reviewed chart and discussed case with nsg at length. Patient doing well with full liquid diet and per nsg, ate ~40% of her breakfast this morning. Continues with quick fatigue with need for rest breaks, slow rate, and smaller frequent meals. No s/s of aspiration observed with full liquids and tolerated meds well per nsg. Patient continues with poor overall strength and endurance and is not yet ready for diet upgrade. Recommend continue full liquid diet for now and will follow up next week to determine readiness for upgrade. Thanks. Virginie Canales M.CD.  CCC-SLP

## 2017-03-17 NOTE — PROGRESS NOTES
Follow up visit with Ms. Prudence Harada. Pt's grandsons girlfriend was present at bedside, and another visitor was preparing to leave. Pt appeared in good spirits. Offered assurance of continued prayers to pt and visitors. No other needs expressed at this time.     Misti Leal, Palliative

## 2017-03-17 NOTE — PROGRESS NOTES
Pulmonary  3/17  Seen and examined. Awake and following commands. CXR still with effusions and edema, BNP 1300.     3/16  Seen examined earlier today awake and conversant. Diuresing well on higher dose of bumex. Off bipap    3/15  Awake this morning. CXR/ABG better. Finally in net negative balance. BP borderline low    3/14  Seen and examined. Lethargic but was able to open eyes and follow simple commands earlier today. ABG checked off bipap showed worsening resp acidosis. CXR still shows pulmonary edema and effusions    3/13  Seen and examined. Last nihgt events noted. On bipap. On levophed. Diuresing well    3/12  Seen and examined earlier today. Failed SBT. ABG with combined resp and metabolic alkalosis    Seen and examined earlier today. Intubated overnight with worsening resp failure. In net +++ fluid balance. Resp acidosis has improved. Has been on bicarb drip which will be stopped. Pressors are being weaned down and she will need diuretic soon. Vent bundle and bronchodilators will be started. Needs repeat blood cultures. On antibiotics.      3/10  Remains on pressors with Maxi  BS continue to fall despite D10 (cortisol level was OK)  Echo with EF 30% concern for vegetation on aortic valve - blood cx negative so far  Ammonia elevated  INR higher today - vitamin K given - no obvious bleeding  Had to go back on warming blanket over night    Patient Vitals for the past 4 hrs:   BP Temp Pulse Resp SpO2   17 1300 96/61 - 81 20 100 %   17 1200 99/55 97.6 °F (36.4 °C) 81 23 100 %   17 1100 100/66 - 80 18 100 %   Temp (24hrs), Av.7 °F (36.5 °C), Min:97.4 °F (36.3 °C), Max:98.5 °F (36.9 °C)      Intake/Output Summary (Last 24 hours) at 17 1418  Last data filed at 17 1317   Gross per 24 hour   Intake             1300 ml   Output             1530 ml   Net             -230 ml       She appears in no distress  diminshed bs at bases  RRR   Soft warm and dry    Recent Labs      17 4477  03/16/17   0457  03/15/17   0443   WBC  2.8*  2.3*  2.4*   HGB  8.6*  8.6*  8.6*   HCT  28.1*  27.8*  27.2*   PLT  47*  44*  44*     Recent Labs      03/17/17   0342  03/16/17   0457  03/15/17   0443   NA  145  148*  145   K  3.4*  3.3*  3.1*   CL  106  111*  108   CO2  31  28  29   GLU  164*  88  90   BUN  28*  28*  23*   CREA  1.05*  1.14*  1.14*   CA  8.7  8.7  8.6   MG  2.2  2.3  2.0   PHOS  1.9*  3.7  2.4*   ALB  2.7*  2.6*  2.4*   SGOT  69*  115*  168*   ALT  92*  112*  135*   INR  1.8*   --   1.4*       No results for input(s): TROIQ, CPK, CKMB in the last 72 hours. All Micro Results     Procedure Component Value Units Date/Time    CULTURE, BLOOD, PAIRED [[id number]] Collected:  03/11/17 1235    Order Status:  Completed Specimen:  Blood Updated:  03/16/17 0458     Special Requests: NO SPECIAL REQUESTS        Culture result: NO GROWTH 5 DAYS       CULTURE, BLOOD, PAIRED [[id number]] Collected:  03/08/17 1945    Order Status:  Completed Specimen:  Blood Updated:  03/13/17 0723     Special Requests: NO SPECIAL REQUESTS        Culture result: NO GROWTH 5 DAYS       CULTURE, BLOOD [[id number]]     Order Status:  Canceled Specimen:  Blood from Blood     CULTURE, BLOOD [[id number]]     Order Status:  Canceled Specimen:  Blood from Blood     CULTURE, URINE [[id number]] Collected:  03/10/17 0230    Order Status:  Completed Specimen:  Urine Updated:  03/11/17 0711     Special Requests: --        NO SPECIAL REQUESTS  Reflexed from Fausto Colindres <1,000 COLONIES/mL        Culture result: NO GROWTH 1 DAY           Impression  1. Acute hypoxic and hypercarbic miko failure, intubated 3/10, extubated 3/12  2. Acute on chronic decompensated CHF with pulmonary edema and bilateral pleural effusions-- cardiology following  3. S/P Shock - suspect septic (cultures are negative) / cardiogenic   4. EF 29% - systolic CHF - concern for possible vegetation on aortic valve - blood cx no growth so far  5.  Hypoglycemia on D10 secondary to sepsis and liver insufficiency  6. Liver insufficiency - ? From chronic heart failure - cirrhotic appearing liver  7. IDDM  8. afib was on coumadin - INR > 5 - vitamin K given  9. Cholelithiasis- surgery consulted  10. Thrombocytopenia, stable, on low dose heparin    --  Off bipap, wean Oxygen  -- - Noted plans for AMMON next week- anesthesia monitoring recommended  -- continue abx and follow up blood cx-- no positive cultures, awaiting AMMON, if vegetation noted>> consult ID for antibiotic management, if no IE >> stop abx  -- Continue diuretics- intensify dose  -- Supportive care  -- Palliative care following--- Overall prognosis seems to be poor but needs to be determined from cardiology standpoint.  Respiratory failure is from pulmonary edema and volume overload and not terminal lung disease  -- Transfer to [facility]  -- Request cardiology to take over diuretic and CHF management  -- Hospitalist following medical problems  -- We will be available to see her again as needed    Natalia Lord MD

## 2017-03-17 NOTE — PROGRESS NOTES
2330 Bedside and Verbal shift change report given to YORDY Zarate RN (oncoming nurse) by Woody Florez RN (offgoing nurse). Report included the following information SBAR, Kardex, ED Summary, Procedure Summary, Intake/Output, MAR, Accordion and Recent Results. Problem: Heart Failure: Day 5  Goal: Activity/Safety  Outcome: Progressing Towards Goal  Variance: Patient Condition  Comments: Pt remains on bedrest and is on BiPAP at this time. Goal: Nutrition/Diet  Outcome: Progressing Towards Goal  Diet advanced to clear liquid, patient seen by SLP. Goal: Respiratory  Outcome: Progressing Towards Goal  4L NC worn throughout the day, BiPAP at night. Problem: General Medical Care Plan  Goal: *Absence of infection signs and symptoms  Outcome: Progressing Towards Goal  Afebrile  Goal: *Optimal pain control at patients stated goal  Outcome: Progressing Towards Goal  No complaints of pain    0800 Bedside and Verbal shift change report given to ARIELLA Steiner RN (oncoming nurse) by Anuja Ng RN (offgoing nurse). Report included the following information SBAR, Kardex, ED Summary, Procedure Summary, Intake/Output, MAR, Accordion and Recent Results.

## 2017-03-17 NOTE — PROGRESS NOTES
Hospitalist Progress Note  Kleber Andrews MD  Office: [phone]        Date of Service:  3/17/2017  NAME:  Keith Lopez  :  1930  MRN:  [id number]      Admission Summary:   59-year-old female with history of chronic systolic congestive heart failure, atrial fibrillation, diabetes mellitus, hypertension, chronic anticoagulation use, history of pacemaker and ICD. She was sent from her primary care physician to the emergency room to be evaluated. History from the patient and her daughters at the bedside, also from the records. Per the daughter, she has been having on and off abdominal pain, epigastric pain for a while, over the last 3 days has been mostly pronounced. She said her pain at times is 9/10 in severity, it is nonradiating, associated with some nausea, but no vomiting. She denies any diarrhea. At her PCP's office,   they could not get her temperature, so they sent her to the emergency room. In the ER she has been hypothermic, her temperature was 91.4 on initial presentation. Also she was hypotensive. Her blood pressure   systolic was 34/59, so she was given about 1.5 liters of IV fluid, and now she is getting 1 bottle of albumin. She was placed on warming blanket, Lizzy Hugger. She denies any dysuria or frequency. Interval history / Subjective:   Pt seen and examined  Awake and follows commands  Alert  Doing well   Have not required BIPAP , stable on NC     Assessment & Plan:     -  Hypothermia and hypotension. Concern for sepsis. On empiric IV antibiotics, on Zosyn and vancomycin. Followup on blood culture results NGSF.  Off and on Lizzy Hugger for her hypothermia.     -Acute Respiratory failure with Hypercapnia due to Pleural effusion/atelectasis  BIPAP PRN  PCCM input aprpeciated  Diuresis , on NC now    -  Pleural effusions, bilateral: improving on CXR, continue antibiotics, may be source of infection +/-  CHF volume overload. C/w diuresis     - Sepsis:  Unknown source, most likely lung with bilateral pleural effusions, possibly infectious. She had caren of leukocytes to 1.9, improving. She was afebrile overnight, low BP requiring pressors. Weaning dunia. Continue zosyn, vanco. Monitor cultures, preliminary blood, urine cultures no growth so far. - Acute on Chronic Systolic congestive heart failure NYHA III/IV. With fluid overload. Echo 3/9 with 30% EF, diffuse hypokinesis. Bumex added, strict I/O, monitor weight.   - History of pacer.   - Valvular disease: Moderate mitral regurgitation, right ventricular systolic dysfunction,   as well as enlarged left atrium.  -  Valvular vegetation on aortic valve, questionable: Blood cultures no growth preliminary. AMMON tentatively on Monday if respiratory status permits  - Diuresis per nephrology     - Hematuria: may be related to traumatic galarza placement with INR max 5.5, today 2.2. Urine noted on UA as yellow on 3/8, Galarza placed 3/9 and hematuria reported 3/10 UA. -  JUAN:  From  ATN and contrast nephropathy/Hypotension most likely etiology. Ischemic heart disease, hypotension; and exposure to IV contrast. Appreciate nephro consult recommendations. Bumex, avoid nephrotoxins, I/Os,     - Hypoglycemia/diabetes mellitus. Resolved. Holding her Lantus and on hypoglycemia protocol. Her blood sugar improved after D50. Stable, off D10 now - resolved    -   Ischemic hepatitis:. Consulted GI. Plans for EGD when stable or outpatient. Continue proton pump inhibitor for now. US with distended gallbladder with stone and sludge, will get gen surg eval     Coagulopathy.  - Holding her Coumadin and monitor daily INR and restart her Coumadin when INR improved. INR increased, she received Vitamin K x 1. With hematuria will give FFP and monitor INR. Pancytopenia. -Appears to be chronic. However, patient and family unaware of her previous lab results. Will monitor her labs closely. Code status: Full    Care Plan discussed with: Patient/Family and Nurse  Disposition: TBD     Hospital Problems  Date Reviewed: 3/8/2017          Codes Class Noted POA    * (Principal)Hypothermia ICD-10-CM: T68. XXXA  ICD-9-CM: 991.6  3/8/2017 Yes                Review of Systems:   A comprehensive review of systems was negative. Vital Signs:    Last 24hrs VS reviewed since prior progress note. Most recent are:  Visit Vitals    /66    Pulse 80    Temp 97.6 °F (36.4 °C)    Resp 18    Ht 5' 6\" (1.676 m)    Wt 72.8 kg (160 lb 7.9 oz)    SpO2 100%    BMI 25.9 kg/m2         Intake/Output Summary (Last 24 hours) at 03/17/17 1159  Last data filed at 03/17/17 0905   Gross per 24 hour   Intake             1660 ml   Output             1300 ml   Net              360 ml        Physical Examination:             Constitutional:   Awake, on NC, alert and oriented to person and place   ENT:   Neck supple,    Resp:  improved air entry   CV:  irregular    GI:  Soft, non-tender, normoactive bowel sounds, no hepatosplenomegaly     Musculoskeletal:  2+ edema    Neurologic:   awake, moves all 4 extremities             Data Review:    Review and/or order of clinical lab test   US abd:  IMPRESSION:  1. Distended gallbladder with sludge and a stone with moderate wall thickening of the gallbladder wall. 2. Dilated IVC suggesting right heart failure. 3. Small amount of free fluid surrounding the liver.   4. Small right pleural effusion      Labs:     Recent Labs      03/17/17 0342 03/16/17 0457   WBC  2.8*  2.3*   HGB  8.6*  8.6*   HCT  28.1*  27.8*   PLT  47*  44*     Recent Labs      03/17/17   0342  03/16/17   0457  03/15/17   0443   NA  145  148*  145   K  3.4*  3.3*  3.1*   CL  106  111*  108   CO2  31  28  29   BUN  28*  28*  23*   CREA  1.05*  1.14*  1.14*   GLU  164*  88  90   CA  8.7  8.7  8.6   MG  2.2  2.3  2.0   PHOS  1.9*  3.7  2.4*     Recent Labs      03/17/17   0342  03/16/17   0457  03/15/17 0443   SGOT  69*  115*  168*   ALT  92*  112*  135*   AP  52  51  57   TBILI  3.0*  3.5*  3.2*   TP  6.1*  6.1*  5.7*   ALB  2.7*  2.6*  2.4*   GLOB  3.4  3.5  3.3   LPSE  1109*   --    --      Recent Labs      03/17/17   0342  03/15/17   0443   INR  1.8*  1.4*   PTP  18.0*  14.8*      No results for input(s): FE, TIBC, PSAT, FERR in the last 72 hours. Lab Results   Component Value Date/Time    Folate 14.5 03/09/2017 03:23 AM      No results for input(s): PH, PCO2, PO2 in the last 72 hours. No results for input(s): CPK, CKNDX, TROIQ in the last 72 hours.     No lab exists for component: CPKMB  Lab Results   Component Value Date/Time    Cholesterol, total 138 09/28/2011 09:13 AM    HDL Cholesterol 50 09/28/2011 09:13 AM    LDL, calculated 74 09/28/2011 09:13 AM    Triglyceride 69 09/28/2011 09:13 AM     Lab Results   Component Value Date/Time    Glucose (POC) 137 03/17/2017 08:44 AM    Glucose (POC) 176 03/16/2017 06:05 PM    Glucose (POC) 110 03/16/2017 11:54 AM    Glucose (POC) 87 03/16/2017 12:37 AM    Glucose (POC) 90 03/15/2017 08:46 PM     Lab Results   Component Value Date/Time    Color RED 03/10/2017 02:30 AM    Appearance BLOODY 03/10/2017 02:30 AM    Specific gravity 1.020 03/10/2017 02:30 AM    Specific gravity 1.016 03/08/2017 08:54 PM    pH (UA) 6.0 03/10/2017 02:30 AM    Protein 100 03/10/2017 02:30 AM    Glucose NEGATIVE  03/10/2017 02:30 AM    Ketone NEGATIVE  03/10/2017 02:30 AM    Bilirubin NEGATIVE  03/10/2017 02:30 AM    Urobilinogen 1.0 03/10/2017 02:30 AM    Nitrites NEGATIVE  03/10/2017 02:30 AM    Leukocyte Esterase TRACE 03/10/2017 02:30 AM    Epithelial cells FEW 03/10/2017 02:30 AM    Bacteria NEGATIVE  03/10/2017 02:30 AM    WBC 20-50 03/10/2017 02:30 AM    RBC >100 03/10/2017 02:30 AM         Medications Reviewed:     Current Facility-Administered Medications   Medication Dose Route Frequency    potassium phosphate 30 mmol in 0.9% sodium chloride 250 mL infusion   IntraVENous ONCE    bumetanide (BUMEX) injection 2 mg  2 mg IntraVENous Q8H    albumin human 25% (BUMINATE) solution 12.5 g  12.5 g IntraVENous Q8H PRN    balsam peru-castor oil (VENELEX)  mg/gram ointment   Topical BID    docusate sodium (COLACE) capsule 100 mg  100 mg Oral BID    heparin (porcine) injection 5,000 Units  5,000 Units SubCUTAneous Q8H    thiamine (B-1) tablet 100 mg  100 mg Oral DAILY    vancomycin (VANCOCIN) 1,000 mg in 0.9% sodium chloride (MBP/ADV) 250 mL  1,000 mg IntraVENous Q24H    albuterol-ipratropium (DUO-NEB) 2.5 MG-0.5 MG/3 ML  3 mL Nebulization Q6H RT    polyvinyl alcohol (LIQUIFILM TEARS) 1.4 % ophthalmic solution 2 Drop  2 Drop Both Eyes TID    sodium chloride (NS) flush 10 mL  10 mL InterCATHeter Q24H    sodium chloride (NS) flush 10 mL  10 mL InterCATHeter PRN    sodium chloride (NS) flush 10-40 mL  10-40 mL InterCATHeter Q8H    alteplase (CATHFLO) 1 mg in sterile water (preservative free) 1 mL injection  1 mg InterCATHeter PRN    bacitracin 500 unit/gram packet 1 Packet  1 Packet Topical PRN    0.9% sodium chloride infusion 250 mL  250 mL IntraVENous PRN    ondansetron (ZOFRAN) injection 4 mg  4 mg IntraVENous Q8H PRN    glucose chewable tablet 16 g  4 Tab Oral PRN    dextrose (D50W) injection syrg 12.5-25 g  12.5-25 g IntraVENous PRN    glucagon (GLUCAGEN) injection 1 mg  1 mg IntraMUSCular PRN    sodium chloride (NS) flush 5-10 mL  5-10 mL IntraVENous Q8H    sodium chloride (NS) flush 5-10 mL  5-10 mL IntraVENous PRN    vancomycin dosing per pharmacy   Other Rx Dosing/Monitoring    pantoprazole (PROTONIX) 40 mg in sodium chloride 0.9 % 10 mL injection  40 mg IntraVENous Q24H    piperacillin-tazobactam (ZOSYN) 3.375 g in 0.9% sodium chloride (MBP/ADV) 100 mL  3.375 g IntraVENous Q8H     ______________________________________________________________________  EXPECTED LENGTH OF STAY: 4d 21h  ACTUAL LENGTH OF STAY:          9                 Jojo Villegas MD

## 2017-03-17 NOTE — PROGRESS NOTES
· Surgery Progress Note    3/17/2017    Admit Date: 3/8/2017    Subjective:       Pt on bipap, in ICU, awake and alert but difficulty communicating due to mask. Denies abdominal pain and has had some juice and yogurt this Am without issue. No SOB. No CP. Pt is ambulating. Patient 's current diet is full liquids . Barak Goodrich Pt reports  no nausea. Objective:     Blood pressure 93/63, pulse 85, temperature 97.6 °F (36.4 °C), resp. rate 23, height 5' 6\" (1.676 m), weight 160 lb 7.9 oz (72.8 kg), SpO2 91 %.    03/17 0701 - 03/17 1900  In: 100 [I.V.:100]  Out: -     03/15 1901 - 03/17 0700  In: 1760 [P.O.:960; I.V.:800]  Out: 9168 [Urine:2645]    General appearance: alert, on bipap   Lungs: on bipap   Abd:soft, protuberant, nontender      Data Review    Recent Results (from the past 12 hour(s))   CBC WITH AUTOMATED DIFF    Collection Time: 03/17/17  3:42 AM   Result Value Ref Range    WBC 2.8 (L) 3.6 - 11.0 K/uL    RBC 3.89 3.80 - 5.20 M/uL    HGB 8.6 (L) 11.5 - 16.0 g/dL    HCT 28.1 (L) 35.0 - 47.0 %    MCV 72.2 (L) 80.0 - 99.0 FL    MCH 22.1 (L) 26.0 - 34.0 PG    MCHC 30.6 30.0 - 36.5 g/dL    RDW 17.2 (H) 11.5 - 14.5 %    PLATELET 47 (LL) 699 - 400 K/uL    NEUTROPHILS 70 32 - 75 %    LYMPHOCYTES 12 12 - 49 %    MONOCYTES 17 (H) 5 - 13 %    EOSINOPHILS 1 0 - 7 %    BASOPHILS 0 0 - 1 %    ABS. NEUTROPHILS 2.0 1.8 - 8.0 K/UL    ABS. LYMPHOCYTES 0.3 (L) 0.8 - 3.5 K/UL    ABS. MONOCYTES 0.5 0.0 - 1.0 K/UL    ABS. EOSINOPHILS 0.0 0.0 - 0.4 K/UL    ABS.  BASOPHILS 0.0 0.0 - 0.1 K/UL    DF SMEAR SCANNED      PLATELET COMMENTS LARGE PLATELETS      RBC COMMENTS ANISOCYTOSIS  1+        RBC COMMENTS HYPOCHROMIA  2+        RBC COMMENTS POLYCHROMASIA  PRESENT        RBC COMMENTS RBC FRAGMENTS  MICROCYTOSIS  1+        RBC COMMENTS TARGET CELLS  PRESENT       PHOSPHORUS    Collection Time: 03/17/17  3:42 AM   Result Value Ref Range    Phosphorus 1.9 (L) 2.6 - 4.7 MG/DL   METABOLIC PANEL, BASIC    Collection Time: 03/17/17  3:42 AM   Result Value Ref Range    Sodium 145 136 - 145 mmol/L    Potassium 3.4 (L) 3.5 - 5.1 mmol/L    Chloride 106 97 - 108 mmol/L    CO2 31 21 - 32 mmol/L    Anion gap 8 5 - 15 mmol/L    Glucose 164 (H) 65 - 100 mg/dL    BUN 28 (H) 6 - 20 MG/DL    Creatinine 1.05 (H) 0.55 - 1.02 MG/DL    BUN/Creatinine ratio 27 (H) 12 - 20      GFR est AA >60 >60 ml/min/1.73m2    GFR est non-AA 50 (L) >60 ml/min/1.73m2    Calcium 8.7 8.5 - 10.1 MG/DL   MAGNESIUM    Collection Time: 03/17/17  3:42 AM   Result Value Ref Range    Magnesium 2.2 1.6 - 2.4 mg/dL   DIRECT & INDIRECT RANDALL    Collection Time: 03/17/17  3:42 AM   Result Value Ref Range    CAL Poly POS     Antibody screen NEG     CAL IgG POS     CAL C3b/C3d NEG    HEPATIC FUNCTION PANEL    Collection Time: 03/17/17  3:42 AM   Result Value Ref Range    Protein, total 6.1 (L) 6.4 - 8.2 g/dL    Albumin 2.7 (L) 3.5 - 5.0 g/dL    Globulin 3.4 2.0 - 4.0 g/dL    A-G Ratio 0.8 (L) 1.1 - 2.2      Bilirubin, total 3.0 (H) 0.2 - 1.0 MG/DL    Bilirubin, direct 2.1 (H) 0.0 - 0.2 MG/DL    Alk. phosphatase 52 45 - 117 U/L    AST (SGOT) 69 (H) 15 - 37 U/L    ALT (SGPT) 92 (H) 12 - 78 U/L   LIPASE    Collection Time: 03/17/17  3:42 AM   Result Value Ref Range    Lipase 1109 (H) 73 - 393 U/L   PROTHROMBIN TIME + INR    Collection Time: 03/17/17  3:42 AM   Result Value Ref Range    INR 1.8 (H) 0.9 - 1.1      Prothrombin time 18.0 (H) 9.0 - 11.1 sec   NUCLEATED RBC    Collection Time: 03/17/17  3:42 AM   Result Value Ref Range    NRBC 6.2 (H) 0  WBC    ABSOLUTE NRBC 0.17 (H) 0.00 - 0.01 K/uL    WBC CORRECTED FOR NR ADJUSTED FOR NUCLEATED RBC'S     GLUCOSE, POC    Collection Time: 03/17/17  8:44 AM   Result Value Ref Range    Glucose (POC) 137 (H) 65 - 100 mg/dL    Performed by Elizabeth Little        Assessment:     Principal Problem:    Hypothermia (3/8/2017)    acute respiratory failure  Elevated LFT's, bili and lipase with ?  Of cholecystitis on US   CHF   Pancytopenia     Plan: Diet pt is toleraing fulls and has no abdominal c/o this morning   GI and DVT Prophylaxis  Antibiotics:  vancomycin and Zosyn   Labs reviewed, total bili trends down, direct bili 2, lipase is up   Unable to undergo MRCP due to pacemaker in place,   Question of benefit of cholecystomy tube?     Further plan per Dr. Deidra Burns PA-C

## 2017-03-17 NOTE — PROGRESS NOTES
Name: Solomon Stafford   MRN: [id number]  : 1930      ASSESSMENT/PLAN:    JUAN- likely ATN in the setting of hypotension/also recent IV Contrast on 3/8. Creatinine a little better. Chronic systolic CHF (EF 70%)/DHN pleural effusions/enlarged LA/RV Systolic HTN/Mod MR    Fluid overload- diuresing    Elevated LFTs- likely ischemic hepatitis      Hypophos/hypokalemia - replete prn    Hypernatremia - push po fluids. Subjective:    Awake. Not talking.     ROS:   UTO    Exam:  Visit Vitals    BP 93/63 (BP 1 Location: Left arm, BP Patient Position: At rest)    Pulse 85    Temp 97.6 °F (36.4 °C)    Resp 23    Ht 5' 6\" (1.676 m)    Wt 72.8 kg (160 lb 7.9 oz)    SpO2 91%    BMI 25.9 kg/m2     NAD  Dec BS at bases  RRR  Soft, distended, NT  +edema-improved  Richardson+    Current Facility-Administered Medications   Medication Dose Route Frequency Last Dose    potassium phosphate 30 mmol in 0.9% sodium chloride 250 mL infusion   IntraVENous ONCE      balsam peru-castor oil (VENELEX)  mg/gram ointment   Topical BID      docusate sodium (COLACE) capsule 100 mg  100 mg Oral  mg at 17 181    albumin human 25% (BUMINATE) solution 12.5 g  12.5 g IntraVENous Q12H PRN      heparin (porcine) injection 5,000 Units  5,000 Units SubCUTAneous Q8H 5,000 Units at 17 0541    thiamine (B-1) tablet 100 mg  100 mg Oral DAILY 100 mg at 17 1033    bumetanide (BUMEX) injection 2 mg  2 mg IntraVENous Q12H 2 mg at 17 2238    vancomycin (VANCOCIN) 1,000 mg in 0.9% sodium chloride (MBP/ADV) 250 mL  1,000 mg IntraVENous Q24H 1,000 mg at 17 1034    albuterol-ipratropium (DUO-NEB) 2.5 MG-0.5 MG/3 ML  3 mL Nebulization Q6H RT 3 mL at 17 0832    polyvinyl alcohol (LIQUIFILM TEARS) 1.4 % ophthalmic solution 2 Drop  2 Drop Both Eyes TID 2 Drop at 03/17/17 0014    sodium chloride (NS) flush 10 mL  10 mL InterCATHeter Q24H 10 mL at 03/16/17 1041    sodium chloride (NS) flush 10 mL  10 mL InterCATHeter PRN      sodium chloride (NS) flush 10-40 mL  10-40 mL InterCATHeter Q8H 10 mL at 03/17/17 0542    alteplase (CATHFLO) 1 mg in sterile water (preservative free) 1 mL injection  1 mg InterCATHeter PRN      bacitracin 500 unit/gram packet 1 Packet  1 Packet Topical PRN 1 Packet at 03/15/17 2136    0.9% sodium chloride infusion 250 mL  250 mL IntraVENous PRN      ondansetron (ZOFRAN) injection 4 mg  4 mg IntraVENous Q8H PRN 4 mg at 03/10/17 1526    glucose chewable tablet 16 g  4 Tab Oral PRN      dextrose (D50W) injection syrg 12.5-25 g  12.5-25 g IntraVENous PRN 12.5 g at 03/10/17 0537    glucagon (GLUCAGEN) injection 1 mg  1 mg IntraMUSCular PRN      sodium chloride (NS) flush 5-10 mL  5-10 mL IntraVENous Q8H 10 mL at 03/17/17 0542    sodium chloride (NS) flush 5-10 mL  5-10 mL IntraVENous PRN      vancomycin dosing per pharmacy   Other Rx Dosing/Monitoring      pantoprazole (PROTONIX) 40 mg in sodium chloride 0.9 % 10 mL injection  40 mg IntraVENous Q24H 40 mg at 03/17/17 0017    piperacillin-tazobactam (ZOSYN) 3.375 g in 0.9% sodium chloride (MBP/ADV) 100 mL  3.375 g IntraVENous Q8H 3.375 g at 03/17/17 0541       Labs/Data:    Lab Results   Component Value Date/Time    WBC 2.8 03/17/2017 03:42 AM    HGB 8.6 03/17/2017 03:42 AM    HCT 28.1 03/17/2017 03:42 AM    PLATELET 47 26/73/8415 03:42 AM    MCV 72.2 03/17/2017 03:42 AM       Lab Results   Component Value Date/Time    Sodium 145 03/17/2017 03:42 AM    Potassium 3.4 03/17/2017 03:42 AM    Chloride 106 03/17/2017 03:42 AM    CO2 31 03/17/2017 03:42 AM    Anion gap 8 03/17/2017 03:42 AM    Glucose 164 03/17/2017 03:42 AM    BUN 28 03/17/2017 03:42 AM    Creatinine 1.05 03/17/2017 03:42 AM    BUN/Creatinine ratio 27 03/17/2017 03:42 AM    GFR est AA >60 03/17/2017 03:42 AM    GFR est non-AA 50 03/17/2017 03:42 AM    Calcium 8.7 03/17/2017 03:42 AM       Wt Readings from Last 3 Encounters:   03/17/17 72.8 kg (160 lb 7.9 oz)   01/10/17 66.3 kg (146 lb 3.2 oz)   01/04/17 68 kg (150 lb)         Intake/Output Summary (Last 24 hours) at 03/17/17 0840  Last data filed at 03/17/17 0800   Gross per 24 hour   Intake             1860 ml   Output             1390 ml   Net              470 ml       Patient seen and examined. Chart reviewed. Labs, data and other pertinent notes reviewed in last 24 hrs.     PMH/SH/FH reviewed and unchanged compared to H&P    Discussed with RN Shearon Cheadle, MD

## 2017-03-17 NOTE — PROGRESS NOTES
Problem: Mobility Impaired (Adult and Pediatric)  Goal: *Acute Goals and Plan of Care (Insert Text)  Physical Therapy Goals  Initiated 3/16/2017  1. Patient will move from supine to sit and sit to supine , scoot up and down and roll side to side in bed with minimal assistance within 7 day(s). 2. Patient will transfer from bed to chair and chair to bed with moderate assistance using the least restrictive device within 7 day(s). 3. Patient will perform sit to stand with moderate assistance within 7 day(s). 4. Patient will ambulate with moderate assistance for 10 feet with the least restrictive device within 7 day(s). PHYSICAL THERAPY TREATMENT  Patient: Meng Nunez (18 y.o. female)  Date: 3/17/2017  Diagnosis: Hypothermia Hypothermia       Precautions: Fall      ASSESSMENT:  Chart reviewed. Pt deemed appropriate for treatment by nursing staff. Pt very sleepy/ difficult to arouse. After several minutes and with nurse's assistance, patient opening eyes and agreeing to sitting EOB. Pt following minimal commands. Unable to get patient to lift her arms, squeeze hands into fists, or perform APs. Max assist x 2, patient supine to sit EOB. Initially retropulsive, however with facilitated forward weight shift and with forearms on thighs, patient able to sit unsupported. Pt becoming more interactive when sitting. Sitting up unsupported x 7 minutes. Suddenly, patient pushing strongly to return to supine. Pt max assist x 2 safely back to bed. BP reading 87/68. May be delayed orthostatic symptoms with positional change as patient is currently being diuresed. Pt left in supine with BP 99/70. Recommend SNF rehab upon discharge. Will follow up Monday as appropriate. Nursing notified.        Progression toward goals:  [X]    Improving appropriately and progressing toward goals  [ ]    Improving slowly and progressing toward goals  [ ]    Not making progress toward goals and plan of care will be adjusted PLAN:  Patient continues to benefit from skilled intervention to address the above impairments. Continue treatment per established plan of care. Discharge Recommendations:  Skilled Nursing Facility  Further Equipment Recommendations for Discharge:  none       SUBJECTIVE:   Patient stated I did that yesterday.       OBJECTIVE DATA SUMMARY:   Critical Behavior:  Neurologic State: Eyes open to voice  Orientation Level: Disoriented to place, Disoriented to situation, Disoriented to time, Oriented to person (did not know place, only city. )  Cognition: Follows commands  Safety/Judgement: Awareness of environment, Decreased insight into deficits  Functional Mobility Training:  Bed Mobility:  Supine to Sit: Maximum assistance;Assist x2  Sit to Supine: Maximum assistance;Assist x2  Balance:  Sitting: Impaired  Sitting - Static: Fair (occasional)  Sitting - Dynamic: Fair (occasional)  Pain:  Pain Scale 1: Numeric (0 - 10)  Pain Intensity 1: 0     Please refer to the flowsheet for vital signs taken during this treatment.   After treatment:   [ ]    Patient left in no apparent distress sitting up in chair  [X]    Patient left in no apparent distress in bed  [X]    Call bell left within reach  [X]    Nursing notified  [ ]    Caregiver present  [ ]    Bed alarm activated      COMMUNICATION/COLLABORATION:   The patients plan of care was discussed with: Registered Nurse     Fabby Herring PT, DPT   Time Calculation: 20 mins

## 2017-03-17 NOTE — PROGRESS NOTES
Clinical Pharmacy Note: IV to PO Automatic Conversion  Please note: Chelsea Bailey medication pantoprazole has been changed from IV to PO based on the following critiera:    - Patient is taking scheduled oral medications  - Patient is tolerating tube feeds at goal rate or a full liquid, soft or regular diet    This IV to PO conversion is based on the [employer] approved automatic conversion policy for eligible patients. Please call with questions.      Raven Sifuentes, PharmD  PGY1 Pharmacy Resident

## 2017-03-17 NOTE — PROGRESS NOTES
[address]  [address], [address]       GI PROGRESS NOTE  [place] [facility]  [phone]    NAME: Negrita Plasencia   :  1930   MRN:  [id number]       Subjective: Ate breakfast without pain or nausea. She denies abdominal pain at present. Objective:     VITALS:   Last 24hrs VS reviewed since prior progress note. Most recent are:  Visit Vitals    BP 93/63 (BP 1 Location: Left arm, BP Patient Position: At rest)    Pulse 85    Temp 97.6 °F (36.4 °C)    Resp 23    Ht 5' 6\" (1.676 m)    Wt 72.8 kg (160 lb 7.9 oz)    SpO2 91%    BMI 25.9 kg/m2       PHYSICAL EXAM:  General: Cooperative, no acute distress    Neurologic:  Alert and oriented X 3. HEENT: PERRLA, EOMI. Lungs:  Diminished to the bases  Heart:  s1 s2  Abdomen: Soft, distended, BS +, (-) cortes  Extremities: edema  Psych:   Not anxious nor agitated. Lab Data Reviewed:     Recent Results (from the past 24 hour(s))   GLUCOSE, POC    Collection Time: 17 11:54 AM   Result Value Ref Range    Glucose (POC) 110 (H) 65 - 100 mg/dL    Performed by Ila Salgado, POC    Collection Time: 17  6:05 PM   Result Value Ref Range    Glucose (POC) 176 (H) 65 - 100 mg/dL    Performed by Jaye Lockeing    CBC WITH AUTOMATED DIFF    Collection Time: 17  3:42 AM   Result Value Ref Range    WBC 2.8 (L) 3.6 - 11.0 K/uL    RBC 3.89 3.80 - 5.20 M/uL    HGB 8.6 (L) 11.5 - 16.0 g/dL    HCT 28.1 (L) 35.0 - 47.0 %    MCV 72.2 (L) 80.0 - 99.0 FL    MCH 22.1 (L) 26.0 - 34.0 PG    MCHC 30.6 30.0 - 36.5 g/dL    RDW 17.2 (H) 11.5 - 14.5 %    PLATELET 47 (LL) 328 - 400 K/uL    NEUTROPHILS 70 32 - 75 %    LYMPHOCYTES 12 12 - 49 %    MONOCYTES 17 (H) 5 - 13 %    EOSINOPHILS 1 0 - 7 %    BASOPHILS 0 0 - 1 %    ABS. NEUTROPHILS 2.0 1.8 - 8.0 K/UL    ABS. LYMPHOCYTES 0.3 (L) 0.8 - 3.5 K/UL    ABS. MONOCYTES 0.5 0.0 - 1.0 K/UL    ABS. EOSINOPHILS 0.0 0.0 - 0.4 K/UL    ABS.  BASOPHILS 0.0 0.0 - 0.1 K/UL DF SMEAR SCANNED      PLATELET COMMENTS LARGE PLATELETS      RBC COMMENTS ANISOCYTOSIS  1+        RBC COMMENTS HYPOCHROMIA  2+        RBC COMMENTS POLYCHROMASIA  PRESENT        RBC COMMENTS RBC FRAGMENTS  MICROCYTOSIS  1+        RBC COMMENTS TARGET CELLS  PRESENT       PHOSPHORUS    Collection Time: 03/17/17  3:42 AM   Result Value Ref Range    Phosphorus 1.9 (L) 2.6 - 4.7 MG/DL   METABOLIC PANEL, BASIC    Collection Time: 03/17/17  3:42 AM   Result Value Ref Range    Sodium 145 136 - 145 mmol/L    Potassium 3.4 (L) 3.5 - 5.1 mmol/L    Chloride 106 97 - 108 mmol/L    CO2 31 21 - 32 mmol/L    Anion gap 8 5 - 15 mmol/L    Glucose 164 (H) 65 - 100 mg/dL    BUN 28 (H) 6 - 20 MG/DL    Creatinine 1.05 (H) 0.55 - 1.02 MG/DL    BUN/Creatinine ratio 27 (H) 12 - 20      GFR est AA >60 >60 ml/min/1.73m2    GFR est non-AA 50 (L) >60 ml/min/1.73m2    Calcium 8.7 8.5 - 10.1 MG/DL   MAGNESIUM    Collection Time: 03/17/17  3:42 AM   Result Value Ref Range    Magnesium 2.2 1.6 - 2.4 mg/dL   DIRECT & INDIRECT RANDALL    Collection Time: 03/17/17  3:42 AM   Result Value Ref Range    CAL Poly POS     Antibody screen NEG     CAL IgG POS     CAL C3b/C3d NEG    HEPATIC FUNCTION PANEL    Collection Time: 03/17/17  3:42 AM   Result Value Ref Range    Protein, total 6.1 (L) 6.4 - 8.2 g/dL    Albumin 2.7 (L) 3.5 - 5.0 g/dL    Globulin 3.4 2.0 - 4.0 g/dL    A-G Ratio 0.8 (L) 1.1 - 2.2      Bilirubin, total 3.0 (H) 0.2 - 1.0 MG/DL    Bilirubin, direct 2.1 (H) 0.0 - 0.2 MG/DL    Alk.  phosphatase 52 45 - 117 U/L    AST (SGOT) 69 (H) 15 - 37 U/L    ALT (SGPT) 92 (H) 12 - 78 U/L   LIPASE    Collection Time: 03/17/17  3:42 AM   Result Value Ref Range    Lipase 1109 (H) 73 - 393 U/L   PROTHROMBIN TIME + INR    Collection Time: 03/17/17  3:42 AM   Result Value Ref Range    INR 1.8 (H) 0.9 - 1.1      Prothrombin time 18.0 (H) 9.0 - 11.1 sec   NUCLEATED RBC    Collection Time: 03/17/17  3:42 AM   Result Value Ref Range    NRBC 6.2 (H) 0  WBC ABSOLUTE NRBC 0.17 (H) 0.00 - 0.01 K/uL    WBC CORRECTED FOR NR ADJUSTED FOR NUCLEATED RBC'S     GLUCOSE, POC    Collection Time: 03/17/17  8:44 AM   Result Value Ref Range    Glucose (POC) 137 (H) 65 - 100 mg/dL    Performed by Antoni Alberto          ________________________________________________________________________       Assessment:   · Jaundice- bilirubin down slightly from yesterday. Transaminases have improved since admission. US shows a CBD of 6mm without obvious stones. Lipase 1100 today - ? Passed stone/sudge causing both - she is asymptomatic. Imaging suggests cholecystitis surgery is following and considering cholecystostomy tube. 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 XXXA     Plan:   · She is high risk for surgery and ERCP  · Await surgery input for today     Signed By: Bebeto Paula NP     3/17/2017  10:45 AM       Pt independently seen and examined. Agree with above. She was admitted with septic versus cardiogenic shock and pancytopenia seen in some labs even prior to admission.   LFTs were normal on 3.8.17 and then the TB/AST/ALT peaked in the last 24-72 hours and are now trending down again (bilirubin is lagging, which is typically seen). The ultrasound shows no evidence of ductal dilation, but stones/sludge seen in the GB . Considerations for elevation include congestive hepatopathy (passive congestion), medications (zosyn can cause hyperbilirubinemia). Cannot get MRCP for optimal visualization. She is high risk for ERCP and surgery. At this point, continue to trend LFTs. If the LFTs continue to trend down, we will continue monitoring. If LFTs trend up again, would switch zosyn to Levaquin/Flagyl and reassess. Appreciate surgery input and involvement. We will see over the weekend.

## 2017-03-17 NOTE — PROGRESS NOTES
Problem: Self Care Deficits Care Plan (Adult)  Goal: *Acute Goals and Plan of Care (Insert Text)  Occupational Therapy Goals  Initiated 3/16/2017  1. Patient will perform grooming seated EOB for 5 minutes with supervision/set-up within 7 day(s). 2. Patient will perform UB bathing with minimal assistance/contact guard assist within 7 day(s). 3. Patient will perform lower body dressing with moderate assistance within 7 day(s). 4. Patient will perform toilet transfers with moderate assistance within 7 day(s). 5. Patient will perform all aspects of toileting with moderate assistance within 7 day(s). 6. Patient will participate in upper extremity therapeutic exercise/activities with minimal assistance/contact guard assist for 10 minutes within 7 day(s). 7. Patient will utilize energy conservation techniques during functional activities with verbal cues within 7 day(s). OCCUPATIONAL THERAPY TREATMENT  Patient: Yudi Gurrola (29 y.o. female)  Date: 3/17/2017  Diagnosis: Hypothermia Hypothermia       Precautions: Fall      ASSESSMENT:  Pt making slow progress this date. Pt continues to be limited by grossly decreased functional strength, cardiopulmonary tolerance, orientation (only to person at this time), decreased AROM of BUEs, limiting her independence and safety with basic ADLs. Pt totalA this date for ADLs with increased drowsiness and activity tolerance as compared to yesterday assessment. Pt only able to tolerate minimal therapeutic exercise with red hand foam for FM strengthening and dexterity. Pt requesting to eat, however, pt very drowsy this date; RN notified. Continue to recommend rehab at discharge as she is far below functional baseline. Suggestions for next session include therapeutic exercise training with yellow resistance band (left at bedside) and grooming/self-feeding tasks.      Progression toward goals:  [ ]       Improving appropriately and progressing toward goals  [X]       Improving slowly and progressing toward goals  [ ]       Not making progress toward goals and plan of care will be adjusted       PLAN:  Patient continues to benefit from skilled intervention to address the above impairments. Continue treatment per established plan of care. Discharge Recommendations:  Rehab and Skilled Nursing Facility  Further Equipment Recommendations for Discharge:  TBD       SUBJECTIVE:   Patient stated I haven't had breakfast yet.  Therapist saw pt for treatment at 1500; reoriented pt to place, situation and time. OBJECTIVE DATA SUMMARY:   Cognitive/Behavioral Status:  Neurologic State: Alert  Orientation Level: Oriented to person;Disoriented to place; Disoriented to situation;Disoriented to time  Cognition: Follows commands;Decreased command following  Perception: Appears intact  Perseveration: No perseveration noted  Safety/Judgement: Decreased awareness of environment;Decreased awareness of need for assistance;Decreased awareness of need for safety;Decreased insight into deficits     Functional Mobility and Transfers for ADLs:  Bed Mobility:  NT remained in bed           ADL Intervention:                                            Cognitive Retraining  Orientation Retraining: Place;Situation;Time;Reorienting  Safety/Judgement: Decreased awareness of environment;Decreased awareness of need for assistance;Decreased awareness of need for safety;Decreased insight into deficits     Therapeutic Exercises:   Provided pt with red hand foam and yellow resistance band. Pt only able to tolerate 1 set of 10 reps with foam for  strengthening exercises. Activity Tolerance:   Fair/poor tolerance this date. Please refer to the flowsheet for vital signs taken during this treatment.   After treatment:   [ ] Patient left in no apparent distress sitting up in chair  [X] Patient left in no apparent distress in bed  [X] Call bell left within reach  [X] Nursing notified  [ ] Caregiver present  [ ] Bed alarm activated      COMMUNICATION/COLLABORATION:   The patients plan of care was discussed with: Physical Therapist and Registered Nurse     Nikki Calixto OT  Time Calculation: 8 mins

## 2017-03-18 NOTE — PROGRESS NOTES
Hospitalist Progress Note  Pinky Cristobal MD  Office: [phone]        Date of Service:  3/18/2017  NAME:  Qasim Jenkins  :  1930  MRN:  [id number]      Admission Summary:   72-year-old female with history of chronic systolic congestive heart failure, atrial fibrillation, diabetes mellitus, hypertension, chronic anticoagulation use, history of pacemaker and ICD. She was sent from her primary care physician to the emergency room to be evaluated. History from the patient and her daughters at the bedside, also from the records. Per the daughter, she has been having on and off abdominal pain, epigastric pain for a while, over the last 3 days has been mostly pronounced. She said her pain at times is 9/10 in severity, it is nonradiating, associated with some nausea, but no vomiting. She denies any diarrhea. At her PCP's office,   they could not get her temperature, so they sent her to the emergency room. In the ER she has been hypothermic, her temperature was 91.4 on initial presentation. Also she was hypotensive. Her blood pressure   systolic was 16/05, so she was given about 1.5 liters of IV fluid, and now she is getting 1 bottle of albumin. She was placed on warming blanket, Lizzy Hugger. She denies any dysuria or frequency. Interval history / Subjective:   Pt seen and examined  Alert and awake   Eating well  No abdominal pain        Assessment & Plan:     -  Hypothermia and hypotension. Concern for sepsis. On empiric IV antibiotics, on Zosyn and vancomycin. Followup on blood culture results NGSF. Off and on Lizzy Hugger for her hypothermia.     -Acute Respiratory failure with Hypercapnia due to Pleural effusion/atelectasis  BIPAP PRN  PCCM input aprpeciated  Diuresis , on NC now    -  Pleural effusions, bilateral: improving on CXR, continue antibiotics, may be source of infection +/-  CHF volume overload.  C/w diuresis     - Sepsis: Unknown source, most likely lung with bilateral pleural effusions, possibly infectious. She had caren of leukocytes to 1.9, improving. She was afebrile overnight, low BP requiring pressors. Weaning dunia. Continue zosyn, vanco. Monitor cultures, preliminary blood, urine cultures no growth so far. - Acute on Chronic Systolic congestive heart failure NYHA III/IV. With fluid overload. Echo 3/9 with 30% EF, diffuse hypokinesis. Bumex added, strict I/O, monitor weight.   - History of pacer.   - Valvular disease: Moderate mitral regurgitation, right ventricular systolic dysfunction,   as well as enlarged left atrium.  -  Valvular vegetation on aortic valve, questionable: Blood cultures no growth preliminary. AMMON tentatively on Monday if respiratory status permits  - Diuresis per nephrology     - Hematuria: may be related to traumatic galarza placement with INR max 5.5, today 2.2. Urine noted on UA as yellow on 3/8, Galarza placed 3/9 and hematuria reported 3/10 UA. -  JUAN:  From  ATN and contrast nephropathy/Hypotension most likely etiology. Ischemic heart disease, hypotension; and exposure to IV contrast. Appreciate nephro consult recommendations. Bumex, avoid nephrotoxins, I/Os,     - Hypoglycemia/diabetes mellitus. Resolved. Holding her Lantus and on hypoglycemia protocol. Her blood sugar improved after D50. Stable, off D10 now - resolved    -   Ischemic hepatitis:. Consulted GI. Plans for EGD when stable or outpatient. Continue proton pump inhibitor for now. US with distended gallbladder with stone and sludge. Appreciate gen surg eval, if abdominal pain worsens, get HIDA prior to godwin tube placement    Coagulopathy.  - Holding her Coumadin and monitor daily INR and restart her Coumadin when INR improved. INR increased, she received Vitamin K x 1. With hematuria will give FFP and monitor INR. Pancytopenia. -Appears to be chronic. However, patient and family unaware of her previous lab results.  Will monitor her labs closely. Code status: Full    Care Plan discussed with: Patient/Family and Nurse  Disposition: TBD     Hospital Problems  Date Reviewed: 3/8/2017          Codes Class Noted POA    * (Principal)Hypothermia ICD-10-CM: T68. XXXA  ICD-9-CM: 991.6  3/8/2017 Yes                Review of Systems:   A comprehensive review of systems was negative. Vital Signs:    Last 24hrs VS reviewed since prior progress note. Most recent are:  Visit Vitals    /73    Pulse 80    Temp 98.1 °F (36.7 °C)    Resp 23    Ht 5' 6\" (1.676 m)    Wt 80.2 kg (176 lb 12.9 oz)    SpO2 95%    BMI 28.54 kg/m2         Intake/Output Summary (Last 24 hours) at 03/18/17 1044  Last data filed at 03/18/17 0800   Gross per 24 hour   Intake              660 ml   Output             2200 ml   Net            -1540 ml        Physical Examination:             Constitutional:   Awake, on NC, alert and oriented to person and place   ENT:   Neck supple,    Resp:  improved air entry   CV:  irregular    GI:  Soft, non-tender, normoactive bowel sounds, no hepatosplenomegaly     Musculoskeletal:  2+ edema    Neurologic:   awake, moves all 4 extremities             Data Review:    Review and/or order of clinical lab test   US abd:  IMPRESSION:  1. Distended gallbladder with sludge and a stone with moderate wall thickening of the gallbladder wall. 2. Dilated IVC suggesting right heart failure. 3. Small amount of free fluid surrounding the liver.   4. Small right pleural effusion      Labs:     Recent Labs      03/18/17   0920  03/17/17   0342   WBC  3.5*  2.8*   HGB  8.5*  8.6*   HCT  27.3*  28.1*   PLT  51*  47*     Recent Labs      03/18/17   0412  03/17/17   0342  03/16/17   0457   NA  145  145  148*   K  3.1*  3.4*  3.3*   CL  106  106  111*   CO2  33*  31  28   BUN  22*  28*  28*   CREA  0.83  1.05*  1.14*   GLU  133*  164*  88   CA  8.2*  8.7  8.7   MG  2.1  2.2  2.3   PHOS  1.6*  1.9*  3.7     Recent Labs      03/18/17 8384  03/17/17   0342  03/16/17   0457   SGOT  40*  69*  115*   ALT  65  92*  112*   AP  45  52  51   TBILI  2.5*  3.0*  3.5*   TP  5.6*  6.1*  6.1*   ALB  2.3*  2.7*  2.6*   GLOB  3.3  3.4  3.5   LPSE   --   1109*   --      Recent Labs      03/18/17   0412  03/17/17   0342   INR  1.6*  1.8*   PTP  16.4*  18.0*      No results for input(s): FE, TIBC, PSAT, FERR in the last 72 hours. Lab Results   Component Value Date/Time    Folate 14.5 03/09/2017 03:23 AM      No results for input(s): PH, PCO2, PO2 in the last 72 hours. No results for input(s): CPK, CKNDX, TROIQ in the last 72 hours.     No lab exists for component: CPKMB  Lab Results   Component Value Date/Time    Cholesterol, total 138 09/28/2011 09:13 AM    HDL Cholesterol 50 09/28/2011 09:13 AM    LDL, calculated 74 09/28/2011 09:13 AM    Triglyceride 69 09/28/2011 09:13 AM     Lab Results   Component Value Date/Time    Glucose (POC) 138 03/18/2017 04:22 AM    Glucose (POC) 137 03/17/2017 05:19 PM    Glucose (POC) 144 03/17/2017 12:04 PM    Glucose (POC) 137 03/17/2017 08:44 AM    Glucose (POC) 176 03/16/2017 06:05 PM     Lab Results   Component Value Date/Time    Color RED 03/10/2017 02:30 AM    Appearance BLOODY 03/10/2017 02:30 AM    Specific gravity 1.020 03/10/2017 02:30 AM    Specific gravity 1.016 03/08/2017 08:54 PM    pH (UA) 6.0 03/10/2017 02:30 AM    Protein 100 03/10/2017 02:30 AM    Glucose NEGATIVE  03/10/2017 02:30 AM    Ketone NEGATIVE  03/10/2017 02:30 AM    Bilirubin NEGATIVE  03/10/2017 02:30 AM    Urobilinogen 1.0 03/10/2017 02:30 AM    Nitrites NEGATIVE  03/10/2017 02:30 AM    Leukocyte Esterase TRACE 03/10/2017 02:30 AM    Epithelial cells FEW 03/10/2017 02:30 AM    Bacteria NEGATIVE  03/10/2017 02:30 AM    WBC 20-50 03/10/2017 02:30 AM    RBC >100 03/10/2017 02:30 AM         Medications Reviewed:     Current Facility-Administered Medications   Medication Dose Route Frequency    potassium phosphate 30 mmol in 0.9% sodium chloride 250 mL infusion   IntraVENous ONCE    potassium chloride 20 mEq in 50 ml IVPB  20 mEq IntraVENous Q2H    bumetanide (BUMEX) injection 2 mg  2 mg IntraVENous Q8H    albumin human 25% (BUMINATE) solution 12.5 g  12.5 g IntraVENous Q8H PRN    Vancomycin trough to be drawn before dose @ 10:00 on 3/18. Thanks!   1 Each Other ONCE    pantoprazole (PROTONIX) tablet 40 mg  40 mg Oral ACB    balsam peru-castor oil (VENELEX)  mg/gram ointment   Topical BID    docusate sodium (COLACE) capsule 100 mg  100 mg Oral BID    heparin (porcine) injection 5,000 Units  5,000 Units SubCUTAneous Q8H    thiamine (B-1) tablet 100 mg  100 mg Oral DAILY    vancomycin (VANCOCIN) 1,000 mg in 0.9% sodium chloride (MBP/ADV) 250 mL  1,000 mg IntraVENous Q24H    albuterol-ipratropium (DUO-NEB) 2.5 MG-0.5 MG/3 ML  3 mL Nebulization Q6H RT    polyvinyl alcohol (LIQUIFILM TEARS) 1.4 % ophthalmic solution 2 Drop  2 Drop Both Eyes TID    sodium chloride (NS) flush 10 mL  10 mL InterCATHeter Q24H    sodium chloride (NS) flush 10 mL  10 mL InterCATHeter PRN    sodium chloride (NS) flush 10-40 mL  10-40 mL InterCATHeter Q8H    alteplase (CATHFLO) 1 mg in sterile water (preservative free) 1 mL injection  1 mg InterCATHeter PRN    bacitracin 500 unit/gram packet 1 Packet  1 Packet Topical PRN    0.9% sodium chloride infusion 250 mL  250 mL IntraVENous PRN    ondansetron (ZOFRAN) injection 4 mg  4 mg IntraVENous Q8H PRN    glucose chewable tablet 16 g  4 Tab Oral PRN    dextrose (D50W) injection syrg 12.5-25 g  12.5-25 g IntraVENous PRN    glucagon (GLUCAGEN) injection 1 mg  1 mg IntraMUSCular PRN    sodium chloride (NS) flush 5-10 mL  5-10 mL IntraVENous Q8H    sodium chloride (NS) flush 5-10 mL  5-10 mL IntraVENous PRN    vancomycin dosing per pharmacy   Other Rx Dosing/Monitoring    piperacillin-tazobactam (ZOSYN) 3.375 g in 0.9% sodium chloride (MBP/ADV) 100 mL  3.375 g IntraVENous Q8H ______________________________________________________________________  EXPECTED LENGTH OF STAY: 4d 21h  ACTUAL LENGTH OF STAY:          Sydney Sampson MD

## 2017-03-18 NOTE — PROGRESS NOTES
Pulmonary    3/18  Patient essentially unchanged    Hospitalist we'll attempt to transfer to the [facility]    I hope she does well    Continue current care    Patient Vitals for the past 4 hrs:   BP Pulse Resp SpO2   17 1300 106/66 80 26 100 %   17 1200 98/64 84 26 100 %   17 1100 90/60 83 26 -   Temp (24hrs), Av.2 °F (36.8 °C), Min:97.8 °F (36.6 °C), Max:98.9 °F (37.2 °C)      Intake/Output Summary (Last 24 hours) at 17 1446  Last data filed at 17 1300   Gross per 24 hour   Intake              910 ml   Output             2500 ml   Net            -1590 ml       She appears in no distress  diminshed bs at bases  RRR   Soft warm and dry    Recent Labs      17   0920  17   0342  17   0457   WBC  3.5*  2.8*  2.3*   HGB  8.5*  8.6*  8.6*   HCT  27.3*  28.1*  27.8*   PLT  51*  47*  44*     Recent Labs      17   0412  17   0342  17   0457   NA  145  145  148*   K  3.1*  3.4*  3.3*   CL  106  106  111*   CO2  33*  31  28   GLU  133*  164*  88   BUN  22*  28*  28*   CREA  0.83  1.05*  1.14*   CA  8.2*  8.7  8.7   MG  2.1  2.2  2.3   PHOS  1.6*  1.9*  3.7   ALB  2.3*  2.7*  2.6*   SGOT  40*  69*  115*   ALT  65  92*  112*   INR  1.6*  1.8*   --        No results for input(s): TROIQ, CPK, CKMB in the last 72 hours.   All Micro Results     Procedure Component Value Units Date/Time    CULTURE, BLOOD, PAIRED [[id number]] Collected:  17 1235    Order Status:  Completed Specimen:  Blood Updated:  178     Special Requests: NO SPECIAL REQUESTS        Culture result: NO GROWTH 5 DAYS       CULTURE, BLOOD, PAIRED [[id number]] Collected:  17 194    Order Status:  Completed Specimen:  Blood Updated:  17 0723     Special Requests: NO SPECIAL REQUESTS        Culture result: NO GROWTH 5 DAYS       CULTURE, BLOOD [[id number]]     Order Status:  Canceled Specimen:  Blood from Blood     CULTURE, BLOOD [[id number]]     Order Status:  Canceled Specimen: Blood from Blood     CULTURE, URINE [[id number]] Collected:  03/10/17 0230    Order Status:  Completed Specimen:  Urine Updated:  03/11/17 0711     Special Requests: --        NO SPECIAL REQUESTS  Reflexed from Fausto Colindres <1,000 COLONIES/mL        Culture result: NO GROWTH 1 DAY           Impression  1. Acute hypoxic and hypercarbic miko failure, intubated 3/10, extubated 3/12  2. Acute on chronic decompensated CHF with pulmonary edema and bilateral pleural effusions-- cardiology following  3. S/P Shock - suspect septic (cultures are negative) / cardiogenic   4. EF 33% - systolic CHF - concern for possible vegetation on aortic valve - blood cx no growth so far  5. Hypoglycemia on D10 secondary to sepsis and liver insufficiency  6. Liver insufficiency - ? From chronic heart failure - cirrhotic appearing liver  7. IDDM  8. afib was on coumadin - INR > 5 - vitamin K given  9. Cholelithiasis- surgery consulted  10. Thrombocytopenia, stable, on low dose heparin    --   BiPAP as needed  -- - Noted plans for AMMON next week- anesthesia monitoring recommended  -- continue abx and follow up blood cx-- no positive cultures, awaiting AMMON, if vegetation noted>> consult ID for antibiotic management, if no IE >> stop abx  -- Continue diuretics- intensify dose  -- Supportive care  -- Palliative care following--- Overall prognosis seems to be poor but needs to be determined from cardiology standpoint.  Respiratory failure is from pulmonary edema and volume overload and not terminal lung disease  -- Transfer to Barron Ha MD

## 2017-03-18 NOTE — PROGRESS NOTES
0730: Bedside shift change report given to Jorge Luis Hwang 69 (oncoming nurse) by Maria Guadalupe Barbosa RN (offgoing nurse). Report included the following information SBAR, Kardex, ED Summary, Procedure Summary, Intake/Output, MAR, Accordion, Recent Results and Cardiac Rhythm paced. 1230: Patient unable to void. Bladder scanned for 750cc; Richardson inserted per protocol for retention > 24 hours. 1930: Bedside shift change report given to Ankita  (oncoming nurse) by Jemal Parker RN (offgoing nurse). Report included the following information SBAR, Kardex, ED Summary, Intake/Output, MAR, Accordion, Recent Results and Cardiac Rhythm paced.

## 2017-03-18 NOTE — PROGRESS NOTES
GI Progress Note Adithyabest Silva)  NAME:Chelsea Nascimento RADHA:[id number] AJM:[id number]   ATTG: Nikolai Nguyen MD  Prim Bailey Graves MD PCP: Gabriela Baig MD  Date/Time:  3/18/2017 3:03 PM   Assessment:   · Jaundice- resolved  · LFT elevation- transaminases are improving and bilirubin is tending down. · Elevated lipase- taking PO without pain. · Abnl GI x-rays- cholecystitis suggested on prior testing. Plan:   · Encourage PO  · Follow LFTs in a few days  · Advance diet as tolerated  · Will see back on Monday     Subjective:   Discussed with RN events overnight. Fells better. Denies pain with PO. Complaint Y/N Description   Abdominal Pain n    Hematemesis n    Hematochezia n    Melena n    Constipation n    Diarrhea n    Dyspepsia n    Dysphagia n    Jaundiced n    Nausea/vomiting n      Review of Systems:  Symptom Y/N Comments  Symptom Y/N Comments   Fever/Chills n   Chest Pain n    Cough n   Headaches n    Sputum n   Joint Pain n    SOB/DICK n   Pruritis/Rash n    Tolerating Diet y Full liquids  Other       Could NOT obtain due to:      Objective:   VITALS:   Last 24hrs VS reviewed since prior progress note. Most recent are:  Visit Vitals    /66    Pulse 80    Temp 98.1 °F (36.7 °C)    Resp 26    Ht 5' 6\" (1.676 m)    Wt 80.2 kg (176 lb 12.9 oz)    SpO2 100%    BMI 28.54 kg/m2       Intake/Output Summary (Last 24 hours) at 03/18/17 1503  Last data filed at 03/18/17 1300   Gross per 24 hour   Intake              910 ml   Output             2500 ml   Net            -1590 ml     PHYSICAL EXAM:  General: WD, WN. Alert, cooperative, no acute distress    HEENT: NC, Atraumatic. PERRL. Barak Ramal Anicteric sclerae. Lungs:  CTA Bilaterally. No Wheezing/Rhonchi/Rales. Heart:  Regular  rhythm,  No murmur/Rubs/Gallops  Abdomen: Soft, Non distended, Non tender.  +Bowel sounds  Extremities: No c/c/e  Neurologic:  CN 2-12 gi, A/O X 3.   No acute neurological distress   Psych:   Good insight. Not anxious nor agitated. Lab and Radiology Data Reviewed: (see below)    Medications Reviewed: (see below)  PMH/SH reviewed - no change compared to H&P  ________________________________________________________________________  Total time spent with patient: 10 minutes   ________________________________________________________________________  Care Plan discussed with:  Patient y   Family  y   RN y              Consultant:       Edwin Escudero MD     Procedures: see electronic medical records for all procedures/Xrays and details which were not copied into this note but were reviewed prior to creation of Plan. LABS:  Recent Labs      03/18/17 0920 03/17/17 0342   WBC  3.5*  2.8*   HGB  8.5*  8.6*   HCT  27.3*  28.1*   PLT  51*  47*     Recent Labs      03/18/17 0412 03/17/17 0342 03/16/17 0457   NA  145  145  148*   K  3.1*  3.4*  3.3*   CL  106  106  111*   CO2  33*  31  28   BUN  22*  28*  28*   CREA  0.83  1.05*  1.14*   GLU  133*  164*  88   CA  8.2*  8.7  8.7   MG  2.1  2.2  2.3   PHOS  1.6*  1.9*  3.7     Recent Labs      03/18/17 0412 03/17/17 0342 03/16/17 0457   SGOT  40*  69*  115*   AP  45  52  51   TP  5.6*  6.1*  6.1*   ALB  2.3*  2.7*  2.6*   GLOB  3.3  3.4  3.5   LPSE   --   1109*   --      Recent Labs      03/18/17 0412 03/17/17 0342   INR  1.6*  1.8*   PTP  16.4*  18.0*      No results for input(s): FE, TIBC, PSAT, FERR in the last 72 hours. Lab Results   Component Value Date/Time    Folate 14.5 03/09/2017 03:23 AM     No results for input(s): PH, PCO2, PO2 in the last 72 hours. No results for input(s): CPK, CKMB in the last 72 hours.     No lab exists for component: TROPONINI  Lab Results   Component Value Date/Time    Color RED 03/10/2017 02:30 AM    Appearance BLOODY 03/10/2017 02:30 AM    Specific gravity 1.020 03/10/2017 02:30 AM    Specific gravity 1.016 03/08/2017 08:54 PM    pH (UA) 6.0 03/10/2017 02:30 AM    Protein 100 03/10/2017 02:30 AM    Glucose NEGATIVE  03/10/2017 02:30 AM    Ketone NEGATIVE  03/10/2017 02:30 AM    Bilirubin NEGATIVE  03/10/2017 02:30 AM    Urobilinogen 1.0 03/10/2017 02:30 AM    Nitrites NEGATIVE  03/10/2017 02:30 AM    Leukocyte Esterase TRACE 03/10/2017 02:30 AM    Epithelial cells FEW 03/10/2017 02:30 AM    Bacteria NEGATIVE  03/10/2017 02:30 AM    WBC 20-50 03/10/2017 02:30 AM    RBC >100 03/10/2017 02:30 AM       MEDICATIONS:  Current Facility-Administered Medications   Medication Dose Route Frequency    bumetanide (BUMEX) injection 2 mg  2 mg IntraVENous Q8H    albumin human 25% (BUMINATE) solution 12.5 g  12.5 g IntraVENous Q8H PRN    Vancomycin trough to be drawn before dose @ 10:00 on 3/18. Thanks!   1 Each Other ONCE    pantoprazole (PROTONIX) tablet 40 mg  40 mg Oral ACB    balsam peru-castor oil (VENELEX)  mg/gram ointment   Topical BID    docusate sodium (COLACE) capsule 100 mg  100 mg Oral BID    heparin (porcine) injection 5,000 Units  5,000 Units SubCUTAneous Q8H    thiamine (B-1) tablet 100 mg  100 mg Oral DAILY    vancomycin (VANCOCIN) 1,000 mg in 0.9% sodium chloride (MBP/ADV) 250 mL  1,000 mg IntraVENous Q24H    albuterol-ipratropium (DUO-NEB) 2.5 MG-0.5 MG/3 ML  3 mL Nebulization Q6H RT    polyvinyl alcohol (LIQUIFILM TEARS) 1.4 % ophthalmic solution 2 Drop  2 Drop Both Eyes TID    sodium chloride (NS) flush 10 mL  10 mL InterCATHeter Q24H    sodium chloride (NS) flush 10 mL  10 mL InterCATHeter PRN    sodium chloride (NS) flush 10-40 mL  10-40 mL InterCATHeter Q8H    alteplase (CATHFLO) 1 mg in sterile water (preservative free) 1 mL injection  1 mg InterCATHeter PRN    bacitracin 500 unit/gram packet 1 Packet  1 Packet Topical PRN    0.9% sodium chloride infusion 250 mL  250 mL IntraVENous PRN    ondansetron (ZOFRAN) injection 4 mg  4 mg IntraVENous Q8H PRN    glucose chewable tablet 16 g  4 Tab Oral PRN    dextrose (D50W) injection syrg 12.5-25 g  12.5-25 g IntraVENous PRN    glucagon (GLUCAGEN) injection 1 mg 1 mg IntraMUSCular PRN    sodium chloride (NS) flush 5-10 mL  5-10 mL IntraVENous Q8H    sodium chloride (NS) flush 5-10 mL  5-10 mL IntraVENous PRN    vancomycin dosing per pharmacy   Other Rx Dosing/Monitoring    piperacillin-tazobactam (ZOSYN) 3.375 g in 0.9% sodium chloride (MBP/ADV) 100 mL  3.375 g IntraVENous Q8H

## 2017-03-18 NOTE — PROGRESS NOTES
2000 Bedside and Verbal shift change report given to YORDY Zarate RN (oncoming nurse) by Cristina Salgado RN (offgoing nurse). Report included the following information SBAR, Kardex, Intake/Output, MAR, Accordion and Recent Results. Problem: Falls - Risk of  Goal: *Knowledge of fall prevention  Outcome: Progressing Towards Goal  Variance: Patient Condition           Problem: Heart Failure: Day 5  Goal: Activity/Safety  Outcome: Progressing Towards Goal  Variance: Patient Condition  Comments: Pt remains on bedrest and is on BiPAP at this time. Goal: Nutrition/Diet  Outcome: Progressing Towards Goal  Patient tolerating full liquid diet, PO intake improving. 0800 Bedside and Verbal shift change report given to Rosa M Jaramillo RN (oncoming nurse) by Rosalio Mead RN (offgoing nurse). Report included the following information SBAR, Kardex, ED Summary, Procedure Summary, Intake/Output, MAR, Accordion and Recent Results.

## 2017-03-18 NOTE — PROGRESS NOTES
Subjective  No complaints  Tolerating full liquid diet     Temp:  [96.5 °F (35.8 °C)-98.9 °F (37.2 °C)]   Pulse (Heart Rate):  [69-92]   BP: ()/(52-77)   Resp Rate:  [11-34]   O2 Sat (%):  [91 %-100 %]   Weight:  [160 lb 7.9 oz (72.8 kg)-176 lb 12.9 oz (80.2 kg)]      03/16 1901 - 03/18 0700  In: 1180 [P.O.:360; I.V.:820]  Out: 3060 [Urine:3060]      Objective  Gen- alert in NAD  Lungs- cta  H- rrr  Abd- S/nt/nd     Recent Results (from the past 24 hour(s))   GLUCOSE, POC    Collection Time: 03/17/17 12:04 PM   Result Value Ref Range    Glucose (POC) 144 (H) 65 - 100 mg/dL    Performed by Martha Carvajal., POC    Collection Time: 03/17/17  5:19 PM   Result Value Ref Range    Glucose (POC) 137 (H) 65 - 100 mg/dL    Performed by Prentice Peabody    PHOSPHORUS    Collection Time: 03/18/17  4:12 AM   Result Value Ref Range    Phosphorus 1.6 (L) 2.6 - 4.7 MG/DL   MAGNESIUM    Collection Time: 03/18/17  4:12 AM   Result Value Ref Range    Magnesium 2.1 1.6 - 2.4 mg/dL   BNP    Collection Time: 03/18/17  4:12 AM   Result Value Ref Range    BNP 2321 (H) 0 - 615 pg/mL   METABOLIC PANEL, COMPREHENSIVE    Collection Time: 03/18/17  4:12 AM   Result Value Ref Range    Sodium 145 136 - 145 mmol/L    Potassium 3.1 (L) 3.5 - 5.1 mmol/L    Chloride 106 97 - 108 mmol/L    CO2 33 (H) 21 - 32 mmol/L    Anion gap 6 5 - 15 mmol/L    Glucose 133 (H) 65 - 100 mg/dL    BUN 22 (H) 6 - 20 MG/DL    Creatinine 0.83 0.55 - 1.02 MG/DL    BUN/Creatinine ratio 27 (H) 12 - 20      GFR est AA >60 >60 ml/min/1.73m2    GFR est non-AA >60 >60 ml/min/1.73m2    Calcium 8.2 (L) 8.5 - 10.1 MG/DL    Bilirubin, total 2.5 (H) 0.2 - 1.0 MG/DL    ALT (SGPT) 65 12 - 78 U/L    AST (SGOT) 40 (H) 15 - 37 U/L    Alk.  phosphatase 45 45 - 117 U/L    Protein, total 5.6 (L) 6.4 - 8.2 g/dL    Albumin 2.3 (L) 3.5 - 5.0 g/dL    Globulin 3.3 2.0 - 4.0 g/dL    A-G Ratio 0.7 (L) 1.1 - 2.2     PROTHROMBIN TIME + INR    Collection Time: 03/18/17 4:12 AM   Result Value Ref Range    INR 1.6 (H) 0.9 - 1.1      Prothrombin time 16.4 (H) 9.0 - 11.1 sec   GLUCOSE, POC    Collection Time: 03/18/17  4:22 AM   Result Value Ref Range    Glucose (POC) 138 (H) 65 - 100 mg/dL    Performed by Cristina Chan          Principal Problem:    Hypothermia (3/8/2017)          Assessment & Plan  Possible cholecystitis with choledocholithiasis- Seems to be asymptomatic at this time. Bilirubin improving. If worsens would get HIDA scan to ensure cholecystitis  prior to placing cholecystostomy tube.

## 2017-03-18 NOTE — PROGRESS NOTES
Name: Bossman Ortez   MRN: [id number]  : 1930      ASSESSMENT/PLAN:    JUAN- likely ATN in the setting of hypotension/also recent IV Contrast on 3/8. Creatinine normal.  I'll sign off. Please call if any questions. Chronic systolic CHF (EF 61%)/JYO pleural effusions/enlarged LA/RV Systolic HTN/Mod MR    Fluid overload- diuresing    Elevated LFTs- likely ischemic hepatitis      Hypophos/hypokalemia - replete prn    Hypernatremia - push po fluids. Subjective:    \"I feel better. \"  Looks brighter today. No dyspnea. No N/V.    ROS:   UTO    Exam:  Visit Vitals    /66    Pulse 80    Temp 98.1 °F (36.7 °C)    Resp 26    Ht 5' 6\" (1.676 m)    Wt 80.2 kg (176 lb 12.9 oz)    SpO2 100%    BMI 28.54 kg/m2     NAD  Dec BS at bases  RRR  Soft, distended, NT  +edema-improved  Richardson+    Current Facility-Administered Medications   Medication Dose Route Frequency Last Dose    bumetanide (BUMEX) injection 2 mg  2 mg IntraVENous Q8H 2 mg at 17 0910    albumin human 25% (BUMINATE) solution 12.5 g  12.5 g IntraVENous Q8H PRN      Vancomycin trough to be drawn before dose @ 10:00 on 3/18. Thanks!   1 Each Other ONCE      pantoprazole (PROTONIX) tablet 40 mg  40 mg Oral ACB 40 mg at 17 0805    balsam peru-castor oil (VENELEX)  mg/gram ointment   Topical BID      docusate sodium (COLACE) capsule 100 mg  100 mg Oral  mg at 17 0909    heparin (porcine) injection 5,000 Units  5,000 Units SubCUTAneous Q8H 5,000 Units at 17 1337    thiamine (B-1) tablet 100 mg  100 mg Oral DAILY 100 mg at 17 0909    vancomycin (VANCOCIN) 1,000 mg in 0.9% sodium chloride (MBP/ADV) 250 mL  1,000 mg IntraVENous Q24H 1,000 mg at 17 1021    albuterol-ipratropium (DUO-NEB) 2.5 MG-0.5 MG/3 ML  3 mL Nebulization Q6H RT 3 mL at 17 4927  polyvinyl alcohol (LIQUIFILM TEARS) 1.4 % ophthalmic solution 2 Drop  2 Drop Both Eyes TID 2 Drop at 03/18/17 0911    sodium chloride (NS) flush 10 mL  10 mL InterCATHeter Q24H 10 mL at 03/18/17 0925    sodium chloride (NS) flush 10 mL  10 mL InterCATHeter PRN      sodium chloride (NS) flush 10-40 mL  10-40 mL InterCATHeter Q8H 10 mL at 03/18/17 1338    alteplase (CATHFLO) 1 mg in sterile water (preservative free) 1 mL injection  1 mg InterCATHeter PRN      bacitracin 500 unit/gram packet 1 Packet  1 Packet Topical PRN 1 Packet at 03/15/17 2136    0.9% sodium chloride infusion 250 mL  250 mL IntraVENous PRN      ondansetron (ZOFRAN) injection 4 mg  4 mg IntraVENous Q8H PRN 4 mg at 03/10/17 1526    glucose chewable tablet 16 g  4 Tab Oral PRN      dextrose (D50W) injection syrg 12.5-25 g  12.5-25 g IntraVENous PRN 12.5 g at 03/10/17 0537    glucagon (GLUCAGEN) injection 1 mg  1 mg IntraMUSCular PRN      sodium chloride (NS) flush 5-10 mL  5-10 mL IntraVENous Q8H 10 mL at 03/18/17 1338    sodium chloride (NS) flush 5-10 mL  5-10 mL IntraVENous PRN      vancomycin dosing per pharmacy   Other Rx Dosing/Monitoring      piperacillin-tazobactam (ZOSYN) 3.375 g in 0.9% sodium chloride (MBP/ADV) 100 mL  3.375 g IntraVENous Q8H 3.375 g at 03/18/17 1337       Labs/Data:    Lab Results   Component Value Date/Time    WBC 3.5 03/18/2017 09:20 AM    HGB 8.5 03/18/2017 09:20 AM    HCT 27.3 03/18/2017 09:20 AM    PLATELET 51 58/10/1448 09:20 AM    MCV 71.7 03/18/2017 09:20 AM       Lab Results   Component Value Date/Time    Sodium 145 03/18/2017 04:12 AM    Potassium 3.1 03/18/2017 04:12 AM    Chloride 106 03/18/2017 04:12 AM    CO2 33 03/18/2017 04:12 AM    Anion gap 6 03/18/2017 04:12 AM    Glucose 133 03/18/2017 04:12 AM    BUN 22 03/18/2017 04:12 AM    Creatinine 0.83 03/18/2017 04:12 AM    BUN/Creatinine ratio 27 03/18/2017 04:12 AM    GFR est AA >60 03/18/2017 04:12 AM    GFR est non-AA >60 03/18/2017 04:12 AM    Calcium 8.2 03/18/2017 04:12 AM       Wt Readings from Last 3 Encounters:   03/18/17 80.2 kg (176 lb 12.9 oz)   01/10/17 66.3 kg (146 lb 3.2 oz)   01/04/17 68 kg (150 lb)         Intake/Output Summary (Last 24 hours) at 03/18/17 1451  Last data filed at 03/18/17 1300   Gross per 24 hour   Intake              910 ml   Output             2500 ml   Net            -1590 ml       Patient seen and examined. Chart reviewed. Labs, data and other pertinent notes reviewed in last 24 hrs.     PMH/SH/FH reviewed and unchanged compared to H&P    Discussed with MECHELLE Beltrán MD

## 2017-03-18 NOTE — PROGRESS NOTES
Cardiology Progress Note         3/18/2017 11:59 AM    Admit Date: 3/8/2017    Admit Diagnosis: Hypothermia      Subjective:     Chelsea Davalos on BIPAP and not alert. poor mental status    Past Medical History:   Diagnosis Date    Atrial fibrillation (HCC)     Chronic systolic heart failure (HCC)     ef 35-40%; hold ACE due to lower bp    Diabetes mellitus type 2, insulin dependent ([address]     Hypertension     Long term (current) use of anticoagulants     Malignant hypertensive heart disease with CHF [facility] [address]     Mitral regurgitation     Pacemaker 10/25/2012    The pacemaker is a [employer], model # [id number], serial [id number]. 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, 1996    # 28 [place]    Thyroid disease     TR (tricuspid regurgitation)     moderate to severe echo 2009     Past Surgical History:   Procedure Laterality Date    CARDIAC SURG PROCEDURE UNLIST  1996    valve replacement    HX HEART CATHETERIZATION  10/4/2012    normal cors, LVEF 50%    HX HEART VALVE SURGERY      HX PACEMAKER      INS PPM/ICD LED DUAL ONLY  10/25/2012         INS PPM/ICD LED DUAL ONLY  9/9/2016          Social History     Social History    Marital status:      Spouse name: N/A    Number of children: N/A    Years of education: N/A     Occupational History    Not on file.      Social History Main Topics    Smoking status: Never Smoker    Smokeless tobacco: Never Used    Alcohol use No    Drug use: No    Sexual activity: Not on file     Other Topics Concern    Not on file     Social History Narrative       Visit Vitals    BP 90/60    Pulse 83    Temp 98.1 °F (36.7 °C)    Resp 26    Ht 5' 6\" (1.676 m)    Wt 176 lb 12.9 oz (80.2 kg)    SpO2 95%    BMI 28.54 kg/m2     Current Facility-Administered Medications   Medication Dose Route Frequency    potassium phosphate 30 mmol in 0.9% sodium chloride 250 mL infusion   IntraVENous ONCE    potassium chloride 20 mEq in 50 ml IVPB  20 mEq IntraVENous Q2H    acetaZOLAMIDE (DIAMOX) 500 mg in sterile water (preservative free) 5 mL injection  500 mg IntraVENous ONCE    bumetanide (BUMEX) injection 2 mg  2 mg IntraVENous Q8H    albumin human 25% (BUMINATE) solution 12.5 g  12.5 g IntraVENous Q8H PRN    Vancomycin trough to be drawn before dose @ 10:00 on 3/18. Thanks!   1 Each Other ONCE    pantoprazole (PROTONIX) tablet 40 mg  40 mg Oral ACB    balsam peru-castor oil (VENELEX)  mg/gram ointment   Topical BID    docusate sodium (COLACE) capsule 100 mg  100 mg Oral BID    heparin (porcine) injection 5,000 Units  5,000 Units SubCUTAneous Q8H    thiamine (B-1) tablet 100 mg  100 mg Oral DAILY    vancomycin (VANCOCIN) 1,000 mg in 0.9% sodium chloride (MBP/ADV) 250 mL  1,000 mg IntraVENous Q24H    albuterol-ipratropium (DUO-NEB) 2.5 MG-0.5 MG/3 ML  3 mL Nebulization Q6H RT    polyvinyl alcohol (LIQUIFILM TEARS) 1.4 % ophthalmic solution 2 Drop  2 Drop Both Eyes TID    sodium chloride (NS) flush 10 mL  10 mL InterCATHeter Q24H    sodium chloride (NS) flush 10 mL  10 mL InterCATHeter PRN    sodium chloride (NS) flush 10-40 mL  10-40 mL InterCATHeter Q8H    alteplase (CATHFLO) 1 mg in sterile water (preservative free) 1 mL injection  1 mg InterCATHeter PRN    bacitracin 500 unit/gram packet 1 Packet  1 Packet Topical PRN    0.9% sodium chloride infusion 250 mL  250 mL IntraVENous PRN    ondansetron (ZOFRAN) injection 4 mg  4 mg IntraVENous Q8H PRN    glucose chewable tablet 16 g  4 Tab Oral PRN    dextrose (D50W) injection syrg 12.5-25 g  12.5-25 g IntraVENous PRN    glucagon (GLUCAGEN) injection 1 mg  1 mg IntraMUSCular PRN    sodium chloride (NS) flush 5-10 mL  5-10 mL IntraVENous Q8H    sodium chloride (NS) flush 5-10 mL  5-10 mL IntraVENous PRN    vancomycin dosing per pharmacy   Other Rx Dosing/Monitoring    piperacillin-tazobactam (ZOSYN) 3.375 g in 0.9% sodium chloride (MBP/ADV) 100 mL  3.375 g IntraVENous Q8H Objective:      Physical Exam:  Visit Vitals    BP 90/60    Pulse 83    Temp 98.1 °F (36.7 °C)    Resp 26    Ht 5' 6\" (1.676 m)    Wt 176 lb 12.9 oz (80.2 kg)    SpO2 95%    BMI 28.54 kg/m2     General Appearance:  Well developed, well nourished on BIPAP mask and not alert    Ears/Nose/Mouth/Throat:   Hearing? Due to mental status change         Neck: Supple. Chest:   Lungs crackles to auscultation bilaterally. Cardiovascular:  Regular rate and rhythm, no murmur. Abdomen:   Soft   Extremities: + edema bilaterally. Skin: Warm and dry. Data Review:   Labs:    Recent Results (from the past 24 hour(s))   GLUCOSE, POC    Collection Time: 03/17/17 12:04 PM   Result Value Ref Range    Glucose (POC) 144 (H) 65 - 100 mg/dL    Performed by Martha Sumner, POC    Collection Time: 03/17/17  5:19 PM   Result Value Ref Range    Glucose (POC) 137 (H) 65 - 100 mg/dL    Performed by Beryl Hoffmann    PHOSPHORUS    Collection Time: 03/18/17  4:12 AM   Result Value Ref Range    Phosphorus 1.6 (L) 2.6 - 4.7 MG/DL   MAGNESIUM    Collection Time: 03/18/17  4:12 AM   Result Value Ref Range    Magnesium 2.1 1.6 - 2.4 mg/dL   BNP    Collection Time: 03/18/17  4:12 AM   Result Value Ref Range    BNP 2321 (H) 0 - 307 pg/mL   METABOLIC PANEL, COMPREHENSIVE    Collection Time: 03/18/17  4:12 AM   Result Value Ref Range    Sodium 145 136 - 145 mmol/L    Potassium 3.1 (L) 3.5 - 5.1 mmol/L    Chloride 106 97 - 108 mmol/L    CO2 33 (H) 21 - 32 mmol/L    Anion gap 6 5 - 15 mmol/L    Glucose 133 (H) 65 - 100 mg/dL    BUN 22 (H) 6 - 20 MG/DL    Creatinine 0.83 0.55 - 1.02 MG/DL    BUN/Creatinine ratio 27 (H) 12 - 20      GFR est AA >60 >60 ml/min/1.73m2    GFR est non-AA >60 >60 ml/min/1.73m2    Calcium 8.2 (L) 8.5 - 10.1 MG/DL    Bilirubin, total 2.5 (H) 0.2 - 1.0 MG/DL    ALT (SGPT) 65 12 - 78 U/L    AST (SGOT) 40 (H) 15 - 37 U/L    Alk.  phosphatase 45 45 - 117 U/L    Protein, total 5.6 (L) 6.4 - 8.2 g/dL    Albumin 2.3 (L) 3.5 - 5.0 g/dL    Globulin 3.3 2.0 - 4.0 g/dL    A-G Ratio 0.7 (L) 1.1 - 2.2     PROTHROMBIN TIME + INR    Collection Time: 03/18/17  4:12 AM   Result Value Ref Range    INR 1.6 (H) 0.9 - 1.1      Prothrombin time 16.4 (H) 9.0 - 11.1 sec   GLUCOSE, POC    Collection Time: 03/18/17  4:22 AM   Result Value Ref Range    Glucose (POC) 138 (H) 65 - 100 mg/dL    Performed by Horacio Connolly, TROUGH    Collection Time: 03/18/17  9:20 AM   Result Value Ref Range    Vancomycin,trough 16.0 (H) 5.0 - 10.0 ug/mL    Reported dose date: NOT PROVIDED      Reported dose time: NOT PROVIDED      Reported dose: NOT PROVIDED UNITS   CBC W/O DIFF    Collection Time: 03/18/17  9:20 AM   Result Value Ref Range    WBC 3.5 (L) 3.6 - 11.0 K/uL    RBC 3.81 3.80 - 5.20 M/uL    HGB 8.5 (L) 11.5 - 16.0 g/dL    HCT 27.3 (L) 35.0 - 47.0 %    MCV 71.7 (L) 80.0 - 99.0 FL    MCH 22.3 (L) 26.0 - 34.0 PG    MCHC 31.1 30.0 - 36.5 g/dL    RDW 17.0 (H) 11.5 - 14.5 %    PLATELET 51 (L) 415 - 400 K/uL   NUCLEATED RBC    Collection Time: 03/18/17  9:20 AM   Result Value Ref Range    NRBC 9.0 (H) 0  WBC    ABSOLUTE NRBC 0.31 (H) 0.00 - 0.01 K/uL    WBC CORRECTED FOR NR ADJUSTED FOR NUCLEATED RBC'S     GLUCOSE, POC    Collection Time: 03/18/17 11:47 AM   Result Value Ref Range    Glucose (POC) 173 (H) 65 - 100 mg/dL    Performed by Rubio ROSE        Telemetry: PVC biv pacing      Assessment:        Plan:     1 Respiratory failure s/p extubation and is on BIPAP  Poor mental status  Oxygen 100% sat    2 Chronic systolic CHF, systolic:   TTE 3/9 EF 61%. AICD/BiV pacer in place  -Bumex 2 mg IV q 12 hr. Diamox is added today   -Beta blocker/ACEI on hold due to hypotension     3. Possible vegetation on aortic valve.  (noted on AMMON 3/9) -Negative blood cultures 3/8 and 3/11.  - AMMON on Monday (12 Noon) with Dr Alpa Grace if respiratory status is stable but it does not appear to be so today   Heather Monaco is seeing her in ICU

## 2017-03-19 NOTE — PROGRESS NOTES
2000 Bedside and Verbal shift change report given to YORDY Zarate RN (oncoming nurse) by Ankita Ramirez RN (offgoing nurse). Report included the following information SBAR, Kardex, Procedure Summary, Intake/Output, MAR and Accordion. Problem: Heart Failure: Day 5  Goal: Activity/Safety  Variance: Patient Condition  Comments: Working with PT/OT to get OOB to chair. Goal: Nutrition/Diet  Outcome: Progressing Towards Goal  PO intake improving    Problem: General Medical Care Plan  Goal: *Labs within defined limits  Outcome: Progressing Towards Goal  Variance: Patient Condition  Platelets low but improving   Goal: *Optimize nutritional status  Outcome: Progressing Towards Goal  Scheduled Bumex    0800 Bedside and Verbal shift change report given to Ankita Ramirez RN (oncoming nurse) by Juan Melvin RN (offgoing nurse). Report included the following information SBAR, Kardex, ED Summary, Procedure Summary, Intake/Output, MAR, Accordion and Recent Results.

## 2017-03-19 NOTE — PROGRESS NOTES
Hospitalist Progress Note  Linn Severs, MD  Office: [phone]        Date of Service:  3/19/2017  NAME:  Saqib Mohr  :  1930  MRN:  [id number]      Admission Summary:   71-year-old female with history of chronic systolic congestive heart failure, atrial fibrillation, diabetes mellitus, hypertension, chronic anticoagulation use, history of pacemaker and ICD. She was sent from her primary care physician to the emergency room to be evaluated. History from the patient and her daughters at the bedside, also from the records. Per the daughter, she has been having on and off abdominal pain, epigastric pain for a while, over the last 3 days has been mostly pronounced. She said her pain at times is 9/10 in severity, it is nonradiating, associated with some nausea, but no vomiting. She denies any diarrhea. At her PCP's office,   they could not get her temperature, so they sent her to the emergency room. In the ER she has been hypothermic, her temperature was 91.4 on initial presentation. Also she was hypotensive. Her blood pressure   systolic was 95/36, so she was given about 1.5 liters of IV fluid, and now she is getting 1 bottle of albumin. She was placed on warming blanket, Lizzy Hugger. She denies any dysuria or frequency. Interval history / Subjective:   Pt seen and examined  Awakes but drowsy this AM  Denies any pain n/v/     Assessment & Plan:     -  Hypothermia and hypotension. - resolved  On empiric IV antibiotics, on Zosyn and vancomycin. Followup on blood culture results NGSF.    Off and on Lizzy Hugger for her hypothermia.     -Acute Respiratory failure with Hypercapnia due to Pleural effusion/atelectasis and acute on chronic Systolic CHF NYHA IV  BIPAP PRN  PCCM input aprpeciated  Diuresis , on NC now  ABG now to evaluate for hypercapnia, if present, place back on BIPAP    -  Pleural effusions, bilateral: likely related to CHF  improving on CXR,   continue antibiotics,CHF volume overload. C/w diuresis     - Sepsis:  Unknown source, most likely lung with bilateral pleural effusions, possibly infectious. She had caren of leukocytes to 1.9, improving. She was afebrile overnight, low BP requiring pressors. Weaned off pressors  Continue zosyn, vanco.   Monitor cultures, preliminary blood, urine cultures no growth so far. - Acute on Chronic Systolic congestive heart failure NYHA III/IV. With fluid overload. Echo 3/9 with 30% EF, diffuse hypokinesis. Bumex added, strict I/O, monitor weight. History of pacer. Valvular disease: Moderate mitral regurgitation, right ventricular systolic dysfunction,   as well as enlarged left atrium. Valvular vegetation on aortic valve, questionable: Blood cultures no growth preliminary. AMMON tentatively on Monday if respiratory status permits  - Diuresis per nephrology     - Hematuria: may be related to traumatic galarza placement with INR max 5.5,  Urine noted on UA as yellow on 3/8,   Galarza placed 3/9 and hematuria reported 3/10 UA. -  JUAN:  From  ATN and contrast nephropathy/Hypotension most likely etiology. Ischemic heart disease, hypotension; and exposure to IV contrast.   Appreciate nephro consult recommendations. Bumex, avoid nephrotoxins, I/Os, -resolved    - Hypoglycemia/diabetes mellitus. Resolved. Holding her Lantus and on hypoglycemia protocol. Her blood sugar improved after D50. Stable, off D10 now  resolved    -   Ischemic hepatitis:. Consulted GI. Plans for EGD when stable or outpatient. Continue proton pump inhibitor for now. US with distended gallbladder with stone and sludge. Appreciate gen surg eval   if abdominal pain worsens, get HIDA prior to godwin tube placement    Coagulopathy.  - Holding her Coumadin and monitor daily INR and restart her Coumadin when INR improved. INR increased, she received Vitamin K x 1.  With hematuria will give FFP and monitor INR. Pancytopenia. -Appears to be chronic. However, patient and family unaware of her previous lab results. Will monitor her labs closely. Code status: Full    Care Plan discussed with: Patient/Family and Nurse  Disposition: TBD     Hospital Problems  Date Reviewed: 3/8/2017          Codes Class Noted POA    * (Principal)Hypothermia ICD-10-CM: T68. XXXA  ICD-9-CM: 991.6  3/8/2017 Yes                Review of Systems:   A comprehensive review of systems was negative. Vital Signs:    Last 24hrs VS reviewed since prior progress note. Most recent are:  Visit Vitals    BP 98/58    Pulse 76    Temp 98 °F (36.7 °C)    Resp 24    Ht 5' 6\" (1.676 m)    Wt 80.1 kg (176 lb 9.4 oz)    SpO2 99%    BMI 28.5 kg/m2         Intake/Output Summary (Last 24 hours) at 03/19/17 1131  Last data filed at 03/19/17 0900   Gross per 24 hour   Intake             1300 ml   Output             2450 ml   Net            -1150 ml        Physical Examination:             Constitutional:   Awake, on NC, drowsy this AM   ENT:   Neck supple,    Resp:  good air entry    CV:  irregular    GI:  Soft, non-tender, normoactive bowel sounds, no hepatosplenomegaly     Musculoskeletal:  2+ edema    Neurologic:   awake, moves all 4 extremities             Data Review:    Review and/or order of clinical lab test   US abd:  IMPRESSION:  1. Distended gallbladder with sludge and a stone with moderate wall thickening of the gallbladder wall. 2. Dilated IVC suggesting right heart failure. 3. Small amount of free fluid surrounding the liver.   4. Small right pleural effusion      Labs:     Recent Labs      03/19/17   0416  03/18/17   0920   WBC  4.1  3.5*   HGB  8.2*  8.5*   HCT  26.2*  27.3*   PLT  61*  51*     Recent Labs      03/19/17   0416  03/18/17   0412  03/17/17   0342   NA  143  145  145   K  3.0*  3.1*  3.4*   CL  103  106  106   CO2  33*  33*  31   BUN  16  22*  28*   CREA  0.77  0.83  1.05*   GLU  124*  133*  164*   CA 8.4*  8.2*  8.7   MG  2.0  2.1  2.2   PHOS  2.0*  1.6*  1.9*     Recent Labs      03/18/17   0412  03/17/17 0342   SGOT  40*  69*   ALT  65  92*   AP  45  52   TBILI  2.5*  3.0*   TP  5.6*  6.1*   ALB  2.3*  2.7*   GLOB  3.3  3.4   LPSE   --   1109*     Recent Labs      03/19/17   0416  03/18/17 0412  03/17/17 0342   INR  1.5*  1.6*  1.8*   PTP  14.8*  16.4*  18.0*      No results for input(s): FE, TIBC, PSAT, FERR in the last 72 hours. Lab Results   Component Value Date/Time    Folate 14.5 03/09/2017 03:23 AM      No results for input(s): PH, PCO2, PO2 in the last 72 hours. No results for input(s): CPK, CKNDX, TROIQ in the last 72 hours.     No lab exists for component: CPKMB  Lab Results   Component Value Date/Time    Cholesterol, total 138 09/28/2011 09:13 AM    HDL Cholesterol 50 09/28/2011 09:13 AM    LDL, calculated 74 09/28/2011 09:13 AM    Triglyceride 69 09/28/2011 09:13 AM     Lab Results   Component Value Date/Time    Glucose (POC) 139 03/18/2017 10:24 PM    Glucose (POC) 133 03/18/2017 05:15 PM    Glucose (POC) 173 03/18/2017 11:47 AM    Glucose (POC) 138 03/18/2017 04:22 AM    Glucose (POC) 137 03/17/2017 05:19 PM     Lab Results   Component Value Date/Time    Color RED 03/10/2017 02:30 AM    Appearance BLOODY 03/10/2017 02:30 AM    Specific gravity 1.020 03/10/2017 02:30 AM    Specific gravity 1.016 03/08/2017 08:54 PM    pH (UA) 6.0 03/10/2017 02:30 AM    Protein 100 03/10/2017 02:30 AM    Glucose NEGATIVE  03/10/2017 02:30 AM    Ketone NEGATIVE  03/10/2017 02:30 AM    Bilirubin NEGATIVE  03/10/2017 02:30 AM    Urobilinogen 1.0 03/10/2017 02:30 AM    Nitrites NEGATIVE  03/10/2017 02:30 AM    Leukocyte Esterase TRACE 03/10/2017 02:30 AM    Epithelial cells FEW 03/10/2017 02:30 AM    Bacteria NEGATIVE  03/10/2017 02:30 AM    WBC 20-50 03/10/2017 02:30 AM    RBC >100 03/10/2017 02:30 AM         Medications Reviewed:     Current Facility-Administered Medications   Medication Dose Route Frequency  potassium chloride SR (KLOR-CON 10) tablet 40 mEq  40 mEq Oral BID    phosphorus (K PHOS NEUTRAL) 250 mg tablet 1 Tab  1 Tab Oral BID    bumetanide (BUMEX) injection 2 mg  2 mg IntraVENous Q8H    albumin human 25% (BUMINATE) solution 12.5 g  12.5 g IntraVENous Q8H PRN    pantoprazole (PROTONIX) tablet 40 mg  40 mg Oral ACB    balsam peru-castor oil (VENELEX)  mg/gram ointment   Topical BID    docusate sodium (COLACE) capsule 100 mg  100 mg Oral BID    heparin (porcine) injection 5,000 Units  5,000 Units SubCUTAneous Q8H    thiamine (B-1) tablet 100 mg  100 mg Oral DAILY    vancomycin (VANCOCIN) 1,000 mg in 0.9% sodium chloride (MBP/ADV) 250 mL  1,000 mg IntraVENous Q24H    albuterol-ipratropium (DUO-NEB) 2.5 MG-0.5 MG/3 ML  3 mL Nebulization Q6H RT    polyvinyl alcohol (LIQUIFILM TEARS) 1.4 % ophthalmic solution 2 Drop  2 Drop Both Eyes TID    sodium chloride (NS) flush 10 mL  10 mL InterCATHeter Q24H    sodium chloride (NS) flush 10 mL  10 mL InterCATHeter PRN    sodium chloride (NS) flush 10-40 mL  10-40 mL InterCATHeter Q8H    alteplase (CATHFLO) 1 mg in sterile water (preservative free) 1 mL injection  1 mg InterCATHeter PRN    bacitracin 500 unit/gram packet 1 Packet  1 Packet Topical PRN    0.9% sodium chloride infusion 250 mL  250 mL IntraVENous PRN    ondansetron (ZOFRAN) injection 4 mg  4 mg IntraVENous Q8H PRN    glucose chewable tablet 16 g  4 Tab Oral PRN    dextrose (D50W) injection syrg 12.5-25 g  12.5-25 g IntraVENous PRN    glucagon (GLUCAGEN) injection 1 mg  1 mg IntraMUSCular PRN    sodium chloride (NS) flush 5-10 mL  5-10 mL IntraVENous Q8H    sodium chloride (NS) flush 5-10 mL  5-10 mL IntraVENous PRN    vancomycin dosing per pharmacy   Other Rx Dosing/Monitoring    piperacillin-tazobactam (ZOSYN) 3.375 g in 0.9% sodium chloride (MBP/ADV) 100 mL  3.375 g IntraVENous Q8H     ______________________________________________________________________  EXPECTED LENGTH OF STAY: 4d 21h  ACTUAL LENGTH OF STAY:          Ani Torrez MD

## 2017-03-19 NOTE — PROGRESS NOTES
Cardiology Progress Note         3/19/2017 11:59 AM    Admit Date: 3/8/2017    Admit Diagnosis: Hypothermia      Subjective:     Chelsea Vazquez off BIPAP and is alert this morning. She is tired but no chest pain    Past Medical History:   Diagnosis Date    Atrial fibrillation (HCC)     Chronic systolic heart failure (HCC)     ef 35-40%; hold ACE due to lower bp    Diabetes mellitus type 2, insulin dependent ([address]     Hypertension     Long term (current) use of anticoagulants     Malignant hypertensive heart disease with CHF [address] [facility]     Mitral regurgitation     Pacemaker 10/25/2012    The pacemaker is a [employer], model # [id number], serial [id number]. 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, 1996    # 28 [place]-Manuel    Thyroid disease     TR (tricuspid regurgitation)     moderate to severe echo 2009     Past Surgical History:   Procedure Laterality Date    CARDIAC SURG PROCEDURE UNLIST  1996    valve replacement    HX HEART CATHETERIZATION  10/4/2012    normal cors, LVEF 50%    HX HEART VALVE SURGERY      HX PACEMAKER      INS PPM/ICD LED DUAL ONLY  10/25/2012         INS PPM/ICD LED DUAL ONLY  9/9/2016          Social History     Social History    Marital status:      Spouse name: N/A    Number of children: N/A    Years of education: N/A     Occupational History    Not on file.      Social History Main Topics    Smoking status: Never Smoker    Smokeless tobacco: Never Used    Alcohol use No    Drug use: No    Sexual activity: Not on file     Other Topics Concern    Not on file     Social History Narrative       Visit Vitals    /63 (BP 1 Location: Left arm, BP Patient Position: At rest)    Pulse 92    Temp 97.5 °F (36.4 °C)    Resp 19    Ht 5' 6\" (1.676 m)    Wt 176 lb 9.4 oz (80.1 kg)    SpO2 98%    BMI 28.5 kg/m2     Current Facility-Administered Medications   Medication Dose Route Frequency    potassium chloride SR (KLOR-CON 10) tablet 40 mEq  40 mEq Oral BID  bumetanide (BUMEX) injection 2 mg  2 mg IntraVENous Q8H    albumin human 25% (BUMINATE) solution 12.5 g  12.5 g IntraVENous Q8H PRN    pantoprazole (PROTONIX) tablet 40 mg  40 mg Oral ACB    balsam peru-castor oil (VENELEX)  mg/gram ointment   Topical BID    docusate sodium (COLACE) capsule 100 mg  100 mg Oral BID    heparin (porcine) injection 5,000 Units  5,000 Units SubCUTAneous Q8H    thiamine (B-1) tablet 100 mg  100 mg Oral DAILY    vancomycin (VANCOCIN) 1,000 mg in 0.9% sodium chloride (MBP/ADV) 250 mL  1,000 mg IntraVENous Q24H    albuterol-ipratropium (DUO-NEB) 2.5 MG-0.5 MG/3 ML  3 mL Nebulization Q6H RT    polyvinyl alcohol (LIQUIFILM TEARS) 1.4 % ophthalmic solution 2 Drop  2 Drop Both Eyes TID    sodium chloride (NS) flush 10 mL  10 mL InterCATHeter Q24H    sodium chloride (NS) flush 10 mL  10 mL InterCATHeter PRN    sodium chloride (NS) flush 10-40 mL  10-40 mL InterCATHeter Q8H    alteplase (CATHFLO) 1 mg in sterile water (preservative free) 1 mL injection  1 mg InterCATHeter PRN    bacitracin 500 unit/gram packet 1 Packet  1 Packet Topical PRN    0.9% sodium chloride infusion 250 mL  250 mL IntraVENous PRN    ondansetron (ZOFRAN) injection 4 mg  4 mg IntraVENous Q8H PRN    glucose chewable tablet 16 g  4 Tab Oral PRN    dextrose (D50W) injection syrg 12.5-25 g  12.5-25 g IntraVENous PRN    glucagon (GLUCAGEN) injection 1 mg  1 mg IntraMUSCular PRN    sodium chloride (NS) flush 5-10 mL  5-10 mL IntraVENous Q8H    sodium chloride (NS) flush 5-10 mL  5-10 mL IntraVENous PRN    vancomycin dosing per pharmacy   Other Rx Dosing/Monitoring    piperacillin-tazobactam (ZOSYN) 3.375 g in 0.9% sodium chloride (MBP/ADV) 100 mL  3.375 g IntraVENous Q8H         Objective:      Physical Exam:  Visit Vitals    /63 (BP 1 Location: Left arm, BP Patient Position: At rest)    Pulse 92    Temp 97.5 °F (36.4 °C)    Resp 19    Ht 5' 6\" (1.676 m)    Wt 176 lb 9.4 oz (80.1 kg)    SpO2 98%    BMI 28.5 kg/m2     General Appearance:  Well developed, well nourished off BIPAP mask and  alert    Ears/Nose/Mouth/Throat:   Normal hearing         Neck: Supple. Chest:   Lungs crackles to auscultation bilaterally. Cardiovascular:  Regular rate and rhythm, no murmur. Abdomen:   Soft   Extremities: + 3 edema bilaterally. Skin: Warm and dry.                Data Review:   Labs:    Recent Results (from the past 24 hour(s))   VANCOMYCIN, TROUGH    Collection Time: 03/18/17  9:20 AM   Result Value Ref Range    Vancomycin,trough 16.0 (H) 5.0 - 10.0 ug/mL    Reported dose date: NOT PROVIDED      Reported dose time: NOT PROVIDED      Reported dose: NOT PROVIDED UNITS   CBC W/O DIFF    Collection Time: 03/18/17  9:20 AM   Result Value Ref Range    WBC 3.5 (L) 3.6 - 11.0 K/uL    RBC 3.81 3.80 - 5.20 M/uL    HGB 8.5 (L) 11.5 - 16.0 g/dL    HCT 27.3 (L) 35.0 - 47.0 %    MCV 71.7 (L) 80.0 - 99.0 FL    MCH 22.3 (L) 26.0 - 34.0 PG    MCHC 31.1 30.0 - 36.5 g/dL    RDW 17.0 (H) 11.5 - 14.5 %    PLATELET 51 (L) 905 - 400 K/uL   NUCLEATED RBC    Collection Time: 03/18/17  9:20 AM   Result Value Ref Range    NRBC 9.0 (H) 0  WBC    ABSOLUTE NRBC 0.31 (H) 0.00 - 0.01 K/uL    WBC CORRECTED FOR NR ADJUSTED FOR NUCLEATED RBC'S     GLUCOSE, POC    Collection Time: 03/18/17 11:47 AM   Result Value Ref Range    Glucose (POC) 173 (H) 65 - 100 mg/dL    Performed by Brandon Ward 27, POC    Collection Time: 03/18/17  5:15 PM   Result Value Ref Range    Glucose (POC) 133 (H) 65 - 100 mg/dL    Performed by Alex ROSE    GLUCOSE, POC    Collection Time: 03/18/17 10:24 PM   Result Value Ref Range    Glucose (POC) 139 (H) 65 - 100 mg/dL    Performed by Divina Luciano    CBC W/O DIFF    Collection Time: 03/19/17  4:16 AM   Result Value Ref Range    WBC 4.1 3.6 - 11.0 K/uL    RBC 3.66 (L) 3.80 - 5.20 M/uL    HGB 8.2 (L) 11.5 - 16.0 g/dL    HCT 26.2 (L) 35.0 - 47.0 %    MCV 71.6 (L) 80.0 - 99.0 FL    MCH 22.4 (L) 26.0 - 34.0 PG    MCHC 31.3 30.0 - 36.5 g/dL    RDW 17.1 (H) 11.5 - 14.5 %    PLATELET 61 (L) 630 - 958 K/uL   METABOLIC PANEL, BASIC    Collection Time: 03/19/17  4:16 AM   Result Value Ref Range    Sodium 143 136 - 145 mmol/L    Potassium 3.0 (L) 3.5 - 5.1 mmol/L    Chloride 103 97 - 108 mmol/L    CO2 33 (H) 21 - 32 mmol/L    Anion gap 7 5 - 15 mmol/L    Glucose 124 (H) 65 - 100 mg/dL    BUN 16 6 - 20 MG/DL    Creatinine 0.77 0.55 - 1.02 MG/DL    BUN/Creatinine ratio 21 (H) 12 - 20      GFR est AA >60 >60 ml/min/1.73m2    GFR est non-AA >60 >60 ml/min/1.73m2    Calcium 8.4 (L) 8.5 - 10.1 MG/DL   MAGNESIUM    Collection Time: 03/19/17  4:16 AM   Result Value Ref Range    Magnesium 2.0 1.6 - 2.4 mg/dL   PHOSPHORUS    Collection Time: 03/19/17  4:16 AM   Result Value Ref Range    Phosphorus 2.0 (L) 2.6 - 4.7 MG/DL   PROTHROMBIN TIME + INR    Collection Time: 03/19/17  4:16 AM   Result Value Ref Range    INR 1.5 (H) 0.9 - 1.1      Prothrombin time 14.8 (H) 9.0 - 11.1 sec   NUCLEATED RBC    Collection Time: 03/19/17  4:16 AM   Result Value Ref Range    NRBC 6.5 (H) 0  WBC    ABSOLUTE NRBC 0.26 (H) 0.00 - 0.01 K/uL    WBC CORRECTED FOR NR ADJUSTED FOR NUCLEATED RBC'S         Telemetry: PVC biv pacing       Assessment:        Plan:     1 Respiratory failure s/p extubation and is improving  Better mental status  Oxygen 100% sat    2 Chronic systolic CHF, systolic:   TTE 3/9 EF 52%. AICD/BiV pacer in place  -Bumex 2 mg IV q 12 hr. Diamox was added  Replace K today  -Beta blocker/ACEI on hold due to hypotension      3. Possible vegetation on aortic valve.  (noted on AMMON 3/9) -Negative blood cultures 3/8 and 3/11.  - AMMON on Monday (12 Noon) with Dr Jaquan Dubose if respiratory status is stable

## 2017-03-19 NOTE — PROGRESS NOTES
0730: Bedside shift change report given to Männemily Jaiden 69 (oncoming nurse) by Tia Mejia RN (offgoing nurse). Report included the following information SBAR, Kardex, ED Summary, Procedure Summary, Intake/Output, MAR, Accordion, Recent Results and Cardiac Rhythm paced. 1357: Dr. Rosi Lyon notified o R on T PVCs and 5 beat run of Vtach. No new orders at this time. 1930: Bedside shift change report given to Duy Carlisle RN (oncoming nurse) by [facility] Jaiden 69 (offgoing nurse). Report included the following information SBAR, Kardex, ED Summary, Procedure Summary, Intake/Output, MAR, Accordion, Recent Results and Cardiac Rhythm paced.

## 2017-03-20 NOTE — ROUTINE PROCESS
Bedside and Verbal shift change report given to K. Dance, RN (oncoming nurse) by ESTEFANI Eli RN (offgoing nurse). Report included the following information SBAR, Kardex, Intake/Output, MAR and Recent Results.

## 2017-03-20 NOTE — PROGRESS NOTES
Problem: Heart Failure: Day 5  Goal: Activity/Safety  Outcome: Not Progressing Towards Goal  Variance: Patient slowly responding

## 2017-03-20 NOTE — WOUND CARE
WOCN Note:     Follow-up visit for cheeks. Chart shows:  Admitted for hypothermia & respiratory failure, sepsis; history of CHF, DM, a-fib, pacer     Assessment:   Patient is on a full-face BIPAP and non-communicative but opens eyes. Bed: total care  Patient reports no pain.      1. Left cheek where it meets the nose pressure ulcer/injury, stage 2 = 1 x 0.5 x 0 cm 100% drying pink/red. No exudate. Periwound intact & without erythema. Venelex in use.      Right cheek where it meets the nose with linear jac = 2 x 0.5 x 0 cm (smaller)  100% dark non-blanching. Fully intact. Venelex in use.      Recommendations:    Continue Venelex to face. Skin Care & Pressure Prevention:  Minimize layers of linen/pads under patient to optimize support surface.     Turn/reposition approximately every 2 hours and offload heels    Transition of Care: Plan to follow as needed while admitted to hospital.    HERBIE OnealN, RN, [facility]  Certified Wound, Ostomy, Continence Nurse  office [phone]  pager [phone] or call  to page

## 2017-03-20 NOTE — PROGRESS NOTES
[address]- SPOKE WITH NOEMI MIN, PLACED PATIENT ON CPAP DUE TO FATIGUE   1220 RETURNED TO CATHLAB   1223 SPOKE TO BETY MIN AT  [facility] OFFICE   1230 ACCESSED PATIENT AND CALLED DR Torres Gravely  1235 DR SHIPLEY STATED HE WOULD ARRIVE IN 15 MINUTES TO ACCESS PATIENT   1255 DR SHIPLEY ARRIVED TO THE BEDSIDE AND ACCESSED PATIENT   1255 MEDICAL SERVICES CALLED  1300 MEDICAL SERVICES ARRIVED   1308 DR SHIPLEY ARRIVED BACK AT BEDSIDE  1309 PATIENT REMOVED FROM CPAP AND PLACED ON 4L OF OXYGEN   1310 TIMEOUT/ PROCEDURE BEGAN   1311 PT THROAT SPAYED   1312 0.5MG VERSED ADMINISTERED   1312 PT THROAT SPRAYED   1313 MOUTHGUARD PLACED ON PATIENT'S MOUTH   1316 AMMON PROBE INSERTED  1317 0.5MG VERSED ADMINISTERED   1322 0.5MG VERSED ADMINISTERED   1327 BUBBLES INJECTED   1331 AMMON PROBE REMOVED   1332 CASE ENDED

## 2017-03-20 NOTE — ROUTINE PROCESS
1945:  Bedside and Verbal shift change report given to Leydi Sousa RN (oncoming nurse) by Keshia Pop RN  (offgoing nurse). Report included the following information SBAR, Kardex, ED Summary, Procedure Summary, Intake/Output, MAR, Accordion, Recent Results, Med Rec Status and Cardiac Rhythm paced with frequent PVC's.

## 2017-03-20 NOTE — PROGRESS NOTES
Follow up visit attempted twice today with Ms. Marcelino Pereira and/or her family. Pt appeared to be resting on both attempts to visit with no family present. Will continue to follow as able.     Misti Hoover, Palliative

## 2017-03-20 NOTE — PROCEDURES
Prelim of AMMON shows mild to moderate MR, TR PASP 40  No AS, mild AR  No vegetation  EF moderate reduced similar to TTE report  Pt tolerated procedure well, see final AMMON report

## 2017-03-20 NOTE — PROGRESS NOTES
Speech Path    Patient currently NPO for AMMON at noon today. Will follow up this afternoon. Tiffani Bañuelos MS, CCC-SLP, BCS-S

## 2017-03-20 NOTE — PROGRESS NOTES
TRANSFER - OUT REPORT:    Verbal report given to MECHELLE Gracia(name) on Javon  being transferred to [facility](unit) for routine progression of care       Report consisted of patients Situation, Background, Assessment and   Recommendations(SBAR). Information from the following report(s) SBAR, Kardex, Procedure Summary, Intake/Output, MAR and Recent Results was reviewed with the receiving nurse. Lines:   PICC Triple Lumen 74/92/48 Right;Basilic (Active)   Central Line Being Utilized Yes 3/20/2017  4:00 PM   Criteria for Appropriate Use Limited/no vessel suitable for conventional peripheral access 3/20/2017  4:00 PM   Site Assessment Clean, dry, & intact 3/20/2017  4:00 PM   Phlebitis Assessment 0 3/20/2017  4:00 PM   Infiltration Assessment 0 3/20/2017  4:00 PM   Date of Last Dressing Change 03/15/17 3/20/2017  4:00 PM   Dressing Status Clean, dry, & intact 3/20/2017  4:00 PM   External Catheter Length (cm) 0 centimeters 3/15/2017  4:04 PM   Dressing Type Disk with Chlorhexadine gluconate (CHG); Transparent 3/20/2017  4:00 PM   Action Taken Open ports on tubing capped 3/20/2017  4:00 PM   Hub Color/Line Status White; Infusing 3/20/2017  4:00 PM   Positive Blood Return (Site #1) Yes 3/20/2017  4:00 PM   Hub Color/Line Status Gray;Capped 3/20/2017  4:00 PM   Positive Blood Return (Site #2) Yes 3/20/2017  4:00 PM   Hub Color/Line Status Red;Capped 3/20/2017  4:00 PM   Positive Blood Return (Site #3) Yes 3/20/2017  4:00 PM   Alcohol Cap Used Yes 3/20/2017  4:00 PM        Opportunity for questions and clarification was provided.       Patient transported with:   Monitor  O2 @ 2 liters  Registered Nurse  [facility]

## 2017-03-20 NOTE — PROGRESS NOTES
Hospitalist Progress Note  Jacob Currie MD  Office: [phone]  Cell: [phone]        Date of Service:  3/20/2017  NAME:  Janel Cash  :  1930  MRN:  [id number]      Admission Summary:   60-year-old female with history of chronic systolic congestive heart failure, atrial fibrillation, diabetes mellitus, hypertension, chronic anticoagulation use, history of pacemaker and ICD. She was sent from her primary care physician to the emergency room to be evaluated. History from the patient and her daughters at the bedside, also from the records. Per the daughter, she has been having on and off abdominal pain, epigastric pain for a while, over the last 3 days has been mostly pronounced. She said her pain at times is 9/10 in severity, it is nonradiating, associated with some nausea, but no vomiting. She denies any diarrhea. At her PCP's office,   they could not get her temperature, so they sent her to the emergency room. In the ER she has been hypothermic, her temperature was 91.4 on initial presentation. Also she was hypotensive. Her blood pressure   systolic was 26/81, so she was given about 1.5 liters of IV fluid, and now she is getting 1 bottle of albumin. She was placed on warming blanket, Lizzy Hugger. She denies any dysuria or frequency. Interval history / Subjective:   Pt seen and examined  Complains of feeling hungry. She is NPO for AMMON today at noon. Assessment & Plan:     Hypothermia and hypotension. - resolved  On empiric IV antibiotics, on Zosyn and vancomycin. Blood culture: No growth x 5 days; Off and on Lizzy Hugger for her hypothermia.      Acute Respiratory failure with Hypercapnia due to Pleural effusion/atelectasis and acute on chronic Systolic CHF NYHA IV  BIPAP PRN  PCCM input aprpeciated  Diuresis, on NC now  May need intermittent BIPAP for hypercapnia;     Bilateral Pleural effusions: likely related to CHF  improving on CXR;   continue antibiotics, CHF volume overload. C/w diuresis     Sepsis:  Unknown source, most likely lung with bilateral pleural effusions, possibly infectious. She had caren of leukocytes to 1.9, improving. She was afebrile overnight, low BP requiring pressors. Weaned off pressors  Continue zosyn, vanco.   Monitor cultures, preliminary blood, urine cultures no growth so far. - Acute on Chronic Systolic congestive heart failure NYHA III/IV. With fluid overload. Echo 3/9 with 30% EF, diffuse hypokinesis. Bumex added, strict I/O, monitor weight. History of pacer. Valvular disease: Moderate mitral regurgitation, right ventricular systolic dysfunction,   as well as enlarged left atrium. Valvular vegetation on aortic valve, questionable: Blood cultures no growth preliminary. AMMON tentatively on Monday if respiratory status permits  - Diuresis per nephrology   - 3/20: AMMON scheduled for noon today. Hematuria: may be related to traumatic galarza placement with INR max 5.5,  Urine noted on UA as yellow on 3/8,   Galarza placed 3/9 and hematuria reported 3/10 UA. JUAN:  From  ATN and contrast nephropathy/Hypotension most likely etiology. Ischemic heart disease, hypotension; and exposure to IV contrast.   Appreciate nephro consult recommendations. Bumex, avoid nephrotoxins, I/Os, -resolved    Hypoglycemia/diabetes mellitus. Resolved. Holding her Lantus and on hypoglycemia protocol. Her blood sugar improved after D50. Stable, off D10 now  resolved    -   Ischemic hepatitis:. Consulted GI. Plans for EGD when stable or outpatient. Continue proton pump inhibitor for now. US with distended gallbladder with stone and sludge. Appreciate gen surg eval   if abdominal pain worsens, get HIDA prior to godwin tube placement    Coagulopathy.  - Holding her Coumadin and monitor daily INR and restart her Coumadin when INR improved. INR increased, she received Vitamin K x 1.  With hematuria will give FFP and monitor INR. Pancytopenia. -Appears to be chronic. However, patient and family unaware of her previous lab results. Will monitor her labs closely. Code status: Full    Care Plan discussed with: Patient/Family and Nurse  Disposition: TBD     Hospital Problems  Date Reviewed: 3/8/2017          Codes Class Noted POA    * (Principal)Hypothermia ICD-10-CM: T68. XXXA  ICD-9-CM: 991.6  3/8/2017 Yes                Review of Systems:   A comprehensive review of systems was negative. Vital Signs:    Last 24hrs VS reviewed since prior progress note. Most recent are:  Visit Vitals    /79    Pulse 80    Temp 97.5 °F (36.4 °C)    Resp 26    Ht 5' 6\" (1.676 m)    Wt 80.6 kg (177 lb 11.1 oz)    SpO2 97%    BMI 28.68 kg/m2         Intake/Output Summary (Last 24 hours) at 03/20/17 1502  Last data filed at 03/20/17 1400   Gross per 24 hour   Intake              870 ml   Output             2355 ml   Net            -1485 ml        Physical Examination:             Constitutional:   Awake, on NC, drowsy this AM   ENT:   Neck supple,    Resp:  good air entry    CV:  irregular    GI:  Soft, non-tender, normoactive bowel sounds, no hepatosplenomegaly     Musculoskeletal:  2+ edema    Neurologic:   awake, moves all 4 extremities             Data Review:    Review and/or order of clinical lab test   US abd:  IMPRESSION:  1. Distended gallbladder with sludge and a stone with moderate wall thickening of the gallbladder wall. 2. Dilated IVC suggesting right heart failure. 3. Small amount of free fluid surrounding the liver.   4. Small right pleural effusion      Labs:     Recent Labs      03/20/17   0402  03/19/17   0416   WBC  4.1  4.1   HGB  8.4*  8.2*   HCT  26.4*  26.2*   PLT  76*  61*     Recent Labs      03/20/17   0909  03/19/17   0416  03/18/17   0412   NA  141  143  145   K  3.5  3.0*  3.1*   CL  102  103  106   CO2  34*  33*  33*   BUN  16  16  22*   CREA  0.79  0.77  0.83 GLU  109*  124*  133*   CA  8.7  8.4*  8.2*   MG   --   2.0  2.1   PHOS   --   2.0*  1.6*     Recent Labs      03/18/17   0412   SGOT  40*   ALT  65   AP  45   TBILI  2.5*   TP  5.6*   ALB  2.3*   GLOB  3.3     Recent Labs      03/20/17   0402  03/19/17   0416  03/18/17   0412   INR  1.4*  1.5*  1.6*   PTP  14.4*  14.8*  16.4*      No results for input(s): FE, TIBC, PSAT, FERR in the last 72 hours. Lab Results   Component Value Date/Time    Folate 14.5 03/09/2017 03:23 AM      No results for input(s): PH, PCO2, PO2 in the last 72 hours. No results for input(s): CPK, CKNDX, TROIQ in the last 72 hours.     No lab exists for component: CPKMB  Lab Results   Component Value Date/Time    Cholesterol, total 138 09/28/2011 09:13 AM    HDL Cholesterol 50 09/28/2011 09:13 AM    LDL, calculated 74 09/28/2011 09:13 AM    Triglyceride 69 09/28/2011 09:13 AM     Lab Results   Component Value Date/Time    Glucose (POC) 102 03/20/2017 11:57 AM    Glucose (POC) 139 03/18/2017 10:24 PM    Glucose (POC) 133 03/18/2017 05:15 PM    Glucose (POC) 173 03/18/2017 11:47 AM    Glucose (POC) 138 03/18/2017 04:22 AM     Lab Results   Component Value Date/Time    Color RED 03/10/2017 02:30 AM    Appearance BLOODY 03/10/2017 02:30 AM    Specific gravity 1.020 03/10/2017 02:30 AM    Specific gravity 1.016 03/08/2017 08:54 PM    pH (UA) 6.0 03/10/2017 02:30 AM    Protein 100 03/10/2017 02:30 AM    Glucose NEGATIVE  03/10/2017 02:30 AM    Ketone NEGATIVE  03/10/2017 02:30 AM    Bilirubin NEGATIVE  03/10/2017 02:30 AM    Urobilinogen 1.0 03/10/2017 02:30 AM    Nitrites NEGATIVE  03/10/2017 02:30 AM    Leukocyte Esterase TRACE 03/10/2017 02:30 AM    Epithelial cells FEW 03/10/2017 02:30 AM    Bacteria NEGATIVE  03/10/2017 02:30 AM    WBC 20-50 03/10/2017 02:30 AM    RBC >100 03/10/2017 02:30 AM         Medications Reviewed:     Current Facility-Administered Medications   Medication Dose Route Frequency    atropine injection 0.5-1 mg  0.5-1 mg IntraVENous Multiple    fentaNYL citrate (PF) injection  mcg   mcg IntraVENous Multiple    midazolam (VERSED) injection 1-10 mg  1-10 mg IntraVENous Multiple    sodium chloride (NS) flush 5-10 mL  5-10 mL IntraVENous Multiple    atropine 0.1 mg/mL injection        flumazenil (ROMAZICON) 0.1 mg/mL injection        naloxone (NARCAN) 0.4 mg/mL injection        phosphorus (K PHOS NEUTRAL) 250 mg tablet 1 Tab  1 Tab Oral BID    bumetanide (BUMEX) injection 2 mg  2 mg IntraVENous Q8H    albumin human 25% (BUMINATE) solution 12.5 g  12.5 g IntraVENous Q8H PRN    pantoprazole (PROTONIX) tablet 40 mg  40 mg Oral ACB    balsam peru-castor oil (VENELEX)  mg/gram ointment   Topical BID    docusate sodium (COLACE) capsule 100 mg  100 mg Oral BID    heparin (porcine) injection 5,000 Units  5,000 Units SubCUTAneous Q8H    thiamine (B-1) tablet 100 mg  100 mg Oral DAILY    vancomycin (VANCOCIN) 1,000 mg in 0.9% sodium chloride (MBP/ADV) 250 mL  1,000 mg IntraVENous Q24H    albuterol-ipratropium (DUO-NEB) 2.5 MG-0.5 MG/3 ML  3 mL Nebulization Q6H RT    polyvinyl alcohol (LIQUIFILM TEARS) 1.4 % ophthalmic solution 2 Drop  2 Drop Both Eyes TID    sodium chloride (NS) flush 10 mL  10 mL InterCATHeter Q24H    sodium chloride (NS) flush 10 mL  10 mL InterCATHeter PRN    sodium chloride (NS) flush 10-40 mL  10-40 mL InterCATHeter Q8H    alteplase (CATHFLO) 1 mg in sterile water (preservative free) 1 mL injection  1 mg InterCATHeter PRN    bacitracin 500 unit/gram packet 1 Packet  1 Packet Topical PRN    0.9% sodium chloride infusion 250 mL  250 mL IntraVENous PRN    ondansetron (ZOFRAN) injection 4 mg  4 mg IntraVENous Q8H PRN    glucose chewable tablet 16 g  4 Tab Oral PRN    dextrose (D50W) injection syrg 12.5-25 g  12.5-25 g IntraVENous PRN    glucagon (GLUCAGEN) injection 1 mg  1 mg IntraMUSCular PRN    sodium chloride (NS) flush 5-10 mL  5-10 mL IntraVENous Q8H    sodium chloride (NS) flush 5-10 mL  5-10 mL IntraVENous PRN    vancomycin dosing per pharmacy   Other Rx Dosing/Monitoring    piperacillin-tazobactam (ZOSYN) 3.375 g in 0.9% sodium chloride (MBP/ADV) 100 mL  3.375 g IntraVENous Q8H     ______________________________________________________________________  EXPECTED LENGTH OF STAY: 4d 21h  ACTUAL LENGTH OF STAY:          Linda Dan MD

## 2017-03-20 NOTE — PROGRESS NOTES
[facility]  -Progress Note     Tam[address] [phone]   3/20/2017      Evelyne Miranda M.D. , F.A.C.C.   --------PCP:-Bc Chakraborty MD   -----Subjective:   . Linn Line Linn Line Linn Line Nacho Becerra is a 80 y.o. female   This am with several hours 8 am to 12 pm off bipap, had to go back on at 12 noon due to mild somnolence  i saw her at 1250 took bipap off and watched her  She did ok with mentation and we proceeded to AMMON  No vegetation on AV   Same EF 35 and 1-2+ MR, TR as expected  Sleepy post AMMON got three divided doses of versed 0.5mg IV  Keep npo for 3 hours  Patient Vitals for the past 12 hrs:   Temp Pulse Resp BP SpO2   03/20/17 1600 97.5 °F (36.4 °C) 82 26 98/64 95 %   03/20/17 1402 - 80 26 - 97 %   03/20/17 1330 - 65 21 116/79 97 %   03/20/17 1325 - - 30 103/71 97 %   03/20/17 1320 - 80 26 96/75 98 %   03/20/17 1316 - 80 20 104/70 96 %   03/20/17 1300 - 81 26 99/67 99 %   03/20/17 1200 97.5 °F (36.4 °C) 80 26 99/67 100 %   03/20/17 1000 - 80 23 96/61 99 %   03/20/17 0839 - - - - 100 %   03/20/17 0800 96.7 °F (35.9 °C) 84 21 91/60 100 %   03/20/17 0600 - 80 20 93/60 100 %        Discussion/Plans/Recs  No vegetation  Sepsis, resp failure, improving, needs bipap    Chronic systolic CHF  Now, compensated-BNP 2321  ---BiV ACID  --Bumex  --ACE held  --BB held due to low BP  ERASTO is very large but no clot, would start to use OAC cautiously for stroke prophylaxis    Afib--[id number]= 6 (age, CHF, HTN, female, DM). Holding coumadin. JUAN resolved  Thrombocytopenia--improving     Cardiac Studies/Hx:  ECHO: 3/9/17: EF 30%. Mod diffuse hyypokinesis, Wall motion is dyssynchronous,  RA mildly dilated. LA mod-severe dilated, Mod MR, AV: In the parasternal long axis view, faintly seen spherical echodensity about 5mms in diameter that seems to move in  relation to the anterior aortic valve leaflet, on the aortic side. Could represent a vegetation. Mod TR, mild RV systolic hypertension.  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 PA pressure of 61 with moderate pulmonary hypertension. 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 Moderate to severe TR, moderate to severe pulmonary hypertension. EF 35-40%, marked LAE, mild JOAN    PACER check 2-11-13 39,288 episodes of atrial tach/flutter and 11 with high rates  1/2014 noises noted on pacer, 260 thousands AMS , some are noises and some are AT, some PMT (adjustment made, Dr Raiza Castellanos discussed with pacer clinic)  Pacer check in May 2013 showed NSVT and AF the latter as long as 1 day with occasional RVR  Pacer check 9/9/13: persistent AT with V paced. 92%  (asymptomatic with AT)    CATH 10-24-12= Normal cors and EF 50 %. -- A annular ring mitral position. Mitral valve repair on July 29, 1996, for mitral regurgitation with a #32 Turner-Manuel ring. Chronic systolic CHF, reduced ejection fraction. NYHA 1-2  . ..admit 7/16/09 mild chf exacerbation  #32 Turner Manuel ring MV repair 1996         Past Medical History:   Diagnosis Date    Atrial fibrillation (HCC)     Chronic systolic heart failure (HCC)     ef 35-40%; hold ACE due to lower bp    Diabetes mellitus type 2, insulin dependent ([address]     Hypertension     Long term (current) use of anticoagulants     Malignant hypertensive heart disease with CHF ([address]     Mitral regurgitation     Pacemaker 10/25/2012    The pacemaker is a [employer] , model # [id number], serial [id number].    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 Results for orders placed or performed during the hospital encounter of 03/08/17   EKG, 12 LEAD, INITIAL   Result Value Ref Range    Ventricular Rate 80 BPM    Atrial Rate 75 BPM    QRS Duration 146 ms    Q-T Interval 480 ms    QTC Calculation (Bezet) 553 ms    Calculated R Axis 158 degrees    Calculated T Axis 5 degrees    Diagnosis       Ventricular-paced rhythm  When compared with ECG of 09-SEP-2016 17:36,  Vent. rate has decreased BY  30 BPM  Confirmed by Long Silvestre M.D., Adam Flores ([id number]) on 3/9/2017 10:15:16 AM     Results for orders placed or performed in visit on 03/27/12   AMB POC EKG ROUTINE W/ 12 LEADS, INTER & REP    Narrative    See scanned report.         Vitals:    03/20/17 1325 03/20/17 1330 03/20/17 1402 03/20/17 1600   BP: 103/71 116/79  98/64   BP 1 Location:    Left arm   BP Patient Position:    At rest   Pulse:  65 80 82   Resp: 30 21 26 26   Temp:    97.5 °F (36.4 °C)   SpO2: 97% 97% 97% 95%   Weight:       Height:           Objective:    Physical Exam:   Patient Vitals for the past 12 hrs:   Temp Pulse Resp BP SpO2   03/20/17 1600 97.5 °F (36.4 °C) 82 26 98/64 95 %   03/20/17 1402 - 80 26 - 97 %   03/20/17 1330 - 65 21 116/79 97 %   03/20/17 1325 - - 30 103/71 97 %   03/20/17 1320 - 80 26 96/75 98 %   03/20/17 1316 - 80 20 104/70 96 %   03/20/17 1300 - 81 26 99/67 99 %   03/20/17 1200 97.5 °F (36.4 °C) 80 26 99/67 100 %   03/20/17 1000 - 80 23 96/61 99 %   03/20/17 0839 - - - - 100 %   03/20/17 0800 96.7 °F (35.9 °C) 84 21 91/60 100 %   03/20/17 0600 - 80 20 93/60 100 %      General:  Somnolent arousable   ENT, Neck:  no jvd   Chest Wall: inspection normal - no chest wall deformities or tenderness, respiratory effort normal   Lung: clear to auscultation bilaterally   Heart:  no gallops noted, irregularly irregular rhythm with rate ok, no JVD   Abdomen: nondistended   Extremities: extremities normal, atraumatic, no cyanosis or edema     Last 24hr Input/Output:    Intake/Output Summary (Last 24 hours) at 03/20/17 1721  Last data filed at 03/20/17 1700   Gross per 24 hour   Intake              650 ml   Output             2615 ml   Net            -1965 ml        Data Review:   Recent Results (from the past 24 hour(s))   PROTHROMBIN TIME + INR    Collection Time: 03/20/17  4:02 AM   Result Value Ref Range    INR 1.4 (H) 0.9 - 1.1      Prothrombin time 14.4 (H) 9.0 - 11.1 sec   CBC W/O DIFF    Collection Time: 03/20/17  4:02 AM   Result Value Ref Range    WBC 4.1 3.6 - 11.0 K/uL    RBC 3.74 (L) 3.80 - 5.20 M/uL    HGB 8.4 (L) 11.5 - 16.0 g/dL    HCT 26.4 (L) 35.0 - 47.0 %    MCV 70.6 (L) 80.0 - 99.0 FL    MCH 22.5 (L) 26.0 - 34.0 PG    MCHC 31.8 30.0 - 36.5 g/dL    RDW 17.2 (H) 11.5 - 14.5 %    PLATELET 76 (L) 644 - 400 K/uL   NUCLEATED RBC    Collection Time: 03/20/17  4:02 AM   Result Value Ref Range    NRBC 4.5 (H) 0  WBC    ABSOLUTE NRBC 0.19 (H) 0.00 - 0.01 K/uL    WBC CORRECTED FOR NR ADJUSTED FOR NUCLEATED RBC'S     METABOLIC PANEL, BASIC    Collection Time: 03/20/17  9:09 AM   Result Value Ref Range    Sodium 141 136 - 145 mmol/L    Potassium 3.5 3.5 - 5.1 mmol/L    Chloride 102 97 - 108 mmol/L    CO2 34 (H) 21 - 32 mmol/L    Anion gap 5 5 - 15 mmol/L    Glucose 109 (H) 65 - 100 mg/dL    BUN 16 6 - 20 MG/DL    Creatinine 0.79 0.55 - 1.02 MG/DL    BUN/Creatinine ratio 20 12 - 20      GFR est AA >60 >60 ml/min/1.73m2    GFR est non-AA >60 >60 ml/min/1.73m2    Calcium 8.7 8.5 - 10.1 MG/DL   GLUCOSE, POC    Collection Time: 03/20/17 11:57 AM   Result Value Ref Range    Glucose (POC) 102 (H) 65 - 100 mg/dL    Performed by Jc Portillo MD 3/20/2017

## 2017-03-20 NOTE — PROGRESS NOTES
2030:  Patient resting quietly in bed on BiPAP (15/4/30% rate 26). 3X PICC infusing in right arm. VSS with paced rhythm. Richardson catheter draining clear/yellow urine. 0000:  Patient sleeping well tonight. BiPAP mask on. Assessment unchanged. 0400:  Patient removed BiPAP mask. Applied nasal cannula 3 L/min. No complaints. Patient requesting water to drink. 56:  Daughter called for update on patient. Questions answered. Shift summary:  Uneventful shift. Patient wore BiPAP (15/4/30% rate 26) until 0400, NC @ 3L, sats %. Richardson catheter draining clear/yellow urine,  mL/hr. Paced with frequent PVCs MAP >65. No other complaints. AMMON today.

## 2017-03-20 NOTE — PROGRESS NOTES
Physical Therapy  3/20/17    Patient chart reviewed. Patient currently getting AMMON at bedside. Not appropriate for mobility. Will follow up tomorrow. Bharati Lowery, PT, DPT

## 2017-03-20 NOTE — PROGRESS NOTES
[address]  [address] [facility], [address]       GI PROGRESS NOTE  Farhana Estrada [facility]  [phone]    NAME: Yu Trotter   :  1930   MRN:  [id number]       Subjective:     No issues with abdominal pain over the weekend. Last set of LFTs showed bilirubin trending down    Objective:     VITALS:   Last 24hrs VS reviewed since prior progress note.  Most recent are:  Visit Vitals    BP 96/61    Pulse 80    Temp 96.7 °F (35.9 °C)    Resp 23    Ht 5' 6\" (1.676 m)    Wt 80.6 kg (177 lb 11.1 oz)    SpO2 99%    BMI 28.68 kg/m2       PHYSICAL EXAM:  General: No acute distress    Neurologic:  Resting quietly  Lungs:  No distress  Heart:  s1 s2, Regular  rhythm  Abdomen: Soft, Non distended, Non tender.  +Bowel sounds      Lab Data Reviewed:     Recent Results (from the past 24 hour(s))   POC G3 - PUL    Collection Time: 17 11:56 AM   Result Value Ref Range    pH (POC) 7.336 (L) 7.35 - 7.45      pCO2 (POC) 57.2 (H) 35.0 - 45.0 MMHG    pO2 (POC) 75 (L) 80 - 100 MMHG    HCO3 (POC) 30.6 (H) 22 - 26 MMOL/L    sO2 (POC) 93 92 - 97 %    Base excess (POC) 5 mmol/L    Site RIGHT RADIAL      Device: NASAL CANNULA      Flow rate (POC) 3 L/M    Allens test (POC) YES      Specimen type (POC) ARTERIAL     PROTHROMBIN TIME + INR    Collection Time: 17  4:02 AM   Result Value Ref Range    INR 1.4 (H) 0.9 - 1.1      Prothrombin time 14.4 (H) 9.0 - 11.1 sec   CBC W/O DIFF    Collection Time: 17  4:02 AM   Result Value Ref Range    WBC 4.1 3.6 - 11.0 K/uL    RBC 3.74 (L) 3.80 - 5.20 M/uL    HGB 8.4 (L) 11.5 - 16.0 g/dL    HCT 26.4 (L) 35.0 - 47.0 %    MCV 70.6 (L) 80.0 - 99.0 FL    MCH 22.5 (L) 26.0 - 34.0 PG    MCHC 31.8 30.0 - 36.5 g/dL    RDW 17.2 (H) 11.5 - 14.5 %    PLATELET 76 (L) 234 - 400 K/uL   NUCLEATED RBC    Collection Time: 17  4:02 AM   Result Value Ref Range    NRBC 4.5 (H) 0  WBC    ABSOLUTE NRBC 0.19 (H) 0.00 - 0.01 K/uL    WBC CORRECTED FOR NR ADJUSTED FOR NUCLEATED RBC'S     METABOLIC PANEL, BASIC    Collection Time: 03/20/17  9:09 AM   Result Value Ref Range    Sodium 141 136 - 145 mmol/L    Potassium 3.5 3.5 - 5.1 mmol/L    Chloride 102 97 - 108 mmol/L    CO2 34 (H) 21 - 32 mmol/L    Anion gap 5 5 - 15 mmol/L    Glucose 109 (H) 65 - 100 mg/dL    BUN 16 6 - 20 MG/DL    Creatinine 0.79 0.55 - 1.02 MG/DL    BUN/Creatinine ratio 20 12 - 20      GFR est AA >60 >60 ml/min/1.73m2    GFR est non-AA >60 >60 ml/min/1.73m2    Calcium 8.7 8.5 - 10.1 MG/DL         ________________________________________________________________________       Assessment:   · Elevated LFTs - showing improvement on last check. No current GI symptoms  · Possible cholecystitis - surgery following - plan for HIDA if she develops symptoms prior to cholecystostomy tube palcement     Patient Active Problem List   Diagnosis Code    Chronic systolic heart failure (HCC) I50.22    Rupture of chordae tendineae (HCC) I51.1    Mitral regurgitation I34.0    S/P mitral valve repair Z98.890    TR (tricuspid regurgitation) I07.1    Malignant hypertensive heart disease with CHF ([facility] [address] I11.0    PAF (paroxysmal atrial fibrillation) (HCC) I48.0    Diabetes mellitus type 2, 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 XXXA     Plan:   · Check LFTs in the am to ensure they are trending down     Signed By: Tanner Duran NP     3/20/2017  11:30 AM         Addendum:  Pt independently seen and examined. No pain. No LFTs x 48 hours but prior to this down bermudez trend was seen.   Elevation likely multifactorial, with major contributor being passive congestion. Would recheck in the AM to follow trend. Appreciate general surgery input re: management of cholecystitis.

## 2017-03-20 NOTE — PROGRESS NOTES
Occupational Therapy Note  3/20/2017    Chart reviewed. Pt currently having AMMON at bedside. Will hold and follow-up as able and medically appropriate.     Amy Cheryle Schooling, OTR/L

## 2017-03-21 NOTE — PROGRESS NOTES
Problem: Dysphagia (Adult)  Goal: *Acute Goals and Plan of Care (Insert Text)  Speech therapy goals  Initiated 3/16/2017   1. Patient will tolerate full liquid diet without s/s of aspiration within 7 days MET 3/21/2017   2. Patient will tolerate solid trials with timely and complete mastication to determine safety for diet upgrade within 7 days MET 3/21/2017   SPEECH LANGUAGE PATHOLOGY DYSPHAGIA TREATMENT/DISCHARGE  Patient: Buddy Stanley (88 y.o. female)  Date: 3/21/2017  Diagnosis: Hypothermia Hypothermia       Precautions:  Fall      ASSESSMENT:  Patient upgraded to dental soft diet over the weekend, and per nsg, tolerating well without s/s of aspiration with meals or meds. Patient agreeable to PO trials with SLP in limited amounts, reporting she is tired and wants to sleep. Patient demonstrated oropharyngeal swallow that is overall functional for age and dentition. Mastication mildly slow, however patient declined putting her dentures in for intake, so suspect this is primary cause. Swallow initiation appeared timely with functional hyolaryngeal elevation/excursion via palpation. Endurance remains limited and fatigue will be the primary limiting factor in PO intake. Suspect patient will benefit from continued nutritional supplements to increase intake. Progression toward goals:  [X]           Improving appropriately and progressing toward goals  [ ]           Improving slowly and progressing toward goals  [ ]           Not making progress toward goals and plan of care will be adjusted       PLAN:  --continue dental soft diet. Smaller, more frequent meals to reduce fatigue. --No further SLP needs at this time as oropharyngeal structures appear age-appropriate and endurance is now the primary limiting factor in intake. Will defer to RD regarding continued oral supplements. Patient will be discharged from speech therapy at this time.   Rationale for discharge:  [X]      Goals Achieved  [ ] Chitra Nelli  [ ]      Patient not participating in therapy  [ ]      Other:  Discharge Recommendations:  None       SUBJECTIVE:   Patient stated Natasha Casie I just sleep? . Patient drowsy but willing to have a small amount to eat/drink for SLP. OBJECTIVE:   Cognitive and Communication Status:  Neurologic State: Alert, Confused  Orientation Level: Oriented to person, Oriented to place  Cognition: Decreased attention/concentration, Follows commands    Perception: Appears intact    Perseveration: No perseveration noted    Safety/Judgement: Not assessed  Dysphagia Treatment:  Oral Assessment:  Oral Assessment  Labial: No impairment  Dentition: Edentulous (patient did not want dentures placed )  Oral Hygiene: moist mucosa   Lingual: No impairment  Velum: Unable to visualize  Mandible: No impairment  P.O. Trials:  Patient Position: upright in bed   Vocal quality prior to P.O.: Low volume  Consistency Presented: Thin liquid; Solid  How Presented: SLP-fed/presented;Straw     Bolus Acceptance: No impairment  Bolus Formation/Control: Impaired  Type of Impairment: Delayed;Mastication (appeared primarily related to dentition )  Propulsion: No impairment  Oral Residue: None  Initiation of Swallow: No impairment (functional for age )  Laryngeal Elevation: Functional (functional for age )  Aspiration Signs/Symptoms: None  Pharyngeal Phase Characteristics: Other (comment) (functional for age )        Comments: fatigue remains a limiting factor in intake   Oral Phase Severity: Other (comment) (functional for dentition and age)  Pharyngeal Phase Severity : Other (comment) (functional for age )                                                                                                              G Codes: In compliance with CMSs Claims Based Outcome Reporting, the following G-code set was chosen for this patient based the use of the NOMS functional outcome to quantify this patient's level of swallowing impairment.      Using the NOMS, the patient was determined to be at level 7 for swallow function which correlates with the CH= 0% level of severity. Based on the objective assessment provided within this note, the current, goal, and discharge g-codes are as follows:     Swallow  Swallowing:   Swallow Goal Status CI= 1-19%   Swallow D/C Status CH= 0%         NOMS Swallowing Levels:  Level 1 (CN): NPO  Level 2 (CM): NPO but takes consistency in therapy  Level 3 (CL): Takes less than 50% of nutrition p.o. and continues with nonoral feedings; and/or safe with mod cues; and/or max diet restriction  Level 4 (CK): Safe swallow but needs mod cues; and/or mod diet restriction; and/or still requires some nonoral feeding/supplements  Level 5 (CJ): Safe swallow with min diet restriction; and/or needs min cues  Level 6 (CI): Independent with p.o.; rare cues; usually self cues; may need to avoid some foods or needs extra time  Level 7 ([address] [place] [address]): Independent for all p.o.  JEFFERSON. (2003). National Outcomes Measurement System (NOMS): Adult Speech-Language Pathology User's Guide. Pain:           After treatment:   [ ]                Patient left in no apparent distress sitting up in chair  [X]                Patient left in no apparent distress in bed  [X]                Call bell left within reach  [X]                Nursing notified  [ ]                Caregiver present  [ ]                Bed alarm activated      COMMUNICATION/EDUCATION:         The patients plan of care including recommendations, planned interventions, and recommended diet changes were discussed with: Registered Nurse. Alyse Duverney, M.CD.  CCC-SLP   Time Calculation: 15 mins

## 2017-03-21 NOTE — PROGRESS NOTES
Primary Nurse Obie Ha RN and Lester Marcano RN performed a dual skin assessment on this patient Impairment noted- see wound doc flow sheet  Pt has open wound to right back ,blisters to both thighs and open wound to upper leg

## 2017-03-21 NOTE — PROGRESS NOTES
Hospitalist Progress Note  Niya Haji MD  Office: [phone]  Cell: [phone]        Date of Service:  3/21/2017  NAME:  Marcelo Anderson  :  1930  MRN:  [id number]      Admission Summary:   80-year-old female with history of chronic systolic congestive heart failure, atrial fibrillation, diabetes mellitus, hypertension, chronic anticoagulation use, history of pacemaker and ICD. She was sent from her primary care physician to the emergency room to be evaluated. History from the patient and her daughters at the bedside, also from the records. Per the daughter, she has been having on and off abdominal pain, epigastric pain for a while, over the last 3 days has been mostly pronounced. She said her pain at times is 9/10 in severity, it is nonradiating, associated with some nausea, but no vomiting. She denies any diarrhea. At her PCP's office,   they could not get her temperature, so they sent her to the emergency room. In the ER she has been hypothermic, her temperature was 91.4 on initial presentation. Also she was hypotensive. Her blood pressure   systolic was 43/85, so she was given about 1.5 liters of IV fluid, and now she is getting 1 bottle of albumin. She was placed on warming blanket, Lizzy Hugger. She denies any dysuria or frequency. Interval history / Subjective:   Pt seen and examined  AMMON yesterday negative for vegetations. Assessment & Plan:     Hypothermia and hypotension. - resolved  On empiric IV antibiotics, on Zosyn and vancomycin. Blood culture: No growth x 5 days; Required the [facility] intermittently     Acute Respiratory failure with Hypercapnia due to Pleural effusion/atelectasis and acute on chronic Systolic CHF NYHA IV  BIPAP PRN  PCCM input aprpeciated  Diuresis, on NC now  May need intermittent BIPAP for hypercapnia;   - 3/21: Bumex change to PO.      Bilateral Pleural effusions: likely related to CHF  improving on CXR;   continue antibiotics, CHF volume overload. C/w diuresis     Sepsis:  Unknown source, most likely lung with bilateral pleural effusions, possibly infectious. She had caren of leukocytes to 1.9, improving. She was afebrile overnight, low BP requiring pressors. Weaned off pressors  Continue zosyn, vanco.   Monitor cultures, preliminary blood, urine cultures no growth so far. - AMMON: no vegetations;   - blood culture 3/08 and 3/11: no growth x 5 days;   - urine culture: no growth;   - 3/21: afebrile, no leukocytosis, cultures negative. Will discontinue IV Abx and watch off Abx. Patient completed 2 weeks of IV Abx since admission. Acute on Chronic Systolic congestive heart failure NYHA III/IV. With fluid overload. Echo 3/9 with 30% EF, diffuse hypokinesis. Bumex added, strict I/O, monitor weight. History of pacer. Valvular disease: Moderate mitral regurgitation, right ventricular systolic dysfunction,   as well as enlarged left atrium. Valvular vegetation on aortic valve, questionable: Blood cultures no growth preliminary. AMMON tentatively on Monday if respiratory status permits  - Diuresis per nephrology   - 3/20: AMMON scheduled for noon today. - 3/21: AMMON showed no vegetations. Hematuria: may be related to traumatic galarza placement with INR max 5.5,  Urine noted on UA as yellow on 3/8,   Galarza placed 3/9 and hematuria reported 3/10 UA. JUAN:  From  ATN and contrast nephropathy/Hypotension most likely etiology. Ischemic heart disease, hypotension; and exposure to IV contrast.   Appreciate nephro consult recommendations. Bumex, avoid nephrotoxins, I/Os, -resolved    Hypoglycemia/diabetes mellitus. Resolved. Holding her Lantus and on hypoglycemia protocol. Her blood sugar improved after D50. Stable, off D10 now  resolved    Ischemic hepatitis:. Consulted GI. Plans for EGD when stable or outpatient. Continue proton pump inhibitor for now.     US with distended gallbladder with stone and sludge. Appreciate gen surg eval   if abdominal pain worsens, get HIDA prior to godwin tube placement  - 3/21: appreciate GI consult and f/u: signed off. LFTs improving. Coagulopathy.  - Holding her Coumadin and monitor daily INR and restart her Coumadin when INR improved. INR increased, she received Vitamin K x 1. With hematuria will give FFP and monitor INR.   - 3/21: started patient on Eliquis for stroke prophylaxis in the setting of chronic Atrial Fibrillation and [id number] score of 6. Pancytopenia. - Appears to be chronic. However, patient and family unaware of her previous lab results. Will monitor her labs closely. Left cheek where it meets the nose pressure ulcer/injury:  - stage 2; measures 1 x 0.5 x 0 cm; 100% drying pink/red;  - with linear jac = 2 x 0.5 x 0 cm (smaller);  - Hospital Acquired; Code status: Full    Care Plan discussed with: Patient/Family and Nurse  Disposition: TBD   Consult PT/OT and OOB. Hospital Problems  Date Reviewed: 3/8/2017          Codes Class Noted POA    * (Principal)Hypothermia ICD-10-CM: T68. XXXA  ICD-9-CM: 991.6  3/8/2017 Yes                Review of Systems:   A comprehensive review of systems was negative. Vital Signs:    Last 24hrs VS reviewed since prior progress note.  Most recent are:  Visit Vitals    /70 (BP 1 Location: Left arm, BP Patient Position: At rest)    Pulse 81    Temp 98.3 °F (36.8 °C)    Resp 21    Ht 5' 6\" (1.676 m)    Wt 79.4 kg (175 lb 0.7 oz)    SpO2 97%    BMI 28.25 kg/m2         Intake/Output Summary (Last 24 hours) at 03/21/17 1551  Last data filed at 03/21/17 0521   Gross per 24 hour   Intake              120 ml   Output             1600 ml   Net            -1480 ml        Physical Examination:             Constitutional:   Awake, on NC, drowsy this AM   ENT:   Neck supple,    Resp:  good air entry    CV:  irregular    GI:  Soft, non-tender, normoactive bowel sounds, no hepatosplenomegaly     Musculoskeletal:  2+ edema    Neurologic:   awake, moves all 4 extremities             Data Review:    Review and/or order of clinical lab test   US abd:  IMPRESSION:  1. Distended gallbladder with sludge and a stone with moderate wall thickening of the gallbladder wall. 2. Dilated IVC suggesting right heart failure. 3. Small amount of free fluid surrounding the liver. 4. Small right pleural effusion      Labs:     Recent Labs      03/20/17   0402  03/19/17   0416   WBC  4.1  4.1   HGB  8.4*  8.2*   HCT  26.4*  26.2*   PLT  76*  61*     Recent Labs      03/20/17   0909  03/19/17   0416   NA  141  143   K  3.5  3.0*   CL  102  103   CO2  34*  33*   BUN  16  16   CREA  0.79  0.77   GLU  109*  124*   CA  8.7  8.4*   MG   --   2.0   PHOS   --   2.0*     Recent Labs      03/21/17   0353   SGOT  16   ALT  34   AP  42*   TBILI  1.9*   TP  5.5*   ALB  2.3*   GLOB  3.2     Recent Labs      03/21/17   0353  03/20/17   0402  03/19/17   0416   INR  1.3*  1.4*  1.5*   PTP  13.4*  14.4*  14.8*      No results for input(s): FE, TIBC, PSAT, FERR in the last 72 hours. Lab Results   Component Value Date/Time    Folate 14.5 03/09/2017 03:23 AM      No results for input(s): PH, PCO2, PO2 in the last 72 hours. No results for input(s): CPK, CKNDX, TROIQ in the last 72 hours.     No lab exists for component: CPKMB  Lab Results   Component Value Date/Time    Cholesterol, total 138 09/28/2011 09:13 AM    HDL Cholesterol 50 09/28/2011 09:13 AM    LDL, calculated 74 09/28/2011 09:13 AM    Triglyceride 69 09/28/2011 09:13 AM     Lab Results   Component Value Date/Time    Glucose (POC) 193 03/21/2017 11:50 AM    Glucose (POC) 116 03/20/2017 11:53 PM    Glucose (POC) 102 03/20/2017 11:57 AM    Glucose (POC) 139 03/18/2017 10:24 PM    Glucose (POC) 133 03/18/2017 05:15 PM     Lab Results   Component Value Date/Time    Color RED 03/10/2017 02:30 AM    Appearance BLOODY 03/10/2017 02:30 AM    Specific gravity 1.020 03/10/2017 02:30 AM    Specific gravity 1.016 03/08/2017 08:54 PM    pH (UA) 6.0 03/10/2017 02:30 AM    Protein 100 03/10/2017 02:30 AM    Glucose NEGATIVE  03/10/2017 02:30 AM    Ketone NEGATIVE  03/10/2017 02:30 AM    Bilirubin NEGATIVE  03/10/2017 02:30 AM    Urobilinogen 1.0 03/10/2017 02:30 AM    Nitrites NEGATIVE  03/10/2017 02:30 AM    Leukocyte Esterase TRACE 03/10/2017 02:30 AM    Epithelial cells FEW 03/10/2017 02:30 AM    Bacteria NEGATIVE  03/10/2017 02:30 AM    WBC 20-50 03/10/2017 02:30 AM    RBC >100 03/10/2017 02:30 AM         Medications Reviewed:     Current Facility-Administered Medications   Medication Dose Route Frequency    furosemide (LASIX) tablet 80 mg  80 mg Oral BID    enalapril (VASOTEC) tablet 2.5 mg  2.5 mg Oral BID    atropine injection 0.5-1 mg  0.5-1 mg IntraVENous Multiple    fentaNYL citrate (PF) injection  mcg   mcg IntraVENous Multiple    midazolam (VERSED) injection 1-10 mg  1-10 mg IntraVENous Multiple    sodium chloride (NS) flush 5-10 mL  5-10 mL IntraVENous Multiple    apixaban (ELIQUIS) tablet 2.5 mg  2.5 mg Oral BID    phosphorus (K PHOS NEUTRAL) 250 mg tablet 1 Tab  1 Tab Oral BID    albumin human 25% (BUMINATE) solution 12.5 g  12.5 g IntraVENous Q8H PRN    pantoprazole (PROTONIX) tablet 40 mg  40 mg Oral ACB    balsam peru-castor oil (VENELEX)  mg/gram ointment   Topical BID    docusate sodium (COLACE) capsule 100 mg  100 mg Oral BID    vancomycin (VANCOCIN) 1,000 mg in 0.9% sodium chloride (MBP/ADV) 250 mL  1,000 mg IntraVENous Q24H    albuterol-ipratropium (DUO-NEB) 2.5 MG-0.5 MG/3 ML  3 mL Nebulization Q6H RT    polyvinyl alcohol (LIQUIFILM TEARS) 1.4 % ophthalmic solution 2 Drop  2 Drop Both Eyes TID    sodium chloride (NS) flush 10 mL  10 mL InterCATHeter Q24H    sodium chloride (NS) flush 10 mL  10 mL InterCATHeter PRN    sodium chloride (NS) flush 10-40 mL  10-40 mL InterCATHeter Q8H    alteplase (CATHFLO) 1 mg in sterile water (preservative free) 1 mL injection  1 mg InterCATHeter PRN    bacitracin 500 unit/gram packet 1 Packet  1 Packet Topical PRN    0.9% sodium chloride infusion 250 mL  250 mL IntraVENous PRN    ondansetron (ZOFRAN) injection 4 mg  4 mg IntraVENous Q8H PRN    glucose chewable tablet 16 g  4 Tab Oral PRN    dextrose (D50W) injection syrg 12.5-25 g  12.5-25 g IntraVENous PRN    glucagon (GLUCAGEN) injection 1 mg  1 mg IntraMUSCular PRN    sodium chloride (NS) flush 5-10 mL  5-10 mL IntraVENous Q8H    sodium chloride (NS) flush 5-10 mL  5-10 mL IntraVENous PRN    vancomycin dosing per pharmacy   Other Rx Dosing/Monitoring    piperacillin-tazobactam (ZOSYN) 3.375 g in 0.9% sodium chloride (MBP/ADV) 100 mL  3.375 g IntraVENous Q8H     ______________________________________________________________________  EXPECTED LENGTH OF STAY: 4d 21h  ACTUAL LENGTH OF STAY:          13                 Shane Duenas MD

## 2017-03-21 NOTE — CDMP QUERY
Per wound care PN (3/20): \"Left cheek where it meets the nose pressure ulcer/injury, stage 2 = 1 x 0.5 x 0 cm 100% drying pink/red and Right cheek where it meets the nose with linear jac = 2 x 0.5 x 0 cm (smaller) 100% dark non-blanching\". Dex Stephens Based on the need for increased specificity in documentation, please include the following components in your documentation:     Specify the Type, Site, Stage and if POA or Hospital Acquired.     Type: Pressure/decubitus, stasis, diabetic, neuropathic    Site: ankle, buttock, elbow, heel, hip, lower back, upper back    Stage: I, II, III, IV (unstageable, suspect deep tissue injury)    POA or Hospital Acquired      Thank you,    Afia Moore, RN, BSN, [facility], [facility]  [phone]

## 2017-03-21 NOTE — ROUTINE PROCESS
TRANSFER - OUT REPORT:    Verbal report given to Gabriela Armstrong on Shaji Mancia, who was transferred to [facility] for routine progression of care   Report consisted of patients Situation, Background, Assessment and Recommendations(SBAR).   MECHELLE Lei BSN CCM

## 2017-03-21 NOTE — PROGRESS NOTES
[address]  [address], [address]       GI PROGRESS NOTE  [facility]  [phone]    NAME: Carito Tapia   :  1930   MRN:  [id number]       Subjective:     Out of the ICU,no abdominal pain or nausea. Ate a good breakfast    Objective:     VITALS:   Last 24hrs VS reviewed since prior progress note. Most recent are:  Visit Vitals    BP 99/59 (BP 1 Location: Left arm, BP Patient Position: At rest)    Pulse 80    Temp 98.5 °F (36.9 °C)    Resp 25    Ht 5' 6\" (1.676 m)    Wt 79.4 kg (175 lb 0.7 oz)    SpO2 98%    BMI 28.25 kg/m2       PHYSICAL EXAM:  General: Cooperative, no acute distress    Neurologic:  Alert and oriented X 3. HEENT: PERRLA, EOMI. Lungs:  No distress  Heart:  s1 s2   Abdomen: Soft, Non distended, Non tender.  +Bowel sounds  Psych:   Good insight. Not anxious nor agitated. Lab Data Reviewed:     Recent Results (from the past 24 hour(s))   GLUCOSE, POC    Collection Time: 17 11:57 AM   Result Value Ref Range    Glucose (POC) 102 (H) 65 - 100 mg/dL    Performed by Ct Esposito, POC    Collection Time: 17 11:53 PM   Result Value Ref Range    Glucose (POC) 116 (H) 65 - 100 mg/dL    Performed by Bright Marin + INR    Collection Time: 17  3:53 AM   Result Value Ref Range    INR 1.3 (H) 0.9 - 1.1      Prothrombin time 13.4 (H) 9.0 - 11.1 sec   HEPATIC FUNCTION PANEL    Collection Time: 17  3:53 AM   Result Value Ref Range    Protein, total 5.5 (L) 6.4 - 8.2 g/dL    Albumin 2.3 (L) 3.5 - 5.0 g/dL    Globulin 3.2 2.0 - 4.0 g/dL    A-G Ratio 0.7 (L) 1.1 - 2.2      Bilirubin, total 1.9 (H) 0.2 - 1.0 MG/DL    Bilirubin, direct 1.1 (H) 0.0 - 0.2 MG/DL    Alk.  phosphatase 42 (L) 45 - 117 U/L    AST (SGOT) 16 15 - 37 U/L    ALT (SGPT) 34 12 - 78 U/L         ________________________________________________________________________       Assessment:   · Elevated LFTs - trending down without GI symptoms. On abx for possible cholecystitis. 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 XXXA     Plan:   · Will sign off  · Please let us know if we can be of further assistance     Signed By: Curt Cruz NP     3/21/2017  9:44 AM         Addendum:  Pt independently seen and examined. No abdominal pain and LFTs are trending down. Please check LFTs intermittently and re-consult in the setting that lab values start to trend upward again. Appreciate surgery input re: cholecystitis. Will sign off.

## 2017-03-21 NOTE — PROGRESS NOTES
Problem: Falls - Risk of  Goal: *Absence of falls  Outcome: Progressing Towards Goal  Call bell within reach  Bed rails upX3  Frequent rounds      Goal: *Knowledge of fall prevention  Outcome: Progressing Towards Goal  Call bell within reach  Bed rails upX3  Frequent rounds

## 2017-03-21 NOTE — PROGRESS NOTES
Problem: Mobility Impaired (Adult and Pediatric)  Goal: *Acute Goals and Plan of Care (Insert Text)  Physical Therapy Goals  Initiated 3/16/2017  1. Patient will move from supine to sit and sit to supine , scoot up and down and roll side to side in bed with minimal assistance within 7 day(s). 2. Patient will transfer from bed to chair and chair to bed with moderate assistance using the least restrictive device within 7 day(s). 3. Patient will perform sit to stand with moderate assistance within 7 day(s). 4. Patient will ambulate with moderate assistance for 10 feet with the least restrictive device within 7 day(s). PHYSICAL THERAPY TREATMENT  Patient: Elizabeth Stratton (30 y.o. female)  Date: 3/21/2017  Diagnosis: Hypothermia Hypothermia  Precautions: Fall      ASSESSMENT:  No indication of orthostatic hypotension with BP assessments. Pt appears to have a slightly improved mentation this date (easily aroused via voice, oriented to person and place, though unsure if it was AM vs. PM. Per continued to perseverate on the fact that she could typically do more functionally in the morning. Noted increased pitting L UE edema and B LE edema. Also noted impaired processing, impaired LE motor control, and globally decreased strength. However, compared to previous therapy session, pt is now able to make a fist with both hands, perform active pumps following rhythmic initiation/ with tactile cues, and perform LAQs, sitting, to approximately 1/3 of full range. With step-by-step sequencing cues and moderate assist x 2, pt able to intermittently assist with LE management towards EOB, reach for bed rail to achieve sidelying, and push with elbow to achieve upright sitting. Pt tolerated sitting x 10 minutes with hands in her lap, while weightshifting on to her forearms L/R, and while performing active-assisted LE ROM exercises.  Attempted standing trial with rolling walker, however, pt total assist x 2 for the slightest displacement from bed surface. Continue to recommend SNF rehab upon discharge. LE exercises written on board for pt to perform outside of therapy - recommended 2 sets x 10 each. Progression toward goals:  [ ]    Improving appropriately and progressing toward goals  [X]    Improving slowly and progressing toward goals  [ ]    Not making progress toward goals and plan of care will be adjusted       PLAN:  Patient continues to benefit from skilled intervention to address the above impairments. Continue treatment per established plan of care. Discharge Recommendations:  Skilled Nursing Facility  Further Equipment Recommendations for Discharge:  TBD at discharge        SUBJECTIVE:   Patient stated I do things better in the morning.       OBJECTIVE DATA SUMMARY:   Critical Behavior:  Neurologic State: Confused  Orientation Level: Disoriented to situation, Disoriented to time  Cognition: Decreased attention/concentration, Decreased command following  Safety/Judgement: Decreased awareness of environment, Decreased awareness of need for assistance, Decreased awareness of need for safety, Decreased insight into deficits      Functional Mobility Training:  Bed Mobility:  Supine to Sit: Assist x2; Moderate assistance  Sit to Supine: Assist x2; Moderate assistance  Scooting: Assist x2;Maximum assistance (Towards HOB)  Transfers:  Sit to Stand: Assist x2;Total assistance  Balance:  Sitting: Impaired  Sitting - Static: Fair (occasional)  Sitting - Dynamic: Fair (occasional)     Therapeutic Exercises:   See above. 3-8 reps of each      Pain:  Pt denied pain at this time. Activity Tolerance:   Fair. On 2 L/min via NC. O2 sats ranging between 92-98%. Please refer to the flowsheet for vital signs taken during this treatment.   After treatment:   [ ]    Patient left in no apparent distress sitting up in chair  [X]    Patient left in no apparent distress in bed  [X]    Call bell left within reach  [X]    Nursing notified  [ ] Caregiver present  [ ]    Bed alarm activated      COMMUNICATION/COLLABORATION:   The patients plan of care was discussed with: Registered Nurse and Rehabilitation Attendant     Davis Castaneda PT, DPT   Time Calculation: 24 mins

## 2017-03-21 NOTE — PROGRESS NOTES
NUTRITION COMPLETE ASSESSMENT    RECOMMENDATIONS:   1. Continue current diet order with assistance at all meals    2. Continue daily weights (standing scale when able to stand)    3. Continue to monitor lytes and replete prn    4. If intake remains poor, may need to consider resumption of enteral feedings to prevent nutrition decline     Interventions/Plan:   Food/Nutrient Delivery: adjust ONS to q day and change to chocolate    Assessment:   Reason for Assessment:   [x]Reassessment     Diet: Dental Soft + Glucerna Shake   Nutritionally Significant Medications: [x] Reviewed & Includes: colace BID; lasix BID; zofran q 8 hours prn; protonix daily; K phosphorus BID; zosyn q 8 hours IV; vancomycin  Meal Intake:   Patient Vitals for the past 100 hrs:   % Diet Eaten   03/20/17 1900 30 %   03/19/17 1530 50 %   03/19/17 0900 50 %   03/18/17 1200 50 %   03/18/17 0800 20 %   03/17/17 1800 90 %   03/17/17 0900 30 %     Subjective:  Pt seems lethargic during visit, but states she's not tired. Fell asleep several times. A few preferences obtained, so will order. Pt doesn't prefer to wear her dentures. Objective:  Chart reviewed, discussed with RN and team during interdisciplinary rounds. Pt's intake remains variable, depending on selections and alertness (PCT assisted with breakfast this morning, but stated the pt didn't care for her breakfast tray). Diet resumed yesterday (3/20). Pt doesn't care for the vanilla Glucerna, but willing to try chocolate. RD to adjust order to q day (multiple cans at the bedside). Pt expressed she likes coffee with creamer, so encouraged her to add Glucerna vanilla to her coffee as creamer. Pt to try. Tube feeds d/c 3/16. Will add high calorie, high protein snacks. Current weight is up from admission. Did not recalculate estimated needs.     Estimated Nutrition Needs:   Kcals/day: 1450 Kcals/day ([id number] (MSJ x 1.3-1.4))  Protein: 79 g (1.2g/kg)  Fluid:  (1 ml/kcal) Based On: Jareth López  Weight Used:  (Office wt 1/10/17  66.4 kg)    Pt expected to meet estimated nutrient needs:  [x]   Yes     Nutrition Diagnosis:   1. Inadequate oral food/beverage intake related to long term hospital stay and decreased appetite as evidenced by 20-90% of meals consumed x 5 days    Goals:     Pt to consume at least 75% of meals over the next 5-7 days     Monitoring & Evaluation:    - Liquid meal replacement   - Electrolyte and renal profile, Weight/weight change, Glucose profile     Previous Nutrition Goals Met:  N/A  Previous Recommendations:      Yes    Education & Discharge Needs:   [x] None Identified   [] Identified and addressed    [x] Participated in care plan, discharge planning, and/or interdisciplinary rounds        Cultural, Christian and ethnic food preferences identified:   None    Skin Integrity: []Intact  [x]Other: blister, lesion, no pressure ulcer  Edema: []None [x]Other: 1+  Last BM: 3/19/17  Food Allergies: [x]None []Other    Anthropometrics:    Weight Loss Metrics 3/21/2017 3/8/2017 1/10/2017 1/4/2017 12/27/2016 12/16/2016 11/12/2016   Today's Wt 175 lb 0.7 oz - 146 lb 3.2 oz 150 lb 157 lb 12.8 oz 152 lb 149 lb 6 oz   BMI - 28.25 kg/m2 23.6 kg/m2 24.21 kg/m2 25.47 kg/m2 24.53 kg/m2 24.11 kg/m2      Last 3 Recorded Weights in this Encounter    03/19/17 0437 03/20/17 0400 03/21/17 0346   Weight: 80.1 kg (176 lb 9.4 oz) 80.6 kg (177 lb 11.1 oz) 79.4 kg (175 lb 0.7 oz)      Weight Source: Bed  Height: 5' 6\" (167.6 cm),    Body mass index is 28.25 kg/(m^2).   IBW : 59 kg (130 lb), % IBW (Calculated): 140.25 %   ,      Labs:    Lab Results   Component Value Date/Time    Sodium 141 03/20/2017 09:09 AM    Potassium 3.5 03/20/2017 09:09 AM    Chloride 102 03/20/2017 09:09 AM    CO2 34 03/20/2017 09:09 AM    Glucose 109 03/20/2017 09:09 AM    BUN 16 03/20/2017 09:09 AM    Creatinine 0.79 03/20/2017 09:09 AM    Calcium 8.7 03/20/2017 09:09 AM    Magnesium 2.0 03/19/2017 04:16 AM Phosphorus 2.0 03/19/2017 04:16 AM    Albumin 2.3 03/21/2017 03:53 AM     No results found for: HBA1C, HGBE8, RWB5YAHX, JQZ4OFML, VHP2SVVW  Lab Results   Component Value Date/Time    Glucose 109 03/20/2017 09:09 AM    Glucose (POC) 193 03/21/2017 11:50 AM      Lab Results   Component Value Date/Time    ALT (SGPT) 34 03/21/2017 03:53 AM    AST (SGOT) 16 03/21/2017 03:53 AM    Alk.  phosphatase 42 03/21/2017 03:53 AM    Bilirubin, direct 1.1 03/21/2017 03:53 AM    Bilirubin, total 1.9 03/21/2017 03:53 AM      [address]

## 2017-03-21 NOTE — PROGRESS NOTES
TRANSFER - IN REPORT:    Verbal report received from Connie(name) on [facility]  being received from ICU(unit) for routine progression of care      Report consisted of patients Situation, Background, Assessment and   Recommendations(SBAR). Information from the following report(s) SBAR, Kardex, Procedure Summary, MAR and Recent Results was reviewed with the receiving nurse. Opportunity for questions and clarification was provided. Assessment completed upon patients arrival to unit and care assumed.

## 2017-03-21 NOTE — PROGRESS NOTES
[facility]  -Progress Note     Harman Clark [phone]   3/21/2017      Marcelino Garcia M.D. , F.A.C.C.   --------PCP:-Bc Osuna MD   -----Subjective:   . Hi Al is a 80 y.o. female   Comfortable   No complaints  Denies chest pain, heart palpitations , increasing edema, pre-syncope or shortness of breath at rest   No problems overnight, rhythm and hemodynamics stable -see vitals below    Seems more alert  Denies any pain or shortness of breath very specifically  No blood in oral area  Afebrile    AMMON yesterday with no vegetation, Same EF 35 and 1-2+ MR, TR as expected  Patient Vitals for the past 12 hrs:   Temp Pulse Resp BP SpO2   03/21/17 0751 - - - - 98 %   03/21/17 0726 98.5 °F (36.9 °C) 80 25 99/59 99 %   03/21/17 0341 98.2 °F (36.8 °C) 81 18 103/60 96 %   03/20/17 2352 98 °F (36.7 °C) 83 18 113/62 100 %   03/20/17 2324 - - - - 100 %   03/20/17 2029 98.2 °F (36.8 °C) 83 19 108/63 100 %        Discussion/Plans/Recs  No vegetation  Sepsis-cholecystitis, resp failure, improving, needed bipap yesterday    Chronic systolic CHF  Now, compensated  ---BiV ACID  --Bumex  --ACE held  --BB held due to low BP  MR-systolic murmur    ERASTO is very large but no clot, would start to use OAC cautiously for stroke prophylaxis    Afib--[id number]= 6 (age, CHF, HTN, female, DM)    JUAN resolved  Thrombocytopenia-, jaundice, Transaminitis, lipase, cholecystitis-improving    PLAN:   Have started her on Eliquis at 2.5 mg bid for stroke prevention,very large LA appendage with some \"smoke\" fortunately no clot  Change bumex to PO today,continue KCl  Anemic , will need to transfuse to 9 if remains weak given CHF , MR-check Hgb in am  OT,PT eval for SNF  Still holding ACE and BB due to low BP  Low dose enalapril at 2.5 mg BID now     Cardiac Studies/Hx:  ECHO: 3/9/17: EF 30%. Mod diffuse hyypokinesis, Wall motion is dyssynchronous,  RA mildly dilated.  LA mod-severe dilated, Mod MR, AV: In the parasternal long axis view, faintly seen spherical echodensity about 5mms in diameter that seems to move in  relation to the anterior aortic valve leaflet, on the aortic side. Could represent a vegetation. Mod TR, mild RV systolic hypertension. 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 PA pressure of 61 with moderate pulmonary hypertension. 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 Moderate to severe TR, moderate to severe pulmonary hypertension. EF 35-40%, marked LAE, mild JOAN    PACER check 2-11-13 39,288 episodes of atrial tach/flutter and 11 with high rates  1/2014 noises noted on pacer, 260 thousands AMS , some are noises and some are AT, some PMT (adjustment made, Dr Amy Warren discussed with pacer clinic)  Pacer check in May 2013 showed NSVT and AF the latter as long as 1 day with occasional RVR  Pacer check 9/9/13: persistent AT with V paced. 92%  (asymptomatic with AT)    CATH 10-24-12= Normal cors and EF 50 %. -- A annular ring mitral position. Mitral valve repair on July 29, 1996, for mitral regurgitation with a #32 Turner-Manuel ring. Chronic systolic CHF, reduced ejection fraction. NYHA 1-2  . ..admit 7/16/09 mild chf exacerbation  #32 Turner Manuel ring MV repair 1996         Past Medical History:   Diagnosis Date    Atrial fibrillation (HCC)     Chronic systolic heart failure (HCC)     ef 35-40%; hold ACE due to lower bp    Diabetes mellitus type 2, insulin dependent ([address]     Hypertension     Long term (current) use of anticoagulants     Malignant hypertensive heart disease with CHF [facility])     Mitral regurgitation     Pacemaker 10/25/2012    The pacemaker is a [employer] , model # [id number], serial [id number]. 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 Results for orders placed or performed during the hospital encounter of 03/08/17   EKG, 12 LEAD, INITIAL   Result Value Ref Range    Ventricular Rate 80 BPM    Atrial Rate 75 BPM    QRS Duration 146 ms    Q-T Interval 480 ms    QTC Calculation (Bezet) 553 ms    Calculated R Axis 158 degrees    Calculated T Axis 5 degrees    Diagnosis       Ventricular-paced rhythm  When compared with ECG of 09-SEP-2016 17:36,  Vent. rate has decreased BY  30 BPM  Confirmed by Shannan Jeffrey M.D., Jaylen Olmos ([id number]) on 3/9/2017 10:15:16 AM     Results for orders placed or performed in visit on 03/27/12   AMB POC EKG ROUTINE W/ 12 LEADS, INTER & REP    Narrative    See scanned report.         Vitals:    03/21/17 0341 03/21/17 0346 03/21/17 0726 03/21/17 0751   BP: 103/60  99/59    BP 1 Location: Left arm  Left arm    BP Patient Position: At rest  At rest    Pulse: 81  80    Resp: 18  25    Temp: 98.2 °F (36.8 °C)  98.5 °F (36.9 °C)    SpO2: 96%  99% 98%   Weight:  175 lb 0.7 oz (79.4 kg)     Height:           Objective:    Physical Exam:   Patient Vitals for the past 12 hrs:   Temp Pulse Resp BP SpO2   03/21/17 0751 - - - - 98 %   03/21/17 0726 98.5 °F (36.9 °C) 80 25 99/59 99 %   03/21/17 0341 98.2 °F (36.8 °C) 81 18 103/60 96 %   03/20/17 2352 98 °F (36.7 °C) 83 18 113/62 100 %   03/20/17 2324 - - - - 100 %   03/20/17 2029 98.2 °F (36.8 °C) 83 19 108/63 100 %      General:  Somnolent arousable   ENT, Neck:  no jvd   Chest Wall: inspection normal - no chest wall deformities or tenderness, respiratory effort normal   Lung: clear to auscultation bilaterally   Heart:  no gallops noted, irregularly irregular rhythm with rate ok, no JVD 2/6 systolic early/decresendo  murmur at RUSB to LUSB without radiation beyond apex    Abdomen: nondistended   Extremities: extremities normal, atraumatic, no cyanosis or edema     Last 24hr Input/Output:    Intake/Output Summary (Last 24 hours) at 03/21/17 0810  Last data filed at 03/21/17 0521   Gross per 24 hour   Intake              570 ml   Output             2605 ml   Net            -2035 ml        Data Review:   Recent Results (from the past 24 hour(s))   METABOLIC PANEL, BASIC    Collection Time: 03/20/17  9:09 AM   Result Value Ref Range    Sodium 141 136 - 145 mmol/L    Potassium 3.5 3.5 - 5.1 mmol/L    Chloride 102 97 - 108 mmol/L    CO2 34 (H) 21 - 32 mmol/L    Anion gap 5 5 - 15 mmol/L    Glucose 109 (H) 65 - 100 mg/dL    BUN 16 6 - 20 MG/DL    Creatinine 0.79 0.55 - 1.02 MG/DL    BUN/Creatinine ratio 20 12 - 20      GFR est AA >60 >60 ml/min/1.73m2    GFR est non-AA >60 >60 ml/min/1.73m2    Calcium 8.7 8.5 - 10.1 MG/DL   GLUCOSE, POC    Collection Time: 03/20/17 11:57 AM   Result Value Ref Range    Glucose (POC) 102 (H) 65 - 100 mg/dL    Performed by Ct Esposito, POC    Collection Time: 03/20/17 11:53 PM   Result Value Ref Range    Glucose (POC) 116 (H) 65 - 100 mg/dL    Performed by Bright Marin + INR    Collection Time: 03/21/17  3:53 AM   Result Value Ref Range    INR 1.3 (H) 0.9 - 1.1      Prothrombin time 13.4 (H) 9.0 - 11.1 sec   HEPATIC FUNCTION PANEL    Collection Time: 03/21/17  3:53 AM   Result Value Ref Range    Protein, total 5.5 (L) 6.4 - 8.2 g/dL    Albumin 2.3 (L) 3.5 - 5.0 g/dL    Globulin 3.2 2.0 - 4.0 g/dL    A-G Ratio 0.7 (L) 1.1 - 2.2      Bilirubin, total 1.9 (H) 0.2 - 1.0 MG/DL    Bilirubin, direct 1.1 (H) 0.0 - 0.2 MG/DL    Alk.  phosphatase 42 (L) 45 - 117 U/L    AST (SGOT) 16 15 - 37 U/L    ALT (SGPT) 34 12 - 78 U/L      Alessandra Tran MD 3/21/2017

## 2017-03-21 NOTE — WOUND CARE
WOCN Note:     Re-consulted by RN for blisters to thighs and wound on back. Chart shows:  Admitted for hypothermia & respiratory failure, sepsis; history of CHF, DM, a-fib, pacer      Assessment:   Patient is on nasal cannula this morning and is communicative and alert. Bed: total care  Has a Richardson  Patient reports no pain.       Heels, buttocks, and sacrum intact and without erythema. 1. Left cheek where it meets the nose pressure ulcer/injury, stage 2 = 1 x 0.5 x 0 cm 100% drying pink/red. No exudate. Periwound intact & without erythema. Venelex in use.       Right cheek where it meets the nose with linear jac = 2 x 0.5 x 0 cm (smaller) 100% dark non-blanching. Fully intact. Venelex in use. 2. Right upper and inner thigh ruptured bullae, partial thickness wound = 4 x 3.5 x 0.1 cm 100% moist pink. Pretroleum ointment applied and Mepilex border. There are intact, scattered bullae to bilateral inner and upper thighs of varying sizes. All covered with Mepilex border to protect. Also a dry linear abrasion to right upper back left open to air. Recommendations:    Cover bullae on thighs with Mepilex border and change as needed. Continue Venelex to cheeks as ordered. Skin Care & Pressure Prevention:  Minimize layers of linen/pads under patient to optimize support surface. Turn/reposition approximately every 2 hours and offload heels. Discussed above plan with patient & RN.      Transition of Care: Plan to follow weekly and as needed while admitted to hospital.    NERY Vang, RN, [facility]  Certified Wound, Ostomy, Continence Nurse  office [phone]  pager [phone] or call  to page

## 2017-03-22 NOTE — PROGRESS NOTES
Palliative Medicine Consult  [place]: [phone] ([phone])    Patient Name: Negrita Plasencia  YOB: 1930    Date of Initial Consult: 3/13/17  Reason for Consult: Care decisions   Requesting Provider: Rolf  Primary Care Physician: Yoly Solorio MD      SUMMARY:   Negrita Plasencia \"Warner\" is a 80 y.o. with a past history of systolic CHF (EF 19% on 2/9/07), AICD, DM, HTN, a fib who was admitted on 3/8/2017 from her PCP office w/ epigastric pain and nausea, in ED was hypothermic to 91.4 degrees and hypotensive. Treated in ICU with empiric abx, echo concerning for possible aortic valve vegetation but BCx x 3 NGTD and AMMON neg for vegetations. Has been diuresed. CT abd showing gallstones, no  Cholecystitis obstruction. GI has signed off but plan on seeing for EGD when stable. Pt was intubated 3/10-3/12, had large mucous plug. Renal following for JUAN. Known well to Dr Kamini Bowie. CT head wnl. Current medical issues leading to Palliative Medicine involvement include:care decisions. Social: Pt was 1 of 13 children, she has 7 children and about 20 grandchildren, and great grands. Very involved in the [employer] in [place] where she lives and is very loved. Before admission was alert/oriented and high functioning. No AMD but family tends to defer to [facility] ([phone]) (dtr). PALLIATIVE DIAGNOSES:   1. Fatigue  2. Edema  3. Debility   4. Goals of care       PLAN:   1. Pt sitting in chair today, feeding herself applesauce; she denies pain, sob, nausea, indicates she is comfortable; does feel tired / fatigued; continue close assessment for distressing sx  2. Discussed with pt and dtr today; pt is oriented; she notes being surprised at how long she's been in hospital; offered to discuss her condition / treatments with her, provide updates, but she says she has no questions for me at this time  3.  Discussed with dtr separately, we reviewed pt current condition including possible cholecystitis issue seems to be resolving, no plan for surgery for this, following her labs / clinical status; infection concern seems resolved, off abx after 2 week course, AMMON neg for vegetations; renal function improved; overall clinical status improved; CHF remains an issue, still on diuretics, we discussed that this would be ongoing issue; pt is quite debilitated at this point, rehab will likely be slow progress with risks for set back  4. Dtr reports to me family plans to discuss goals of care with pt; again offered to be present / facilitate discussion; dtr and I discussed that pt is full code at this time and what that means  5. Communicated plan of care with: Palliative IDT; Noble Harrington RN; Dr Trae Burnett SLP, Dr Eddie Zuluaga, Dr Ila Harris / TREATMENT PREFERENCES:   [====Goals of Care====]  GOALS OF CARE:  Patient / health care proxy stated goals: recovery as possible       TREATMENT PREFERENCES:   Code Status: Full Code    Advance Care Planning:  Advance Care Planning 3/10/2017   Patient's Healthcare Decision Maker is: Legal Next of Kin   Confirm Advance Directive None   Patient Would Like to Complete Advance Directive No       Other:    The palliative care team has discussed with patient / health care proxy about goals of care / treatment preferences for patient.  [====Goals of Care====]    Family incl dtrs Daiana Del Toro and bessie Hidalgo. HISTORY:     Reviewed vitals, I/O's, labs, imaging, MAR, notes. Afebrile, 1L NC.  120 / 320 PO. No BM recorded on i/o. MAR:  Duo neb TID, changed from qid on 3/21  Eliquis, enalapril, lasix 80mg po bid  Colace bid, protonix PO  Insulin, k phos, liquifilm tears, venelex  zofran PRN, no recent dose    Zosyn, vanc stopped 3/21; transfused FFP on 3/10    WBC 3.1, hgb 7.8, plt 88  INR 1.3  Na and cr wnl. BNP 1238.   Prior LFT ok, t angeli 1.9, alb 2.3    AMMON 3/20 ef 35%, no mass / vegetation noted    ISSUES INCLUDE:  Hypothermia / hypotension / sepsis - resolved, now off abx; unclear source; completed 2 wk abx  ARF from pl eff / atx and a/c systolic CHF NYHA IV - bipap intermittent, PO diuretic  A/c CHF - cards seeing, diuresis, hx pacer  Hematuria: traumatic galarza?, high INR; galarza placed 3/9, hematuria 3/10; galarza out 3/21  JUAN from ATN / contrast / hypotension; nephro consult; resolved  Hypoglycemia / DM; resolved  Ischemic hepatitis: GI has seen, f/u for op EGD; surgery has seen; LFT better  Coagulopathy: holding coumadin; started eliquis for afib  Pancytopenia: chronic, follow labs; consider txfuse to 9 if chf  Nasolabial pressure ulcer  Deconditioned: PT/OT/SLP, possible snf  Possible cholecystitis: seems to be resolving; surgery with no plans at this time    HPI/SUBJECTIVE:    The patient is:   [x] Verbal and participatory  [] Non-participatory due to: medical condition     Pt denies pain, sob, nausea.      Clinical Pain Assessment (nonverbal scale for severity on nonverbal patients):   [++++ Clinical Pain Assessment++++]  [++++Pain Severity++++]: Pain: 0  [++++Pain Character++++]:   [++++Pain Duration++++]:   [++++Pain Effect++++]:   [++++Pain Factors++++]:   [++++Pain Frequency++++]:   [++++Pain Location++++]:   [++++ Clinical Pain Assessment++++]     FUNCTIONAL ASSESSMENT:     Palliative Performance Scale (PPS):  PPS: 40       PSYCHOSOCIAL/SPIRITUAL SCREENING:     Advance Care Planning:  Advance Care Planning 3/10/2017   Patient's Healthcare Decision Maker is: Legal Next of Kin   Confirm Advance Directive None   Patient Would Like to Complete Advance Directive No        Any spiritual / Amish concerns:  [] Yes /  [x] No    Caregiver Burnout:  [] Yes /  [x] No /  [] No Caregiver Present      Anticipatory grief assessment:   [x] Normal  / [] Maladaptive       ESAS Anxiety:   Cannot obtain due to patient factors    ESAS Depression:   Cannot obtain due to patient factors       REVIEW OF SYSTEMS:     Positive and pertinent negative findings in ROS are noted above in HPI. The following systems were [] reviewed / [x] unable to be reviewed as noted in HPI  Other findings are noted below. Systems: constitutional, ears/nose/mouth/throat, respiratory, gastrointestinal, genitourinary, musculoskeletal, integumentary, neurologic, psychiatric, endocrine. Positive findings noted below. Modified ESAS Completed by: provider   Fatigue: 6 Drowsiness: 1     Pain: 0         Anorexia: 0 Dyspnea: 0           Stool Occurrence(s): 1        PHYSICAL EXAM:     From RN flowsheet:  Wt Readings from Last 3 Encounters:   03/22/17 180 lb 5.4 oz (81.8 kg)   01/10/17 146 lb 3.2 oz (66.3 kg)   01/04/17 150 lb (68 kg)     Blood pressure 93/48, pulse 81, temperature 98.4 °F (36.9 °C), resp. rate 26, height 5' 6\" (1.676 m), weight 180 lb 5.4 oz (81.8 kg), SpO2 95 %.     Pain Scale 1: Numeric (0 - 10)  Pain Intensity 1: 0     Pain Location 1: Abdomen  Pain Orientation 1: Mid     Pain Intervention(s) 1: Medication (see MAR)    Constitutional: awake, fatigued, nad  Eyes: pupils equal, anicteric  ENMT: no nasal discharge, moist mucous membranes  Cardiovascular: regular rhythm, distal pulses intact  Respiratory: breathing not labored, symmetric  Gastrointestinal: soft non-tender, +bowel sounds  Musculoskeletal: no deformity, no tenderness to palpation  Skin: warm, dry, diffuse edema  Neurologic: following commands, moving all extremities  Psychiatric: restricted affect, no hallucinations  Other:         HISTORY:     Principal Problem:    Hypothermia (3/8/2017)      Past Medical History:   Diagnosis Date    Atrial fibrillation (HCC)     Chronic systolic heart failure (HCC)     ef 35-40%; hold ACE due to lower bp    Diabetes mellitus type 2, insulin dependent ([address]     Hypertension     Long term (current) use of anticoagulants     Malignant hypertensive heart disease with CHF ([address]     Mitral regurgitation     Pacemaker 10/25/2012    The pacemaker is a [employer] , model # [id number], serial [id number]. 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, 1996    # 28 [place]    Thyroid disease     TR (tricuspid regurgitation)     moderate to severe echo 2009      Past Surgical History:   Procedure Laterality Date    CARDIAC SURG PROCEDURE UNLIST  1996    valve replacement    HX HEART CATHETERIZATION  10/4/2012    normal cors, LVEF 50%    HX HEART VALVE SURGERY      HX PACEMAKER      INS PPM/ICD LED DUAL ONLY  10/25/2012         INS PPM/ICD LED DUAL ONLY  9/9/2016           Family History   Problem Relation Age of Onset    Cancer Sister      breast    Heart Disease Brother     Heart Disease Brother     Diabetes Son     Stroke Son       History reviewed, no pertinent family history.   Social History   Substance Use Topics    Smoking status: Never Smoker    Smokeless tobacco: Never Used    Alcohol use No     No Known Allergies   Current Facility-Administered Medications   Medication Dose Route Frequency    albuterol-ipratropium (DUO-NEB) 2.5 MG-0.5 MG/3 ML  3 mL Nebulization TID    insulin lispro (HUMALOG) injection   SubCUTAneous AC&HS    furosemide (LASIX) tablet 80 mg  80 mg Oral BID    enalapril (VASOTEC) tablet 2.5 mg  2.5 mg Oral BID    atropine injection 0.5-1 mg  0.5-1 mg IntraVENous Multiple    fentaNYL citrate (PF) injection  mcg   mcg IntraVENous Multiple    midazolam (VERSED) injection 1-10 mg  1-10 mg IntraVENous Multiple    sodium chloride (NS) flush 5-10 mL  5-10 mL IntraVENous Multiple    apixaban (ELIQUIS) tablet 2.5 mg  2.5 mg Oral BID    phosphorus (K PHOS NEUTRAL) 250 mg tablet 1 Tab  1 Tab Oral BID    albumin human 25% (BUMINATE) solution 12.5 g  12.5 g IntraVENous Q8H PRN    pantoprazole (PROTONIX) tablet 40 mg  40 mg Oral ACB    balsam peru-castor oil (VENELEX)  mg/gram ointment   Topical BID    docusate sodium (COLACE) capsule 100 mg  100 mg Oral BID    polyvinyl alcohol (LIQUIFILM TEARS) 1.4 % ophthalmic solution 2 Drop  2 Drop Both Eyes TID    sodium chloride (NS) flush 10 mL  10 mL InterCATHeter Q24H    sodium chloride (NS) flush 10 mL  10 mL InterCATHeter PRN    sodium chloride (NS) flush 10-40 mL  10-40 mL InterCATHeter Q8H    alteplase (CATHFLO) 1 mg in sterile water (preservative free) 1 mL injection  1 mg InterCATHeter PRN    bacitracin 500 unit/gram packet 1 Packet  1 Packet Topical PRN    0.9% sodium chloride infusion 250 mL  250 mL IntraVENous PRN    ondansetron (ZOFRAN) injection 4 mg  4 mg IntraVENous Q8H PRN    glucose chewable tablet 16 g  4 Tab Oral PRN    dextrose (D50W) injection syrg 12.5-25 g  12.5-25 g IntraVENous PRN    glucagon (GLUCAGEN) injection 1 mg  1 mg IntraMUSCular PRN    sodium chloride (NS) flush 5-10 mL  5-10 mL IntraVENous Q8H    sodium chloride (NS) flush 5-10 mL  5-10 mL IntraVENous PRN          LAB AND IMAGING FINDINGS:     Lab Results   Component Value Date/Time    WBC 3.1 03/22/2017 05:21 AM    HGB 7.8 03/22/2017 05:21 AM    PLATELET 88 47/16/6075 05:21 AM     Lab Results   Component Value Date/Time    Sodium 144 03/22/2017 05:21 AM    Potassium 3.3 03/22/2017 05:21 AM    Chloride 104 03/22/2017 05:21 AM    CO2 34 03/22/2017 05:21 AM    BUN 14 03/22/2017 05:21 AM    Creatinine 0.80 03/22/2017 05:21 AM    Calcium 9.1 03/22/2017 05:21 AM    Magnesium 2.0 03/19/2017 04:16 AM    Phosphorus 2.0 03/19/2017 04:16 AM      Lab Results   Component Value Date/Time    AST (SGOT) 16 03/21/2017 03:53 AM    Alk.  phosphatase 42 03/21/2017 03:53 AM    Protein, total 5.5 03/21/2017 03:53 AM    Albumin 2.3 03/21/2017 03:53 AM    Globulin 3.2 03/21/2017 03:53 AM     Lab Results   Component Value Date/Time    INR 1.3 03/22/2017 05:21 AM    Prothrombin time 13.6 03/22/2017 05:21 AM    aPTT 37 08/15/2016 12:00 AM      No results found for: IRON, FE, TIBC, IBCT, PSAT, FERR   No results found for: PH, PCO2, PO2  No components found for: [place]   Lab Results   Component Value Date/Time    CK 88 09/01/2012 04:00 AM CK - MB 2.2 09/01/2012 04:00 AM                Total time: 25min  Counseling / coordination time:  15min  > 50% counseling / coordination?: yes    Prolonged service was provided for  []30 min   []75 min in face to face time in the presence of the patient. Time Start:    Time End:   Note: this can only be billed with 94789 (initial) or 70031 (follow up). If multiple start / stop times, list each separately.

## 2017-03-22 NOTE — PROGRESS NOTES
Hospitalist f/u:  I called patient's daughter Yu Moreno and left a message on her Voicemail.       Charity Delgado M.D.

## 2017-03-22 NOTE — PROGRESS NOTES
Problem: Mobility Impaired (Adult and Pediatric)  Goal: *Acute Goals and Plan of Care (Insert Text)  Physical Therapy Goals  Initiated 3/16/2017  1. Patient will move from supine to sit and sit to supine , scoot up and down and roll side to side in bed with minimal assistance within 7 day(s). 2. Patient will transfer from bed to chair and chair to bed with moderate assistance using the least restrictive device within 7 day(s). 3. Patient will perform sit to stand with moderate assistance within 7 day(s). 4. Patient will ambulate with moderate assistance for 10 feet with the least restrictive device within 7 day(s). PHYSICAL THERAPY TREATMENT  Patient: Quinton Shaver (89 y.o. female)  Date: 3/22/2017  Diagnosis: Hypothermia Hypothermia       Precautions: Fall      ASSESSMENT: Patient able to sit today from supine with max assist of 1. Initial sitting balance required min assist for left lateral lean but improved with time up. Attempted to have patient stand pulling up on back of stabilized chair and facilitation given bilaterally for knee extension. Patient unable to even clear buttocks. Therapist then switched approach to in front of patient to work on rocking forward enough to weight bear through LE's in sitting position. Patient needed max assist to achieve this task but once she understood what to do she was able to shift her weight enough over her LE's that with facilitation given for knee extension she was able to fully clear her buttocks off the bed. Patient then was able to perform a squat pivot transfer with max assist plus another assist for line management. Vitals stable during therapy session. Spoke with nursing about having mobility team use perri lift for transfer back to bed.       Progression toward goals:  [ ]    Improving appropriately and progressing toward goals  [X]    Improving slowly and progressing toward goals  [ ]    Not making progress toward goals and plan of care will be adjusted PLAN:  Patient continues to benefit from skilled intervention to address the above impairments. Continue treatment per established plan of care. Discharge Recommendations:  Rehab  Further Equipment Recommendations for Discharge: Will continue to assess       SUBJECTIVE:   Patient stated Feels good to sit up.       OBJECTIVE DATA SUMMARY:   Critical Behavior:  Neurologic State: Alert  Orientation Level: Oriented to person, Oriented to place, Disoriented to time, Disoriented to situation  Cognition: Decreased attention/concentration, Follows commands  Safety/Judgement: Not assessed  Functional Mobility Training:  Bed Mobility:   Supine to Sit: Maximum assistance;Assist x1;Additional time   Scooting: Moderate assistance;Assist x1 (sitting to the edge of the bed)   Transfers:  Sit to Stand: Total assistance;Assist x2 (allowing patient to pull up using back of the chair. Patient unable to clear buttocks. Therapist then stood in front of patient facilitating knee extension bilaterally and trunk flexion. With some practice patient able to come to partial standing with max assist of 1)  Stand to Sit: Maximum assistance;Assist x1      Bed to Chair: Maximum assistance;Assist x1 (squat pivot to chair, another assist for line managment)         Balance:  Sitting: Impaired; Without support  Sitting - Static: Fair (occasional)  Sitting - Dynamic: Fair (occasional)  Standing: Impaired; With support  Standing - Static: Poor  Standing - Dynamic : Poor     Therapeutic Exercises: Ankle pumps and LAQ 3 sets 5 reps  Pain:  Pain Scale 1: Numeric (0 - 10)  Pain Intensity 1: 0      Activity Tolerance:   Sitting BP97/67 HR 94 post transfer sitting /50 HR 86 O2 sats 98%  Please refer to the flowsheet for vital signs taken during this treatment.   After treatment:   [X]    Patient left in no apparent distress sitting up in chair  [ ]    Patient left in no apparent distress in bed  [X]    Call bell left within reach  [X] Nursing notified  [ ]    Caregiver present  [ ]    Bed alarm activated      COMMUNICATION/COLLABORATION:   The patients plan of care was discussed with: Registered Nurse     Folrentino Bland, PT   Time Calculation: 23 mins

## 2017-03-22 NOTE — PROGRESS NOTES
Hospitalist Progress Note  Sydnie Mesa MD  Office: [phone]  Cell: [phone]        Date of Service:  3/22/2017  NAME:  Kaushik Lara  :  1930  MRN:  [id number]      Admission Summary:   80-year-old female with history of chronic systolic congestive heart failure, atrial fibrillation, diabetes mellitus, hypertension, chronic anticoagulation use, history of pacemaker and ICD. She was sent from her primary care physician to the emergency room to be evaluated. History from the patient and her daughters at the bedside, also from the records. Per the daughter, she has been having on and off abdominal pain, epigastric pain for a while, over the last 3 days has been mostly pronounced. She said her pain at times is 9/10 in severity, it is nonradiating, associated with some nausea, but no vomiting. She denies any diarrhea. At her PCP's office,   they could not get her temperature, so they sent her to the emergency room. In the ER she has been hypothermic, her temperature was 91.4 on initial presentation. Also she was hypotensive. Her blood pressure   systolic was 59/56, so she was given about 1.5 liters of IV fluid, and now she is getting 1 bottle of albumin. She was placed on warming blanket, Lizzy Hugger. She denies any dysuria or frequency. Interval history / Subjective:   Pt seen and examined  Sitting up in a chair this morning, feels very tired. BP slightly better this morning. Appears to have more peripheral edema. Richardson removed yesterday. Has no new complaints. Assessment & Plan:     Hypothermia and hypotension. - resolved  On empiric IV antibiotics, on Zosyn and vancomycin. Blood culture: No growth x 5 days;    Required the [facility] intermittently     Acute Respiratory failure with Hypercapnia due to Pleural effusion/atelectasis and acute on chronic Systolic CHF NYHA IV  BIPAP PRN  PCCM input aprpeciated  Diuresis, on NC now  May need intermittent BIPAP for hypercapnia;   - 3/21: Lasix changed to PO. Bilateral Pleural effusions: likely related to CHF  improving on CXR;   continue antibiotics, CHF volume overload. C/w diuresis     Sepsis:  Unknown source, most likely lung with bilateral pleural effusions, possibly infectious. She had caren of leukocytes to 1.9, improving. She was afebrile overnight, low BP requiring pressors. Weaned off pressors  Continue zosyn, vanco.   Monitor cultures, preliminary blood, urine cultures no growth so far. - AMMON: no vegetations;   - blood culture 3/08 and 3/11: no growth x 5 days;   - urine culture: no growth;   - 3/21: afebrile, no leukocytosis, cultures negative. Will discontinue IV Abx and watch off Abx. Patient completed 2 weeks of IV Abx since admission. Acute on Chronic Systolic congestive heart failure NYHA III/IV. With fluid overload. Echo 3/9 with 30% EF, diffuse hypokinesis. Bumex added, strict I/O, monitor weight. History of pacer. Valvular disease: Moderate mitral regurgitation, right ventricular systolic dysfunction,   as well as enlarged left atrium. Valvular vegetation on aortic valve, questionable: Blood cultures no growth preliminary. AMMON tentatively on Monday if respiratory status permits  - Diuresis per nephrology   - 3/20: AMMON scheduled for noon today. - 3/21: AMMON showed no vegetations. Hematuria: may be related to traumatic galarza placement with INR max 5.5,  Urine noted on UA as yellow on 3/8,   Galarza placed 3/9 and hematuria reported 3/10 UA. JUAN:  From  ATN and contrast nephropathy/Hypotension most likely etiology. Ischemic heart disease, hypotension; and exposure to IV contrast.   Appreciate nephro consult recommendations. Bumex, avoid nephrotoxins, I/Os, -resolved    Hypoglycemia/diabetes mellitus. Resolved. Holding her Lantus and on hypoglycemia protocol. Her blood sugar improved after D50.     Stable, off D10 now  resolved    Ischemic hepatitis:. Consulted GI. Plans for EGD when stable or outpatient. Continue proton pump inhibitor for now. US with distended gallbladder with stone and sludge. Appreciate gen surg eval   if abdominal pain worsens, get HIDA prior to godwin tube placement  - 3/21: appreciate GI consult and f/u: signed off. LFTs improving. Coagulopathy.  - Holding her Coumadin and monitor daily INR and restart her Coumadin when INR improved. INR increased, she received Vitamin K x 1. With hematuria will give FFP and monitor INR.   - 3/21: started patient on Eliquis for stroke prophylaxis in the setting of chronic Atrial Fibrillation and [id number] score of 6. Pancytopenia. - Appears to be chronic. However, patient and family unaware of her previous lab results. Will monitor her labs closely. Left cheek where it meets the nose pressure ulcer/injury:  - stage 2; measures 1 x 0.5 x 0 cm; 100% drying pink/red;  - with linear jac = 2 x 0.5 x 0 cm (smaller);  - Hospital Acquired; Code status: Full    Care Plan discussed with: Patient/Family and Nurse  Disposition: TBD   Consult PT/OT and OOB. Hospital Problems  Date Reviewed: 3/8/2017          Codes Class Noted POA    * (Principal)Hypothermia ICD-10-CM: T68. XXXA  ICD-9-CM: 991.6  3/8/2017 Yes                Review of Systems:   A comprehensive review of systems was negative. Vital Signs:    Last 24hrs VS reviewed since prior progress note.  Most recent are:  Visit Vitals    BP 93/48 (BP 1 Location: Left arm, BP Patient Position: Sitting)    Pulse 81    Temp 98.4 °F (36.9 °C)    Resp 26    Ht 5' 6\" (1.676 m)    Wt 81.8 kg (180 lb 5.4 oz)    SpO2 95%    BMI 29.11 kg/m2         Intake/Output Summary (Last 24 hours) at 03/22/17 1552  Last data filed at 03/22/17 0900   Gross per 24 hour   Intake              340 ml   Output              600 ml   Net             -260 ml        Physical Examination:             Constitutional: Awake, on NC, drowsy this AM   ENT:   Neck supple,    Resp:  good air entry    CV:  irregular    GI:  Soft, non-tender, normoactive bowel sounds, no hepatosplenomegaly     Musculoskeletal:  2+ edema    Neurologic:   awake, moves all 4 extremities             Data Review:    Review and/or order of clinical lab test   US abd:  IMPRESSION:  1. Distended gallbladder with sludge and a stone with moderate wall thickening of the gallbladder wall. 2. Dilated IVC suggesting right heart failure. 3. Small amount of free fluid surrounding the liver. 4. Small right pleural effusion      Labs:     Recent Labs      03/22/17   0521  03/20/17   0402   WBC  3.1*  4.1   HGB  7.8*  8.4*   HCT  25.4*  26.4*   PLT  88*  76*     Recent Labs      03/22/17   0521  03/20/17   0909   NA  144  141   K  3.3*  3.5   CL  104  102   CO2  34*  34*   BUN  14  16   CREA  0.80  0.79   GLU  126*  109*   CA  9.1  8.7     Recent Labs      03/21/17   0353   SGOT  16   ALT  34   AP  42*   TBILI  1.9*   TP  5.5*   ALB  2.3*   GLOB  3.2     Recent Labs      03/22/17   0521  03/21/17   0353  03/20/17   0402   INR  1.3*  1.3*  1.4*   PTP  13.6*  13.4*  14.4*      No results for input(s): FE, TIBC, PSAT, FERR in the last 72 hours. Lab Results   Component Value Date/Time    Folate 14.5 03/09/2017 03:23 AM      No results for input(s): PH, PCO2, PO2 in the last 72 hours. No results for input(s): CPK, CKNDX, TROIQ in the last 72 hours.     No lab exists for component: CPKMB  Lab Results   Component Value Date/Time    Cholesterol, total 138 09/28/2011 09:13 AM    HDL Cholesterol 50 09/28/2011 09:13 AM    LDL, calculated 74 09/28/2011 09:13 AM    Triglyceride 69 09/28/2011 09:13 AM     Lab Results   Component Value Date/Time    Glucose (POC) 154 03/22/2017 11:25 AM    Glucose (POC) 136 03/22/2017 03:17 AM    Glucose (POC) 137 03/22/2017 12:00 AM    Glucose (POC) 193 03/21/2017 11:50 AM    Glucose (POC) 116 03/20/2017 11:53 PM     Lab Results   Component Value Date/Time    Color RED 03/10/2017 02:30 AM    Appearance BLOODY 03/10/2017 02:30 AM    Specific gravity 1.020 03/10/2017 02:30 AM    Specific gravity 1.016 03/08/2017 08:54 PM    pH (UA) 6.0 03/10/2017 02:30 AM    Protein 100 03/10/2017 02:30 AM    Glucose NEGATIVE  03/10/2017 02:30 AM    Ketone NEGATIVE  03/10/2017 02:30 AM    Bilirubin NEGATIVE  03/10/2017 02:30 AM    Urobilinogen 1.0 03/10/2017 02:30 AM    Nitrites NEGATIVE  03/10/2017 02:30 AM    Leukocyte Esterase TRACE 03/10/2017 02:30 AM    Epithelial cells FEW 03/10/2017 02:30 AM    Bacteria NEGATIVE  03/10/2017 02:30 AM    WBC 20-50 03/10/2017 02:30 AM    RBC >100 03/10/2017 02:30 AM         Medications Reviewed:     Current Facility-Administered Medications   Medication Dose Route Frequency    albuterol-ipratropium (DUO-NEB) 2.5 MG-0.5 MG/3 ML  3 mL Nebulization TID    insulin lispro (HUMALOG) injection   SubCUTAneous AC&HS    0.9% sodium chloride infusion 250 mL  250 mL IntraVENous PRN    furosemide (LASIX) injection 80 mg  80 mg IntraVENous ONCE    furosemide (LASIX) tablet 80 mg  80 mg Oral BID    enalapril (VASOTEC) tablet 2.5 mg  2.5 mg Oral BID    atropine injection 0.5-1 mg  0.5-1 mg IntraVENous Multiple    fentaNYL citrate (PF) injection  mcg   mcg IntraVENous Multiple    midazolam (VERSED) injection 1-10 mg  1-10 mg IntraVENous Multiple    sodium chloride (NS) flush 5-10 mL  5-10 mL IntraVENous Multiple    apixaban (ELIQUIS) tablet 2.5 mg  2.5 mg Oral BID    phosphorus (K PHOS NEUTRAL) 250 mg tablet 1 Tab  1 Tab Oral BID    albumin human 25% (BUMINATE) solution 12.5 g  12.5 g IntraVENous Q8H PRN    pantoprazole (PROTONIX) tablet 40 mg  40 mg Oral ACB    balsam peru-castor oil (VENELEX)  mg/gram ointment   Topical BID    docusate sodium (COLACE) capsule 100 mg  100 mg Oral BID    polyvinyl alcohol (LIQUIFILM TEARS) 1.4 % ophthalmic solution 2 Drop  2 Drop Both Eyes TID    sodium chloride (NS) flush 10 mL  10 mL InterCATHeter Q24H    sodium chloride (NS) flush 10 mL  10 mL InterCATHeter PRN    sodium chloride (NS) flush 10-40 mL  10-40 mL InterCATHeter Q8H    alteplase (CATHFLO) 1 mg in sterile water (preservative free) 1 mL injection  1 mg InterCATHeter PRN    bacitracin 500 unit/gram packet 1 Packet  1 Packet Topical PRN    0.9% sodium chloride infusion 250 mL  250 mL IntraVENous PRN    ondansetron (ZOFRAN) injection 4 mg  4 mg IntraVENous Q8H PRN    glucose chewable tablet 16 g  4 Tab Oral PRN    dextrose (D50W) injection syrg 12.5-25 g  12.5-25 g IntraVENous PRN    glucagon (GLUCAGEN) injection 1 mg  1 mg IntraMUSCular PRN    sodium chloride (NS) flush 5-10 mL  5-10 mL IntraVENous Q8H    sodium chloride (NS) flush 5-10 mL  5-10 mL IntraVENous PRN     ______________________________________________________________________  EXPECTED LENGTH OF STAY: 4d 21h  ACTUAL LENGTH OF STAY:          14                 Natali Stafford MD

## 2017-03-22 NOTE — PROGRESS NOTES
Chart reviewed. Met with pt to introduce self and role. CM talked to pt about possibility of her needing to go to a rehab,? SNF when she is ready for discharge. Pt stated understanding. CM attempted to reach pt's daughter, [facility], [phone], left VM explaining this information. List of SNF left in room on pt's bedside table for pt and daughter to review. CM left contact information.      Alida Dexter RN ACM CRM

## 2017-03-22 NOTE — PROGRESS NOTES
1450: Orders received for 2 units of blood. 1454: Pt urinated, bladder scan performed post void. MD notified, orders received. 1500: Blood drawn for type and cross. 1527: Richardson placed per order. Problem: Heart Failure: Day 4  Goal: *Oxygen saturation within defined limits  Outcome: Progressing Towards Goal  Pt O2 demand decreased to 1L NC, pt off bipap at current time. Will continue to monitor and educate pt. Bedside shift change report given to [address] [place] [address] (oncoming nurse) by Luz Elena Mancia (offgoing nurse). Report included the following information SBAR, Kardex, ED Summary, Procedure Summary, Intake/Output, MAR, Accordion, Recent Results, Med Rec Status, Cardiac Rhythm V paced and Alarm Parameters .

## 2017-03-22 NOTE — PROGRESS NOTES
0010 - Bedside and Verbal shift change report given to susan amaro (oncoming nurse) by Julia Lomeli (offgoing nurse). Report included the following information SBAR, Kardex, Intake/Output, MAR, Recent Results and Cardiac Rhythm afib.   0745 - Bedside and Verbal shift change report given to Cecile Vogel (oncoming nurse) by Sofiya Pierce (offgoing nurse). Report included the following information SBAR, Kardex, Intake/Output, MAR, Recent Results and Cardiac Rhythm paced.

## 2017-03-22 NOTE — PROGRESS NOTES
Follow up visit with Ms.  Jose Jimenez. Pt was sitting in chair at time of visit. Reintroduced myself to pt and shared that we had met in the ICU, and that I had also met many of her family members, as well as her . Pt smiled as she spoke about her family and their support. Provided emotional support and assisted pt with some apple juice after consulting with her nurse. While pt acknowledge that her health concerns and hospitalization have been difficult, she appeared in good spirits and denied any worries or concerns at this time. Assurance of prayers offered.     Misti Fisher, Palliative

## 2017-03-22 NOTE — PROGRESS NOTES
Problem: Self Care Deficits Care Plan (Adult)  Goal: *Acute Goals and Plan of Care (Insert Text)  Occupational Therapy Goals  Initiated 3/16/2017  1. Patient will perform grooming seated EOB for 5 minutes with supervision/set-up within 7 day(s). 2. Patient will perform UB bathing with minimal assistance/contact guard assist within 7 day(s). 3. Patient will perform lower body dressing with moderate assistance within 7 day(s). 4. Patient will perform toilet transfers with moderate assistance within 7 day(s). 5. Patient will perform all aspects of toileting with moderate assistance within 7 day(s). 6. Patient will participate in upper extremity therapeutic exercise/activities with minimal assistance/contact guard assist for 10 minutes within 7 day(s). 7. Patient will utilize energy conservation techniques during functional activities with verbal cues within 7 day(s). OCCUPATIONAL THERAPY TREATMENT  Patient: Saqib Mohr (12 y.o. female)  Date: 3/22/2017  Diagnosis: Hypothermia Hypothermia       Precautions: Fall      ASSESSMENT:  Pt cleared by nursing to work with therapy and asked that therapy discuss importance of self feeding with family as they are consistently providing assist to patient when nursing feels she doesn't need it. Upon entry into room pt with minimal eaten tray but declined eating anything else (stated she doesn't like the food). Family reported she is able to feed herself/does not need assistance and education was provided on the importance of increasing her independence with daily activities. Pt participated in R UE/hand AAROM/AROM exercises as well light edema massage for the R hand. Following exercises/massage pt demonstrated ability to feed herself with setup (needs assist to open container) and left seated in chair feeding self with the r hand. Added grasp/extension of both hands to exercise list on board and education on provided to family on how to perform edema massage. Pt does not recall ever receiving any red foam or theraband as reported by prior therapist.  Alessandro Nick following up with bilateral UE exercises next session. Progression toward goals:  [X]       Improving appropriately and progressing toward goals  [ ]       Improving slowly and progressing toward goals  [ ]       Not making progress toward goals and plan of care will be adjusted       PLAN:  Patient continues to benefit from skilled intervention to address the above impairments. Continue treatment per established plan of care. Discharge Recommendations:  Gera Parr  Further Equipment Recommendations for Discharge:  TBD       SUBJECTIVE:   Patient stated I'm working .       OBJECTIVE DATA SUMMARY:   Cognitive/Behavioral Status:  Neurologic State: Alert  Orientation Level: Appropriate for age  Cognition: Decreased attention/concentration  Perception: Appears intact  Perseveration: No perseveration noted  Safety/Judgement: Fall prevention     Functional Mobility and Transfers for ADLs:  Bed Mobility:  Supine to Sit: Maximum assistance;Assist x1;Additional time  Scooting: Moderate assistance;Assist x1 (sitting to the edge of the bed)     Transfers:  Sit to Stand: Total assistance;Assist x2 (allowing patient to pull up using back of the chair. Patient unable to clear buttocks. Therapist then stood in front of patient facilitating knee extension bilaterally and trunk flexion. With some practice patient able to come to partial standing with ma)  Stand to Sit: Maximum assistance;Assist x1  Bed to Chair: Maximum assistance;Assist x1 (squat pivot to chair, another assist for line managment)     Balance:  Sitting: Impaired; Without support  Sitting - Static: Fair (occasional)  Sitting - Dynamic: Fair (occasional)  Standing: Impaired; With support  Standing - Static: Poor  Standing - Dynamic : Poor     ADL Intervention:  Feeding  Feeding Assistance:  (RN reports pt having family feed her)  Food to Mouth: Supervision/set-up (assist to open applesauce)     Cognitive Retraining  Safety/Judgement: Fall prevention     Therapeutic Exercises:      Pt completed the following exercises with the R UE and was instructed to complete grasp/extension with the L hand later on today. EXERCISE   Sets   Reps   Active Active Assist   Passive   Comments   Shoulder flexion 1 5 [ ]           [X]           [ ]           AAROM shoulder flex; hold 1 sec at end range; AROM extension   Bicep Flexion 1 5 [X]           [ ]           [ ]           AROM; Hand Grasp/Extension 1 5 [X]           [ ]           [ ]           AROM; added to HEP   Wrist Extension 1 5 [ ]           [X]           [ ]           AAROM with hold at end range         Pain:  Pain Scale 1: Numeric (0 - 10)  Pain Intensity 1: 0        Activity Tolerance:   Delayed processing with increased time needed for responses to questions     Please refer to the flowsheet for vital signs taken during this treatment.   After treatment:   [X] Patient left in no apparent distress sitting up in chair  [ ] Patient left in no apparent distress in bed  [X] Call bell left within reach  [X] Nursing notified  [ ] Caregiver present  [ ] Bed alarm activated      COMMUNICATION/COLLABORATION:   The patients plan of care was discussed with: Physical Therapist and Registered Nurse     Christina Adkins OT  Time Calculation: 21 mins

## 2017-03-22 NOTE — PROGRESS NOTES
Lab Results   Component Value Date/Time    HGB 7.8 03/22/2017 05:21 AM      Lab Results   Component Value Date/Time    Creatinine 0.80 03/22/2017 05:21 AM      Transfuse 2 units with Lasix 80 mg IV in between

## 2017-03-22 NOTE — PROGRESS NOTES
0730: Received bedside report on pt from [address]. Pt resting comfortably in bed, NAD, VSS.    1200: Pt refused to eat food brought for lunch. Asked if pt wanted something else, she said she was not interested. I convinced her to drink half of her nutritional supplement and eat some applesauce. 1700: Pt ate about half of dinner with some encouragement. She definitely seemed to have an easier time eating the softer foods like sweet potatoes than the chewy foods like pork loin. 1900: Richardson removed. 1930: Bedside shift change report given to Ricky Schuler RN (oncoming nurse) by Guerline Mitchell RN (offgoing nurse). Report included the following information SBAR, Kardex, ED Summary, Procedure Summary, Intake/Output, MAR, Recent Results and Med Rec Status. Opportunity for questions provided. q1h rounds completed during shift.

## 2017-03-22 NOTE — PROGRESS NOTES
[facility]  -Progress Note     Harman Clark [phone]   3/22/2017      Kiet Harvey M.D. , F.A.C.C.   --------PCP:-Bc Muro MD   -----Subjective:   . Elaine Bending Agra Bending Agra Jordan Harding is a 80 y.o. female   Looks more edematous in arms, legs  Quiet response to questions but clear voice and denies complaints  Comfortable   No complaints  Denies chest pain, heart palpitations ,  pre-syncope or shortness of breath at rest   No problems overnight, rhythm and hemodynamics stable -see vitals below    AMMON Monday with no vegetation, Same EF 35 and 1-2+ MR, TR as expected  Patient Vitals for the past 12 hrs:   Temp Pulse Resp BP SpO2   03/22/17 1124 98.4 °F (36.9 °C) 81 26 93/48 95 %   03/22/17 0920 - - - - 94 %   03/22/17 0919 - - - - (!) 86 %   03/22/17 0917 - - - - 94 %   03/22/17 0838 99.3 °F (37.4 °C) 81 25 92/53 96 %   03/22/17 0826 - - - - 97 %   03/22/17 0421 98.4 °F (36.9 °C) 82 23 90/53 94 %        Discussion/Plans/Recs  No vegetation  Sepsis-cholecystitis, resp failure, improving, needed bipap yesterday    Chronic systolic CHF  Now, compensated  ---BiV ACID  --PO diuretics  --ACE held-now restarted  --BB held due to low BP  MR-systolic murmur    ERASTO is very large but no clot, would start to use OAC cautiously for stroke prophylaxis    Afib--[id number]= 6 (age, CHF, HTN, female, DM)    JUAN resolved  Thrombocytopenia-, jaundice, Transaminitis, lipase, cholecystitis-improving    PLAN:   started her on Eliquis at 2.5 mg bid for stroke prevention,very large LA appendage with some \"smoke\" fortunately no clot  Change bumex to PO continue KCl BUT if edema worsens go back to IV dosing tonight or am  Anemic , will need to transfuse to 9    OT,PT eval for SNF     Cardiac Studies/Hx:  No specialty comments available.          Past Medical History:   Diagnosis Date    Atrial fibrillation (HCC)     Chronic systolic heart failure (HCC)     ef 35-40%; hold ACE due to lower bp    Diabetes mellitus type 2, insulin dependent ([facility]     Hypertension     Long term (current) use of anticoagulants     Malignant hypertensive heart disease with CHF ([facility]     Mitral regurgitation     Pacemaker 10/25/2012    The pacemaker is a [employer], model # [id number], serial [id number]. 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 Results for orders placed or performed during the hospital encounter of 03/08/17   EKG, 12 LEAD, INITIAL   Result Value Ref Range    Ventricular Rate 80 BPM    Atrial Rate 75 BPM    QRS Duration 146 ms    Q-T Interval 480 ms    QTC Calculation (Bezet) 553 ms    Calculated R Axis 158 degrees    Calculated T Axis 5 degrees    Diagnosis       Ventricular-paced rhythm  When compared with ECG of 09-SEP-2016 17:36,  Vent. rate has decreased BY  30 BPM  Confirmed by Sarthak Miranda M.D., Chelsea Hussein ([id number]) on 3/9/2017 10:15:16 AM     Results for orders placed or performed in visit on 03/27/12   AMB POC EKG ROUTINE W/ 12 LEADS, INTER & REP    Narrative    See scanned report.         Vitals:    03/22/17 0917 03/22/17 0919 03/22/17 0920 03/22/17 1124   BP:    93/48   BP 1 Location:    Left arm   BP Patient Position:    Sitting   Pulse:    81   Resp:    26   Temp:    98.4 °F (36.9 °C)   SpO2: 94% (!) 86% 94% 95%   Weight:       Height:           Objective:    Physical Exam:   Patient Vitals for the past 12 hrs:   Temp Pulse Resp BP SpO2   03/22/17 1124 98.4 °F (36.9 °C) 81 26 93/48 95 %   03/22/17 0920 - - - - 94 %   03/22/17 0919 - - - - (!) 86 %   03/22/17 0917 - - - - 94 %   03/22/17 0838 99.3 °F (37.4 °C) 81 25 92/53 96 %   03/22/17 0826 - - - - 97 %   03/22/17 0421 98.4 °F (36.9 °C) 82 23 90/53 94 %      General:  Somnolent arousable   ENT, Neck:  no jvd   Chest Wall: inspection normal - no chest wall deformities or tenderness, respiratory effort normal   Lung: clear to auscultation bilaterally   Heart:  no gallops noted, irregularly irregular rhythm with rate ok, no JVD 2/6 systolic early/decresendo  murmur at RUSB to LUSB without radiation beyond apex    Abdomen: nondistended   Extremities: extremities normal, atraumatic, no cyanosis or edema     Last 24hr Input/Output:    Intake/Output Summary (Last 24 hours) at 03/22/17 1445  Last data filed at 03/22/17 0900   Gross per 24 hour   Intake              340 ml   Output              600 ml   Net             -260 ml        Data Review:   Recent Results (from the past 24 hour(s))   GLUCOSE, POC    Collection Time: 03/22/17 12:00 AM   Result Value Ref Range    Glucose (POC) 137 (H) 65 - 100 mg/dL    Performed by [address] [place] [address], POC    Collection Time: 03/22/17  3:17 AM   Result Value Ref Range    Glucose (POC) 136 (H) 65 - 100 mg/dL    Performed by Trini Cannon + INR    Collection Time: 03/22/17  5:21 AM   Result Value Ref Range    INR 1.3 (H) 0.9 - 1.1      Prothrombin time 13.6 (H) 9.0 - 78.3 sec   METABOLIC PANEL, BASIC    Collection Time: 03/22/17  5:21 AM   Result Value Ref Range    Sodium 144 136 - 145 mmol/L    Potassium 3.3 (L) 3.5 - 5.1 mmol/L    Chloride 104 97 - 108 mmol/L    CO2 34 (H) 21 - 32 mmol/L    Anion gap 6 5 - 15 mmol/L    Glucose 126 (H) 65 - 100 mg/dL    BUN 14 6 - 20 MG/DL    Creatinine 0.80 0.55 - 1.02 MG/DL    BUN/Creatinine ratio 18 12 - 20      GFR est AA >60 >60 ml/min/1.73m2    GFR est non-AA >60 >60 ml/min/1.73m2    Calcium 9.1 8.5 - 10.1 MG/DL   CBC W/O DIFF    Collection Time: 03/22/17  5:21 AM   Result Value Ref Range    WBC 3.1 (L) 3.6 - 11.0 K/uL    RBC 3.50 (L) 3.80 - 5.20 M/uL    HGB 7.8 (L) 11.5 - 16.0 g/dL    HCT 25.4 (L) 35.0 - 47.0 %    MCV 72.6 (L) 80.0 - 99.0 FL    MCH 22.3 (L) 26.0 - 34.0 PG    MCHC 30.7 30.0 - 36.5 g/dL    RDW 17.9 (H) 11.5 - 14.5 %    PLATELET 88 (L) 086 - 400 K/uL   BNP    Collection Time: 03/22/17  5:21 AM   Result Value Ref Range    BNP 1238 (H) 0 - 100 pg/mL   NUCLEATED RBC    Collection Time: 03/22/17  5:21 AM   Result Value Ref Range    NRBC 2.7 (H) 0  WBC    ABSOLUTE NRBC 0.08 (H) 0.00 - 0.01 K/uL    WBC CORRECTED FOR NR ADJUSTED FOR NUCLEATED RBC'S     GLUCOSE, POC    Collection Time: 03/22/17 11:25 AM   Result Value Ref Range    Glucose (POC) 154 (H) 65 - 100 mg/dL    Performed by Ana Paula Valencia MD 3/22/2017

## 2017-03-22 NOTE — PROGRESS NOTES
Problem: Falls - Risk of  Goal: *Absence of falls  Outcome: Progressing Towards Goal  Pt A&Ox2, reminded to call RN often with any needs. Call bell and personal belongings kept within reach. Bed in low position with wheels locked, rails up x3, frequent rounding completed by staff.

## 2017-03-23 NOTE — PROGRESS NOTES
0000:Bedside and Verbal shift change report given to Jay Tomas (oncoming nurse) by Julia Lomeli (offgoing nurse). Report included the following information SBAR, Kardex, MAR and Recent Results.

## 2017-03-23 NOTE — PROGRESS NOTES
Chart checked, pt cleared by nursing. Attempted to work with pt. She declined stating \"no, not today\". I educated her about the importance of mobilizing encouraging OOB to chair, or at least sitting at EOB or even bed and she just kept refusing stating \"no, not today\". PT will continue to follow and encourage progression of mobility.  Thank you, Katie Espinal, PT.

## 2017-03-23 NOTE — PROGRESS NOTES
[facility]  -Progress Note     Harman Clark [phone]   3/23/2017      Prasanna Amezcua M.D. , F.A.C.C.   --------PCP:-Bc Daily MD   -----Subjective:   . Jyoti Bill Jyoti Shaver is a 80 y.o. female   No complaints- states she is feeling better. No chest pain. Received 2 units PRBCs yesterday (IV lasix between units) with appropriate response- hgb 9.9 today. No problems overnight, rhythm and hemodynamics stable -see vitals below  Patient Vitals for the past 12 hrs:   Temp Pulse Resp BP SpO2   03/23/17 0821 - - - - 97 %   03/23/17 0600 98 °F (36.7 °C) 83 24 104/64 97 %   03/23/17 0500 97.9 °F (36.6 °C) 80 19 101/58 97 %   03/23/17 0400 98.2 °F (36.8 °C) 78 24 101/58 92 %   03/23/17 0300 98.3 °F (36.8 °C) 82 24 107/60 92 %   03/23/17 0230 98.1 °F (36.7 °C) 80 26 109/54 94 %   03/23/17 0215 98.2 °F (36.8 °C) 84 24 106/59 95 %   03/23/17 0200 98.2 °F (36.8 °C) 82 22 101/53 96 %   03/22/17 2248 98.1 °F (36.7 °C) 83 18 93/55 97 %   03/22/17 2234 98 °F (36.7 °C) 83 22 (!) 90/39 95 %   03/22/17 2137 97.8 °F (36.6 °C) 81 16 (!) 87/52 97 %      Assessment/Plan/Discussion:Cardiology Attending:     Patient seen earlier today and examined  and agree with Advance Practice Provider (ANDI, NP,PA)  assessment and plans. Quinton Shaevr is a 80 y.o. female   Maryam lady we have known for years, had resp failure sepsis and GB dz  With this AMMON shows no vegetation and same moderate LV dysfx  Had some edema and anemia  Did well with blood and IV lasix on top of BID po lasix  Continue OAC as long as no bleed  At this point, not much else to do and agree she needs SNF  Has appt with me in 2 weeks     Pricilla Moeller MD 3/23/2017             Discussion/Plans/Recs  No vegetation on valves  Sepsis-cholecystitis, resp failure, improving  Chronic systolic CHF  Now, compensated.    ---BiV ACID  --PO diuretics (Lasix 80 BID)  --ACE held-now restarted 3/21 (Enalapril 2.5 mg daily)  --Continue to hold BB due to low BP    MR-systolic murmur    ERASTO is very large but no clot, would start to use OAC cautiously for stroke prophylaxis    Afib--[id number]= 6 (age, CHF, HTN, female, DM)  Eliquis 2.5 mg BID    JUAN resolved  Thrombocytopenia-, jaundice, Transaminitis, lipase, cholecystitis-improving  Anemia:  hgb 9.9 from 7.8    Hypophosphatemia: phos-=2.3-  On daily kphos. PLAN:   Continue Eliquis 2.5 mg bid for stroke prevention,very large LA appendage with some \"smoke\" - no clot. Continue po diuretics (Lasix 80 mg BID) and continue potassium supplement (Kphos)  Anemia - hgb improved to 9.9 s/p 2 units PRBCs   OT,PT eval- recommends SNF. Cardiac Studies/Hx:    AMMON 3/20 with no vegetation, Same EF 35 and 1-2+ MR, TR as expected    ECHO: 3/9/17: EF 30%. Mod diffuse hyypokinesis, Wall motion is dyssynchronous,  RA mildly dilated. LA mod-severe dilated, Mod MR, AV: In the parasternal long axis view, faintly seen spherical echodensity about 5mms in diameter that seems to move in  relation to the anterior aortic valve leaflet, on the aortic side. Could represent a vegetation. Mod TR, mild RV systolic hypertension. 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 PA pressure of 61 with moderate pulmonary hypertension. 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 Moderate to severe TR, moderate to severe pulmonary hypertension.  EF 35-40%, marked LAE, mild JOAN    PACER check 2-11-13 39,288 episodes of atrial tach/flutter and 11 with high rates  1/2014 noises noted on pacer, 260 thousands AMS , some are noises and some are AT, some PMT (adjustment made, Dr Steven Lo discussed with pacer clinic)  Pacer check in May 2013 showed NSVT and AF the latter as long as 1 day with occasional RVR  Pacer check 9/9/13: persistent AT with V paced. 92%  (asymptomatic with AT)    CATH 10-24-12= Normal cors and EF 50 %. -- A annular ring mitral position. Mitral valve repair on July 29, 1996, for mitral regurgitation with a #32 Turner-Manuel ring. Chronic systolic CHF, reduced ejection fraction. NYHA 1-2  . ..admit 7/16/09 mild chf exacerbation  #32 Turner Manuel ring MV repair 1996         Past Medical History:   Diagnosis Date    Atrial fibrillation (HCC)     Chronic systolic heart failure (HCC)     ef 35-40%; hold ACE due to lower bp    Diabetes mellitus type 2, insulin dependent ([address]     Hypertension     Long term (current) use of anticoagulants     Malignant hypertensive heart disease with CHF ([address]     Mitral regurgitation     Pacemaker 10/25/2012    The pacemaker is a [employer] , model # [id number], serial [id number]. 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 Results for orders placed or performed during the hospital encounter of 03/08/17   EKG, 12 LEAD, INITIAL   Result Value Ref Range    Ventricular Rate 80 BPM    Atrial Rate 75 BPM    QRS Duration 146 ms    Q-T Interval 480 ms    QTC Calculation (Bezet) 553 ms    Calculated R Axis 158 degrees    Calculated T Axis 5 degrees    Diagnosis       Ventricular-paced rhythm  When compared with ECG of 09-SEP-2016 17:36,  Vent.  rate has decreased BY  30 BPM  Confirmed by Hallie Davis M.D., Shama Méndez ([id number]) on 3/9/2017 10:15:16 AM     Results for orders placed or performed in visit on 03/27/12   AMB POC EKG ROUTINE W/ 12 LEADS, INTER & REP    Narrative    See scanned report.         Vitals:    03/23/17 0400 03/23/17 0500 03/23/17 0600 03/23/17 0821   BP: 101/58 101/58 104/64    BP 1 Location: Left arm Left arm Left arm    BP Patient Position: At rest At rest At rest    Pulse: 78 80 83    Resp: 24 19 24    Temp: 98.2 °F (36.8 °C) 97.9 °F (36.6 °C) 98 °F (36.7 °C)    SpO2: 92% 97% 97% 97%   Weight: 82.7 kg (182 lb 5.1 oz)      Height:           Objective:    Physical Exam:   Patient Vitals for the past 12 hrs:   Temp Pulse Resp BP SpO2   03/23/17 0821 - - - - 97 %   03/23/17 0600 98 °F (36.7 °C) 83 24 104/64 97 %   03/23/17 0500 97.9 °F (36.6 °C) 80 19 101/58 97 %   03/23/17 0400 98.2 °F (36.8 °C) 78 24 101/58 92 %   03/23/17 0300 98.3 °F (36.8 °C) 82 24 107/60 92 %   03/23/17 0230 98.1 °F (36.7 °C) 80 26 109/54 94 %   03/23/17 0215 98.2 °F (36.8 °C) 84 24 106/59 95 %   03/23/17 0200 98.2 °F (36.8 °C) 82 22 101/53 96 %   03/22/17 2248 98.1 °F (36.7 °C) 83 18 93/55 97 %   03/22/17 2234 98 °F (36.7 °C) 83 22 (!) 90/39 95 %   03/22/17 2137 97.8 °F (36.6 °C) 81 16 (!) 87/52 97 %      General:  Somnolent arousable   ENT, Neck:  no jvd   Chest Wall: inspection normal - no chest wall deformities or tenderness, respiratory effort normal   Lung: Faint inspiratory wheeze bilaterally   Heart:  no gallops noted, irregularly irregular rhythm with rate ok, no JVD 2/6 systolic early/decresendo  murmur at RUSB to LUSB without radiation beyond apex    Abdomen: nondistended   Extremities: extremities normal, atraumatic, no cyanosis or edema     Last 24hr Input/Output:    Intake/Output Summary (Last 24 hours) at 03/23/17 0902  Last data filed at 03/23/17 0500   Gross per 24 hour   Intake             1030 ml   Output             1630 ml   Net             -600 ml        Data Review:   Recent Results (from the past 24 hour(s))   GLUCOSE, POC    Collection Time: 03/22/17 11:25 AM   Result Value Ref Range    Glucose (POC) 154 (H) 65 - 100 mg/dL    Performed by Giulia Hussein    TYPE & CROSSMATCH    Collection Time: 03/22/17  3:13 PM   Result Value Ref Range    Crossmatch Expiration 03/25/2017     ABO/Rh(D) B NEGATIVE     Antibody screen NEG     Unit number [id number]     Blood component type RC LR AS1     Unit division 00     Status of unit ISSUED     Crossmatch result Compatible     Unit number [id number]     Blood component type RC LR AS3,1     Unit division 00     Status of unit TRANSFUSED     Crossmatch result Compatible    GLUCOSE, POC    Collection Time: 03/22/17  4:35 PM   Result Value Ref Range    Glucose (POC) 201 (H) 65 - 100 mg/dL    Performed by Dana Chappell    GLUCOSE, POC    Collection Time: 03/22/17 10:06 PM   Result Value Ref Range    Glucose (POC) 132 (H) 65 - 100 mg/dL    Performed by Jeanne Graham    PROTHROMBIN TIME + INR    Collection Time: 03/23/17  5:34 AM   Result Value Ref Range    INR 1.3 (H) 0.9 - 1.1      Prothrombin time 12.9 (H) 9.0 - 11.1 sec   MAGNESIUM    Collection Time: 03/23/17  5:34 AM   Result Value Ref Range    Magnesium 2.1 1.6 - 2.4 mg/dL   PHOSPHORUS    Collection Time: 03/23/17  5:34 AM   Result Value Ref Range    Phosphorus 2.3 (L) 2.6 - 4.7 MG/DL   CBC WITH AUTOMATED DIFF    Collection Time: 03/23/17  5:34 AM   Result Value Ref Range    WBC 3.6 3.6 - 11.0 K/uL    RBC 4.24 3.80 - 5.20 M/uL    HGB 9.9 (L) 11.5 - 16.0 g/dL    HCT 31.2 (L) 35.0 - 47.0 %    MCV 73.6 (L) 80.0 - 99.0 FL    MCH 23.3 (L) 26.0 - 34.0 PG    MCHC 31.7 30.0 - 36.5 g/dL    RDW 18.1 (H) 11.5 - 14.5 %    PLATELET 97 (L) 615 - 400 K/uL    NEUTROPHILS 75 32 - 75 %    LYMPHOCYTES 12 12 - 49 %    MONOCYTES 9 5 - 13 %    EOSINOPHILS 4 0 - 7 %    BASOPHILS 0 0 - 1 %    ABS. NEUTROPHILS 2.8 1.8 - 8.0 K/UL    ABS. LYMPHOCYTES 0.4 (L) 0.8 - 3.5 K/UL    ABS. MONOCYTES 0.3 0.0 - 1.0 K/UL    ABS. EOSINOPHILS 0.1 0.0 - 0.4 K/UL    ABS.  BASOPHILS 0.0 0.0 - 0.1 K/UL    DF SMEAR SCANNED      PLATELET COMMENTS LARGE PLATELETS      RBC COMMENTS ANISOCYTOSIS  1+        RBC COMMENTS MICROCYTOSIS  1+        RBC COMMENTS TARGET CELLS  PRESENT        RBC COMMENTS SPHEROCYTES  PRESENT        RBC COMMENTS POLYCHROMASIA  PRESENT        RBC COMMENTS RBC FRAGMENTS  PRESENT  HYPOCHROMIA  2+        RBC COMMENTS OVALOCYTES  PRESENT       METABOLIC PANEL, BASIC    Collection Time: 03/23/17  5:34 AM   Result Value Ref Range    Sodium 142 136 - 145 mmol/L    Potassium 3.0 (L) 3.5 - 5.1 mmol/L    Chloride 101 97 - 108 mmol/L    CO2 37 (H) 21 - 32 mmol/L    Anion gap 4 (L) 5 - 15 mmol/L    Glucose 102 (H) 65 - 100 mg/dL    BUN 14 6 - 20 MG/DL    Creatinine 0.63 0.55 - 1.02 MG/DL    BUN/Creatinine ratio 22 (H) 12 - 20      GFR est AA >60 >60 ml/min/1.73m2    GFR est non-AA >60 >60 ml/min/1.73m2    Calcium 9.2 8.5 - 10.1 MG/DL   NUCLEATED RBC    Collection Time: 03/23/17  5:34 AM   Result Value Ref Range    NRBC 1.6 (H) 0  WBC    ABSOLUTE NRBC 0.06 (H) 0.00 - 0.01 K/uL    WBC CORRECTED FOR NR ADJUSTED FOR NUCLEATED RBC'S     GLUCOSE, POC    Collection Time: 03/23/17  6:49 AM   Result Value Ref Range    Glucose (POC) 134 (H) 65 - 100 mg/dL    Performed by Marcelo Nagy.  IRLANDA Wilkinson 3/23/2017

## 2017-03-23 NOTE — PROGRESS NOTES
Problem: Heart Failure: Discharge Outcomes  Goal: *Demonstrates ability to perform prescribed activity without shortness of breath or discomfort  Outcome: Progressing Towards Goal  Improved respiratory status. Turning with no SOB noted. Weaned from 2L to 1L NC. O2 sats 98%.

## 2017-03-23 NOTE — PROGRESS NOTES
Hospitalist Progress Note  Trever Morrison MD  Office: [phone]  Cell: [phone]        Date of Service:  3/23/2017  NAME:  Elizabeth Stratton  :  1930  MRN:  [id number]      Admission Summary:   71-year-old female with history of chronic systolic congestive heart failure, atrial fibrillation, diabetes mellitus, hypertension, chronic anticoagulation use, history of pacemaker and ICD. She was sent from her primary care physician to the emergency room to be evaluated. History from the patient and her daughters at the bedside, also from the records. Per the daughter, she has been having on and off abdominal pain, epigastric pain for a while, over the last 3 days has been mostly pronounced. She said her pain at times is 9/10 in severity, it is nonradiating, associated with some nausea, but no vomiting. She denies any diarrhea. At her PCP's office,   they could not get her temperature, so they sent her to the emergency room. In the ER she has been hypothermic, her temperature was 91.4 on initial presentation. Also she was hypotensive. Her blood pressure   systolic was 38/93, so she was given about 1.5 liters of IV fluid, and now she is getting 1 bottle of albumin. She was placed on warming blanket, Lizzy Hugger. She denies any dysuria or frequency. Interval history / Subjective:   No new complaints, marked edema of both upper and lower extremities. Assessment & Plan:     Hypothermia and hypotension. - resolved  On empiric IV antibiotics, on Zosyn and vancomycin. Blood culture: No growth x 5 days; Required the [facility] intermittently     Acute Respiratory failure with Hypercapnia due to Pleural effusion/atelectasis and acute on chronic Systolic CHF NYHA IV  BIPAP PRN  PCCM input appreciated  Diuresis, on NC now  May need intermittent BIPAP for hypercapnia;   - 3/21: Lasix changed to PO.      Bilateral Pleural effusions: likely related to CHF  improving on CXR;   continue antibiotics, CHF volume overload. C/w diuresis     Sepsis:  Unknown source, most likely lung with bilateral pleural effusions, possibly infectious. She had caren of leukocytes to 1.9, improving. She was afebrile overnight, low BP requiring pressors. Weaned off pressors  Continue zosyn, vanco.   Monitor cultures, preliminary blood, urine cultures no growth so far. - AMMON: no vegetations;   - blood culture 3/08 and 3/11: no growth x 5 days;   - urine culture: no growth;   - 3/21: afebrile, no leukocytosis, cultures negative. Will discontinue IV Abx and watch off Abx. Patient completed 2 weeks of IV Abx since admission. Acute on Chronic Systolic congestive heart failure NYHA III/IV. With fluid overload. Echo 3/9 with 30% EF, diffuse hypokinesis. Bumex added, strict I/O, monitor weight. History of pacer. Valvular disease: Moderate mitral regurgitation, right ventricular systolic dysfunction,   as well as enlarged left atrium. Valvular vegetation on aortic valve, questionable: Blood cultures no growth preliminary. AMMON tentatively on Monday if respiratory status permits  - Diuresis per nephrology   - 3/20: AMMON scheduled for noon today. - 3/21: AMMON showed no vegetations. Hematuria: may be related to traumatic galarza placement with INR max 5.5,  Urine noted on UA as yellow on 3/8,   Galarza placed 3/9 and hematuria reported 3/10 UA. JUAN:  From  ATN and contrast nephropathy/Hypotension most likely etiology. Ischemic heart disease, hypotension; and exposure to IV contrast.   Appreciate nephro consult recommendations. Bumex, avoid nephrotoxins, I/Os, -resolved    Hypoglycemia/diabetes mellitus. Resolved. Holding her Lantus and on hypoglycemia protocol. Her blood sugar improved after D50. Stable, off D10 now  resolved    Ischemic hepatitis:   Consulted GI. Plans for EGD when stable or outpatient. Continue proton pump inhibitor for now.     US with distended gallbladder with stone and sludge. Appreciate gen surg eval   if abdominal pain worsens, get HIDA prior to godwin tube placement  - 3/21: appreciate GI consult and f/u: signed off. LFTs improving. Coagulopathy.  - Holding her Coumadin and monitor daily INR and restart her Coumadin when INR improved. INR increased, she received Vitamin K x 1. With hematuria will give FFP and monitor INR.   - 3/21: started patient on Eliquis for stroke prophylaxis in the setting of chronic Atrial Fibrillation and [id number] score of 6. Pancytopenia. - Appears to be chronic. However, patient and family unaware of her previous lab results. Will monitor her labs closely. Left cheek where it meets the nose pressure ulcer/injury:  - stage 2; measures 1 x 0.5 x 0 cm; 100% drying pink/red;  - with linear jac = 2 x 0.5 x 0 cm (smaller);  - Hospital Acquired;    Urinary retention:  - Richardson removed 3/21, decreased urine output, bladder scan revealed 900 ml of urine in the bladder per nursing report, Richardson was reinserted; (second attempt at removing Richardson during this hospitalization). - will keep the Richardson in at discharge; outpatient f/u with Urology for voiding trials once patient is more ambulatory. Anemia:  - of chronic disease with slow decline in H&H during this hospitalization. - patient transfused 2 Units of PRBC's on 3/22 with 80 mg of Lasix IV in between the 2 Units. Tolerated the transfusion well and good response in H&H. Code status: Full    Care Plan discussed with: Patient/Family and Nurse  Disposition: TBD   Consult PT/OT and OOB. Discharge planning: anticipate discharge to SNF tomorrow. Hospital Problems  Date Reviewed: 3/8/2017          Codes Class Noted POA    * (Principal)Hypothermia ICD-10-CM: T68. XXXA  ICD-9-CM: 991.6  3/8/2017 Yes                Review of Systems:   A comprehensive review of systems was negative.        Vital Signs:    Last 24hrs VS reviewed since prior progress note. Most recent are:  Visit Vitals    /71 (BP 1 Location: Left arm, BP Patient Position: At rest)    Pulse 83    Temp 98.2 °F (36.8 °C)    Resp 16    Ht 5' 6\" (1.676 m)    Wt 82.7 kg (182 lb 5.1 oz)    SpO2 98%    BMI 29.43 kg/m2         Intake/Output Summary (Last 24 hours) at 03/23/17 1945  Last data filed at 03/23/17 1603   Gross per 24 hour   Intake              360 ml   Output             1980 ml   Net            -1620 ml        Physical Examination:             Constitutional:   Awake, on NC, drowsy this AM   ENT:   Neck supple,    Resp:  good air entry    CV:  irregular    GI:  Soft, non-tender, normoactive bowel sounds, no hepatosplenomegaly     Musculoskeletal:  2+ edema    Neurologic:   awake, moves all 4 extremities             Data Review:    Review and/or order of clinical lab test   US abd:  IMPRESSION:  1. Distended gallbladder with sludge and a stone with moderate wall thickening of the gallbladder wall. 2. Dilated IVC suggesting right heart failure. 3. Small amount of free fluid surrounding the liver. 4. Small right pleural effusion      Labs:     Recent Labs      03/23/17 0534  03/22/17 0521   WBC  3.6  3.1*   HGB  9.9*  7.8*   HCT  31.2*  25.4*   PLT  97*  88*     Recent Labs      03/23/17 0534  03/22/17   0521   NA  142  144   K  3.0*  3.3*   CL  101  104   CO2  37*  34*   BUN  14  14   CREA  0.63  0.80   GLU  102*  126*   CA  9.2  9.1   MG  2.1   --    PHOS  2.3*   --      Recent Labs      03/21/17 0353   SGOT  16   ALT  34   AP  42*   TBILI  1.9*   TP  5.5*   ALB  2.3*   GLOB  3.2     Recent Labs      03/23/17 0534  03/22/17 0521 03/21/17 0353   INR  1.3*  1.3*  1.3*   PTP  12.9*  13.6*  13.4*      No results for input(s): FE, TIBC, PSAT, FERR in the last 72 hours. Lab Results   Component Value Date/Time    Folate 14.5 03/09/2017 03:23 AM      No results for input(s): PH, PCO2, PO2 in the last 72 hours.   No results for input(s): CPK, CKNDX, TROIQ in the last 72 hours.     No lab exists for component: CPKMB  Lab Results   Component Value Date/Time    Cholesterol, total 138 09/28/2011 09:13 AM    HDL Cholesterol 50 09/28/2011 09:13 AM    LDL, calculated 74 09/28/2011 09:13 AM    Triglyceride 69 09/28/2011 09:13 AM     Lab Results   Component Value Date/Time    Glucose (POC) 145 03/23/2017 05:30 PM    Glucose (POC) 151 03/23/2017 12:34 PM    Glucose (POC) 134 03/23/2017 06:49 AM    Glucose (POC) 132 03/22/2017 10:06 PM    Glucose (POC) 201 03/22/2017 04:35 PM     Lab Results   Component Value Date/Time    Color RED 03/10/2017 02:30 AM    Appearance BLOODY 03/10/2017 02:30 AM    Specific gravity 1.020 03/10/2017 02:30 AM    Specific gravity 1.016 03/08/2017 08:54 PM    pH (UA) 6.0 03/10/2017 02:30 AM    Protein 100 03/10/2017 02:30 AM    Glucose NEGATIVE  03/10/2017 02:30 AM    Ketone NEGATIVE  03/10/2017 02:30 AM    Bilirubin NEGATIVE  03/10/2017 02:30 AM    Urobilinogen 1.0 03/10/2017 02:30 AM    Nitrites NEGATIVE  03/10/2017 02:30 AM    Leukocyte Esterase TRACE 03/10/2017 02:30 AM    Epithelial cells FEW 03/10/2017 02:30 AM    Bacteria NEGATIVE  03/10/2017 02:30 AM    WBC 20-50 03/10/2017 02:30 AM    RBC >100 03/10/2017 02:30 AM         Medications Reviewed:     Current Facility-Administered Medications   Medication Dose Route Frequency    albuterol-ipratropium (DUO-NEB) 2.5 MG-0.5 MG/3 ML  3 mL Nebulization TID    insulin lispro (HUMALOG) injection   SubCUTAneous AC&HS    0.9% sodium chloride infusion 250 mL  250 mL IntraVENous PRN    furosemide (LASIX) tablet 80 mg  80 mg Oral BID    enalapril (VASOTEC) tablet 2.5 mg  2.5 mg Oral BID    atropine injection 0.5-1 mg  0.5-1 mg IntraVENous Multiple    sodium chloride (NS) flush 5-10 mL  5-10 mL IntraVENous Multiple    apixaban (ELIQUIS) tablet 2.5 mg  2.5 mg Oral BID    phosphorus (K PHOS NEUTRAL) 250 mg tablet 1 Tab  1 Tab Oral BID    albumin human 25% (BUMINATE) solution 12.5 g  12.5 g IntraVENous Q8H PRN    pantoprazole (PROTONIX) tablet 40 mg  40 mg Oral ACB    balsam peru-castor oil (VENELEX)  mg/gram ointment   Topical BID    docusate sodium (COLACE) capsule 100 mg  100 mg Oral BID    polyvinyl alcohol (LIQUIFILM TEARS) 1.4 % ophthalmic solution 2 Drop  2 Drop Both Eyes TID    sodium chloride (NS) flush 10 mL  10 mL InterCATHeter Q24H    sodium chloride (NS) flush 10 mL  10 mL InterCATHeter PRN    sodium chloride (NS) flush 10-40 mL  10-40 mL InterCATHeter Q8H    alteplase (CATHFLO) 1 mg in sterile water (preservative free) 1 mL injection  1 mg InterCATHeter PRN    bacitracin 500 unit/gram packet 1 Packet  1 Packet Topical PRN    0.9% sodium chloride infusion 250 mL  250 mL IntraVENous PRN    ondansetron (ZOFRAN) injection 4 mg  4 mg IntraVENous Q8H PRN    glucose chewable tablet 16 g  4 Tab Oral PRN    dextrose (D50W) injection syrg 12.5-25 g  12.5-25 g IntraVENous PRN    glucagon (GLUCAGEN) injection 1 mg  1 mg IntraMUSCular PRN    sodium chloride (NS) flush 5-10 mL  5-10 mL IntraVENous Q8H    sodium chloride (NS) flush 5-10 mL  5-10 mL IntraVENous PRN     ______________________________________________________________________  EXPECTED LENGTH OF STAY: 4d 21h  ACTUAL LENGTH OF STAY:          15                 Stephie Almonte MD

## 2017-03-23 NOTE — PROGRESS NOTES
1000: Weaned pt from 2L to 1L NC. O2 sats maintained at 98%. Will continue to monitor. 1600: Bedside and Verbal shift change report given to Patricia RN (oncoming nurse) by Radha Randolph RN (offgoing nurse). Report included the following information SBAR, Kardex, Intake/Output, MAR, Accordion, Recent Results, Med Rec Status and Cardiac Rhythm NSR.     Hourly rounds completed throughout shift

## 2017-03-23 NOTE — PROGRESS NOTES
CM met with pt this am to discuss her preference for rehab/SNF. Pt stated that her daughter, Rhona Giles, came in yesterday afternoon but did not make a decision. CM talked to Gurdeep, [phone], this am to determine if she reviewed the list. She stated that she did not see the list, but she and her sister, Ulysses Reyes, will visit today in the afternoon and they will review the list and probable visit some places. Gurdeep and her sister share the decision making. Africa Graham manages the financial matters for pt but neither are POA. CM provided names of a couple SNF that are located in the areas of which they live, [facility] or [place]([facility]). The pt has [facility] which will require authorization be obtained for the SNF of choice.     Aparna Pimentel RN ACM CRM

## 2017-03-23 NOTE — PROGRESS NOTES
Occupational Therapy [phone]    Pt seen today by rehab technician. Pt performed self feeding using built up utensils. Tech reported pt continues to require assist to scoop her pudding and continues with difficulty despite built up handles. Continue to recommend pt discharge to SNF. Recommend following up next session with self feeding.       Bethany Silva OTR/L

## 2017-03-24 NOTE — PROGRESS NOTES
1935 - Bedside and Verbal shift change report given to susan amaro (oncoming nurse) by Lolis Perrin (offgoing nurse). Report included the following information SBAR, Kardex, Intake/Output, MAR, Recent Results and Cardiac Rhythm paced. 2330 - Bedside and Verbal shift change report given to Mk suero (oncoming nurse) by Karena Martínez (offgoing nurse). Report included the following information SBAR, Kardex, Intake/Output, MAR, Recent Results and Cardiac Rhythm paced.

## 2017-03-24 NOTE — PROGRESS NOTES
Problem: Mobility Impaired (Adult and Pediatric)  Goal: *Acute Goals and Plan of Care (Insert Text)  Physical Therapy Goals  Revisited 3/24/2017, goals remain appropriate, carry over        Physical Therapy Goals  Initiated 3/16/2017  1. Patient will move from supine to sit and sit to supine , scoot up and down and roll side to side in bed with minimal assistance within 7 day(s). 2. Patient will transfer from bed to chair and chair to bed with moderate assistance using the least restrictive device within 7 day(s). 3. Patient will perform sit to stand with moderate assistance within 7 day(s). 4. Patient will ambulate with moderate assistance for 10 feet with the least restrictive device within 7 day(s). PHYSICAL THERAPY TREATMENT: WEEKLY REASSESSMENT  Patient: Sheridan Diaz (62 y.o. female)  Date: 3/24/2017  Diagnosis: Hypothermia Hypothermia       Precautions: Fall      ASSESSMENT:  Pt received in supine agreeable to PT with encouragement. She came to sitting at EOB with max assist X 2. Once at EOB note good sitting balance. Worked with her on forward rocking, leaning in preparation for assisting with sit to stand, pt lends to lean post. She appears fearful. Chair was set up for transfer and then her nurse reported that d/c is anticipated this morning and pt needs to be ready for d/c, requesting not up to the chair. Still worked on sit to stand, with second person just assisting with the forward lean. Unable to get pt to full stand, pt resisting. She was returned to supine, max assist X 2. Patient's progression toward goals since last assessment: slow gains, goals carried over. PLAN:  Goals have been updated based on progression since last assessment. Patient continues to benefit from skilled intervention to address the above impairments. Continue to follow the patient 5 times a week to address goals if discharge does occur today as planned. .  Planned Interventions:  [X]              Bed Mobility Training             [ ]       Neuromuscular Re-Education  [X]              Transfer Training                   [ ]       Orthotic/Prosthetic Training  [X]              Gait Training                         [ ]       Modalities  [X]              Therapeutic Exercises           [ ]       Edema Management/Control  [X]              Therapeutic Activities            [X]       Patient and Family Training/Education  [ ]              Other (comment):  Discharge Recommendations: Skilled Nursing Facility  Further Equipment Recommendations for Discharge: none       SUBJECTIVE:   Patient stated Do I have to today? Sharona Romeo      OBJECTIVE DATA SUMMARY:   Chart checked, pt cleared by nursing   Critical Behavior:  Neurologic State: Alert  Orientation Level: Oriented to person, Disoriented to place, Disoriented to situation, Disoriented to time  Cognition: Follows commands  Safety/Judgement: Fall prevention     Strength:             decreased funcctional              Functional Mobility Training:  Bed Mobility:  Rolling: Assist x2;Maximum assistance  Supine to Sit: Assist x2;Maximum assistance  Sit to Supine: Assist x2;Maximum assistance           Transfers:  Sit to Stand: Assist x2;Total assistance  Stand to Sit: Assist x2;Total assistance                             Balance:  Sitting: Impaired  Sitting - Static: Good (unsupported)  Sitting - Dynamic: Fair (occasional)  Standing: Impaired  Standing - Static: Poor  Standing - Dynamic : Poor  Ambulation/Gait Training:                                                                   Stairs:           Neuro Re-Education:     Therapeutic Exercises: Ankle pumps 10 reps bilaterally   Pain:  Pain Scale 1: Numeric (0 - 10)  Pain Intensity 1: 0              Activity Tolerance:   VSS  Please refer to the flowsheet for vital signs taken during this treatment.   After treatment:   [ ]  Patient left in no apparent distress sitting up in chair  [X]  Patient left in no apparent distress in bed to near chair position   [X]  Call bell left within reach  [X]  Nursing notified  [X]  Student nurse present  [ ]  Bed alarm activated      COMMUNICATION/COLLABORATION:   The patients plan of care was discussed with: Registered Nurse     Valerie Reeder   Time Calculation: 26 mins

## 2017-03-24 NOTE — PROGRESS NOTES
Problem: Self Care Deficits Care Plan (Adult)  Goal: *Acute Goals and Plan of Care (Insert Text)  Occupational Therapy Goals  All goals downgraded 3/24/17:  1. Patient will perform grooming in supported sitting with supervision/set-up within 7 day(s). 2. Patient will perform UB bathing with minimal assistance/contact guard assist within 7 day(s). 3. Patient will perform self feeding consistently with setup/adpative tools PRN within 7 day(s). 4. Patient will perform toilet transfers with maximal assistance within 7 day(s). 5. Patient will perform all aspects of toileting with maximal assistance within 7 day(s). 6. Patient will participate in upper extremity therapeutic exercise/activities with minimal assistance/contact guard assist for 10 minutes within 7 day(s). 7. Patient will utilize energy conservation techniques during functional activities with verbal cues within 7 day(s). Initiated 3/16/2017  1. Patient will perform grooming seated EOB for 5 minutes with supervision/set-up within 7 day(s). 2. Patient will perform UB bathing with minimal assistance/contact guard assist within 7 day(s). 3. Patient will perform lower body dressing with moderate assistance within 7 day(s). 4. Patient will perform toilet transfers with moderate assistance within 7 day(s). 5. Patient will perform all aspects of toileting with moderate assistance within 7 day(s). 6. Patient will participate in upper extremity therapeutic exercise/activities with minimal assistance/contact guard assist for 10 minutes within 7 day(s). 7. Patient will utilize energy conservation techniques during functional activities with verbal cues within 7 day(s). OCCUPATIONAL THERAPY TREATMENT: WEEKLY REASSESSMENT  Patient: Solomon Stafford (44 y.o. female)  Date: 3/24/2017  Diagnosis: Hypothermia Hypothermia       Precautions: Fall      ASSESSMENT:  Pt received supine in bed sleeping soundly and did not wake up to voice.   Removed pillows and boosted patient up in bed, then placed chair position. Pt continued to sleep through most of these tasks but would open her eyes intermittantly. Encoruaged pt to wash her face to assist with waking up however pt would not raise washcloth to her face and would resist therpaist's attempts at hand over hand assist.  Plan was to address feeding this session however pt unable to wake up enough to safely work with therapy or have any PO intake. Pt returned to supine position and RN notified (who reported when pt is in deep sleep she is very hard to wake up). Review of goals reveals limited progress and goals were downgraded this date. Will continue to follow. Recommend with nursing patient to complete as able in order to maintain strength, endurance and independence: perri to chair daily; chair position daily, feeding/grooming with supervision/setup and rolling bed for bedpan. Thank you for your assistance. Progression toward goals:  [ ]            Improving appropriately and progressing toward goals  [ ]            Improving slowly and progressing toward goals  [X]            Not making progress toward goals and plan of care will be adjusted       PLAN:  Goals have been updated based on progression since last assessment. Patient continues to benefit from skilled intervention to address the above impairments. Continue to follow patient 5 times a week to address goals.   Planned Interventions:  [X]                    Self Care Training                  [X]             Therapeutic Activities  [X]                    Functional Mobility Training    [ ]             Cognitive Retraining  [X]                    Therapeutic Exercises           [X]             Endurance Activities  [X]                    Balance Training                   [ ]             Neuromuscular Re-Education  [ ]                    Visual/Perceptual Training     [X]        Home Safety Training  [X]                    Patient Education [X]             Family Training/Education  [ ]                    Other (comment):  Discharge Recommendations: [facility]  Further Equipment Recommendations for Discharge: TBD       SUBJECTIVE:   Patient stated wait a min (then fell asleep).       OBJECTIVE DATA SUMMARY:   Cognitive/Behavioral Status:  Neurologic State: Drowsy; Eyes do not open to any stimulus; Lethargic  Orientation Level: Disoriented X4  Cognition: Decreased attention/concentration     ADL Intervention:        Grooming  Washing Face: Total assistance (dependent)     Pain:  Pain Scale 1: Visual  Pain Intensity 1: 0     Activity Tolerance:   Limited by lethargy; very difficult to arouse and unable to fully awaken. RN notified and returned to supine position     Please refer to the flowsheet for vital signs taken during this treatment.   After treatment:   [X] Patient left in no apparent distress sitting up in chair  [ ] Patient left in no apparent distress in bed  [X] Call bell left within reach  [ ] Nursing notified  [ ] Caregiver present  [ ] Bed alarm activated      COMMUNICATION/COLLABORATION:   The patients plan of care was discussed with: Physical Therapist and Registered Nurse     Kenrick Winn OT  Time Calculation: 20 mins

## 2017-03-24 NOTE — PROGRESS NOTES
[address] [facility]  -Progress Note     Harman Clark [phone]   3/24/2017      Jacek Garcia M.D. , F.A.C.C.   --------PCP:-Bc Fletcher MD   -----Subjective:   . Gevena Beth Gevena Beth Gevena Beth Ruth Jones is a 80 y.o. female  Is very sleepy but arrousable this afternoon- nursing states she was more awake earlier and participating with PT/OT, sat in chair. Patient Vitals for the past 12 hrs:   Temp Pulse Resp BP SpO2   03/24/17 1103 97.8 °F (36.6 °C) 80 20 129/69 95 %   03/24/17 0932 - 85 - 130/73 95 %   03/24/17 0924 - 80 - 119/63 95 %   03/24/17 0754 - - - - 98 %   03/24/17 0738 98 °F (36.7 °C) 84 19 107/59 100 %   03/24/17 0322 97.9 °F (36.6 °C) 81 18 100/51 99 %      Assessment/Plan/Discussion:Cardiology Attending:     Patient seen earlier today and examined  and agree with Advance Practice Provider (ANDI, NP,PA)  assessment and plans. Ruth Jones is a 80 y.o. female   Sleepier today ,edema unchanged  No complaints and no change in physical exam, now seems to need SNF and placement next step  Lab Results   Component Value Date/Time    Creatinine 0.56 03/24/2017 04:14 AM      Lab Results   Component Value Date/Time    HGB 9.8 03/24/2017 04:14 AM    are stable  We will sign off   Glad to see back if we can be of help    Hussein Aguiar MD 3/24/2017               Discussion/Plans/Recs  1) Sepsis-cholecystitis, resp failure, improving  2) Chronic systolic CHF  Now, compensated. ---BiV ACID  --Continue PO diuretics (Lasix 80 BID)- Down 3 lbs today from yesterday. Still with BLE edema  --ACE restarted 3/21 (Enalapril 2.5 mg daily)  --Continue to hold BB due to low BP    3) MR-systolic murmur. No vegetation on valves per AMMON. 4)Afib: -[id number]= 6 (age, CHF, HTN, female, DM)  Eliquis 2.5 mg BID   ERASTO is very large but no clot, would start to use OAC cautiously for stroke prophylaxis      5). Thrombocytopenia-platelets trending up- 106 today.     6.Anemia:  Hgb stable 9.8 from 9.9 yesterday. (Received 2 units PRBCs on 3/22/17.)    7) Hypokalemia:K=3.3- on kphos but will give additional KCL today. 8). Hypophosphatemia: phos-=2.3-  On BID kphos. PLAN:   Continue Eliquis 2.5 mg bid for stroke prevention,very large LA appendage with some \"smoke\" - no clot. Continue po diuretics (Lasix 80 mg BID) and continue potassium supplement (Kphos- give dose of KDUR today)  OT,PT - recommends SNF. Cardiac Studies/Hx:    AMMON 3/20 with no vegetation, Same EF 35 and 1-2+ MR, TR as expected    AMMON 3/20 with no vegetation, Same EF 35 and 1-2+ MR, TR as expected  ECHO: 3/9/17: EF 30%. Mod diffuse hyypokinesis, Wall motion is dyssynchronous, RA mildly dilated. LA mod-severe dilated, Mod MR, AV: In the parasternal long axis view, faintly seen spherical echodensity about 5mms in diameter that seems to move in  relation to the anterior aortic valve leaflet, on the aortic side. Could represent a vegetation. Mod TR, mild RV systolic hypertension. 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 PA pressure of 61 with moderate pulmonary hypertension. 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 Moderate to severe TR, moderate to severe pulmonary hypertension.  EF 35-40%, marked LAE, mild JOAN    PACER check 2-11-13 39,288 episodes of atrial tach/flutter and 11 with high rates  1/2014 noises noted on pacer, 260 thousands AMS , some are noises and some are AT, some PMT (adjustment made, Dr Rachel Alonso discussed with pacer clinic)  Pacer check in May 2013 showed NSVT and AF the latter as long as 1 day with occasional RVR  Pacer check 9/9/13: persistent AT with V paced. 92%  (asymptomatic with AT)    CATH 10-24-12= Normal cors and EF 50 %. -- A annular ring mitral position. Mitral valve repair on July 29, 1996, for mitral regurgitation with a #32 Turner-Manuel ring. Chronic systolic CHF, reduced ejection fraction. NYHA 1-2  . ..admit 7/16/09 mild chf exacerbation  #32 Turner Manuel ring MV repair 1996         Past Medical History:   Diagnosis Date    Atrial fibrillation (HCC)     Chronic systolic heart failure (HCC)     ef 35-40%; hold ACE due to lower bp    Diabetes mellitus type 2, insulin dependent ([address]     Hypertension     Long term (current) use of anticoagulants     Malignant hypertensive heart disease with [facility]F [facility]     Mitral regurgitation     Pacemaker 10/25/2012    The pacemaker is a [employer], model # [id number], serial [id number]. 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 Results for orders placed or performed during the hospital encounter of 03/08/17   EKG, 12 LEAD, INITIAL   Result Value Ref Range    Ventricular Rate 80 BPM    Atrial Rate 75 BPM    QRS Duration 146 ms    Q-T Interval 480 ms    QTC Calculation (Bezet) 553 ms    Calculated R Axis 158 degrees    Calculated T Axis 5 degrees    Diagnosis       Ventricular-paced rhythm  When compared with ECG of 09-SEP-2016 17:36,  Vent. rate has decreased BY  30 BPM  Confirmed by Cyndie Ozuna M.D., Renan Li ([id number]) on 3/9/2017 10:15:16 AM     Results for orders placed or performed in visit on 03/27/12   AMB POC EKG ROUTINE W/ 12 LEADS, INTER & REP    Narrative    See scanned report. Vitals:    03/24/17 0754 03/24/17 0924 03/24/17 0932 03/24/17 1103   BP:  119/63 130/73 129/69   BP 1 Location:  Left arm Left arm Left arm   BP Patient Position:  Supine     Pulse:  80 85 80   Resp:    20   Temp:    97.8 °F (36.6 °C)   SpO2: 98% 95% 95% 95%   Weight:       Height:           Objective:    Physical Exam:   Patient Vitals for the past 12 hrs:   Temp Pulse Resp BP SpO2   03/24/17 1103 97.8 °F (36.6 °C) 80 20 129/69 95 %   03/24/17 0932 - 85 - 130/73 95 %   03/24/17 0924 - 80 - 119/63 95 %   03/24/17 0754 - - - - 98 %   03/24/17 0738 98 °F (36.7 °C) 84 19 107/59 100 %   03/24/17 0322 97.9 °F (36.6 °C) 81 18 100/51 99 %      General:  Somnolent arousable   ENT, Neck:  no jvd   Chest Wall: inspection normal - no chest wall deformities or tenderness, respiratory effort normal   Lung: Faint inspiratory wheeze bilaterally   Heart:  no gallops noted, irregularly irregular rhythm, no JVD 2/6 systolic early/decresendo  murmur at RUSB to LUSB without radiation beyond apex    Abdomen: nondistended   Extremities: extremities normal, atraumatic, no cyanosis or edema     Last 24hr Input/Output:    Intake/Output Summary (Last 24 hours) at 03/24/17 1339  Last data filed at 03/24/17 1039   Gross per 24 hour   Intake              240 ml   Output             1075 ml   Net             -835 ml        Data Review:   Recent Results (from the past 24 hour(s))   GLUCOSE, POC    Collection Time: 03/23/17  5:30 PM   Result Value Ref Range    Glucose (POC) 145 (H) 65 - 100 mg/dL    Performed by Marta Oswald    GLUCOSE, POC    Collection Time: 03/23/17  9:36 PM   Result Value Ref Range    Glucose (POC) 149 (H) 65 - 100 mg/dL    Performed by Bright Marin + INR    Collection Time: 03/24/17  4:14 AM   Result Value Ref Range    INR 1.3 (H) 0.9 - 1.1      Prothrombin time 13.0 (H) 9.0 - 11.1 sec   MAGNESIUM    Collection Time: 03/24/17  4:14 AM   Result Value Ref Range    Magnesium 2.4 1.6 - 2.4 mg/dL PHOSPHORUS    Collection Time: 03/24/17  4:14 AM   Result Value Ref Range    Phosphorus 2.3 (L) 2.6 - 4.7 MG/DL   CBC WITH AUTOMATED DIFF    Collection Time: 03/24/17  4:14 AM   Result Value Ref Range    WBC 4.0 3.6 - 11.0 K/uL    RBC 4.24 3.80 - 5.20 M/uL    HGB 9.8 (L) 11.5 - 16.0 g/dL    HCT 31.7 (L) 35.0 - 47.0 %    MCV 74.8 (L) 80.0 - 99.0 FL    MCH 23.1 (L) 26.0 - 34.0 PG    MCHC 30.9 30.0 - 36.5 g/dL    RDW 18.6 (H) 11.5 - 14.5 %    PLATELET 381 (L) 817 - 400 K/uL    NEUTROPHILS 76 (H) 32 - 75 %    LYMPHOCYTES 13 12 - 49 %    MONOCYTES 8 5 - 13 %    EOSINOPHILS 3 0 - 7 %    BASOPHILS 0 0 - 1 %    ABS. NEUTROPHILS 3.1 1.8 - 8.0 K/UL    ABS. LYMPHOCYTES 0.5 (L) 0.8 - 3.5 K/UL    ABS. MONOCYTES 0.3 0.0 - 1.0 K/UL    ABS. EOSINOPHILS 0.1 0.0 - 0.4 K/UL    ABS.  BASOPHILS 0.0 0.0 - 0.1 K/UL    DF SMEAR SCANNED      PLATELET COMMENTS LARGE PLATELETS      RBC COMMENTS ANISOCYTOSIS  1+        RBC COMMENTS POLYCHROMASIA  1+        RBC COMMENTS TARGET CELLS  PRESENT        RBC COMMENTS RBC FRAGMENTS  MICROCYTOSIS  1+        RBC COMMENTS HYPOCHROMIA  1+        RBC COMMENTS SPHEROCYTES  PRESENT       METABOLIC PANEL, BASIC    Collection Time: 03/24/17  4:14 AM   Result Value Ref Range    Sodium 145 136 - 145 mmol/L    Potassium 3.3 (L) 3.5 - 5.1 mmol/L    Chloride 102 97 - 108 mmol/L    CO2 38 (H) 21 - 32 mmol/L    Anion gap 5 5 - 15 mmol/L    Glucose 123 (H) 65 - 100 mg/dL    BUN 13 6 - 20 MG/DL    Creatinine 0.56 0.55 - 1.02 MG/DL    BUN/Creatinine ratio 23 (H) 12 - 20      GFR est AA >60 >60 ml/min/1.73m2    GFR est non-AA >60 >60 ml/min/1.73m2    Calcium 9.5 8.5 - 10.1 MG/DL   NUCLEATED RBC    Collection Time: 03/24/17  4:14 AM   Result Value Ref Range    NRBC 1.2 (H) 0  WBC    ABSOLUTE NRBC 0.05 (H) 0.00 - 0.01 K/uL    WBC CORRECTED FOR NR ADJUSTED FOR NUCLEATED RBC'S     GLUCOSE, POC    Collection Time: 03/24/17  6:38 AM   Result Value Ref Range    Glucose (POC) 152 (H) 65 - 100 mg/dL    Performed by Teresa [id number]    GLUCOSE, POC    Collection Time: 03/24/17 11:40 AM   Result Value Ref Range    Glucose (POC) 133 (H) 65 - 100 mg/dL    Performed by Arielle Callaway.  IRLANDA Wilkinson 3/24/2017

## 2017-03-24 NOTE — PROGRESS NOTES
Hospitalist Progress Note  Erich Fitch MD  Office: [phone]  Cell: [phone]        Date of Service:  3/24/2017  NAME:  Keiht Lopez  :  1930  MRN:  [id number]      Admission Summary:   80-year-old female with history of chronic systolic congestive heart failure, atrial fibrillation, diabetes mellitus, hypertension, chronic anticoagulation use, history of pacemaker and ICD. She was sent from her primary care physician to the emergency room to be evaluated. History from the patient and her daughters at the bedside, also from the records. Per the daughter, she has been having on and off abdominal pain, epigastric pain for a while, over the last 3 days has been mostly pronounced. She said her pain at times is 9/10 in severity, it is nonradiating, associated with some nausea, but no vomiting. She denies any diarrhea. At her PCP's office,   they could not get her temperature, so they sent her to the emergency room. In the ER she has been hypothermic, her temperature was 91.4 on initial presentation. Also she was hypotensive. Her blood pressure   systolic was 84/30, so she was given about 1.5 liters of IV fluid, and now she is getting 1 bottle of albumin. She was placed on warming blanket, Lizzy Hugger. She denies any dysuria or frequency. Interval history / Subjective:   No new complaints. Patient appears fairly tired this morning, just getting her nebulizer treatment. Swelling of her UEs appears slightly improved as compared to yesterday. Slightly prolonged expiratory phase, ordered a PCXR. Assessment & Plan:     Hypothermia and hypotension. - resolved  On empiric IV antibiotics, on Zosyn and vancomycin. Blood culture: No growth x 5 days;    Required [facility] intermittently     Acute Respiratory failure with Hypercapnia due to Pleural effusion/atelectasis and acute on chronic Systolic CHF NYHA IV  BIPAP PRN  PCCM input appreciated  Diuresis, on NC now  May need intermittent BIPAP for hypercapnia;   - 3/21: Lasix changed to PO. Bilateral Pleural effusions: likely related to CHF  improving on CXR;   continue antibiotics, CHF volume overload. C/w diuresis     Sepsis:  Unknown source, most likely lung with bilateral pleural effusions, possibly infectious. She had caren of leukocytes to 1.9, improving. She was afebrile overnight, low BP requiring pressors. Weaned off pressors  Continue zosyn, vanco.   Monitor cultures, preliminary blood, urine cultures no growth so far. - AMMON: no vegetations;   - blood culture 3/08 and 3/11: no growth x 5 days;   - urine culture: no growth;   - 3/21: afebrile, no leukocytosis, cultures negative. Will discontinue IV Abx and watch off Abx. Patient completed 2 weeks of IV Abx since admission. Acute on Chronic Systolic congestive heart failure NYHA III/IV. With fluid overload. Echo 3/9 with 30% EF, diffuse hypokinesis. Bumex added, strict I/O, monitor weight. History of pacer. Valvular disease: Moderate mitral regurgitation, right ventricular systolic dysfunction,   as well as enlarged left atrium. Valvular vegetation on aortic valve, questionable: Blood cultures no growth preliminary. AMMON tentatively on Monday if respiratory status permits  - Diuresis per nephrology   - 3/20: AMMON scheduled for noon today. - 3/21: AMMON showed no vegetations. Hematuria: may be related to traumatic galarza placement with INR max 5.5,  Urine noted on UA as yellow on 3/8,   Galarza placed 3/9 and hematuria reported 3/10 UA. JUAN:  From  ATN and contrast nephropathy/Hypotension most likely etiology. Ischemic heart disease, hypotension; and exposure to IV contrast.   Appreciate nephro consult recommendations. Bumex, avoid nephrotoxins, I/Os, -resolved    Hypoglycemia/diabetes mellitus. Resolved. Holding her Lantus and on hypoglycemia protocol. Her blood sugar improved after D50. Stable, off D10 now  resolved    Ischemic hepatitis:   Consulted GI. Plans for EGD when stable or outpatient. Continue proton pump inhibitor for now. US with distended gallbladder with stone and sludge. Appreciate gen surg eval   if abdominal pain worsens, get HIDA prior to godwin tube placement  - 3/21: appreciate GI consult and f/u: signed off. LFTs improving. Coagulopathy.  - Holding her Coumadin and monitor daily INR and restart her Coumadin when INR improved. INR increased, she received Vitamin K x 1. With hematuria will give FFP and monitor INR.   - 3/21: started patient on Eliquis for stroke prophylaxis in the setting of chronic Atrial Fibrillation and [id number] score of 6. Pancytopenia. - Appears to be chronic. However, patient and family unaware of her previous lab results. Will monitor her labs closely. Left cheek where it meets the nose pressure ulcer/injury:  - stage 2; measures 1 x 0.5 x 0 cm; 100% drying pink/red;  - with linear jac = 2 x 0.5 x 0 cm (smaller);  - Hospital Acquired;    Urinary retention:  - Richardson removed 3/21, decreased urine output, bladder scan revealed 900 ml of urine in the bladder per nursing report, Richardson was reinserted; (second attempt at removing Richardson during this hospitalization). - will keep the Richardson in at discharge; outpatient f/u with Urology for voiding trials once patient is more ambulatory. Anemia:  - of chronic disease with slow decline in H&H during this hospitalization. - patient transfused 2 Units of PRBC's on 3/22 with 80 mg of Lasix IV in between the 2 Units. Tolerated the transfusion well and good response in H&H. Code status: Full    Care Plan discussed with: Patient/Family and Nurse  Disposition: TBD   Consult PT/OT and OOB. Discharge planning: to SNF. Hospital Problems  Date Reviewed: 3/8/2017          Codes Class Noted POA    * (Principal)Hypothermia ICD-10-CM: T68. Dairl Tucker  ICD-9-CM: 991.6  3/8/2017 Yes Review of Systems:   A comprehensive review of systems was negative. Vital Signs:    Last 24hrs VS reviewed since prior progress note. Most recent are:  Visit Vitals    /69 (BP 1 Location: Left arm)    Pulse 80    Temp 97.8 °F (36.6 °C)    Resp 20    Ht 5' 6\" (1.676 m)    Wt 81.5 kg (179 lb 10.8 oz)    SpO2 95%    BMI 29 kg/m2         Intake/Output Summary (Last 24 hours) at 03/24/17 1320  Last data filed at 03/24/17 1039   Gross per 24 hour   Intake              240 ml   Output             1075 ml   Net             -835 ml        Physical Examination:             Constitutional:   Awake, on NC, drowsy this AM   ENT:   Neck supple,    Resp:  good air entry    CV:  irregular    GI:  Soft, non-tender, normoactive bowel sounds;     Musculoskeletal:  2+ edema    Neurologic:   awake, moves all 4 extremities             Data Review:    Review and/or order of clinical lab test   US abd:  IMPRESSION:  1. Distended gallbladder with sludge and a stone with moderate wall thickening of the gallbladder wall. 2. Dilated IVC suggesting right heart failure. 3. Small amount of free fluid surrounding the liver. 4. Small right pleural effusion      Labs:     Recent Labs      03/24/17 0414 03/23/17   0534   WBC  4.0  3.6   HGB  9.8*  9.9*   HCT  31.7*  31.2*   PLT  106*  97*     Recent Labs      03/24/17 0414 03/23/17 0534  03/22/17   0521   NA  145  142  144   K  3.3*  3.0*  3.3*   CL  102  101  104   CO2  38*  37*  34*   BUN  13  14  14   CREA  0.56  0.63  0.80   GLU  123*  102*  126*   CA  9.5  9.2  9.1   MG  2.4  2.1   --    PHOS  2.3*  2.3*   --      No results for input(s): SGOT, GPT, ALT, AP, TBIL, TBILI, TP, ALB, GLOB, GGT, AML, LPSE in the last 72 hours. No lab exists for component: AMYP, HLPSE  Recent Labs      03/24/17 0414 03/23/17 0534  03/22/17   0521   INR  1.3*  1.3*  1.3*   PTP  13.0*  12.9*  13.6*      No results for input(s): FE, TIBC, PSAT, FERR in the last 72 hours. Lab Results   Component Value Date/Time    Folate 14.5 03/09/2017 03:23 AM      No results for input(s): PH, PCO2, PO2 in the last 72 hours. No results for input(s): CPK, CKNDX, TROIQ in the last 72 hours.     No lab exists for component: CPKMB  Lab Results   Component Value Date/Time    Cholesterol, total 138 09/28/2011 09:13 AM    HDL Cholesterol 50 09/28/2011 09:13 AM    LDL, calculated 74 09/28/2011 09:13 AM    Triglyceride 69 09/28/2011 09:13 AM     Lab Results   Component Value Date/Time    Glucose (POC) 133 03/24/2017 11:40 AM    Glucose (POC) 152 03/24/2017 06:38 AM    Glucose (POC) 149 03/23/2017 09:36 PM    Glucose (POC) 145 03/23/2017 05:30 PM    Glucose (POC) 151 03/23/2017 12:34 PM     Lab Results   Component Value Date/Time    Color RED 03/10/2017 02:30 AM    Appearance BLOODY 03/10/2017 02:30 AM    Specific gravity 1.020 03/10/2017 02:30 AM    Specific gravity 1.016 03/08/2017 08:54 PM    pH (UA) 6.0 03/10/2017 02:30 AM    Protein 100 03/10/2017 02:30 AM    Glucose NEGATIVE  03/10/2017 02:30 AM    Ketone NEGATIVE  03/10/2017 02:30 AM    Bilirubin NEGATIVE  03/10/2017 02:30 AM    Urobilinogen 1.0 03/10/2017 02:30 AM    Nitrites NEGATIVE  03/10/2017 02:30 AM    Leukocyte Esterase TRACE 03/10/2017 02:30 AM    Epithelial cells FEW 03/10/2017 02:30 AM    Bacteria NEGATIVE  03/10/2017 02:30 AM    WBC 20-50 03/10/2017 02:30 AM    RBC >100 03/10/2017 02:30 AM         Medications Reviewed:     Current Facility-Administered Medications   Medication Dose Route Frequency    bisacodyl (DULCOLAX) suppository 10 mg  10 mg Rectal DAILY PRN    albuterol-ipratropium (DUO-NEB) 2.5 MG-0.5 MG/3 ML  3 mL Nebulization TID    insulin lispro (HUMALOG) injection   SubCUTAneous AC&HS    0.9% sodium chloride infusion 250 mL  250 mL IntraVENous PRN    furosemide (LASIX) tablet 80 mg  80 mg Oral BID    enalapril (VASOTEC) tablet 2.5 mg  2.5 mg Oral BID    atropine injection 0.5-1 mg  0.5-1 mg IntraVENous Multiple    sodium chloride (NS) flush 5-10 mL  5-10 mL IntraVENous Multiple    apixaban (ELIQUIS) tablet 2.5 mg  2.5 mg Oral BID    phosphorus (K PHOS NEUTRAL) 250 mg tablet 1 Tab  1 Tab Oral BID    albumin human 25% (BUMINATE) solution 12.5 g  12.5 g IntraVENous Q8H PRN    pantoprazole (PROTONIX) tablet 40 mg  40 mg Oral ACB    balsam peru-castor oil (VENELEX)  mg/gram ointment   Topical BID    docusate sodium (COLACE) capsule 100 mg  100 mg Oral BID    polyvinyl alcohol (LIQUIFILM TEARS) 1.4 % ophthalmic solution 2 Drop  2 Drop Both Eyes TID    sodium chloride (NS) flush 10 mL  10 mL InterCATHeter Q24H    sodium chloride (NS) flush 10 mL  10 mL InterCATHeter PRN    sodium chloride (NS) flush 10-40 mL  10-40 mL InterCATHeter Q8H    alteplase (CATHFLO) 1 mg in sterile water (preservative free) 1 mL injection  1 mg InterCATHeter PRN    bacitracin 500 unit/gram packet 1 Packet  1 Packet Topical PRN    0.9% sodium chloride infusion 250 mL  250 mL IntraVENous PRN    ondansetron (ZOFRAN) injection 4 mg  4 mg IntraVENous Q8H PRN    glucose chewable tablet 16 g  4 Tab Oral PRN    dextrose (D50W) injection syrg 12.5-25 g  12.5-25 g IntraVENous PRN    glucagon (GLUCAGEN) injection 1 mg  1 mg IntraMUSCular PRN    sodium chloride (NS) flush 5-10 mL  5-10 mL IntraVENous Q8H    sodium chloride (NS) flush 5-10 mL  5-10 mL IntraVENous PRN     ______________________________________________________________________  EXPECTED LENGTH OF STAY: 4d 21h  ACTUAL LENGTH OF STAY:          16                 Anai Mcclelland MD

## 2017-03-24 NOTE — PROGRESS NOTES
Met with pt this am. Pt stated that her daughter Wilma Del Angel came in to visit on yesterday afternoon. Pt does not know if choice of SNF was determined. CM talked to pt's daughter, Alesha Crouch) [phone]. She has a copy of the list that her sister her sister sent to her but they have not talked about nor had the opportunity to look at any places yet. They prefer SNF be located in [facility], close to where family lives. CM informed daughter that pt may be ready for discharge soon and that pt has [facility] which would require an authorization for the rehab/SNF. Pt's daughter stated that she will talk to her sister today and get back with this CM before end of day. CM left a VM on pt's sister phone, Wilma Del Angel, as well. Shavonne Rivera RN  ACM CRM    Pt's daughter, Teddy Mathew, called back to CM at lunch time   She stated that she reviewed the list of SNF and had 4 choices, see named [facility] as one of the 4 choices. However, she has not spoken to her sister yet and does not want to make a decision without talking to her or other family. She will take to her sister and called back to this CM today.

## 2017-03-24 NOTE — WOUND CARE
WOCN Note:     Follow-up visit for thighs and cheek. Chart shows:  Admitted for hypothermia & respiratory failure, sepsis; history of CHF, DM, a-fib, pacer      Assessment:   Patient is on nasal cannula this morning and is communicative and alert. Bed: total care  Has a Richardson  Patient reports no pain.       Heels, buttocks, and sacrum intact and without erythema.      1. Left cheek where it meets the nose pressure ulcer/injury, stage 2 = 1 x 0.5 x 0 cm 100% drying dyschromia. No exudate. Periwound intact & without erythema. Venelex in use.       Right cheek where it meets the nose with linear jac = 2 x 0.5 x 0 cm (smaller) 100% dark non-blanching, drying. Fully intact. Venelex in use.     2. Right upper and inner thigh ruptured bullae, partial thickness wounds = 4 x 3.5 x 0.1 cm and 2 x 4 x 0.1 cm both bases are 100% moist pink. Pretroleum ointment applied and Mepilex border.      There are intact, scattered bullae to left inner and upper thigh of varying sizes. All covered with Mepilex border to protect.      Also a dry linear abrasion to right upper back left open to air. Recommendations:    Continue Mepilex border to inner thighs to protect ruptured bullae sites. Continue Venelex to cheeks. Skin Care & Pressure Prevention:  Minimize layers of linen/pads under patient to optimize support surface. Turn/reposition approximately every 2 hours and offload heels. Aloe Vesta to sacrum and buttocks    Discussed above plan with patient & RN.     Transition of Care: Plan to follow weekly and as needed while admitted to hospital.    Rajesh Hendrix, HERBIEN, RN, [facility]  Certified Wound, Ostomy, Continence Nurse  office [phone]  pager [phone] or call  to page

## 2017-03-24 NOTE — PROGRESS NOTES
2330: Bedside and Verbal shift change report given to [address] [place] [address] Isael (oncoming nurse) by Ender Siddiqui (offgoing nurse). Report included the following information SBAR, Kardex, Intake/Output, MAR, Accordion, Recent Results, Med Rec Status, Cardiac Rhythm Paced and Alarm Parameters . 0500: Turn team documented twice in chart that patient was not turned per RN, This RN did not speak to turn team either time this was documented    0730: Bedside and Verbal shift change report given to José Kennedy (oncoming nurse) by [address] [place] [address] (offgoing nurse). Report included the following information SBAR, Kardex, Intake/Output, MAR, Accordion, Recent Results, Med Rec Status, Cardiac Rhythm Paced and Alarm Parameters . Hourly rounds completed throughout shift.     Problem: Falls - Risk of  Goal: *Absence of falls  Outcome: Progressing Towards Goal  Call bell within reach, bed in low/locked position

## 2017-03-25 NOTE — PROGRESS NOTES
TRANSFER - OUT REPORT:    Verbal report given to Rosie Castillo RN(name) on Javon  being transferred to [facility](unit) for routine progression of care       Report consisted of patients Situation, Background, Assessment and   Recommendations(SBAR). Information from the following report(s) SBAR, Kardex, ED Summary, Procedure Summary, Intake/Output, MAR, Accordion, Recent Results, Med Rec Status, Cardiac Rhythm Paced and Alarm Parameters  was reviewed with the receiving nurse. Lines:   PICC Triple Lumen 40/76/76 Right;Basilic (Active)   Central Line Being Utilized Yes 3/25/2017  8:00 AM   Criteria for Appropriate Use Limited/no vessel suitable for conventional peripheral access 3/25/2017  8:00 AM   Site Assessment Clean, dry, & intact 3/25/2017  8:00 AM   Phlebitis Assessment 0 3/25/2017  8:00 AM   Infiltration Assessment 0 3/25/2017  8:00 AM   Date of Last Dressing Change 03/22/17 3/25/2017  8:00 AM   Dressing Status Clean, dry, & intact 3/25/2017  8:00 AM   External Catheter Length (cm) 0 centimeters 3/22/2017 10:31 AM   Dressing Type Disk with Chlorhexadine gluconate (CHG); Transparent 3/25/2017  8:00 AM   Action Taken Open ports on tubing capped 3/25/2017  8:00 AM   Hub Color/Line Status Gray;Capped 3/25/2017  8:00 AM   Positive Blood Return (Site #1) Yes 3/25/2017  8:00 AM   Hub Color/Line Status White;Capped 3/25/2017  8:00 AM   Positive Blood Return (Site #2) Yes 3/25/2017  8:00 AM   Hub Color/Line Status Blue;Capped 3/25/2017  8:00 AM   Positive Blood Return (Site #3) Yes 3/25/2017  8:00 AM   Alcohol Cap Used Yes 3/25/2017  8:00 AM        Opportunity for questions and clarification was provided. Patient transported with:   Tech- transport    5269: Left message for daughter Eriberto Rodriguez [employer] [facility]) and notified of transfer. Pt transferred with pt belongings. 1300: Pt family returned to 18, asking where pt belongings were. Explained to pt's family that pt transported with pt belongings a few hours ago to [facility]. That pt belongings were with pt at time of transport and nothing was in the room when pt left.

## 2017-03-25 NOTE — PROGRESS NOTES
36 [facility] monitor tech notified RN that patient is having multiple runs PVC's and her pacemaker is not capturing. Jesenia NORTON to notify. 3403 Dr. Juanita Jones returned page, no new orders received, MD to assess patient. 920 Amada Hernandez with monitor tech on [facility], will send up strip for MD.    2000 [facility] notified RN that patient tachy into 130's and her pacemaker was not capturing every time, patient galarza catheter is putting out ron to bloody urine, patient is on eliquis. Jesenia NORTON to notify. 2005 Dr. Denis Osorio returned page and gave orders for Stat CBC, notify cardiology of Pacemaker issues, and consult urology/ notify urology about bloody urine in galarza catheter. 1 Paged Cardiology     2018 Dr. Reid Steinberg returned page from cardiology and will come review strips. 2020 Dr. Reid Steinberg reviewed strips, no new orders received. Continue to monitor patient but did not see anything of concern at this time. 2028 Called consult for urology to evaluate bloody galarza output that is passing through catheter without difficulty. Jesenia NORTON    2030 Dr. Cora Yanes returned page and was notified of patient situation, will follow up and see the patient in the morning.

## 2017-03-25 NOTE — PROGRESS NOTES
Bedside shift change report given to [address] [place] [address] (oncoming nurse) by Viviann Bumpers (offgoing nurse). Report included the following information SBAR, Kardex, ED Summary, Procedure Summary, Intake/Output, MAR, Accordion, Recent Results, Med Rec Status, Cardiac Rhythm V paced and Alarm Parameters .

## 2017-03-25 NOTE — PROGRESS NOTES
Hospitalist Progress Note  Abdoul Ngo MD  Office: [phone]  Cell: [phone]        Date of Service:  3/25/2017  NAME:  Lesia Lugo  :  1930  MRN:  [id number]      Admission Summary:   49-year-old female with history of chronic systolic congestive heart failure, atrial fibrillation, diabetes mellitus, hypertension, chronic anticoagulation use, history of pacemaker and ICD. She was sent from her primary care physician to the emergency room to be evaluated. History from the patient and her daughters at the bedside, also from the records. Per the daughter, she has been having on and off abdominal pain, epigastric pain for a while, over the last 3 days has been mostly pronounced. She said her pain at times is 9/10 in severity, it is nonradiating, associated with some nausea, but no vomiting. She denies any diarrhea. At her PCP's office,   they could not get her temperature, so they sent her to the emergency room. In the ER she has been hypothermic, her temperature was 91.4 on initial presentation. Also she was hypotensive. Her blood pressure   systolic was 99/98, so she was given about 1.5 liters of IV fluid, and now she is getting 1 bottle of albumin. She was placed on warming blanket, Lizzy Hugger. She denies any dysuria or frequency. Interval history / Subjective:   Communication with the patient slightly difficult as her speech is difficult to understand, she does not articulate well. CXR yesterday showed some pulmonary congestion. Assessment & Plan:     Hypothermia and hypotension: - resolved  On empiric IV antibiotics, on Zosyn and vancomycin. Blood culture: No growth x 5 days;    Required the [facility] intermittently     Acute Respiratory failure with Hypercapnia due to Pleural effusion/atelectasis and acute on chronic Systolic CHF NYHA IV  BIPAP PRN  PCCM input appreciated  Diuresis, on NC now  May need intermittent BIPAP for hypercapnia;   - 3/21: Lasix changed to PO. Bilateral Pleural effusions: likely related to CHF  improving on CXR;   continue antibiotics, CHF volume overload. C/w diuresis     Sepsis:  Unknown source, most likely lung with bilateral pleural effusions, possibly infectious. She had caren of leukocytes to 1.9, improving. She was afebrile overnight, low BP requiring pressors. Weaned off pressors  Continue zosyn, vanco.   Monitor cultures, preliminary blood, urine cultures no growth so far. - AMMON: no vegetations;   - blood culture 3/08 and 3/11: no growth x 5 days;   - urine culture: no growth;   - 3/21: afebrile, no leukocytosis, cultures negative. Will discontinue IV Abx and watch off Abx. Patient completed 2 weeks of IV Abx since admission. Acute on Chronic Systolic congestive heart failure NYHA III/IV. With fluid overload. Echo 3/9 with 30% EF, diffuse hypokinesis. Bumex added, strict I/O, monitor weight. History of pacer. Valvular disease: Moderate mitral regurgitation, right ventricular systolic dysfunction,   as well as enlarged left atrium. Valvular vegetation on aortic valve, questionable: Blood cultures no growth preliminary. AMMON tentatively on Monday if respiratory status permits  - Diuresis per nephrology   - 3/20: AMMON scheduled for noon today. - 3/21: AMMON showed no vegetations. - continue diuresis with lasix. Hematuria: may be related to traumatic galarza placement with INR max 5.5,  Urine noted on UA as yellow on 3/8,   Galarza placed 3/9 and hematuria reported 3/10 UA. JUAN:  From  ATN and contrast nephropathy/Hypotension most likely etiology. Ischemic heart disease, hypotension; and exposure to IV contrast.   Appreciate nephro consult recommendations. Bumex, avoid nephrotoxins, I/Os, -resolved    Hypoglycemia/diabetes mellitus. Resolved. Holding her Lantus and on hypoglycemia protocol. Her blood sugar improved after D50.     Stable, off D10 now  resolved    Ischemic hepatitis:   Consulted GI. Plans for EGD when stable or outpatient. Continue proton pump inhibitor for now. US with distended gallbladder with stone and sludge. Appreciate gen surg eval   if abdominal pain worsens, get HIDA prior to godwin tube placement  - 3/21: appreciate GI consult and f/u: signed off. LFTs improving. Coagulopathy.  - Holding her Coumadin and monitor daily INR and restart her Coumadin when INR improved. INR increased, she received Vitamin K x 1. With hematuria will give FFP and monitor INR.   - 3/21: started patient on Eliquis for stroke prophylaxis in the setting of chronic Atrial Fibrillation and [id number] score of 6. Pancytopenia. - Appears to be chronic. However, patient and family unaware of her previous lab results. Will monitor her labs closely. Left cheek where it meets the nose pressure ulcer/injury:  - stage 2; measures 1 x 0.5 x 0 cm; 100% drying pink/red;  - with linear jac = 2 x 0.5 x 0 cm (smaller);  - Hospital Acquired;    Urinary retention:  - Richardson removed 3/21, decreased urine output, bladder scan revealed 900 ml of urine in the bladder per nursing report, Richardson was reinserted; (second attempt at removing Richardson during this hospitalization). - will keep the Richardson in at discharge; outpatient f/u with Urology for voiding trials once patient is more ambulatory. Anemia:  - of chronic disease with slow decline in H&H during this hospitalization. - patient transfused 2 Units of PRBC's on 3/22 with 80 mg of Lasix IV in between the 2 Units. Tolerated the transfusion well and good response in H&H. Code status: Full    Care Plan discussed with: Patient/Family and Nurse  Disposition: TBD   Consult PT/OT and OOB. Discharge planning: to SNF. Daughter has not yet chosen a facility and patient needs prior authorization form the insurance company.    The sooner the patient can go to rehab, the better in my opinion, as rehab is what she needs the most at this time. Patient has multiple medically complex conditions and is at high risk of deterioration and readmission. I will consult Palliative Medicine to discuss goals of care moving forward and at discharge. Hospital Problems  Date Reviewed: 3/8/2017          Codes Class Noted POA    * (Principal)Hypothermia ICD-10-CM: T68. XXXA  ICD-9-CM: 991.6  3/8/2017 Yes                Review of Systems:   A comprehensive review of systems was negative. Vital Signs:    Last 24hrs VS reviewed since prior progress note. Most recent are:  Visit Vitals    BP 95/54 (BP 1 Location: Left arm, BP Patient Position: At rest)    Pulse 81    Temp 97.5 °F (36.4 °C)    Resp 18    Ht 5' 6\" (1.676 m)    Wt 80.9 kg (178 lb 5.6 oz)    SpO2 96%    BMI 28.79 kg/m2         Intake/Output Summary (Last 24 hours) at 03/25/17 0853  Last data filed at 03/25/17 0193   Gross per 24 hour   Intake             1140 ml   Output             1900 ml   Net             -760 ml        Physical Examination:             Constitutional:   Awake, on NC, drowsy this AM   ENT:   Neck supple,    Resp:  good air entry    CV:  irregular    GI:  Soft, non-tender, normoactive bowel sounds;     Musculoskeletal:  2+ edema    Neurologic:   awake, moves all 4 extremities             Data Review:    Review and/or order of clinical lab test   US abd:  IMPRESSION:  1. Distended gallbladder with sludge and a stone with moderate wall thickening of the gallbladder wall. 2. Dilated IVC suggesting right heart failure. 3. Small amount of free fluid surrounding the liver.   4. Small right pleural effusion      Labs:     Recent Labs      03/25/17 0348  03/24/17 0414   WBC  3.8  4.0   HGB  10.1*  9.8*   HCT  32.7*  31.7*   PLT  101*  106*     Recent Labs      03/25/17   0348  03/24/17   0414  03/23/17   0534   NA  143  145  142   K  3.4*  3.3*  3.0*   CL  101  102  101   CO2  36*  38*  37*   BUN  11  13  14   CREA  0.49*  0.56  0.63 GLU  120*  123*  102*   CA  9.4  9.5  9.2   MG  2.2  2.4  2.1   PHOS  1.8*  2.3*  2.3*     No results for input(s): SGOT, GPT, ALT, AP, TBIL, TBILI, TP, ALB, GLOB, GGT, AML, LPSE in the last 72 hours. No lab exists for component: AMYP, HLPSE  Recent Labs      03/25/17   0348  03/24/17   0414  03/23/17   0534   INR  1.2*  1.3*  1.3*   PTP  12.6*  13.0*  12.9*      No results for input(s): FE, TIBC, PSAT, FERR in the last 72 hours. Lab Results   Component Value Date/Time    Folate 14.5 03/09/2017 03:23 AM      No results for input(s): PH, PCO2, PO2 in the last 72 hours. No results for input(s): CPK, CKNDX, TROIQ in the last 72 hours.     No lab exists for component: CPKMB  Lab Results   Component Value Date/Time    Cholesterol, total 138 09/28/2011 09:13 AM    HDL Cholesterol 50 09/28/2011 09:13 AM    LDL, calculated 74 09/28/2011 09:13 AM    Triglyceride 69 09/28/2011 09:13 AM     Lab Results   Component Value Date/Time    Glucose (POC) 96 03/25/2017 06:18 AM    Glucose (POC) 150 03/24/2017 09:59 PM    Glucose (POC) 130 03/24/2017 04:14 PM    Glucose (POC) 133 03/24/2017 11:40 AM    Glucose (POC) 152 03/24/2017 06:38 AM     Lab Results   Component Value Date/Time    Color RED 03/10/2017 02:30 AM    Appearance BLOODY 03/10/2017 02:30 AM    Specific gravity 1.020 03/10/2017 02:30 AM    Specific gravity 1.016 03/08/2017 08:54 PM    pH (UA) 6.0 03/10/2017 02:30 AM    Protein 100 03/10/2017 02:30 AM    Glucose NEGATIVE  03/10/2017 02:30 AM    Ketone NEGATIVE  03/10/2017 02:30 AM    Bilirubin NEGATIVE  03/10/2017 02:30 AM    Urobilinogen 1.0 03/10/2017 02:30 AM    Nitrites NEGATIVE  03/10/2017 02:30 AM    Leukocyte Esterase TRACE 03/10/2017 02:30 AM    Epithelial cells FEW 03/10/2017 02:30 AM    Bacteria NEGATIVE  03/10/2017 02:30 AM    WBC 20-50 03/10/2017 02:30 AM    RBC >100 03/10/2017 02:30 AM         Medications Reviewed:     Current Facility-Administered Medications   Medication Dose Route Frequency    bisacodyl (DULCOLAX) suppository 10 mg  10 mg Rectal DAILY PRN    potassium chloride SR (KLOR-CON 10) tablet 20 mEq  20 mEq Oral DAILY    albuterol-ipratropium (DUO-NEB) 2.5 MG-0.5 MG/3 ML  3 mL Nebulization TID    insulin lispro (HUMALOG) injection   SubCUTAneous AC&HS    0.9% sodium chloride infusion 250 mL  250 mL IntraVENous PRN    furosemide (LASIX) tablet 80 mg  80 mg Oral BID    enalapril (VASOTEC) tablet 2.5 mg  2.5 mg Oral BID    atropine injection 0.5-1 mg  0.5-1 mg IntraVENous Multiple    sodium chloride (NS) flush 5-10 mL  5-10 mL IntraVENous Multiple    apixaban (ELIQUIS) tablet 2.5 mg  2.5 mg Oral BID    phosphorus (K PHOS NEUTRAL) 250 mg tablet 1 Tab  1 Tab Oral BID    albumin human 25% (BUMINATE) solution 12.5 g  12.5 g IntraVENous Q8H PRN    pantoprazole (PROTONIX) tablet 40 mg  40 mg Oral ACB    balsam peru-castor oil (VENELEX)  mg/gram ointment   Topical BID    docusate sodium (COLACE) capsule 100 mg  100 mg Oral BID    polyvinyl alcohol (LIQUIFILM TEARS) 1.4 % ophthalmic solution 2 Drop  2 Drop Both Eyes TID    sodium chloride (NS) flush 10 mL  10 mL InterCATHeter Q24H    sodium chloride (NS) flush 10 mL  10 mL InterCATHeter PRN    sodium chloride (NS) flush 10-40 mL  10-40 mL InterCATHeter Q8H    alteplase (CATHFLO) 1 mg in sterile water (preservative free) 1 mL injection  1 mg InterCATHeter PRN    bacitracin 500 unit/gram packet 1 Packet  1 Packet Topical PRN    0.9% sodium chloride infusion 250 mL  250 mL IntraVENous PRN    ondansetron (ZOFRAN) injection 4 mg  4 mg IntraVENous Q8H PRN    glucose chewable tablet 16 g  4 Tab Oral PRN    dextrose (D50W) injection syrg 12.5-25 g  12.5-25 g IntraVENous PRN    glucagon (GLUCAGEN) injection 1 mg  1 mg IntraMUSCular PRN    sodium chloride (NS) flush 5-10 mL  5-10 mL IntraVENous Q8H    sodium chloride (NS) flush 5-10 mL  5-10 mL IntraVENous PRN ______________________________________________________________________  EXPECTED LENGTH OF STAY: 4d 21h  ACTUAL LENGTH OF STAY:          Genaro Patrick MD

## 2017-03-26 NOTE — CONSULTS
Requesting Provider: Rossy Hernandez MD  Reason for Consultation: \"blood in urine\"  Pre-existing [facility] Patient:   No                Patient: Jyothi Winston MRN: [id number]  SSN: [id number]    YOB: 1930  Age: 80 y.o. Sex: female     Location: [facility]       Code Status: Full Code   PCP: Jackie Smith MD  - [phone]   Emergency Contact:  Primary Emergency Contact: Virgie Braga, [place] Phone: [phone]   Race/Oriental orthodox/Language: [facility] Kobi Davis. / [place] / Alphonsus Ogles: Payor: Blanca Correaman / Plan: [address] [place] [address] HMO / Product Type: [employer] Medicare /    Prior Admission Data: 12/16/16 [address] [place] [address] EMERGENCY DEPT     Hospitalized:  Hospital Day: 23 - Admitted 3/8/2017  7:11 PM   POD # * No surgery found *  by * Surgery not found * - Blood Loss: * No surgery found * * Surgery not found *     CONSULTANTS  IP CONSULT TO CARDIOLOGY  IP CONSULT TO GASTROENTEROLOGY  IP CONSULT TO NEPHROLOGY  IP CONSULT TO PALLIATIVE CARE - PROVIDER  IP CONSULT TO UROLOGY  IP CONSULT TO [address] [place] [address]     ICD-10-CM ICD-9-CM   1. Abdominal pain, epigastric R10.13 789.06   2. Hypoglycemia E16.2 251.2   3. Leukopenia, unspecified type D72.819 288.50   4. Hypothermia, initial encounter T68. XXXA 991.6   5. Hypervolemia, unspecified hypervolemia type E87.70 276.69   6. Hypotension, unspecified hypotension type I95.9 458.9   7. Shortness of breath R06.02 786.05   8. Lethargy R53.83 780.79   9. Edema, unspecified type R60.9 782.3   10. Feeding difficulties R63.3 783.3   11. Other fatigue R53.83 780.79   12. Goals of care, counseling/discussion Z71.89 V65.49   13. Fatigue, unspecified type R53.83 780.79   14. Debility R53.81 799.3         Assessment/Plan:     · Gross hematuria - suspect cath/supratherapeutic INR related. Minor and improving. Outpatient followup.  Will be available for concerns      Contact information has been added to e-discharge materials for outpatient followup. Follow-up Appointments   Procedures    FOLLOW UP VISIT Appointment in: [facility] [phone], Dr. Maddy Baxter saw you in hospital, but can followup with your MD/location of choice     [facility] [phone], Dr. Maddy Baxter saw you in hospital, but can followup with your MD/location of choice     Standing Status:   Standing     Number of Occurrences:   1     Order Specific Question:   Appointment in     Answer:   One Month             CC: Epigastric Pain   HPI: She is a 80 y.o. female past medical history significant for mitral regurgitation, mitral valve repair, chronic systolic heart failure, tricuspid regurgitation, a-fib, CHF, DM type 2, long-term use of anticoagulants, thyroid disease, HTN, pacemaker in place, cardiac catheterization who presents accompanied by daughters with chief complaint of abdominal pain. Admission UA, and culture negative. Richardson Cath placed when INR supratherapeutic at 5.5 and some minor gross hematuria seen. She is quite sedate, falling back asleep but denies prior gross hematuria episodes. We discussed outpatient followup with option of workup.     Urine in tubing clear with some hematuria sediment    Current Antimicrobial Therapy     None        Diet: DIET NUTRITIONAL SUPPLEMENTS Dinner; Glucerna Shake  DIET DENTAL SOFT (SOFT SOLID) - % Diet Eaten: 10 %  Urinary Status: Richardson     Labs     Lab Results   Component Value Date/Time    WBC 4.0 03/26/2017 04:11 AM    HCT 33.0 03/26/2017 04:11 AM    PLATELET 896 80/51/6521 04:11 AM    Sodium 143 03/25/2017 03:48 AM    Potassium 3.4 03/25/2017 03:48 AM    Chloride 101 03/25/2017 03:48 AM    CO2 36 03/25/2017 03:48 AM    BUN 11 03/25/2017 03:48 AM    Creatinine 0.49 03/25/2017 03:48 AM    Glucose 120 03/25/2017 03:48 AM    Calcium 9.4 03/25/2017 03:48 AM    Magnesium 2.2 03/25/2017 03:48 AM    INR 1.2 03/25/2017 03:48 AM     UA: Lab Results   Component Value Date/Time    Color RED 03/10/2017 02:30 AM    Appearance BLOODY 03/10/2017 02:30 AM    Specific gravity 1.020 03/10/2017 02:30 AM    Specific gravity 1.016 03/08/2017 08:54 PM    pH (UA) 6.0 03/10/2017 02:30 AM    Protein 100 03/10/2017 02:30 AM    Glucose NEGATIVE  03/10/2017 02:30 AM    Ketone NEGATIVE  03/10/2017 02:30 AM    Bilirubin NEGATIVE  03/10/2017 02:30 AM    Urobilinogen 1.0 03/10/2017 02:30 AM    Nitrites NEGATIVE  03/10/2017 02:30 AM    Leukocyte Esterase TRACE 03/10/2017 02:30 AM    Epithelial cells FEW 03/10/2017 02:30 AM    Bacteria NEGATIVE  03/10/2017 02:30 AM    WBC 20-50 03/10/2017 02:30 AM    RBC >100 03/10/2017 02:30 AM     Imaging   US: US Results (most recent):    Results from Hospital Encounter encounter on 03/08/17   US ABD LTD   Addendum Addendum: The common duct measures 5.8 mm within normal limits. Matthew Cabrera MD 3/16/2017  4:27 PM             Narrative Indication: Right upper Quadrant pain. Right upper quadrant ultrasound was performed. FINDINGS:    There are stones and sludge in the gallbladder. Gallbladder wall is thickened up  to 6 mm. Gallbladder is distended. There is a small amount of ascites adjacent to the liver. There is also a right  pleural effusion. The liver is heterogeneous but no focal mass. There is a 3 cm cyst in the left  lobe. Portal vein is patent. Pancreatic head and right kidney are unremarkable. Impression IMPRESSION:  1. Distended gallbladder with sludge and a stone with moderate wall thickening  of the gallbladder wall. 2. Dilated IVC suggesting right heart failure. 3. Small amount of free fluid surrounding the liver. 4. Small right pleural effusion        CT:   Results for orders placed during the hospital encounter of 03/08/17   CT HEAD WO CONT    Narrative EXAM:  CT HEAD WO CONT  Clinical history: Altered mental status  INDICATION:   altered mental status    COMPARISON: None. TECHNIQUE: Unenhanced CT of the head was performed using 5 mm images.  Brain and  bone windows were generated. CT dose reduction was achieved through use of a  standardized protocol tailored for this examination and automatic exposure  control for dose modulation. FINDINGS:  There are scattered areas of hypodensity in the cerebral white matter. There is  sulcal and ventricular prominence. . Remote tiny basal ganglia infarction on the  right. . There is no intracranial hemorrhage, extra-axial collection, mass, mass  effect or midline shift. The basilar cisterns are open. No acute infarct is  identified. The bone windows demonstrate no abnormalities. The visualized  portions of the paranasal sinuses and mastoid air cells are clear. Impression IMPRESSION:     Moderate chronic microvascular ischemic change and mild cerebral atrophy for  patient age.          Cultures     All Micro Results     Procedure Component Value Units Date/Time    CULTURE, BLOOD, PAIRED [[id number]] Collected:  03/11/17 1235    Order Status:  Completed Specimen:  Blood Updated:  03/16/17 0458     Special Requests: NO SPECIAL REQUESTS        Culture result: NO GROWTH 5 DAYS       CULTURE, BLOOD, PAIRED [[id number]] Collected:  03/08/17 1945    Order Status:  Completed Specimen:  Blood Updated:  03/13/17 0723     Special Requests: NO SPECIAL REQUESTS        Culture result: NO GROWTH 5 DAYS       CULTURE, BLOOD [[id number]]     Order Status:  Canceled Specimen:  Blood from Blood     CULTURE, BLOOD [[id number]]     Order Status:  Canceled Specimen:  Blood from Blood     CULTURE, URINE [[id number]] Collected:  03/10/17 0230    Order Status:  Completed Specimen:  Urine Updated:  03/11/17 0711     Special Requests: --        NO SPECIAL REQUESTS  Reflexed from [id number]       Newcomb Count <1,000 COLONIES/mL        Culture result: NO GROWTH 1 DAY              Past History: (Complete 2+/3 areas)   No Known Allergies   Current Facility-Administered Medications   Medication Dose Route Frequency    bisacodyl (DULCOLAX) suppository 10 mg  10 mg Rectal DAILY PRN    potassium chloride SR (KLOR-CON 10) tablet 20 mEq  20 mEq Oral DAILY    albuterol-ipratropium (DUO-NEB) 2.5 MG-0.5 MG/3 ML  3 mL Nebulization TID    insulin lispro (HUMALOG) injection   SubCUTAneous AC&HS    0.9% sodium chloride infusion 250 mL  250 mL IntraVENous PRN    furosemide (LASIX) tablet 80 mg  80 mg Oral BID    enalapril (VASOTEC) tablet 2.5 mg  2.5 mg Oral BID    sodium chloride (NS) flush 5-10 mL  5-10 mL IntraVENous Multiple    apixaban (ELIQUIS) tablet 2.5 mg  2.5 mg Oral BID    phosphorus (K PHOS NEUTRAL) 250 mg tablet 1 Tab  1 Tab Oral BID    albumin human 25% (BUMINATE) solution 12.5 g  12.5 g IntraVENous Q8H PRN    pantoprazole (PROTONIX) tablet 40 mg  40 mg Oral ACB    balsam peru-castor oil (VENELEX)  mg/gram ointment   Topical BID    docusate sodium (COLACE) capsule 100 mg  100 mg Oral BID    polyvinyl alcohol (LIQUIFILM TEARS) 1.4 % ophthalmic solution 2 Drop  2 Drop Both Eyes TID    sodium chloride (NS) flush 10 mL  10 mL InterCATHeter Q24H    sodium chloride (NS) flush 10 mL  10 mL InterCATHeter PRN    sodium chloride (NS) flush 10-40 mL  10-40 mL InterCATHeter Q8H    alteplase (CATHFLO) 1 mg in sterile water (preservative free) 1 mL injection  1 mg InterCATHeter PRN    bacitracin 500 unit/gram packet 1 Packet  1 Packet Topical PRN    0.9% sodium chloride infusion 250 mL  250 mL IntraVENous PRN    ondansetron (ZOFRAN) injection 4 mg  4 mg IntraVENous Q8H PRN    glucose chewable tablet 16 g  4 Tab Oral PRN    dextrose (D50W) injection syrg 12.5-25 g  12.5-25 g IntraVENous PRN    glucagon (GLUCAGEN) injection 1 mg  1 mg IntraMUSCular PRN    sodium chloride (NS) flush 5-10 mL  5-10 mL IntraVENous Q8H    sodium chloride (NS) flush 5-10 mL  5-10 mL IntraVENous PRN    Prior to Admission medications    Medication Sig Start Date End Date Taking?  Authorizing Provider   KLOR-CON M20 20 mEq tablet TAKE 2 TABS BY MOUTH TWO (2) TIMES A DAY. 2/15/17   Denise March Marlene Simmons MD   furosemide (LASIX) 80 mg tablet TAKE 1 TAB BY MOUTH TWO (2) TIMES A DAY. 1/10/17   Estela Garcia MD   enalapril (VASOTEC) 10 mg tablet Take 5 mg by mouth daily. Historical Provider   carvedilol (COREG) 25 mg tablet TAKE 1 TAB BY MOUTH TWO (2) TIMES DAILY (WITH MEALS). 12/1/16   Ale Retana MD   warfarin (COUMADIN) 5 mg tablet Take half tablet or 2.5 on Tues and Thursdays, whole tab or 5 mg on all other days or as directed by Coumadin clinic 9/1/15   Ale Retana MD   insulin glargine (LANTUS SOLOSTAR) 100 unit/mL (3 mL) pen 22 Units by SubCUTAneous route daily. Historical Provider   acetaminophen (TYLENOL) 325 mg tablet Take  by mouth every four (4) hours as needed. Historical Provider        PMHx:  has a past medical history of Atrial fibrillation ([facility] [address] Chronic systolic heart failure ([facility] [address] Diabetes mellitus type 2, insulin dependent ([facility] [address] Hypertension; Long term (current) use of anticoagulants; Malignant hypertensive heart disease with CHF ([address] Mitral regurgitation; Pacemaker (10/25/2012); S/P mitral valve repair (July 29th, 1996); Thyroid disease; and TR (tricuspid regurgitation). PSurgHx:  has a past surgical history that includes heart valve surgery; cardiac surg procedure unlist (1996); ins ppm/icd led dual only (10/25/2012); pacemaker; heart catheterization (10/4/2012); and ins ppm/icd led dual only (9/9/2016). PSocHx:  reports that she has never smoked. She has never used smokeless tobacco. She reports that she does not drink alcohol or use illicit drugs.    ROS:  (Complete - 10 systems) - positives are bolded : Weightloss (Constitutional), Dry mouth (ENMT), Chest pain (CV), SOB (Respiratory), Constipation (GI), Weakness (MS), Pallor (Skin), TIA Sx (Neuro), Confusion (Psych), Easy bruising (Heme)    Physical Exam: (Comprehesive - 8+ 1995 Systems)     (1) Constitutional:  FIO2: FIO2 (%): 30 % on SpO2: O2 Sat (%): 99 %  O2 Device: Nasal cannula O2 Flow Rate (L/min): 2 l/min  Patient Vitals for the past 24 hrs:   BP Temp Pulse Resp SpO2 Weight   03/26/17 0406 120/74 97.9 °F (36.6 °C) 85 18 99 % 83.5 kg (184 lb 1.4 oz)   03/26/17 0353 122/76 97.7 °F (36.5 °C) 81 20 97 % -   03/26/17 0042 100/65 97.4 °F (36.3 °C) 82 20 98 % -   03/25/17 2353 100/55 - 78 - - -   03/25/17 2350 95/60 97.7 °F (36.5 °C) 84 20 95 % -   03/25/17 2215 101/67 98.4 °F (36.9 °C) 85 20 97 % -   03/25/17 2009 - - - - 93 % -   03/25/17 2008 127/77 98.5 °F (36.9 °C) (!) 104 22 (!) 88 % -   03/25/17 1942 - - - - 97 % -   03/25/17 1838 118/68 - 89 - - -   03/25/17 1639 141/78 97.6 °F (36.4 °C) (!) 107 28 97 % -   03/25/17 1019 113/72 97.6 °F (36.4 °C) 84 24 92 % -   03/25/17 0840 - - - - 96 % -         Date 03/25/17 0700 - 03/26/17 0659 03/26/17 0700 - 03/27/17 0659   Shift 2458-1182 3877-4877 24 Hour Total 0599-6593 2599-9022 24 Hour Total   I  N  T  A  K  E   P.O. 240  240         P. O. 240  240       Other  625 625         Irrigation Volume Input (mL) (Urinary Catheter 03/22/17 Richardson)  625 625       Shift Total  (mL/kg) 240  (3) 625  (7.5) 865  (10.4)      O  U  T  P  U  T   Urine  (mL/kg/hr)    450  450      Urine Output (mL) (Urinary Catheter 03/22/17 Richardson)    450  450    Stool            Stool Occurrence(s) 1 x 1 x 2 x       Shift Total  (mL/kg)    450  (5.4)  450  (5.4)    625 865 -450  -450   Weight (kg) 80.9 83.5 83.5 83.5 83.5 83.5      (2) ENMT:   moist mucous membranes   (3) Respiratory:  breathing easily   (4) GI:  no abdominal masses, tenderness, no hepatosplenomegaly, no ventral hernia, stool for occult blood not indicated as urologist.   (5) :   normal   (6) Lymphatic:  no adenopathy   (7) Muscloskeletal:  no gross deformity   (8) Skin:  no rash   (9) Neuro:  sedate      Signed By: Edwin Nicole MD  - March 26, 2017

## 2017-03-26 NOTE — PROGRESS NOTES
Bedside and Verbal shift change report given to Amanda Jang RN (oncoming nurse) by Laura Joaquin RN (offgoing nurse). Report included the following information SBAR, Kardex, ED Summary, Intake/Output, MAR, Recent Results and Cardiac Rhythm PVC's, Tachycardia.

## 2017-03-26 NOTE — PROGRESS NOTES
Called Dr. Parul Meléndez regarding patient's blood pressure being low this mornin/60. Dr. Parul Meléndez stated that patient's blood pressure medication Vasotec and Lasix may be administered to patient as scheduled.

## 2017-03-26 NOTE — PROGRESS NOTES
Bedside shift change report given to Jose A Lundberg RN (oncoming nurse) by Sandra Amador RN (offgoing nurse). Report included the following information SBAR, Kardex, Intake/Output, MAR and Recent Results.

## 2017-03-26 NOTE — PROGRESS NOTES
Received care of this patient. On assessment patient mumbling words and breathing heavy and fast with RR of 22. Vitals were taken and HR was 104 and O2 sats were 88% on 1 L NC, RR 22; other vital signs stable. RN increased NC to 2 L. O2 sat up to 93%. RN assessed patient for signs of a stroke, and assessment was negative. Pt only A/O to self and place. Unable to answer other questions at this time. Pt has bloody clear urine in galarza, however no signs of clots at this time. Hgb stable at 10.7. RN will continue to monitor.

## 2017-03-26 NOTE — PROGRESS NOTES
Hospitalist Progress Note  Alex Guevara MD  Office: [phone]  Cell: [phone]        Date of Service:  3/26/2017  NAME:  Carito Tapia  :  1930  MRN:  [id number]      Admission Summary:   80-year-old female with history of chronic systolic congestive heart failure, atrial fibrillation, diabetes mellitus, hypertension, chronic anticoagulation use, history of pacemaker and ICD. She was sent from her primary care physician to the emergency room to be evaluated. History from the patient and her daughters at the bedside, also from the records. Per the daughter, she has been having on and off abdominal pain, epigastric pain for a while, over the last 3 days has been mostly pronounced. She said her pain at times is 9/10 in severity, it is nonradiating, associated with some nausea, but no vomiting. She denies any diarrhea. At her PCP's office,   they could not get her temperature, so they sent her to the emergency room. In the ER she has been hypothermic, her temperature was 91.4 on initial presentation. Also she was hypotensive. Her blood pressure   systolic was 15/49, so she was given about 1.5 liters of IV fluid, and now she is getting 1 bottle of albumin. She was placed on warming blanket, Lizzy Hugger. She denies any dysuria or frequency. Interval history / Subjective:   No overnight events noted. Patient has no new complaints. Still very weak and needs a lot of encouragement to do anything. Assessment & Plan:     Hypothermia and hypotension: - resolved  On empiric IV antibiotics, on Zosyn and vancomycin. Blood culture: No growth x 5 days;    Required the [facility] intermittently     Acute Respiratory failure with Hypercapnia due to Pleural effusion/atelectasis and acute on chronic Systolic CHF NYHA IV:  BIPAP PRN  PCCM input appreciated  Diuresis, on NC now  May need intermittent BIPAP for hypercapnia;   - 3/21: Lasix changed to PO. Bilateral Pleural effusions: likely related to CHF  improving on CXR;   continue antibiotics, CHF volume overload. C/w diuresis     Sepsis:  Unknown source, most likely lung with bilateral pleural effusions, possibly infectious. She had caren of leukocytes to 1.9, improving. She was afebrile overnight, low BP requiring pressors. Weaned off pressors. Continue Zosyn, Vanco.   Monitor cultures, preliminary blood, urine cultures no growth so far. - AMMON: no vegetations;   - blood culture 3/08 and 3/11: no growth x 5 days;   - urine culture: no growth;   - 3/21: afebrile, no leukocytosis, cultures negative. Will discontinue IV Abx and watch off Abx. Patient completed 2 weeks of IV Abx since admission. Acute on Chronic Systolic congestive heart failure NYHA III/IV. With fluid overload. Echo 3/9 with 30% EF, diffuse hypokinesis. Bumex added, strict I/O, monitor weight. History of pacer. Valvular disease: Moderate mitral regurgitation, right ventricular systolic dysfunction,   as well as enlarged left atrium. Valvular vegetation on aortic valve, questionable: Blood cultures no growth preliminary. AMMON tentatively on Monday if respiratory status permits  - Diuresis per nephrology   - 3/20: AMMON scheduled for noon today. - 3/21: AMMON showed no vegetations. - continue diuresis with lasix. Hematuria: may be related to traumatic gaalrza placement with INR max 5.5,  Urine noted on UA as yellow on 3/8,   Galarza placed 3/9 and hematuria reported 3/10 UA. JUAN:  From  ATN and contrast nephropathy/Hypotension most likely etiology. Ischemic heart disease, hypotension; and exposure to IV contrast.   Appreciate nephro consult recommendations. Bumex, avoid nephrotoxins, I/Os, -resolved    Hypoglycemia/diabetes mellitus. Resolved. Holding her Lantus and on hypoglycemia protocol. Her blood sugar improved after D50.     Stable, off D10 now  resolved    Ischemic hepatitis:   Consulted GI. Plans for EGD when stable or outpatient. Continue proton pump inhibitor for now. US with distended gallbladder with stone and sludge. Appreciate gen surg eval   if abdominal pain worsens, get HIDA prior to godwin tube placement  - 3/21: appreciate GI consult and f/u: signed off. LFTs improving. Coagulopathy.  - Holding her Coumadin and monitor daily INR and restart her Coumadin when INR improved. INR increased, she received Vitamin K x 1. With hematuria will give FFP and monitor INR.   - 3/21: started patient on Eliquis for stroke prophylaxis in the setting of chronic Atrial Fibrillation and [id number] score of 6. Pancytopenia. - Appears to be chronic. However, patient and family unaware of her previous lab results. Will monitor her labs closely. Left cheek where it meets the nose pressure ulcer/injury:  - stage 2; measures 1 x 0.5 x 0 cm; 100% drying pink/red;  - with linear jac = 2 x 0.5 x 0 cm (smaller);  - Hospital Acquired;    Urinary retention:  - Richardson removed 3/21, decreased urine output, bladder scan revealed 900 ml of urine in the bladder per nursing report, Richardson was reinserted; (second attempt at removing Richardson during this hospitalization). - will keep the Richardson in at discharge; outpatient f/u with Urology for voiding trials once patient is more ambulatory. Anemia:  - of chronic disease with slow decline in H&H during this hospitalization. - patient transfused 2 Units of PRBC's on 3/22 with 80 mg of Lasix IV in between the 2 Units. Tolerated the transfusion well and good response in H&H. Code status: Full    Care Plan discussed with: Patient/Family and Nurse  Disposition: TBD   Consult PT/OT and OOB. Discharge planning: to SNF. Daughter has not yet chosen a facility and patient needs prior authorization form the insurance company. The sooner the patient can go to rehab, the better in my opinion, as rehab is what she needs the most at this time.     Patient has multiple medically complex conditions and is at high risk of deterioration and readmission. Consulted Palliative Medicine to discuss goals of care moving forward and at discharge. Hospital Problems  Date Reviewed: 3/8/2017          Codes Class Noted POA    * (Principal)Hypothermia ICD-10-CM: T68. XXXA  ICD-9-CM: 991.6  3/8/2017 Yes                Review of Systems:   A comprehensive review of systems was negative. Vital Signs:    Last 24hrs VS reviewed since prior progress note. Most recent are:  Visit Vitals    /71 (BP 1 Location: Left arm, BP Patient Position: At rest)    Pulse 92    Temp 98.3 °F (36.8 °C)    Resp 20    Ht 5' 6\" (1.676 m)    Wt 83.5 kg (184 lb 1.4 oz)    SpO2 97%    BMI 29.71 kg/m2         Intake/Output Summary (Last 24 hours) at 03/26/17 1746  Last data filed at 03/26/17 0744   Gross per 24 hour   Intake              625 ml   Output              450 ml   Net              175 ml        Physical Examination:             Constitutional:   Awake, on NC, drowsy this AM   ENT:   Neck supple,    Resp:  good air entry    CV:  irregular    GI:  Soft, non-tender, normoactive bowel sounds;     Musculoskeletal:  2+ edema    Neurologic:   awake, moves all 4 extremities             Data Review:    Review and/or order of clinical lab test   US abd:  IMPRESSION:  1. Distended gallbladder with sludge and a stone with moderate wall thickening of the gallbladder wall. 2. Dilated IVC suggesting right heart failure. 3. Small amount of free fluid surrounding the liver.   4. Small right pleural effusion      Labs:     Recent Labs      03/26/17   0411  03/25/17 2033   WBC  4.0  4.4   HGB  10.4*  10.7*   HCT  33.0*  34.7*   PLT  116*  121*     Recent Labs      03/25/17   0348  03/24/17   0414   NA  143  145   K  3.4*  3.3*   CL  101  102   CO2  36*  38*   BUN  11  13   CREA  0.49*  0.56   GLU  120*  123*   CA  9.4  9.5   MG  2.2  2.4   PHOS  1.8*  2.3*     No results for input(s): SGOT, GPT, ALT, AP, TBIL, TBILI, TP, ALB, GLOB, GGT, AML, LPSE in the last 72 hours. No lab exists for component: AMYP, HLPSE  Recent Labs      03/25/17   0348  03/24/17   0414   INR  1.2*  1.3*   PTP  12.6*  13.0*      No results for input(s): FE, TIBC, PSAT, FERR in the last 72 hours. Lab Results   Component Value Date/Time    Folate 14.5 03/09/2017 03:23 AM      No results for input(s): PH, PCO2, PO2 in the last 72 hours. No results for input(s): CPK, CKNDX, TROIQ in the last 72 hours.     No lab exists for component: CPKMB  Lab Results   Component Value Date/Time    Cholesterol, total 138 09/28/2011 09:13 AM    HDL Cholesterol 50 09/28/2011 09:13 AM    LDL, calculated 74 09/28/2011 09:13 AM    Triglyceride 69 09/28/2011 09:13 AM     Lab Results   Component Value Date/Time    Glucose (POC) 151 03/26/2017 05:20 PM    Glucose (POC) 139 03/26/2017 11:12 AM    Glucose (POC) 105 03/26/2017 06:30 AM    Glucose (POC) 153 03/25/2017 09:13 PM    Glucose (POC) 153 03/25/2017 05:36 PM     Lab Results   Component Value Date/Time    Color RED 03/10/2017 02:30 AM    Appearance BLOODY 03/10/2017 02:30 AM    Specific gravity 1.020 03/10/2017 02:30 AM    Specific gravity 1.016 03/08/2017 08:54 PM    pH (UA) 6.0 03/10/2017 02:30 AM    Protein 100 03/10/2017 02:30 AM    Glucose NEGATIVE  03/10/2017 02:30 AM    Ketone NEGATIVE  03/10/2017 02:30 AM    Bilirubin NEGATIVE  03/10/2017 02:30 AM    Urobilinogen 1.0 03/10/2017 02:30 AM    Nitrites NEGATIVE  03/10/2017 02:30 AM    Leukocyte Esterase TRACE 03/10/2017 02:30 AM    Epithelial cells FEW 03/10/2017 02:30 AM    Bacteria NEGATIVE  03/10/2017 02:30 AM    WBC 20-50 03/10/2017 02:30 AM    RBC >100 03/10/2017 02:30 AM         Medications Reviewed:     Current Facility-Administered Medications   Medication Dose Route Frequency    bisacodyl (DULCOLAX) suppository 10 mg  10 mg Rectal DAILY PRN    potassium chloride SR (KLOR-CON 10) tablet 20 mEq  20 mEq Oral DAILY    albuterol-ipratropium (DUO-NEB) 2.5 MG-0.5 MG/3 ML  3 mL Nebulization TID    insulin lispro (HUMALOG) injection   SubCUTAneous AC&HS    0.9% sodium chloride infusion 250 mL  250 mL IntraVENous PRN    furosemide (LASIX) tablet 80 mg  80 mg Oral BID    enalapril (VASOTEC) tablet 2.5 mg  2.5 mg Oral BID    sodium chloride (NS) flush 5-10 mL  5-10 mL IntraVENous Multiple    apixaban (ELIQUIS) tablet 2.5 mg  2.5 mg Oral BID    phosphorus (K PHOS NEUTRAL) 250 mg tablet 1 Tab  1 Tab Oral BID    albumin human 25% (BUMINATE) solution 12.5 g  12.5 g IntraVENous Q8H PRN    pantoprazole (PROTONIX) tablet 40 mg  40 mg Oral ACB    balsam peru-castor oil (VENELEX)  mg/gram ointment   Topical BID    docusate sodium (COLACE) capsule 100 mg  100 mg Oral BID    polyvinyl alcohol (LIQUIFILM TEARS) 1.4 % ophthalmic solution 2 Drop  2 Drop Both Eyes TID    sodium chloride (NS) flush 10 mL  10 mL InterCATHeter Q24H    sodium chloride (NS) flush 10 mL  10 mL InterCATHeter PRN    sodium chloride (NS) flush 10-40 mL  10-40 mL InterCATHeter Q8H    alteplase (CATHFLO) 1 mg in sterile water (preservative free) 1 mL injection  1 mg InterCATHeter PRN    bacitracin 500 unit/gram packet 1 Packet  1 Packet Topical PRN    0.9% sodium chloride infusion 250 mL  250 mL IntraVENous PRN    ondansetron (ZOFRAN) injection 4 mg  4 mg IntraVENous Q8H PRN    glucose chewable tablet 16 g  4 Tab Oral PRN    dextrose (D50W) injection syrg 12.5-25 g  12.5-25 g IntraVENous PRN    glucagon (GLUCAGEN) injection 1 mg  1 mg IntraMUSCular PRN    sodium chloride (NS) flush 5-10 mL  5-10 mL IntraVENous Q8H    sodium chloride (NS) flush 5-10 mL  5-10 mL IntraVENous PRN     ______________________________________________________________________  EXPECTED LENGTH OF STAY: 4d 21h  ACTUAL LENGTH OF STAY:          18                 Muna Medina MD

## 2017-03-26 NOTE — PROGRESS NOTES
[facility] called saying pt had 7 runs of Vtach. Pt vitals stable at this time. Dr. Karthik Asencio notified and no new orders were received at this time.

## 2017-03-27 NOTE — PROGRESS NOTES
Bedside and Verbal shift change report given to Matthew Souza RN (oncoming nurse) by Latonia Yoo RN (offgoing nurse). Report included the following information SBAR, Kardex, Intake/Output, MAR and Recent Results.

## 2017-03-27 NOTE — PROGRESS NOTES
Occupational Therapy [phone] - [phone]:    Pt received for Occupational therapy immediately after working with Physical therapy who placed pt in the chair position. Pt set up for feeding task and demonstrated difficulty using her fork to feed self but showed increased IND using the spoon. Session interrupted by nursing who requested OT abort session as RN received call from lab regarding critical lab result for CO2 and pt was to be transferred to a higher level of care. Pt left in care of nursing to assess vitals. Left foam in room ; recommend working on AROM UE/ therapy sponge exercises next session. Will follow up as able/appropriate.     Charlene Carcamo OTR/L

## 2017-03-27 NOTE — PROCEDURES
Intubation Note    Called to bedside secondary to impending respiratory failure and airway protection. Patient pre-oxygenated with 100% oxygen. Smooth RSI with cricoid pressure. Etomidate 10 mg + Succinylcholine 100 mg IV. DVL x 1 atraumatic. Posterior pharynx was clear at the time of OETT placement. 7.5 ETT taped and secured at 21 cm at the gums. + Bilateral BS, + Chest rise, + ETCO2, dentition unchanged. VSS. CXR pending.     Care turned over to covering ICU Attending MD.

## 2017-03-27 NOTE — PROGRESS NOTES
Hospitalist Progress Note  Alondra Hernandez MD  Office: [phone]          Date of Service:  3/27/2017  NAME:  Jyothi Winston  :  1930  MRN:  [id number]      Admission Summary:   80-year-old female with history of chronic systolic congestive heart failure, atrial fibrillation, diabetes mellitus, hypertension, chronic anticoagulation use, history of pacemaker and ICD. She was sent from her primary care physician to the emergency room to be evaluated. History from the patient and her daughters at the bedside, also from the records. Per the daughter, she has been having on and off abdominal pain, epigastric pain for a while, over the last 3 days has been mostly pronounced. She said her pain at times is 9/10 in severity, it is nonradiating, associated with some nausea, but no vomiting. She denies any diarrhea. At her PCP's office,   they could not get her temperature, so they sent her to the emergency room. In the ER she has been hypothermic, her temperature was 91.4 on initial presentation. Also she was hypotensive. Her blood pressure   systolic was 77/20, so she was given about 1.5 liters of IV fluid, and now she is getting 1 bottle of albumin. She was placed on warming blanket, Lizzy Hugger. She denies any dysuria or frequency. Interval history / Subjective:   Appeared very weak,although subjectively she is not offering much complaints. Assessment & Plan:     Hypothermia and hypotension: - resolved  On empiric IV antibiotics, on Zosyn and vancomycin. Blood culture: No growth x 5 days; Required the [facility] intermittently     Acute Respiratory failure with Hypercapnia due to Pleural effusion/atelectasis and acute on chronic Systolic CHF NYHA IV:  -,JCG is lethargic and high CO2,transfer to [facility] for bipap  - 3/21: Lasix changed to PO.      Bilateral Pleural effusions: likely related to CHF  improving on CXR;   continue antibiotics, CHF volume overload. C/w diuresis     Sepsis:  Unknown source, most likely lung with bilateral pleural effusions, possibly infectious. She had caren of leukocytes to 1.9, improving. She was afebrile overnight, low BP requiring pressors. Weaned off pressors. Continue Zosyn, Vanco.   Monitor cultures, preliminary blood, urine cultures no growth so far. - AMMON: no vegetations;   - blood culture 3/08 and 3/11: no growth x 5 days;   - urine culture: no growth;   - 3/21: afebrile, no leukocytosis, cultures negative. Will discontinue IV Abx and watch off Abx. Patient completed 2 weeks of IV Abx since admission. Acute on Chronic Systolic congestive heart failure NYHA III/IV. With fluid overload. Echo 3/9 with 30% EF, diffuse hypokinesis. Bumex added, strict I/O, monitor weight. History of pacer. Valvular disease: Moderate mitral regurgitation, right ventricular systolic dysfunction,   as well as enlarged left atrium. Valvular vegetation on aortic valve, questionable: Blood cultures no growth preliminary.   - Diuresis per nephrology   - 3/21: AMMON showed no vegetations. - continue diuresis with lasix. Hematuria: may be related to traumatic galarza placement with INR max 5.5,  Urine noted on UA as yellow on 3/8,   Galarza placed 3/9 and hematuria reported 3/10 UA. JUAN:  From  ATN and contrast nephropathy/Hypotension most likely etiology. Ischemic heart disease, hypotension; and exposure to IV contrast.   Appreciate nephro consult recommendations. Diuresis, avoid nephrotoxins, I/Os, -resolved    Hypoglycemia/diabetes mellitus. Resolved. Holding her Lantus and on hypoglycemia protocol. Her blood sugar improved after D50. Stable, off D10 now  resolved    Ischemic hepatitis:   Consulted GI. Plans for EGD when stable or outpatient. -LFTs resolved. Coagulopathy.  - Had hematuria. INR was supra therapeutic on warfarin which is now stopped.   - 3/21: started patient on Eliquis for stroke prophylaxis in the setting of chronic Atrial Fibrillation and [id number] score of 6. Pancytopenia. - Appears to be chronic. However, patient and family unaware of her previous lab results. Will monitor her labs closely. Left cheek where it meets the nose pressure ulcer/injury:  - stage 2; measures 1 x 0.5 x 0 cm; 100% drying pink/red;  - with linear jac = 2 x 0.5 x 0 cm (smaller);  - Hospital Acquired;    Urinary retention:  - Richardson removed 3/21, decreased urine output, bladder scan revealed 900 ml of urine in the bladder per nursing report, Richardson was reinserted; (second attempt at removing Richardson during this hospitalization). - will keep the Richardson in at discharge; outpatient f/u with Urology for voiding trials once patient is more ambulatory. Anemia:  - of chronic disease with slow decline in H&H during this hospitalization. - patient transfused 2 Units of PRBC's on 3/22 with 80 mg of Lasix IV in between the 2 Units. Tolerated the transfusion well and good response in H&H. Code status: Full    Care Plan discussed with: Patient/Family and Nurse  Disposition: TBD   Consult PT/OT and OOB. Discharge planning: to SNF. Daughter has not yet chosen a facility and patient needs prior authorization form the insurance company. The sooner the patient can go to rehab, the better in my opinion, as rehab is what she needs the most at this time. Patient has multiple medically complex conditions and is at high risk of deterioration and readmission. Consulted Palliative Medicine to discuss goals of care moving forward and at discharge. Hospital Problems  Date Reviewed: 3/8/2017          Codes Class Noted POA    * (Principal)Hypothermia ICD-10-CM: T68. XXXA  ICD-9-CM: 991.6  3/8/2017 Yes                Review of Systems:   A comprehensive review of systems was negative. Vital Signs:    Last 24hrs VS reviewed since prior progress note.  Most recent are:  Visit Vitals    BP 98/61 (BP 1 Location: Left arm, BP Patient Position: At rest)    Pulse 87    Temp 98.3 °F (36.8 °C)    Resp 20    Ht 5' 6\" (1.676 m)    Wt 78.5 kg (173 lb 1 oz)    SpO2 98%    BMI 27.93 kg/m2         Intake/Output Summary (Last 24 hours) at 03/27/17 1738  Last data filed at 03/27/17 1514   Gross per 24 hour   Intake              200 ml   Output             1025 ml   Net             -825 ml        Physical Examination:             Constitutional:   Awake, on NC, drowsy this AM   ENT:   Neck supple,    Resp:  good air entry    CV:  irregular,garde III systolic murmur. GI:  Soft, non-tender, normoactive bowel sounds;     Musculoskeletal:  2+ edema    Neurologic:   awake, moves all 4 extremities             Data Review:    Review and/or order of clinical lab test   US abd:  IMPRESSION:  1. Distended gallbladder with sludge and a stone with moderate wall thickening of the gallbladder wall. 2. Dilated IVC suggesting right heart failure. 3. Small amount of free fluid surrounding the liver. 4. Small right pleural effusion      Labs:     Recent Labs      03/26/17   0411  03/25/17 2033   WBC  4.0  4.4   HGB  10.4*  10.7*   HCT  33.0*  34.7*   PLT  116*  121*     Recent Labs      03/27/17   1309  03/25/17   0348   NA  144  143   K  3.8  3.4*   CL  100  101   CO2  43*  36*   BUN  12  11   CREA  0.58  0.49*   GLU  128*  120*   CA  9.9  9.4   MG  2.2  2.2   PHOS  2.2*  1.8*     No results for input(s): SGOT, GPT, ALT, AP, TBIL, TBILI, TP, ALB, GLOB, GGT, AML, LPSE in the last 72 hours. No lab exists for component: AMYP, HLPSE  Recent Labs      03/25/17   0348   INR  1.2*   PTP  12.6*      No results for input(s): FE, TIBC, PSAT, FERR in the last 72 hours. Lab Results   Component Value Date/Time    Folate 14.5 03/09/2017 03:23 AM      No results for input(s): PH, PCO2, PO2 in the last 72 hours. No results for input(s): CPK, CKNDX, TROIQ in the last 72 hours.     No lab exists for component: CPKMB  Lab Results Component Value Date/Time    Cholesterol, total 138 09/28/2011 09:13 AM    HDL Cholesterol 50 09/28/2011 09:13 AM    LDL, calculated 74 09/28/2011 09:13 AM    Triglyceride 69 09/28/2011 09:13 AM     Lab Results   Component Value Date/Time    Glucose (POC) 146 03/27/2017 04:51 PM    Glucose (POC) 128 03/27/2017 11:59 AM    Glucose (POC) 133 03/27/2017 06:12 AM    Glucose (POC) 130 03/26/2017 10:19 PM    Glucose (POC) 151 03/26/2017 05:20 PM     Lab Results   Component Value Date/Time    Color RED 03/10/2017 02:30 AM    Appearance BLOODY 03/10/2017 02:30 AM    Specific gravity 1.020 03/10/2017 02:30 AM    Specific gravity 1.016 03/08/2017 08:54 PM    pH (UA) 6.0 03/10/2017 02:30 AM    Protein 100 03/10/2017 02:30 AM    Glucose NEGATIVE  03/10/2017 02:30 AM    Ketone NEGATIVE  03/10/2017 02:30 AM    Bilirubin NEGATIVE  03/10/2017 02:30 AM    Urobilinogen 1.0 03/10/2017 02:30 AM    Nitrites NEGATIVE  03/10/2017 02:30 AM    Leukocyte Esterase TRACE 03/10/2017 02:30 AM    Epithelial cells FEW 03/10/2017 02:30 AM    Bacteria NEGATIVE  03/10/2017 02:30 AM    WBC 20-50 03/10/2017 02:30 AM    RBC >100 03/10/2017 02:30 AM         Medications Reviewed:     Current Facility-Administered Medications   Medication Dose Route Frequency    albuterol-ipratropium (DUO-NEB) 2.5 MG-0.5 MG/3 ML  3 mL Nebulization TID PRN    bisacodyl (DULCOLAX) suppository 10 mg  10 mg Rectal DAILY PRN    potassium chloride SR (KLOR-CON 10) tablet 20 mEq  20 mEq Oral DAILY    insulin lispro (HUMALOG) injection   SubCUTAneous AC&HS    furosemide (LASIX) tablet 80 mg  80 mg Oral BID    enalapril (VASOTEC) tablet 2.5 mg  2.5 mg Oral BID    sodium chloride (NS) flush 5-10 mL  5-10 mL IntraVENous Multiple    apixaban (ELIQUIS) tablet 2.5 mg  2.5 mg Oral BID    phosphorus (K PHOS NEUTRAL) 250 mg tablet 1 Tab  1 Tab Oral BID    albumin human 25% (BUMINATE) solution 12.5 g  12.5 g IntraVENous Q8H PRN    pantoprazole (PROTONIX) tablet 40 mg  40 mg Oral ACB    balsam peru-castor oil (VENELEX)  mg/gram ointment   Topical BID    docusate sodium (COLACE) capsule 100 mg  100 mg Oral BID    polyvinyl alcohol (LIQUIFILM TEARS) 1.4 % ophthalmic solution 2 Drop  2 Drop Both Eyes TID    sodium chloride (NS) flush 10 mL  10 mL InterCATHeter Q24H    sodium chloride (NS) flush 10 mL  10 mL InterCATHeter PRN    sodium chloride (NS) flush 10-40 mL  10-40 mL InterCATHeter Q8H    alteplase (CATHFLO) 1 mg in sterile water (preservative free) 1 mL injection  1 mg InterCATHeter PRN    bacitracin 500 unit/gram packet 1 Packet  1 Packet Topical PRN    ondansetron (ZOFRAN) injection 4 mg  4 mg IntraVENous Q8H PRN    glucose chewable tablet 16 g  4 Tab Oral PRN    dextrose (D50W) injection syrg 12.5-25 g  12.5-25 g IntraVENous PRN    glucagon (GLUCAGEN) injection 1 mg  1 mg IntraMUSCular PRN    sodium chloride (NS) flush 5-10 mL  5-10 mL IntraVENous Q8H    sodium chloride (NS) flush 5-10 mL  5-10 mL IntraVENous PRN     ______________________________________________________________________  EXPECTED LENGTH OF STAY: 4d 21h  ACTUAL LENGTH OF STAY:          19                 Ezra Izquierdo MD

## 2017-03-27 NOTE — PROGRESS NOTES
1400 Received call from lab regarding critical lab result of CO2 of 43. Notified Dr. Jose J Murphy of critical lab value and per MD he will place orders for STAT ABGs. 18 Notified Dr. Jose J Murphy of results of stat ABGs including elevated pCo2 of 77. Received orders for pt to be placed on BiPap and to be transferred to a higher level of care.

## 2017-03-27 NOTE — PROGRESS NOTES
1700: TRANSFER - IN REPORT:    Verbal report received from [place], [address] [facility] (name) on Javon  being received from [address] (unit) for routine progression of care      Report consisted of patients Situation, Background, Assessment and   Recommendations(SBAR). Information from the following report(s) SBAR, Kardex, ED Summary, Procedure Summary, Intake/Output, MAR, Recent Results and Cardiac Rhythm   was reviewed with the receiving nurse. Opportunity for questions and clarification was provided. Assessment completed upon patients arrival to unit and care assumed. 1733: Pt arrived to CCU 19 with family at bedside. Pt lethargic at this time. RT paged to initiate BIPAP. Primary Nurse Dequan Meneses RN and Leighton Becerril RN performed a dual skin assessment on this patient Impairment noted- see wound doc flow sheet pt's sacrum is red, but blanchable. Right upper/inner thigh with three open areas. Stanley score is 11    1746: RT at bedside for BIPAP.     1800: Pt's daughter at bedside and expresses concerns with numerous pt transfers and states \"I told the nurse yesterday she was not breathing good\". Patient Advocate notified of situation. 1830: Pt continues to be lethargic. RT paged for ABG. 1835: RT at bedside to draw ABG. ABG results unchanged. 1900: Pt very lethargic and with periods of apnea. Armando NORTON, hospitalist, paged and notified. Orders received for intubation. 1930: Dr. Eduardo Temple, anesthesia, at bedside to intubate pt. Pt tolerated well. Family notified. Bedside shift change report given to MECHELLE Powell (oncoming nurse) by Aag Nair RN (offgoing nurse). Report included the following information SBAR, Kardex, ED Summary, Procedure Summary, Intake/Output, MAR, Recent Results and Cardiac Rhythm Vpaced.

## 2017-03-27 NOTE — PROGRESS NOTES
[facility]  -Progress Note     Harman Clark [phone]   3/27/2017      Kevin Hoffman M.D. , F.A.C.C.   --------PCP:-Bc Ragland MD   -----Subjective:   . Ritchie Chung Ritchie Diaz is a 80 y.o. female  Denies chest pain, denies SOB. Had 7 beat run NSVT last night. Patient Vitals for the past 12 hrs:   Temp Pulse Resp BP SpO2   03/27/17 1540 98.3 °F (36.8 °C) 87 20 98/61 98 %   03/27/17 1509 98.2 °F (36.8 °C) 86 20 108/66 96 %   03/27/17 1155 98.6 °F (37 °C) 91 18 133/77 97 %   03/27/17 0908 - - - - 95 %   03/27/17 0840 98.1 °F (36.7 °C) 96 18 113/65 100 %      Assessment/Plan/Discussion:Cardiology Attending:     Patient seen earlier today and examined  and agree with Advance Practice Provider (ANDI, NP,PA)  assessment and plans. Sheridan Diaz is a 80 y.o. female   Arms seem brawny  No complaints  Plan continue PO diuretics  SNF placement  Check bilateral upper ext dopplers    Loi Leung MD 3/27/2017                 Discussion/Plans/Recs  1) Chronic systolic CHF     ---BiV ACID  --Continue PO diuretics (Lasix 80 BID)- Still with BLE edema  --ACE restarted 3/21 (Enalapril 2.5 mg daily)  --Continue to hold BB due to low BP    3) MR-systolic murmur. No vegetation on valves per AMMON. 4)Afib: -[id number]= 6 (age, CHF, HTN, female, DM)  Eliquis 2.5 mg BID   ERASTO is very large but no clot, would start to use OAC cautiously for stroke prophylaxis      5). Thrombocytopenia-platelets 838 yesterday     6. Anemia:  Hgb stable 10.4 yesterday. 7. NSVT- check lytes. Keep K > or =4, Mg 2 or >. Has AICD  9). Hypophosphatemia: phos-=2.4-  On BID kphos. PLAN:   Check lytes  Continue Eliquis 2.5 mg bid for stroke prevention,very large LA appendage with some \"smoke\" - no clot. Continue po diuretics (Lasix 80 mg BID) and continue potassium supplement (Kphos- give dose of KDUR today)  OT,PT - recommends SNF.          Cardiac Studies/Hx:    AMMON 3/20 with no vegetation, Same EF 35 and 1-2+ MR, TR as expected    AMMON 3/20 with no vegetation, Same EF 35 and 1-2+ MR, TR as expected  ECHO: 3/9/17: EF 30%. Mod diffuse hyypokinesis, Wall motion is dyssynchronous, RA mildly dilated. LA mod-severe dilated, Mod MR, AV: In the parasternal long axis view, faintly seen spherical echodensity about 5mms in diameter that seems to move in  relation to the anterior aortic valve leaflet, on the aortic side. Could represent a vegetation. Mod TR, mild RV systolic hypertension. 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 PA pressure of 61 with moderate pulmonary hypertension. 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 Moderate to severe TR, moderate to severe pulmonary hypertension. EF 35-40%, marked LAE, mild JOAN    PACER check 2-11-13 39,288 episodes of atrial tach/flutter and 11 with high rates  1/2014 noises noted on pacer, 260 thousands AMS , some are noises and some are AT, some PMT (adjustment made, Dr Carin Yoder discussed with pacer clinic)  Pacer check in May 2013 showed NSVT and AF the latter as long as 1 day with occasional RVR  Pacer check 9/9/13: persistent AT with V paced. 92%  (asymptomatic with AT)    CATH 10-24-12= Normal cors and EF 50 %. -- A annular ring mitral position. Mitral valve repair on July 29, 1996, for mitral regurgitation with a #32 Turner-Manuel ring. Chronic systolic CHF, reduced ejection fraction. NYHA 1-2  . ..admit 7/16/09 mild chf exacerbation  #32 Turner Manuel ring MV repair 1996         Past Medical History:   Diagnosis Date    Atrial fibrillation (HCC)     Chronic systolic heart failure (HCC)     ef 35-40%; hold ACE due to lower bp    Diabetes mellitus type 2, insulin dependent ([facility] [address] [facility]     Hypertension     Long term (current) use of anticoagulants     Malignant hypertensive heart disease with CHF ([facility] [address]     Mitral regurgitation     Pacemaker 10/25/2012    The pacemaker is a [employer], model # [id number], serial [id number]. 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 Results for orders placed or performed during the hospital encounter of 03/08/17   EKG, 12 LEAD, INITIAL   Result Value Ref Range    Ventricular Rate 80 BPM    Atrial Rate 75 BPM    QRS Duration 146 ms    Q-T Interval 480 ms    QTC Calculation (Bezet) 553 ms    Calculated R Axis 158 degrees    Calculated T Axis 5 degrees    Diagnosis       Ventricular-paced rhythm  When compared with ECG of 09-SEP-2016 17:36,  Vent. rate has decreased BY  30 BPM  Confirmed by Shannan Jeffrey M.D., Jaylen Olmos ([id number]) on 3/9/2017 10:15:16 AM     Results for orders placed or performed in visit on 03/27/12   AMB POC EKG ROUTINE W/ 12 LEADS, INTER & REP    Narrative    See scanned report.         Vitals:    03/27/17 0908 03/27/17 1155 03/27/17 1509 03/27/17 1540   BP:  133/77 108/66 98/61   BP 1 Location:  Left arm  Left arm   BP Patient Position:  At rest  At rest   Pulse:  91 86 87   Resp:  18 20 20   Temp:  98.6 °F (37 °C) 98.2 °F (36.8 °C) 98.3 °F (36.8 °C)   SpO2: 95% 97% 96% 98%   Weight:       Height:           Objective:    Physical Exam:   Patient Vitals for the past 12 hrs:   Temp Pulse Resp BP SpO2   03/27/17 1540 98.3 °F (36.8 °C) 87 20 98/61 98 % 03/27/17 1509 98.2 °F (36.8 °C) 86 20 108/66 96 %   03/27/17 1155 98.6 °F (37 °C) 91 18 133/77 97 %   03/27/17 0908 - - - - 95 %   03/27/17 0840 98.1 °F (36.7 °C) 96 18 113/65 100 %      General:  Awake, appears weak, deconditioned   ENT, Neck:  no jvd   Chest Wall: inspection normal - no chest wall deformities or tenderness, respiratory effort normal   Lung: Faint inspiratory wheeze bilaterally   Heart:  no gallops noted, irregularly irregular rhythm, no JVD 2/6 systolic early/decresendo  murmur at RUSB to LUSB without radiation beyond apex    Abdomen: nondistended   Extremities: extremities normal, atraumatic, no cyanosis or edema     Last 24hr Input/Output:    Intake/Output Summary (Last 24 hours) at 03/27/17 1702  Last data filed at 03/27/17 1514   Gross per 24 hour   Intake              200 ml   Output             1025 ml   Net             -825 ml        Data Review:   Recent Results (from the past 24 hour(s))   GLUCOSE, POC    Collection Time: 03/26/17  5:20 PM   Result Value Ref Range    Glucose (POC) 151 (H) 65 - 100 mg/dL    Performed by Lloyd Oregon    GLUCOSE, POC    Collection Time: 03/26/17 10:19 PM   Result Value Ref Range    Glucose (POC) 130 (H) 65 - 100 mg/dL    Performed by Lloyd Oregon    GLUCOSE, POC    Collection Time: 03/27/17  6:12 AM   Result Value Ref Range    Glucose (POC) 133 (H) 65 - 100 mg/dL    Performed by Gillian Trimble    GLUCOSE, POC    Collection Time: 03/27/17 11:59 AM   Result Value Ref Range    Glucose (POC) 128 (H) 65 - 100 mg/dL    Performed by Cristina Batista    METABOLIC PANEL, BASIC    Collection Time: 03/27/17  1:09 PM   Result Value Ref Range    Sodium 144 136 - 145 mmol/L    Potassium 3.8 3.5 - 5.1 mmol/L    Chloride 100 97 - 108 mmol/L    CO2 43 (HH) 21 - 32 mmol/L    Anion gap 1 (L) 5 - 15 mmol/L    Glucose 128 (H) 65 - 100 mg/dL    BUN 12 6 - 20 MG/DL    Creatinine 0.58 0.55 - 1.02 MG/DL    BUN/Creatinine ratio 21 (H) 12 - 20      GFR est AA >60 >60 ml/min/1.73m2    GFR est non-AA >60 >60 ml/min/1.73m2    Calcium 9.9 8.5 - 10.1 MG/DL   MAGNESIUM    Collection Time: 03/27/17  1:09 PM   Result Value Ref Range    Magnesium 2.2 1.6 - 2.4 mg/dL   PHOSPHORUS    Collection Time: 03/27/17  1:09 PM   Result Value Ref Range    Phosphorus 2.2 (L) 2.6 - 4.7 MG/DL   POC G3 - PUL    Collection Time: 03/27/17  2:20 PM   Result Value Ref Range    pH (POC) 7.357 7.35 - 7.45      pCO2 (POC) 77.0 (H) 35.0 - 45.0 MMHG    pO2 (POC) 81 80 - 100 MMHG    HCO3 (POC) 43.2 (H) 22 - 26 MMOL/L    sO2 (POC) 95 92 - 97 %    Base excess (POC) 18 mmol/L    Site RIGHT RADIAL      Device: NASAL CANNULA      Flow rate (POC) 2 L/M    Allens test (POC) YES      Specimen type (POC) ARTERIAL     GLUCOSE, POC    Collection Time: 03/27/17  4:51 PM   Result Value Ref Range    Glucose (POC) 146 (H) 65 - 100 mg/dL    Performed by Sary Wright.  IRLANDA Wilkinson 3/27/2017

## 2017-03-27 NOTE — PROGRESS NOTES
Reviewed medical chart; met with the patient at the bedside, but she conversed minimally. Patient has a history of A-Fib, chronic systolic congestive heart failure (EF 30%-35%), type 2 diabetes, hypertension, chronic anticoagulation use, status post pacemaker and ICD, status post mitral valve repair. She is being seen at this visit for hypothermia and hypotension. The patient needs rehabilitation at discharge. Called the patient's family members to ask that they choose at least 3 facilities to send referrals for rehabilitation at discharge. Message left for the patient's daughters, Yari Morris (Y#[phone]) and Devan Grimes (E#[phone]). Called the patient's other daughter Robin Mar (U#[phone]), but reached a full voicemail box. Received a call back from Robin Mar. She explained that they would like referrals sent to [facility] and [facility]. Referrals sent via [employer] - awaiting responses. Care Management will continue to follow her disposition.    ANNALEE Robertson

## 2017-03-27 NOTE — PROGRESS NOTES
Bedside shift change report given to Breanne Badillo RN (oncoming nurse) by Lamar Bates RN (offgoing nurse). Report included the following information SBAR, Intake/Output and MAR.

## 2017-03-27 NOTE — PROGRESS NOTES
Problem: Mobility Impaired (Adult and Pediatric)  Goal: *Acute Goals and Plan of Care (Insert Text)  Physical Therapy Goals  Revisited 3/24/2017, goals remain appropriate, carry over        Physical Therapy Goals  Initiated 3/16/2017  1. Patient will move from supine to sit and sit to supine , scoot up and down and roll side to side in bed with minimal assistance within 7 day(s). 2. Patient will transfer from bed to chair and chair to bed with moderate assistance using the least restrictive device within 7 day(s). 3. Patient will perform sit to stand with moderate assistance within 7 day(s). 4. Patient will ambulate with moderate assistance for 10 feet with the least restrictive device within 7 day(s). PHYSICAL THERAPY TREATMENT  Patient: Brady Yusuf (55 y.o. female)  Date: 3/27/2017  Diagnosis: Hypothermia Hypothermia       Precautions: Fall      ASSESSMENT:  Pt is making slow, steady progress, participating with therapy with maximum cueing for each task, completed supine exercises for all extremities with active assist, requires total assistance for rolling and supine <> sit, has excessive posterior lean requiring constant support, pt tolerated sitting edge of bed ~ 5 min for sitting balance activities, recommend SNF for rehab  Progression toward goals:  [ ]    Improving appropriately and progressing toward goals  [X]    Improving slowly and progressing toward goals  [ ]    Not making progress toward goals and plan of care will be adjusted       PLAN:  Patient continues to benefit from skilled intervention to address the above impairments. Continue treatment per established plan of care. Discharge Recommendations:  Gera Parr  Further Equipment Recommendations for Discharge: To be determined        SUBJECTIVE:   Patient stated I wish I could.   Pt alert and oriented to name, place, and time.         OBJECTIVE DATA SUMMARY:   Critical Behavior:  Neurologic State: Alert, Confused  Orientation Level: Oriented to person, Oriented to place   Cognition: Follows commands  Safety/Judgement: Fall prevention  Functional Mobility Training:  Bed Mobility:  Rolling: Total assistance;Assist x2  Supine to Sit: Total assistance;Assist x2, pt sat edge of bed ~ 5 min, excessive posterior lean, placed chair in front of pt, she was able to hold onto chair with BUE and sit with less assistance, continued to lean posteriorly   Sit to Supine: Total assistance  Scooting:  Total assistance                 Balance:  Sitting: Impaired  Sitting - Static: Poor (constant support)  Sitting - Dynamic: Poor (constant support)                       Therapeutic Exercises:   Completed supine active assisted exercises x 5 reps each, pt needs constant cueing to complete exercises  Pain:  Pain Scale 1: Numeric (0 - 10)  Pain Intensity 1: 0              Activity Tolerance:   Fair, participating with therapy, fatigues quickly      After treatment:   [ ]    Patient left in no apparent distress sitting up in chair  [X]    Patient left in no apparent distress in bed, left in chair position   [X]    Call bell left within reach  [X]    Nursing notified  [ ]    Caregiver present  [ ]    Bed alarm activated      COMMUNICATION/COLLABORATION:   The patients plan of care was discussed with: Registered Nurse     Misti Braga   Time Calculation: 29 mins

## 2017-03-28 NOTE — PROGRESS NOTES
Patient CCU on BIPAP. RN reported periods apnea. I reassessed patient,she is really displaying shallow breathing with periods of apnea,ABG worsened on BIPAP and her mentation is poor. I discussed with her daughters and granddaughter,raised about prognosis. All expressed they want intubation and everything else done,they wanted to keep full code. Anesthesia to intubate.

## 2017-03-28 NOTE — PROGRESS NOTES
Palliative Medicine Consult  Juwan: [phone] ([phone])    Patient Name: Meredtih Culp  YOB: 1930    Date of Initial Consult: 3/13/17  Reason for Consult: Care decisions   Requesting Provider: Rolf  Primary Care Physician: Shahrzad Edgar MD      SUMMARY:   Meredith Culp \"Warner\" is a 80 y.o. with a past history of systolic CHF (EF 33% on 3/5/08), AICD, DM, HTN, a fib who was admitted on 3/8/2017 from her PCP office w/ epigastric pain and nausea, in ED was hypothermic to 91.4 degrees and hypotensive. Treated in ICU with empiric abx, echo concerning for possible aortic valve vegetation but BCx x 3 NGTD and AMMON neg for vegetations. Has been diuresed. CT abd showing gallstones, no  Cholecystitis obstruction. GI has signed off but plan on seeing for EGD when stable. Pt was intubated 3/10-3/12, had large mucous plug. Renal following for JUAN. Known well to Dr Hayden Gilliam. CT head wnl. Had improved well throughout this stay, had 2 family meetings. However due to resp distress required re-intubation 3/27. Current medical issues leading to Palliative Medicine involvement include:care decisions. Social: Pt was 1 of 13 children, she has 7 children and about 20 grandchildren, and great grands. Very involved in the [employer] in [facility] where she lives and is very loved. Before admission was alert/oriented and high functioning. No AMD but family tends to defer to Derrick Andersen ([phone]) (dtr). PALLIATIVE DIAGNOSES:     1. Shortness of breath -reintubated 3/27  2. Fatigue  3. Edema  4. Debility   5. Goals of care       PLAN:   1. Have been following peripherally, as goals clear after big family meeting on 3/13/17 and another meeting on 3/22- however since that time pt has decompensated and was reintubated yest.   2. Family open to our involvement but also quite protective of pt.  Neither family nor pt (when she was awake and alert) wished to speak about care goals w/ pt despite our encouragement. There is no AMD and many family members involved. 3. Yesterday note Dr Em Deal and CCU's conversation w/ family incl granddtr Prosper Jackson?) who were very clear and even a bit defensive about questions regarding code status. 4. As want to cont to maintain rapport with family and at this time goals are clear to cont all measures- I reached out to [place] [phone] (dtr) just for support. When I met w/ mult family members (about 8) earlier this stay- there was a very vast array of understanding and acceptance of pt's underlying conditions. Pt did make significant improvement during this stay and prior to admission was quite functional- this makes understanding the chronicity of her medical conditions harder for them, I think. 5. CCU staff to alert me if family at bedside, awaiting call back from family. 6. Communicated plan of care with: Palliative IDT; Hi Kaur RN    Addendum 2pm: Speak to dtr [place] [phone], who I have been in touch w/ the most. She tells me that she is at peace w/ her mother's decline and does think that she should be DNR in order to respect what her mother would want- knowing that she has had a big decline, has mult medical issues that are chronic and will only worsen, and that even if pt improves her quality of life will be very different (talk about how she will likely need NH, or at least 24h care). However her siblings, miko Brown and Perla Love, and the grandchildren are having a harder time both understanding situation and coming to  w/ it. I still hope that pt will improve- but each set back pt has takes a big toll on her. Their  has talked to family about the fact that everyone has a time that they are at end of life, and now may be the pt's- however not everyone agrees. Remains full code, but aware that I am happy to address situation w/ family again and encourage her to cont to remind family that they are making decisions based on what pt would want- not what they want for her. Patient advocacy also involved( lost dentures, etc). GOALS OF CARE / TREATMENT PREFERENCES:   [====Goals of Care====]  GOALS OF CARE:  Patient / health care proxy stated goals: recovery as possible       TREATMENT PREFERENCES:   Code Status: Full Code    Advance Care Planning:  Advance Care Planning 3/28/2017   Patient's Healthcare Decision Maker is: Legal Next of Shanelle Vazquez   Primary Decision Maker Name Marni Duran (but there are 8 children)   Primary Decision Maker Phone Number [phone]   Primary Decision Maker Relationship to Patient Adult child   Confirm Advance Directive -   Patient Would Like to Complete Advance Directive -       Other:    The palliative care team has discussed with patient / health care proxy about goals of care / treatment preferences for patient.  [====Goals of Care====]    Family incl dtrs Regi Bradley and graddtr Maurice Presley. HISTORY:     HPI/SUBJECTIVE:    The patient is:   [] Verbal and participatory  [x] Non-participatory due to: medical condition - sedated on vent. Pt was reintubated, very drowsy off sedation.     AMMON 3/20 ef 35%, no mass / vegetation noted    ISSUES INCLUDE:  Hypothermia / hypotension / sepsis - resolved, now off abx; unclear source; completed 2 wk abx  ARF from pl eff / atx and a/c systolic CHF NYHA IV - bipap intermittent, PO diuretic, reintubated   A/c CHF - cards seeing, diuresis, hx pacer  Hematuria: traumatic galarza?, high INR; galarza placed 3/9, hematuria 3/10; galarza out 3/21  JUAN from ATN / contrast / hypotension; nephro consult; resolved  Hypoglycemia / DM; resolved  Ischemic hepatitis: GI has seen, f/u for op EGD; surgery has seen; LFT better  Coagulopathy: holding coumadin; started eliquis for afib  Pancytopenia: chronic, follow labs; consider txfuse to 9 if chf  Nasolabial pressure ulcer  Deconditioned: PT/OT/SLP, possible snf  Possible cholecystitis: seems to be resolving; surgery with no plans at this time            Clinical Pain Assessment (nonverbal scale for severity on nonverbal patients):   [++++ Clinical Pain Assessment++++]  [++++Pain Severity++++]: Pain: 0  [++++Pain Character++++]:   [++++Pain Duration++++]:   [++++Pain Effect++++]:   [++++Pain Factors++++]:   [++++Pain Frequency++++]:   [++++Pain Location++++]:   [++++ Clinical Pain Assessment++++]     FUNCTIONAL ASSESSMENT:     Palliative Performance Scale (PPS):  PPS: 20       PSYCHOSOCIAL/SPIRITUAL SCREENING:     Advance Care Planning:  Advance Care Planning 3/28/2017   Patient's Healthcare Decision Maker is: Legal Next of Shanelle 69   Primary Decision Maker Name Jet Das (but there are 8 children)   Primary Decision Maker Phone Number [phone]   Primary Decision Maker Relationship to Patient Adult child   Confirm Advance Directive -   Patient Would Like to Complete Advance Directive -        Any spiritual / Denominational concerns:  [] Yes /  [x] No    Caregiver Burnout:  [] Yes /  [x] No /  [] No Caregiver Present      Anticipatory grief assessment:   [x] Normal  / [] Maladaptive       ESAS Anxiety:   Cannot obtain due to patient factors    ESAS Depression:   Cannot obtain due to patient factors       REVIEW OF SYSTEMS:     Positive and pertinent negative findings in ROS are noted above in HPI. The following systems were [] reviewed / [x] unable to be reviewed as noted in HPI  Other findings are noted below. Systems: constitutional, ears/nose/mouth/throat, respiratory, gastrointestinal, genitourinary, musculoskeletal, integumentary, neurologic, psychiatric, endocrine. Positive findings noted below.   Modified ESAS Completed by: provider   Fatigue: 10 Drowsiness: 10     Pain: 0         Anorexia: 0 Dyspnea: 0           Stool Occurrence(s): 1        PHYSICAL EXAM:     From RN flowsheet:  Wt Readings from Last 3 Encounters:   03/28/17 174 lb 9.7 oz (79.2 kg)   01/10/17 146 lb 3.2 oz (66.3 kg)   01/04/17 150 lb (68 kg)     Blood pressure 109/60, pulse 81, temperature 99.2 °F (37.3 °C), resp. rate 11, height 5' 6\" (1.676 m), weight 174 lb 9.7 oz (79.2 kg), SpO2 100 %. Pain Scale 1: Adult Nonverbal Pain Scale  Pain Intensity 1: 0     Pain Location 1: Abdomen  Pain Orientation 1: Mid     Pain Intervention(s) 1: Medication (see MAR)    Constitutional: drowsy, intubated   Eyes: pupils equal, anicteric  ENMT: no nasal discharge, moist mucous membranes  Cardiovascular: regular rhythm  Respiratory: breathing not labored  Gastrointestinal: soft non-tender, +bowel sounds  Musculoskeletal: no deformity, no tenderness to palpation  Skin: warm, dry, diffuse edema  Neurologic: not following commands for me off sedation   Psychiatric: cannot assess          HISTORY:     Principal Problem:    Hypothermia (3/8/2017)      Past Medical History:   Diagnosis Date    Atrial fibrillation (HCC)     Chronic systolic heart failure (HCC)     ef 35-40%; hold ACE due to lower bp    Diabetes mellitus type 2, insulin dependent ([facility] [address] [facility]     Hypertension     Long term (current) use of anticoagulants     Malignant hypertensive heart disease with CHF ([facility] [address] [facility]     Mitral regurgitation     Pacemaker 10/25/2012    The pacemaker is a [employer], model # [id number], serial [id number]. 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, 1996    # 28 [place]-Manuel    Thyroid disease     TR (tricuspid regurgitation)     moderate to severe echo 2009      Past Surgical History:   Procedure Laterality Date    CARDIAC SURG PROCEDURE UNLIST  1996    valve replacement    HX HEART CATHETERIZATION  10/4/2012    normal cors, LVEF 50%    HX HEART VALVE SURGERY      HX PACEMAKER      INS PPM/ICD LED DUAL ONLY  10/25/2012         INS PPM/ICD LED DUAL ONLY  9/9/2016           Family History   Problem Relation Age of Onset    Cancer Sister      breast    Heart Disease Brother     Heart Disease Brother     Diabetes Son     Stroke Son       History reviewed, no pertinent family history.   Social History Substance Use Topics    Smoking status: Never Smoker    Smokeless tobacco: Never Used    Alcohol use No     No Known Allergies   Current Facility-Administered Medications   Medication Dose Route Frequency    potassium chloride 20 mEq in 50 ml IVPB  20 mEq IntraVENous Q1H    insulin lispro (HUMALOG) injection   SubCUTAneous Q6H    apixaban (ELIQUIS) tablet 2.5 mg  2.5 mg Per NG tube BID    pantoprazole (PROTONIX) 2 mg/mL oral suspension 40 mg  40 mg Per NG tube ACB    furosemide (LASIX) injection 40 mg  40 mg IntraVENous DAILY    ELECTROLYTE REPLACEMENT PROTOCOL  1 Each Other PRN    ELECTROLYTE REPLACEMENT PROTOCOL  1 Each Other PRN    potassium chloride 20 mEq in 50 ml IVPB  20 mEq IntraVENous Q1H    albuterol-ipratropium (DUO-NEB) 2.5 MG-0.5 MG/3 ML  3 mL Nebulization TID PRN    propofol (DIPRIVAN) infusion  5-50 mcg/kg/min IntraVENous TITRATE    PHENYLephrine (NEOSYNEPHRINE) 30,000 mcg in 0.9% sodium chloride 250 mL infusion   mcg/min IntraVENous TITRATE    chlorhexidine (PERIDEX) 0.12 % mouthwash 10 mL  10 mL Oral Q12H    bisacodyl (DULCOLAX) suppository 10 mg  10 mg Rectal DAILY PRN    enalapril (VASOTEC) tablet 2.5 mg  2.5 mg Oral BID    sodium chloride (NS) flush 5-10 mL  5-10 mL IntraVENous Multiple    phosphorus (K PHOS NEUTRAL) 250 mg tablet 1 Tab  1 Tab Oral BID    albumin human 25% (BUMINATE) solution 12.5 g  12.5 g IntraVENous Q8H PRN    balsam peru-castor oil (VENELEX)  mg/gram ointment   Topical BID    docusate sodium (COLACE) capsule 100 mg  100 mg Oral BID    polyvinyl alcohol (LIQUIFILM TEARS) 1.4 % ophthalmic solution 2 Drop  2 Drop Both Eyes TID    sodium chloride (NS) flush 10 mL  10 mL InterCATHeter Q24H    sodium chloride (NS) flush 10 mL  10 mL InterCATHeter PRN    sodium chloride (NS) flush 10-40 mL  10-40 mL InterCATHeter Q8H    alteplase (CATHFLO) 1 mg in sterile water (preservative free) 1 mL injection  1 mg InterCATHeter PRN    bacitracin 500 unit/gram packet 1 Packet  1 Packet Topical PRN    glucose chewable tablet 16 g  4 Tab Oral PRN    dextrose (D50W) injection syrg 12.5-25 g  12.5-25 g IntraVENous PRN    glucagon (GLUCAGEN) injection 1 mg  1 mg IntraMUSCular PRN    sodium chloride (NS) flush 5-10 mL  5-10 mL IntraVENous Q8H    sodium chloride (NS) flush 5-10 mL  5-10 mL IntraVENous PRN          LAB AND IMAGING FINDINGS:     Lab Results   Component Value Date/Time    WBC 4.2 03/28/2017 06:06 AM    HGB 9.7 03/28/2017 06:06 AM    PLATELET 528 76/32/3106 06:06 AM     Lab Results   Component Value Date/Time    Sodium 143 03/28/2017 06:06 AM    Potassium 2.8 03/28/2017 06:06 AM    Chloride 101 03/28/2017 06:06 AM    CO2 38 03/28/2017 06:06 AM    BUN 12 03/28/2017 06:06 AM    Creatinine 0.59 03/28/2017 06:06 AM    Calcium 10.0 03/28/2017 06:06 AM    Magnesium 2.2 03/27/2017 01:09 PM    Phosphorus 2.2 03/27/2017 01:09 PM      Lab Results   Component Value Date/Time    AST (SGOT) 20 03/28/2017 06:06 AM    Alk. phosphatase 47 03/28/2017 06:06 AM    Protein, total 5.5 03/28/2017 06:06 AM    Albumin 2.3 03/28/2017 06:06 AM    Globulin 3.2 03/28/2017 06:06 AM     Lab Results   Component Value Date/Time    INR 1.2 03/25/2017 03:48 AM    Prothrombin time 12.6 03/25/2017 03:48 AM    aPTT 37 08/15/2016 12:00 AM      No results found for: IRON, FE, TIBC, IBCT, PSAT, FERR   No results found for: PH, PCO2, PO2  No components found for: [place]   Lab Results   Component Value Date/Time    CK 88 09/01/2012 04:00 AM    CK - MB 2.2 09/01/2012 04:00 AM                Total time: 50min  Counseling / coordination time:  40 min  > 50% counseling / coordination?: yes    Prolonged service was provided for  []30 min   []75 min in face to face time in the presence of the patient. Time Start:    Time End:   Note: this can only be billed with 01024 (initial) or 15000 (follow up). If multiple start / stop times, list each separately.

## 2017-03-28 NOTE — INTERDISCIPLINARY ROUNDS
IDR/SLIDR Summary          Patient: Karma Jones MRN: [id number]    Age: 80 y.o. YOB: 1930 Room/Bed: [address] [place] [address]   Admit Diagnosis: Hypothermia  Principal Diagnosis: Hypothermia   Goals: extubate asap  Readmission: NO  Quality Measure: CHF  VTE Prophylaxis: Mechanical  Influenza Vaccine screening completed? YES  Pneumococcal Vaccine screening completed? YES  Mobility needs: Yes   Nutrition plan:Yes  Consults:P.T, O.T., Speech, Respiratory and Case Management    Financial concerns:Yes  Escalated to CM? YES  RRAT Score: 25   Interventions:Palliative Care   Testing due for pt today?  YES  LOS: 20 days Expected length of stay ?? days  Discharge plan: rehab   PCP: Gabriela Baig MD  Transportation needs: Yes    Days before discharge:longer than expected LOS  Discharge disposition: Rehab    Signed:     Navid Mooney RN  3/28/2017  6:49 AM

## 2017-03-28 NOTE — PROGRESS NOTES
continues to monitor for transitions of care needs. Spiritual care  did speak with dgt in law Radha Record who was visiting. Patient is on a ventilator at this time. I do believe that patient will need to go for rehab post discharge. The two choices family have been interested in are [place] and [facility]. Care manager will continue to monitor and work with family on another meeting in the near future.

## 2017-03-28 NOTE — ROUTINE PROCESS
TRANSFER - OUT REPORT:    Verbal report given to Gertrudis Armenta RN(name) on Carissa Doe  being transferred to CCU(unit) for routine progression of care       Report consisted of patients Situation, Background, Assessment and   Recommendations(SBAR). Information from the following report(s) SBAR was reviewed with the receiving nurse. Lines:   PICC Triple Lumen 23/35/33 Right;Basilic (Active)   Central Line Being Utilized Yes 3/27/2017  5:33 PM   Criteria for Appropriate Use Limited/no vessel suitable for conventional peripheral access 3/27/2017  5:33 PM   Site Assessment Clean, dry, & intact 3/27/2017  5:33 PM   Phlebitis Assessment 0 3/27/2017  5:33 PM   Infiltration Assessment 0 3/27/2017  5:33 PM   Date of Last Dressing Change 03/22/17 3/27/2017  5:33 PM   Dressing Status Clean, dry, & intact 3/27/2017  5:33 PM   External Catheter Length (cm) 0 centimeters 3/22/2017 10:31 AM   Dressing Type Disk with Chlorhexadine gluconate (CHG); Transparent 3/27/2017  5:33 PM   Action Taken Open ports on tubing capped 3/27/2017  5:33 PM   Hub Color/Line Status Estephanie Woodson 3/27/2017  5:33 PM   Positive Blood Return (Site #1) Yes 3/27/2017  5:33 PM   Hub Color/Line Status White 3/27/2017  5:33 PM   Positive Blood Return (Site #2) Yes 3/27/2017  5:33 PM   Hub Color/Line Status Blue 3/27/2017  5:33 PM   Positive Blood Return (Site #3) Yes 3/27/2017  5:33 PM   Alcohol Cap Used Yes 3/27/2017  5:33 PM        Opportunity for questions and clarification was provided.       Patient transported with:   Monitor  Registered Nurse

## 2017-03-28 NOTE — ROUTINE PROCESS
TRANSFER - IN REPORT:    Verbal report received from [facility], RN/CRM on Valerieo  being received from [facility] for urgent transfer      Report consisted of patients Situation, Background, Assessment and Recommendations(SBAR). Information from the following report(s) SBAR was reviewed with the receiving nurse. Opportunity for questions and clarification was provided. Assessment completed upon patients arrival to unit and care assumed.

## 2017-03-28 NOTE — CONSULTS
Intensivist / Pulmonary / Critical Care  Assessment / Plan:      1. Acute on chronic resp failure - combined chronic resp acidosis and metabolic alkalosis from diuresis contributing to high PCO2 and compensated pH  2. CHF - on scheduled diuretics  3. Afib - on eliquis  4. Sepsis syndrome - ? Source s/p 2 weeks abx  5. Acute on chronic kidney disease - improved  6. DM  7. Full code - palliative care to see    · Vent support  · Minute ventilation has been decreased - will recheck abg - may benefit from diamox if continued significant met alkalosis component. Potassium being replaced as well   · Electrolyte replacement protocol  · Cardiology following    D/w RN    The patient is critically ill and is at significant risk of decompensation. Critical Care time spent exclusive of procedures 30 minutes. History / Subjective:  Hospital Day: 21 - Interval history and notes reviewed     Events noted. Pt moved back to ICU for ams and resp difficulties yesterday. PCO2 was 77 with compensated pH and was placed on bipap. Her resp became shallow and pt was intubated. She now has a markedly elevated pH and her ventilator has been adjusted. The patient is intubated and sedated and is unable to provide any history. The history was obtained from review of the medical record.     Objective:  Patient Vitals for the past 4 hrs:   BP Temp Pulse Resp SpO2 Weight   17 0900 109/63 - 81 10 100 % -   17 0839 - - 82 10 100 % -   17 0800 108/56 99.2 °F (37.3 °C) 81 15 100 % -   17 0700 105/60 - 81 15 100 % -   17 0630 104/60 - 84 14 100 % -   17 0600 103/58 - 81 10 100 % 79.2 kg (174 lb 9.7 oz)   Temp (24hrs), Av.5 °F (36.9 °C), Min:98.1 °F (36.7 °C), Max:99.2 °F (37.3 °C)    Intake/Output Summary (Last 24 hours) at 17 0959  Last data filed at 17 0900   Gross per 24 hour   Intake           225.54 ml   Output             2035 ml   Net         -1809.46 ml     Lab Results   Component Value Date/Time    Glucose (POC) 83 03/28/2017 06:13 AM    Glucose (POC) 88 03/27/2017 11:51 PM    Glucose (POC) 146 03/27/2017 04:51 PM    Glucose (POC) 128 03/27/2017 11:59 AM    Glucose (POC) 133 03/27/2017 06:12 AM     Exam:  Sedate  Oral ett  Rhonchi  Reg  Protuberant  Warm and dry    Data:   Interval lab and diagnostic data was reviewed. Interval radiology images were independently viewed and available reports were reviewed. CXR - ETT, cm, bilateral effusions, edema    Recent Labs      03/28/17   0606  03/26/17   0411  03/25/17   2033   WBC  4.2  4.0  4.4   HGB  9.7*  10.4*  10.7*   HCT  31.7*  33.0*  34.7*   PLT  139*  116*  121*     Recent Labs      03/28/17   0606  03/27/17   1309   NA  143  144   K  2.8*  3.8   CL  101  100   CO2  38*  43*   GLU  90  128*   BUN  12  12   CREA  0.59  0.58   CA  10.0  9.9   MG   --   2.2   PHOS   --   2.2*   ALB  2.3*   --    SGOT  20   --    ALT  15   --        No results for input(s): TROIQ, CPK, CKMB in the last 72 hours.   ABG:  Recent Labs      03/28/17   0317  03/27/17   2135  03/27/17   1837   PHI  7.660*  7.672*  7.344*   PCO2I  32.5*  35.4  78.3*   PO2I  58*  188*  69*   HCO3I  36.7*  41.1*  42.7*   SO2I  95  100*  91*   FIO2I  60  100  35          Meenakshi Salazar MD

## 2017-03-28 NOTE — PROGRESS NOTES
Hospitalist Progress Note  Ezra Izquierdo MD  Office: [phone]          Date of Service:  3/28/2017  NAME:  Carito Tapia  :  1930  MRN:  [id number]      Admission Summary:   80-year-old female with history of chronic systolic congestive heart failure, atrial fibrillation, diabetes mellitus, hypertension, chronic anticoagulation use, history of pacemaker and ICD. She was sent from her primary care physician to the emergency room to be evaluated. History from the patient and her daughters at the bedside, also from the records. Per the daughter, she has been having on and off abdominal pain, epigastric pain for a while, over the last 3 days has been mostly pronounced. She said her pain at times is 9/10 in severity, it is nonradiating, associated with some nausea, but no vomiting. She denies any diarrhea. At her PCP's office,   they could not get her temperature, so they sent her to the emergency room. In the ER she has been hypothermic, her temperature was 91.4 on initial presentation. Also she was hypotensive. Her blood pressure   systolic was 74/64, so she was given about 1.5 liters of IV fluid, and now she is getting 1 bottle of albumin. She was placed on warming blanket, Lizzy Hugger. She denies any dysuria or frequency. Interval history / Subjective: Intubated and sedated. Assessment & Plan:     Acute Respiratory failure with Hypercapnia due to Pleural effusion/atelectasis and acute on chronic Systolic CHF NYHA IV:  -/61,WMT is lethargic and high CO2  -3/257,intubated  -Lasix iv  -vent mx per [facility] f  -Palliative care Re-consulted. Hypothermia and hypotension: - resolved  On empiric IV antibiotics, on Zosyn and vancomycin. Blood culture: No growth x 5 days; Required the [facility] intermittently     Bilateral Pleural effusions: likely related to CHF  -IV diuresis  -I/O negative.     Sepsis:  Unknown source, most likely lung with bilateral pleural effusions, possibly infectious. She had caren of leukocytes to 1.9, improving. She was afebrile overnight, low BP requiring pressors. Weaned off pressors. Continue Zosyn, Vanco.   Monitor cultures, preliminary blood, urine cultures no growth so far. - AMMON: no vegetations;   - blood culture 3/08 and 3/11: no growth x 5 days;   - urine culture: no growth;   - 3/21: afebrile, no leukocytosis, cultures negative. Will discontinue IV Abx and watch off Abx. Patient completed 2 weeks of IV Abx since admission. Acute on Chronic Systolic congestive heart failure NYHA III/IV. With fluid overload. Echo 3/9 with 30% EF, diffuse hypokinesis. Bumex added, strict I/O, monitor weight. History of pacer. Valvular disease: Moderate mitral regurgitation, right ventricular systolic dysfunction,   as well as enlarged left atrium. Valvular vegetation on aortic valve, questionable: Blood cultures no growth preliminary.   - Diuresis per nephrology   - 3/21: AMMON showed no vegetations. - continue diuresis with lasix. Hematuria: may be related to traumatic galarza placement with INR max 5.5,  Urine noted on UA as yellow on 3/8,   Galarza placed 3/9 and hematuria reported 3/10 UA. JUAN:  From  ATN and contrast nephropathy/Hypotension most likely etiology. Ischemic heart disease, hypotension; and exposure to IV contrast.   Appreciate nephro consult recommendations. Diuresis, avoid nephrotoxins, I/Os, -resolved    Hypoglycemia/diabetes mellitus. Resolved. Holding her Lantus and on hypoglycemia protocol. Her blood sugar improved after D50. Stable, off D10 now  resolved    Ischemic hepatitis:   Consulted GI. Plans for EGD when stable or outpatient. -LFTs resolved. Coagulopathy.  - Had hematuria. INR was supra therapeutic on warfarin which is now stopped.   - 3/21: started patient on Eliquis for stroke prophylaxis in the setting of chronic Atrial Fibrillation and [id number] score of 6. Pancytopenia. - Appears to be chronic. However, patient and family unaware of her previous lab results. Will monitor her labs closely. Left cheek where it meets the nose pressure ulcer/injury:  - stage 2; measures 1 x 0.5 x 0 cm; 100% drying pink/red;  - with linear jac = 2 x 0.5 x 0 cm (smaller);  - Hospital Acquired;    Urinary retention:  - Richardson removed 3/21, decreased urine output, bladder scan revealed 900 ml of urine in the bladder per nursing report, Richardson was reinserted; (second attempt at removing Richardson during this hospitalization). - will keep the Richardson in at discharge; outpatient f/u with Urology for voiding trials once patient is more ambulatory. Anemia:  - of chronic disease with slow decline in H&H during this hospitalization. - patient transfused 2 Units of PRBC's on 3/22 with 80 mg of Lasix IV in between the 2 Units. Tolerated the transfusion well and good response in H&H. Code status: Full    Care Plan discussed with: Patient/Family and Nurse  Disposition: TBD   Consult PT/OT and OOB. Discharge planning: to SNF. Patient is now re-intubated. Prognosis is guarded. I had extensive discussion with family(odalis and grand daughter) on 3/27 prior to her re intubation. They wanted to keep full code,gave go ahead for intubation. I wanted to re consult palliative to revisit goals of care. Hospital Problems  Date Reviewed: 3/8/2017          Codes Class Noted POA    * (Principal)Hypothermia ICD-10-CM: T68. XXXA  ICD-9-CM: 991.6  3/8/2017 Yes                Review of Systems:   A comprehensive review of systems was negative. Vital Signs:    Last 24hrs VS reviewed since prior progress note.  Most recent are:  Visit Vitals    /56    Pulse 81    Temp 98.3 °F (36.8 °C)    Resp 15    Ht 5' 6\" (1.676 m)    Wt 79.2 kg (174 lb 9.7 oz)    SpO2 100%    BMI 28.18 kg/m2         Intake/Output Summary (Last 24 hours) at 03/28/17 9980  Last data filed at 03/28/17 0600   Gross per 24 hour   Intake           425.54 ml   Output             1875 ml   Net         -1449.46 ml        Physical Examination:             Constitutional:   Intubated and sedated   ENT:  ET and OGT in place. Resp:  good air entry    CV:  irregular,garde III systolic murmur. GI:  Soft, non-tender, normoactive bowel sounds;     Musculoskeletal:  2+ edema,SCDs in place. Neurologic: Intubated and sedated            Data Review:    Review and/or order of clinical lab test   US abd:  IMPRESSION:  1. Distended gallbladder with sludge and a stone with moderate wall thickening of the gallbladder wall. 2. Dilated IVC suggesting right heart failure. 3. Small amount of free fluid surrounding the liver. 4. Small right pleural effusion      Labs:     Recent Labs      03/28/17   0606 03/26/17   0411   WBC  4.2  4.0   HGB  9.7*  10.4*   HCT  31.7*  33.0*   PLT  139*  116*     Recent Labs      03/28/17   0606 03/27/17   1309   NA  143  144   K  2.8*  3.8   CL  101  100   CO2  38*  43*   BUN  12  12   CREA  0.59  0.58   GLU  90  128*   CA  10.0  9.9   MG   --   2.2   PHOS   --   2.2*     Recent Labs      03/28/17   0606   SGOT  20   ALT  15   AP  47   TBILI  2.2*   TP  5.5*   ALB  2.3*   GLOB  3.2     No results for input(s): INR, PTP, APTT in the last 72 hours. No lab exists for component: INREXT, INREXT   No results for input(s): FE, TIBC, PSAT, FERR in the last 72 hours. Lab Results   Component Value Date/Time    Folate 14.5 03/09/2017 03:23 AM      No results for input(s): PH, PCO2, PO2 in the last 72 hours. No results for input(s): CPK, CKNDX, TROIQ in the last 72 hours.     No lab exists for component: CPKMB  Lab Results   Component Value Date/Time    Cholesterol, total 138 09/28/2011 09:13 AM    HDL Cholesterol 50 09/28/2011 09:13 AM    LDL, calculated 74 09/28/2011 09:13 AM    Triglyceride 69 09/28/2011 09:13 AM     Lab Results   Component Value Date/Time    Glucose (POC) 83 03/28/2017 06:13 AM    Glucose (POC) 88 03/27/2017 11:51 PM    Glucose (POC) 146 03/27/2017 04:51 PM    Glucose (POC) 128 03/27/2017 11:59 AM    Glucose (POC) 133 03/27/2017 06:12 AM     Lab Results   Component Value Date/Time    Color RED 03/10/2017 02:30 AM    Appearance BLOODY 03/10/2017 02:30 AM    Specific gravity 1.020 03/10/2017 02:30 AM    Specific gravity 1.016 03/08/2017 08:54 PM    pH (UA) 6.0 03/10/2017 02:30 AM    Protein 100 03/10/2017 02:30 AM    Glucose NEGATIVE  03/10/2017 02:30 AM    Ketone NEGATIVE  03/10/2017 02:30 AM    Bilirubin NEGATIVE  03/10/2017 02:30 AM    Urobilinogen 1.0 03/10/2017 02:30 AM    Nitrites NEGATIVE  03/10/2017 02:30 AM    Leukocyte Esterase TRACE 03/10/2017 02:30 AM    Epithelial cells FEW 03/10/2017 02:30 AM    Bacteria NEGATIVE  03/10/2017 02:30 AM    WBC 20-50 03/10/2017 02:30 AM    RBC >100 03/10/2017 02:30 AM         Medications Reviewed:     Current Facility-Administered Medications   Medication Dose Route Frequency    potassium chloride 20 mEq in 50 ml IVPB  20 mEq IntraVENous Q1H    insulin lispro (HUMALOG) injection   SubCUTAneous Q6H    apixaban (ELIQUIS) tablet 2.5 mg  2.5 mg Per NG tube BID    pantoprazole (PROTONIX) 2 mg/mL oral suspension 40 mg  40 mg Per NG tube ACB    furosemide (LASIX) injection 40 mg  40 mg IntraVENous DAILY    ELECTROLYTE REPLACEMENT PROTOCOL  1 Each Other PRN    ELECTROLYTE REPLACEMENT PROTOCOL  1 Each Other PRN    albuterol-ipratropium (DUO-NEB) 2.5 MG-0.5 MG/3 ML  3 mL Nebulization TID PRN    propofol (DIPRIVAN) infusion  5-50 mcg/kg/min IntraVENous TITRATE    PHENYLephrine (NEOSYNEPHRINE) 30,000 mcg in 0.9% sodium chloride 250 mL infusion   mcg/min IntraVENous TITRATE    chlorhexidine (PERIDEX) 0.12 % mouthwash 10 mL  10 mL Oral Q12H    bisacodyl (DULCOLAX) suppository 10 mg  10 mg Rectal DAILY PRN    enalapril (VASOTEC) tablet 2.5 mg  2.5 mg Oral BID    sodium chloride (NS) flush 5-10 mL  5-10 mL IntraVENous Multiple    phosphorus (K PHOS NEUTRAL) 250 mg tablet 1 Tab  1 Tab Oral BID    albumin human 25% (BUMINATE) solution 12.5 g  12.5 g IntraVENous Q8H PRN    balsam peru-castor oil (VENELEX)  mg/gram ointment   Topical BID    docusate sodium (COLACE) capsule 100 mg  100 mg Oral BID    polyvinyl alcohol (LIQUIFILM TEARS) 1.4 % ophthalmic solution 2 Drop  2 Drop Both Eyes TID    sodium chloride (NS) flush 10 mL  10 mL InterCATHeter Q24H    sodium chloride (NS) flush 10 mL  10 mL InterCATHeter PRN    sodium chloride (NS) flush 10-40 mL  10-40 mL InterCATHeter Q8H    alteplase (CATHFLO) 1 mg in sterile water (preservative free) 1 mL injection  1 mg InterCATHeter PRN    bacitracin 500 unit/gram packet 1 Packet  1 Packet Topical PRN    glucose chewable tablet 16 g  4 Tab Oral PRN    dextrose (D50W) injection syrg 12.5-25 g  12.5-25 g IntraVENous PRN    glucagon (GLUCAGEN) injection 1 mg  1 mg IntraMUSCular PRN    sodium chloride (NS) flush 5-10 mL  5-10 mL IntraVENous Q8H    sodium chloride (NS) flush 5-10 mL  5-10 mL IntraVENous PRN     ______________________________________________________________________  EXPECTED LENGTH OF STAY: 4d 21h  ACTUAL LENGTH OF STAY:          20                 Ezra Izquierdo MD

## 2017-03-28 NOTE — PROGRESS NOTES
Physical Therapy    Pt transferred to CCU overnight, intubated and sedated d/t respiratory failure. Will follow.     Thank Zachariah To PT,DPT

## 2017-03-28 NOTE — PROGRESS NOTES
[facility]  -Progress Note     Harman Clark [phone]   3/28/2017      Nnamdi Perez M.D. , F.A.C.C.   --------PCP:-Bc Harper MD   -----Subjective:   . Chales Minus Chales Minus Chales Minus Juliana Scales is a 80 y.o. female  who required re-intubation yesterday evening due to hypercapnic respiratory failure. Pt opens eyes to voice (propofol just stopped). Not following commands at present, lethargic. Assessment/Plan/Discussion:Cardiology Attending:     Patient seen earlier today and examined  and agree with Advance Practice Provider (ANDI, NP,PA)  assessment and plans. Juliana Scales is a 80 y.o. female     Now intubated with hypercapnea  No pressors  BNP down a tad   EF low  On OACs (oral anti-coagulants)   Prelim dopplers with no arm DVT  Continue diuresis  Brittney Henson MD 3/28/2017             Discussion/Plans/Recs  1. Hypercapnic respiratory failure/Resp acidosis: PH 7.66, pCO2 32.5 (from 77 yesterday) HCO3= 36  --Mechanical ventilation management per pulmonary    2)  Systolic CHF:  BNP trending down ( 1006 from 1238 on 3/22). CXR today:persistent pulmonary interstitial edema, unchanged. Persistent Rt basilar consolidation and sm. Rt pleural effusion, unchanged. TTE 3/30/17: EF 30%. Wt up 1 lb from yesterday per record but   Net ( -1449/24 hrs. ). Also, albumin 2.3.  --BiV AICD  --PO Lasix 80 mg BID changed to Lasix IV 40 mg IV daily- Still with BLE and BUEedema  --ACE restarted 3/21 (Enalapril 2.5 mg daily)  --Continue to hold BB due to low BP    3) MR-systolic murmur. No vegetation on valves per AMMON. 4)Afib: -[id number]= 6 (age, CHF, HTN, female, DM)  Eliquis 2.5 mg BID   ERASTO is very large but no clot, would continue to use OAC cautiously for stroke prophylaxis      5). Thrombocytopenia-platelets 849 from 689 yesterday     6. Anemia:  Hgb 9.7 today from 10.4 on 3/26/17     8). Hypokalemia: K=2.8 today.   -Repleted by primary team.    9) BUE edema:  US BUE pending    10). Advanced care planning: Per primary team, Family confirmed their desires for full code status yesterday   - Palliative care re-consulted. Cardiac Studies/Hx:  AMMON 3/20 with no vegetation, Same EF 35 and 1-2+ MR, TR as expected  ECHO: 3/9/17: EF 30%. Mod diffuse hyypokinesis, Wall motion is dyssynchronous, RA mildly dilated. LA mod-severe dilated, Mod MR, AV: In the parasternal long axis view, faintly seen spherical echodensity about 5mms in diameter that seems to move in  relation to the anterior aortic valve leaflet, on the aortic side. Could represent a vegetation. Mod TR, mild RV systolic hypertension. 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 PA pressure of 61 with moderate pulmonary hypertension. 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 Moderate to severe TR, moderate to severe pulmonary hypertension. EF 35-40%, marked LAE, mild JOAN    PACER check 2-11-13 39,288 episodes of atrial tach/flutter and 11 with high rates  1/2014 noises noted on pacer, 260 thousands AMS , some are noises and some are AT, some PMT (adjustment made, Dr Abeba Bowie discussed with pacer clinic)  Pacer check in May 2013 showed NSVT and AF the latter as long as 1 day with occasional RVR  Pacer check 9/9/13: persistent AT with V paced. 92%  (asymptomatic with AT)    CATH 10-24-12= Normal cors and EF 50 %. -- A annular ring mitral position. Mitral valve repair on July 29, 1996, for mitral regurgitation with a #32 Turner-Manuel ring.    Chronic systolic CHF, reduced ejection fraction. NYHA 1-2  . ..admit 7/16/09 mild chf exacerbation  #32 Turner Manuel ring MV repair 1996         Past Medical History:   Diagnosis Date    Atrial fibrillation (HCC)     Chronic systolic heart failure (HCC)     ef 35-40%; hold ACE due to lower bp    Diabetes mellitus type 2, insulin dependent ([facility] [address]     Hypertension     Long term (current) use of anticoagulants     Malignant hypertensive heart disease with CHF ([facility] [address]     Mitral regurgitation     Pacemaker 10/25/2012    The pacemaker is a [employer], model # [id number], serial [id number]. 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 Results for orders placed or performed during the hospital encounter of 03/08/17   EKG, 12 LEAD, INITIAL   Result Value Ref Range    Ventricular Rate 80 BPM    Atrial Rate 75 BPM    QRS Duration 146 ms    Q-T Interval 480 ms    QTC Calculation (Bezet) 553 ms    Calculated R Axis 158 degrees    Calculated T Axis 5 degrees    Diagnosis       Ventricular-paced rhythm  When compared with ECG of 09-SEP-2016 17:36,  Vent. rate has decreased BY  30 BPM  Confirmed by Renetta Lee M.D., Karine Slade ([id number]) on 3/9/2017 10:15:16 AM     Results for orders placed or performed in visit on 03/27/12   AMB POC EKG ROUTINE W/ 12 LEADS, INTER & REP    Narrative    See scanned report.         Vitals:    03/28/17 0700 03/28/17 0800 03/28/17 0839 03/28/17 0900   BP: 105/60 108/56  109/63   BP 1 Location:       BP Patient Position:       Pulse: 81 81 82 81   Resp: 15 15 10 10   Temp:       SpO2: 100% 100% 100% 100%   Weight:       Height:           Objective:    Physical Exam:   Patient Vitals for the past 12 hrs: Temp Pulse Resp BP SpO2   03/28/17 0900 - 81 10 109/63 100 %   03/28/17 0839 - 82 10 - 100 %   03/28/17 0800 - 81 15 108/56 100 %   03/28/17 0700 - 81 15 105/60 100 %   03/28/17 0630 - 84 14 104/60 100 %   03/28/17 0600 - 81 10 103/58 100 %   03/28/17 0530 - 84 10 110/55 100 %   03/28/17 0500 - 82 10 101/55 100 %   03/28/17 0430 - 84 11 107/54 99 %   03/28/17 0400 98.3 °F (36.8 °C) 81 10 106/58 97 %   03/28/17 0340 - 85 10 - 98 %   03/28/17 0330 - 84 14 106/52 98 %   03/28/17 0314 - 82 14 - 98 %   03/28/17 0300 - 81 14 105/53 97 %   03/28/17 0230 - 82 14 107/53 98 %   03/28/17 0200 - 84 14 110/56 98 %   03/28/17 0130 - 83 14 107/55 98 %   03/28/17 0100 - 82 17 103/58 98 %   03/28/17 0030 - 81 19 113/51 98 %   03/28/17 0000 98.1 °F (36.7 °C) 84 9 (!) 87/42 99 %   03/27/17 2330 - 80 14 (!) 85/43 99 %   03/27/17 2321 - 83 14 - 100 %   03/27/17 2300 - 78 15 97/55 100 %   03/27/17 2230 - 82 14 95/49 100 %   03/27/17 2200 - 83 14 96/50 100 %   03/27/17 2153 - 82 14 - 100 %   03/27/17 2137 - 80 18 - 100 %   03/27/17 2130 - 82 15 108/57 100 %      General:  Lethargic, sedate, intubated. Opens eyes to voice   ENT, Neck:  no jvd. NG tube in place (off suction for meds currently)   Chest Wall: inspection normal - no chest wall deformities or tenderness, mechanically ventilated   Lung: Few faint Rt basilar crackles, otherwise Clear to ausculation bilaterally   Heart:  no gallops noted, irregularly irregular rhythm, no JVD 2/6 systolic early/decresendo  murmur at RUSB to LUSB without radiation beyond apex    Abdomen: Nondistended, + bowel sounds, no grimacing to palpation    Richardson with mast urine. No hematuria   Extremities: extremities normal, 2+ BLE edema, +BUE edema.        Last 24hr Input/Output:    Intake/Output Summary (Last 24 hours) at 03/28/17 0915  Last data filed at 03/28/17 0600   Gross per 24 hour   Intake           425.54 ml   Output             1875 ml   Net         -1449.46 ml        Data Review:   Recent Results (from the past 24 hour(s))   GLUCOSE, POC    Collection Time: 03/27/17 11:59 AM   Result Value Ref Range    Glucose (POC) 128 (H) 65 - 100 mg/dL    Performed by Dagmar Garza    METABOLIC PANEL, BASIC    Collection Time: 03/27/17  1:09 PM   Result Value Ref Range    Sodium 144 136 - 145 mmol/L    Potassium 3.8 3.5 - 5.1 mmol/L    Chloride 100 97 - 108 mmol/L    CO2 43 (HH) 21 - 32 mmol/L    Anion gap 1 (L) 5 - 15 mmol/L    Glucose 128 (H) 65 - 100 mg/dL    BUN 12 6 - 20 MG/DL    Creatinine 0.58 0.55 - 1.02 MG/DL    BUN/Creatinine ratio 21 (H) 12 - 20      GFR est AA >60 >60 ml/min/1.73m2    GFR est non-AA >60 >60 ml/min/1.73m2    Calcium 9.9 8.5 - 10.1 MG/DL   MAGNESIUM    Collection Time: 03/27/17  1:09 PM   Result Value Ref Range    Magnesium 2.2 1.6 - 2.4 mg/dL   PHOSPHORUS    Collection Time: 03/27/17  1:09 PM   Result Value Ref Range    Phosphorus 2.2 (L) 2.6 - 4.7 MG/DL   POC G3 - PUL    Collection Time: 03/27/17  2:20 PM   Result Value Ref Range    pH (POC) 7.357 7.35 - 7.45      pCO2 (POC) 77.0 (H) 35.0 - 45.0 MMHG    pO2 (POC) 81 80 - 100 MMHG    HCO3 (POC) 43.2 (H) 22 - 26 MMOL/L    sO2 (POC) 95 92 - 97 %    Base excess (POC) 18 mmol/L    Site RIGHT RADIAL      Device: NASAL CANNULA      Flow rate (POC) 2 L/M    Allens test (POC) YES      Specimen type (POC) ARTERIAL     GLUCOSE, POC    Collection Time: 03/27/17  4:51 PM   Result Value Ref Range    Glucose (POC) 146 (H) 65 - 100 mg/dL    Performed by Al Lagos    POC G3 - PUL    Collection Time: 03/27/17  6:37 PM   Result Value Ref Range    FIO2 (POC) 35 %    pH (POC) 7.344 (L) 7.35 - 7.45      pCO2 (POC) 78.3 (H) 35.0 - 45.0 MMHG    pO2 (POC) 69 (L) 80 - 100 MMHG    HCO3 (POC) 42.7 (H) 22 - 26 MMOL/L    sO2 (POC) 91 (L) 92 - 97 %    Base excess (POC) 17 mmol/L    Site RIGHT RADIAL      Device: BIPAP      PEEP/CPAP (POC) 6 cmH2O    PIP (POC) 14      Pressure support 8 cmH2O    Allens test (POC) YES      Specimen type (POC) ARTERIAL      Total resp. rate 19     POC G3 - PUL    Collection Time: 03/27/17  9:35 PM   Result Value Ref Range    FIO2 (POC) 100 %    pH (POC) 7.672 (HH) 7.35 - 7.45      pCO2 (POC) 35.4 35.0 - 45.0 MMHG    pO2 (POC) 188 (H) 80 - 100 MMHG    HCO3 (POC) 41.1 (H) 22 - 26 MMOL/L    sO2 (POC) 100 (H) 92 - 97 %    Base excess (POC) 21 mmol/L    Site LEFT RADIAL      Device: VENT      Mode ASSIST CONTROL      Tidal volume 450 ml    Set Rate 18 bpm    PEEP/CPAP (POC) 5 cmH2O    Allens test (POC) YES      Specimen type (POC) ARTERIAL     GLUCOSE, POC    Collection Time: 03/27/17 11:51 PM   Result Value Ref Range    Glucose (POC) 88 65 - 100 mg/dL    Performed by Mallika Presley    POC G3 - PUL    Collection Time: 03/28/17  3:17 AM   Result Value Ref Range    FIO2 (POC) 60 %    pH (POC) 7.660 (HH) 7.35 - 7.45      pCO2 (POC) 32.5 (L) 35.0 - 45.0 MMHG    pO2 (POC) 58 (L) 80 - 100 MMHG    HCO3 (POC) 36.7 (H) 22 - 26 MMOL/L    sO2 (POC) 95 92 - 97 %    Base excess (POC) 16 mmol/L    Site LEFT RADIAL      Device: VENT      Mode ASSIST CONTROL      Tidal volume 450 ml    Set Rate 14 bpm    PEEP/CPAP (POC) 5 cmH2O    Allens test (POC) YES      Specimen type (POC) ARTERIAL     CBC WITH AUTOMATED DIFF    Collection Time: 03/28/17  6:06 AM   Result Value Ref Range    WBC 4.2 3.6 - 11.0 K/uL    RBC 4.17 3.80 - 5.20 M/uL    HGB 9.7 (L) 11.5 - 16.0 g/dL    HCT 31.7 (L) 35.0 - 47.0 %    MCV 76.0 (L) 80.0 - 99.0 FL    MCH 23.3 (L) 26.0 - 34.0 PG    MCHC 30.6 30.0 - 36.5 g/dL    RDW 19.3 (H) 11.5 - 14.5 %    PLATELET 716 (L) 639 - 400 K/uL    NEUTROPHILS 73 32 - 75 %    LYMPHOCYTES 10 (L) 12 - 49 %    MONOCYTES 14 (H) 5 - 13 %    EOSINOPHILS 2 0 - 7 %    BASOPHILS 1 0 - 1 %    ABS. NEUTROPHILS 3.1 1.8 - 8.0 K/UL    ABS. LYMPHOCYTES 0.4 (L) 0.8 - 3.5 K/UL    ABS. MONOCYTES 0.6 0.0 - 1.0 K/UL    ABS. EOSINOPHILS 0.1 0.0 - 0.4 K/UL    ABS.  BASOPHILS 0.0 0.0 - 0.1 K/UL    DF SMEAR SCANNED      PLATELET COMMENTS LARGE PLATELETS      RBC COMMENTS ANISOCYTOSIS  1+        RBC COMMENTS MICROCYTOSIS  1+        RBC COMMENTS HYPOCHROMIA  1+        RBC COMMENTS POLYCHROMASIA  1+        RBC COMMENTS TARGET CELLS  PRESENT       METABOLIC PANEL, COMPREHENSIVE    Collection Time: 03/28/17  6:06 AM   Result Value Ref Range    Sodium 143 136 - 145 mmol/L    Potassium 2.8 (L) 3.5 - 5.1 mmol/L    Chloride 101 97 - 108 mmol/L    CO2 38 (H) 21 - 32 mmol/L    Anion gap 4 (L) 5 - 15 mmol/L    Glucose 90 65 - 100 mg/dL    BUN 12 6 - 20 MG/DL    Creatinine 0.59 0.55 - 1.02 MG/DL    BUN/Creatinine ratio 20 12 - 20      GFR est AA >60 >60 ml/min/1.73m2    GFR est non-AA >60 >60 ml/min/1.73m2    Calcium 10.0 8.5 - 10.1 MG/DL    Bilirubin, total 2.2 (H) 0.2 - 1.0 MG/DL    ALT (SGPT) 15 12 - 78 U/L    AST (SGOT) 20 15 - 37 U/L    Alk. phosphatase 47 45 - 117 U/L    Protein, total 5.5 (L) 6.4 - 8.2 g/dL    Albumin 2.3 (L) 3.5 - 5.0 g/dL    Globulin 3.2 2.0 - 4.0 g/dL    A-G Ratio 0.7 (L) 1.1 - 2.2     NUCLEATED RBC    Collection Time: 03/28/17  6:06 AM   Result Value Ref Range    NRBC 3.4 (H) 0  WBC    ABSOLUTE NRBC 0.14 (H) 0.00 - 0.01 K/uL    WBC CORRECTED FOR NR ADJUSTED FOR NUCLEATED RBC'S     BNP    Collection Time: 03/28/17  6:06 AM   Result Value Ref Range    BNP 1006 (H) 0 - 100 pg/mL   GLUCOSE, POC    Collection Time: 03/28/17  6:13 AM   Result Value Ref Range    Glucose (POC) 83 65 - 100 mg/dL    Performed by Ml Wilkinson NP 3/28/2017

## 2017-03-28 NOTE — PROGRESS NOTES
0730: Report received from [address] RN.     0800: Dr. Jabari Ruiz, hospitalist, at bedside. 0830: Diprivan stopped. 0900: Dr. Fernanda Mcpherson, pulmonology, at bedside. Orders received. 1145: Pt opens eyes to voice and sternal rub while off sedation. 1800: Patient advocate at bedside with family. 1930: Bedside shift change report given to [address], RN (oncoming nurse) by Malcolm laguna RN (offgoing nurse). Report included the following information SBAR, Kardex, ED Summary, Procedure Summary, Intake/Output, MAR, Recent Results and Cardiac Rhythm Paced.

## 2017-03-28 NOTE — PROCEDURES
[facility]  *** FINAL REPORT ***    Name: Bentley Tubbs  MRN: [id number]    Inpatient  : 1930  HIS Order #: [id number]  [id number] [facility] Visit #: [id number]  Date: 28 Mar 2017    TYPE OF TEST: Peripheral Venous Testing    REASON FOR TEST  Limb swelling    Right Arm:-  Deep venous thrombosis:           No  Superficial venous thrombosis:    No    Left Arm:-  Deep venous thrombosis:           No  Superficial venous thrombosis:    No      INTERPRETATION/FINDINGS  PROCEDURE:  Color duplex ultrasound imaging of upper extremity veins. FINDINGS:       Right:  The internal jugular, subclavian, axillary, brachial,  basilic, and cephalic veins are patent and without evidence of  thrombus;  each is fully compressible and there is no narrowing of the   flow channel on color Doppler imaging. Portions of the basilic and  brachial veins not seen due to bandaging over picc line. Phasic/pulsatile flow is observed in the subclavian vein. Left:    The internal jugular, subclavian, axillary, brachial,  basilic, and forearm cephalic veins are patent and without evidence of   thrombus;  each is fully compressible and there is no narrowing of  the flow channel on color Doppler imaging. The upper arm cephalic  vein was not visualized. Phasic/pulsatile flow is observed in the  subclavian vein. IMPRESSION:  No evidence of right or left upper extremity vein  thrombosis where visualized. ADDITIONAL COMMENTS    I have personally reviewed the data relevant to the interpretation of  this  study.     TECHNOLOGIST: Cristina Morton RVT  Signed: 2017 01:26 PM    PHYSICIAN: Sindi Kennedy MD  Signed: 2017 05:52 AM

## 2017-03-28 NOTE — PROGRESS NOTES
Follow up visit with pt and family. Pt case was dicussed with Palliative IDT, Pt is now in CCU and staff was present at MsManuelito Selina Marks' bedside providing care. Pt's nurse shared that a family member was in the waiting room; I was able to locate pt's daughter-in-law Олег Cannon and offer support. No specific needs expressed by Олег Cannon at this time - offered emotional support and assurance of prayers. Chaplains will continue to follow as able/needed.     Misti Oviedo, Palliative

## 2017-03-29 NOTE — PROGRESS NOTES
notes that patient's daughters are now at the bedside and that Mags the nurse gave family an update on patient's status.   Patient is still intubated and a sbt will be initiated in am.

## 2017-03-29 NOTE — PROGRESS NOTES
Bedside shift change report given to Mavis Good (oncoming nurse) by Mir (offgoing nurse). Report included the following information SBAR, Kardex, Intake/Output, MAR and Recent Results. 0800 = Respiratory and I attempted a spontaneous breathing trial. The patient did not pass due to long periods of apnea. 1000 = I completed patient's wound care on groin area. 1230 = Patient was started on tube feeds. 1500 = [facility] has called [facility] twice and said that they do not have the \"sport bed\" available. We are continuing to try and find a suitable bed that is best for the patient (we are trying to switch beds around the unit). 1630 = Patient was finally moved to another bed and weight was documented    Bedside shift change report given to Mavis Good   (oncoming nurse) by Selina Trejo (offgoing nurse). Report included the following information SBAR, Kardex, MAR and Recent Results.

## 2017-03-29 NOTE — PROGRESS NOTES
[facility]  -Progress Note     [facility] [phone]   3/29/2017      Prasanna Amezcua M.D. , F.A.C.C.   --------PCP:-Bc Daily MD   -----Subjective:   . Jyoti Alcantara Boy is a 80 y.o. female  Awakened to voice. Nods \"no\" when asked if having pain. Didn't answer any other question. Remains on Neosynephrine 20 mcg/min. Off propofol. Discussion/Plans/Recs  1. Hypercapnic respiratory failure/Resp acidosis/metabolic alkalosis: PH 6.96, pCO2 53.2 HCO3= 34.7  (per pulm-  thought to be from diuresis contributing to high PCO2 and compensated pH)   -Pulmonary following- SBT daily. 2)  Systolic CHF:  BNP trending down (972 today from 1006 3/28). CXR today:Central pulmonary vascular congestive change. TTE 3/30/17: EF 30%. No weight today- Bed scale broken. Net ( -3565/24 hrs. ). Also, albumin 2.2.  --BiV AICD  --Lasix IV 40 mg IV daily- + BLE and BUE edema (BUE edema seems improved)  --ACE held due to low BP requiring neosynephrine at 20 mcg/min. Resume when BP improved off pressor  --Continue to hold BB due to low BP/on pressors    3) MR-systolic murmur. No vegetation on valves per AMMON. 4)Afib: -[id number]= 6 (age, CHF, HTN, female, DM)    -Continue Eliquis 2.5 mg BID. -ERASTO is very large but no clot, would continue to use OAC cautiously for stroke prophylaxis  (hgb stable)    5). Thrombocytopenia-platelets 535 from 344 yesterday     6. Anemia:  Hgb 9.6 today from 9.7 3/28.     7). Hypokalemia: K=2.7 today. -Repleted by electrolyte protocol. 8. NSVT: had 15 beat run of VT on alarm review yesterday @ approx 1355- recommend to keep K, 4 or > and Mg 2 or >. Continue telemetry    9) BUE edema: improved- BUE venous ultrasound negative for DVT    11). Advanced care planning:- Palliative care re-consulted. Family decision thus far to maintain full code status.          Assessment/Plan/Discussion:Cardiology Attending:     Patient seen earlier today and examined  and agree with Advance Practice Provider (ANDI, NP,PA)  assessment and plans. Sri Gtz is a 80 y.o. female     Remains on Maxi  Failed wean trial today  respr failure, aging, systolic CHF  Louder murmur? Will consider AMMON or TTE if not able to wean  Ale Retana MD 3/29/2017           Cardiac Studies/Hx:  AMMON 3/20 with no vegetation, Same EF 35 and 1-2+ MR, TR as expected  ECHO: 3/9/17: EF 30%. Mod diffuse hyypokinesis, Wall motion is dyssynchronous, RA mildly dilated. LA mod-severe dilated, Mod MR, AV: In the parasternal long axis view, faintly seen spherical echodensity about 5mms in diameter that seems to move in  relation to the anterior aortic valve leaflet, on the aortic side. Could represent a vegetation. Mod TR, mild RV systolic hypertension. 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 PA pressure of 61 with moderate pulmonary hypertension. 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 Moderate to severe TR, moderate to severe pulmonary hypertension. EF 35-40%, marked LAE, mild JOAN    PACER check 2-11-13 39,288 episodes of atrial tach/flutter and 11 with high rates  1/2014 noises noted on pacer, 260 thousands AMS , some are noises and some are AT, some PMT (adjustment made, Dr Abeba Bowie discussed with pacer clinic)  Pacer check in May 2013 showed NSVT and AF the latter as long as 1 day with occasional RVR  Pacer check 9/9/13: persistent AT with V paced. 92%  (asymptomatic with AT)    CATH 10-24-12= Normal cors and EF 50 %. -- A annular ring mitral position. Mitral valve repair on July 29, 1996, for mitral regurgitation with a #32 Turner-Manuel ring. Chronic systolic CHF, reduced ejection fraction. NYHA 1-2  . ..admit 7/16/09 mild chf exacerbation  #32 Turner Manuel ring MV repair 1996         Past Medical History:   Diagnosis Date    Atrial fibrillation (HCC)     Chronic systolic heart failure (HCC)     ef 35-40%; hold ACE due to lower bp    Diabetes mellitus type 2, insulin dependent ([address]     Hypertension     Long term (current) use of anticoagulants     Malignant hypertensive heart disease with CHF ([address]     Mitral regurgitation     Pacemaker 10/25/2012    The pacemaker is a [employer], model # [id number], serial [id number]. 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 Results for orders placed or performed during the hospital encounter of 03/08/17   EKG, 12 LEAD, INITIAL   Result Value Ref Range    Ventricular Rate 80 BPM    Atrial Rate 75 BPM    QRS Duration 146 ms    Q-T Interval 480 ms    QTC Calculation (Bezet) 553 ms    Calculated R Axis 158 degrees    Calculated T Axis 5 degrees    Diagnosis       Ventricular-paced rhythm  When compared with ECG of 09-SEP-2016 17:36,  Vent. rate has decreased BY  30 BPM  Confirmed by Rebecca Pugh M.D., Samantha Keller ([id number]) on 3/9/2017 10:15:16 AM     Results for orders placed or performed in visit on 03/27/12   AMB POC EKG ROUTINE W/ 12 LEADS, INTER & REP    Narrative    See scanned report.         Vitals:    03/29/17 0800 03/29/17 0810 03/29/17 0900 03/29/17 1000   BP: 95/66  107/61 105/60   BP 1 Location: Left arm      BP Patient Position:       Pulse: 85 82 83 85   Resp: 21 20 17 9   Temp: 98.3 °F (36.8 °C)      SpO2: 99% 96% 100% 100%   Weight:       Height:           Objective:    Physical Exam:   Patient Vitals for the past 12 hrs:   Temp Pulse Resp BP SpO2   03/29/17 1000 - 85 9 105/60 100 %   03/29/17 0900 - 83 17 107/61 100 %   03/29/17 0810 - 82 20 - 96 %   03/29/17 0800 98.3 °F (36.8 °C) 85 21 95/66 99 %   03/29/17 0600 - 84 16 104/56 99 %   03/29/17 0530 - 83 16 104/56 97 %   03/29/17 0500 - 84 16 95/48 97 %   03/29/17 0445 - 85 10 - 98 %   03/29/17 0430 - 83 17 103/56 98 %   03/29/17 0400 98.5 °F (36.9 °C) 82 18 112/64 98 %   03/29/17 0330 - 84 17 94/59 100 %   03/29/17 0302 - 81 11 - 99 %   03/29/17 0300 - 85 10 98/86 99 %   03/29/17 0230 - 83 15 94/54 100 %   03/29/17 0200 - 85 23 95/50 100 %   03/29/17 0130 - 83 14 97/59 100 %   03/29/17 0100 - 85 10 100/59 100 %   03/29/17 0030 - 84 11 105/54 100 %   03/29/17 0000 98.3 °F (36.8 °C) 85 13 105/54 98 %   03/28/17 2330 - 85 12 114/61 100 %   03/28/17 2300 - 86 25 110/58 98 %      General:  Lethargic,  ntubated. Opens eyes to voice   ENT, Neck:   NG tube in place (off suction for meds currently)   Chest Wall: inspection normal - no chest wall deformities or tenderness, mechanically ventilated   Lung: Clear to ausculation bilaterally, on mechanical ventilatin   Heart:  no gallops noted, RRR, no JVD 2/6 systolic early/decresendo  murmur at RUSB to LUSB without radiation beyond apex    Abdomen: Nondistended, + bowel sounds, no grimacing to palpation    Richardson with mast urine.   No hematuria   Extremities: extremities normal, 1-2+ BLE edema (pedal/ankle more prominent), +BUE edema- improved       Last 24hr Input/Output:    Intake/Output Summary (Last 24 hours) at 03/29/17 1043  Last data filed at 03/29/17 1000   Gross per 24 hour   Intake           424.09 ml   Output             3075 ml   Net         -2650.91 ml        Data Review:   Recent Results (from the past 24 hour(s))   GLUCOSE, POC    Collection Time: 03/28/17 11:56 AM Result Value Ref Range    Glucose (POC) 96 65 - 100 mg/dL    Performed by Carla Hernandez Kendra    POC G3 - PUL    Collection Time: 03/28/17  2:43 PM   Result Value Ref Range    FIO2 (POC) 50 %    pH (POC) 7.427 7.35 - 7.45      pCO2 (POC) 56.7 (H) 35.0 - 45.0 MMHG    pO2 (POC) 107 (H) 80 - 100 MMHG    HCO3 (POC) 37.3 (H) 22 - 26 MMOL/L    sO2 (POC) 98 (H) 92 - 97 %    Base excess (POC) 13 mmol/L    Site RIGHT RADIAL      Device: VENT      Mode ASSIST CONTROL      Tidal volume 350 ml    Set Rate 8 bpm    PEEP/CPAP (POC) 5 cmH2O    PIP (POC) 24      Allens test (POC) YES      Specimen type (POC) ARTERIAL      Total resp. rate 12     GLUCOSE, POC    Collection Time: 03/28/17  5:06 PM   Result Value Ref Range    Glucose (POC) 90 65 - 100 mg/dL    Performed by [address] [place] [address], POC    Collection Time: 03/29/17 12:27 AM   Result Value Ref Range    Glucose (POC) 81 65 - 100 mg/dL    Performed by Wynema Skiff    BNP    Collection Time: 03/29/17  4:49 AM   Result Value Ref Range     (H) 0 - 100 pg/mL   CBC WITH AUTOMATED DIFF    Collection Time: 03/29/17  4:49 AM   Result Value Ref Range    WBC 3.7 3.6 - 11.0 K/uL    RBC 4.11 3.80 - 5.20 M/uL    HGB 9.6 (L) 11.5 - 16.0 g/dL    HCT 30.0 (L) 35.0 - 47.0 %    MCV 73.0 (L) 80.0 - 99.0 FL    MCH 23.4 (L) 26.0 - 34.0 PG    MCHC 32.0 30.0 - 36.5 g/dL    RDW 20.1 (H) 11.5 - 14.5 %    PLATELET 879 (L) 069 - 400 K/uL    NEUTROPHILS 84 (H) 32 - 75 %    LYMPHOCYTES 7 (L) 12 - 49 %    MONOCYTES 7 5 - 13 %    EOSINOPHILS 1 0 - 7 %    BASOPHILS 1 0 - 1 %    ABS. NEUTROPHILS 3.1 1.8 - 8.0 K/UL    ABS. LYMPHOCYTES 0.3 (L) 0.8 - 3.5 K/UL    ABS. MONOCYTES 0.3 0.0 - 1.0 K/UL    ABS. EOSINOPHILS 0.0 0.0 - 0.4 K/UL    ABS.  BASOPHILS 0.0 0.0 - 0.1 K/UL    DF MANUAL      RBC COMMENTS POLYCHROMASIA  1+        RBC COMMENTS ANISOCYTOSIS  2+        RBC COMMENTS MICROCYTOSIS  1+        RBC COMMENTS TARGET CELLS  PRESENT        RBC COMMENTS HYPOCHROMIA  1+ MAGNESIUM    Collection Time: 03/29/17  4:49 AM   Result Value Ref Range    Magnesium 1.9 1.6 - 2.4 mg/dL   METABOLIC PANEL, COMPREHENSIVE    Collection Time: 03/29/17  4:49 AM   Result Value Ref Range    Sodium 144 136 - 145 mmol/L    Potassium 2.9 (L) 3.5 - 5.1 mmol/L    Chloride 105 97 - 108 mmol/L    CO2 33 (H) 21 - 32 mmol/L    Anion gap 6 5 - 15 mmol/L    Glucose 80 65 - 100 mg/dL    BUN 11 6 - 20 MG/DL    Creatinine 0.59 0.55 - 1.02 MG/DL    BUN/Creatinine ratio 19 12 - 20      GFR est AA >60 >60 ml/min/1.73m2    GFR est non-AA >60 >60 ml/min/1.73m2    Calcium 9.8 8.5 - 10.1 MG/DL    Bilirubin, total 2.3 (H) 0.2 - 1.0 MG/DL    ALT (SGPT) 13 12 - 78 U/L    AST (SGOT) 21 15 - 37 U/L    Alk.  phosphatase 50 45 - 117 U/L    Protein, total 5.4 (L) 6.4 - 8.2 g/dL    Albumin 2.2 (L) 3.5 - 5.0 g/dL    Globulin 3.2 2.0 - 4.0 g/dL    A-G Ratio 0.7 (L) 1.1 - 2.2     PHOSPHORUS    Collection Time: 03/29/17  4:49 AM   Result Value Ref Range    Phosphorus 2.4 (L) 2.6 - 4.7 MG/DL   PROTHROMBIN TIME + INR    Collection Time: 03/29/17  4:49 AM   Result Value Ref Range    INR 1.4 (H) 0.9 - 1.1      Prothrombin time 14.2 (H) 9.0 - 11.1 sec   PTT    Collection Time: 03/29/17  4:49 AM   Result Value Ref Range    aPTT 33.5 (H) 22.1 - 32.5 sec    aPTT, therapeutic range     58.0 - 77.0 SECS   NUCLEATED RBC    Collection Time: 03/29/17  4:49 AM   Result Value Ref Range    NRBC 1.3 (H) 0  WBC    ABSOLUTE NRBC 0.05 (H) 0.00 - 0.01 K/uL    WBC CORRECTED FOR NR ADJUSTED FOR NUCLEATED RBC'S     GLUCOSE, POC    Collection Time: 03/29/17  7:19 AM   Result Value Ref Range    Glucose (POC) 79 65 - 100 mg/dL    Performed by Lilia 7, POC    Collection Time: 03/29/17  7:48 AM   Result Value Ref Range    Glucose (POC) 128 (H) 65 - 100 mg/dL    Performed by Abhijit Haynes    POC G3 - PUL    Collection Time: 03/29/17  9:47 AM   Result Value Ref Range    FIO2 (POC) 0.50 %    pH (POC) 7.423 7.35 - 7.45 pCO2 (POC) 53.2 (H) 35.0 - 45.0 MMHG    pO2 (POC) 139 (H) 80 - 100 MMHG    HCO3 (POC) 34.7 (H) 22 - 26 MMOL/L    sO2 (POC) 99 (H) 92 - 97 %    Base excess (POC) 10 mmol/L    Site RIGHT RADIAL      Device: VENT      Mode ASSIST CONTROL      Tidal volume 350 ml    Set Rate 8 bpm    PEEP/CPAP (POC) 5 cmH2O    PIP (POC) 22      Allens test (POC) YES      Specimen type (POC) ARTERIAL      Total resp. rate 8.6        Federica Wilkinson, IRLANDA 3/29/2017

## 2017-03-29 NOTE — DIABETES MGMT
DTC Progress Note    Recommendations/ Comments: Pt's chart reviewed due to hypoglycemia. Pt with a blood sugars of 79 mg/dl at 0719 today. Pt without hypoglycemia since 3/10/2017. BG's 83-97, one result 128 over the past 24 hours. Noted patient has been NPO with dextrose running. TF begun at 1230 today. On ventilator. DTC to continue to follow. Chart reviewed on [facility]. Patient is a 80 y.o. female with known diabetes on Lantus 22 units daily at home. A1c:   No results found for: HBA1C, HGBE8, ECZ6YOGD, FJH2BMSW    Recent Glucose Results:   Lab Results   Component Value Date/Time    GLU 80 03/29/2017 04:49 AM    GLUCPOC 97 03/29/2017 11:37 AM    GLUCPOC 128 (H) 03/29/2017 07:48 AM    GLUCPOC 79 03/29/2017 07:19 AM        Lab Results   Component Value Date/Time    Creatinine 0.59 03/29/2017 04:49 AM       Active Orders   Diet    DIET NPO        PO intake:   Patient Vitals for the past 72 hrs:   % Diet Eaten   03/27/17 0918 30 %       Current hospital DM medication: lispro high sensitivity scale    Will continue to follow as needed.     Thank you  Manuel Downs RN, CDE

## 2017-03-29 NOTE — INTERDISCIPLINARY ROUNDS
IDR/SLIDR Summary          Patient: Ruth Jones MRN: [id number]    Age: 80 y.o. YOB: 1930 Room/Bed: [address] [place] [address]   Admit Diagnosis: Hypothermia  Principal Diagnosis: Hypothermia   Goals: extubate asap  Readmission: NO  Quality Measure: CHF  VTE Prophylaxis: Mechanical  Influenza Vaccine screening completed? YES  Pneumococcal Vaccine screening completed? YES  Mobility needs: Yes   Nutrition plan:Yes  Consults:P.T, O.T., Speech, Respiratory and Case Management    Financial concerns:Yes  Escalated to CM? YES  RRAT Score: 25   Interventions:Palliative Care   Testing due for pt today?  YES  LOS: 21 days Expected length of stay ?? days  Discharge plan: rehab   PCP: Chelle Sun MD  Transportation needs: Yes    Days before discharge:longer than expected LOS  Discharge disposition: Rehab    Signed:     Maryan Robledo RN  3/29/2017  6:49 AM

## 2017-03-29 NOTE — CONSULTS
Intensivist / Pulmonary / Critical Care  Assessment / Plan:      1. Acute on chronic resp failure - combined chronic resp acidosis and metabolic alkalosis from diuresis contributing to high PCO2 and compensated pH  2. CHF - on scheduled diuretics  3. Afib - on eliquis  4. Sepsis syndrome - ? Source s/p 2 weeks abx  5. Acute on chronic kidney disease - improved  6. DM  7. Full code - palliative care to see    · Vent support  · Daily SBT  · Electrolyte replacement protocol  · Cardiology following  · Palliative care following    D/w RN         History / Subjective:  Hospital Day: 22 - Interval history and notes reviewed     Delivered Ve reduced and received a dose of diamox yesterday  Still appears to have CHF on CXR    Failed SBT this am    Objective:  Patient Vitals for the past 4 hrs:   BP Pulse Resp SpO2   17 0810 - 82 20 96 %   17 0600 104/56 84 16 99 %   17 0530 104/56 83 16 97 %   Temp (24hrs), Av.6 °F (37 °C), Min:98.3 °F (36.8 °C), Max:99 °F (37.2 °C)      Intake/Output Summary (Last 24 hours) at 17 0908  Last data filed at 17 0730   Gross per 24 hour   Intake           319.09 ml   Output             3855 ml   Net         -3535.91 ml     Lab Results   Component Value Date/Time    Glucose (POC) 128 2017 07:48 AM    Glucose (POC) 79 2017 07:19 AM    Glucose (POC) 81 2017 12:27 AM    Glucose (POC) 90 2017 05:06 PM    Glucose (POC) 96 2017 11:56 AM     Exam:  Sedate  Oral ett  Rhonchi  Reg  Protuberant  Warm and dry    Data:   Interval lab and diagnostic data was reviewed. Interval radiology images were independently viewed and available reports were reviewed.      CXR - ETT, cm, bilateral effusions, edema    Recent Labs      17   0449  17   0606   WBC  3.7  4.2   HGB  9.6*  9.7*   HCT  30.0*  31.7*   PLT  141*  139*     Recent Labs      17   0449  17   0606  17   1309   NA  144  143  144   K  2.9*  2.8*  3.8   CL  105 101  100   CO2  33*  38*  43*   GLU  80  90  128*   BUN  11  12  12   CREA  0.59  0.59  0.58   CA  9.8  10.0  9.9   MG  1.9   --   2.2   PHOS  2.4*   --   2.2*   ALB  2.2*  2.3*   --    SGOT  21  20   --    ALT  13  15   --    INR  1.4*   --    --        No results for input(s): TROIQ, CPK, CKMB in the last 72 hours.   ABG:  Recent Labs      03/28/17   1443  03/28/17   0317  03/27/17   2135   PHI  7.427  7.660*  7.672*   PCO2I  56.7*  32.5*  35.4   PO2I  107*  58*  188*   HCO3I  37.3*  36.7*  41.1*   SO2I  98*  95  100*   FIO2I  50  60  100          Lance Gurrola MD

## 2017-03-29 NOTE — PROGRESS NOTES
Chart reviewed and patient received sedated and on vent. Per ABCDE protocol, will work with patient when PEEP is 7.5 or less, FIO2 50% or less, has passed a spontaneous awake trial (SAT), and patient is following basic commands. Will follow patient peripherally.        Thank Jayson Villa PT,DPT

## 2017-03-29 NOTE — PROGRESS NOTES
Hospitalist Progress Note  Sheridan Nova MD  Office: [phone]          Date of Service:  3/29/2017  NAME:  Karma Jones  :  1930  MRN:  [id number]      Admission Summary:   80-year-old female with history of chronic systolic congestive heart failure, atrial fibrillation, diabetes mellitus, hypertension, chronic anticoagulation use, history of pacemaker and ICD. She was sent from her primary care physician to the emergency room to be evaluated. History from the patient and her daughters at the bedside, also from the records. Per the daughter, she has been having on and off abdominal pain, epigastric pain for a while, over the last 3 days has been mostly pronounced. She said her pain at times is 9/10 in severity, it is nonradiating, associated with some nausea, but no vomiting. She denies any diarrhea. At her PCP's office,   they could not get her temperature, so they sent her to the emergency room. In the ER she has been hypothermic, her temperature was 91.4 on initial presentation. Also she was hypotensive. Her blood pressure   systolic was 18/95, so she was given about 1.5 liters of IV fluid, and now she is getting 1 bottle of albumin. She was placed on warming blanket, Lizzy Hugger. She denies any dysuria or frequency. Interval history / Subjective:   Ms Ross Sullivan is awake this morning. She responded appropriately. Assessment & Plan:     Acute on chronic respiratory failure with Hypercapnia due to Pleural effusion/atelectasis and acute on chronic Systolic CHF NYHA IV:  -,OIZ is lethargic and high CO2  -3/257,intubated  -Lasix iv  -vent mx per PCCM   -Palliative care Re-consulted. -She is awake and responsive, may be able to be extubated. Hypothermia and hypotension: - resolved  On empiric IV antibiotics, on Zosyn and vancomycin. Blood culture: No growth x 5 days;    Required [facility] intermittently     Bilateral Pleural effusions: likely related to CHF  -IV diuresis  -I/O negative. Sepsis:  Unknown source, most likely lung with bilateral pleural effusions, possibly infectious. She had caren of leukocytes to 1.9, improving. She was afebrile overnight, low BP requiring pressors. Weaned off pressors. Continue Zosyn, Vanco.   Monitor cultures, preliminary blood, urine cultures no growth so far. - AMMON: no vegetations;   - blood culture 3/08 and 3/11: no growth x 5 days;   - urine culture: no growth;   - 3/21: afebrile, no leukocytosis, cultures negative. Will discontinue IV Abx and watch off Abx. Patient completed 2 weeks of IV Abx since admission. Acute on Chronic Systolic congestive heart failure NYHA III/IV. With fluid overload. Echo 3/9 with 30% EF, diffuse hypokinesis. Bumex added, strict I/O, monitor weight. History of pacer. Valvular disease: Moderate mitral regurgitation, right ventricular systolic dysfunction,   as well as enlarged left atrium. Valvular vegetation on aortic valve, questionable: Blood cultures no growth preliminary.   - Diuresis per nephrology   - 3/21: AMMON showed no vegetations. - continue diuresis with lasix. Hematuria: may be related to traumatic galarza placement with INR max 5.5,  Urine noted on UA as yellow on 3/8,   Galarza placed 3/9 and hematuria reported 3/10 UA. JUAN:  From  ATN and contrast nephropathy/Hypotension most likely etiology. Ischemic heart disease, hypotension; and exposure to IV contrast.   Appreciate nephro consult recommendations. Diuresis, avoid nephrotoxins, I/Os, -resolved    Hypoglycemia/diabetes mellitus. Resolved. Holding her Lantus and on hypoglycemia protocol. Her blood sugar improved after D50. Stable, off D10 now  resolved    Ischemic hepatitis:   Consulted GI. Plans for EGD when stable or outpatient. -LFTs resolved. Coagulopathy.  - Had hematuria. INR was supra therapeutic on warfarin which is now stopped.   - 3/21: started patient on Eliquis for stroke prophylaxis in the setting of chronic Atrial Fibrillation and [id number] score of 6. Pancytopenia. - Appears to be chronic. However, patient and family unaware of her previous lab results. Will monitor her labs closely. Left cheek where it meets the nose pressure ulcer/injury:  - stage 2; measures 1 x 0.5 x 0 cm; 100% drying pink/red;  - with linear jac = 2 x 0.5 x 0 cm (smaller);  - Hospital Acquired;    Urinary retention:  - Richardson removed 3/21, decreased urine output, bladder scan revealed 900 ml of urine in the bladder per nursing report, Richardson was reinserted; (second attempt at removing Richardson during this hospitalization). - will keep the Richardson in at discharge; outpatient f/u with Urology for voiding trials once patient is more ambulatory. Anemia:  - of chronic disease with slow decline in H&H during this hospitalization. - patient transfused 2 Units of PRBC's on 3/22 with 80 mg of Lasix IV in between the 2 Units. Tolerated the transfusion well and good response in H&H. Hypokalemia: receiving IV.add kcl per NGT  UE doppler negative for DVT    Diet: NPO  -If not extubated today,start tube feeds. Code status: Full    Care Plan discussed with: Patient/Family and Nurse  Disposition: TBD   Consult PT/OT and OOB. Discharge planning: to SNF. Patient is now re-intubated. Prognosis is guarded. I had extensive discussion with family(odalis and grand daughter) on 3/27 prior to her re intubation. They wanted to keep full code,gave go ahead for intubation. I wanted to re consult palliative to revisit goals of care. Hospital Problems  Date Reviewed: 3/8/2017          Codes Class Noted POA    * (Principal)Hypothermia ICD-10-CM: T68. XXXA  ICD-9-CM: 991.6  3/8/2017 Yes                Review of Systems:   A comprehensive review of systems was negative. Vital Signs:    Last 24hrs VS reviewed since prior progress note.  Most recent are:  Visit Vitals  /56    Pulse 82    Temp 98.5 °F (36.9 °C)    Resp 20    Ht 5' 6\" (1.676 m)    Wt 79.2 kg (174 lb 9.7 oz)    SpO2 96%    BMI 28.18 kg/m2         Intake/Output Summary (Last 24 hours) at 03/29/17 0901  Last data filed at 03/29/17 0730   Gross per 24 hour   Intake           319.09 ml   Output             3855 ml   Net         -3535.91 ml        Physical Examination:             Constitutional:   Intubated and awake and responsive   ENT:  ET and OGT in place. Resp:  good air entry    CV:  irregular,garde III systolic murmur. GI:  Soft, non-tender, normoactive bowel sounds;     Musculoskeletal:  2+ edema,SCDs in place. Neurologic: Intubated and awake and responsive. Data Review:    Review and/or order of clinical lab test   US abd:  IMPRESSION:  1. Distended gallbladder with sludge and a stone with moderate wall thickening of the gallbladder wall. 2. Dilated IVC suggesting right heart failure. 3. Small amount of free fluid surrounding the liver. 4. Small right pleural effusion      Labs:     Recent Labs      03/29/17 0449 03/28/17   0606   WBC  3.7  4.2   HGB  9.6*  9.7*   HCT  30.0*  31.7*   PLT  141*  139*     Recent Labs      03/29/17 0449 03/28/17   0606  03/27/17   1309   NA  144  143  144   K  2.9*  2.8*  3.8   CL  105  101  100   CO2  33*  38*  43*   BUN  11  12  12   CREA  0.59  0.59  0.58   GLU  80  90  128*   CA  9.8  10.0  9.9   MG  1.9   --   2.2   PHOS  2.4*   --   2.2*     Recent Labs      03/29/17 0449 03/28/17   0606   SGOT  21  20   ALT  13  15   AP  50  47   TBILI  2.3*  2.2*   TP  5.4*  5.5*   ALB  2.2*  2.3*   GLOB  3.2  3.2     Recent Labs      03/29/17 0449   INR  1.4*   PTP  14.2*   APTT  33.5*      No results for input(s): FE, TIBC, PSAT, FERR in the last 72 hours. Lab Results   Component Value Date/Time    Folate 14.5 03/09/2017 03:23 AM      No results for input(s): PH, PCO2, PO2 in the last 72 hours.   No results for input(s): CPK, CKNDX, TROIQ in the last 72 hours.     No lab exists for component: CPKMB  Lab Results   Component Value Date/Time    Cholesterol, total 138 09/28/2011 09:13 AM    HDL Cholesterol 50 09/28/2011 09:13 AM    LDL, calculated 74 09/28/2011 09:13 AM    Triglyceride 69 09/28/2011 09:13 AM     Lab Results   Component Value Date/Time    Glucose (POC) 128 03/29/2017 07:48 AM    Glucose (POC) 79 03/29/2017 07:19 AM    Glucose (POC) 81 03/29/2017 12:27 AM    Glucose (POC) 90 03/28/2017 05:06 PM    Glucose (POC) 96 03/28/2017 11:56 AM     Lab Results   Component Value Date/Time    Color RED 03/10/2017 02:30 AM    Appearance BLOODY 03/10/2017 02:30 AM    Specific gravity 1.020 03/10/2017 02:30 AM    Specific gravity 1.016 03/08/2017 08:54 PM    pH (UA) 6.0 03/10/2017 02:30 AM    Protein 100 03/10/2017 02:30 AM    Glucose NEGATIVE  03/10/2017 02:30 AM    Ketone NEGATIVE  03/10/2017 02:30 AM    Bilirubin NEGATIVE  03/10/2017 02:30 AM    Urobilinogen 1.0 03/10/2017 02:30 AM    Nitrites NEGATIVE  03/10/2017 02:30 AM    Leukocyte Esterase TRACE 03/10/2017 02:30 AM    Epithelial cells FEW 03/10/2017 02:30 AM    Bacteria NEGATIVE  03/10/2017 02:30 AM    WBC 20-50 03/10/2017 02:30 AM    RBC >100 03/10/2017 02:30 AM         Medications Reviewed:     Current Facility-Administered Medications   Medication Dose Route Frequency    potassium phosphate 6.33 mmol in 0.9% sodium chloride 250 mL infusion   IntraVENous PRN    potassium chloride 20 mEq in 50 ml IVPB  20 mEq IntraVENous Q1H    insulin lispro (HUMALOG) injection   SubCUTAneous Q6H    apixaban (ELIQUIS) tablet 2.5 mg  2.5 mg Per NG tube BID    pantoprazole (PROTONIX) 2 mg/mL oral suspension 40 mg  40 mg Per NG tube ACB    furosemide (LASIX) injection 40 mg  40 mg IntraVENous DAILY    ELECTROLYTE REPLACEMENT PROTOCOL  1 Each Other PRN    ELECTROLYTE REPLACEMENT PROTOCOL  1 Each Other PRN    albuterol-ipratropium (DUO-NEB) 2.5 MG-0.5 MG/3 ML  3 mL Nebulization TID PRN    propofol (DIPRIVAN) infusion  5-50 mcg/kg/min IntraVENous TITRATE    PHENYLephrine (NEOSYNEPHRINE) 30,000 mcg in 0.9% sodium chloride 250 mL infusion   mcg/min IntraVENous TITRATE    chlorhexidine (PERIDEX) 0.12 % mouthwash 10 mL  10 mL Oral Q12H    bisacodyl (DULCOLAX) suppository 10 mg  10 mg Rectal DAILY PRN    sodium chloride (NS) flush 5-10 mL  5-10 mL IntraVENous Multiple    phosphorus (K PHOS NEUTRAL) 250 mg tablet 1 Tab  1 Tab Oral BID    albumin human 25% (BUMINATE) solution 12.5 g  12.5 g IntraVENous Q8H PRN    balsam peru-castor oil (VENELEX)  mg/gram ointment   Topical BID    docusate sodium (COLACE) capsule 100 mg  100 mg Oral BID    polyvinyl alcohol (LIQUIFILM TEARS) 1.4 % ophthalmic solution 2 Drop  2 Drop Both Eyes TID    sodium chloride (NS) flush 10 mL  10 mL InterCATHeter Q24H    sodium chloride (NS) flush 10 mL  10 mL InterCATHeter PRN    sodium chloride (NS) flush 10-40 mL  10-40 mL InterCATHeter Q8H    alteplase (CATHFLO) 1 mg in sterile water (preservative free) 1 mL injection  1 mg InterCATHeter PRN    bacitracin 500 unit/gram packet 1 Packet  1 Packet Topical PRN    glucose chewable tablet 16 g  4 Tab Oral PRN    dextrose (D50W) injection syrg 12.5-25 g  12.5-25 g IntraVENous PRN    glucagon (GLUCAGEN) injection 1 mg  1 mg IntraMUSCular PRN    sodium chloride (NS) flush 5-10 mL  5-10 mL IntraVENous Q8H    sodium chloride (NS) flush 5-10 mL  5-10 mL IntraVENous PRN     ______________________________________________________________________  EXPECTED LENGTH OF STAY: 4d 21h  ACTUAL LENGTH OF STAY:          21                 Rabia Masnfield MD

## 2017-03-29 NOTE — WOUND CARE
WOCN Note:     Follow-up visit for cheeks and thighs. Chart shows:  Admitted for hypothermia & respiratory failure, sepsis; history of CHF, DM, a-fib, pacer      Assessment:   Patient is now intubated. Bed: total care sport  Has a Richardson  Patient reports no pain and eyes open briefly with talking.      Heels intact and without erythema.       1. Left cheek where it meets the nose pressure ulcer/injury, stage 2 = 1 x 0.5 x 0 cm 100% drying dyschromia. No exudate. Periwound intact & without erythema. Venelex in use.       Right cheek where it meets the nose with linear jac = 2 x 0.5 x 0 cm (smaller) 100% dark non-blanching, drying. Fully intact. Venelex in use.      2. Right upper and inner thigh ruptured bullae, partial thickness wounds = 4 x 3.5 x 0.1 cm and 2 x 4 x 0.1 cm both bases are 100% moist pink. Petroleum ointment applied and Mepilex border.       There are intact, scattered bullae to left inner and upper thigh of varying sizes now flat and drying. Recommendations:    Continue care as ordered to bullae on thighs. Skin Care & Pressure Prevention:  Minimize layers of linen/pads under patient to optimize support surface. Turn/reposition approximately every 2 hours and offload heels. Specialty bed:  ordered via [facility]. Use only flat sheet and one incontinence pad. Discussed above plan with patient & RN, Judi.     Transition of Care: Plan to follow weekly and as needed while admitted to hospital.    NERY Sanchez, RN, [facility]  Certified Wound, Ostomy, Continence Nurse  office [phone]  pager [phone] or call  to page

## 2017-03-30 NOTE — INTERDISCIPLINARY ROUNDS
IDR/SLIDR Summary          Patient: Yudi Gurrola MRN: [id number]    Age: 80 y.o. YOB: 1930 Room/Bed: [address] [place] [address]   Admit Diagnosis: Hypothermia  Principal Diagnosis: Hypothermia   Goals: extubate asap  Readmission: NO  Quality Measure: CHF  VTE Prophylaxis: Mechanical  Influenza Vaccine screening completed? YES  Pneumococcal Vaccine screening completed? YES  Mobility needs: Yes   Nutrition plan:Yes  Consults:P.T, O.T., Speech, Respiratory and Case Management    Financial concerns:Yes  Escalated to CM? YES  RRAT Score: 25   Interventions:Palliative Care   Testing due for pt today?  YES  LOS: 22 days Expected length of stay ?? days  Discharge plan: rehab   PCP: Rukhsana Alba MD  Transportation needs: Yes    Days before discharge:longer than expected LOS  Discharge disposition: Rehab    Signed:     Giovanni Rodrigez RN  3/30/2017  6:49 AM

## 2017-03-30 NOTE — PROGRESS NOTES
Late entry from 10:30am: patient being extubated at this time. Will f/u tomorrow for OT Re-evaluation. Recommend with nursing patient to complete as able in order to maintain strength, endurance and independence: ADLs with supervision/setup, bed/chair position 3x/day and mobilizing self in the bed for toileting. Thank you for your assistance.

## 2017-03-30 NOTE — PROGRESS NOTES
Follow up visit with Ms. Aleah Sousa. No family present at bedside. Left note for pt's family. Offered words of comfort and assurance to Ms. Aleah Sousa, who opened her eyes when I gently spoke her name. Shared prayer at pt's bedside. Will continue to follow as able/needed for pt and family support.     Misti Moss, Palliative

## 2017-03-30 NOTE — CONSULTS
Intensivist / Pulmonary / Critical Care  Assessment / Plan:      1. Acute on chronic resp failure - combined chronic resp acidosis and metabolic alkalosis from diuresis contributing to high PCO2 and compensated pH  2. CHF - on scheduled diuretics  3. Afib - on eliquis  4. Sepsis syndrome - ? Source s/p 2 weeks abx  5. Acute on chronic kidney disease - improved  6. DM  7. Full code - palliative care to see    · Vent support - will consider extubation to bipap from PSV 7 SBT  · Consider draining R effusion if unable to extubate - Pt is on eliquis  · Electrolyte replacement protocol  · Cardiology following  · Palliative care following    D/w RN         History / Subjective:  Hospital Day: 21 - Interval history and notes reviewed     Failed SBT on  but on PSV 10 and 7 appeared to be having passing numbers.   Overall negative fluid status is negative from admission  CXR continues to show some edema and effusions      Objective:  Patient Vitals for the past 4 hrs:   BP Temp Pulse Resp SpO2   17 0855 - - 80 22 100 %   17 0824 - - 80 19 100 %   17 0822 - - 78 24 100 %   17 0821 - - 79 16 100 %   17 0820 - - 82 15 99 %   17 0816 - - 82 10 100 %   17 0800 104/65 97.4 °F (36.3 °C) 80 19 99 %   17 0757 - - 80 10 99 %   17 0700 119/69 - 79 11 100 %   17 0630 93/51 - 82 11 98 %   17 0600 92/52 - 82 10 98 %   17 0530 98/48 - 82 10 98 %   Temp (24hrs), Av.8 °F (36.6 °C), Min:97 °F (36.1 °C), Max:98.6 °F (37 °C)      Intake/Output Summary (Last 24 hours) at 17 0907  Last data filed at 17 0800   Gross per 24 hour   Intake           1555.5 ml   Output             1435 ml   Net            120.5 ml     Lab Results   Component Value Date/Time    Glucose (POC) 142 2017 05:46 AM    Glucose (POC) 128 2017 12:13 AM    Glucose (POC) 122 2017 05:51 PM    Glucose (POC) 97 2017 11:37 AM    Glucose (POC) 128 2017 07:48 AM Exam:  Sedate  Oral ett  Rhonchi  Reg  Protuberant  Warm and dry    Data:   Interval lab and diagnostic data was reviewed. Interval radiology images were independently viewed and available reports were reviewed. CXR - ETT, cm, bilateral effusions, edema    Recent Labs      03/30/17   0350  03/29/17   0449  03/28/17   0606   WBC  3.6  3.7  4.2   HGB  10.1*  9.6*  9.7*   HCT  32.3*  30.0*  31.7*   PLT  157  141*  139*     Recent Labs      03/30/17   0350  03/29/17   0449  03/28/17   0606  03/27/17   1309   NA  144  144  143  144   K  3.8  2.9*  2.8*  3.8   CL  107  105  101  100   CO2  34*  33*  38*  43*   GLU  151*  80  90  128*   BUN  13  11  12  12   CREA  0.60  0.59  0.59  0.58   CA  9.8  9.8  10.0  9.9   MG  2.2  1.9   --   2.2   PHOS  2.1*  2.4*   --   2.2*   ALB  2.4*  2.2*  2.3*   --    SGOT  19  21  20   --    ALT  13  13  15   --    INR   --   1.4*   --    --        No results for input(s): TROIQ, CPK, CKMB in the last 72 hours.   ABG:  Recent Labs      03/29/17   0947  03/28/17   1443  03/28/17   0317   PHI  7.423  7.427  7.660*   PCO2I  53.2*  56.7*  32.5*   PO2I  139*  107*  58*   HCO3I  34.7*  37.3*  36.7*   SO2I  99*  98*  95   FIO2I  0.50  50  60          Desiree Vasquez MD

## 2017-03-30 NOTE — PROGRESS NOTES
Patient completed SBT; results below. Patient attempted SBT earlier with standard settings of 5 PS & 5 peep without success. Recent SBT was with 7 PS & 5 Peep. Approved by MD.    Patient had a moderate amount of oral secretions suctioned. Lungs were scant. Cuff leak audible.     ABG:  PH: 7.378  PCO2: 55.5  PaO2: 97  BE 8  HCO3 32.7  Sats 97       03/30/17 0931 03/30/17 0932   Patient Observations   Pulse (Heart Rate) 79 --    Resp Rate 19 --    O2 Sat (%) 100 % --    Vent Settings   FIO2 (%) 40 % --    Pressure Support (cm H2O) 7 cm H2O --    PEEP/VENT (cm H2O) 5 cm H20 --    Ventilator Measurements   Resp Rate Observed 19 --    Weaning Parameters   Spontaneous Breathing Trial Complete Yes --    Resp Rate Observed 19 --    Ve 3.9 --     --    RSBI 89 --    Vent Method/Mode   Ventilation Method --  Conventional   Ventilator  Mode Spontaneous Assist control;Volume control  (back on previous settings)

## 2017-03-30 NOTE — PROGRESS NOTES
continues to monitor patient and she has been extubated this afternoon. Will continue to follow for transitions of care needs.

## 2017-03-30 NOTE — PROGRESS NOTES
Physical Therapy    Attempted to see patient for re-evaluation. Pt currently extubated but bi-pap dependent and per RN very lethargic. Will follow up with patient tomorrow.     Thank Kyle Patel PT,DPT

## 2017-03-30 NOTE — PROGRESS NOTES
8865 = Tried another spontaneous breathing trial. Patient did better than yesterday but still did not pass due to very shallow breaths (not adequate volume intake) and a high RR rate. 1030 = Patient completed another spontaneous and passed. MD ordered extubation and BiPap. Patient did well with extubation and was able to weakly cough some secretions up. She is tolerating the BiPap well. The restraints have been removed and patient is resting quietly. 1430 = Took patient off bipap for a nasal canula trial; patient had elevated HR and struggled to breath after 10 min. She could not tolerate and we had to put her back on bipap.

## 2017-03-30 NOTE — PROGRESS NOTES
Bedside shift change report given to Mani Loera (oncoming nurse) by Judi (offgoing nurse). Report included the following information SBAR, Kardex, Procedure Summary, MAR and Recent Results.

## 2017-03-30 NOTE — PROGRESS NOTES
Hospitalist Progress Note  Bob Orozco MD  Office: [phone]          Date of Service:  3/30/2017  NAME:  Kaushik Lara  :  1930  MRN:  [id number]      Admission Summary:   44-year-old female with history of chronic systolic congestive heart failure, atrial fibrillation, diabetes mellitus, hypertension, chronic anticoagulation use, history of pacemaker and ICD. She was sent from her primary care physician to the emergency room to be evaluated. History from the patient and her daughters at the bedside, also from the records. Per the daughter, she has been having on and off abdominal pain, epigastric pain for a while, over the last 3 days has been mostly pronounced. She said her pain at times is 9/10 in severity, it is nonradiating, associated with some nausea, but no vomiting. She denies any diarrhea. At her PCP's office,   they could not get her temperature, so they sent her to the emergency room. In the ER she has been hypothermic, her temperature was 91.4 on initial presentation. Also she was hypotensive. Her blood pressure   systolic was 09/79, so she was given about 1.5 liters of IV fluid, and now she is getting 1 bottle of albumin. She was placed on warming blanket, Lizzy Hugger. She denies any dysuria or frequency. Interval history / Subjective:   Ms Nidhi Whyte is still intubated, she is alert and responsive. Assessment & Plan:     Acute on chronic respiratory failure with Hypercapnia due to Pleural effusion/atelectasis and acute on chronic Systolic CHF NYHA IV:  -47,BZG is lethargic and high CO2  -3/257,intubated  -Lasix iv  -vent mx per PCCM   -Palliative care Re-consulted. -She is awake and responsive,failed SBT yesterday,ongoing SBT  -She has periods of apnea,not sure of etiology. May consider brain CT head although clinically low suspicion for central causes,has ppm so cant do mri.       Hypothermia and hypotension: - resolved  On empiric IV antibiotics, on Zosyn and vancomycin. Blood culture: No growth x 5 days; Required the [facility] intermittently     Bilateral Pleural effusions: likely related to CHF  -IV diuresis  -I/O negative. Sepsis:  Unknown source, most likely lung with bilateral pleural effusions, possibly infectious. She had caren of leukocytes to 1.9, improving. She was afebrile overnight, low BP requiring pressors. Weaned off pressors. Continue Zosyn, Vanco.   Monitor cultures, preliminary blood, urine cultures no growth so far. - AMMON: no vegetations;   - blood culture 3/08 and 3/11: no growth x 5 days;   - urine culture: no growth;   - 3/21: afebrile, no leukocytosis, cultures negative. Will discontinue IV Abx and watch off Abx. Patient completed 2 weeks of IV Abx since admission. Acute on Chronic Systolic congestive heart failure NYHA III/IV. With fluid overload. Echo 3/9 with 30% EF, diffuse hypokinesis. Bumex added, strict I/O, monitor weight. History of pacer. Valvular disease: Moderate mitral regurgitation, right ventricular systolic dysfunction,   as well as enlarged left atrium. Valvular vegetation on aortic valve, questionable: Blood cultures no growth preliminary.   - Diuresis per nephrology   - 3/21: AMMON showed no vegetations. - continue diuresis with lasix. Hematuria: may be related to traumatic galarza placement with INR max 5.5,  Urine noted on UA as yellow on 3/8,   Galarza placed 3/9 and hematuria reported 3/10 UA. JUAN:  From  ATN and contrast nephropathy/Hypotension most likely etiology. Ischemic heart disease, hypotension; and exposure to IV contrast.   Appreciate nephro consult recommendations. Diuresis, avoid nephrotoxins, I/Os, -resolved    Hypoglycemia/diabetes mellitus. Resolved. Holding her Lantus and on hypoglycemia protocol. Her blood sugar improved after D50. Stable, off D10 now  resolved    Ischemic hepatitis:   Consulted GI.  Plans for EGD when stable or outpatient. -LFTs resolved. Coagulopathy.  - Had hematuria. INR was supra therapeutic on warfarin which is now stopped. - 3/21: started patient on Eliquis for stroke prophylaxis in the setting of chronic Atrial Fibrillation and [id number] score of 6. Pancytopenia. - Appears to be chronic. However, patient and family unaware of her previous lab results. Will monitor her labs closely. Left cheek where it meets the nose pressure ulcer/injury:  - stage 2; measures 1 x 0.5 x 0 cm; 100% drying pink/red;  - with linear jac = 2 x 0.5 x 0 cm (smaller);  - Hospital Acquired;    Urinary retention:  - Richardson removed 3/21, decreased urine output, bladder scan revealed 900 ml of urine in the bladder per nursing report, Richardson was reinserted; (second attempt at removing Richardson during this hospitalization). - will keep the Richardson in at discharge; outpatient f/u with Urology for voiding trials once patient is more ambulatory. Anemia:  - of chronic disease with slow decline in H&H during this hospitalization. - patient transfused 2 Units of PRBC's on 3/22 with 80 mg of Lasix IV in between the 2 Units. Tolerated the transfusion well and good response in H&H. Hypokalemia: receiving IV.add kcl per NGT  UE doppler negative for DVT    Diet: NPO  -Tube feeds through NGT    Code status: Full    Care Plan discussed with: Patient/Family and Nurse  Disposition: TBD   Consult PT/OT and OOB. Discharge planning: to SNF. Patient is now re-intubated. Prognosis is guarded. I had extensive discussion with family(odalis and grand daughter) on 3/27 prior to her re intubation. They wanted to keep full code,gave go ahead for intubation. I wanted to re consult palliative to revisit goals of care. Hospital Problems  Date Reviewed: 3/8/2017          Codes Class Noted POA    * (Principal)Hypothermia ICD-10-CM: T68. Bettylou Duty  ICD-9-CM: 991.6  3/8/2017 Yes                Review of Systems:   A comprehensive review of systems was negative. Vital Signs:    Last 24hrs VS reviewed since prior progress note. Most recent are:  Visit Vitals    /65 (BP 1 Location: Left arm, BP Patient Position: At rest)    Pulse 80    Temp 97.4 °F (36.3 °C)    Resp 22    Ht 5' 6\" (1.676 m)    Wt 77.7 kg (171 lb 4.8 oz)    SpO2 100%    BMI 27.65 kg/m2         Intake/Output Summary (Last 24 hours) at 03/30/17 0920  Last data filed at 03/30/17 0800   Gross per 24 hour   Intake           1555.5 ml   Output             1435 ml   Net            120.5 ml        Physical Examination:             Constitutional:   Intubated and awake and responsive   ENT:  ET and OGT in place. Resp:  good air entry    CV:  irregular,garde III systolic murmur. GI:  Soft, non-tender, normoactive bowel sounds;     Musculoskeletal:  2+ edema,SCDs in place. Neurologic: Intubated and awake and responsive. Data Review:    Review and/or order of clinical lab test   US abd:  IMPRESSION:  1. Distended gallbladder with sludge and a stone with moderate wall thickening of the gallbladder wall. 2. Dilated IVC suggesting right heart failure. 3. Small amount of free fluid surrounding the liver.   4. Small right pleural effusion      Labs:     Recent Labs      03/30/17 0350 03/29/17 0449   WBC  3.6  3.7   HGB  10.1*  9.6*   HCT  32.3*  30.0*   PLT  157  141*     Recent Labs      03/30/17   0350  03/29/17   0449  03/28/17   0606  03/27/17   1309   NA  144  144  143  144   K  3.8  2.9*  2.8*  3.8   CL  107  105  101  100   CO2  34*  33*  38*  43*   BUN  13  11  12  12   CREA  0.60  0.59  0.59  0.58   GLU  151*  80  90  128*   CA  9.8  9.8  10.0  9.9   MG  2.2  1.9   --   2.2   PHOS  2.1*  2.4*   --   2.2*     Recent Labs      03/30/17   0350  03/29/17   0449  03/28/17   0606   SGOT  19  21  20   ALT  13  13  15   AP  66  50  47   TBILI  1.8*  2.3*  2.2*   TP  6.0*  5.4*  5.5*   ALB  2.4*  2.2*  2.3*   GLOB  3.6  3.2  3.2     Recent Labs 03/29/17   0449   INR  1.4*   PTP  14.2*   APTT  33.5*      No results for input(s): FE, TIBC, PSAT, FERR in the last 72 hours. Lab Results   Component Value Date/Time    Folate 14.5 03/09/2017 03:23 AM      No results for input(s): PH, PCO2, PO2 in the last 72 hours. No results for input(s): CPK, CKNDX, TROIQ in the last 72 hours.     No lab exists for component: CPKMB  Lab Results   Component Value Date/Time    Cholesterol, total 138 09/28/2011 09:13 AM    HDL Cholesterol 50 09/28/2011 09:13 AM    LDL, calculated 74 09/28/2011 09:13 AM    Triglyceride 69 09/28/2011 09:13 AM     Lab Results   Component Value Date/Time    Glucose (POC) 142 03/30/2017 05:46 AM    Glucose (POC) 128 03/30/2017 12:13 AM    Glucose (POC) 122 03/29/2017 05:51 PM    Glucose (POC) 97 03/29/2017 11:37 AM    Glucose (POC) 128 03/29/2017 07:48 AM     Lab Results   Component Value Date/Time    Color RED 03/10/2017 02:30 AM    Appearance BLOODY 03/10/2017 02:30 AM    Specific gravity 1.020 03/10/2017 02:30 AM    Specific gravity 1.016 03/08/2017 08:54 PM    pH (UA) 6.0 03/10/2017 02:30 AM    Protein 100 03/10/2017 02:30 AM    Glucose NEGATIVE  03/10/2017 02:30 AM    Ketone NEGATIVE  03/10/2017 02:30 AM    Bilirubin NEGATIVE  03/10/2017 02:30 AM    Urobilinogen 1.0 03/10/2017 02:30 AM    Nitrites NEGATIVE  03/10/2017 02:30 AM    Leukocyte Esterase TRACE 03/10/2017 02:30 AM    Epithelial cells FEW 03/10/2017 02:30 AM    Bacteria NEGATIVE  03/10/2017 02:30 AM    WBC 20-50 03/10/2017 02:30 AM    RBC >100 03/10/2017 02:30 AM         Medications Reviewed:     Current Facility-Administered Medications   Medication Dose Route Frequency    sodium phosphate 6.3 mmol in 0.9% sodium chloride 250 mL infusion   IntraVENous ONCE    potassium chloride (KAON 10%) 20 mEq/15 mL oral liquid 40 mEq  40 mEq Per NG tube DAILY    insulin lispro (HUMALOG) injection   SubCUTAneous Q6H    apixaban (ELIQUIS) tablet 2.5 mg  2.5 mg Per NG tube BID    pantoprazole (PROTONIX) 2 mg/mL oral suspension 40 mg  40 mg Per NG tube ACB    ELECTROLYTE REPLACEMENT PROTOCOL  1 Each Other PRN    ELECTROLYTE REPLACEMENT PROTOCOL  1 Each Other PRN    albuterol-ipratropium (DUO-NEB) 2.5 MG-0.5 MG/3 ML  3 mL Nebulization TID PRN    propofol (DIPRIVAN) infusion  5-50 mcg/kg/min IntraVENous TITRATE    PHENYLephrine (NEOSYNEPHRINE) 30,000 mcg in 0.9% sodium chloride 250 mL infusion   mcg/min IntraVENous TITRATE    chlorhexidine (PERIDEX) 0.12 % mouthwash 10 mL  10 mL Oral Q12H    bisacodyl (DULCOLAX) suppository 10 mg  10 mg Rectal DAILY PRN    sodium chloride (NS) flush 5-10 mL  5-10 mL IntraVENous Multiple    phosphorus (K PHOS NEUTRAL) 250 mg tablet 1 Tab  1 Tab Oral BID    albumin human 25% (BUMINATE) solution 12.5 g  12.5 g IntraVENous Q8H PRN    balsam peru-castor oil (VENELEX)  mg/gram ointment   Topical BID    docusate sodium (COLACE) capsule 100 mg  100 mg Oral BID    polyvinyl alcohol (LIQUIFILM TEARS) 1.4 % ophthalmic solution 2 Drop  2 Drop Both Eyes TID    sodium chloride (NS) flush 10 mL  10 mL InterCATHeter PRN    alteplase (CATHFLO) 1 mg in sterile water (preservative free) 1 mL injection  1 mg InterCATHeter PRN    bacitracin 500 unit/gram packet 1 Packet  1 Packet Topical PRN    glucose chewable tablet 16 g  4 Tab Oral PRN    dextrose (D50W) injection syrg 12.5-25 g  12.5-25 g IntraVENous PRN    glucagon (GLUCAGEN) injection 1 mg  1 mg IntraMUSCular PRN    sodium chloride (NS) flush 5-10 mL  5-10 mL IntraVENous PRN     ______________________________________________________________________  EXPECTED LENGTH OF STAY: 4d 21h  ACTUAL LENGTH OF STAY:          22                 Ezra Izquierdo MD

## 2017-03-30 NOTE — PROGRESS NOTES
[facility]  -Progress Note     Harman Clark [phone]   3/30/2017      Alyssa Marinelli M.D. , F.A.C.C.   --------PCP:-Kenneth Anise Pallas, MD   -----Subjective:   . Barak Jones is a 80 y.o. female  Opens eyes to voice. Nods \"no\" when asked if having pain, SOB, or chest pain. Remains on Neosynephrine 30 mcg/min. Discussion/Plans/Recs  1. Acute on chronic respiratory failure: ABG today pre-extubation PH 7.38, pCO2 55.5 pO2= 97,  HCO3= 32.7   -Pulmonary following- pt was extubated today, now on BiPAP 40% fiO2    2) Chronic Systolic CHF:  CXR today- no change, central pulmonary vascular congestive change. TTE 3/30/17: EF 30%. Down 2 lbs from yesterday. Net -240 mls in 24 hours  -BNP tomorrow      -Has been receiving Lasix 40 mg IV daily- +BLE, BUE edema improved. -BiV AICD  -ACE held due to low BP requiring neosynephrine at 30 mcg/min. Resume when BP improved off pressor  -Continue to hold BB due to low BP/on pressors    3) MR-systolic murmur . No vegetation on valves per AMMON. MR 1-2+ on AMMON 3/20/17    4)Afib: -[id number]= 6 (age, CHF, HTN, female, DM)    -Continue Eliquis 2.5 mg BID. -ERASTO is very large but no clot, would continue to use OAC cautiously for stroke prophylaxis  (hgb stable)    5). Thrombocytopenia-resolved. 157 from 141 yesterday. 6.Anemia:  Hgb 10.1 today from 9.6 yesterday. 7.Advanced care planning:- Palliative care re-consulted. Pt remains full code status. Assessment/Plan/Discussion:Cardiology Attending:     Patient seen earlier today and examined  and agree with Advance Practice Provider (ANDI, NP,PA)  assessment and plans.   Karma Jones is a 80 y.o. female   Extubated  Murmur not as loud  Check echo in am  Not really getting anywhere   We are really stuck in lung-heart  vishnu Yun MD 3/30/2017             Cardiac Studies/Hx:  AMMON 3/20 with no vegetation, Same EF 35 and 1-2+ MR, TR as expected  ECHO: 3/9/17: EF 30%. Mod diffuse hyypokinesis, Wall motion is dyssynchronous, RA mildly dilated. LA mod-severe dilated, Mod MR, AV: In the parasternal long axis view, faintly seen spherical echodensity about 5mms in diameter that seems to move in  relation to the anterior aortic valve leaflet, on the aortic side. Could represent a vegetation. Mod TR, mild RV systolic hypertension. 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 PA pressure of 61 with moderate pulmonary hypertension. 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 Moderate to severe TR, moderate to severe pulmonary hypertension. EF 35-40%, marked LAE, mild JOAN    PACER check 2-11-13 39,288 episodes of atrial tach/flutter and 11 with high rates  1/2014 noises noted on pacer, 260 thousands AMS , some are noises and some are AT, some PMT (adjustment made, Dr Monica Simpson discussed with pacer clinic)  Pacer check in May 2013 showed NSVT and AF the latter as long as 1 day with occasional RVR  Pacer check 9/9/13: persistent AT with V paced. 92%  (asymptomatic with AT)    CATH 10-24-12= Normal cors and EF 50 %. -- A annular ring mitral position. Mitral valve repair on July 29, 1996, for mitral regurgitation with a #32 Turner-Manuel ring. Chronic systolic CHF, reduced ejection fraction. NYHA 1-2  . ..admit 7/16/09 mild chf exacerbation  #32 Turner Manuel ring MV repair 1996         Past Medical History:   Diagnosis Date    Atrial fibrillation (HCC)     Chronic systolic heart failure ([facility]     ef 35-40%; hold ACE due to lower bp    Diabetes mellitus type 2, insulin dependent ([facility]     Hypertension     Long term (current) use of anticoagulants     Malignant hypertensive heart disease with CHF ([facility]     Mitral regurgitation     Pacemaker 10/25/2012    The pacemaker is a [employer], model # [id number], serial [id number]. 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 Results for orders placed or performed during the hospital encounter of 03/08/17   EKG, 12 LEAD, INITIAL   Result Value Ref Range    Ventricular Rate 80 BPM    Atrial Rate 75 BPM    QRS Duration 146 ms    Q-T Interval 480 ms    QTC Calculation (Bezet) 553 ms    Calculated R Axis 158 degrees    Calculated T Axis 5 degrees    Diagnosis       Ventricular-paced rhythm  When compared with ECG of 09-SEP-2016 17:36,  Vent. rate has decreased BY  30 BPM  Confirmed by Mateo Smith M.D., Gregoria Ascencio ([id number]) on 3/9/2017 10:15:16 AM     Results for orders placed or performed in visit on 03/27/12   AMB POC EKG ROUTINE W/ 12 LEADS, INTER & REP    Narrative    See scanned report.         Vitals:    03/30/17 1100 03/30/17 1157 03/30/17 1200 03/30/17 1300   BP: 104/54  103/70 100/60   BP 1 Location:   Left arm    BP Patient Position:   At rest    Pulse: 79  80 80   Resp: 26  22 22   Temp:   96.8 °F (36 °C)    SpO2: 99% 100% 100% 98%   Weight:       Height:           Objective:    Physical Exam:   Patient Vitals for the past 12 hrs:   Temp Pulse Resp BP SpO2   03/30/17 1300 - 80 22 100/60 98 %   03/30/17 1200 96.8 °F (36 °C) 80 22 103/70 100 %   03/30/17 1157 - - - - 100 %   03/30/17 1100 - 79 26 104/54 99 %   03/30/17 1032 - - - - 99 %   03/30/17 1000 - 80 21 110/57 -   03/30/17 0931 - 79 19 - 100 %   03/30/17 0900 - 79 21 103/54 99 %   03/30/17 0855 - 80 22 - 100 %   03/30/17 0824 - 80 19 - 100 %   03/30/17 0822 - 78 24 - 100 %   03/30/17 0821 - 79 16 - 100 %   03/30/17 0820 - 82 15 - 99 %   03/30/17 0816 - 82 10 - 100 %   03/30/17 0800 97.4 °F (36.3 °C) 80 19 104/65 99 %   03/30/17 0757 - 80 10 - 99 %   03/30/17 0700 - 79 11 119/69 100 %   03/30/17 0630 - 82 11 93/51 98 %   03/30/17 0600 - 82 10 92/52 98 %   03/30/17 0530 - 82 10 98/48 98 %   03/30/17 0500 - 82 11 95/48 98 %   03/30/17 0430 - 82 11 100/53 98 %   03/30/17 0400 97 °F (36.1 °C) 82 10 104/55 98 %   03/30/17 0330 - 82 18 103/56 98 %   03/30/17 0305 - 84 11 - 99 %   03/30/17 0300 - 82 11 106/60 98 %   03/30/17 0230 - 83 13 103/63 99 %   03/30/17 0200 - 83 20 103/55 100 %      General:  Lethargic,  Opens eyes to voice   ENT, Neck:  supple   Chest Wall: No chest wall deformities   Lung: Clear to ausculation bilaterally, on mechanical ventilatin   Heart:  no gallops noted, RRR, no JVD 2/6 systolic early/decresendo  murmur at RUSB to LUSB without radiation beyond apex    Abdomen: Nondistended, + bowel sounds, no grimacing to palpation    Richardson with mast urine.   No hematuria   Extremities: extremities normal, 1-2+ BLE edema (pedal/ankle more prominent), BUE edema much improved       Last 24hr Input/Output:    Intake/Output Summary (Last 24 hours) at 03/30/17 1340  Last data filed at 03/30/17 1300   Gross per 24 hour   Intake           1450.5 ml   Output             1525 ml   Net            -74.5 ml        Data Review:   Recent Results (from the past 24 hour(s))   GLUCOSE, POC    Collection Time: 03/29/17  5:51 PM   Result Value Ref Range    Glucose (POC) 122 (H) 65 - 100 mg/dL    Performed by [address] [place] [address], POC    Collection Time: 03/30/17 12:13 AM   Result Value Ref Range    Glucose (POC) 128 (H) 65 - 100 mg/dL    Performed by Em LUNA    CBC WITH AUTOMATED DIFF Collection Time: 03/30/17  3:50 AM   Result Value Ref Range    WBC 3.6 3.6 - 11.0 K/uL    RBC 4.39 3.80 - 5.20 M/uL    HGB 10.1 (L) 11.5 - 16.0 g/dL    HCT 32.3 (L) 35.0 - 47.0 %    MCV 73.6 (L) 80.0 - 99.0 FL    MCH 23.0 (L) 26.0 - 34.0 PG    MCHC 31.3 30.0 - 36.5 g/dL    RDW 20.0 (H) 11.5 - 14.5 %    PLATELET 322 213 - 966 K/uL    NEUTROPHILS 84 (H) 32 - 75 %    BAND NEUTROPHILS 1 0 - 6 %    LYMPHOCYTES 7 (L) 12 - 49 %    MONOCYTES 5 5 - 13 %    EOSINOPHILS 2 0 - 7 %    BASOPHILS 1 0 - 1 %    ABS. NEUTROPHILS 3.0 1.8 - 8.0 K/UL    ABS. LYMPHOCYTES 0.3 (L) 0.8 - 3.5 K/UL    ABS. MONOCYTES 0.2 0.0 - 1.0 K/UL    ABS. EOSINOPHILS 0.1 0.0 - 0.4 K/UL    ABS. BASOPHILS 0.0 0.0 - 0.1 K/UL    DF MANUAL      PLATELET COMMENTS LARGE PLATELETS      RBC COMMENTS POLYCHROMASIA  1+        RBC COMMENTS BASOPHILIC STIPPLING  PRESENT        RBC COMMENTS ANISOCYTOSIS  2+        RBC COMMENTS TARGET CELLS  PRESENT        RBC COMMENTS MICROCYTOSIS  1+       MAGNESIUM    Collection Time: 03/30/17  3:50 AM   Result Value Ref Range    Magnesium 2.2 1.6 - 2.4 mg/dL   METABOLIC PANEL, COMPREHENSIVE    Collection Time: 03/30/17  3:50 AM   Result Value Ref Range    Sodium 144 136 - 145 mmol/L    Potassium 3.8 3.5 - 5.1 mmol/L    Chloride 107 97 - 108 mmol/L    CO2 34 (H) 21 - 32 mmol/L    Anion gap 3 (L) 5 - 15 mmol/L    Glucose 151 (H) 65 - 100 mg/dL    BUN 13 6 - 20 MG/DL    Creatinine 0.60 0.55 - 1.02 MG/DL    BUN/Creatinine ratio 22 (H) 12 - 20      GFR est AA >60 >60 ml/min/1.73m2    GFR est non-AA >60 >60 ml/min/1.73m2    Calcium 9.8 8.5 - 10.1 MG/DL    Bilirubin, total 1.8 (H) 0.2 - 1.0 MG/DL    ALT (SGPT) 13 12 - 78 U/L    AST (SGOT) 19 15 - 37 U/L    Alk.  phosphatase 66 45 - 117 U/L    Protein, total 6.0 (L) 6.4 - 8.2 g/dL    Albumin 2.4 (L) 3.5 - 5.0 g/dL    Globulin 3.6 2.0 - 4.0 g/dL    A-G Ratio 0.7 (L) 1.1 - 2.2     PHOSPHORUS    Collection Time: 03/30/17  3:50 AM   Result Value Ref Range    Phosphorus 2.1 (L) 2.6 - 4.7 MG/DL NUCLEATED RBC    Collection Time: 03/30/17  3:50 AM   Result Value Ref Range    NRBC 1.1 (H) 0  WBC    ABSOLUTE NRBC 0.04 (H) 0.00 - 0.01 K/uL    WBC CORRECTED FOR NR ADJUSTED FOR NUCLEATED RBC'S     GLUCOSE, POC    Collection Time: 03/30/17  5:46 AM   Result Value Ref Range    Glucose (POC) 142 (H) 65 - 100 mg/dL    Performed by Yarelis Shearer    POC G3 - PUL    Collection Time: 03/30/17  9:30 AM   Result Value Ref Range    FIO2 (POC) 40 %    pH (POC) 7.378 7.35 - 7.45      pCO2 (POC) 55.5 (H) 35.0 - 45.0 MMHG    pO2 (POC) 97 80 - 100 MMHG    HCO3 (POC) 32.7 (H) 22 - 26 MMOL/L    sO2 (POC) 97 92 - 97 %    Base excess (POC) 8 mmol/L    Site RIGHT RADIAL      Device: VENT      Mode CPAP/SPON      PEEP/CPAP (POC) 5 cmH2O    Pressure support 7 cmH2O    Allens test (POC) N/A      Specimen type (POC) ARTERIAL      Total resp. rate 19     GLUCOSE, POC    Collection Time: 03/30/17 12:29 PM   Result Value Ref Range    Glucose (POC) 128 (H) 65 - 100 mg/dL    Performed by Antonieta Wilkinson NP 3/30/2017

## 2017-03-31 NOTE — PROGRESS NOTES
introduced self to patient's [facility] Yaimlet Tang who was visiting patient at the bedside. 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  Her daughters provided transportation to and from the job. Support offered to family viisting and questions answered regarding the bipap machine. Care management will follow for transitions of care.

## 2017-03-31 NOTE — PROGRESS NOTES
Spoke with pt's granddaughters Michelle and Mercedes, as well as a family friend, as they arrived to visit pt. Offered listening presence as Michelle expressed some concerns. Mercedes asked for continued prayers, which was offered. Family went in pt's room to speak with medical staff. Chaplains will continue to follow for pt and family support.     Misti Haskins, Palliative

## 2017-03-31 NOTE — PROGRESS NOTES
Hospitalist Progress Note  Ferdinand Soulier, MD   Office: [phone]          Date of Service:  3/31/2017  NAME:  Chase Al  :  1930  MRN:  [id number]      Admission Summary:   80-year-old female with history of chronic systolic congestive heart failure, atrial fibrillation, diabetes mellitus, hypertension, chronic anticoagulation use, history of pacemaker and ICD. She was sent from her primary care physician to the emergency room to be evaluated. History from the patient and her daughters at the bedside, also from the records. Per the daughter, she has been having on and off abdominal pain, epigastric pain for a while, over the last 3 days has been mostly pronounced. She said her pain at times is 9/10 in severity, it is nonradiating, associated with some nausea, but no vomiting. She denies any diarrhea. At her PCP's office,   they could not get her temperature, so they sent her to the emergency room. In the ER she has been hypothermic, her temperature was 91.4 on initial presentation. Also she was hypotensive. Her blood pressure   systolic was 65/46, so she was given about 1.5 liters of IV fluid, and now she is getting 1 bottle of albumin. She was placed on warming blanket, Lizzy Hugger. She denies any dysuria or frequency. Interval history / Subjective:   Ms Tenorio Poor extubated,now on bipap, slow but responsive. Assessment & Plan:     Acute on chronic respiratory failure with Hypercapnia due to Pleural effusion/atelectasis and acute on chronic Systolic CHF NYHA IV:  -35,RTW is lethargic and high CO2  -3,intubated. 3/30, extubated.  -Lasix iv  -Palliative care Re-consulted. -She has periods of apnea,not sure of etiology. May consider brain CT head although clinically low suspicion for central causes,has ppm so cant do mri.      Pre-extubation on 3/30/2017 09:30 On BiPAP on 3/31/2017 06:10   pH  7.378 7.326 (L)   pCO2  55.5 (H) 62.0 (H)   pO2  97 105 (H)   HCO3 32.7 (H) 32.4 (H)   sO2  97 97   FIO2 (POC) 40 40         Hypothermia and hypotension: - resolved  On empiric IV antibiotics, on Zosyn and vancomycin. Blood culture: No growth x 5 days; Required the [facility] intermittently     Bilateral Pleural effusions and significant dependent atx: likely related to CHF  -IV diuresis  -I/O negative. Sepsis:  Unknown source, most likely lung with bilateral pleural effusions, possibly infectious.    - AMMON: no vegetations;   - blood culture 3/08 and 3/11: no growth x 5 days;   - urine culture: no growth;   - 3/21: afebrile, no leukocytosis, cultures negative  -Off antibiotics. Patient completed 2 weeks of IV Abx since admission. Acute on Chronic Systolic congestive heart failure NYHA III/IV. With fluid overload. Echo 3/9 with 30% EF, diffuse hypokinesis. Bumex added, strict I/O, monitor weight. History of pacer. Valvular disease: Moderate mitral regurgitation, right ventricular systolic dysfunction,   as well as enlarged left atrium. Valvular vegetation on aortic valve, questionable: Blood cultures no growth preliminary.   - Diuresis per nephrology   - 3/21: AMMON showed no vegetations. - continue diuresis with lasix. -3/31,repeat echocardiogram    Hematuria: may be related to traumatic galarza placement with INR max 5.5,  Urine noted on UA as yellow on 3/8,   Galarza placed 3/9 and hematuria reported 3/10 UA. JUAN:  From  ATN and contrast nephropathy/Hypotension most likely etiology. Ischemic heart disease, hypotension; and exposure to IV contrast.   Appreciate nephro consult recommendations. Creatinine normalized. Hypoglycemia/diabetes mellitus. Resolved. Holding her Lantus and on hypoglycemia protocol. Her blood sugar improved after D50. Stable, off D10 now  resolved    Ischemic hepatitis:   Consulted GI. Plans for EGD when stable or outpatient. -LFTs resolved. Coagulopathy.  - Had hematuria. INR was supra therapeutic on warfarin which is now stopped. - 3/21: started patient on Eliquis for stroke prophylaxis in the setting of chronic Atrial Fibrillation and [id number] score of 6. Pancytopenia. - Appears to be chronic. However, patient and family unaware of her previous lab results. Will monitor her labs closely. Left cheek where it meets the nose pressure ulcer/injury:  - stage 2; measures 1 x 0.5 x 0 cm; 100% drying pink/red;  - with linear jac = 2 x 0.5 x 0 cm (smaller);  - Hospital Acquired;    Urinary retention:  - Richardson removed 3/21, decreased urine output, bladder scan revealed 900 ml of urine in the bladder per nursing report, Richardson was reinserted; (second attempt at removing Richardson during this hospitalization). - will keep the Richardson in at discharge; outpatient f/u with Urology for voiding trials once patient is more ambulatory. Anemia:  - of chronic disease with slow decline in H&H during this hospitalization. - patient transfused 2 Units of PRBC's on 3/22 with 80 mg of Lasix IV in between the 2 Units. Tolerated the transfusion well and good response in H&H. Hypokalemia: receiving IV.add kcl per NGT  UE doppler negative for DVT    Diet: NPO  -NGT removed with extubation,may have to re insert  Hypernatremia: increase free water     Code status: Full    Care Plan discussed with: Patient/Family and Nurse  Disposition: TBD   Consult PT/OT and OOB. Discharge planning: to SNF. Patient is now re-intubated. Prognosis is guarded. I had extensive discussion with family(odalis and grand daughter) on 3/27 prior to her re intubation. They wanted to keep full code,gave go ahead for intubation. I wanted to re consult palliative to revisit goals of care. Extubated on 3/30.  3/31 on bipap,her respiratory effort is weak and ABG worsening. Keep in CCU one more day. Hospital Problems  Date Reviewed: 3/8/2017          Codes Class Noted POA    * (Principal)Hypothermia ICD-10-CM: T68. Yuriy Erazo  ICD-9-CM: 991.6  3/8/2017 Yes                Review of Systems:   A comprehensive review of systems was negative. Vital Signs:    Last 24hrs VS reviewed since prior progress note. Most recent are:  Visit Vitals    /58    Pulse 74    Temp 97.5 °F (36.4 °C)    Resp 20    Ht 5' 6\" (1.676 m)    Wt 74.7 kg (164 lb 10.9 oz)    SpO2 98%    BMI 26.58 kg/m2         Intake/Output Summary (Last 24 hours) at 03/31/17 0850  Last data filed at 03/31/17 0800   Gross per 24 hour   Intake           343.13 ml   Output             1235 ml   Net          -891.87 ml        Physical Examination:             Constitutional:  On bipap,responsive. ENT:  ET and OGT in place. Resp:  good air entry    CV:  irregular,garde III systolic murmur. GI:  Soft, non-tender, normoactive bowel sounds;     Musculoskeletal:  2+ edema,SCDs in place. Neurologic: On bipap and awake and responsive. Data Review:    Review and/or order of clinical lab test   US abd:  IMPRESSION:  1. Distended gallbladder with sludge and a stone with moderate wall thickening of the gallbladder wall. 2. Dilated IVC suggesting right heart failure. 3. Small amount of free fluid surrounding the liver.   4. Small right pleural effusion      Labs:     Recent Labs      03/31/17 0302 03/30/17   0350   WBC  2.5*  3.6   HGB  9.4*  10.1*   HCT  30.2*  32.3*   PLT  126*  157     Recent Labs      03/31/17 0302 03/30/17   0350  03/29/17   0449   NA  150*  144  144   K  3.9  3.8  2.9*   CL  110*  107  105   CO2  32  34*  33*   BUN  13  13  11   CREA  0.51*  0.60  0.59   GLU  84  151*  80   CA  9.8  9.8  9.8   MG  2.2  2.2  1.9   PHOS  2.3*  2.1*  2.4*     Recent Labs      03/31/17 0302 03/30/17   0350  03/29/17   0449   SGOT  17  19  21   ALT  12  13  13   AP  50  66  50   TBILI  1.8*  1.8*  2.3*   TP  5.8*  6.0*  5.4*   ALB  2.1*  2.4*  2.2*   GLOB  3.7  3.6  3.2     Recent Labs      03/29/17   0449   INR  1.4*   PTP  14.2*   APTT  33.5*      No results for input(s): FE, TIBC, PSAT, FERR in the last 72 hours. Lab Results   Component Value Date/Time    Folate 14.5 03/09/2017 03:23 AM      No results for input(s): PH, PCO2, PO2 in the last 72 hours. No results for input(s): CPK, CKNDX, TROIQ in the last 72 hours.     No lab exists for component: CPKMB  Lab Results   Component Value Date/Time    Cholesterol, total 138 09/28/2011 09:13 AM    HDL Cholesterol 50 09/28/2011 09:13 AM    LDL, calculated 74 09/28/2011 09:13 AM    Triglyceride 69 09/28/2011 09:13 AM     Lab Results   Component Value Date/Time    Glucose (POC) 88 03/31/2017 05:59 AM    Glucose (POC) 76 03/31/2017 05:55 AM    Glucose (POC) 90 03/31/2017 12:47 AM    Glucose (POC) 108 03/30/2017 05:20 PM    Glucose (POC) 128 03/30/2017 12:29 PM     Lab Results   Component Value Date/Time    Color RED 03/10/2017 02:30 AM    Appearance BLOODY 03/10/2017 02:30 AM    Specific gravity 1.020 03/10/2017 02:30 AM    Specific gravity 1.016 03/08/2017 08:54 PM    pH (UA) 6.0 03/10/2017 02:30 AM    Protein 100 03/10/2017 02:30 AM    Glucose NEGATIVE  03/10/2017 02:30 AM    Ketone NEGATIVE  03/10/2017 02:30 AM    Bilirubin NEGATIVE  03/10/2017 02:30 AM    Urobilinogen 1.0 03/10/2017 02:30 AM    Nitrites NEGATIVE  03/10/2017 02:30 AM    Leukocyte Esterase TRACE 03/10/2017 02:30 AM    Epithelial cells FEW 03/10/2017 02:30 AM    Bacteria NEGATIVE  03/10/2017 02:30 AM    WBC 20-50 03/10/2017 02:30 AM    RBC >100 03/10/2017 02:30 AM         Medications Reviewed:     Current Facility-Administered Medications   Medication Dose Route Frequency    potassium chloride (KAON 10%) 20 mEq/15 mL oral liquid 40 mEq  40 mEq Per NG tube DAILY    insulin lispro (HUMALOG) injection   SubCUTAneous Q6H    apixaban (ELIQUIS) tablet 2.5 mg  2.5 mg Per NG tube BID    pantoprazole (PROTONIX) 2 mg/mL oral suspension 40 mg  40 mg Per NG tube ACB    ELECTROLYTE REPLACEMENT PROTOCOL  1 Each Other PRN    ELECTROLYTE REPLACEMENT PROTOCOL  1 Each Other PRN    albuterol-ipratropium (DUO-NEB) 2.5 MG-0.5 MG/3 ML  3 mL Nebulization TID PRN    propofol (DIPRIVAN) infusion  5-50 mcg/kg/min IntraVENous TITRATE    PHENYLephrine (NEOSYNEPHRINE) 30,000 mcg in 0.9% sodium chloride 250 mL infusion   mcg/min IntraVENous TITRATE    chlorhexidine (PERIDEX) 0.12 % mouthwash 10 mL  10 mL Oral Q12H    bisacodyl (DULCOLAX) suppository 10 mg  10 mg Rectal DAILY PRN    sodium chloride (NS) flush 5-10 mL  5-10 mL IntraVENous Multiple    phosphorus (K PHOS NEUTRAL) 250 mg tablet 1 Tab  1 Tab Oral BID    albumin human 25% (BUMINATE) solution 12.5 g  12.5 g IntraVENous Q8H PRN    balsam peru-castor oil (VENELEX)  mg/gram ointment   Topical BID    docusate sodium (COLACE) capsule 100 mg  100 mg Oral BID    polyvinyl alcohol (LIQUIFILM TEARS) 1.4 % ophthalmic solution 2 Drop  2 Drop Both Eyes TID    sodium chloride (NS) flush 10 mL  10 mL InterCATHeter PRN    alteplase (CATHFLO) 1 mg in sterile water (preservative free) 1 mL injection  1 mg InterCATHeter PRN    bacitracin 500 unit/gram packet 1 Packet  1 Packet Topical PRN    glucose chewable tablet 16 g  4 Tab Oral PRN    dextrose (D50W) injection syrg 12.5-25 g  12.5-25 g IntraVENous PRN    glucagon (GLUCAGEN) injection 1 mg  1 mg IntraMUSCular PRN    sodium chloride (NS) flush 5-10 mL  5-10 mL IntraVENous PRN     ______________________________________________________________________  EXPECTED LENGTH OF STAY: 4d 21h  ACTUAL LENGTH OF STAY:          23                 Christiana Paez MD

## 2017-03-31 NOTE — CONSULTS
ID consult    NAME:  Janel Cash                      :   1930                       MRN:   [id number]   Date/Time:  3/31/2017 4:54 PM  Subjective:   REASON FOR CONSULT: leucopenia      No hisotry from patient, records reviewed  Janel Cash  is a 80 y.o. female with a history of CHF/AFIB/HTN/MR- S/P MVrepair /BiVICD  Presented on 3/8 sent from her PCP's office with abdominal pain of 3 days. She also had hypothermia (91.4)  When she arrived in ED and was hypotensive( 82/56)- found to have ascites, pleural effusions  Started on pressor and bairhugger, Seen by GI, pulmonary, cardiology, renal . Pt was intubated 3/10-3/12, started on vanco and zosyn from 3/8-3/21- AMMON on 3/20 no vegetation, PASP of 40, moderate MR, TR. reintubated on 3/27 for resp failure. Extubated on 3/30 and on BIPAP- AMMON was done today again- severe MR- seen by CTS for mitral valve repair  I ama sked to see her for low WBC and to r/o infection  Past Medical History:   Diagnosis Date    Atrial fibrillation ([address]     Chronic systolic heart failure (HCC)     ef 35-40%; hold ACE due to lower bp    Diabetes mellitus type 2, insulin dependent ([address]     Hypertension     Long term (current) use of anticoagulants     Malignant hypertensive heart disease with CHF ([address]     Mitral regurgitation     Pacemaker 10/25/2012    The pacemaker is a [employer] CASSIDY, model # [id number], serial [id number].     S/P mitral valve repair 1996    # 28 Turner-Manuel    Thyroid disease     TR (tricuspid regurgitation)     moderate to severe echo       Past Surgical History:   Procedure Laterality Date    CARDIAC SURG PROCEDURE UNLIST      valve replacement    HX HEART CATHETERIZATION  10/4/2012    normal cors, LVEF 50%    HX HEART VALVE SURGERY      HX PACEMAKER      INS PPM/ICD LED DUAL ONLY  10/25/2012         INS PPM/ICD LED DUAL ONLY  2016         SH  Non smoker  No alcohol   Family History   Problem Relation Age of Onset    Cancer Sister      breast    Heart Disease Brother     Heart Disease Brother     Diabetes Son     Stroke Son      No Known Allergies     Meds reviewed    REVIEW OF SYSTEMS:   NA    Objective:   VITALS:    Visit Vitals    /49 (BP 1 Location: Left arm, BP Patient Position: At rest)    Pulse 79    Temp 97.5 °F (36.4 °C)    Resp 26    Ht 5' 6\" (1.676 m)    Wt 164 lb 10.9 oz (74.7 kg)    SpO2 98%    BMI 26.58 kg/m2   PAced heart rhythm    PHYSICAL EXAM:   General:    Awake, has a face tent  Head:   Normocephalic,   Neck:  Supple,   Back:    decub  Lungs:   B/l air entry- decreased bases  Rt chest tube  Heart:   s1s2  Sternotomy    Abdomen:   Soft,  distended. Bowel sounds normal.   Extremities: Edema legs  Rt PICC since 3/15  Inner thighs pressure excoriation from galarza  Skin:     No rashes or lesions. Not Jaundiced  Neurologic: Grossly non-focal    Pertinent Labs  9/28/11-WBC 2.5  12/16/16- WBC 3.0, , HB 9.8  3/8/17 WBC 1.7  Hb 9.6  PLT 74  3/31 WBC 2.5, HB 9.2,   3/8 /17 cr 1.01>0.57  BNP 2321> 1636  3/9 NH3 88  ALT 13, AST 22, Bili 2.0, alb 2.3  3/8 & 3/11 BC-NG  3/10 UC-nG    IMAGING RESULTS:  Anasarca with bilateral pleural effusion and ascites. Cholelithiasis. Cardiomegaly. Ultrasound- heterogenic liver  CXR-pulm edema and pleural effusion  Impression/Recommendation    1) Pancytopenia- Chronic leucopenia  Since 2011- likely  due to cirrhosis with underlying benign ethnic neutropenia  There is no indication that this could be due to infection currently-  She is at high risk for infection especially prolonged hospitalization, galarza, recurrent intubation, immobility - She was on vanco and zosyn from 3/8-3/21. No antibiotic needed now   Will get a procalcitonin level    2) Biventricular failure with severe MR and TR- that could cause chronic liver congestion leading to cirrhosis.   AMMON has no vegetation  If mitral valve repair is going to be attempted will get blood cultures, urine culture       3) cardiogenic shock on presentation was on pressors but none now- no indication to suggest septic shock      4) Liver cirrhosis- pancytopenia/ascites likely secondary to rt sided heart failure- Hepatitis panel can be sent  Recommend 's opinion if valve surgery is being considered    5) Recurrent Respiratory failure s/p extubation- on BIPAP  B/l pleural effusion- s/p chest tube on rt side  Fluid sent for studies    Palliative on board- pt is very frail with     Discussed the management with her nurse.   Will follow her peripherally over the weekend -

## 2017-03-31 NOTE — PROGRESS NOTES
Physical Therapy    MD and cardiac cath RNs currently present at bedside to complete AMMON. Will follow up with patient on Monday. If medically stable recommend pt OOB over weekend to chair or bed plced in chair position.      Thank Marissa Herbert PT,DPT

## 2017-03-31 NOTE — CONSULTS
Intensivist / Pulmonary / Critical Care  Assessment / Plan:      1. Acute on chronic resp failure - combined chronic resp acidosis and metabolic alkalosis from diuresis contributing to high PCO2 and compensated pH  2. CHF - on scheduled diuretics  3. Afib - on eliquis  4. Sepsis syndrome - ? Source s/p 2 weeks abx  5. Acute on chronic kidney disease - improved  6. DM  7. Full code - palliative care to see    · Wean bipap as able - High flow for breaks  · Right sided thoracentesis  · Electrolyte replacement protocol  · Cardiology following for systolic CHF management and MR  · Palliative care following    D/w RN         History / Subjective:  Hospital Day: 24 - Interval history and notes reviewed     Extubated to bipap  No stuck on bipap - desats quickly when comes off  CXR continues to show R > L effusion. Net negative volume 1 L over last day. ? If drainage of R effusion may improve her tenuous resp status  Had eliquis yesterday - on hold for today      Objective:  Patient Vitals for the past 4 hrs:   BP Temp Pulse Resp SpO2   17 1000 106/54 - 76 22 99 %   17 0900 100/52 - 74 21 99 %   17 0800 100/58 97.5 °F (36.4 °C) 74 20 98 %   17 0757 - - - - 98 %   17 0700 97/49 - 76 20 97 %   Temp (24hrs), Av.3 °F (36.3 °C), Min:96.8 °F (36 °C), Max:97.7 °F (36.5 °C)      Intake/Output Summary (Last 24 hours) at 17 1041  Last data filed at 17 0800   Gross per 24 hour   Intake           163.13 ml   Output             1135 ml   Net          -971.87 ml     Lab Results   Component Value Date/Time    Glucose (POC) 88 2017 05:59 AM    Glucose (POC) 76 2017 05:55 AM    Glucose (POC) 90 2017 12:47 AM    Glucose (POC) 108 2017 05:20 PM    Glucose (POC) 128 2017 12:29 PM     Exam:  Sedate  Oral ett  Rhonchi  Reg  Protuberant  Warm and dry    Data:   Interval lab and diagnostic data was reviewed.   Interval radiology images were independently viewed and available reports were reviewed. CXR - ETT, cm, bilateral effusions, edema    Recent Labs      03/31/17   0302  03/30/17   0350  03/29/17   0449   WBC  2.5*  3.6  3.7   HGB  9.4*  10.1*  9.6*   HCT  30.2*  32.3*  30.0*   PLT  126*  157  141*     Recent Labs      03/31/17   0302  03/30/17   0350  03/29/17   0449   NA  150*  144  144   K  3.9  3.8  2.9*   CL  110*  107  105   CO2  32  34*  33*   GLU  84  151*  80   BUN  13  13  11   CREA  0.51*  0.60  0.59   CA  9.8  9.8  9.8   MG  2.2  2.2  1.9   PHOS  2.3*  2.1*  2.4*   ALB  2.1*  2.4*  2.2*   SGOT  17  19  21   ALT  12  13  13   INR   --    --   1.4*       No results for input(s): TROIQ, CPK, CKMB in the last 72 hours.   ABG:  Recent Labs      03/31/17   0610  03/30/17   0930  03/29/17   0947   PHI  7.326*  7.378  7.423   PCO2I  62.0*  55.5*  53.2*   PO2I  105*  97  139*   HCO3I  32.4*  32.7*  34.7*   SO2I  97  97  99*   FIO2I  40  40  0.50          Shanae Mcghee MD

## 2017-03-31 NOTE — INTERDISCIPLINARY ROUNDS
IDR/SLIDR Summary          Patient: Sri Gtz MRN: [id number]    Age: 80 y.o. YOB: 1930 Room/Bed: [address] [place] [address]   Admit Diagnosis: Hypothermia  Principal Diagnosis: Hypothermia   Goals: hospice  Readmission: NO  Quality Measure: CHF  VTE Prophylaxis: Mechanical  Influenza Vaccine screening completed? YES  Pneumococcal Vaccine screening completed? YES  Mobility needs: Yes   Nutrition plan:Yes  Consults:P.T, O.T., Speech, Respiratory and Case Management    Financial concerns:Yes  Escalated to CM? YES  RRAT Score: 25   Interventions:Palliative Care   Testing due for pt today?  NO  LOS: 23 days Expected length of stay ?? days  Discharge plan: hospice   PCP: Iza Rico MD  Transportation needs: Yes    Days before discharge:longer than expected LOS  Discharge disposition: Hospice    Signed:     Shamika Garcias RN  3/31/2017  6:49 AM

## 2017-03-31 NOTE — PROGRESS NOTES
1930 Bedside shift change report given to Tejinder Girard  (oncoming nurse) by Cyndie Ng (offgoing nurse). Report included the following information SBAR, Kardex, Procedure Summary, Intake/Output, MAR, Recent Results and Cardiac Rhythm V paced.

## 2017-03-31 NOTE — PROGRESS NOTES
Occupational Therapy Note  3/31/2017    Chart reviewed. Pt in prep to receive AMMON at bedside. Will hold and follow-up Monday as medically stable.     Misti Pollard, OTR/L

## 2017-03-31 NOTE — PROGRESS NOTES
Arrived in CCU 19 for bedside AMMON, echo tech present, Dr. Bruno Lieberman, Dr. Amanda Kunz speaking with patients family. Patient on bedside hemodynamic monitors. Vitals stable at this time. Will continue to monitor throughout AMMON procedure. 1408: Procedure completed, patient tolerated well with Versed 1.5 mg. Dr. Amanda Kunz and Dr. Bruno Lieberman to talk to family. Patient placed back onto Bipap at previous settings. Report given to CCU nurse. Vitals stable post procedure.

## 2017-03-31 NOTE — PROGRESS NOTES
[facility]  -Progress Note     Harman Clark [phone]   3/31/2017      Rebecca Naranjo M.D. , F.A.C.C.   --------PCP:-Bc Yu MD   -----Subjective:   . Lashawn Calixto LashawnMary Wang is a 80 y.o. female  Opens eyes spontaneously  Nods \"no\" when asked if having pain, SOB, or chest pain. Off vasopressors       Assessment/Plan/Discussion:Cardiology Attending:     Patient seen earlier today and examined  and agree with Advance Practice Provider (ANDI, NP,PA)  assessment and plans. Patrick Wang is a 80 y.o. female     Echo this am with 4 + MR, 2+ TR, EF 25-30  Discussed with Dr Livia Cai for structural heart options, AMMON done, not amenable to Mitraclip  Will confer with Surg for possible transcutaneous mitral but issues with hypernatremia and possible infection  I have asked ID and AHFS to see pt-consider milrinone    Judit Trivedi MD 3/31/2017             Discussion/Plans/Recs  1. Acute on chronic respiratory failure: ABG today on biPAP PH 7.32, pCO2 62 pO2= 105,  HCO3= 32.4  -Pulmonary following- remains on biPAP    2) Chronic Systolic CHF: NYHA stage IV BNP 1636 today from 972 3/29. Net - 801 mls in 24 hours. Continues with BLE, no BUE edema noted  -Sodium 150- will hold Lasix for now. -Repeat TTE final read pending  -BiV AICD  -ACE held due to low BP- low normal BP-  now off pressors. Continue to hold ACE-I  -Continue to hold BB due to low BP/on pressors  -Will ask Advanced heart failure team to eval also. 3) MR-systolic murmur . No vegetation on valves per AMMON. -Preliminary review of TTE with problematic MR. Will ask Valve team to eval for mitraclip candidacy. 4. Hypernatremia:  Sodium 150.  -Will hold lasix for now. 5)Afib: -[id number]= 6 (age, CHF, HTN, female, DM)    -Continue Eliquis 2.5 mg BID. -ERASTO is very large but no clot, would continue to use OAC cautiously for stroke prophylaxis  (hgb stable)    6). Anemia:  Hgb 9.4 from 10.1 yesterday. 7).Advanced care planning:- Palliative care re-consulted. Pt remains full code status. Dr. Cleve Valencia available today to meet with family to discuss status- contacted Kevin. Cardiac Studies/Hx:  AMMON 3/20 with no vegetation, Same EF 35 and 1-2+ MR, TR as expected  ECHO: 3/9/17: EF 30%. Mod diffuse hyypokinesis, Wall motion is dyssynchronous, RA mildly dilated. LA mod-severe dilated, Mod MR, AV: In the parasternal long axis view, faintly seen spherical echodensity about 5mms in diameter that seems to move in  relation to the anterior aortic valve leaflet, on the aortic side. Could represent a vegetation. Mod TR, mild RV systolic hypertension. 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 PA pressure of 61 with moderate pulmonary hypertension. 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 Moderate to severe TR, moderate to severe pulmonary hypertension. EF 35-40%, marked LAE, mild JOAN    PACER check 2-11-13 39,288 episodes of atrial tach/flutter and 11 with high rates  1/2014 noises noted on pacer, 260 thousands AMS , some are noises and some are AT, some PMT (adjustment made, Dr Laurie Damico discussed with pacer clinic)  Pacer check in May 2013 showed NSVT and AF the latter as long as 1 day with occasional RVR  Pacer check 9/9/13: persistent AT with V paced. 92%  (asymptomatic with AT)    CATH 10-24-12= Normal cors and EF 50 %. -- A annular ring mitral position.     Mitral valve repair on July 29, 1996, for mitral regurgitation with a #32 Turner-Manuel ring. Chronic systolic CHF, reduced ejection fraction. NYHA 1-2  . ..admit 7/16/09 mild chf exacerbation  #32 Turner Manuel ring MV repair 1996         Past Medical History:   Diagnosis Date    Atrial fibrillation (HCC)     Chronic systolic heart failure (HCC)     ef 35-40%; hold ACE due to lower bp    Diabetes mellitus type 2, insulin dependent ([address]     Hypertension     Long term (current) use of anticoagulants     Malignant hypertensive heart disease with CHF ([address]     Mitral regurgitation     Pacemaker 10/25/2012    The pacemaker is a [employer], model # [id number], serial [id number]. 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 Results for orders placed or performed during the hospital encounter of 03/08/17   EKG, 12 LEAD, INITIAL   Result Value Ref Range    Ventricular Rate 80 BPM    Atrial Rate 75 BPM    QRS Duration 146 ms    Q-T Interval 480 ms    QTC Calculation (Bezet) 553 ms    Calculated R Axis 158 degrees    Calculated T Axis 5 degrees    Diagnosis       Ventricular-paced rhythm  When compared with ECG of 09-SEP-2016 17:36,  Vent. rate has decreased BY  30 BPM  Confirmed by Derrell Dubois M.D., [facility] ([id number]) on 3/9/2017 10:15:16 AM     Results for orders placed or performed in visit on 03/27/12   AMB POC EKG ROUTINE W/ 12 LEADS, INTER & REP    Narrative    See scanned report.         Vitals:    03/31/17 0700 03/31/17 0757 03/31/17 0800 03/31/17 0900   BP: 97/49  100/58 100/52   BP 1 Location:       BP Patient Position:   At rest    Pulse: 76  74 74   Resp: 20  20 21   Temp:   97.5 °F (36.4 °C)    SpO2: 97% 98% 98% 99%   Weight:       Height:           Objective: Physical Exam:   Patient Vitals for the past 12 hrs:   Temp Pulse Resp BP SpO2   03/31/17 0900 - 74 21 100/52 99 %   03/31/17 0800 97.5 °F (36.4 °C) 74 20 100/58 98 %   03/31/17 0757 - - - - 98 %   03/31/17 0700 - 76 20 97/49 97 %   03/31/17 0600 - 77 20 104/48 96 %   03/31/17 0500 - 75 20 95/48 96 %   03/31/17 0400 97.4 °F (36.3 °C) 74 19 93/51 98 %   03/31/17 0305 - - - - 97 %   03/31/17 0300 - 72 21 98/51 98 %   03/31/17 0200 - 75 22 94/53 96 %   03/31/17 0100 - 78 21 92/50 97 %   03/31/17 0001 - - - - 95 %   03/31/17 0000 97.7 °F (36.5 °C) 72 26 94/57 96 %   03/30/17 2300 - 72 23 99/49 96 %   03/30/17 2200 - 79 20 92/52 98 %      General:    Opens eyes spontaneously, appears weak. ENT, Neck:  supple   Chest Wall: No chest wall deformities   Lung: Clear to ausculation bilaterally, on mechanical ventilatin   Heart:  no gallops noted, RRR, no JVD 3/6 systolic early/decresendo  murmur at RUSB to LUSB without radiation beyond apex, best heard at apex   Abdomen: Nondistended, + bowel sounds, no grimacing to palpation    Richardson with mast urine.   No hematuria   Extremities: extremities normal, 1-2+ BLE edema (pedal/ankle more prominent), No BUE edema       Last 24hr Input/Output:    Intake/Output Summary (Last 24 hours) at 03/31/17 5848  Last data filed at 03/31/17 0800   Gross per 24 hour   Intake           228.13 ml   Output             1235 ml   Net         -1006.87 ml        Data Review:   Recent Results (from the past 24 hour(s))   GLUCOSE, POC    Collection Time: 03/30/17 12:29 PM   Result Value Ref Range    Glucose (POC) 128 (H) 65 - 100 mg/dL    Performed by [address], POC    Collection Time: 03/30/17  5:20 PM   Result Value Ref Range    Glucose (POC) 108 (H) 65 - 100 mg/dL    Performed by [address] [place] [address], POC    Collection Time: 03/31/17 12:47 AM   Result Value Ref Range    Glucose (POC) 90 65 - 100 mg/dL    Performed by Alva Almodovar    BNP    Collection Time: 03/31/17 3:02 AM   Result Value Ref Range    BNP 1636 (H) 0 - 100 pg/mL   CBC WITH AUTOMATED DIFF    Collection Time: 03/31/17  3:02 AM   Result Value Ref Range    WBC 2.5 (L) 3.6 - 11.0 K/uL    RBC 4.06 3.80 - 5.20 M/uL    HGB 9.4 (L) 11.5 - 16.0 g/dL    HCT 30.2 (L) 35.0 - 47.0 %    MCV 74.4 (L) 80.0 - 99.0 FL    MCH 23.2 (L) 26.0 - 34.0 PG    MCHC 31.1 30.0 - 36.5 g/dL    RDW 20.1 (H) 11.5 - 14.5 %    PLATELET 347 (L) 335 - 400 K/uL    NEUTROPHILS 71 32 - 75 %    LYMPHOCYTES 13 12 - 49 %    MONOCYTES 8 5 - 13 %    EOSINOPHILS 7 0 - 7 %    BASOPHILS 1 0 - 1 %    ABS. NEUTROPHILS 1.8 1.8 - 8.0 K/UL    ABS. LYMPHOCYTES 0.3 (L) 0.8 - 3.5 K/UL    ABS. MONOCYTES 0.2 0.0 - 1.0 K/UL    ABS. EOSINOPHILS 0.2 0.0 - 0.4 K/UL    ABS. BASOPHILS 0.0 0.0 - 0.1 K/UL    DF MANUAL      RBC COMMENTS ANISOCYTOSIS  2+        RBC COMMENTS POLYCHROMASIA  1+        RBC COMMENTS TARGET CELLS  PRESENT        RBC COMMENTS HYPOCHROMIA  1+        RBC COMMENTS BASOPHILIC STIPPLING  PRESENT        RBC COMMENTS MICROCYTOSIS  1+       MAGNESIUM    Collection Time: 03/31/17  3:02 AM   Result Value Ref Range    Magnesium 2.2 1.6 - 2.4 mg/dL   METABOLIC PANEL, COMPREHENSIVE    Collection Time: 03/31/17  3:02 AM   Result Value Ref Range    Sodium 150 (H) 136 - 145 mmol/L    Potassium 3.9 3.5 - 5.1 mmol/L    Chloride 110 (H) 97 - 108 mmol/L    CO2 32 21 - 32 mmol/L    Anion gap 8 5 - 15 mmol/L    Glucose 84 65 - 100 mg/dL    BUN 13 6 - 20 MG/DL    Creatinine 0.51 (L) 0.55 - 1.02 MG/DL    BUN/Creatinine ratio 25 (H) 12 - 20      GFR est AA >60 >60 ml/min/1.73m2    GFR est non-AA >60 >60 ml/min/1.73m2    Calcium 9.8 8.5 - 10.1 MG/DL    Bilirubin, total 1.8 (H) 0.2 - 1.0 MG/DL    ALT (SGPT) 12 12 - 78 U/L    AST (SGOT) 17 15 - 37 U/L    Alk.  phosphatase 50 45 - 117 U/L    Protein, total 5.8 (L) 6.4 - 8.2 g/dL    Albumin 2.1 (L) 3.5 - 5.0 g/dL    Globulin 3.7 2.0 - 4.0 g/dL    A-G Ratio 0.6 (L) 1.1 - 2.2     PHOSPHORUS    Collection Time: 03/31/17  3:02 AM Result Value Ref Range    Phosphorus 2.3 (L) 2.6 - 4.7 MG/DL   GLUCOSE, POC    Collection Time: 03/31/17  5:55 AM   Result Value Ref Range    Glucose (POC) 76 65 - 100 mg/dL    Performed by [facility]    GLUCOSE, POC    Collection Time: 03/31/17  5:59 AM   Result Value Ref Range    Glucose (POC) 88 65 - 100 mg/dL    Performed by [facility]    POC G3 - PUL    Collection Time: 03/31/17  6:10 AM   Result Value Ref Range    FIO2 (POC) 40 %    pH (POC) 7.326 (L) 7.35 - 7.45      pCO2 (POC) 62.0 (H) 35.0 - 45.0 MMHG    pO2 (POC) 105 (H) 80 - 100 MMHG    HCO3 (POC) 32.4 (H) 22 - 26 MMOL/L    sO2 (POC) 97 92 - 97 %    Base excess (POC) 6 mmol/L    Site LEFT RADIAL      Device: BIPAP      PEEP/CPAP (POC) 6 cmH2O    Pressure support 8 cmH2O    Allens test (POC) YES      Specimen type (POC) ARTERIAL        Concepción Malik.  Jaja, IRLANDA 3/31/2017

## 2017-03-31 NOTE — PROGRESS NOTES
Prelim echo with problematic MR  i will review final report later this am  Will ask Structural heart team to take a look at her MR   Would she be candidate for a Mitraclip since we can not wean her  I have communicated with Ludivina Kendall NP with Dr Erika Dhillon 10:05 AM  Will also ask FS for their input  And have callled 3 dts #s   Only able to get in touch with Kevin  She will call her sisters  i am here until at least 4 pm all day today and can meet and would like to meet with family

## 2017-03-31 NOTE — PROGRESS NOTES
Asked to see Ms. Snyder Orn by Dr. Kathe Abebe for evaluation of transcatheter options on her mitral valve. See full note coming by NP. Echo shows severe MR after previous annuloplasty ring, and admission concerning for sepsis (negative cultures but still low WBC noted). She was non-communicative with me on exam, but apparently quite functional prior. If she is thought to be mentally viable, would recommend AMMON to better define mitral anatomy as unclear if enough posterior leaflet to clip, and CTA to see if option for transcatheter valve in ring is possible. Will follow.

## 2017-04-01 NOTE — CONSULTS
Intensivist / Pulmonary / Critical Care  Assessment / Plan:      1. Acute on chronic resp failure - combined chronic resp acidosis and metabolic alkalosis from diuresis contributing to high PCO2 and compensated pH  2. CHF with mitral valve disease - on scheduled diuretics  3. Afib - on eliquis  4. Sepsis syndrome - ? Source s/p 2 weeks abx - ID following  5. Acute on chronic kidney disease - improved  6. DM  7. Full code - palliative care to see    · Wean bipap as able - High flow for breaks  · Continue chest tube drainage  · Will add diamox - her metabolic alkalosis (from diuresis) likely contributing to her hypoventilation  · Electrolyte replacement protocol  · Cardiology following for systolic CHF management and MR  · Palliative care following    D/w RN       History / Subjective:  Hospital Day: 25 - Interval history and notes reviewed     Remains critically ill on BiPAP.   Chest tube draining large amounts of pleural fluid      Objective:  Patient Vitals for the past 4 hrs:   BP Temp Pulse Resp SpO2   17 0900 111/54 - 76 20 98 %   17 0800 111/51 97.3 °F (36.3 °C) 73 16 99 %   17 0737 - - - - 100 %   17 0700 108/49 - 77 20 99 %   Temp (24hrs), Av.6 °F (36.4 °C), Min:97.3 °F (36.3 °C), Max:98 °F (36.7 °C)      Intake/Output Summary (Last 24 hours) at 17 1052  Last data filed at 17 0800   Gross per 24 hour   Intake           804.63 ml   Output             3005 ml   Net         -2200.37 ml     Lab Results   Component Value Date/Time    Glucose (POC) 101 2017 05:51 AM    Glucose (POC) 114 2017 11:55 PM    Glucose (POC) 71 2017 11:37 PM    Glucose (POC) 83 2017 05:54 PM    Glucose (POC) 90 2017 12:50 PM     Exam:  Alert on BiPAP  Bilateral rales and ronchi  Reg, + murmur  Protuberant abdomen  Warm and dry skin  2+ edema    Data:   Labs:  Recent Labs      17   0403  17   0302  17   0350   WBC  2.2*  2.5*  3.6   HGB  8.8*  9.4*  10.1* HCT  29.5*  30.2*  32.3*   PLT  126*  126*  157     Recent Labs      04/01/17   0403  03/31/17   1450  03/31/17   0302   NA  150*  149*  150*   K  3.4*  4.3  3.9   CL  108  110*  110*   CO2  34*  34*  32   GLU  96  92  84   BUN  15  14  13   CREA  0.56  0.57  0.51*   CA  9.9  10.5*  9.8   MG  2.2  2.3  2.2   PHOS  1.9*  2.8  2.3*   ALB  2.2*  2.3*  2.1*   TBILI  1.9*  2.0*  1.8*   SGOT  19  22  17   ALT  11*  13  12     No results for input(s): PH, PCO2, PO2, HCO3, FIO2 in the last 72 hours.     Imaging:  I have personally reviewed the patients radiographs:  No change in edema/effusions        Lashon Varela MD

## 2017-04-01 NOTE — PROGRESS NOTES
Hospitalist Progress Note  Radha Quintero MD   Office: [phone]          Date of Service:  2017  NAME:  Sri Gtz  :  1930  MRN:  [id number]      Admission Summary:   25-year-old female with history of chronic systolic congestive heart failure, atrial fibrillation, diabetes mellitus, hypertension, chronic anticoagulation use, history of pacemaker and ICD. She was sent from her primary care physician to the emergency room to be evaluated. History from the patient and her daughters at the bedside, also from the records. Per the daughter, she has been having on and off abdominal pain, epigastric pain for a while, over the last 3 days has been mostly pronounced. She said her pain at times is 9/10 in severity, it is nonradiating, associated with some nausea, but no vomiting. She denies any diarrhea. At her PCP's office,   they could not get her temperature, so they sent her to the emergency room. In the ER she has been hypothermic, her temperature was 91.4 on initial presentation. Also she was hypotensive. Her blood pressure   systolic was 95/09, so she was given about 1.5 liters of IV fluid, and now she is getting 1 bottle of albumin. She was placed on warming blanket, Lizzy Hugger. She denies any dysuria or frequency. Interval history / Subjective:   Ms Lucien Abrams on bipap, alert and responsive. Brother at bedside. She asked if she can drink water. No other complaints      Assessment & Plan:     Acute on chronic respiratory failure with Hypercapnia due to Pleural effusion/atelectasis and acute on chronic Systolic CHF NYHA IV:  -,IIB is lethargic and high CO2  -3/257,intubated. 3/30, extubated.  -Lasix iv  -Palliative care Re-consulted. -She has periods of apnea,not sure of etiology. May consider brain CT head although clinically low suspicion for central causes,has ppm so cant do mri.      Pre-extubation on 3/30/2017 09:30 On BiPAP on 3/31/2017 06:10   pH  7.378 7.326 (L)   pCO2  55.5 (H) 62.0 (H)   pO2  97 105 (H)   HCO3 32.7 (H) 32.4 (H)   sO2  97 97   FIO2 (POC) 40 40   -Remained on BiPAP  -Received diamox today      Hypothermia and hypotension: - resolved  On empiric IV antibiotics, on Zosyn and vancomycin. Blood culture: No growth x 5 days; Required the [facility] intermittently     Bilateral Pleural effusions and significant dependent atx: likely related to CHF  -IV diuresis  -I/O negative.  -3/31,US guided thoracentesis and CT placed    Sepsis:  Unknown source, most likely lung with bilateral pleural effusions, possibly infectious.    - AMMON: no vegetations;   - blood culture 3/08 and 3/11: no growth x 5 days;   - urine culture: no growth;   - 3/21: afebrile, no leukocytosis, cultures negative  -Off antibiotics. Patient completed 2 weeks of IV Abx since admission.  -Saw pulmonary started Azactam on 4/1    Acute on Chronic Systolic congestive heart failure NYHA III/IV. With fluid overload. Echo 3/9 with 30% EF, diffuse hypokinesis. Bumex added, strict I/O, monitor weight. History of pacer. Valvular disease: Moderate mitral regurgitation, right ventricular systolic dysfunction,   as well as enlarged left atrium. Valvular vegetation on aortic valve, questionable: Blood cultures no growth preliminary.   - Diuresis per nephrology   - 3/21: AMMON showed no vegetations. - continue diuresis with lasix. -3/31,repeat echocardiogram,severe MR  -Dr Logan Floor consulted per cardiology  -3/31,started on milrinone,advanced heart failure team on board. -    Hematuria: may be related to traumatic galarza placement with INR max 5.5,  Urine noted on UA as yellow on 3/8,   Galarza placed 3/9 and hematuria reported 3/10 UA. JUAN:  From  ATN and contrast nephropathy/Hypotension most likely etiology. Ischemic heart disease, hypotension; and exposure to IV contrast.   Appreciate nephro consult recommendations.    Creatinine normalized. Hypoglycemia/diabetes mellitus. Resolved. Holding her Lantus and on hypoglycemia protocol. Her blood sugar improved after D50. Stable, off D10 now  resolved    Ischemic hepatitis:   Consulted GI. Plans for EGD when stable or outpatient. -LFTs resolved. Coagulopathy.  - Had hematuria. INR was supra therapeutic on warfarin which is now stopped. - 3/21: started patient on Eliquis for stroke prophylaxis in the setting of chronic Atrial Fibrillation and [id number] score of 6. Pancytopenia. - Appears to be chronic. However, patient and family unaware of her previous lab results. Will monitor her labs closely. Left cheek where it meets the nose pressure ulcer/injury:  - stage 2; measures 1 x 0.5 x 0 cm; 100% drying pink/red;  - with linear jac = 2 x 0.5 x 0 cm (smaller);  - Hospital Acquired;    Urinary retention:  - Richardson removed 3/21, decreased urine output, bladder scan revealed 900 ml of urine in the bladder per nursing report, Richardson was reinserted; (second attempt at removing Richardson during this hospitalization). - will keep the Richardson in at discharge; outpatient f/u with Urology for voiding trials once patient is more ambulatory. Anemia:  - of chronic disease with slow decline in H&H during this hospitalization. - patient transfused 2 Units of PRBC's on 3/22 with 80 mg of Lasix IV in between the 2 Units. Tolerated the transfusion well and good response in H&H. Hypokalemia: receiving IV.add kcl per NGT  UE doppler negative for DVT    Diet: NPO  -Tube feeds  Code status: Full    Care Plan discussed with: Patient/Family and Nurse  Disposition: TBD   Consult PT/OT and OOB. Discharge planning: to SNF. Patient is now re-intubated. Prognosis is guarded. I had extensive discussion with family(odalis and grand daughter) on 3/27 prior to her re intubation. They wanted to keep full code,gave go ahead for intubation. I wanted to re consult palliative to revisit goals of care.    Extubated on 3/30.  3/31 on bipap,her respiratory effort is weak and ABG worsening. Keep in [place] [address] Problems  Date Reviewed: 3/8/2017          Codes Class Noted POA    * (Principal)Hypothermia ICD-10-CM: T68. XXXA  ICD-9-CM: 991.6  3/8/2017 Yes                Review of Systems:   A comprehensive review of systems was negative. Vital Signs:    Last 24hrs VS reviewed since prior progress note. Most recent are:  Visit Vitals    /54 (BP 1 Location: Left arm, BP Patient Position: At rest)    Pulse 81    Temp 97.6 °F (36.4 °C)    Resp 20    Ht 5' 6\" (1.676 m)    Wt 74.6 kg (164 lb 7.4 oz)    SpO2 100%    BMI 26.55 kg/m2         Intake/Output Summary (Last 24 hours) at 04/01/17 1813  Last data filed at 04/01/17 1600   Gross per 24 hour   Intake          1280.63 ml   Output             5405 ml   Net         -4124.37 ml        Physical Examination:             Constitutional:  On bipap,responsive. ENT:Bipap face mask and NGT     Resp: Basal dulness. CT in place. CV:  irregular,garde III systolic murmur. GI:  Soft, non-tender, normoactive bowel sounds;     Musculoskeletal:  2+ edema,SCDs in place. Neurologic: On bipap and awake and responsive. Data Review:    Review and/or order of clinical lab test   US abd:  IMPRESSION:  1. Distended gallbladder with sludge and a stone with moderate wall thickening of the gallbladder wall. 2. Dilated IVC suggesting right heart failure. 3. Small amount of free fluid surrounding the liver.   4. Small right pleural effusion      Labs:     Recent Labs      04/01/17   0403  03/31/17   0302   WBC  2.2*  2.5*   HGB  8.8*  9.4*   HCT  29.5*  30.2*   PLT  126*  126*     Recent Labs      04/01/17   0403  03/31/17   1450  03/31/17   0302   NA  150*  149*  150*   K  3.4*  4.3  3.9   CL  108  110*  110*   CO2  34*  34*  32   BUN  15  14  13   CREA  0.56  0.57  0.51*   GLU  96  92  84   CA  9.9  10.5*  9.8   MG  2.2  2.3  2.2   PHOS  1.9*  2.8 2.3*     Recent Labs      04/01/17   0403  03/31/17   1450  03/31/17   0302   SGOT  19  22  17   ALT  11*  13  12   AP  47  55  50   TBILI  1.9*  2.0*  1.8*   TP  5.8*  6.5  5.8*   ALB  2.2*  2.3*  2.1*   GLOB  3.6  4.2*  3.7     No results for input(s): INR, PTP, APTT in the last 72 hours. No lab exists for component: INREXT, INREXT   No results for input(s): FE, TIBC, PSAT, FERR in the last 72 hours. Lab Results   Component Value Date/Time    Folate 14.5 03/09/2017 03:23 AM      No results for input(s): PH, PCO2, PO2 in the last 72 hours. No results for input(s): CPK, CKNDX, TROIQ in the last 72 hours.     No lab exists for component: CPKMB  Lab Results   Component Value Date/Time    Cholesterol, total 138 09/28/2011 09:13 AM    HDL Cholesterol 50 09/28/2011 09:13 AM    LDL, calculated 74 09/28/2011 09:13 AM    Triglyceride 69 09/28/2011 09:13 AM     Lab Results   Component Value Date/Time    Glucose (POC) 140 04/01/2017 05:24 PM    Glucose (POC) 124 04/01/2017 11:27 AM    Glucose (POC) 101 04/01/2017 05:51 AM    Glucose (POC) 114 03/31/2017 11:55 PM    Glucose (POC) 71 03/31/2017 11:37 PM     Lab Results   Component Value Date/Time    Color RED 03/10/2017 02:30 AM    Appearance BLOODY 03/10/2017 02:30 AM    Specific gravity 1.020 03/10/2017 02:30 AM    Specific gravity 1.016 03/08/2017 08:54 PM    pH (UA) 6.0 03/10/2017 02:30 AM    Protein 100 03/10/2017 02:30 AM    Glucose NEGATIVE  03/10/2017 02:30 AM    Ketone NEGATIVE  03/10/2017 02:30 AM    Bilirubin NEGATIVE  03/10/2017 02:30 AM    Urobilinogen 1.0 03/10/2017 02:30 AM    Nitrites NEGATIVE  03/10/2017 02:30 AM    Leukocyte Esterase TRACE 03/10/2017 02:30 AM    Epithelial cells FEW 03/10/2017 02:30 AM    Bacteria NEGATIVE  03/10/2017 02:30 AM    WBC 20-50 03/10/2017 02:30 AM    RBC >100 03/10/2017 02:30 AM         Medications Reviewed:     Current Facility-Administered Medications   Medication Dose Route Frequency    dextrose 5 % - 0.45% NaCl infusion 55 mL/hr IntraVENous CONTINUOUS    acetaZOLAMIDE (DIAMOX) 250 mg in sterile water (preservative free) 2.5 mL injection  250 mg IntraVENous Q24H    milrinone (PRIMACOR) 20 MG/100 ML D5W infusion  0.2 mcg/kg/min IntraVENous CONTINUOUS    bumetanide (BUMEX) injection 2 mg  2 mg IntraVENous BID    potassium chloride (KAON 10%) 20 mEq/15 mL oral liquid 40 mEq  40 mEq Per NG tube DAILY    insulin lispro (HUMALOG) injection   SubCUTAneous Q6H    apixaban (ELIQUIS) tablet 2.5 mg  2.5 mg Per NG tube BID    pantoprazole (PROTONIX) 2 mg/mL oral suspension 40 mg  40 mg Per NG tube ACB    ELECTROLYTE REPLACEMENT PROTOCOL  1 Each Other PRN    ELECTROLYTE REPLACEMENT PROTOCOL  1 Each Other PRN    albuterol-ipratropium (DUO-NEB) 2.5 MG-0.5 MG/3 ML  3 mL Nebulization TID PRN    propofol (DIPRIVAN) infusion  5-50 mcg/kg/min IntraVENous TITRATE    chlorhexidine (PERIDEX) 0.12 % mouthwash 10 mL  10 mL Oral Q12H    bisacodyl (DULCOLAX) suppository 10 mg  10 mg Rectal DAILY PRN    sodium chloride (NS) flush 5-10 mL  5-10 mL IntraVENous Multiple    phosphorus (K PHOS NEUTRAL) 250 mg tablet 1 Tab  1 Tab Oral BID    albumin human 25% (BUMINATE) solution 12.5 g  12.5 g IntraVENous Q8H PRN    balsam peru-castor oil (VENELEX)  mg/gram ointment   Topical BID    docusate sodium (COLACE) capsule 100 mg  100 mg Oral BID    polyvinyl alcohol (LIQUIFILM TEARS) 1.4 % ophthalmic solution 2 Drop  2 Drop Both Eyes TID    sodium chloride (NS) flush 10 mL  10 mL InterCATHeter PRN    alteplase (CATHFLO) 1 mg in sterile water (preservative free) 1 mL injection  1 mg InterCATHeter PRN    bacitracin 500 unit/gram packet 1 Packet  1 Packet Topical PRN    glucose chewable tablet 16 g  4 Tab Oral PRN    dextrose (D50W) injection syrg 12.5-25 g  12.5-25 g IntraVENous PRN    glucagon (GLUCAGEN) injection 1 mg  1 mg IntraMUSCular PRN    sodium chloride (NS) flush 5-10 mL  5-10 mL IntraVENous PRN ______________________________________________________________________  EXPECTED LENGTH OF STAY: 4d 21h  ACTUAL LENGTH OF STAY:          24                 Johnita Cockayne, MD

## 2017-04-01 NOTE — PROGRESS NOTES
Bedside and Verbal shift change report given to [facility] [place] [id number] by Kandace Mai. Report included the following information SBAR, Kardex, [facility], Recent Results and Cardiac Rhythm Paced. 0830: Ordered KUB to determine if dobhoff tube in place. 6161: XRAY at bedside    1000: XRAY report says dobhoff in stomach will turn pt and try to get tube to advance    1030: MD at bedside. Chest tube output 350 ml after turn. Richardson emptied. Pt off Bipap for short trial O2 level down to 82%.    1200: Daughter Keshia Cunha at bedside    1400: Bedside and Verbal shift change report given to [employer] by Martin Bailey. Report included the following information SBAR, Kardex, [facility], Recent Results and Cardiac Rhythm Paced.

## 2017-04-01 NOTE — CONSULTS
[facility]   Consult   NAME:  Saqib Mohr   :   1930   MRN:   [id number]  PCP:  Kim Hawkins MD    Date:  2017    Date of  Admission: 3/8/2017     Asked to see patient by Dr. Kathe Abebe for management of heart failure. IMPRESSION/PLAN:     Acute on chronic systolic heart failure, NYHA Class IV, EF 25-30%  Begin milrinone 0.2 mcg/kg/min  Bumex 2 mg IV BID  Hold BB, ACEi until BP stable  Trend BNP  Strict I/O, daily weights  Na+ restricted diet     MR, Severe  [facility] following  ? Surgical plans    Acute on chronic respiratory failure  Plans per pulmonary  Diurese  BiPAP PRN    Right pleural effusion  S/p thoracentesis, CT placement    Hypernatremia  Monitor neuro status, labs    Sepsis, POA  ID consult noted  BC, urine culture NGTD  Abx complete    Atrial fibrillation  Rate controlled  Eliquis    Pancytopenia  Likely d/t passive congestion -> hepatic dysfunction    DM Type II  SSI    Subjective:     Saqib Mohr is a 80 y.o. female initially admitted for Hypothermia. She presented to the ED with chills and abdominal pain, with a temp of 91.  nitial workup was unremarkable. Since admission, she has had progressive issues with acute respiratory failure, including serial intubations and dependence upon BiPAP. A TTE performed 3/9 showed mild-moderate MR and TR. However, Ms. Lillian Dobbins continued to exhibit signs of hemodynamic instability. A AMMNO performed today (3/31) showed that her MR had progressed from mild-moderate to 4+, severe. Patient nods head \"no\" when asked about pain. Unable to obtain additional ROS d/t patient's status.       Past Medical History:   Diagnosis Date    Atrial fibrillation (HCC)     Chronic systolic heart failure (HCC)     ef 35-40%; hold ACE due to lower bp    Diabetes mellitus type 2, insulin dependent ([facility] [address]     Hypertension     Long term (current) use of anticoagulants     Malignant hypertensive heart disease with CHF [facility])     Mitral regurgitation     Pacemaker 10/25/2012    The pacemaker is a [employer], model # [id number], serial [id number].    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, 1996    # 28 Turner-Manuel    Thyroid disease     TR (tricuspid regurgitation)     moderate to severe echo 2009      Past Surgical History:   Procedure Laterality Date    CARDIAC SURG PROCEDURE UNLIST  1996    valve replacement    HX HEART CATHETERIZATION  10/4/2012    normal cors, LVEF 50%    HX HEART VALVE SURGERY      HX PACEMAKER      INS PPM/ICD LED DUAL ONLY  10/25/2012         INS PPM/ICD LED DUAL ONLY  9/9/2016          Family History   Problem Relation Age of Onset    Cancer Sister      breast    Heart Disease Brother     Heart Disease Brother     Diabetes Son     Stroke Son       Social History   Substance Use Topics    Smoking status: Never Smoker    Smokeless tobacco: Never Used    Alcohol use No      Current Facility-Administered Medications   Medication Dose Route Frequency    milrinone (PRIMACOR) 20 MG/100 ML D5W infusion  0.2 mcg/kg/min IntraVENous CONTINUOUS    bumetanide (BUMEX) injection 2 mg  2 mg IntraVENous BID    potassium chloride (KAON 10%) 20 mEq/15 mL oral liquid 40 mEq  40 mEq Per NG tube DAILY    insulin lispro (HUMALOG) injection   SubCUTAneous Q6H    apixaban (ELIQUIS) tablet 2.5 mg  2.5 mg Per NG tube BID    pantoprazole (PROTONIX) 2 mg/mL oral suspension 40 mg  40 mg Per NG tube ACB    ELECTROLYTE REPLACEMENT PROTOCOL  1 Each Other PRN    ELECTROLYTE REPLACEMENT PROTOCOL  1 Each Other PRN    albuterol-ipratropium (DUO-NEB) 2.5 MG-0.5 MG/3 ML  3 mL Nebulization TID PRN    propofol (DIPRIVAN) infusion  5-50 mcg/kg/min IntraVENous TITRATE    chlorhexidine (PERIDEX) 0.12 % mouthwash 10 mL  10 mL Oral Q12H    bisacodyl (DULCOLAX) suppository 10 mg  10 mg Rectal DAILY PRN    sodium chloride (NS) flush 5-10 mL  5-10 mL IntraVENous Multiple    phosphorus (K PHOS NEUTRAL) 250 mg tablet 1 Tab  1 Tab Oral BID    albumin human 25% (BUMINATE) solution 12.5 g  12.5 g IntraVENous Q8H PRN    balsam peru-castor oil (VENELEX)  mg/gram ointment   Topical BID    docusate sodium (COLACE) capsule 100 mg  100 mg Oral BID    polyvinyl alcohol (LIQUIFILM TEARS) 1.4 % ophthalmic solution 2 Drop  2 Drop Both Eyes TID    sodium chloride (NS) flush 10 mL  10 mL InterCATHeter PRN    alteplase (CATHFLO) 1 mg in sterile water (preservative free) 1 mL injection  1 mg InterCATHeter PRN    bacitracin 500 unit/gram packet 1 Packet  1 Packet Topical PRN    glucose chewable tablet 16 g  4 Tab Oral PRN    dextrose (D50W) injection syrg 12.5-25 g  12.5-25 g IntraVENous PRN    glucagon (GLUCAGEN) injection 1 mg  1 mg IntraMUSCular PRN    sodium chloride (NS) flush 5-10 mL  5-10 mL IntraVENous PRN          Objective:     Visit Vitals    /59    Pulse 82    Temp 97.9 °F (36.6 °C)    Resp 23    Ht 5' 6\" (1.676 m)    Wt 164 lb 10.9 oz (74.7 kg)    SpO2 91%    BMI 26.58 kg/m2             General:  Appears fatigued, non-verbal    Neck:  supple, no significant adenopathy    Heart:  Irregularly irregular, +heave    CVP:  ~20  cm  ( +) HJR    Murmur: IV/VI systolic murmur    Lungs: Bilateral rales, right CT in place draining serous fluid    Abdomen: Soft, NTTP    Extremity: 3+ pitting LE edema    Neuro: [id number]    O2 Flow Rate (L/min): 6 l/min O2 Device: BIPAP    Temp (24hrs), Av.6 °F (36.4 °C), Min:97.4 °F (36.3 °C), Max:97.9 °F (36.6 °C)      Admission Weight: Last Weight   Weight: 150 lb (68 kg) Weight: 164 lb 10.9 oz (74.7 kg)         Intake/Output Summary (Last 24 hours) at 17  Last data filed at 17   Gross per 24 hour   Intake                0 ml   Output              790 ml   Net             -790 ml       Cardiographics:  Telemetry: atrial fibrillation, frequent PVCs    Echocardiogram: LEFT VENTRICLE: Size was normal. Systolic function was moderately to  markedly reduced. Ejection fraction was estimated in the range of 25 % to  30 %. There was moderate diffuse hypokinesis. Wall thickness was normal.    RIGHT VENTRICLE: The size was normal. Systolic function was mildly  reduced. LEFT ATRIUM: The atrium was severely dilated. ATRIAL SEPTUM: The atrial septum appeared intact. RIGHT ATRIUM: The atrium was moderately dilated. MITRAL VALVE: There was moderate thickening. There was moderate  calcification of the posterior leaflet. There was severely restricted  mobility of the posterior leaflet. DOPPLER: The findings were consistent  with mild mitral stenosis. There was severe regurgitation. Regurgitation  grade was 4+ on a scale of 0 to 4+. The regurgitant jet was centrally  directed. AORTIC VALVE: Leaflets exhibited sclerosis without stenosis. DOPPLER:  Transaortic velocity was within the normal range. There was no stenosis. There was mild regurgitation. TRICUSPID VALVE: Normal valve structure. DOPPLER: There was moderate  regurgitation. Pulmonary artery systolic pressure: 70 mmHg. There was  moderate to severe pulmonary hypertension. PULMONIC VALVE: Not well visualized, but normal Doppler findings. AORTA: The root exhibited normal size. PERICARDIUM: There was no pericardial effusion. There was a large left  pleural effusion. SYSTEM MEASUREMENT TABLES    2D  Ao Diam: 3.45 cm  Ao asc: 3.37 cm  LA Diam: 6.66 cm  LAAs A2C: 46.51 cm2  LAAs A4C: 47.48 cm2  LAESV A-L A2C: 220.97 ml  LAESV A-L A4C: 219.46 ml  LAESV Index (A-L): 122.59 ml/m2  LAESV MOD A2C: 214.33 ml  LAESV MOD A4C: 197.41 ml  LAESV(A-L): 225.56 ml  LALs A2C: 8.31 cm  LALs A4C: 8.72 cm  %FS: 21.52 %  EDV(Teich): 219.5 ml  EF(Teich): 42.62 %  ESV(Teich): 125.95 ml  IVSd: 1.08 cm  LVIDd: 6.55 cm  LVIDs: 5.14 cm  LVPWd: 0.7 cm  SV(Teich): 93.55 ml  RA Diam: 5.52 cm  RVIDd: 5.06 cm  IVC: 3.25 cm    CF  MR Als. Spencer: 0.32 m/s  MR Flow: 401.25 ml/s  MR Rad: 1.42 cm    CW  AR Dec [place]: 2.14 m/s2  AR Dec Time: 1817.97 ms  AR PHT: 527.21 ms  AR maxP.12 mmHg  AV Env. Ti: 319.7 ms  AV VTI: 28.32 cm  AV Vmax: 1.44 m/s  AV Vmean: 0.89 m/s  AV maxP.3 mmHg  AV meanPG: 3.55 mmHg  IVRT: 91.34 ms  AR Vmax: 3.81 m/s  MR VTI: 165.08 cm  MR Vmax: 4.78 m/s  MR Vmean: 3.65 m/s  MR maxP.54 mmHg  MR meanP.28 mmHg  MV VTI: 40.86 cm  MV Vmax: 2.17 m/s  MV Vmean: 1.06 m/s  MV maxP.81 mmHg  MV meanP.53 mmHg  PRend PG: 15.63 mmHg  PRend Vmax: 1.98 m/s  TR Vmax: 3.84 m/s  TR maxP.96 mmHg  HR: 68.47 BPM      Care Plan discussed with:    Comments   Patient x    Family      RN     Care Manager                    Consultant:  alfredo Chappell       Data Review:     Labs:  Recent Results (from the past 24 hour(s))   GLUCOSE, POC    Collection Time: 17 12:47 AM   Result Value Ref Range    Glucose (POC) 90 65 - 100 mg/dL    Performed by Donnie Garcia    BNP    Collection Time: 17  3:02 AM   Result Value Ref Range    BNP 1636 (H) 0 - 100 pg/mL   CBC WITH AUTOMATED DIFF    Collection Time: 17  3:02 AM   Result Value Ref Range    WBC 2.5 (L) 3.6 - 11.0 K/uL    RBC 4.06 3.80 - 5.20 M/uL    HGB 9.4 (L) 11.5 - 16.0 g/dL    HCT 30.2 (L) 35.0 - 47.0 %    MCV 74.4 (L) 80.0 - 99.0 FL    MCH 23.2 (L) 26.0 - 34.0 PG    MCHC 31.1 30.0 - 36.5 g/dL    RDW 20.1 (H) 11.5 - 14.5 %    PLATELET 624 (L) 727 - 400 K/uL    NEUTROPHILS 71 32 - 75 %    LYMPHOCYTES 13 12 - 49 %    MONOCYTES 8 5 - 13 %    EOSINOPHILS 7 0 - 7 %    BASOPHILS 1 0 - 1 %    ABS. NEUTROPHILS 1.8 1.8 - 8.0 K/UL    ABS. LYMPHOCYTES 0.3 (L) 0.8 - 3.5 K/UL    ABS. MONOCYTES 0.2 0.0 - 1.0 K/UL    ABS. EOSINOPHILS 0.2 0.0 - 0.4 K/UL    ABS.  BASOPHILS 0.0 0.0 - 0.1 K/UL    DF MANUAL      RBC COMMENTS ANISOCYTOSIS  2+        RBC COMMENTS POLYCHROMASIA  1+        RBC COMMENTS TARGET CELLS  PRESENT        RBC COMMENTS HYPOCHROMIA  1+        RBC COMMENTS BASOPHILIC STIPPLING  PRESENT        RBC COMMENTS MICROCYTOSIS  1+       MAGNESIUM Collection Time: 03/31/17  3:02 AM   Result Value Ref Range    Magnesium 2.2 1.6 - 2.4 mg/dL   METABOLIC PANEL, COMPREHENSIVE    Collection Time: 03/31/17  3:02 AM   Result Value Ref Range    Sodium 150 (H) 136 - 145 mmol/L    Potassium 3.9 3.5 - 5.1 mmol/L    Chloride 110 (H) 97 - 108 mmol/L    CO2 32 21 - 32 mmol/L    Anion gap 8 5 - 15 mmol/L    Glucose 84 65 - 100 mg/dL    BUN 13 6 - 20 MG/DL    Creatinine 0.51 (L) 0.55 - 1.02 MG/DL    BUN/Creatinine ratio 25 (H) 12 - 20      GFR est AA >60 >60 ml/min/1.73m2    GFR est non-AA >60 >60 ml/min/1.73m2    Calcium 9.8 8.5 - 10.1 MG/DL    Bilirubin, total 1.8 (H) 0.2 - 1.0 MG/DL    ALT (SGPT) 12 12 - 78 U/L    AST (SGOT) 17 15 - 37 U/L    Alk.  phosphatase 50 45 - 117 U/L    Protein, total 5.8 (L) 6.4 - 8.2 g/dL    Albumin 2.1 (L) 3.5 - 5.0 g/dL    Globulin 3.7 2.0 - 4.0 g/dL    A-G Ratio 0.6 (L) 1.1 - 2.2     PHOSPHORUS    Collection Time: 03/31/17  3:02 AM   Result Value Ref Range    Phosphorus 2.3 (L) 2.6 - 4.7 MG/DL   GLUCOSE, POC    Collection Time: 03/31/17  5:55 AM   Result Value Ref Range    Glucose (POC) 76 65 - 100 mg/dL    Performed by Histrosthomase Kind    GLUCOSE, POC    Collection Time: 03/31/17  5:59 AM   Result Value Ref Range    Glucose (POC) 88 65 - 100 mg/dL    Performed by Karolee Kind    POC G3 - PUL    Collection Time: 03/31/17  6:10 AM   Result Value Ref Range    FIO2 (POC) 40 %    pH (POC) 7.326 (L) 7.35 - 7.45      pCO2 (POC) 62.0 (H) 35.0 - 45.0 MMHG    pO2 (POC) 105 (H) 80 - 100 MMHG    HCO3 (POC) 32.4 (H) 22 - 26 MMOL/L    sO2 (POC) 97 92 - 97 %    Base excess (POC) 6 mmol/L    Site LEFT RADIAL      Device: BIPAP      PEEP/CPAP (POC) 6 cmH2O    Pressure support 8 cmH2O    Allens test (POC) YES      Specimen type (POC) ARTERIAL     GLUCOSE, POC    Collection Time: 03/31/17 12:50 PM   Result Value Ref Range    Glucose (POC) 90 65 - 100 mg/dL    Performed by [address] [place] [address]    Collection Time: 03/31/17  2:50 PM   Result Value Ref Range    Sodium 149 (H) 136 - 145 mmol/L    Potassium 4.3 3.5 - 5.1 mmol/L    Chloride 110 (H) 97 - 108 mmol/L    CO2 34 (H) 21 - 32 mmol/L    Anion gap 5 5 - 15 mmol/L    Glucose 92 65 - 100 mg/dL    BUN 14 6 - 20 MG/DL    Creatinine 0.57 0.55 - 1.02 MG/DL    BUN/Creatinine ratio 25 (H) 12 - 20      GFR est AA >60 >60 ml/min/1.73m2    GFR est non-AA >60 >60 ml/min/1.73m2    Calcium 10.5 (H) 8.5 - 10.1 MG/DL    Bilirubin, total 2.0 (H) 0.2 - 1.0 MG/DL    ALT (SGPT) 13 12 - 78 U/L    AST (SGOT) 22 15 - 37 U/L    Alk. phosphatase 55 45 - 117 U/L    Protein, total 6.5 6.4 - 8.2 g/dL    Albumin 2.3 (L) 3.5 - 5.0 g/dL    Globulin 4.2 (H) 2.0 - 4.0 g/dL    A-G Ratio 0.5 (L) 1.1 - 2.2     MAGNESIUM    Collection Time: 03/31/17  2:50 PM   Result Value Ref Range    Magnesium 2.3 1.6 - 2.4 mg/dL   PHOSPHORUS    Collection Time: 03/31/17  2:50 PM   Result Value Ref Range    Phosphorus 2.8 2.6 - 4.7 MG/DL   CELL COUNT, BODY FLUID    Collection Time: 03/31/17  5:05 PM   Result Value Ref Range    BODY FLUID TYPE PLEURAL FLUID      FLUID COLOR YELLOW      FLUID APPEARANCE CLEAR      FLUID RBC COUNT >100 /cu mm    FLD NUCLEATED CELLS 80 (H) 0 - 5 /cu mm    FLD SEGS 28 %    FLD LYMPHS 50 %    FLD MONO/MACROPHAGE 12 %    FLUID MESOTHELIAL 10 %   LDH, BODY FLUID    Collection Time: 03/31/17  5:05 PM   Result Value Ref Range    Fluid Type: PLEURAL FLUID      LD, body fld. 86 U/L   CULTURE, BODY FLUID Erinla Ben STAIN    Collection Time: 03/31/17  5:05 PM   Result Value Ref Range    Special Requests: NO SPECIAL REQUESTS      GRAM STAIN OCCASIONAL  WBCS SEEN        GRAM STAIN NO ORGANISMS SEEN      Culture result: PENDING    PROTEIN TOTAL, FLUID    Collection Time: 03/31/17  5:05 PM   Result Value Ref Range    Fluid Type: PLEURAL FLUID      Protein total, body fld. 1.3 g/dL   GLUCOSE, FLUID    Collection Time: 03/31/17  5:05 PM   Result Value Ref Range    Fluid Type: PLEURAL FLUID      Glucose, body fld.  92 MG/DL GLUCOSE, POC    Collection Time: 03/31/17  5:54 PM   Result Value Ref Range    Glucose (POC) 83 65 - 100 mg/dL    Performed by Elliott Crook            Thank you for letting us see Mrs Ganesh Hoffman with you. IRLANDA Galloway [phone] [facility] [address] [facility], [address] [address]  Office: [phone]  24 hour VAD Pager: [phone]        Patient seen and examined. Data and note reviewed. I have discussed and agree with the plans as noted. Ms. Ganesh Hoffman is a 80year old female with a history of HFrEF and volume overload. Recommend initiation of IV milrinone and IV bumex to enhance diuresis. Thank you for allowing us to participate in your patient's care. Joycelyn Diop MD, [place] [id number] Director    [address] [place] [address]  [address] [facility], [address] [place] [address], [address] [place] [address]  Office [phone]  Fax [phone]

## 2017-04-01 NOTE — PROGRESS NOTES
Bedside and Verbal shift change report given to Denisha Jang (oncoming nurse) by Vince Dyer (offgoing nurse). Report included the following information SBAR, Kardex, Procedure Summary, Intake/Output, MAR, Accordion, Recent Results, Med Rec Status and Cardiac Rhythm Paced.

## 2017-04-01 NOTE — PROGRESS NOTES
[facility]  -Progress Note     Harman Clark [phone]   4/1/2017      Elke Pastrana M.D. , F.A.C.C.   --------PCP:-Bc Vazquez MD   -----Subjective:   . Yissel Sprung Yissel Sprung Yissel Sprung Yu Trotter is a 80 y.o. female  Opens eyes spontaneously  Nods \"no\" when asked if having pain, SOB, or chest pain. Off vasopressors       Assessment/Plan/Discussion:Cardiology Attending:        Yu Trotter is a 80 y.o. female     Thoracentesis yesterday, seen by ID, now on milrinone, sodium 150, severe MR, options limited. She has no complaints. Chanel Delgado MD 4/1/2017             Discussion/Plans/Recs  1. Acute on chronic respiratory failure: ABG today on biPAP PH 7.32, pCO2 62 pO2= 105,  HCO3= 32.4  -Pulmonary following- remains on biPAP    2) Chronic Systolic CHF: NYHA stage IV BNP 1636 today from 972 3/29. Net - 801 mls in 24 hours. Continues with BLE, no BUE edema noted  -Sodium 150- will hold Lasix for now. -Repeat TTE final read pending  -BiV AICD  -ACE held due to low BP- low normal BP-  now off pressors. Continue to hold ACE-I  -Continue to hold BB due to low BP/on pressors      3) MR-systolic murmur . No vegetation on valves per AMMON. -Preliminary review of TTE with problematic MR. Will ask Valve team to eval for mitraclip candidacy. 4. Hypernatremia:  Sodium 150.  -Will hold lasix for now. 5)Afib: -[id number]= 6 (age, CHF, HTN, female, DM)    -Continue Eliquis 2.5 mg BID. -ERASTO is very large but no clot, would continue to use OAC cautiously for stroke prophylaxis  (hgb stable)    6). Anemia:  Hgb 9.4 from 10.1 yesterday. 7). Advanced care planning:- Palliative care re-consulted. Pt remains full code status. Dr. Abhay Truong available today to meet with family to discuss status- contacted Kevin. Cardiac Studies/Hx:  AMMON 3/20 with no vegetation, Same EF 35 and 1-2+ MR, TR as expected  ECHO: 3/9/17: EF 30%.  Mod diffuse hyypokinesis, Wall motion is dyssynchronous, RA mildly dilated. LA mod-severe dilated, Mod MR, AV: In the parasternal long axis view, faintly seen spherical echodensity about 5mms in diameter that seems to move in  relation to the anterior aortic valve leaflet, on the aortic side. Could represent a vegetation. Mod TR, mild RV systolic hypertension. 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 PA pressure of 61 with moderate pulmonary hypertension. 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 Moderate to severe TR, moderate to severe pulmonary hypertension. EF 35-40%, marked LAE, mild JOAN    PACER check 2-11-13 39,288 episodes of atrial tach/flutter and 11 with high rates  1/2014 noises noted on pacer, 260 thousands AMS , some are noises and some are AT, some PMT (adjustment made, Dr Mcdermott Risk discussed with pacer clinic)  Pacer check in May 2013 showed NSVT and AF the latter as long as 1 day with occasional RVR  Pacer check 9/9/13: persistent AT with V paced. 92%  (asymptomatic with AT)    CATH 10-24-12= Normal cors and EF 50 %. -- A annular ring mitral position. Mitral valve repair on July 29, 1996, for mitral regurgitation with a #32 Turner-Manuel ring. Chronic systolic CHF, reduced ejection fraction. NYHA 1-2  . ..admit 7/16/09 mild chf exacerbation  #32 Turner Manuel ring MV repair 1996         Past Medical History:   Diagnosis Date    Atrial fibrillation (HCC)     Chronic systolic heart failure (HCC)     ef 35-40%; hold ACE due to lower bp    Diabetes mellitus type 2, insulin dependent ([facility] [address]     Hypertension     Long term (current) use of anticoagulants     Malignant hypertensive heart disease with CHF ([facility] [address]     Mitral regurgitation     Pacemaker 10/25/2012    The pacemaker is a [employer], model # [id number], serial [id number]. 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 Results for orders placed or performed during the hospital encounter of 03/08/17   EKG, 12 LEAD, INITIAL   Result Value Ref Range    Ventricular Rate 80 BPM    Atrial Rate 75 BPM    QRS Duration 146 ms    Q-T Interval 480 ms    QTC Calculation (Bezet) 553 ms    Calculated R Axis 158 degrees    Calculated T Axis 5 degrees    Diagnosis       Ventricular-paced rhythm  When compared with ECG of 09-SEP-2016 17:36,  Vent. rate has decreased BY  30 BPM  Confirmed by Hallie Davis M.D., Shama Méndez ([id number]) on 3/9/2017 10:15:16 AM     Results for orders placed or performed in visit on 03/27/12   AMB POC EKG ROUTINE W/ 12 LEADS, INTER & REP    Narrative    See scanned report.         Vitals:    04/01/17 0600 04/01/17 0700 04/01/17 0737 04/01/17 0800   BP: 108/51 108/49  111/51   BP 1 Location:       BP Patient Position:       Pulse: 77 77  73   Resp: 18 20  16   Temp:    97.3 °F (36.3 °C)   SpO2: 99% 99% 100% 99%   Weight:       Height:           Objective:    Physical Exam:   Patient Vitals for the past 12 hrs:   Temp Pulse Resp BP SpO2   04/01/17 0800 97.3 °F (36.3 °C) 73 16 111/51 99 %   04/01/17 0737 - - - - 100 %   04/01/17 0700 - 77 20 108/49 99 %   04/01/17 0600 - 77 18 108/51 99 %   04/01/17 0500 - 79 16 111/48 99 %   04/01/17 0400 97.6 °F (36.4 °C) 75 16 108/53 98 %   04/01/17 0300 - 74 14 113/50 100 %   04/01/17 0200 - 77 21 98/47 98 %   04/01/17 0100 - 75 23 108/75 98 %   04/01/17 0000 98 °F (36.7 °C) 77 22 111/52 98 %   03/31/17 2330 - 82 24 120/58 97 %   03/31/17 2300 - 80 26 106/65 95 %   03/31/17 2230 - 84 28 104/56 97 %   03/31/17 2215 - - - - 96 %   03/31/17 2200 - 79 25 105/48 96 %   03/31/17 2130 - 80 25 100/58 97 %      General:    Opens eyes spontaneously, appears weak. ENT, Neck:  supple   Chest Wall: No chest wall deformities   Lung: Clear to ausculation bilaterally, on mechanical ventilatin   Heart:  no gallops noted, RRR, no JVD 3/6 systolic early/decresendo  murmur at RUSB to LUSB without radiation beyond apex, best heard at apex   Abdomen: Nondistended, + bowel sounds, no grimacing to palpation    Richardson with mast urine. No hematuria   Extremities: extremities normal, 1-2+ BLE edema (pedal/ankle more prominent), No BUE edema       Last 24hr Input/Output:    Intake/Output Summary (Last 24 hours) at 04/01/17 0919  Last data filed at 04/01/17 0800   Gross per 24 hour   Intake           804.63 ml   Output             3005 ml   Net         -2200.37 ml        Data Review:   Recent Results (from the past 24 hour(s))   GLUCOSE, POC    Collection Time: 03/31/17 12:50 PM   Result Value Ref Range    Glucose (POC) 90 65 - 100 mg/dL    Performed by [facility]    METABOLIC PANEL, COMPREHENSIVE    Collection Time: 03/31/17  2:50 PM   Result Value Ref Range    Sodium 149 (H) 136 - 145 mmol/L    Potassium 4.3 3.5 - 5.1 mmol/L    Chloride 110 (H) 97 - 108 mmol/L    CO2 34 (H) 21 - 32 mmol/L    Anion gap 5 5 - 15 mmol/L    Glucose 92 65 - 100 mg/dL    BUN 14 6 - 20 MG/DL    Creatinine 0.57 0.55 - 1.02 MG/DL    BUN/Creatinine ratio 25 (H) 12 - 20      GFR est AA >60 >60 ml/min/1.73m2    GFR est non-AA >60 >60 ml/min/1.73m2    Calcium 10.5 (H) 8.5 - 10.1 MG/DL    Bilirubin, total 2.0 (H) 0.2 - 1.0 MG/DL    ALT (SGPT) 13 12 - 78 U/L    AST (SGOT) 22 15 - 37 U/L    Alk.  phosphatase 55 45 - 117 U/L Protein, total 6.5 6.4 - 8.2 g/dL    Albumin 2.3 (L) 3.5 - 5.0 g/dL    Globulin 4.2 (H) 2.0 - 4.0 g/dL    A-G Ratio 0.5 (L) 1.1 - 2.2     MAGNESIUM    Collection Time: 03/31/17  2:50 PM   Result Value Ref Range    Magnesium 2.3 1.6 - 2.4 mg/dL   PHOSPHORUS    Collection Time: 03/31/17  2:50 PM   Result Value Ref Range    Phosphorus 2.8 2.6 - 4.7 MG/DL   CELL COUNT, BODY FLUID    Collection Time: 03/31/17  5:05 PM   Result Value Ref Range    BODY FLUID TYPE PLEURAL FLUID      FLUID COLOR YELLOW      FLUID APPEARANCE CLEAR      FLUID RBC COUNT >100 /cu mm    FLD NUCLEATED CELLS 80 (H) 0 - 5 /cu mm    FLD SEGS 28 %    FLD LYMPHS 50 %    FLD MONO/MACROPHAGE 12 %    FLUID MESOTHELIAL 10 %   LDH, BODY FLUID    Collection Time: 03/31/17  5:05 PM   Result Value Ref Range    Fluid Type: PLEURAL FLUID      LD, body fld. 86 U/L   CULTURE, BODY FLUID Edmonia Georgia STAIN    Collection Time: 03/31/17  5:05 PM   Result Value Ref Range    Special Requests: NO SPECIAL REQUESTS      GRAM STAIN OCCASIONAL  WBCS SEEN        GRAM STAIN NO ORGANISMS SEEN      Culture result: PENDING    PROTEIN TOTAL, FLUID    Collection Time: 03/31/17  5:05 PM   Result Value Ref Range    Fluid Type: PLEURAL FLUID      Protein total, body fld. 1.3 g/dL   GLUCOSE, FLUID    Collection Time: 03/31/17  5:05 PM   Result Value Ref Range    Fluid Type: PLEURAL FLUID      Glucose, body fld.  92 MG/DL   GLUCOSE, POC    Collection Time: 03/31/17  5:54 PM   Result Value Ref Range    Glucose (POC) 83 65 - 100 mg/dL    Performed by Rosa White, POC    Collection Time: 03/31/17 11:37 PM   Result Value Ref Range    Glucose (POC) 71 65 - 100 mg/dL    Performed by [address], POC    Collection Time: 03/31/17 11:55 PM   Result Value Ref Range    Glucose (POC) 114 (H) 65 - 100 mg/dL    Performed by Brendon CM WITH AUTOMATED DIFF    Collection Time: 04/01/17  4:03 AM   Result Value Ref Range    WBC 2.2 (L) 3.6 - 11.0 K/uL    RBC 3.91 3.80 - 5.20 M/uL    HGB 8.8 (L) 11.5 - 16.0 g/dL    HCT 29.5 (L) 35.0 - 47.0 %    MCV 75.4 (L) 80.0 - 99.0 FL    MCH 22.5 (L) 26.0 - 34.0 PG    MCHC 29.8 (L) 30.0 - 36.5 g/dL    RDW 19.8 (H) 11.5 - 14.5 %    PLATELET 899 (L) 568 - 400 K/uL    NEUTROPHILS 60 32 - 75 %    LYMPHOCYTES 23 12 - 49 %    MONOCYTES 12 5 - 13 %    EOSINOPHILS 4 0 - 7 %    BASOPHILS 1 0 - 1 %    ABS. NEUTROPHILS 1.3 (L) 1.8 - 8.0 K/UL    ABS. LYMPHOCYTES 0.5 (L) 0.8 - 3.5 K/UL    ABS. MONOCYTES 0.3 0.0 - 1.0 K/UL    ABS. EOSINOPHILS 0.1 0.0 - 0.4 K/UL    ABS. BASOPHILS 0.0 0.0 - 0.1 K/UL    DF SMEAR SCANNED      RBC COMMENTS ANISOCYTOSIS  2+        RBC COMMENTS TARGET CELLS  PRESENT        RBC COMMENTS MICROCYTOSIS  1+        RBC COMMENTS HYPOCHROMIA  1+       MAGNESIUM    Collection Time: 04/01/17  4:03 AM   Result Value Ref Range    Magnesium 2.2 1.6 - 2.4 mg/dL   METABOLIC PANEL, COMPREHENSIVE    Collection Time: 04/01/17  4:03 AM   Result Value Ref Range    Sodium 150 (H) 136 - 145 mmol/L    Potassium 3.4 (L) 3.5 - 5.1 mmol/L    Chloride 108 97 - 108 mmol/L    CO2 34 (H) 21 - 32 mmol/L    Anion gap 8 5 - 15 mmol/L    Glucose 96 65 - 100 mg/dL    BUN 15 6 - 20 MG/DL    Creatinine 0.56 0.55 - 1.02 MG/DL    BUN/Creatinine ratio 27 (H) 12 - 20      GFR est AA >60 >60 ml/min/1.73m2    GFR est non-AA >60 >60 ml/min/1.73m2    Calcium 9.9 8.5 - 10.1 MG/DL    Bilirubin, total 1.9 (H) 0.2 - 1.0 MG/DL    ALT (SGPT) 11 (L) 12 - 78 U/L    AST (SGOT) 19 15 - 37 U/L    Alk.  phosphatase 47 45 - 117 U/L    Protein, total 5.8 (L) 6.4 - 8.2 g/dL    Albumin 2.2 (L) 3.5 - 5.0 g/dL    Globulin 3.6 2.0 - 4.0 g/dL    A-G Ratio 0.6 (L) 1.1 - 2.2     PHOSPHORUS    Collection Time: 04/01/17  4:03 AM   Result Value Ref Range    Phosphorus 1.9 (L) 2.6 - 4.7 MG/DL   GLUCOSE, POC    Collection Time: 04/01/17  5:51 AM   Result Value Ref Range    Glucose (POC) 101 (H) 65 - 100 mg/dL    Performed by Zoila Pelaez    POC G3 - PUL    Collection Time: 04/01/17  6:02 AM   Result Value Ref Range    FIO2 (POC) 40 %    pH (POC) 7.384 7.35 - 7.45      pCO2 (POC) 61.6 (H) 35.0 - 45.0 MMHG    pO2 (POC) 87 80 - 100 MMHG    HCO3 (POC) 36.8 (H) 22 - 26 MMOL/L    sO2 (POC) 96 92 - 97 %    Base excess (POC) 12 mmol/L    Site LEFT RADIAL      Device: BIPAP      PEEP/CPAP (POC) 6 cmH2O    PIP (POC) 14      Pressure support 8 cmH2O    Allens test (POC) YES      Specimen type (POC) ARTERIAL      Total resp.  rate 22      Spontaneous timed Joseph Romero MD 4/1/2017

## 2017-04-01 NOTE — PROGRESS NOTES
1400:  Received handover from [facility], assumed care. Patient BIPAP dependent, family at bedside, chest tube to water seal. Family at bedside. VSS, will continue to monitor. 1600:  Patient turned and repositioned. Oral care provided while in BIPAP. VSS.     1700: Allowing patient break of BIPAP, maintaining sats >96% on nasal cannula 6lpm.  Will continue to monitor. 1715:  Dr. Tyrel Hernandez at bedside. 1930:  Bedside, Verbal and Written shift change report given to Suman Orourke (oncoming nurse) by Mirlande Ni (offgoing nurse). Report included the following information SBAR, Kardex, Procedure Summary, Intake/Output, MAR, Recent Results, Med Rec Status and Cardiac Rhythm Paced with PVCs.

## 2017-04-01 NOTE — PROGRESS NOTES
Bedside and Verbal shift change report given to Robyn Bearden, (oncoming nurse) by Morgan Samayoa RN (offgoing nurse). Report included the following information SBAR, Kardex, MAR and Recent Results.

## 2017-04-02 NOTE — PROGRESS NOTES
Hospitalist Progress Note  Luis Charles MD   Office: [phone]          Date of Service:  2017  NAME:  Santiago Carter  :  1930  MRN:  [id number]      Admission Summary:   25-year-old female with history of chronic systolic congestive heart failure, atrial fibrillation, diabetes mellitus, hypertension, chronic anticoagulation use, history of pacemaker and ICD. She was sent from her primary care physician to the emergency room to be evaluated. History from the patient and her daughters at the bedside, also from the records. Per the daughter, she has been having on and off abdominal pain, epigastric pain for a while, over the last 3 days has been mostly pronounced. She said her pain at times is 9/10 in severity, it is nonradiating, associated with some nausea, but no vomiting. She denies any diarrhea. At her PCP's office,   they could not get her temperature, so they sent her to the emergency room. In the ER she has been hypothermic, her temperature was 91.4 on initial presentation. Also she was hypotensive. Her blood pressure   systolic was 92/86, so she was given about 1.5 liters of IV fluid, and now she is getting 1 bottle of albumin. She was placed on warming blanket, Lizzy Hugger. She denies any dysuria or frequency. Interval history / Subjective:   Ms Jose Gaona came off BIPAP and is now on NC. She is sleeping. Several family members in the room. Assessment & Plan:     Acute on chronic respiratory failure with Hypercapnia due to Pleural effusion/atelectasis and acute on chronic Systolic CHF NYHA IV:  -0/14,URX is lethargic and high CO2  -3/257,intubated. 3/30, extubated.  -Lasix iv  -Palliative care Re-consulted. -She has periods of apnea,not sure of etiology. May consider brain CT head although clinically low suspicion for central causes,has ppm so cant do mri.      Pre-extubation on 3/30/2017 09:30 On BiPAP on 3/31/2017 06:10   pH 7.378 7.326 (L)   pCO2  55.5 (H) 62.0 (H)   pO2  97 105 (H)   HCO3 32.7 (H) 32.4 (H)   sO2  97 97   FIO2 (POC) 40 40   -Remained on BiPAP  -Received diamox 4/1  -4/2,off bipap, using NC day time       Hypothermia and hypotension: - resolved  On empiric IV antibiotics, on Zosyn and vancomycin. Blood culture: No growth x 5 days; Required the [facility] intermittently     Bilateral Pleural effusions and significant dependent atx: likely related to CHF  -IV diuresis  -I/O negative.  -3/31,US guided thoracentesis and CT placed,CT output >1200 last 24 hours. Sepsis:  Unknown source, most likely lung with bilateral pleural effusions, possibly infectious.    - AMMON: no vegetations;   - blood culture 3/08 and 3/11: no growth x 5 days;   - urine culture: no growth;   - 3/21: afebrile, no leukocytosis, cultures negative  -Off antibiotics. Patient completed 2 weeks of IV Abx since admission. Acute on Chronic Systolic congestive heart failure NYHA III/IV. With fluid overload. Echo 3/9 with 30% EF, diffuse hypokinesis. Bumex added, strict I/O, monitor weight. History of pacer. Valvular disease: Moderate mitral regurgitation, right ventricular systolic dysfunction,   as well as enlarged left atrium. Valvular vegetation on aortic valve, questionable: Blood cultures no growth preliminary.   - Diuresis per nephrology   - 3/21: AMMON showed no vegetations. - continue diuresis with lasix. -3/31,repeat echocardiogram,severe MR  -Dr Michi Jordan consulted per cardiology  -3/31,started on milrinone,advanced heart failure team on board. -    Hematuria: may be related to traumatic galarza placement with INR max 5.5,  Urine noted on UA as yellow on 3/8,   Galarza placed 3/9 and hematuria reported 3/10 UA. JUAN:  From  ATN and contrast nephropathy/Hypotension most likely etiology. Ischemic heart disease, hypotension; and exposure to IV contrast.   Appreciate nephro consult recommendations.    Creatinine normalized. Hypoglycemia/diabetes mellitus. Resolved. Holding her Lantus and on hypoglycemia protocol. Her blood sugar improved after D50. Stable, off D10 now  Resolved,getting tube feeds    Ischemic hepatitis:   Consulted GI. Plans for EGD when stable or outpatient. -LFTs resolved. Coagulopathy.  - Had hematuria. INR was supra therapeutic on warfarin which is now stopped. - 3/21: started patient on Eliquis for stroke prophylaxis in the setting of chronic Atrial Fibrillation and [id number] score of 6. Pancytopenia. - Appears to be chronic. However, patient and family unaware of her previous lab results. Will monitor her labs closely. Left cheek where it meets the nose pressure ulcer/injury:  - stage 2; measures 1 x 0.5 x 0 cm; 100% drying pink/red;  - with linear jac = 2 x 0.5 x 0 cm (smaller);  - Hospital Acquired;    Urinary retention:  - Richardson removed 3/21, decreased urine output, bladder scan revealed 900 ml of urine in the bladder per nursing report, Richardson was reinserted; (second attempt at removing Richardson during this hospitalization). - keep the Richardson in at discharge; outpatient f/u with Urology for voiding trials once patient is more ambulatory. Anemia:  - of chronic disease with slow decline in H&H during this hospitalization. - patient transfused 2 Units of PRBC's on 3/22,Hb stable    Hypokalemia: receiving IV.add kcl per NGT  UE doppler negative for DVT    Diet: NPO  -Tube feeds  Code status: Full    Care Plan discussed with: Patient/Family and Nurse  Disposition: TBD   Consult PT/OT and OOB. Discharge planning:TBD. Discussion on going for transcatheter MR repair. Extubated on 3/30. Hospital Problems  Date Reviewed: 3/8/2017          Codes Class Noted POA    * (Principal)Hypothermia ICD-10-CM: T68. XXXA  ICD-9-CM: 991.6  3/8/2017 Yes                Review of Systems:   A comprehensive review of systems was negative.        Vital Signs:    Last 24hrs VS reviewed since prior progress note. Most recent are:  Visit Vitals    /63    Pulse 75    Temp 96.2 °F (35.7 °C)    Resp 18    Ht 5' 6\" (1.676 m)    Wt 68.8 kg (151 lb 10.8 oz)    SpO2 96%    BMI 24.48 kg/m2         Intake/Output Summary (Last 24 hours) at 04/02/17 1613  Last data filed at 04/02/17 1600   Gross per 24 hour   Intake             1744 ml   Output             2275 ml   Net             -531 ml        Physical Examination:             Constitutional:  On bipap,responsive. ENT:Bipap face mask and NGT     Resp: Basal dulness. CT in place. CV:  irregular,garde III systolic murmur. GI:  Soft, non-tender, normoactive bowel sounds;     Musculoskeletal:  2+ edema,SCDs in place. Neurologic: On bipap and awake and responsive. Data Review:    Review and/or order of clinical lab test   US abd:  IMPRESSION:  1. Distended gallbladder with sludge and a stone with moderate wall thickening of the gallbladder wall. 2. Dilated IVC suggesting right heart failure. 3. Small amount of free fluid surrounding the liver. 4. Small right pleural effusion      Labs:     Recent Labs      04/01/17   0403  03/31/17   0302   WBC  2.2*  2.5*   HGB  8.8*  9.4*   HCT  29.5*  30.2*   PLT  126*  126*     Recent Labs      04/02/17   0646  04/01/17   0403  03/31/17   1450  03/31/17   0302   NA  148*  150*  149*  150*   K  3.0*  3.4*  4.3  3.9   CL  106  108  110*  110*   CO2  38*  34*  34*  32   BUN  10  15  14  13   CREA  0.53*  0.56  0.57  0.51*   GLU  137*  96  92  84   CA  9.3  9.9  10.5*  9.8   MG   --   2.2  2.3  2.2   PHOS   --   1.9*  2.8  2.3*     Recent Labs      04/01/17   0403  03/31/17   1450  03/31/17   0302   SGOT  19  22  17   ALT  11*  13  12   AP  47  55  50   TBILI  1.9*  2.0*  1.8*   TP  5.8*  6.5  5.8*   ALB  2.2*  2.3*  2.1*   GLOB  3.6  4.2*  3.7     No results for input(s): INR, PTP, APTT in the last 72 hours.     No lab exists for component: INREXT, INREXT   No results for input(s): FE, TIBC, PSAT, FERR in the last 72 hours. Lab Results   Component Value Date/Time    Folate 14.5 03/09/2017 03:23 AM      No results for input(s): PH, PCO2, PO2 in the last 72 hours. No results for input(s): CPK, CKNDX, TROIQ in the last 72 hours.     No lab exists for component: CPKMB  Lab Results   Component Value Date/Time    Cholesterol, total 138 09/28/2011 09:13 AM    HDL Cholesterol 50 09/28/2011 09:13 AM    LDL, calculated 74 09/28/2011 09:13 AM    Triglyceride 69 09/28/2011 09:13 AM     Lab Results   Component Value Date/Time    Glucose (POC) 145 04/02/2017 01:19 PM    Glucose (POC) 134 04/02/2017 06:49 AM    Glucose (POC) 164 04/01/2017 10:56 PM    Glucose (POC) 140 04/01/2017 05:24 PM    Glucose (POC) 124 04/01/2017 11:27 AM     Lab Results   Component Value Date/Time    Color RED 03/10/2017 02:30 AM    Appearance BLOODY 03/10/2017 02:30 AM    Specific gravity 1.020 03/10/2017 02:30 AM    Specific gravity 1.016 03/08/2017 08:54 PM    pH (UA) 6.0 03/10/2017 02:30 AM    Protein 100 03/10/2017 02:30 AM    Glucose NEGATIVE  03/10/2017 02:30 AM    Ketone NEGATIVE  03/10/2017 02:30 AM    Bilirubin NEGATIVE  03/10/2017 02:30 AM    Urobilinogen 1.0 03/10/2017 02:30 AM    Nitrites NEGATIVE  03/10/2017 02:30 AM    Leukocyte Esterase TRACE 03/10/2017 02:30 AM    Epithelial cells FEW 03/10/2017 02:30 AM    Bacteria NEGATIVE  03/10/2017 02:30 AM    WBC 20-50 03/10/2017 02:30 AM    RBC >100 03/10/2017 02:30 AM         Medications Reviewed:     Current Facility-Administered Medications   Medication Dose Route Frequency    potassium chloride 20 mEq/50 mL IVPB premix pgbk        dextrose 5 % - 0.45% NaCl infusion  55 mL/hr IntraVENous CONTINUOUS    acetaZOLAMIDE (DIAMOX) 250 mg in sterile water (preservative free) 2.5 mL injection  250 mg IntraVENous Q24H    milrinone (PRIMACOR) 20 MG/100 ML D5W infusion  0.2 mcg/kg/min IntraVENous CONTINUOUS    bumetanide (BUMEX) injection 2 mg  2 mg IntraVENous BID    potassium chloride (KAON 10%) 20 mEq/15 mL oral liquid 40 mEq  40 mEq Per NG tube DAILY    insulin lispro (HUMALOG) injection   SubCUTAneous Q6H    apixaban (ELIQUIS) tablet 2.5 mg  2.5 mg Per NG tube BID    pantoprazole (PROTONIX) 2 mg/mL oral suspension 40 mg  40 mg Per NG tube ACB    ELECTROLYTE REPLACEMENT PROTOCOL  1 Each Other PRN    ELECTROLYTE REPLACEMENT PROTOCOL  1 Each Other PRN    albuterol-ipratropium (DUO-NEB) 2.5 MG-0.5 MG/3 ML  3 mL Nebulization TID PRN    propofol (DIPRIVAN) infusion  5-50 mcg/kg/min IntraVENous TITRATE    chlorhexidine (PERIDEX) 0.12 % mouthwash 10 mL  10 mL Oral Q12H    bisacodyl (DULCOLAX) suppository 10 mg  10 mg Rectal DAILY PRN    sodium chloride (NS) flush 5-10 mL  5-10 mL IntraVENous Multiple    phosphorus (K PHOS NEUTRAL) 250 mg tablet 1 Tab  1 Tab Oral BID    albumin human 25% (BUMINATE) solution 12.5 g  12.5 g IntraVENous Q8H PRN    balsam peru-castor oil (VENELEX)  mg/gram ointment   Topical BID    docusate sodium (COLACE) capsule 100 mg  100 mg Oral BID    polyvinyl alcohol (LIQUIFILM TEARS) 1.4 % ophthalmic solution 2 Drop  2 Drop Both Eyes TID    sodium chloride (NS) flush 10 mL  10 mL InterCATHeter PRN    alteplase (CATHFLO) 1 mg in sterile water (preservative free) 1 mL injection  1 mg InterCATHeter PRN    bacitracin 500 unit/gram packet 1 Packet  1 Packet Topical PRN    glucose chewable tablet 16 g  4 Tab Oral PRN    dextrose (D50W) injection syrg 12.5-25 g  12.5-25 g IntraVENous PRN    glucagon (GLUCAGEN) injection 1 mg  1 mg IntraMUSCular PRN    sodium chloride (NS) flush 5-10 mL  5-10 mL IntraVENous PRN     ______________________________________________________________________  EXPECTED LENGTH OF STAY: 4d 21h  ACTUAL LENGTH OF STAY:          25                 Malina Cormier MD

## 2017-04-02 NOTE — PROGRESS NOTES
Bedside shift change report given to sulaiman (oncoming nurse) by Alexander Gaona (offgoing nurse). Report included the following information SBAR, Kardex, ED Summary, OR Summary, Procedure Summary, Intake/Output, MAR, Accordion, Recent Results, Med Rec Status, Cardiac Rhythm nsr and Alarm Parameters . 2000-patient granddaughter at bedside-provided update. Patient with mild confusion. On 6L NC sats 100%. 2200-patient resting comfortably  0000-patient restless, confused, and attempting to get OOB. Unable to be re-directed. Patient sats are still . Contacted respiratory to place patient on bipap QHS. Transferred patient from CCU 19 to CCU 22 to be closer to the nurses station. Will continue to monitor. 0100-patient restless and pulling at wires-turned on CARE channel. 0200-patient now resting comfortably with eyes open. 0500-patient given CHG bath and blood sent to the lab      Bedside shift change report given to Tiera (oncoming nurse) by Bev Fitch (offgoing nurse). Report included the following information SBAR, Kardex, ED Summary, OR Summary, Procedure Summary, Intake/Output, MAR, Accordion, Recent Results, Med Rec Status, Cardiac Rhythm nsr and Alarm Parameters .

## 2017-04-02 NOTE — PROGRESS NOTES
Problem: Pressure Ulcer - Risk of  Goal: *Prevention of pressure ulcer  Outcome: Progressing Towards Goal  Patient repositioned every two hours.

## 2017-04-02 NOTE — PROGRESS NOTES
0800: SBAR received from Penxy.   0830: Patient appears to be resting comfortably. VSS.   1200: Ordered KUB to check placement on Dobhoff. 1545:  @ bedside. Gave okay to start tube feedings through Dobhoff. Chest tube pleura-vac changed due to being full.   1600: Bedside and Verbal shift change report given to [address] [place] [address] Way  (oncoming nurse) by Rayna Quinn  (offgoing nurse). Report included the following information SBAR, Kardex, Intake/Output, MAR and Recent Results.

## 2017-04-02 NOTE — PROGRESS NOTES
1600- Report obtained from [address]- Uneventful evening. Report given to [facility] [place].

## 2017-04-02 NOTE — PROGRESS NOTES
[facility]  -Progress Note     Harman Clark [phone]   4/2/2017      Sonja Vogel M.D. , F.A.C.C.   --------PCP:-Bc Wan MD   -----Subjective:   . Meg Snooks Meg Snooks Meg Snooks Lesia Lugo is a 80 y.o. female  Opens eyes spontaneously  Nods \"no\" when asked if having pain, SOB, or chest pain. Off vasopressors       Assessment/Plan/Discussion:Cardiology Attending:        Lesia Lugo is a 80 y.o. female     Thoracentesis done, seen by ID, now on milrinone,  severe MR, options limited. She has no complaints. Low K being replaced. Bumex BID. Rodri Woodward MD 4/2/2017             Discussion/Plans/Recs  1. Acute on chronic respiratory failure: ABG today on biPAP PH 7.32, pCO2 62 pO2= 105,  HCO3= 32.4  -Pulmonary following- remains on biPAP    2) Chronic Systolic CHF: NYHA stage IV BNP 1636 today from 972 3/29. Net - 801 mls in 24 hours. Continues with BLE, no BUE edema noted  -Sodium 150- will hold Lasix for now. -Repeat TTE final read pending  -BiV AICD  -ACE held due to low BP- low normal BP-  now off pressors. Continue to hold ACE-I  -Continue to hold BB due to low BP/on pressors      3) MR-systolic murmur . No vegetation on valves per AMMON. -Preliminary review of TTE with problematic MR. Will ask Valve team to eval for mitraclip candidacy. 4. Hypernatremia:  Sodium 150.  -Will hold lasix for now. 5)Afib: -[id number]= 6 (age, CHF, HTN, female, DM)    -Continue Eliquis 2.5 mg BID. -ERASTO is very large but no clot, would continue to use OAC cautiously for stroke prophylaxis  (hgb stable)    6). Anemia:  Hgb 9.4 from 10.1 yesterday. 7). Advanced care planning:- Palliative care re-consulted. Pt remains full code status. Dr. Rachel Rico available today to meet with family to discuss status- contacted Kevin. Cardiac Studies/Hx:  AMMON 3/20 with no vegetation, Same EF 35 and 1-2+ MR, TR as expected  ECHO: 3/9/17: EF 30%.  Mod diffuse hyypokinesis, Wall motion is dyssynchronous, RA mildly dilated. LA mod-severe dilated, Mod MR, AV: In the parasternal long axis view, faintly seen spherical echodensity about 5mms in diameter that seems to move in  relation to the anterior aortic valve leaflet, on the aortic side. Could represent a vegetation. Mod TR, mild RV systolic hypertension. 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 PA pressure of 61 with moderate pulmonary hypertension. 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 Moderate to severe TR, moderate to severe pulmonary hypertension. EF 35-40%, marked LAE, mild JOAN    PACER check 2-11-13 39,288 episodes of atrial tach/flutter and 11 with high rates  1/2014 noises noted on pacer, 260 thousands AMS , some are noises and some are AT, some PMT (adjustment made, Dr Braulio Urena discussed with pacer clinic)  Pacer check in May 2013 showed NSVT and AF the latter as long as 1 day with occasional RVR  Pacer check 9/9/13: persistent AT with V paced. 92%  (asymptomatic with AT)    CATH 10-24-12= Normal cors and EF 50 %. -- A annular ring mitral position. Mitral valve repair on July 29, 1996, for mitral regurgitation with a #32 Turner-Manuel ring. Chronic systolic CHF, reduced ejection fraction. NYHA 1-2  . ..admit 7/16/09 mild chf exacerbation  #32 Turner Manuel ring MV repair 1996         Past Medical History:   Diagnosis Date    Atrial fibrillation (HCC)     Chronic systolic heart failure (HCC)     ef 35-40%; hold ACE due to lower bp  Diabetes mellitus type 2, insulin dependent ([facility]     Hypertension     Long term (current) use of anticoagulants     Malignant hypertensive heart disease with CHF ([facility]     Mitral regurgitation     Pacemaker 10/25/2012    The pacemaker is a [employer], model # [id number], serial [id number]. 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 Results for orders placed or performed during the hospital encounter of 03/08/17   EKG, 12 LEAD, INITIAL   Result Value Ref Range    Ventricular Rate 80 BPM    Atrial Rate 75 BPM    QRS Duration 146 ms    Q-T Interval 480 ms    QTC Calculation (Bezet) 553 ms    Calculated R Axis 158 degrees    Calculated T Axis 5 degrees    Diagnosis       Ventricular-paced rhythm  When compared with ECG of 09-SEP-2016 17:36,  Vent. rate has decreased BY  30 BPM  Confirmed by Rebecca Pugh M.D., Samantha Keller ([id number]) on 3/9/2017 10:15:16 AM     Results for orders placed or performed in visit on 03/27/12   AMB POC EKG ROUTINE W/ 12 LEADS, INTER & REP    Narrative    See scanned report.         Vitals:    04/02/17 0728 04/02/17 0800 04/02/17 0900 04/02/17 1000   BP:  113/56 94/41 136/79   BP 1 Location:       BP Patient Position:       Pulse: 73 79 73 77   Resp: 13 17 24 19   Temp:  96.2 °F (35.7 °C)     SpO2: 93% 94% 98% 96%   Weight:       Height:           Objective:    Physical Exam:   Patient Vitals for the past 12 hrs:   Temp Pulse Resp BP SpO2   04/02/17 1000 - 77 19 136/79 96 %   04/02/17 0900 - 73 24 94/41 98 %   04/02/17 0800 96.2 °F (35.7 °C) 79 17 113/56 94 %   04/02/17 0728 - 73 13 - 93 %   04/02/17 0700 - 77 24 113/56 99 %   04/02/17 0600 - 77 22 116/58 97 %   04/02/17 0500 - 77 19 108/57 93 %   04/02/17 0400 96.4 °F (35.8 °C) 77 25 116/58 94 %   04/02/17 0300 - 79 20 105/51 95 %   04/02/17 0200 - 80 18 114/51 -   04/02/17 0100 - 79 19 112/58 99 %   04/02/17 0030 - - - - 99 %   04/02/17 0000 97.3 °F (36.3 °C) 79 19 115/46 99 %   04/01/17 2300 - 92 19 126/46 99 %      General:    Opens eyes spontaneously, appears weak. ENT, Neck:  supple   Chest Wall: No chest wall deformities   Lung: Clear to ausculation bilaterally, on mechanical ventilatin   Heart:  no gallops noted, RRR, no JVD 3/6 systolic early/decresendo  murmur at RUSB to LUSB without radiation beyond apex, best heard at apex   Abdomen: Nondistended, + bowel sounds, no grimacing to palpation    Richardson with mast urine.   No hematuria   Extremities: extremities normal, 1-2+ BLE edema (pedal/ankle more prominent), No BUE edema       Last 24hr Input/Output:    Intake/Output Summary (Last 24 hours) at 04/02/17 1052  Last data filed at 04/02/17 1000   Gross per 24 hour   Intake             1653 ml   Output             2680 ml   Net            -1027 ml        Data Review:   Recent Results (from the past 24 hour(s))   GLUCOSE, POC    Collection Time: 04/01/17 11:27 AM   Result Value Ref Range    Glucose (POC) 124 (H) 65 - 100 mg/dL    Performed by Alex FISH(CON)    GLUCOSE, POC    Collection Time: 04/01/17  5:24 PM   Result Value Ref Range    Glucose (POC) 140 (H) 65 - 100 mg/dL    Performed by Alex FISH(CON)    GLUCOSE, POC    Collection Time: 04/01/17 10:56 PM   Result Value Ref Range    Glucose (POC) 164 (H) 65 - 100 mg/dL    Performed by Loretta Allan    POC G3 - PUL    Collection Time: 04/02/17  5:55 AM   Result Value Ref Range    FIO2 (POC) 30 %    pH (POC) 7.422 7.35 - 7.45      pCO2 (POC) 59.7 (H) 35.0 - 45.0 MMHG    pO2 (POC) 70 (L) 80 - 100 MMHG    HCO3 (POC) 38.9 (H) 22 - 26 MMOL/L    sO2 (POC) 94 92 - 97 %    Base excess (POC) 14 mmol/L    Site LEFT RADIAL      Device: BIPAP      PEEP/CPAP (POC) 6 cmH2O    PIP (POC) 15      Pressure support 8 cmH2O    Allens test (POC) YES      Specimen type (POC) ARTERIAL      Total resp.  rate 18      Spontaneous timed YES     METABOLIC PANEL, BASIC    Collection Time: 04/02/17  6:46 AM   Result Value Ref Range    Sodium 148 (H) 136 - 145 mmol/L    Potassium 3.0 (L) 3.5 - 5.1 mmol/L    Chloride 106 97 - 108 mmol/L    CO2 38 (H) 21 - 32 mmol/L    Anion gap 4 (L) 5 - 15 mmol/L    Glucose 137 (H) 65 - 100 mg/dL    BUN 10 6 - 20 MG/DL    Creatinine 0.53 (L) 0.55 - 1.02 MG/DL    BUN/Creatinine ratio 19 12 - 20      GFR est AA >60 >60 ml/min/1.73m2    GFR est non-AA >60 >60 ml/min/1.73m2    Calcium 9.3 8.5 - 10.1 MG/DL   GLUCOSE, POC    Collection Time: 04/02/17  6:49 AM   Result Value Ref Range    Glucose (POC) 134 (H) 65 - 100 mg/dL    Performed by Elizabeth Ricketts MD 4/2/2017

## 2017-04-03 NOTE — PROGRESS NOTES
[address] NP Update:    Events over weekend noted: Thoracentesis for 1.2L removal - CT remaining. HF consult and started on milrinone and scheudled diuretics. Leukopenia/Pancyptopneia - Still awaiting final ID input. In prelim note, it was suggested that hepatology be consulted d/t initial elevated LFTs, & hypoglycemia. Feel that this suggesting is reasonable so will also appreciate hepatology input and eval for cirrhosis v. Hepatic congestion. Resp Failure - appreciate Pulm input. Per notes, appears to be tolerating BiPap trials with extended NC periods. Pleural CT remains currently though suspect could be removed in near future. Cardiogenic Shock - appears to be slightly improved with milrinone and diuresis. HF input appreciated. Altered MS - per notes appears to be slightly improved. Na approaching normal although unsure how much that individually was contributory. If cirrhosis perhaps liver dysfunction contributory - await hepatology consult. Improved oxygenation/ventilation likely helping as well as potential forward flow from improved cardiac output with ionotropic support. Improvement is an encouraging sign. Deconditioned - Very viable 80 yr old 2 months ago but has now been hospitalized for almost one month and severely deconditioned. Would need to be stronger to withstand any valvular intervention, if even possible to intervene. Recommend continuing to optimize nutrition, sleep cycle and get PT/OT involved for aggressive rehab efforts. Palliative care input appreciated. Unsure if full code status appropriate considering age and complicated medical status. Re: interventional timing - above ID/liver issues need sorting out and pt needs to show continual physical and mental improvements.  on vacation this week therefore no valvular intervention this week. Will continue to follow pt peripherally and will wait for above consult inputs.   Will re-eval timing of intervention next week upon his return.

## 2017-04-03 NOTE — PROGRESS NOTES
Karma Jones is a 80 y.o. female with a history of CHF/AFIB/HTN/MR- S/P MVrepair /BiVICD  Presented on 3/8 sent from her PCP's office with abdominal pain of 3 days. She also had hypothermia (91.4)  When she arrived in ED and was hypotensive( 82/56)- found to have ascites, pleural effusions  Started on pressor and bairhugger, Seen by GI, pulmonary, cardiology, renal . Pt was intubated 3/10-3/12, started on vanco and zosyn from 3/8-3/21- AMMON on 3/20 no vegetation, PASP of 40, moderate MR, TR. reintubated on 3/27 for resp failure. Extubated on 3/30 and on BIPAP- AMMON was done today again- severe MR- seen by CTS for mitral valve repair  I was  asked to see her for low WBC and to r/o infection    Subjective  Says she is very thirsty     Objective:   VITALS:   Visit Vitals    BP (!) 93/39    Pulse 79    Temp 97.2 °F (36.2 °C)    Resp 21    Ht 5' 6\" (1.676 m)    Wt 146 lb 6.2 oz (66.4 kg)    SpO2 99%    BMI 23.63 kg/m2      PHYSICAL EXAM:   General:  No resp distress- on nasal cannula  Head:   Normocephalic,   Neck:  Supple,   Dubhoff  Back:   decub  Lungs:  B/l air entry- decreased bases  Rt chest tube  Heart:   s1s2  Sternotomy     Abdomen:  Soft, distended. Bowel sounds normal.   Extremities: Edema legs  Rt PICC since 3/15  Inner thighs pressure excoriation from galarza  Skin:   No rashes or lesions. Not Jaundiced  Neurologic: Grossly non-focal     Pertinent Labs  9/28/11-WBC 2.5  12/16/16- WBC 3.0, , HB 9.8  3/8/17 WBC 1.7>2.8  Hb 9.6  PLT 74  3/31 WBC 2.5, HB 9.2,   cr 1.01>0.57  BNP 2321> 1636  3/9 NH3 88  ALT 13, AST 22, Bili 2.0, alb 2.3  3/8 & 3/11 BC-NG  3/10 UC-nG     IMAGING RESULTS:  Anasarca with bilateral pleural effusion and ascites. Cholelithiasis. Cardiomegaly.   Ultrasound- heterogenic liver  CXR-pulm edema and pleural effusion  Impression/Recommendation     1) Pancytopenia- Chronic leucopenia Since 2011- likely due to cirrhosis with possible underlying benign ethnic neutropenia  There is no indication that this could be due to infection currently-  But She is at high risk for infection especially prolonged hospitalization, galarza, recurrent intubation, immobility - She was on vanco and zosyn from 3/8-3/21. No antibiotic needed now        2) Biventricular failure with severe MR and TR- that could cause chronic liver congestion leading to cirrhosis. AMMON has no vegetation  If mitral valve repair is going to be attempted will get blood cultures, urine culture         3) cardiogenic shock on presentation was on pressors but none now- no indication to suggest septic shock        4) Liver cirrhosis- pancytopenia/ascites likely secondary to rt sided heart failure- Hepatitis panel can be sent  Recommend 's opinion if valve surgery is being considered     5) Recurrent Respiratory failure s/p extubation- was on BIPAP much better   B/l pleural effusion- s/p chest tube on rt side  Fluid not an exudate        Discussed the management with her nurse.   Will sign off -  Call if needed

## 2017-04-03 NOTE — CONSULTS
Intensivist / Pulmonary / Critical Care  Assessment / Plan:      1. Acute on chronic resp failure - combined chronic resp acidosis and metabolic alkalosis from diuresis contributing to high PCO2 and compensated pH  2. CHF with mitral valve disease - on scheduled diuretics  3. Afib - on eliquis  4. Sepsis syndrome - ? Source s/p 2 weeks abx - ID following  5. Acute on chronic kidney disease - improved  6. DM  7. Full code - palliative care to see    · BiPAP with breaks on nasal cannula as tolerated  · Continue chest tube drainage-- DC when output decreases  · Continue diamox - her metabolic alkalosis (from diuresis) likely contributing to her hypoventilation  · Electrolyte replacement protocol  · Cardiology following for systolic CHF management and MR  · Palliative care following  · Mobilize, speech    D/w RN       History / Subjective:  Hospital Day: 32 - Interval history and notes reviewed     Dyspnea improving. Tolerating greater periods of time without BiPAP.  Awake and comfortable on NC      Objective:  Patient Vitals for the past 4 hrs:   BP Pulse Resp SpO2   17 0700 (!) 93/39 79 21 99 %   17 0600 110/48 76 20 100 %   17 0500 115/50 77 23 99 %   Temp (24hrs), Av.8 °F (36 °C), Min:96.2 °F (35.7 °C), Max:97.3 °F (36.3 °C)      Intake/Output Summary (Last 24 hours) at 17 0849  Last data filed at 17 0700   Gross per 24 hour   Intake             3024 ml   Output             3825 ml   Net             -801 ml     Lab Results   Component Value Date/Time    Glucose (POC) 144 2017 05:17 AM    Glucose (POC) 128 2017 12:13 AM    Glucose (POC) 144 2017 06:34 PM    Glucose (POC) 145 2017 01:19 PM    Glucose (POC) 134 2017 06:49 AM     Exam:  Alert on BiPAP  Bilateral rales and ronchi  Reg, + murmur  Protuberant abdomen  Warm and dry skin  2+ edema    Data:   Labs:  Recent Labs      17   0233  17   0403   WBC  2.8*  2.2*   HGB  9.5*  8.8*   HCT  31.0* 29.5*   PLT  122*  126*     Recent Labs      04/03/17   0233  04/02/17   0646  04/01/17   0403  03/31/17   1450   NA  148*  148*  150*  149*   K  3.2*  3.0*  3.4*  4.3   CL  107  106  108  110*   CO2  35*  38*  34*  34*   GLU  133*  137*  96  92   BUN  8  10  15  14   CREA  0.53*  0.53*  0.56  0.57   CA  9.6  9.3  9.9  10.5*   MG   --    --   2.2  2.3   PHOS   --    --   1.9*  2.8   ALB  2.4*   --   2.2*  2.3*   TBILI  2.1*   --   1.9*  2.0*   SGOT  22   --   19  22   ALT  11*   --   11*  13     Imaging:  I have personally reviewed the patients radiographs:  Decreased right pleural effusion, no change in pulmonary edema        Aicha Gagnon MD

## 2017-04-03 NOTE — PROGRESS NOTES
[facility] Consult Note:    Patient: Saqib Mohr   Age: 80 y.o. Patient Care Team:  iKm Hawkins MD as PCP - General (Family Practice)  Nicole Medina MD as Physician (Cardiology)  Leatha Bose NP as Nurse Practitioner (Nurse Practitioner)  Huber Nagy MD (Cardiology)      PCP: Kim Hawkins MD    Cardiologist: Kathe Abebe    Diagnosis/Reason for Consultation: The primary encounter diagnosis was Abdominal pain, epigastric. Diagnoses of Hypoglycemia, Leukopenia, unspecified type, Hypothermia, initial encounter, Hypervolemia, unspecified hypervolemia type, Hypotension, unspecified hypotension type, Shortness of breath, Lethargy, Edema, unspecified type, Feeding difficulties, Other fatigue, Goals of care, counseling/discussion, Fatigue, unspecified type, Debility, Acute on chronic combined systolic and diastolic ACC/AHA stage C congestive heart failure (HCC), Biventricular ICD (implantable cardioverter-defibrillator) in place, Cardiac pacemaker, Non-rheumatic mitral regurgitation, PAF (paroxysmal atrial fibrillation) ([facility] [address] and S/P mitral valve repair were also pertinent to this visit.     Problem List:   Patient Active Problem List   Diagnosis Code    Chronic systolic heart failure (HCC) I50.22    Rupture of chordae tendineae (HCC) I51.1    Mitral regurgitation I34.0    S/P mitral valve repair Z98.890    TR (tricuspid regurgitation) I07.1    Malignant hypertensive heart disease with CHF ([facility] [address] I11.0    PAF (paroxysmal atrial fibrillation) ([facility]) I48.0    Diabetes mellitus type 2, 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 XXXA         HPI: 80 y.o. Black woman with PMHx of MR s/p MVring (#32 CE Classic Mitral Annuloplasty Ring Model [id number] / Serial [id number] on 7/29/1996), TR, CHF, Afib (warfarin) s/p PPM ([place]), DM, hypothyroid, HTN, cholelithiasis that is referred to the [address] [place] [address] by Dr. Frankie Patel for interventional evaluation of her MR. Ms. Anjel Mendoza was in her usual state of health up until approximately a month ago. 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 She has never driven but was independent with medication administration, decision making, ADLs, household chores, etc.  She was admitted to [facility] via EMS from PCP's office she she presented with 3-4 months worth of intermittent abdominal pain and 3-4 days of nausea, intense epigastric pain and generalized weakness and fatigue. She was found to be hypotensive and hypothermic and was sent to the ED. On arrival her HR was 79, SBP 80's and sat 92% on RA but BG 27 and temp 33C. She was given IV Dextrose, and albumin, empiric abx, started on a anatoliy hugger and low dose vasopressors. An abdominal CT revealed anasarca, B pleural effusions, ascites and cholelithiasis. She eventually came off of the anatoliy hugger and pressors but has had respiratory failure believed to be at last partially contributed to be her severe MR and had been intubated but was extubated on 3/31/2017 to BiPap. Ms. Anjel Mendoza underwent a AMMON on Friday 3/31 that revealed a MR with restricted posterior leaflet that would could not be grasped with a MitraClip.       Past Medical History:   Diagnosis Date    Atrial fibrillation (HCC)     Chronic systolic heart failure (HCC)     ef 35-40%; hold ACE due to lower bp    Diabetes mellitus type 2, insulin dependent ([facility] [address]     Hypertension     Long term (current) use of anticoagulants     Malignant hypertensive heart disease with CHF ([facility] [address]     Mitral regurgitation     Pacemaker 10/25/2012    The pacemaker is a [place] [employer] , model # [id number], serial [id number]. 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, 1996    # 28 [place]Manuel    Thyroid disease     TR (tricuspid regurgitation)     moderate to severe echo 2009       Past Surgical History:   Procedure Laterality Date    CARDIAC SURG PROCEDURE UNLIST  1996    valve replacement    HX HEART CATHETERIZATION  10/4/2012    normal cors, LVEF 50%    HX HEART VALVE SURGERY      HX PACEMAKER      INS PPM/ICD LED DUAL ONLY  10/25/2012         INS PPM/ICD LED DUAL ONLY  9/9/2016           Social History   Substance Use Topics    Smoking status: Never Smoker    Smokeless tobacco: Never Used    Alcohol use No      Family History   Problem Relation Age of Onset    Cancer Sister      breast    Heart Disease Brother     Heart Disease Brother     Diabetes Son     Stroke Son      Prior to Admission medications    Medication Sig Start Date End Date Taking? Authorizing Provider   KLOR-CON M20 20 mEq tablet TAKE 2 TABS BY MOUTH TWO (2) TIMES A DAY. 2/15/17  Yes Meredith Montes MD   furosemide (LASIX) 80 mg tablet TAKE 1 TAB BY MOUTH TWO (2) TIMES A DAY. 1/10/17  Yes Meredith Montes MD   enalapril (VASOTEC) 10 mg tablet Take 5 mg by mouth daily. Yes Historical Provider   carvedilol (COREG) 25 mg tablet TAKE 1 TAB BY MOUTH TWO (2) TIMES DAILY (WITH MEALS). 12/1/16  Yes Ken Bernardo MD   warfarin (COUMADIN) 5 mg tablet Take half tablet or 2.5 on Tues and Thursdays, whole tab or 5 mg on all other days or as directed by Coumadin clinic 9/1/15  Yes Ken Bernardo MD   insulin glargine (LANTUS SOLOSTAR) 100 unit/mL (3 mL) pen 22 Units by SubCUTAneous route daily. Yes Historical Provider   acetaminophen (TYLENOL) 325 mg tablet Take  by mouth every four (4) hours as needed.    Yes Historical Provider       No Known Allergies    Current Medications:   Current Facility-Administered Medications   Medication Dose Route Frequency Provider Last Rate Last Dose    0.9% sodium chloride infusion  10 mL/hr IntraVENous CONTINUOUS Roz Darden MD 10 mL/hr at 04/03/17 0132 10 mL/hr at 04/03/17 0132    dextrose 5 % - 0.45% NaCl infusion  55 mL/hr IntraVENous CONTINUOUS Neyda Cedeno DO 55 mL/hr at 04/03/17 0623 55 mL/hr at 04/03/17 0623    acetaZOLAMIDE (DIAMOX) 250 mg in sterile water (preservative free) 2.5 mL injection  250 mg IntraVENous Q24H [address], MD   250 mg at 04/02/17 1320    milrinone (PRIMACOR) 20 MG/100 ML D5W infusion  0.2 mcg/kg/min IntraVENous CONTINUOUS Rosalinda Meneses NP 4.5 mL/hr at 04/03/17 0511 0.2 mcg/kg/min at 04/03/17 0511    bumetanide (BUMEX) injection 2 mg  2 mg IntraVENous BID Rosalinda Meneses NP   2 mg at 04/02/17 1903    potassium chloride (KAON 10%) 20 mEq/15 mL oral liquid 40 mEq  40 mEq Per NG tube DAILY Roz Darden MD   40 mEq at 04/02/17 0937    insulin lispro (HUMALOG) injection   SubCUTAneous Q6H Roz Darden MD   Stopped at 03/28/17 1200    apixaban (ELIQUIS) tablet 2.5 mg  2.5 mg Per NG tube BID Roz Darden MD   2.5 mg at 04/02/17 2021    pantoprazole (PROTONIX) 2 mg/mL oral suspension 40 mg  40 mg Per NG tube ACB Roz Darden MD   40 mg at 04/03/17 0623    ELECTROLYTE REPLACEMENT PROTOCOL  1 Each Other PRN Chuy Sullivan MD        ELECTROLYTE REPLACEMENT PROTOCOL  1 Each Other PRN Chuy Sullivan MD        albuterol-ipratropium (DUO-NEB) 2.5 MG-0.5 MG/3 ML  3 mL Nebulization TID PRN Roz Darden MD        propofol (DIPRIVAN) infusion  5-50 mcg/kg/min IntraVENous TITRATE Becky Moritz, NP   Stopped at 03/28/17 0830    chlorhexidine (PERIDEX) 0.12 % mouthwash 10 mL  10 mL Oral O29K Eliseo Jensen MD   10 mL at 04/02/17 2022    bisacodyl (DULCOLAX) suppository 10 mg  10 mg Rectal DAILY PRN Seven Bingham MD   10 mg at 03/24/17 1528    sodium chloride (NS) flush 5-10 mL  5-10 mL IntraVENous Multiple Lexis Julien MD   10 mL at 04/03/17 0518    phosphorus (K PHOS NEUTRAL) 250 mg tablet 1 Tab  1 Tab Oral BID Christelle Loaiza MD   1 Tab at 04/02/17 1907    albumin human 25% (BUMINATE) solution 12.5 g  12.5 g IntraVENous Q8H PRN Gray Sapp MD   12.5 g at 03/18/17 1710    balsam peru-castor oil (VENELEX)  mg/gram ointment   Topical BID Christelle Loaiza MD        docusate sodium (COLACE) capsule 100 mg  100 mg Oral BID Juana Bullock MD   Stopped at 03/30/17 1715    polyvinyl alcohol (LIQUIFILM TEARS) 1.4 % ophthalmic solution 2 Drop  2 Drop Both Eyes TID Gray Sapp MD   2 Drop at 04/02/17 2022    sodium chloride (NS) flush 10 mL  10 mL InterCATHeter PRN Tita Camacho MD   10 mL at 04/02/17 2022    alteplase (CATHFLO) 1 mg in sterile water (preservative free) 1 mL injection  1 mg InterCATHeter PRN Tita Camacho MD        bacitracin 500 unit/gram packet 1 Packet  1 Packet Topical PRN Tita Camacho MD   1 Packet at 03/15/17 2136    glucose chewable tablet 16 g  4 Tab Oral PRN Thomasine Bamberger, MD        dextrose (D50W) injection syrg 12.5-25 g  12.5-25 g IntraVENous PRN Thomasine Bamberger, MD   25 g at 03/31/17 2341    glucagon (GLUCAGEN) injection 1 mg  1 mg IntraMUSCular PRN Thomasine Bamberger, MD        sodium chloride (NS) flush 5-10 mL  5-10 mL IntraVENous PRN Thomasine Bamberger, MD   10 mL at 04/03/17 0518       Vitals: Blood pressure (!) 93/39, pulse 79, temperature 97.2 °F (36.2 °C), resp. rate 21, height 5' 6\" (1.676 m), weight 146 lb 6.2 oz (66.4 kg), SpO2 99 %. Allergies: has No Known Allergies. Review of Systems: Pertinent Positives per HPI   [x] Unable to obtain  ROS due to  [x]mental status change     Cardiovascular Testing:   TTE 3/31/2017:  LEFT VENTRICLE: Size was normal. Systolic function was moderately to markedly reduced. Ejection fraction was estimated in the range of 25 % to 30 %. There was moderate diffuse hypokinesis.  Wall thickness was normal. RIGHT VENTRICLE: The size was normal. Systolic function was mildly reduced. LEFT ATRIUM: The atrium was severely dilated. ATRIAL SEPTUM: The atrial septum appeared intact. RIGHT ATRIUM: The atrium was moderately dilated. MITRAL VALVE: There was moderate thickening. There was moderate calcification of the posterior leaflet. There was severely restricted mobility of the posterior leaflet. DOPPLER: The findings were consistent with mild mitral stenosis. There was severe regurgitation. Regurgitation grade was 4+ on a scale of 0 to 4+. The regurgitant  jet was centrally directed. AORTIC VALVE: Leaflets exhibited sclerosis without stenosis. DOPPLER: Transaortic velocity was within the normal range. There was no stenosis. There was mild regurgitation. TRICUSPID VALVE: Normal valve structure. DOPPLER: There was moderate regurgitation. Pulmonary artery systolic pressure: 70 mmHg. There  Was moderate to severe pulmonary hypertension. PULMONIC VALVE: Not well visualized, but normal Doppler findings. AORTA: The root exhibited normal size. PERICARDIUM: There was no pericardial effusion. There was a large left pleural effusion. AMMON 3/31/2017: read pending    Gated Chest CTA 3/31/2017: Trachea is displaced to the right but not narrowed, by large intrathoracic goiter.    Mitral valve annuloplasty ring is noted. ICD and sternal suture wires are present. There is no pericardial effusion. There  is left atrial enlargement. No aortic aneurysm. There are moderate to large size bilateral pleural effusions, on the left  appearing partly loculated, with bilateral associated compressive atelectasis. There is a small patch of nonspecific  airspace disease in the anterior right upper lung. There is no pulmonary edema. Visualized upper abdomen is unremarkable. Physical Exam:  General: Thin elderly woman appearing stated age resting comfortably in ICU bed accompanied by several family members. Neuro: Minimally responsive. Would track with eyes and weakly nod to yes/no questions.   Would not follow directions with extremities. Did not offer any spontaneous independent movement. Weak gag  Head:Normocephalic. Atraumatic. Symmetrical  Neck: Trachea Midline  Resp: Diminished B BS. No Adv BS/cough/sputum/tachypnea with BiPap in place. CV: S1S2 Irregular rhythm. LIZET IV/VI. No JVD/carotid bruits. Pink/warm/dry extremities. Modest LE peripheral edema  GI:Benign ab. Soft. NT/ND. Active BS. Dobhoff in place/clamped. : Indwelling galarza catheter with clear yellow urine  Integ: No obvious s/s of infection or breakdown except on nose - backside not assessed. Musculo/Skeletal: No spontaneous extremity movement. Poor muscle mass    Assessment/Plan:   1. MR - severe. High risk surgical candidate d/t multiple co-morbids. S/p MV incomplete ring (1996) Posterior leaflet restricted and not amenable to MitraClip. Only valve intervention option would be for TA MV in Ring or Transvenous-Transeptal MVR. Either would be extremely high risk at this time d/t pt's frailty, potential ongoing infectious issues, ? Failure to thrive. Will have Dr. Jovon Sapp review CTA for candidacy for the above mentioned interventional options. 2. Afib- currently rate controlled and on eliquis for anticoagulation  3. Leukopenia - has completed approx 2 weeks of abx. ID consult would be appreciated. 4. Resp failure - complicated by diuresis and metabolic alkalosis - appreciate pulm input. BiPap currently. 5. Nutrition - while appears highly viable by family pictures taken approximately 1-2 months prior to admission, she appears severely malnourished at this time. Apparently with swallowing/intake issues during this hospitalization and has dobhoff in place for TF. Optimize nutrition. 6. Altered MS - hypoactive delirium. ? Impact of hypernatremia with Na 150. Or hypercapnia? Or infectious? Further investigation necessary  7. CHF - HF consult would be appreciate to maximize medical therapy  8. Dispo - Pt needs PT/OT.   Appreciate palliative care input. Pt still currently full code but I don't feel she would survive if experienced a lethal arrhythmia at this point.     9. Further care/plan per Dr. Klaus Olmos

## 2017-04-03 NOTE — PROGRESS NOTES
Hospitalist Progress Note  Vanessa Harden MD   Office: [phone]          Date of Service:  4/3/2017  NAME:  Saqib Mohr  :  1930  MRN:  [id number]      Admission Summary:   57-year-old female with history of chronic systolic congestive heart failure, atrial fibrillation, diabetes mellitus, hypertension, chronic anticoagulation use, history of pacemaker and ICD. She was sent from her primary care physician to the emergency room to be evaluated. History from the patient and her daughters at the bedside, also from the records. Per the daughter, she has been having on and off abdominal pain, epigastric pain for a while, over the last 3 days has been mostly pronounced. She said her pain at times is 9/10 in severity, it is nonradiating, associated with some nausea, but no vomiting. She denies any diarrhea. At her PCP's office,   they could not get her temperature, so they sent her to the emergency room. In the ER she has been hypothermic, her temperature was 91.4 on initial presentation. Also she was hypotensive. Her blood pressure   systolic was 31/20, so she was given about 1.5 liters of IV fluid, and now she is getting 1 bottle of albumin. She was placed on warming blanket, Lizzy Hugger. She denies any dysuria or frequency. Interval history / Subjective:    Not very forthcoming, but off BiPAP. Mainly states she had dry mouth and wants some water. Otherwise, she denies any current chest pain, dyspnea, palpitations.      Assessment & Plan:         Acute on chronic respiratory failure with hypercapnia due to pleural effusion/atelectasis and acute on chronic systolic heart failure   - NYHA class IV upon admission   - she had been intubated on 3/25, subsequently extubated 3/30; now on intermittent BiPAP   - diuresis with Lasix   - s/p chest tube placement on 3/31   -     Hypothermia and hypotension: - resolved   - completed empiric therapy with vancomycin and Zosyn    Bilateral Pleural effusions and significant dependent atx: likely related to CHF   - see above    Sepsis:  Unknown source, most likely lung with bilateral pleural effusions, possibly infectious.     - AMMON: no vegetations;    - blood culture 3/8 and 3/11: no growth x 5 days;    - urine culture: no growth;    - 3/21: afebrile, no leukocytosis, cultures negative   -Off antibiotics. Patient completed 2 weeks of IV Abx since admission. Acute on Chronic Systolic congestive heart failure NYHA IV   - difficult situation as suspect there is a component of valvular disease contributing to her presentation (severe MR)   - Echo 3/9 EF 30%, diffuse hypokinesis. - AMMON 3/22 showed no vegetations   - not a candidate for MitraClip, possibly TA MV or transvenous-transeptal MVR   - cautious diuresis with Bumex   - holding beta blocker / ACE inhibitor secondary to borderline BPs   - on spironolactone   - cardiology, advanced heart failure following    Hematuria: may be related to traumatic galarza placement with INR max 5.5,   - Urine noted on UA as yellow on 3/8,    - Galarza placed 3/9 and hematuria reported 3/10 UA. JUAN:  From  ATN and contrast nephropathy/Hypotension most likely etiology. - Ischemic heart disease, hypotension; and exposure to IV contrast.    - Appreciate nephro consult recommendations. - Creatinine now normalized. Hypoglycemia   - now on tube feeds    Ischemic hepatitis:    - Consulted GI. Plans for EGD when stable or outpatient.   - LFTs now normalized. Coagulopathy.   - Had hematuria. INR was supra therapeutic on warfarin which is now stopped. - 3/21: started patient on Eliquis for stroke prophylaxis in the setting of chronic Atrial Fibrillation and [id number] score of 6. Pancytopenia. - Appears to be chronic. However, patient and family unaware of her previous lab results. Will monitor her labs closely.     Left cheek where it meets the nose pressure ulcer/injury:  - stage 2; measures 1 x 0.5 x 0 cm; 100% drying pink/red;  - with linear jac = 2 x 0.5 x 0 cm (smaller);  - Hospital Acquired;    Urinary retention:  - Richardson removed 3/21, decreased urine output, bladder scan revealed 900 ml of urine in the bladder per nursing report, Richardson was reinserted; (second attempt at removing Richardson during this hospitalization). - keep the Richardson in at discharge; outpatient f/u with Urology for voiding trials once patient is more ambulatory. Anemia:  - of chronic disease with slow decline in H&H during this hospitalization. - patient transfused 2 Units of PRBC's on 3/22, Hgb fairly stable currently    Afib   - rate controlled on no medications currently   - on Eliquis    Hypernatremia   - continue to trend    Code status: [facility] discussed with: Patient/Family and Nurse  Disposition: TBD      Hospital Problems  Date Reviewed: 3/8/2017          Codes Class Noted POA    * (Principal)Hypothermia ICD-10-CM: T68. XXXA  ICD-9-CM: 991.6  3/8/2017 Yes                Review of Systems:   A comprehensive review of systems was negative. Vital Signs:    Last 24hrs VS reviewed since prior progress note. Most recent are:  Visit Vitals    /55    Pulse 74    Temp 97.2 °F (36.2 °C)    Resp 22    Ht 5' 6\" (1.676 m)    Wt 66.4 kg (146 lb 6.2 oz)    SpO2 100%    BMI 23.63 kg/m2         Intake/Output Summary (Last 24 hours) at 04/03/17 1945  Last data filed at 04/03/17 1800   Gross per 24 hour   Intake           3143.5 ml   Output             3795 ml   Net           -651.5 ml        Physical Examination:             Constitutional:  On bipap,responsive. HEENT: Now off BiPAP   Resp: Basal dulness. CT in place. CV:  irregularly irregular, soft systolic murmur. GI:  Soft, non-tender, normoactive bowel sounds;     Musculoskeletal:  2+ edema,SCDs in place.     Neurologic: Awake, alert            Data Review:    Review and/or order of clinical lab test   US abd:  IMPRESSION:  1. Distended gallbladder with sludge and a stone with moderate wall thickening of the gallbladder wall. 2. Dilated IVC suggesting right heart failure. 3. Small amount of free fluid surrounding the liver. 4. Small right pleural effusion      Labs:     Recent Labs      04/03/17 0233  04/01/17   0403   WBC  2.8*  2.2*   HGB  9.5*  8.8*   HCT  31.0*  29.5*   PLT  122*  126*     Recent Labs      04/03/17 0233 04/02/17   0646  04/01/17   0403   NA  148*  148*  150*   K  3.2*  3.0*  3.4*   CL  107  106  108   CO2  35*  38*  34*   BUN  8  10  15   CREA  0.53*  0.53*  0.56   GLU  133*  137*  96   CA  9.6  9.3  9.9   MG   --    --   2.2   PHOS   --    --   1.9*     Recent Labs      04/03/17 0233 04/01/17   0403   SGOT  22  19   ALT  11*  11*   AP  55  47   TBILI  2.1*  1.9*   TP  6.2*  5.8*   ALB  2.4*  2.2*   GLOB  3.8  3.6     No results for input(s): INR, PTP, APTT in the last 72 hours. No lab exists for component: INREXT, INREXT   No results for input(s): FE, TIBC, PSAT, FERR in the last 72 hours. Lab Results   Component Value Date/Time    Folate 14.5 03/09/2017 03:23 AM      No results for input(s): PH, PCO2, PO2 in the last 72 hours. No results for input(s): CPK, CKNDX, TROIQ in the last 72 hours.     No lab exists for component: CPKMB  Lab Results   Component Value Date/Time    Cholesterol, total 138 09/28/2011 09:13 AM    HDL Cholesterol 50 09/28/2011 09:13 AM    LDL, calculated 74 09/28/2011 09:13 AM    Triglyceride 69 09/28/2011 09:13 AM     Lab Results   Component Value Date/Time    Glucose (POC) 121 04/03/2017 06:35 PM    Glucose (POC) 149 04/03/2017 12:55 PM    Glucose (POC) 144 04/03/2017 05:17 AM    Glucose (POC) 128 04/03/2017 12:13 AM    Glucose (POC) 144 04/02/2017 06:34 PM     Lab Results   Component Value Date/Time    Color RED 03/10/2017 02:30 AM    Appearance BLOODY 03/10/2017 02:30 AM    Specific gravity 1.020 03/10/2017 02:30 AM    Specific gravity 1.016 03/08/2017 08:54 PM    pH (UA) 6.0 03/10/2017 02:30 AM    Protein 100 03/10/2017 02:30 AM    Glucose NEGATIVE  03/10/2017 02:30 AM    Ketone NEGATIVE  03/10/2017 02:30 AM    Bilirubin NEGATIVE  03/10/2017 02:30 AM    Urobilinogen 1.0 03/10/2017 02:30 AM    Nitrites NEGATIVE  03/10/2017 02:30 AM    Leukocyte Esterase TRACE 03/10/2017 02:30 AM    Epithelial cells FEW 03/10/2017 02:30 AM    Bacteria NEGATIVE  03/10/2017 02:30 AM    WBC 20-50 03/10/2017 02:30 AM    RBC >100 03/10/2017 02:30 AM         Medications Reviewed:     Current Facility-Administered Medications   Medication Dose Route Frequency    0.9% sodium chloride infusion  10 mL/hr IntraVENous CONTINUOUS    spironolactone (ALDACTONE) tablet 25 mg  25 mg Oral DAILY    dextrose 5 % - 0.45% NaCl infusion  55 mL/hr IntraVENous CONTINUOUS    milrinone (PRIMACOR) 20 MG/100 ML D5W infusion  0.2 mcg/kg/min IntraVENous CONTINUOUS    bumetanide (BUMEX) injection 2 mg  2 mg IntraVENous BID    potassium chloride (KAON 10%) 20 mEq/15 mL oral liquid 40 mEq  40 mEq Per NG tube DAILY    insulin lispro (HUMALOG) injection   SubCUTAneous Q6H    apixaban (ELIQUIS) tablet 2.5 mg  2.5 mg Per NG tube BID    pantoprazole (PROTONIX) 2 mg/mL oral suspension 40 mg  40 mg Per NG tube ACB    ELECTROLYTE REPLACEMENT PROTOCOL  1 Each Other PRN    ELECTROLYTE REPLACEMENT PROTOCOL  1 Each Other PRN    albuterol-ipratropium (DUO-NEB) 2.5 MG-0.5 MG/3 ML  3 mL Nebulization TID PRN    propofol (DIPRIVAN) infusion  5-50 mcg/kg/min IntraVENous TITRATE    chlorhexidine (PERIDEX) 0.12 % mouthwash 10 mL  10 mL Oral Q12H    bisacodyl (DULCOLAX) suppository 10 mg  10 mg Rectal DAILY PRN    sodium chloride (NS) flush 5-10 mL  5-10 mL IntraVENous Multiple    phosphorus (K PHOS NEUTRAL) 250 mg tablet 1 Tab  1 Tab Oral BID    albumin human 25% (BUMINATE) solution 12.5 g  12.5 g IntraVENous Q8H PRN    balsam peru-castor oil (VENELEX)  mg/gram ointment   Topical BID    docusate sodium (COLACE) capsule 100 mg  100 mg Oral BID    polyvinyl alcohol (LIQUIFILM TEARS) 1.4 % ophthalmic solution 2 Drop  2 Drop Both Eyes TID    sodium chloride (NS) flush 10 mL  10 mL InterCATHeter PRN    alteplase (CATHFLO) 1 mg in sterile water (preservative free) 1 mL injection  1 mg InterCATHeter PRN    bacitracin 500 unit/gram packet 1 Packet  1 Packet Topical PRN    glucose chewable tablet 16 g  4 Tab Oral PRN    dextrose (D50W) injection syrg 12.5-25 g  12.5-25 g IntraVENous PRN    glucagon (GLUCAGEN) injection 1 mg  1 mg IntraMUSCular PRN    sodium chloride (NS) flush 5-10 mL  5-10 mL IntraVENous PRN     ______________________________________________________________________  EXPECTED LENGTH OF STAY: 4d 21h  ACTUAL LENGTH OF STAY:          [id number] MD

## 2017-04-03 NOTE — PROGRESS NOTES
[facility]  -Progress Note     Harman Clark [phone]   4/3/2017      Marye Crigler M.D. , F.A.C.C.   --------PCP:-Bc Ruiz MD   -----Subjective:   . Dex Nunez is a 80 y.o. female denies chest pain, SOB, pain in general. Asking for water              Discussion/Plans/Recs  1. Acute on chronic respiratory failure:   -Pulmonary following- remains on biPAP at night, off during day- O2 at 2L NC    2) Acute on Chronic Systolic CHF: NYHA stage IV BNP trending down 602 today from 1636 3/31. Net -766 on 24/hr. 24 hours. Continues with BLE, no BUE edema noted. TTE on 3/31:  EF 25-30%, moderate diffuse hypokinesis, severe MR 4+. BNP trending down- 602 today from 1636 3/31. -Advanced heart failure on consult.- Recommend continue Bumex 2mg IV BID, Aldactone 25 mg daily, and DC diamox. Continue Milrinone 0.2 mc/kg/min for now. -BiV AICD  -ACE held due to low BP- on Milrinone Continue to hold ACE-I  -Continue to hold BB due to low BP/on pressors    3) MR-systolic murmur . No vegetation on valves per AMMON.  -Per valve team- not a candidate for Mitraclip. Considering TA MV in Ring or Transvenous-Transeptal MVR but Either would be extremely high risk at this time d/t pt's frailty,  -ID recommends consult to Dr. Delfino Gomez for liver cirrhosis for eval prior to any planned surgery.  -Will need improved nutrition and overall conditioning before surgery considered- PT/OT, optimize nutrition recommended. 4. Right pleural effusion:  Still with outputs too high to pull chest tube    5. Hypokalemia: potassium 3.2.  - Repleted     6). Hypernatremia-  sodium 148 from 150 4/1    7) Afib: -[id number]= 6 (age, CHF, HTN, female, DM)    -Continue Eliquis 2.5 mg BID. Continue to use OAC cautiously for stroke prophylaxis. 8). Anemia:  Hgb 9.5 from 8.8  On 4/1.    9).  Pancytopenia:  WBC 2.8.   -ID on consult: Chronic leukopenia Since 2011- likely due to cirrhosis with underlying benign ethnic neutropenia  There is no indication that this could be due to infection currently-at high risk for infection especially prolonged hospitalization, galarza, recurrent intubation, immobility - She was on vanco and zosyn from 3/8-3/21. No antibiotic needed now. Procalcitonin level pending. 10) Hx of diabetes: On SSI     11). Advanced care planning:- Palliative care last seen 3/28. Pt remains full code status. Assessment/Plan/Discussion:Cardiology Attending:     Patient seen earlier today and examined  and agree with Advance Practice Provider (ANDI, NP,PA)  assessment and plans. Keith Lopez is a 80 y.o. female   Up in chair bed  Sleeping but arousable, not conversant and mildly uncomfortable  Denies pain  Appears chronic SOB  Has chest tube, since that , only needs bipap at night per nurse, high chest tube output    Janett Lott MD 4/3/2017                      Cardiac Studies/Hx:  AMMON 3/20 with no vegetation, Same EF 35 and 1-2+ MR, TR as expected  ECHO: 3/9/17: EF 30%. Mod diffuse hyypokinesis, Wall motion is dyssynchronous, RA mildly dilated. LA mod-severe dilated, Mod MR, AV: In the parasternal long axis view, faintly seen spherical echodensity about 5mms in diameter that seems to move in  relation to the anterior aortic valve leaflet, on the aortic side. Could represent a vegetation. Mod TR, mild RV systolic hypertension. 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 PA pressure of 61 with moderate pulmonary hypertension.      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 Moderate to severe TR, moderate to severe pulmonary hypertension. EF 35-40%, marked LAE, mild JOAN    PACER check 2-11-13 39,288 episodes of atrial tach/flutter and 11 with high rates  1/2014 noises noted on pacer, 260 thousands AMS , some are noises and some are AT, some PMT (adjustment made, Dr Bri Lopez discussed with pacer clinic)  Pacer check in May 2013 showed NSVT and AF the latter as long as 1 day with occasional RVR  Pacer check 9/9/13: persistent AT with V paced. 92%  (asymptomatic with AT)    CATH 10-24-12= Normal cors and EF 50 %. -- A annular ring mitral position. Mitral valve repair on July 29, 1996, for mitral regurgitation with a #32 Turner-Manuel ring. Chronic systolic CHF, reduced ejection fraction. NYHA 1-2  . ..admit 7/16/09 mild chf exacerbation  #32 Turner Manuel ring MV repair 1996         Past Medical History:   Diagnosis Date    Atrial fibrillation (HCC)     Chronic systolic heart failure (HCC)     ef 35-40%; hold ACE due to lower bp    Diabetes mellitus type 2, insulin dependent ([address]     Hypertension     Long term (current) use of anticoagulants     Malignant hypertensive heart disease with CHF ([address]     Mitral regurgitation     Pacemaker 10/25/2012    The pacemaker is a [employer] , model # [id number], serial [id number]. 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     Results for orders placed or performed during the hospital encounter of 03/08/17   EKG, 12 LEAD, INITIAL   Result Value Ref Range    Ventricular Rate 80 BPM    Atrial Rate 75 BPM    QRS Duration 146 ms    Q-T Interval 480 ms    QTC Calculation (Bezet) 553 ms    Calculated R Axis 158 degrees    Calculated T Axis 5 degrees    Diagnosis       Ventricular-paced rhythm  When compared with ECG of 09-SEP-2016 17:36,  Vent. rate has decreased BY  30 BPM  Confirmed by Yosvany Dominique M.D., Nestor Finn ([id number]) on 3/9/2017 10:15:16 AM     Results for orders placed or performed in visit on 03/27/12   AMB POC EKG ROUTINE W/ 12 LEADS, INTER & REP    Narrative    See scanned report. Vitals:    04/03/17 0700 04/03/17 0800 04/03/17 0900 04/03/17 1000   BP: (!) 93/39 107/53 110/58 90/52   BP 1 Location:  Left arm     BP Patient Position:  At rest     Pulse: 79 81 83 70   Resp: 21 22 22 21   Temp:       SpO2: 99% 96% 97% 100%   Weight:       Height:           Objective:    Physical Exam:   Patient Vitals for the past 12 hrs:   Temp Pulse Resp BP SpO2   04/03/17 1000 - 70 21 90/52 100 %   04/03/17 0900 - 83 22 110/58 97 %   04/03/17 0800 - 81 22 107/53 96 %   04/03/17 0700 - 79 21 (!) 93/39 99 %   04/03/17 0600 - 76 20 110/48 100 %   04/03/17 0500 - 77 23 115/50 99 %   04/03/17 0400 97.2 °F (36.2 °C) 85 21 110/69 -   04/03/17 0300 - 75 18 104/55 99 %   04/03/17 0200 - 86 22 111/63 98 %   04/03/17 0100 - 73 19 99/63 96 %   04/03/17 0030 - 74 18 - 100 %      General:    Opens eyes spontaneously, converses weakly. Disoriented to place, year   ENT, Neck:  supple   Chest Wall: Rt chest tube in place, no air leak. Lung: Bilateral course crackles    Heart:  no gallops noted, RRR, no JVD 3/6 systolic early/decresendo  murmur heard best at apex. Abdomen: Nondistended, + bowel sounds, no grimacing to palpation    Richardson with mast urine.   No hematuria   Extremities: extremities normal, 1-2+ BLE edema (pedal/ankle more prominent), No BUE edema       Last 24hr Input/Output:    Intake/Output Summary (Last 24 hours) at 04/03/17 1226  Last data filed at 04/03/17 1000   Gross per 24 hour   Intake           3030.5 ml   Output             3490 ml   Net           -459.5 ml Data Review:   Recent Results (from the past 24 hour(s))   GLUCOSE, POC    Collection Time: 04/02/17  1:19 PM   Result Value Ref Range    Glucose (POC) 145 (H) 65 - 100 mg/dL    Performed by Burke Gomez    GLUCOSE, POC    Collection Time: 04/02/17  6:34 PM   Result Value Ref Range    Glucose (POC) 144 (H) 65 - 100 mg/dL    Performed by BARBARA ZAMORANO    GLUCOSE, POC    Collection Time: 04/03/17 12:13 AM   Result Value Ref Range    Glucose (POC) 128 (H) 65 - 100 mg/dL    Performed by Xiang Jones    METABOLIC PANEL, COMPREHENSIVE    Collection Time: 04/03/17  2:33 AM   Result Value Ref Range    Sodium 148 (H) 136 - 145 mmol/L    Potassium 3.2 (L) 3.5 - 5.1 mmol/L    Chloride 107 97 - 108 mmol/L    CO2 35 (H) 21 - 32 mmol/L    Anion gap 6 5 - 15 mmol/L    Glucose 133 (H) 65 - 100 mg/dL    BUN 8 6 - 20 MG/DL    Creatinine 0.53 (L) 0.55 - 1.02 MG/DL    BUN/Creatinine ratio 15 12 - 20      GFR est AA >60 >60 ml/min/1.73m2    GFR est non-AA >60 >60 ml/min/1.73m2    Calcium 9.6 8.5 - 10.1 MG/DL    Bilirubin, total 2.1 (H) 0.2 - 1.0 MG/DL    ALT (SGPT) 11 (L) 12 - 78 U/L    AST (SGOT) 22 15 - 37 U/L    Alk.  phosphatase 55 45 - 117 U/L    Protein, total 6.2 (L) 6.4 - 8.2 g/dL    Albumin 2.4 (L) 3.5 - 5.0 g/dL    Globulin 3.8 2.0 - 4.0 g/dL    A-G Ratio 0.6 (L) 1.1 - 2.2     BNP    Collection Time: 04/03/17  2:33 AM   Result Value Ref Range     (H) 0 - 100 pg/mL   CBC W/O DIFF    Collection Time: 04/03/17  2:33 AM   Result Value Ref Range    WBC 2.8 (L) 3.6 - 11.0 K/uL    RBC 4.19 3.80 - 5.20 M/uL    HGB 9.5 (L) 11.5 - 16.0 g/dL    HCT 31.0 (L) 35.0 - 47.0 %    MCV 74.0 (L) 80.0 - 99.0 FL    MCH 22.7 (L) 26.0 - 34.0 PG    MCHC 30.6 30.0 - 36.5 g/dL    RDW 19.7 (H) 11.5 - 14.5 %    PLATELET 439 (L) 329 - 400 K/uL   NUCLEATED RBC    Collection Time: 04/03/17  2:33 AM   Result Value Ref Range    NRBC 1.3 (H) 0  WBC    ABSOLUTE NRBC 0.04 (H) 0.00 - 0.01 K/uL    WBC CORRECTED FOR NR ADJUSTED FOR NUCLEATED RBC'S     GLUCOSE, POC    Collection Time: 04/03/17  5:17 AM   Result Value Ref Range    Glucose (POC) 144 (H) 65 - 100 mg/dL    Performed by Yi Anderson.  IRLANDA Wilkinson 4/3/2017

## 2017-04-03 NOTE — PROGRESS NOTES
continues to monitor for transitions of care needs. I note the events over the weekend and that patient currently has a chest tube in place. She is still lethargic but opens her eyes and grimaces to verbal stimul. Patient has been on intermittent bipap and nasal oxygen. I will continue to monitor for needs and be supportive to family.

## 2017-04-03 NOTE — PROGRESS NOTES
Problem: Falls - Risk of  Goal: *Absence of falls  Outcome: Progressing Towards Goal  Reorienting frequently    Problem: Pressure Ulcer - Risk of  Goal: *Prevention of pressure ulcer  Outcome: Progressing Towards Goal  Skin wnl, mepitel foam on groins bilaterally.  Barrier cream to sacrum

## 2017-04-03 NOTE — PROGRESS NOTES
Problem: Self Care Deficits Care Plan (Adult)  Goal: *Acute Goals and Plan of Care (Insert Text)  Occupational Therapy Goals  Continue Goals at re evaluation 4/3/17: All goals downgraded 3/24/17:  1. Patient will perform grooming in supported sitting with supervision/set-up within 7 day(s). 2. Patient will perform UB bathing with minimal assistance/contact guard assist within 7 day(s). 3. Patient will perform self feeding consistently with setup/adpative tools PRN within 7 day(s). 4. Patient will perform toilet transfers with maximal assistance within 7 day(s). 5. Patient will perform all aspects of toileting with maximal assistance within 7 day(s). 6. Patient will participate in upper extremity therapeutic exercise/activities with minimal assistance/contact guard assist for 10 minutes within 7 day(s). 7. Patient will utilize energy conservation techniques during functional activities with verbal cues within 7 day(s). Initiated 3/16/2017  1. Patient will perform grooming seated EOB for 5 minutes with supervision/set-up within 7 day(s). 2. Patient will perform UB bathing with minimal assistance/contact guard assist within 7 day(s). 3. Patient will perform lower body dressing with moderate assistance within 7 day(s). 4. Patient will perform toilet transfers with moderate assistance within 7 day(s). 5. Patient will perform all aspects of toileting with moderate assistance within 7 day(s). 6. Patient will participate in upper extremity therapeutic exercise/activities with minimal assistance/contact guard assist for 10 minutes within 7 day(s). 7. Patient will utilize energy conservation techniques during functional activities with verbal cues within 7 day(s).        OCCUPATIONAL THERAPY REEVALUATION  Patient: Jessica Fletcher (45 y.o. female)  Date: 4/3/2017  Diagnosis: Hypothermia Hypothermia       Precautions: Fall      ASSESSMENT :  Based on the objective data described below, the patient presents with grossly decreased UE strength/endurance, decreased bilateral shoulder AROM and decreased trunk control/strength with need for supplemental O2. Pt seen for re evaluation s/p decline in medical status on 3/28 where pt was intubated for respiratory failure and extubated 3/30. Pt cleared for therapy by nurse Carolyn Ortiz and reported pt is asking for water/food but is still NPO until cleared by SLP. Pt overall requires MOD A for grooming, and maximal to total assist for all other ADLs (unable to assess feeding due to NPO status but this was a primary goal prior to patient's intubation). Pt tolerated placement in chair position and worked on pulling self forward from back of chair as well as AAROM/AROM UE exercises. Pt left in chair position with PT present for re evaluation. Pt with increased alertness and decreased hand edema from prior sessions but will continue to need extensive rehab. Recommend SNF. Suggest working on feeding if cleared for diet by SLP or hand exercises using therapy sponge. Recommend with nursing patient to complete as able in order to maintain strength, endurance and independence: Chair position 3x/day , feeding (when cleared) and grooming with supervision/setup and rolling in the bed for toileting with assist. Thank you for your assistance. .     Patient will benefit from skilled intervention to address the above impairments.   Patients rehabilitation potential is considered to be Fair  Factors which may influence rehabilitation potential include:   [ ]                None noted  [X]                Mental ability/status  [X]                Medical condition  [X]                Home/family situation and support systems  [X]                Safety awareness  [ ]                Pain tolerance/management  [ ]                Other:        PLAN :  Recommendations and Planned Interventions:  [X]                  Self Care Training                  [X]           Therapeutic Activities  [X] Functional Mobility Training    [ ]           Cognitive Retraining  [X]                  Therapeutic Exercises           [X]           Endurance Activities  [X]                  Balance Training                   [ ]           Neuromuscular Re-Education  [ ]                  Visual/Perceptual Training     [X]      Home Safety Training  [X]                  Patient Education                 [X]           Family Training/Education  [ ]                  Other (comment):     Frequency/Duration: Patient will be followed by occupational therapy 3 times a week to address goals. Discharge Recommendations: Skilled Nursing Facility  Further Equipment Recommendations for Discharge: TBD       SUBJECTIVE:   Patient stated I feel fine.       OBJECTIVE DATA SUMMARY:   Hospital course since last seen and reason for reevaluation: decline in medical status; retaining CO2 and transferred to CCU/intubated 3/28  Cognitive/Behavioral Status:  Neurologic State: Confused;Drowsy  Orientation Level: Disoriented to place; Disoriented to situation;Disoriented to time;Oriented to person  Cognition: Decreased attention/concentration;Decreased command following (awake throughout session; delayed processing noted)  Perception: Appears intact  Perseveration: No perseveration noted  Safety/Judgement: Fall prevention  Skin: intact  Edema: edema in hands much improved  Vision/Perceptual:         Range of Motion:     PROM: Generally decreased, functional (limited bilateral shoulder ROM; R more limited than L)     RUE AROM  R Shoulder Flexion:  (AROM aprox 15 degrees; PROM aprox 80 degrees)  R Elbow Flexion:  (WFL; delayed processing)  R Composite Fist:  (WFL)        LUE AROM  L Shoulder Flexion:  (AROM to aprox 50 degrees; PROM to 100 degrees)  L Elbow Flexion:  (WFL)     Strength:  Strength: Grossly decreased, non-functional (Able to touch chin with both hands; fatigues very easily)     RUE Strength  R Shoulder Flexion: 2- (Unable to hold against gravity)  R Elbow Flexion: 2+  R : 3+     LUE Strength  L Shoulder Flexion: 2+ (AAROM to 90 degrees; can hold against gravity/no resistance)  L Elbow Flexion: 3-  L : 3+  Coordination:  Coordination: Generally decreased, functional  Fine Motor Skills-Upper: Left Intact; Right Intact    Gross Motor Skills-Upper: Left Impaired;Right Impaired (\"drifts off\" during movements)  Tone & Sensation:  Tone: Normal  Sensation: Intact     Balance:  Sitting: Impaired (able to pull self breifly away from back of bed)  Sitting - Static: Poor (constant support)  Sitting - Dynamic: Poor (constant support)  Standing:  (unable to attempt standing)     Functional Mobility and Transfers for ADLs:  Bed Mobility:  Supine to Sit:  (placed in chair position)     Transfers:     Functional Transfers  Toilet Transfer : Total assistance (Unable to safely complete at this time)     ADL Assessment:  Feeding: Total assistance (currently NPO)     Oral Facial Hygiene/Grooming: Moderate assistance (able to hold swab and use to clean/hydrate mouth)     Bathing: Total assistance     Upper Body Dressing: Total assistance     Lower Body Dressing: Total assistance     Toileting: Total assistance      ,Barthel Index:      Bathin  Bladder: 0  Bowels: 0  Groomin  Dressin  Feedin  Mobility: 0  Stairs: 0  Toilet Use: 0  Transfer (Bed to Chair and Back): 0  Total: 0         Barthel and G-code impairment scale:  Percentage of impairment CH  0% CI  1-19% CJ  20-39% CK  40-59% CL  60-79% CM  80-99% CN  100%   Barthel Score 0-100 100 99-80 79-60 59-40 20-39 1-19    0   Barthel Score 0-20 20 17-19 13-16 9-12 5-8 1-4 0      The Barthel ADL Index: Guidelines  1. The index should be used as a record of what a patient does, not as a record of what a patient could do. 2. The main aim is to establish degree of independence from any help, physical or verbal, however minor and for whatever reason.   3. The need for supervision renders the patient not independent. 4. A patient's performance should be established using the best available evidence. Asking the patient, friends/relatives and nurses are the usual sources, but direct observation and common sense are also important. However direct testing is not needed. 5. Usually the patient's performance over the preceding 24-48 hours is important, but occasionally longer periods will be relevant. 6. Middle categories imply that the patient supplies over 50 per cent of the effort. 7. Use of aids to be independent is allowed. Kavita Arellano., Barthel, D.W. (2984). Functional evaluation: the Barthel Index. [facility] (14)2. Contreras Grade atul JD Guaman, Margarito Mckeon., Gerard [place]., Ranjit, [address] (1999). Measuring the change indisability after inpatient rehabilitation; comparison of the responsiveness of the Barthel Index and Functional [place] Measure. Journal of Neurology, Neurosurgery, and Psychiatry, 66(4), 842-934. Kavita Zapata, N.J.A, FRANCA Mina, & Michelle Gloria M.A. (2004.) Assessment of post-stroke quality of life in cost-effectiveness studies: The usefulness of the Barthel Index and the EuroQoL-5D. Quality of Life Research, 13, 427-43      ADL Intervention:     Grooming  Brushing Teeth:  (able to hold swab and hydrate mouth)     Cognitive Retraining  Safety/Judgement: Fall prevention     Therapeutic Exercise:    EXERCISE   Sets   Reps   Active Active Assist   Passive   Comments   Shoulder flexion 1 5 [ ]           [X]           [ ]               Elbow flexion 1 5 [X]           [ ]           [ ]                     Pain:  Pain Scale 1: Numeric (0 - 10)  Pain Intensity 1: 0              Activity Tolerance:   VSS during evaluation; pt reported no SOB or dizziness. Please refer to the flowsheet for vital signs taken during this treatment.   After treatment:   [ ] Patient left in no apparent distress sitting up in chair  [X] Patient left in no apparent distress in bed/chair position  [X] Call bell left within reach  [X] Nursing notified  [X] Caregiver present (family member and PT present in room)  [ ] Bed alarm activated      COMMUNICATION/EDUCATION:   The patients plan of care was discussed with: Physical Therapist and Registered Nurse.  [X]    Home safety education was provided and the patient/caregiver indicated understanding. [X]    Patient/family have participated as able in goal setting and plan of care. [X]    Patient/family agree to work toward stated goals and plan of care. [ ]    Patient understands intent and goals of therapy, but is neutral about his/her participation. [ ]    Patient is unable to participate in goal setting and plan of care. This patients plan of care is appropriate for delegation to [facility].      Thank you for this referral.  Sindi Galindo OT  Time Calculation: 24 mins

## 2017-04-03 NOTE — INTERDISCIPLINARY ROUNDS
IDR/SLIDR Summary          Patient: Patrick Wang MRN: [id number]    Age: 80 y.o. YOB: 1930 Room/Bed: [address] [place] [address]   Admit Diagnosis: Hypothermia  Principal Diagnosis: Hypothermia pain, nausea  Goals: out of bed to chair, speech eval  Readmission: NO  Quality Measure: CHF  VTE Prophylaxis: Chemical eliquis  Influenza Vaccine screening completed? YES  Pneumococcal Vaccine screening completed? YES  Mobility needs: Yes   Nutrition plan:Yes  Consults:P.T, O.T., Speech, Respiratory and Case Management    Financial concerns:Yes  Escalated to CM? YES  RRAT Score: 25   Interventions:Palliative Care   Testing due for pt today?  YES  LOS: 26 days Expected length of stay ?? days  Discharge plan: rehab   PCP: Renata Grimes MD  Transportation needs: Yes    Days before discharge:longer than expected LOS  Discharge disposition: Rehab    Signed:     Kristofer Benítez RN  4/3/2017  6:49 AM

## 2017-04-03 NOTE — PROGRESS NOTES
[facility]   Progress Note  NAME:  Janel Cash   :   1930   MRN:   [id number]  PCP:  Waylon Wiseman MD    Date:  April 3, 2017    Date of  Admission: 3/8/2017         IMPRESSION/Plan:   Acute on chronic systolic heart failure, NYHA Class IV, EF 25-30%  Recommend continuing milrinone 0.2 mcg/kg/min until surgical plan determined  Bumex 2 mg IV BID  Recommend stopping Diamox  Hold BB, ACEi d/t hypotension  Cont. aldactone  BNP decreasing - trend  Strict I/O, daily weights  Na+ restricted diet      MR, Severe  [facility] following  Surgical plan TBD - not candidate for MitraClip     Acute on chronic respiratory failure  Improving  Management per pulmonary  BiPAP qHS and prn during day     Right pleural effusion  Still w/ considerable CT output  Management of CT by attending     Hypernatremia  Sodium 148 today  Cont. To trend    Hypokalemia  Replaced  monitor     Sepsis, POA  ID following  Abx treatment complete     Atrial fibrillation  Rate controlled  Eliquis per attending     Pancytopenia  Thought d/t hepatic congestion  ID recommends hepatology consult     DM Type II  SSIC    Deconditioning  PT/OT         Subjective:   Pt resting in bed - denies SOB or pain. Remains on milrinone 0.2 mcg/kg/min. Weight donw 5 lbs since yesterday negative about 1L in past 24 hrs    Past Medical History:   Diagnosis Date    Atrial fibrillation (HCC)     Chronic systolic heart failure (HCC)     ef 35-40%; hold ACE due to lower bp    Diabetes mellitus type 2, insulin dependent ([address]     Hypertension     Long term (current) use of anticoagulants     Malignant hypertensive heart disease with CHF ([address]     Mitral regurgitation     Pacemaker 10/25/2012    The pacemaker is a [employer] , model # [id number], serial [id number].     S/P mitral valve repair 1996    # 28 Turner-Manuel    Thyroid disease     TR (tricuspid regurgitation)     moderate to severe echo 2009      Past Surgical History:   Procedure Laterality Date    CARDIAC SURG PROCEDURE UNLIST  1996    valve replacement    HX HEART CATHETERIZATION  10/4/2012    normal cors, LVEF 50%    HX HEART VALVE SURGERY      HX PACEMAKER      INS PPM/ICD LED DUAL ONLY  10/25/2012         INS PPM/ICD LED DUAL ONLY  9/9/2016          Family History   Problem Relation Age of Onset    Cancer Sister      breast    Heart Disease Brother     Heart Disease Brother     Diabetes Son     Stroke Son       Social History   Substance Use Topics    Smoking status: Never Smoker    Smokeless tobacco: Never Used    Alcohol use No      Current Facility-Administered Medications   Medication Dose Route Frequency    0.9% sodium chloride infusion  10 mL/hr IntraVENous CONTINUOUS    spironolactone (ALDACTONE) tablet 25 mg  25 mg Oral DAILY    dextrose 5 % - 0.45% NaCl infusion  55 mL/hr IntraVENous CONTINUOUS    milrinone (PRIMACOR) 20 MG/100 ML D5W infusion  0.2 mcg/kg/min IntraVENous CONTINUOUS    bumetanide (BUMEX) injection 2 mg  2 mg IntraVENous BID    potassium chloride (KAON 10%) 20 mEq/15 mL oral liquid 40 mEq  40 mEq Per NG tube DAILY    insulin lispro (HUMALOG) injection   SubCUTAneous Q6H    apixaban (ELIQUIS) tablet 2.5 mg  2.5 mg Per NG tube BID    pantoprazole (PROTONIX) 2 mg/mL oral suspension 40 mg  40 mg Per NG tube ACB    ELECTROLYTE REPLACEMENT PROTOCOL  1 Each Other PRN    ELECTROLYTE REPLACEMENT PROTOCOL  1 Each Other PRN    albuterol-ipratropium (DUO-NEB) 2.5 MG-0.5 MG/3 ML  3 mL Nebulization TID PRN    propofol (DIPRIVAN) infusion  5-50 mcg/kg/min IntraVENous TITRATE    chlorhexidine (PERIDEX) 0.12 % mouthwash 10 mL  10 mL Oral Q12H    bisacodyl (DULCOLAX) suppository 10 mg  10 mg Rectal DAILY PRN    sodium chloride (NS) flush 5-10 mL  5-10 mL IntraVENous Multiple    phosphorus (K PHOS NEUTRAL) 250 mg tablet 1 Tab  1 Tab Oral BID    albumin human 25% (BUMINATE) solution 12.5 g  12.5 g IntraVENous Q8H PRN    balsam peru-castor oil (VENELEX)  mg/gram ointment   Topical BID    docusate sodium (COLACE) capsule 100 mg  100 mg Oral BID    polyvinyl alcohol (LIQUIFILM TEARS) 1.4 % ophthalmic solution 2 Drop  2 Drop Both Eyes TID    sodium chloride (NS) flush 10 mL  10 mL InterCATHeter PRN    alteplase (CATHFLO) 1 mg in sterile water (preservative free) 1 mL injection  1 mg InterCATHeter PRN    bacitracin 500 unit/gram packet 1 Packet  1 Packet Topical PRN    glucose chewable tablet 16 g  4 Tab Oral PRN    dextrose (D50W) injection syrg 12.5-25 g  12.5-25 g IntraVENous PRN    glucagon (GLUCAGEN) injection 1 mg  1 mg IntraMUSCular PRN    sodium chloride (NS) flush 5-10 mL  5-10 mL IntraVENous PRN          Objective:     Visit Vitals    /63 (BP Patient Position: Sitting)    Pulse 71    Temp 97.2 °F (36.2 °C)    Resp 21    Ht 5' 6\" (1.676 m)    Wt 146 lb 6.2 oz (66.4 kg)    SpO2 100%    BMI 23.63 kg/m2      Physical Exam  Gen:  WA, WN, appears tired  HEENT:  EOMI, FT in place  NECK: JVD about 12 cm  CVS:  V/VI sys murmur, irregular, telemetry reviewed  Pul:  Soft basilar crackles  Abd:  Soft, NT  Ext:  (-) edema      O2 Flow Rate (L/min): 2 l/min O2 Device: Nasal cannula    Temp (24hrs), Av.1 °F (36.2 °C), Min:96.9 °F (36.1 °C), Max:97.3 °F (36.3 °C)      Admission Weight: Last Weight   Weight: 150 lb (68 kg) Weight: 146 lb 6.2 oz (66.4 kg)         Intake/Output Summary (Last 24 hours) at 17 1617  Last data filed at 17 1000   Gross per 24 hour   Intake             2747 ml   Output             2965 ml   Net             -218 ml       Data Review:     Lab Results   Component Value Date/Time    WBC 2.8 2017 02:33 AM    HGB 9.5 2017 02:33 AM    HCT 31.0 2017 02:33 AM    PLATELET 040  02:33 AM    MCV 74.0 2017 02:33 AM     Lab Results   Component Value Date/Time    Sodium 148 2017 02:33 AM    Potassium 3.2 2017 02:33 AM Chloride 107 04/03/2017 02:33 AM    CO2 35 04/03/2017 02:33 AM    Anion gap 6 04/03/2017 02:33 AM    Glucose 133 04/03/2017 02:33 AM    BUN 8 04/03/2017 02:33 AM    Creatinine 0.53 04/03/2017 02:33 AM    BUN/Creatinine ratio 15 04/03/2017 02:33 AM    GFR est AA >60 04/03/2017 02:33 AM    GFR est non-AA >60 04/03/2017 02:33 AM    Calcium 9.6 04/03/2017 02:33 AM       Pt seen with Dr. Jennifer Massey    Thank you for letting us see Ms. Santizo Serve with you. EMILY Xiong Shirley [address] [place] [address] Dr Katheryn Cheema [facility]  Office: [phone]  24 hour VAD Pager: [phone]        Patient seen and examined. Data and note reviewed. I have discussed and agree with the plans as noted. 80year old female with a history of HFrEF, severe MR, who was admitted with decompensated heart failure. She has diuresed well with IV milrinone and bumex. Undergoing evaluation for possible trans-cutaneous MV replacement. Joycelyn Massey MD, [place] [address] Director    [address] [place] [address], [address] [place] [address] Pkw[address]  Office [phone]  Fax [phone]

## 2017-04-03 NOTE — PROGRESS NOTES
Follow up visit with Ms. Jaqueline Ramirez. No family present at time of visit. Offered words of comfort and assurance. Pt indicated that her  visits, but has not been here today. No worries or concerns expressed by Ms. Jaqueline Ramirez at this time, and she smiled some as we spoke. Pt case discussed in IDR prior to visit. Chaplains will continue to follow as able/needed.     Misti Pedraza, Palliative

## 2017-04-03 NOTE — PROGRESS NOTES
2000 Received report from Katja Grayson and assumed care. VSS, denies pain. Oriented to self, follows commands. 2230 Complete chlorahexidine bed bath provided, tolerated well. No needs or complaints at this time. 0000 Assessment unchanged. VSS, call bell at side. 0400 Remains confused, but easily reorients. VSS, denies pain. Incontinence care done for small BM.   0600 Receiving electrolyte replacement for K 3.2 per electrolyte protocol. No needs or complaints at this time. Repositioned. 0730 Bedside and Verbal shift change report given to Micheal Sparks (oncoming nurse) by Reema Campos (offgoing nurse). Report included the following information SBAR, Kardex, Intake/Output, MAR and Recent Results.

## 2017-04-04 NOTE — PROGRESS NOTES
Problem: Mobility Impaired (Adult and Pediatric)  Goal: *Acute Goals and Plan of Care (Insert Text)  Physical Therapy Goals  Revisited 3/24/2017, goals remain appropriate, carry over        Physical Therapy Goals  Initiated 3/16/2017  1. Patient will move from supine to sit and sit to supine , scoot up and down and roll side to side in bed with minimal assistance within 7 day(s). 2. Patient will transfer from bed to chair and chair to bed with moderate assistance using the least restrictive device within 7 day(s). 3. Patient will perform sit to stand with moderate assistance within 7 day(s). 4. Patient will ambulate with moderate assistance for 10 feet with the least restrictive device within 7 day(s). PHYSICAL THERAPY TREATMENT  Patient: Kaushik Lara (13 y.o. female)  Date: 4/4/2017  Diagnosis: Hypothermia Hypothermia  Precautions: Fall      ASSESSMENT:  Pt stable off of nasal cannula today. Noted pt with cogwheel rigidity with passive motion, primarily in B ankles and knees. Also noted some B reflexive activity when minimal pressure was applied to plantar aspect of foot. Would highly suggest a neurology consult. Plan to assess reflexes next session. Pt remains profoundly weak - unable to co-contract muscle groups for accentuated movements (ie. Reaching forwards with outstretched arm). Pt participated in trunk control/strengthening exercises while sitting up in bed-in-chair position: minimal perturbations with dramatic LOB, rhythmic stabilization. Continue to note tight heel cords -- applied moist heat to facilitate relaxation, however, minimally effective. Conducted gentle PROM/AAROM to LEs. Pt left sitting up in chair position; RN aware. Pt will require SNF placement.       Progression toward goals:  [ ]    Improving appropriately and progressing toward goals  [X]    Improving slowly and progressing toward goals  [ ]    Not making progress toward goals and plan of care will be adjusted PLAN:  Patient continues to benefit from skilled intervention to address the above impairments. Continue treatment per established plan of care. Discharge Recommendations:  Skilled Nursing Facility  Further Equipment Recommendations for Discharge:  TBD following SNF       SUBJECTIVE:   Patient stated I ate meat spaghetti today.       OBJECTIVE DATA SUMMARY:   Critical Behavior:  Neurologic State: Alert  Orientation Level: Oriented to person, Oriented to place  Cognition: Follows commands  Safety/Judgement: Awareness of environment      Functional Mobility Training:  Bed Mobility:  Supine to Sit:  (Bed placed in chair position)  Balance:  Sitting: Impaired  Sitting - Static: Poor (constant support)  Sitting - Dynamic: Poor (constant support)     Pain:  Pain Scale 1: Numeric (0 - 10)  Pain Intensity 1: 0     Activity Tolerance:   Please refer to the flowsheet for vital signs taken during this treatment.   After treatment:   [X]    Patient left in no apparent distress sitting up in modified chair position  [ ]    Patient left in no apparent distress in bed  [X]    Call bell left within reach  [X]    Nursing notified  [ ]    Caregiver present  [ ]    Bed alarm activated      COMMUNICATION/COLLABORATION:   The patients plan of care was discussed with: Physical Therapist, Registered Nurse and Rehabilitation Attendant     Malcolm Burdick PT, DPT   Time Calculation: 33 mins

## 2017-04-04 NOTE — PROGRESS NOTES
16:00 SBAR report from [facility]    17:00 Assessment completed, galarza care done and marathon applied to left buttock skin tear. It is not draining, looks like a tear and I reminded her we will need to keep her off her sacrum. It does not appear to be a decubitus. She has 3 mepalex border dressings on her thigh ? Related to the galarza catheter stat lock. The catheter is not secured at his time secondary to the wounds. 19:30 Bedside shift change report given to Aj Fairchild (oncoming nurse) by Trever Jara (offgoing nurse). Report included the following information SBAR, Kardex, Intake/Output, MAR, Accordion, Recent Results, Med Rec Status, Cardiac Rhythm Paced and Alarm Parameters . Problem: Falls - Risk of  Goal: *Absence of falls  Outcome: Progressing Towards Goal  She is stiff, follows commands but does not take the initiative to help unless asked.     Problem: Infection - Risk of, Central Venous Catheter-Associated Bloodstream Infection  Goal: *Absence of infection signs and symptoms  Outcome: Progressing Towards Goal  Dressing dry and intact    Problem: Infection - Risk of, Urinary Catheter-Associated Urinary Tract Infection  Goal: *Absence of infection signs and symptoms  Outcome: Progressing Towards Goal  galarza catheter care completed    Problem: Nutrition Deficit  Goal: *Optimize nutritional status  Outcome: Progressing Towards Goal  Started on mechanical soft diet and still on tube feeds    Problem: General Medical Care Plan  Goal: *Optimal pain control at patients stated goal  Outcome: Progressing Towards Goal  No pain per patient  Goal: *Skin integrity maintained  Outcome: Not Progressing Towards Goal  Dome size skin tear left buttock    Problem: Infection - Risk of, Central Venous Catheter-Associated Bloodstream Infection  Goal: *Absence of infection signs and symptoms  Outcome: Progressing Towards Goal  Vitals stable, no signs of infection

## 2017-04-04 NOTE — PROGRESS NOTES
Problem: Heart Failure: Day 5  Goal: Off Pathway (Use only if patient is Off Pathway)  Outcome: Not Progressing Towards Goal  Day 27 of hospitalization.

## 2017-04-04 NOTE — PROGRESS NOTES
continues to monitor patient. She is more alert today and verbalizing in short sentences. She did pass the swallowing test per ST and note with recommendations noted. Patient continues to have a chest tube in place. I have not re-contacted daughters about other skilled nursing facilities and one of the daughters was also interested in [facility]. Will contine to follow for needs. The opportunity has not presented itself to ask questions regarding patient's finances and the need for a pre-screeing [facility] during this hospitalization.

## 2017-04-04 NOTE — CONSULTS
Intensivist / Pulmonary / Critical Care  Assessment / Plan:      1. Acute on chronic resp failure - combined chronic resp acidosis and metabolic alkalosis from diuresis contributing to high PCO2 and compensated pH  2. CHF with mitral valve disease - on scheduled diuretics  3. Afib - on eliquis  4. Sepsis syndrome - ? Source s/p 2 weeks abx - ID following  5. Acute on chronic kidney disease - improved  6. DM  7. Full code - palliative care to see    · Observe off bipap. · pulm toilet  · Diuresis. History / Subjective:  Hospital Day: 28 - Interval history and notes reviewed     Alert off bipap. Denies pain.        Objective:  Patient Vitals for the past 4 hrs:   BP Temp Pulse Resp SpO2   17 0900 101/59 - 80 24 92 %   17 0800 111/59 98.1 °F (36.7 °C) 73 25 95 %   17 0700 115/56 - 65 24 99 %   17 0600 106/52 - 81 25 96 %   Temp (24hrs), Av.9 °F (36.6 °C), Min:97 °F (36.1 °C), Max:98.8 °F (37.1 °C)      Intake/Output Summary (Last 24 hours) at 17 0934  Last data filed at 17 0800   Gross per 24 hour   Intake             3263 ml   Output             3580 ml   Net             -317 ml     Lab Results   Component Value Date/Time    Glucose (POC) 151 2017 06:44 AM    Glucose (POC) 162 2017 12:45 AM    Glucose (POC) 121 2017 06:35 PM    Glucose (POC) 149 2017 12:55 PM    Glucose (POC) 144 2017 05:17 AM     Exam:  Alert on BiPAP  Bilateral rales and ronchi  Reg, + murmur  Protuberant abdomen  Warm and dry skin  2+ edema    Data:   Labs:  Recent Labs      17   0233   WBC  2.8*   HGB  9.5*   HCT  31.0*   PLT  122*     Recent Labs      17   0645  17   0233  17   0646   NA  144  148*  148*   K  3.2*  3.2*  3.0*   CL  105  107  106   CO2  33*  35*  38*   GLU  127*  133*  137*   BUN  11  8  10   CREA  0.49*  0.53*  0.53*   CA  9.1  9.6  9.3   ALB   --   2.4*   --    TBILI   --   2.1*   --    SGOT   --   22   --    ALT   --   11* --             Marcia Ring MD

## 2017-04-04 NOTE — PROGRESS NOTES
Hospitalist Progress Note  Carl Michelle MD   Office: [phone]          Date of Service:  2017  NAME:  Carissa Doe  :  1930  MRN:  [id number]      Admission Summary:   70-year-old female with history of chronic systolic congestive heart failure, atrial fibrillation, diabetes mellitus, hypertension, chronic anticoagulation use, history of pacemaker and ICD. She was sent from her primary care physician to the emergency room to be evaluated. History from the patient and her daughters at the bedside, also from the records. Per the daughter, she has been having on and off abdominal pain, epigastric pain for a while, over the last 3 days has been mostly pronounced. She said her pain at times is 9/10 in severity, it is nonradiating, associated with some nausea, but no vomiting. She denies any diarrhea. At her PCP's office,   they could not get her temperature, so they sent her to the emergency room. In the ER she has been hypothermic, her temperature was 91.4 on initial presentation. Also she was hypotensive. Her blood pressure   systolic was 01/50, so she was given about 1.5 liters of IV fluid, and now she is getting 1 bottle of albumin. She was placed on warming blanket, Lizzy Hugger. She denies any dysuria or frequency.      Interval history / Subjective:    Feels a little better this morning, no specific complaints, but wants to get out of hospital.     Assessment & Plan:         Acute on chronic respiratory failure with hypercapnia due to pleural effusion/atelectasis and acute on chronic systolic heart failure   - NYHA class IV upon admission   - she had been intubated on 3/25, subsequently extubated 3/30; now on intermittent BiPAP   - diuresis with Lasix   - s/p chest tube placement on 3/31   - on milrinone    Hypothermia and hypotension: - resolved   - completed empiric therapy with vancomycin and Zosyn    Bilateral Pleural effusions and significant dependent atx: likely related to CHF   - see above    Sepsis:  Unknown source, most likely lung with bilateral pleural effusions, possibly infectious.     - AMMON: no vegetations;    - blood culture 3/8 and 3/11: no growth x 5 days;    - urine culture: no growth;    - 3/21: afebrile, no leukocytosis, cultures negative   - off antibiotics. Patient completed 2 weeks of IV Abx since admission. Acute on Chronic Systolic congestive heart failure NYHA IV   - difficult situation as suspect there is a component of valvular disease contributing to her presentation (severe MR)   - Echo 3/9 EF 30%, diffuse hypokinesis. - AMMON 3/22 showed no vegetations   - not a candidate for MitraClip, possibly TA MV or transvenous-transeptal MVR, await cardiac surgery input   - cautious diuresis with Bumex   - holding beta blocker / ACE inhibitor secondary to borderline BPs   - on spironolactone   - cardiology, advanced heart failure following    Hematuria: may be related to traumatic galarza placement with INR max 5.5,   - Urine noted on UA as yellow on 3/8,    - Galarza placed 3/9 and hematuria reported 3/10 UA. JUAN:  From  ATN and contrast nephropathy/Hypotension most likely etiology. - Ischemic heart disease, hypotension; and exposure to IV contrast.    - Appreciate nephro consult recommendations. - Creatinine now normalized. Hypoglycemia   - now on tube feeds    Ischemic hepatitis:    - Consulted GI. Plans for EGD when stable or outpatient.   - LFTs now normalized. - hepatology consulted - unfortunately unavailable until 4/13? Coagulopathy.   - Had hematuria. INR was supra therapeutic on warfarin which is now stopped. - 3/21: started patient on Eliquis for stroke prophylaxis in the setting of chronic Atrial Fibrillation and [id number] score of 6. Pancytopenia. - Appears to be chronic. However, patient and family unaware of her previous lab results. Will monitor her labs closely.     Left cheek where it meets the nose pressure ulcer/injury:  - stage 2; measures 1 x 0.5 x 0 cm; 100% drying pink/red;  - with linear jac = 2 x 0.5 x 0 cm (smaller);  - Hospital Acquired;    Urinary retention:  - Richardson removed 3/21, decreased urine output, bladder scan revealed 900 ml of urine in the bladder per nursing report, Richardson was reinserted; (second attempt at removing Richardson during this hospitalization). - keep the Richardson in at discharge; outpatient f/u with Urology for voiding trials once patient is more ambulatory. Anemia:  - of chronic disease with slow decline in H&H during this hospitalization. - patient transfused 2 Units of PRBC's on 3/22, Hgb fairly stable currently    Afib   - rate controlled on no medications currently   - on Eliquis    Hypernatremia   - continue to trend    Code status: [facility] discussed with: Patient/Family and Nurse  Disposition: TBD      Hospital Problems  Date Reviewed: 3/8/2017          Codes Class Noted POA    * (Principal)Hypothermia ICD-10-CM: T68. XXXA  ICD-9-CM: 991.6  3/8/2017 Yes                Review of Systems:   A comprehensive review of systems was negative. Vital Signs:    Last 24hrs VS reviewed since prior progress note. Most recent are:  Visit Vitals    /85    Pulse 88    Temp 98.2 °F (36.8 °C)    Resp (!) 33    Ht 5' 6\" (1.676 m)    Wt 64.8 kg (142 lb 13.7 oz)    SpO2 98%    BMI 23.06 kg/m2         Intake/Output Summary (Last 24 hours) at 04/04/17 1951  Last data filed at 04/04/17 1900   Gross per 24 hour   Intake           3531.4 ml   Output             4330 ml   Net           -798.6 ml        Physical Examination:             Constitutional:  On bipap,responsive. HEENT: Now off BiPAP   Resp: Basal dulness. CT in place. CV:  irregularly irregular, soft systolic murmur. GI:  Soft, non-tender, normoactive bowel sounds;     Musculoskeletal:  2+ edema,SCDs in place.     Neurologic: Awake, alert            Data Review:    Review and/or order of clinical lab test   US abd:  IMPRESSION:  1. Distended gallbladder with sludge and a stone with moderate wall thickening of the gallbladder wall. 2. Dilated IVC suggesting right heart failure. 3. Small amount of free fluid surrounding the liver. 4. Small right pleural effusion      Labs:     Recent Labs      04/03/17 0233   WBC  2.8*   HGB  9.5*   HCT  31.0*   PLT  122*     Recent Labs      04/04/17   0645  04/03/17   0233  04/02/17   0646   NA  144  148*  148*   K  3.2*  3.2*  3.0*   CL  105  107  106   CO2  33*  35*  38*   BUN  11  8  10   CREA  0.49*  0.53*  0.53*   GLU  127*  133*  137*   CA  9.1  9.6  9.3     Recent Labs      04/03/17 0233   SGOT  22   ALT  11*   AP  55   TBILI  2.1*   TP  6.2*   ALB  2.4*   GLOB  3.8     No results for input(s): INR, PTP, APTT in the last 72 hours. No lab exists for component: INREXT, INREXT   No results for input(s): FE, TIBC, PSAT, FERR in the last 72 hours. Lab Results   Component Value Date/Time    Folate 14.5 03/09/2017 03:23 AM      No results for input(s): PH, PCO2, PO2 in the last 72 hours. No results for input(s): CPK, CKNDX, TROIQ in the last 72 hours.     No lab exists for component: CPKMB  Lab Results   Component Value Date/Time    Cholesterol, total 138 09/28/2011 09:13 AM    HDL Cholesterol 50 09/28/2011 09:13 AM    LDL, calculated 74 09/28/2011 09:13 AM    Triglyceride 69 09/28/2011 09:13 AM     Lab Results   Component Value Date/Time    Glucose (POC) 151 04/04/2017 05:49 PM    Glucose (POC) 151 04/04/2017 06:44 AM    Glucose (POC) 162 04/04/2017 12:45 AM    Glucose (POC) 121 04/03/2017 06:35 PM    Glucose (POC) 149 04/03/2017 12:55 PM     Lab Results   Component Value Date/Time    Color RED 03/10/2017 02:30 AM    Appearance BLOODY 03/10/2017 02:30 AM    Specific gravity 1.020 03/10/2017 02:30 AM    Specific gravity 1.016 03/08/2017 08:54 PM    pH (UA) 6.0 03/10/2017 02:30 AM    Protein 100 03/10/2017 02:30 AM    Glucose NEGATIVE  03/10/2017 02:30 AM    Ketone NEGATIVE  03/10/2017 02:30 AM    Bilirubin NEGATIVE  03/10/2017 02:30 AM    Urobilinogen 1.0 03/10/2017 02:30 AM    Nitrites NEGATIVE  03/10/2017 02:30 AM    Leukocyte Esterase TRACE 03/10/2017 02:30 AM    Epithelial cells FEW 03/10/2017 02:30 AM    Bacteria NEGATIVE  03/10/2017 02:30 AM    WBC 20-50 03/10/2017 02:30 AM    RBC >100 03/10/2017 02:30 AM         Medications Reviewed:     Current Facility-Administered Medications   Medication Dose Route Frequency    [START ON 4/5/2017] pantoprazole (PROTONIX) 2 mg/mL oral suspension 40 mg  40 mg Per NG tube ACB    milrinone (PRIMACOR) 20 MG/100 ML D5W infusion  0.3 mcg/kg/min IntraVENous CONTINUOUS    0.9% sodium chloride infusion  10 mL/hr IntraVENous CONTINUOUS    spironolactone (ALDACTONE) tablet 25 mg  25 mg Oral DAILY    dextrose 5 % - 0.45% NaCl infusion  55 mL/hr IntraVENous CONTINUOUS    bumetanide (BUMEX) injection 2 mg  2 mg IntraVENous BID    potassium chloride (KAON 10%) 20 mEq/15 mL oral liquid 40 mEq  40 mEq Per NG tube DAILY    insulin lispro (HUMALOG) injection   SubCUTAneous Q6H    apixaban (ELIQUIS) tablet 2.5 mg  2.5 mg Per NG tube BID    ELECTROLYTE REPLACEMENT PROTOCOL  1 Each Other PRN    ELECTROLYTE REPLACEMENT PROTOCOL  1 Each Other PRN    albuterol-ipratropium (DUO-NEB) 2.5 MG-0.5 MG/3 ML  3 mL Nebulization TID PRN    propofol (DIPRIVAN) infusion  5-50 mcg/kg/min IntraVENous TITRATE    chlorhexidine (PERIDEX) 0.12 % mouthwash 10 mL  10 mL Oral Q12H    bisacodyl (DULCOLAX) suppository 10 mg  10 mg Rectal DAILY PRN    sodium chloride (NS) flush 5-10 mL  5-10 mL IntraVENous Multiple    phosphorus (K PHOS NEUTRAL) 250 mg tablet 1 Tab  1 Tab Oral BID    albumin human 25% (BUMINATE) solution 12.5 g  12.5 g IntraVENous Q8H PRN    balsam peru-castor oil (VENELEX)  mg/gram ointment   Topical BID    docusate sodium (COLACE) capsule 100 mg  100 mg Oral BID    polyvinyl alcohol (LIQUIFILM TEARS) 1.4 % ophthalmic solution 2 Drop  2 Drop Both Eyes TID    sodium chloride (NS) flush 10 mL  10 mL InterCATHeter PRN    alteplase (CATHFLO) 1 mg in sterile water (preservative free) 1 mL injection  1 mg InterCATHeter PRN    bacitracin 500 unit/gram packet 1 Packet  1 Packet Topical PRN    glucose chewable tablet 16 g  4 Tab Oral PRN    dextrose (D50W) injection syrg 12.5-25 g  12.5-25 g IntraVENous PRN    glucagon (GLUCAGEN) injection 1 mg  1 mg IntraMUSCular PRN    sodium chloride (NS) flush 5-10 mL  5-10 mL IntraVENous PRN     ______________________________________________________________________  EXPECTED LENGTH OF STAY: 4d 21h  ACTUAL LEN[place] OF ST[place]          [facility] [place]

## 2017-04-04 NOTE — PROGRESS NOTES
1930 Bedside shift change report given to Tamera Ellis (oncoming nurse) by Armando David (offgoing nurse). Report included the following information SBAR, Kardex, Procedure Summary, Intake/Output, MAR, Recent Results and Cardiac Rhythm AV Paced. 0730 Bedside shift change report given to Emiliana (oncoming nurse) by Tamera Ellis (offgoing nurse). Report included the following information SBAR, Kardex, Procedure Summary, Intake/Output, MAR, Recent Results and Cardiac Rhythm AV Paced.

## 2017-04-04 NOTE — PROGRESS NOTES
[facility]  -Progress Note     Harman Clark [phone]   4/4/2017      Kevin Hoffman M.D. , F.A.C.C.   --------PCP:-Bc Ragland MD   -----Subjective:   . Ritchie Diaz is a 80 y.o. female denies chest pain, SOB. Requesting bedpan. Assessment/Plan/Discussion:Cardiology Attending:     Patient seen earlier today and examined  and agree with Advance Practice Provider (ANDI, NP,PA)  assessment and plans. Sheridan Diaz is a 80 y.o. female   Weak and slow to move  Overnight with chest tube drains in place, seems to have not needed bipap last night  Nutrition seems an issue, very weak, needs calories  CHF with MR, await surgical plan, continue milrinone,diuretic  Looking closer to euvolemia  hgb 9.5 Cr 0.49   Consult to hepatology  No surgery this week      Loi Leung MD 4/4/2017           Discussion/Plans/Recs  1. Acute on chronic respiratory failure:  Much improved. Off bipap yesterday, overnight and today.    -Pulmonary following- currently on Room air! 2) Acute on Chronic Systolic CHF: NYHA stage IV. BNP trending down 602 yesterday from 1636 3/31. Net -391on 24/hr. Improved BLE edema. No BUE edema. TTE on 3/31:  EF 25-30%, moderate diffuse hypokinesis, severe MR 4+. -Advanced heart failure on consult. -Bumex 2mg IV BID, Aldactone 25 mg daily. Diamox stopped yesterday. Continue Milrinone 0.2 mc/kg/min for now. -BiV AICD, PPM  -ACE and BB held due to low BP- on Milrinone     3) Severe MR-systolic murmur . 4+ MR. No vegetation on valves per AMMON.    -Per valve team- not a candidate for Mitraclip.   Considering TA MV in Ring or Transvenous-Transeptal MVR but Either would be extremely high risk at this time d/t pt's frailty,  -ID recommends consult to Dr. Brandy Meredith for liver cirrhosis for eval prior to any planned surgery.- Hepatology consulted by primary team..  -Will need improved nutrition and overall conditioning before surgery considered- PT/OT, optimize nutrition recommended.  -On TF    4. Right pleural effusion:  Still with outputs too high to pull chest tube (CT with -640 ML per record). Management per pulmonary team.    5. Hypokalemia: potassium 3.2.  - Repleted    6. Afib: -[id number]= 6 (age, CHF, HTN, female, DM)    -Continue Eliquis 2.5 mg BID. Continue to use OAC cautiously for stroke prophylaxis. 7.Anemia:  Hgb 9.5 yesterday from 8.8  On 4/1.    9). Pancytopenia:  WBC 2.8.   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 No indication that this is due to infection currently-at high risk for infection especially prolonged hospitalization, galarza, recurrent intubation, immobility -  No antibiotic needed now. ID signed off.    10) Hx of diabetes: On SSI    11)Dysphagia:    -Continue Tube feeds to optimize nutrition  -Passed swallow study today for modified diet (mech soft/thin)     12). Advanced care planning:- Palliative care last seen 3/28. Full code status. Cardiac Studies/Hx:  AMMON 3/31: EF 25 % to 30 %. Mod.diffuse hypokinesis. RV systolic fxn mild reduced  LA severely dilated. RA mod dilated. Mod MAC posterior leaflet, Severely restricted  Mobility posterior leaflet. Mild MS, Severe MR, 4+.  AV:sclerosis. Mild AR. TV:  Mod TR  PAS 70 mmHg, Mod-severe pulm. HTN. Lge Left pleural effusion. AMMON 3/20 with no vegetation, Same EF 35 and 1-2+ MR, TR as expected  ECHO: 3/9/17: EF 30%. Mod diffuse hyypokinesis, Wall motion is dyssynchronous, RA mildly dilated. LA mod-severe dilated, Mod MR, AV: In the parasternal long axis view, faintly seen spherical echodensity about 5mms in diameter that seems to move in  relation to the anterior aortic valve leaflet, on the aortic side. Could represent a vegetation. Mod TR, mild RV systolic hypertension.  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 PA pressure of 61 with moderate pulmonary hypertension. 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 Moderate to severe TR, moderate to severe pulmonary hypertension. EF 35-40%, marked LAE, mild JOAN    PACER check 2-11-13 39,288 episodes of atrial tach/flutter and 11 with high rates  1/2014 noises noted on pacer, 260 thousands AMS , some are noises and some are AT, some PMT (adjustment made, Dr Ariana Reeder discussed with pacer clinic)  Pacer check in May 2013 showed NSVT and AF the latter as long as 1 day with occasional RVR  Pacer check 9/9/13: persistent AT with V paced. 92%  (asymptomatic with AT)    CATH 10-24-12= Normal cors and EF 50 %. -- A annular ring mitral position. Mitral valve repair on July 29, 1996, for mitral regurgitation with a #32 Turner-Manuel ring. Chronic systolic CHF, reduced ejection fraction. NYHA 1-2  . ..admit 7/16/09 mild chf exacerbation  #32 Turner Manuel ring MV repair 1996         Past Medical History:   Diagnosis Date    Atrial fibrillation (HCC)     Chronic systolic heart failure (HCC)     ef 35-40%; hold ACE due to lower bp    Diabetes mellitus type 2, insulin dependent ([address]     Hypertension     Long term (current) use of anticoagulants     Malignant hypertensive heart disease with CHF ([address]     Mitral regurgitation     Pacemaker 10/25/2012    The pacemaker is a [employer] , model # [id number], serial [id number].    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 Results for orders placed or performed during the hospital encounter of 03/08/17   EKG, 12 LEAD, INITIAL   Result Value Ref Range    Ventricular Rate 80 BPM    Atrial Rate 75 BPM    QRS Duration 146 ms    Q-T Interval 480 ms    QTC Calculation (Bezet) 553 ms    Calculated R Axis 158 degrees    Calculated T Axis 5 degrees    Diagnosis       Ventricular-paced rhythm  When compared with ECG of 09-SEP-2016 17:36,  Vent. rate has decreased BY  30 BPM  Confirmed by Adebayo Keith M.D., Yeboah Severe ([id number]) on 3/9/2017 10:15:16 AM     Results for orders placed or performed in visit on 03/27/12   AMB POC EKG ROUTINE W/ 12 LEADS, INTER & REP    Narrative    See scanned report. Vitals:    04/04/17 0800 04/04/17 0900 04/04/17 1000 04/04/17 1100   BP: 111/59 101/59 125/70 119/74   BP 1 Location:       BP Patient Position:       Pulse: 73 80 73 77   Resp: 25 24 24 27   Temp: 98.1 °F (36.7 °C)      SpO2: 95% 92% 98% 97%   Weight:       Height:           Objective:    Physical Exam:   Patient Vitals for the past 12 hrs:   Temp Pulse Resp BP SpO2   04/04/17 1100 - 77 27 119/74 97 %   04/04/17 1000 - 73 24 125/70 98 %   04/04/17 0900 - 80 24 101/59 92 %   04/04/17 0800 98.1 °F (36.7 °C) 73 25 111/59 95 %   04/04/17 0700 - 65 24 115/56 99 %   04/04/17 0600 - 81 25 106/52 96 %   04/04/17 0500 - 81 20 98/54 98 %   04/04/17 0400 97.8 °F (36.6 °C) 85 25 97/59 96 %   04/04/17 0300 - 75 19 101/56 96 %   04/04/17 0200 - 81 24 101/54 96 %   04/04/17 0100 - 80 23 114/52 99 %   04/04/17 0000 98.2 °F (36.8 °C) 77 20 100/51 100 %      General:    Awake, alert, converses weakly. ENT, Neck:  supple   Chest Wall: Rt chest tube in place, no air leak. Serous drainage noted.    Lung: Bilateral course crackles    Heart:  no gallops noted, RRR, no JVD 3/6 systolic early/decresendo  murmur heard best at apex. Abdomen: Nondistended, + bowel sounds, no grimacing to palpation    Richardson with mast urine. No hematuria   Extremities: extremities normal, 1+ BLE edema (pedal/ankle more prominent)- Edema improved, No BUE edema       Last 24hr Input/Output:    Intake/Output Summary (Last 24 hours) at 04/04/17 1143  Last data filed at 04/04/17 0800   Gross per 24 hour   Intake             3208 ml   Output             3330 ml   Net             -122 ml        Data Review:   Recent Results (from the past 24 hour(s))   GLUCOSE, POC    Collection Time: 04/03/17 12:55 PM   Result Value Ref Range    Glucose (POC) 149 (H) 65 - 100 mg/dL    Performed by Tova, POC    Collection Time: 04/03/17  6:35 PM   Result Value Ref Range    Glucose (POC) 121 (H) 65 - 100 mg/dL    Performed by Ermelinda Boast    GLUCOSE, POC    Collection Time: 04/04/17 12:45 AM   Result Value Ref Range    Glucose (POC) 162 (H) 65 - 100 mg/dL    Performed by Ozzie Suresh    GLUCOSE, POC    Collection Time: 04/04/17  6:44 AM   Result Value Ref Range    Glucose (POC) 151 (H) 65 - 100 mg/dL    Performed by Lisa Toscano    METABOLIC PANEL, BASIC    Collection Time: 04/04/17  6:45 AM   Result Value Ref Range    Sodium 144 136 - 145 mmol/L    Potassium 3.2 (L) 3.5 - 5.1 mmol/L    Chloride 105 97 - 108 mmol/L    CO2 33 (H) 21 - 32 mmol/L    Anion gap 6 5 - 15 mmol/L    Glucose 127 (H) 65 - 100 mg/dL    BUN 11 6 - 20 MG/DL    Creatinine 0.49 (L) 0.55 - 1.02 MG/DL    BUN/Creatinine ratio 22 (H) 12 - 20      GFR est AA >60 >60 ml/min/1.73m2    GFR est non-AA >60 >60 ml/min/1.73m2    Calcium 9.1 8.5 - 10.1 MG/DL      Sanjiv Wilkinson, IRLANDA 4/4/2017

## 2017-04-04 NOTE — INTERDISCIPLINARY ROUNDS
IDR/SLIDR Summary          Patient: Carissa Doe MRN: [id number]    Age: 80 y.o. YOB: 1930 Room/Bed: [address] [place] [address]   Admit Diagnosis: Hypothermia  Principal Diagnosis: Hypothermia pain, nausea  Goals: out of bed to chair, speech eval  Readmission: NO  Quality Measure: CHF  VTE Prophylaxis: Chemical eliquis  Influenza Vaccine screening completed? YES  Pneumococcal Vaccine screening completed? YES  Mobility needs: Yes   Nutrition plan:Yes  Consults:P.T, O.T., Speech, Respiratory and Case Management    Financial concerns:Yes  Escalated to CM? YES  RRAT Score: 25   Interventions:Palliative Care   Testing due for pt today?  YES  LOS: 27 days Expected length of stay ?? days  Discharge plan: rehab   PCP: Su Cornelius MD  Transportation needs: Yes    Days before discharge:longer than expected LOS  Discharge disposition: Rehab    Signed:     Josefa Julien RN  4/4/2017  6:49 AM

## 2017-04-04 NOTE — PROGRESS NOTES
Problem: Falls - Risk of  Goal: *Absence of falls  Outcome: Progressing Towards Goal  Lower extremities immobile. Turn q2h. Does not try to get up. Siderails x 3     Problem: Pressure Ulcer - Risk of  Goal: *Prevention of pressure ulcer  Outcome: Progressing Towards Goal  Keeping an eye on gluteal excoriation. Problem: Infection - Risk of, Central Venous Catheter-Associated Bloodstream Infection  Goal: *Absence of infection signs and symptoms  Outcome: Progressing Towards Goal  Labs stable    Problem: Infection - Risk of, Urinary Catheter-Associated Urinary Tract Infection  Goal: *Absence of infection signs and symptoms  Outcome: Progressing Towards Goal  Still has galarza. Very immobile, incontinent    Comments:   0800 Received pt awake after verbal report from Ayo. Oriented to self and pleasant. Denies pain. 0900 Seen by Speech, passed swallow. Mech soft since pt does not have her dentures. 1030 Pt requested bedpan. No result. 1300 Patient ate lunch and fed herself. 1400 worked with PT. Pt does not spontaneously move her lower extremities. She will move them on command but they are stiff. See their note for details. 1530 Bedside verbal report given to 0 [facility], RN. Opportunity for questions provided.

## 2017-04-04 NOTE — PROGRESS NOTES
Problem: Dysphagia (Adult)  Goal: *Acute Goals and Plan of Care (Insert Text)  Speech pathology goals initiated 4/4/2017   1. Patient will tolerate a mechanical soft diet/ thin liquids free of s/s aspiration within 7 days   [address] EVALUATION  Patient: Chase Al (80 y.o. female)  Date: 4/4/2017  Primary Diagnosis: Hypothermia        Precautions:   Fall      ASSESSMENT :  Based on the objective data described below, the patient presents with functional oropharyngeal swallow for age and edentulous state. Patient required increased time for bolus manipulation of soft solid due to not having any dentition but she cleared oral cavity completely. Pharyngeal swallow initiation is slightly delayed but hyolaryngeal elevation/excursion functional via palpation. No overt s/s aspiration with straw sips of thin, puree or softened crackers. Recommend mechanical soft diet/ thin liquids. Patient will benefit from skilled intervention to address the above impairments. Patients rehabilitation potential is considered to be Good  Factors which may influence rehabilitation potential include:   [ ]            None notedy[ ]            Mental ability/status  [ ]            Medical condition  [ ]            Home/family situation and support systems  [ ]            Safety awareness  [ ]            Pain tolerance/management  [ ]            Other:        PLAN :  Recommendations and Planned Interventions:  -- Mechanical soft diet/ Thin liquids. Single straw sips ok  -- Meds 1 at a time in applesauce  -- FULLY upright- prefer in chair position for meals  -- will f/u x1 to ensure diet tolerance     Frequency/Duration: Patient will be followed by speech-language pathology 2 times a week to address goals. Discharge Recommendations: None       SUBJECTIVE:   Patient stated I like my applesauce warm.       OBJECTIVE:       Past Medical History:   Diagnosis Date    Atrial fibrillation ([facility]      Chronic systolic heart failure (HCC)       ef 35-40%; hold ACE due to lower bp    Diabetes mellitus type 2, insulin dependent [facility]      Hypertension      Long term (current) use of anticoagulants      Malignant hypertensive heart disease with CHF ([facility]      Mitral regurgitation      Pacemaker 10/25/2012     The pacemaker is a [employer] Acc[employer] DR, model # [id number], serial [id number]. 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, 1996     # 28 Turner-Manuel    Thyroid disease      TR (tricuspid regurgitation)       moderate to severe echo 2009     Past Surgical History:   Procedure Laterality Date    CARDIAC SURG PROCEDURE UNLIST   1996     valve replacement    HX HEART CATHETERIZATION   10/4/2012     normal cors, LVEF 50%    HX HEART VALVE SURGERY        HX PACEMAKER        INS PPM/ICD LED DUAL ONLY   10/25/2012          INS PPM/ICD LED DUAL ONLY   9/9/2016           Prior Level of Function/Home Situation:   Home Situation  Home Environment: Private residence  # Steps to Enter: 4  Rails to Enter: Yes  Hand Rails : Bilateral  One/Two Story Residence: One story  Living Alone: No  Support Systems: Child(milagros), Family member(s), [employer]  Patient Expects to be Discharged to[de-identified] Unknown  Current DME Used/Available at Home: None  Tub or Shower Type: Tub/Shower combination  Diet prior to admission: regular/thin  Current Diet:  Npo with TF via DBHT   Cognitive and Communication Status:  Neurologic State: Alert  Orientation Level: Oriented to person, Oriented to place  Cognition: Follows commands  Perception: Appears intact  Perseveration: No perseveration noted  Safety/Judgement: Awareness of environment  Oral Assessment:  Oral Assessment  Labial: No impairment  Dentition: Edentulous  Oral Hygiene:  (clean, moist)  Lingual: No impairment  Velum: Unable to visualize  Mandible: No impairment  P.O.  Trials:  Patient Position:  (upright in bed)  Vocal quality prior to P.O.: No impairment  Consistency Presented: Thin liquid;Mechanical soft;Puree  How Presented: SLP-fed/presented;Self-fed/presented; Successive swallows;Straw;Spoon     Bolus Acceptance: No impairment  Bolus Formation/Control: Impaired  Type of Impairment: Delayed  Propulsion: No impairment  Oral Residue: None  Initiation of Swallow: No impairment  Laryngeal Elevation: Functional  Aspiration Signs/Symptoms: None  Pharyngeal Phase Characteristics: Other (comment); Poor endurance (functional for age)  Effective Modifications: Small sips and bites           Oral Phase Severity: Other (comment) (functional for age/no dentition)  Pharyngeal Phase Severity : Other (comment) (functional for age)     NOMS:   The NOMS functional outcome measure was used to quantify this patient's level of swallowing impairment. Based on the NOMS, the patient was determined to be at level 6 for swallow function      G Codes: In compliance with CMSs Claims Based Outcome Reporting, the following G-code set was chosen for this patient based the use of the NOMS functional outcome to quantify this patient's level of swallowing impairment. Using the NOMS, the patient was determined to be at level 6 for swallow function which correlates with the CI= 1-19% level of severity. Based on the objective assessment provided within this note, the current, goal, and discharge g-codes are as follows:     Swallow  Swallowing:   Swallow Current Status CI= 1-19%   Swallow Goal Status CI= 1-19%         NOMS Swallowing Levels:  Level 1 (CN): NPO  Level 2 (CM): NPO but takes consistency in therapy  Level 3 (CL): Takes less than 50% of nutrition p.o. and continues with nonoral feedings; and/or safe with mod cues; and/or max diet restriction  Level 4 (CK):  Safe swallow but needs mod cues; and/or mod diet restriction; and/or still requires some nonoral feeding/supplements  Level 5 (CJ): Safe swallow with min diet restriction; and/or needs min cues  Level 6 (CI): Independent with p.o.; rare cues; usually self cues; may need to avoid some foods or needs extra time  Level 7 [facility]): Independent for all p.o.  JEFFERSON. (2003). National Outcomes Measurement System (NOMS): Adult Speech-Language Pathology User's Guide. Pain:  Pain Scale 1: Numeric (0 - 10)  Pain Intensity 1: 0     After treatment:   [ ]            Patient left in no apparent distress sitting up in chair  [ ]            Patient left in no apparent distress in bed  [ ]            Call bell left within reach  [ ]            Nursing notified  [ ]            Caregiver present  [ ]            Bed alarm activated      COMMUNICATION/EDUCATION:   The patients plan of care including recommendations, planned interventions, and recommended diet changes were discussed with: Registered Nurse. [ ]            Posted safety precautions in patient's room. [ ]            Patient/family have participated as able in goal setting and plan of care. [ ]            Patient/family agree to work toward stated goals and plan of care. [ ]            Patient understands intent and goals of therapy, but is neutral about his/her participation. [ ]            Patient is unable to participate in goal setting and plan of care.      Thank you for this referral.  Saskia Hollis, SLP  Time Calculation: 13 mins

## 2017-04-04 NOTE — PROGRESS NOTES
[facility]   Progress Note  NAME:  Patrick Wang   :   1930   MRN:   [id number]  PCP:  Renata Grimes MD    Date:  2017    Date of  Admission: 3/8/2017         IMPRESSION/Plan:   Acute on chronic systolic heart failure, NYHA Class IV, EF 25-30%  Increase IV milrinone to 0.3 mcg/kg/min until surgical plan determined  Continue Bumex 2 mg IV BID  Hold BB, ACEi d/t hypotension  Cont. aldactone  BNP decreasing - trend  Strict I/O, daily weights  Na+ restricted diet      MR, Severe  [facility] following  Surgical plan TBD - not candidate for MitraClip     Acute on chronic respiratory failure  Improving  Management per pulmonary  BiPAP qHS and prn during day     Right pleural effusion  Still w/ considerable CT output  Management of CT by attending     Hypernatremia  Sodium 144 today  Cont. To trend    Hypokalemia  Replace  monitor     Sepsis, POA  ID following  Abx treatment complete     Atrial fibrillation  Rate controlled  Eliquis per attending     Pancytopenia  Thought d/t hepatic congestion  ID recommends hepatology consult     DM Type II  SSIC    Deconditioning  PT/OT         Subjective:   Pt resting in bed - denies SOB or pain. Remains on milrinone 0.2 mcg/kg/min. Weight down 8 lbs since admission    Past Medical History:   Diagnosis Date    Atrial fibrillation (HCC)     Chronic systolic heart failure (HCC)     ef 35-40%; hold ACE due to lower bp    Diabetes mellitus type 2, insulin dependent ([address]     Hypertension     Long term (current) use of anticoagulants     Malignant hypertensive heart disease with CHF ([address]     Mitral regurgitation     Pacemaker 10/25/2012    The pacemaker is a [employer] CASSIDY, model # [id number], serial [id number].     S/P mitral valve repair 1996    # 28 Turner-Manuel    Thyroid disease     TR (tricuspid regurgitation)     moderate to severe echo       Past Surgical History:   Procedure Laterality Date    CARDIAC SURG PROCEDURE UNLIST  1996    valve replacement    HX HEART CATHETERIZATION  10/4/2012    normal cors, LVEF 50%    HX HEART VALVE SURGERY      HX PACEMAKER      INS PPM/ICD LED DUAL ONLY  10/25/2012         INS PPM/ICD LED DUAL ONLY  9/9/2016          Family History   Problem Relation Age of Onset    Cancer Sister      breast    Heart Disease Brother     Heart Disease Brother     Diabetes Son     Stroke Son       Social History   Substance Use Topics    Smoking status: Never Smoker    Smokeless tobacco: Never Used    Alcohol use No      Current Facility-Administered Medications   Medication Dose Route Frequency    [START ON 4/5/2017] pantoprazole (PROTONIX) 2 mg/mL oral suspension 40 mg  40 mg Per NG tube ACB    0.9% sodium chloride infusion  10 mL/hr IntraVENous CONTINUOUS    spironolactone (ALDACTONE) tablet 25 mg  25 mg Oral DAILY    dextrose 5 % - 0.45% NaCl infusion  55 mL/hr IntraVENous CONTINUOUS    milrinone (PRIMACOR) 20 MG/100 ML D5W infusion  0.2 mcg/kg/min IntraVENous CONTINUOUS    bumetanide (BUMEX) injection 2 mg  2 mg IntraVENous BID    potassium chloride (KAON 10%) 20 mEq/15 mL oral liquid 40 mEq  40 mEq Per NG tube DAILY    insulin lispro (HUMALOG) injection   SubCUTAneous Q6H    apixaban (ELIQUIS) tablet 2.5 mg  2.5 mg Per NG tube BID    ELECTROLYTE REPLACEMENT PROTOCOL  1 Each Other PRN    ELECTROLYTE REPLACEMENT PROTOCOL  1 Each Other PRN    albuterol-ipratropium (DUO-NEB) 2.5 MG-0.5 MG/3 ML  3 mL Nebulization TID PRN    propofol (DIPRIVAN) infusion  5-50 mcg/kg/min IntraVENous TITRATE    chlorhexidine (PERIDEX) 0.12 % mouthwash 10 mL  10 mL Oral Q12H    bisacodyl (DULCOLAX) suppository 10 mg  10 mg Rectal DAILY PRN    sodium chloride (NS) flush 5-10 mL  5-10 mL IntraVENous Multiple    phosphorus (K PHOS NEUTRAL) 250 mg tablet 1 Tab  1 Tab Oral BID    albumin human 25% (BUMINATE) solution 12.5 g  12.5 g IntraVENous Q8H PRN    balsam peru-castor oil (VENELEX)  mg/gram ointment   Topical BID    docusate sodium (COLACE) capsule 100 mg  100 mg Oral BID    polyvinyl alcohol (LIQUIFILM TEARS) 1.4 % ophthalmic solution 2 Drop  2 Drop Both Eyes TID    sodium chloride (NS) flush 10 mL  10 mL InterCATHeter PRN    alteplase (CATHFLO) 1 mg in sterile water (preservative free) 1 mL injection  1 mg InterCATHeter PRN    bacitracin 500 unit/gram packet 1 Packet  1 Packet Topical PRN    glucose chewable tablet 16 g  4 Tab Oral PRN    dextrose (D50W) injection syrg 12.5-25 g  12.5-25 g IntraVENous PRN    glucagon (GLUCAGEN) injection 1 mg  1 mg IntraMUSCular PRN    sodium chloride (NS) flush 5-10 mL  5-10 mL IntraVENous PRN          Objective:     Visit Vitals    BP (!) 109/94    Pulse (!) 59    Temp 98.2 °F (36.8 °C)    Resp 24    Ht 5' 6\" (1.676 m)    Wt 142 lb 13.7 oz (64.8 kg)    SpO2 96%    BMI 23.06 kg/m2      Physical Exam  Gen:  WA, WN, appears tired  HEENT:  EOMI, FT in place  NECK: JVD about 10 cm  CVS:  V/VI sys murmur, irregular, telemetry reviewed  Pul:  Soft basilar crackles  Abd:  Soft, NT  Ext:  (-) edema      O2 Flow Rate (L/min): 2 l/min O2 Device: Nasal cannula    Temp (24hrs), Av.1 °F (36.7 °C), Min:97.2 °F (36.2 °C), Max:98.8 °F (37.1 °C)      Admission Weight: Last Weight   Weight: 150 lb (68 kg) Weight: 142 lb 13.7 oz (64.8 kg)         Intake/Output Summary (Last 24 hours) at 17 1424  Last data filed at 17 1300   Gross per 24 hour   Intake           3138.5 ml   Output             3690 ml   Net           -551.5 ml       Data Review:     Lab Results   Component Value Date/Time    WBC 2.8 2017 02:33 AM    HGB 9.5 2017 02:33 AM    HCT 31.0 2017 02:33 AM    PLATELET 802  02:33 AM    MCV 74.0 2017 02:33 AM     Lab Results   Component Value Date/Time    Sodium 144 2017 06:45 AM    Potassium 3.2 2017 06:45 AM    Chloride 105 2017 06:45 AM    CO2 33 2017 06:45 AM Anion gap 6 04/04/2017 06:45 AM    Glucose 127 04/04/2017 06:45 AM    BUN 11 04/04/2017 06:45 AM    Creatinine 0.49 04/04/2017 06:45 AM    BUN/Creatinine ratio 22 04/04/2017 06:45 AM    GFR est AA >60 04/04/2017 06:45 AM    GFR est non-AA >60 04/04/2017 06:45 AM    Calcium 9.1 04/04/2017 06:45 AM         I have discussed and agree with the plans as noted. 80year old female with a history of HFrEF, severe MR, who was admitted with decompensated heart failure. She has diuresed well with IV milrinone and bumex - will increase IV milrinone dose. Undergoing evaluation for possible trans-cutaneous MV replacement. Joycelyn Guerrero MD, [place] [id number] Director    [facility]  [facility], [address] [place] [address], [address] [place] [address]  Office [phone]  Fax [phone]

## 2017-04-05 NOTE — PROGRESS NOTES
Intensivist / Pulmonary / Critical Care  Assessment / Plan:      1. Acute on chronic resp failure - combined chronic resp acidosis and metabolic alkalosis from diuresis contributing to high PCO2 and compensated pH  2. CHF with mitral valve disease - on scheduled diuretics  3. Afib - on eliquis  4. Sepsis syndrome - ? Source s/p 2 weeks abx - ID following  5. Acute on chronic kidney disease - improved  6. DM  7. Full code - palliative care to see    · Observe off bipap. · pulm toilet  · Diuresis. · Can d/c right CT  · Will sign off           History / Subjective:  Hospital Day: 34 - Interval history and notes reviewed   Alert, comfortable on RaA.  Denies CP or abd pain./     Objective:  Patient Vitals for the past 4 hrs:   BP Temp Pulse Resp SpO2 Weight   17 0800 92/54 98.1 °F (36.7 °C) 79 27 100 % -   17 0700 104/65 - 87 26 100 % -   17 0600 108/53 - 82 23 96 % 65.1 kg (143 lb 8.3 oz)   Temp (24hrs), Av.2 °F (36.8 °C), Min:97.9 °F (36.6 °C), Max:98.4 °F (36.9 °C)      Intake/Output Summary (Last 24 hours) at 17 0933  Last data filed at 17 0917   Gross per 24 hour   Intake           3659.3 ml   Output             3885 ml   Net           -225.7 ml     Lab Results   Component Value Date/Time    Glucose (POC) 156 2017 06:30 AM    Glucose (POC) 144 2017 12:29 AM    Glucose (POC) 151 2017 05:49 PM    Glucose (POC) 151 2017 06:44 AM    Glucose (POC) 162 2017 12:45 AM     Exam:  Alert on RA  Bilateral rales and ronchi  Reg, + murmur  Protuberant abdomen  Warm and dry skin  2+ edema    Data:   Labs:  Recent Labs      17   0233   WBC  2.8*   HGB  9.5*   HCT  31.0*   PLT  122*     Recent Labs      17   0133  17   0645  17   0233   NA  139  144  148*   K  3.3*  3.2*  3.2*   CL  100  105  107   CO2  33*  33*  35*   GLU  139*  127*  133*   BUN  12  11  8   CREA  0.50*  0.49*  0.53*   CA  9.6  9.1  9.6   ALB   --    --   2.4*   TBILI   -- --   2.1*   SGOT   --    --   22   ALT   --    --   11*            Gianluca Hooker MD

## 2017-04-05 NOTE — PROGRESS NOTES
Kevin at bedside  Chelsea is most talkative in days  Able to respond to questions  Ate a bit  Sat is 100% on RA  No complaints  Still weak of course  2/6 mur  No leg edema  Hgb 9.5  k 3.3 Cr 0.5    Dr Juany Mitchell has noted decr breath sounds on left  I have listened tonight and agree, 1/2 up at least with e to a egophony, yesterdays CXR also agrees with that assessment  We have great improvement since the chest tube on right and milrinone  If we care going to give her the best possible chance for a TMVR then agree CT on left is reasonable  I have ordered CXR and IR to do thoracentesis in am   We will see back on Friday as AHFS following with us

## 2017-04-05 NOTE — PROGRESS NOTES
Palliative Medicine:    Have been in discussion w/ family throughout stay, and  Misti has been supporting. They are aware we are here to help w/ any care decisions, Yoly (one of pt's dtrs) has my contact info and will reach out. However at this time they do not feel they need our involvement, as goals are clear for all measures and full code. At this time providers involved do not feel pt w/ capacity so cannot assist w/ AMD. Signing off a this time, but please reconsult with specific questions/concerns . Thank you for allowing us to assist in Warner's care.  Discussed w/ Amilcar Arreola NP.

## 2017-04-05 NOTE — PROGRESS NOTES
2000 Received report from Jefe Bernstein and assumed care. VSS, denies pain. Family at bedside. 0000 Complete chlorahexidine bed bath provided, tolerated well. No changes in prior assessment. Call bell at side. No needs or complaints at this time. Placed on bipap.   0200 Labs drawn. Call bell at side. 0400 Electrolytes ordered per protocol. VSS, denies pain. No needs or complaints at this time. 0630 Bipap off, currently on room air. Conused about time of day but easily reorients.  Bedside and Verbal shift change report given to Jefe Bernstein (oncoming nurse) by Roman Benjamin (offgoing nurse). Report included the following information SBAR, Kardex, Intake/Output, MAR and Recent Results.

## 2017-04-05 NOTE — PROGRESS NOTES
Problem: Falls - Risk of  Goal: *Absence of falls  Outcome: Progressing Towards Goal  Uses call bell    Problem: Pressure Ulcer - Risk of  Goal: *Prevention of pressure ulcer  Outcome: Progressing Towards Goal  Skin tears to bilateral groins and right buttocks- mepifoam and marathon in use    Problem: Nutrition Deficit  Goal: *Optimize nutritional status  Outcome: Progressing Towards Goal  Cardiac regular diet and tube feeds

## 2017-04-05 NOTE — PROGRESS NOTES
Problem: Mobility Impaired (Adult and Pediatric)  Goal: *Acute Goals and Plan of Care (Insert Text)  Physical Therapy Goals    Goals evaluated, and new goals formulated/ downgraded 4/5/2017  1. Patient will move from supine to sit and sit to supine, scoot up and down and roll side to side in bed with maximal assistance within 7 day(s). 2. Patient will participate in lower extremity therapeutic exercise/activities (in supine & sitting) with minimal assistance and set-up within 7 day(s). 3. Patient will maintain static sitting balance at EOB with no UE support for >/= 15 sec and with supervision within 7 day(s). 4. Patient will perform sit to stand x 2 consecutive repetitions with maximal assistance within 7 day(s). 5. Patient will transfer from bed to chair and chair to bed with maximal assistance x 2 using the least restrictive device within 7 day(s). Revisited 3/24/2017, goals remain appropriate, carry over  Initiated 3/16/2017  1. Patient will move from supine to sit and sit to supine, scoot up and down and roll side to side in bed with minimal assistance within 7 day(s). 2. Patient will transfer from bed to chair and chair to bed with moderate assistance using the least restrictive device within 7 day(s). 3. Patient will perform sit to stand with moderate assistance within 7 day(s). 4. Patient will ambulate with moderate assistance for 10 feet with the least restrictive device within 7 day(s). PHYSICAL THERAPY TREATMENT: WEEKLY REASSESSMENT  Patient: Chase Al (80 y.o. female)  Date: 4/5/2017  Diagnosis: Hypothermia Hypothermia  Precautions: Fall      ASSESSMENT:  Pt's functional mobility continues to be limited 2* cognitive deficits (oriented to self & place only), lead pipe vs cogwheel rigidity in B LEs (R LE more rigid than L LE), general debility, B LE edema (but improved), impaired trunk control/strength, significantly decreased B LE strength, and neurological-presenting reflexes. Session focused on  PNF patterns in sitting (D1 flexion/extension for pre-gait) progressing from rhythmic initiation to contract relax and to repeated contractions for strength and ROM benefits. Pt also participated in seated therapeutic exercises (LAQs x 3 on each side, isometric hip adduction/abduction x 5, and seated marches [actively-assisted]). At this time, pt does not have functional strength in her LEs to trial standing at this time - could benefit from isometric presses into floor while sitting with subsequent anterior weight shifting. This date, pt able to pull herself forward in seated position via use of B UEs on bed rails. Pt able to maintain sitting unsupported with hands in lap 3 x 5 sec but with manual facilitation for abdominal contraction and at anterior tibialis muscle for maintaining feet contact to floor. Continue to recommend SNF placement. Pt will continue to benefit from bed-in-chair position at least 3 times daily. PLAN:  Goals have been updated based on progression since last assessment. Patient continues to benefit from skilled intervention to address the above impairments. Continue to follow the patient 4 times a week to address goals. Planned Interventions:  [X]              Bed Mobility Training             [ ]       Neuromuscular Re-Education  [X]              Transfer Training                   [ ]       Orthotic/Prosthetic Training  [X]              Gait Training                         [ ]       Modalities  [X]              Therapeutic Exercises           [X]       Edema Management/Control  [X]              Therapeutic Activities            [X]       Patient and Family Training/Education  [ ]              Other (comment):  Discharge Recommendations: Skilled Nursing Facility  Further Equipment Recommendations for Discharge: TBD following SNF       SUBJECTIVE:   Patient stated I can try again if you'd like.       OBJECTIVE DATA SUMMARY:   Critical Behavior:  Neurologic State: Alert  Orientation Level: Oriented to person, Oriented to place  Cognition: Follows commands (1 step 100%)  Safety/Judgement: Awareness of environment     Functional Mobility Training:  Bed Mobility:  Scooting: Minimum assistance (To advance hips anteriorly towards EOB)  Balance:  Sitting: Impaired; Without support  Sitting - Static: Fair (occasional)  Sitting - Dynamic: Poor (constant support)     Neuro Re-Education:  See assessment above. Therapeutic Exercises:   See assessment above. Pain:  Pain Scale 1: Numeric (0 - 10)  Pain Intensity 1: 0     Activity Tolerance:   Please refer to the flowsheet for vital signs taken during this treatment.   After treatment:   [ ]  Patient left in no apparent distress sitting up in chair  [X]  Patient left in no apparent distress in bed  [X]  Call bell left within reach  [X]  Nursing notified  [ ]  Caregiver present  [ ]  Bed alarm activated      COMMUNICATION/COLLABORATION:   The patients plan of care was discussed with: Registered Nurse and Rehabilitation Attendant     Serafin Crockett PT, DPT   Time Calculation: 30 mins

## 2017-04-05 NOTE — PROGRESS NOTES
Follow up visit with Ms. Lili Monroe. No family present at this time. Pt indicated that she was feeling okay today. Affirmed our ongoing care for her and the support and love of her family. Assurance of prayers also offered. Will continue to follow as able for pt and family support.     Misti Moore, Palliative

## 2017-04-05 NOTE — PROGRESS NOTES
08:00 SBAR from [place]    08:32 Bumex increased and given per order. She is more awake, smiling and asking for breakfast.     09:30 Ate 30% and took med's with water, she seems motivated and more energetic this am.     10:00 Primacor increased per order, Dr. Cash Dopp at bedside with NP, concerned about left lung and need for thoracentesis, she placed call to Dr. Gilson Waldron.    14:30 Did not eat well for lunch, worked with PT but did not get out of bed. No complaints    16:00 Bedside shift change report given to Darby GIBBS (oncoming nurse) by BODØ (offgoing nurse). Report included the following information SBAR, Kardex, Procedure Summary, Intake/Output, MAR, Accordion, Recent Results, Med Rec Status, Cardiac Rhythm Paced with pvc and Alarm Parameters .

## 2017-04-05 NOTE — WOUND CARE
WOCN Note:     Follow-up visit for cheeks and thighs. Chart shows:  Admitted for hypothermia & respiratory failure, sepsis; history of CHF, DM, a-fib, pacer      Assessment:   Patient is alert and verbal but confused. Tube feeding via nose. Bed: total care sport  Has a Richardson  Patient reports no pain.      Heels intact and without erythema.       Left cheek where it meets the nose pressure ulcer/injury, stage 2 now resolved.       Right cheek where it meets the nose with linear jac now resolved.       Upper and inner thigh ruptured bullae, partial thickness wounds now resurfaced and beginning to repigment. Left open to air. 1. Right medial buttock = 0.9 x 0.9 x 0.1 cm 100% moist pink skin tear with epithelial bunching noted to one side. Marathon in use. No periwound color changes. Recommendations:    Marathon to skin tear on buttock every 3 days after it flakes off. Aloe Vesta to rest of buttocks. Skin Care & Pressure Prevention:  Minimize layers of linen/pads under patient to optimize support surface. Turn/reposition approximately every 2 hours and offload heels. Specialty bed: total care sport in use: Use only flat sheet and one incontinence pad. Discussed above plan with RN, Cynthia Ambrosio.     Transition of Care: Plan to follow weekly and as needed while admitted to hospital.    NERY Dumas, RN, [facility] [place]  Certified Wound, Ostomy, Continence Nurse  office [phone]  pager [phone] or call  to page

## 2017-04-05 NOTE — PROGRESS NOTES
[facility]  -Progress Note     Harman Clark 154- 2020   4/5/2017      Mihir Harris M.D. , F.A.C.C.   --------PCP:-Bc Munoz MD   -----Subjective:   . Minerva Pretty Minerva Pretty Minerva Pretty Saqib Mohr is a 80 y.o. female denies chest pain, SOB. Working with PT currently    Assessment/Plan/Discussion:Cardiology Attending:     Patient seen earlier today and examined  and agree with Advance Practice Provider (ANDI, NP,PA)  assessment and plans. Saqib Mohr is a 80 y.o. female     Kevin at bedside  Chelsea is most talkative in days  Able to respond to questions  Ate a bit  Sat is 100% on RA  No complaints  Still weak of course  2/6 mur  No leg edema  Hgb 9.5  k 3.3 Cr 0.5     Dr Cole Matthews has noted decr breath sounds on left  I have listened tonight and agree, 1/2 up at least with e to a egophony, yesterdays CXR also agrees with that assessment  We have great improvement since the chest tube on right and milrinone  If we care going to give her the best possible chance for a TMVR then agree CT on left is reasonable  I have ordered CXR and IR to do thoracentesis in am   We will see back on Friday as AHFS following with us     Nicole Medina MD 4/5/2017           Discussion/Plans/Recs  1. Acute on chronic respiratory failure:  Much improved. Remains off bipap since 4/3. On room air today.    -Pulmonary following-    2) Acute on Chronic Systolic CHF: NYHA stage IV. BNP trending down 602 4/4 from 1636 3/31. Net -393on 24/hr. Much Improved BLE edema. No BUE edema. TTE on 3/31:  EF 25-30%, moderate diffuse hypokinesis, severe MR 4+. -Advanced heart failure on consult. -Bumex 2mg IV TID, Aldactone 25 mg daily. Milrinone to 0.3 mc/kg/min for now. -BiV AICD, PPM  -ACE and BB held due to some low BPs (BP  systolic)- on Milrinone   -Trend BNPs- repeat BNP tomorrow. 3) Severe MR-systolic murmur . 4+ MR.   No vegetation on valves per AMMON.    -Per valve team- not a candidate for Mitraclip. Considering TA MV in Ring or Transvenous-Transeptal MVR but Either would be extremely high risk at this time d/t pt's frailty,  -ID recommends consult to Dr. Belle Armstrong for liver cirrhosis for eval prior to any planned surgery.- Hepatology consulted by primary team..  -Will need improved nutrition and overall conditioning before surgery considered- PT/OT, optimize nutrition recommended.  -On TF    4. Right pleural effusion:  Still with outputs too high to pull chest tube (CT with -300 ML per record). Management per pulmonary team.    5. Hypokalemia: potassium 3.3  - Repleted    6. Afib: -[id number]= 6 (age, CHF, HTN, female, DM)    -Continue Eliquis 2.5 mg BID. Continue to use OAC cautiously for stroke prophylaxis. 7.Anemia:  Hgb 9.5 4/3 from 8.8  On 4/1.    9). Pancytopenia:  WBC 2.8. 4/3  -ID consulted: Chronic leukopenia Since 2011- likely due to cirrhosis with underlying benign ethnic neutropenia. No indication that this is due to infection currently-at high risk for infection especially prolonged hospitalization, galarza, recurrent intubation, immobility -  No antibiotic needed now. ID signed off.    10) Hx of diabetes: On SSI    11)Dysphagia:    -Continue Tube feeds to optimize nutrition  -Passed swallow [facility] for [facility]    12. Deconditioned:  Continue PT/OT     13). Advanced care planning:- Palliative care last seen 3/28. Full code status. Cardiac Studies/Hx:  AMMON 3/31: EF 25 % to 30 %. Mod.diffuse hypokinesis. RV systolic fxn mild reduced  LA severely dilated. RA mod dilated. Mod MAC posterior leaflet, Severely restricted  Mobility posterior leaflet. Mild MS, Severe MR, 4+.  AV:sclerosis. Mild AR. TV:  Mod TR  PAS 70 mmHg, Mod-severe pulm. HTN. Lge Left pleural effusion. AMMON 3/20 with no vegetation, Same EF 35 and 1-2+ MR, TR as expected  ECHO: 3/9/17: EF 30%. Mod diffuse hyypokinesis, Wall motion is dyssynchronous, RA mildly dilated.  LA mod-severe dilated, Mod MR, AV: In the parasternal long axis view, faintly seen spherical echodensity about 5mms in diameter that seems to move in  relation to the anterior aortic valve leaflet, on the aortic side. Could represent a vegetation. Mod TR, mild RV systolic hypertension. 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 PA pressure of 61 with moderate pulmonary hypertension. 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 Moderate to severe TR, moderate to severe pulmonary hypertension. EF 35-40%, marked LAE, mild JOAN    PACER check 2-11-13 39,288 episodes of atrial tach/flutter and 11 with high rates  1/2014 noises noted on pacer, 260 thousands AMS , some are noises and some are AT, some PMT (adjustment made, Dr Marylene Plum discussed with pacer clinic)  Pacer check in May 2013 showed NSVT and AF the latter as long as 1 day with occasional RVR  Pacer check 9/9/13: persistent AT with V paced. 92%  (asymptomatic with AT)    CATH 10-24-12= Normal cors and EF 50 %. -- A annular ring mitral position. Mitral valve repair on July 29, 1996, for mitral regurgitation with a #32 Turner-Manuel ring. Chronic systolic CHF, reduced ejection fraction. NYHA 1-2  . ..admit 7/16/09 mild chf exacerbation  #32 Turner Manuel ring MV repair 1996         Past Medical History:   Diagnosis Date    Atrial fibrillation (HCC)     Chronic systolic heart failure (HCC)     ef 35-40%; hold ACE due to lower bp    Diabetes mellitus type 2, insulin dependent ([address]     Hypertension     Long term (current) use of anticoagulants     Malignant hypertensive heart disease with CHF [facility])     Mitral regurgitation     Pacemaker 10/25/2012    The pacemaker is a [employer], model # [id number], serial [id number]. 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 Results for orders placed or performed during the hospital encounter of 03/08/17   EKG, 12 LEAD, INITIAL   Result Value Ref Range    Ventricular Rate 80 BPM    Atrial Rate 75 BPM    QRS Duration 146 ms    Q-T Interval 480 ms    QTC Calculation (Bezet) 553 ms    Calculated R Axis 158 degrees    Calculated T Axis 5 degrees    Diagnosis       Ventricular-paced rhythm  When compared with ECG of 09-SEP-2016 17:36,  Vent. rate has decreased BY  30 BPM  Confirmed by Kun Briceño M.D., Emy Churchill ([id number]) on 3/9/2017 10:15:16 AM     Results for orders placed or performed in visit on 03/27/12   AMB POC EKG ROUTINE W/ 12 LEADS, INTER & REP    Narrative    See scanned report.         Vitals:    04/05/17 0900 04/05/17 0953 04/05/17 1000 04/05/17 1005   BP: 110/75 110/75 128/73 129/63   BP 1 Location:       BP Patient Position:  At rest  Sitting   Pulse: (!) 55 75 (!) 21 62   Resp: 26 20 13 30   Temp:       SpO2: 98% 96% 98% 98%   Weight:       Height:           Objective:    Physical Exam:   Patient Vitals for the past 12 hrs:   Temp Pulse Resp BP SpO2   04/05/17 1005 - 62 30 129/63 98 %   04/05/17 1000 - (!) 21 13 128/73 98 %   04/05/17 0953 - 75 20 110/75 96 %   04/05/17 0900 - (!) 55 26 110/75 98 %   04/05/17 0800 98.1 °F (36.7 °C) 79 27 92/54 100 %   04/05/17 0700 - 87 26 104/65 100 %   04/05/17 0600 - 82 23 108/53 96 %   04/05/17 0513 - 83 22 - 96 %   04/05/17 0500 - 83 20 94/58 95 %   04/05/17 0400 - 86 21 107/63 91 %   04/05/17 0300 - 81 23 100/54 97 %   04/05/17 0204 - 95 25 - 95 %   04/05/17 0200 - 81 26 109/52 97 %   04/05/17 0100 - 92 30 105/60 100 %   04/05/17 0000 97.9 °F (36.6 °C) 95 (!) 31 109/64 100 %      General:    Awake, alert, converses weakly. Sitting up in chair bed with PT    ENT, Neck:  supple   Chest Wall: Rt chest tube in place, no air leak. Serous drainage noted. Lung: Bilateral course crackles    Heart:  no gallops noted, RRR, no JVD 3/6 systolic early/decresendo  murmur heard best at apex. Abdomen: Nondistended, + bowel sounds, no grimacing to palpation    Richardson with mast urine.   No hematuria   Extremities: extremities normal, 1+ BLE edema (pedal/ankle more prominent)- Edema significantly improved, No BUE edema       Last 24hr Input/Output:    Intake/Output Summary (Last 24 hours) at 04/05/17 1124  Last data filed at 04/05/17 1000   Gross per 24 hour   Intake           3741.9 ml   Output             3335 ml   Net            406.9 ml        Data Review:   Recent Results (from the past 24 hour(s))   GLUCOSE, POC    Collection Time: 04/04/17  5:49 PM   Result Value Ref Range    Glucose (POC) 151 (H) 65 - 100 mg/dL    Performed by Gino Hernandez, POC    Collection Time: 04/05/17 12:29 AM   Result Value Ref Range    Glucose (POC) 144 (H) 65 - 100 mg/dL    Performed by Dayana Harris    METABOLIC PANEL, BASIC    Collection Time: 04/05/17  1:33 AM   Result Value Ref Range    Sodium 139 136 - 145 mmol/L    Potassium 3.3 (L) 3.5 - 5.1 mmol/L    Chloride 100 97 - 108 mmol/L    CO2 33 (H) 21 - 32 mmol/L    Anion gap 6 5 - 15 mmol/L    Glucose 139 (H) 65 - 100 mg/dL    BUN 12 6 - 20 MG/DL    Creatinine 0.50 (L) 0.55 - 1.02 MG/DL    BUN/Creatinine ratio 24 (H) 12 - 20      GFR est AA >60 >60 ml/min/1.73m2    GFR est non-AA >60 >60 ml/min/1.73m2    Calcium 9.6 8.5 - 10.1 MG/DL   GLUCOSE, POC    Collection Time: 04/05/17  6:30 AM   Result Value Ref Range    Glucose (POC) 156 (H) 65 - 100 mg/dL    Performed by Kandace Rapp.  IRLANDA Wilkinson 4/5/2017

## 2017-04-05 NOTE — PROGRESS NOTES
Hospitalist Progress Note  Ricardo Page MD   Office: [phone]          Date of Service:  2017  NAME:  Xin Harding  :  1930  MRN:  [id number]      Admission Summary:   51-year-old female with history of chronic systolic congestive heart failure, atrial fibrillation, diabetes mellitus, hypertension, chronic anticoagulation use, history of pacemaker and ICD. She was sent from her primary care physician to the emergency room to be evaluated. History from the patient and her daughters at the bedside, also from the records. Per the daughter, she has been having on and off abdominal pain, epigastric pain for a while, over the last 3 days has been mostly pronounced. She said her pain at times is 9/10 in severity, it is nonradiating, associated with some nausea, but no vomiting. She denies any diarrhea. At her PCP's office,   they could not get her temperature, so they sent her to the emergency room. In the ER she has been hypothermic, her temperature was 91.4 on initial presentation. Also she was hypotensive. Her blood pressure   systolic was 21/54, so she was given about 1.5 liters of IV fluid, and now she is getting 1 bottle of albumin. She was placed on warming blanket, Lizzy Hugger. She denies any dysuria or frequency. Interval history / Subjective: Ate about 20% of her dinner last night, working on breakfast currently. She reports her legs are a little less swollen. Still having some shortness of breath, generally improved since her admission, but not back to her baseline.        Assessment & Plan:         Acute on chronic respiratory failure with hypercapnia due to pleural effusion/atelectasis and acute on chronic systolic heart failure   - NYHA class IV upon admission   - she had been intubated on 3/25, subsequently extubated 3/30; now on intermittent BiPAP   - diuresis with Lasix   - s/p chest tube placement on 3/31, still with significant output, may require contralateral chest tube placement as well   - on milrinone    Hypothermia and hypotension: - resolved   - completed empiric therapy with vancomycin and Zosyn    Bilateral Pleural effusions and significant dependent atx: likely related to CHF   - see above    Sepsis:  Unknown source, most likely lung with bilateral pleural effusions, possibly infectious.     - AMMON: no vegetations;    - blood culture 3/8 and 3/11: no growth x 5 days;    - urine culture: no growth;    - 3/21: afebrile, no leukocytosis, cultures negative   - off antibiotics. Patient completed 2 weeks of IV Abx since admission. Acute on Chronic Systolic congestive heart failure NYHA IV   - difficult situation as suspect there is a component of valvular disease contributing to her presentation (severe MR)   - Echo 3/9 EF 30%, diffuse hypokinesis. - AMMON 3/22 showed no vegetations   - not a candidate for MitraClip, possibly TA MV or transvenous-transeptal MVR, await cardiac surgery input   - cautious diuresis with Bumex   - holding beta blocker / ACE inhibitor secondary to borderline BPs   - on spironolactone   - cardiology, advanced heart failure following    Hematuria: may be related to traumatic galarza placement with INR max 5.5,   - Urine noted on UA as yellow on 3/8,    - Galarza placed 3/9 and hematuria reported 3/10 UA. JUAN:  From  ATN and contrast nephropathy/Hypotension most likely etiology. - Ischemic heart disease, hypotension; and exposure to IV contrast.    - Appreciate nephro consult recommendations. - Creatinine now normalized. Hypoglycemia   - now on tube feeds    Ischemic hepatitis:    - Consulted GI. Plans for EGD when stable or outpatient.   - LFTs now normalized. - hepatology consulted - unfortunately unavailable until 4/13? Coagulopathy.   - Had hematuria. INR was supra therapeutic on warfarin which is now stopped.    - 3/21: started patient on Eliquis for stroke prophylaxis in the setting of chronic Atrial Fibrillation and [id number] score of 6. Pancytopenia. - Appears to be chronic. However, patient and family unaware of her previous lab results. Will monitor her labs closely. Left cheek where it meets the nose pressure ulcer/injury:  - stage 2; measures 1 x 0.5 x 0 cm; 100% drying pink/red;  - with linear jac = 2 x 0.5 x 0 cm (smaller);  - Hospital Acquired;    Urinary retention:  - Richardson removed 3/21, decreased urine output, bladder scan revealed 900 ml of urine in the bladder per nursing report, Richardson was reinserted; (second attempt at removing Richardson during this hospitalization). - keep the Richardson in at discharge; outpatient f/u with Urology for voiding trials once patient is more ambulatory. Anemia:  - of chronic disease with slow decline in H&H during this hospitalization. - patient transfused 2 Units of PRBC's on 3/22, Hgb fairly stable currently    Afib   - rate controlled on no medications currently   - on Eliquis    Hypernatremia   - continue to trend    Code status: [facility] discussed with: Patient/Family and Nurse  Disposition: TBD      Hospital Problems  Date Reviewed: 3/8/2017          Codes Class Noted POA    * (Principal)Hypothermia ICD-10-CM: T68. XXXA  ICD-9-CM: 991.6  3/8/2017 Yes                Review of Systems:   A comprehensive review of systems was negative. Vital Signs:    Last 24hrs VS reviewed since prior progress note. Most recent are:  Visit Vitals    BP 97/61    Pulse 87    Temp 98 °F (36.7 °C)    Resp 27    Ht 5' 6\" (1.676 m)    Wt 65.1 kg (143 lb 8.3 oz)    SpO2 100%    BMI 23.16 kg/m2         Intake/Output Summary (Last 24 hours) at 04/05/17 1422  Last data filed at 04/05/17 1000   Gross per 24 hour   Intake           3452.9 ml   Output             2535 ml   Net            917.9 ml        Physical Examination:             Constitutional:  Now off bipap, awake, alert, responsive.    HEENT: Now off BiPAP   Resp: Basal dullness. Right chest tube in place. CV:  irregularly irregular, soft systolic murmur. GI:  Soft, non-tender, normoactive bowel sounds;     Musculoskeletal:  2+ edema,SCDs in place. Neurologic: Awake, alert, non-focal otherwise. Data Review:    Review and/or order of clinical lab test   US abd:  IMPRESSION:  1. Distended gallbladder with sludge and a stone with moderate wall thickening of the gallbladder wall. 2. Dilated IVC suggesting right heart failure. 3. Small amount of free fluid surrounding the liver. 4. Small right pleural effusion      Labs:     Recent Labs      04/03/17 0233   WBC  2.8*   HGB  9.5*   HCT  31.0*   PLT  122*     Recent Labs      04/05/17   0133  04/04/17   0645  04/03/17 0233   NA  139  144  148*   K  3.3*  3.2*  3.2*   CL  100  105  107   CO2  33*  33*  35*   BUN  12  11  8   CREA  0.50*  0.49*  0.53*   GLU  139*  127*  133*   CA  9.6  9.1  9.6     Recent Labs      04/03/17 0233   SGOT  22   ALT  11*   AP  55   TBILI  2.1*   TP  6.2*   ALB  2.4*   GLOB  3.8     No results for input(s): INR, PTP, APTT in the last 72 hours. No lab exists for component: INREXT, INREXT   No results for input(s): FE, TIBC, PSAT, FERR in the last 72 hours. Lab Results   Component Value Date/Time    Folate 14.5 03/09/2017 03:23 AM      No results for input(s): PH, PCO2, PO2 in the last 72 hours. No results for input(s): CPK, CKNDX, TROIQ in the last 72 hours.     No lab exists for component: CPKMB  Lab Results   Component Value Date/Time    Cholesterol, total 138 09/28/2011 09:13 AM    HDL Cholesterol 50 09/28/2011 09:13 AM    LDL, calculated 74 09/28/2011 09:13 AM    Triglyceride 69 09/28/2011 09:13 AM     Lab Results   Component Value Date/Time    Glucose (POC) 169 04/05/2017 12:49 PM    Glucose (POC) 156 04/05/2017 06:30 AM    Glucose (POC) 144 04/05/2017 12:29 AM    Glucose (POC) 151 04/04/2017 05:49 PM    Glucose (POC) 151 04/04/2017 06:44 AM     Lab Results   Component Value Date/Time    Color RED 03/10/2017 02:30 AM    Appearance BLOODY 03/10/2017 02:30 AM    Specific gravity 1.020 03/10/2017 02:30 AM    Specific gravity 1.016 03/08/2017 08:54 PM    pH (UA) 6.0 03/10/2017 02:30 AM    Protein 100 03/10/2017 02:30 AM    Glucose NEGATIVE  03/10/2017 02:30 AM    Ketone NEGATIVE  03/10/2017 02:30 AM    Bilirubin NEGATIVE  03/10/2017 02:30 AM    Urobilinogen 1.0 03/10/2017 02:30 AM    Nitrites NEGATIVE  03/10/2017 02:30 AM    Leukocyte Esterase TRACE 03/10/2017 02:30 AM    Epithelial cells FEW 03/10/2017 02:30 AM    Bacteria NEGATIVE  03/10/2017 02:30 AM    WBC 20-50 03/10/2017 02:30 AM    RBC >100 03/10/2017 02:30 AM         Medications Reviewed:     Current Facility-Administered Medications   Medication Dose Route Frequency    bumetanide (BUMEX) injection 2 mg  2 mg IntraVENous TID    milrinone (PRIMACOR) 20 MG/100 ML D5W infusion  0.375 mcg/kg/min IntraVENous CONTINUOUS    potassium chloride (KAON 10%) 20 mEq/15 mL oral liquid 40 mEq  40 mEq Per NG tube BID WITH MEALS    pantoprazole (PROTONIX) 2 mg/mL oral suspension 40 mg  40 mg Per NG tube ACB    0.9% sodium chloride infusion  10 mL/hr IntraVENous CONTINUOUS    spironolactone (ALDACTONE) tablet 25 mg  25 mg Oral DAILY    insulin lispro (HUMALOG) injection   SubCUTAneous Q6H    apixaban (ELIQUIS) tablet 2.5 mg  2.5 mg Per NG tube BID    ELECTROLYTE REPLACEMENT PROTOCOL  1 Each Other PRN    ELECTROLYTE REPLACEMENT PROTOCOL  1 Each Other PRN    albuterol-ipratropium (DUO-NEB) 2.5 MG-0.5 MG/3 ML  3 mL Nebulization TID PRN    chlorhexidine (PERIDEX) 0.12 % mouthwash 10 mL  10 mL Oral Q12H    bisacodyl (DULCOLAX) suppository 10 mg  10 mg Rectal DAILY PRN    sodium chloride (NS) flush 5-10 mL  5-10 mL IntraVENous Multiple    phosphorus (K PHOS NEUTRAL) 250 mg tablet 1 Tab  1 Tab Oral BID    albumin human 25% (BUMINATE) solution 12.5 g  12.5 g IntraVENous Q8H PRN    balsam peru-castor oil (VENELEX)  mg/gram ointment Topical BID    docusate sodium (COLACE) capsule 100 mg  100 mg Oral BID    polyvinyl alcohol (LIQUIFILM TEARS) 1.4 % ophthalmic solution 2 Drop  2 Drop Both Eyes TID    sodium chloride (NS) flush 10 mL  10 mL InterCATHeter PRN    alteplase (CATHFLO) 1 mg in sterile water (preservative free) 1 mL injection  1 mg InterCATHeter PRN    bacitracin 500 unit/gram packet 1 Packet  1 Packet Topical PRN    glucose chewable tablet 16 g  4 Tab Oral PRN    dextrose (D50W) injection syrg 12.5-25 g  12.5-25 g IntraVENous PRN    glucagon (GLUCAGEN) injection 1 mg  1 mg IntraMUSCular PRN    sodium chloride (NS) flush 5-10 mL  5-10 mL IntraVENous PRN     ______________________________________________________________________  EXPECTED LENGTH OF STAY: 4d 21h  ACTUAL LENGTH OF STAY:          [id number]

## 2017-04-05 NOTE — PROGRESS NOTES
[facility]   Progress Note  NAME:  Erin Cuba   :   1930   MRN:   [id number]  PCP:  Osmany Cruz MD    Date:  2017    Date of  Admission: 3/8/2017         IMPRESSION/Plan:   Acute on chronic systolic heart failure, NYHA Class IV, EF 25-30%  Increase milrinone to 0.375 mcg/kg/min until surgical plan determined  Bumex 2 mg IV BID  Hold BB, ACEi d/t hypotension  Cont. aldactone  BNP decreasing - repeat in AM  Strict I/O, daily weights  Na+ restricted diet      MR, Severe  [facility] following  Surgical plan TBD - not candidate for MitraClip     Acute on chronic respiratory failure  Improving, on RA  Management per pulmonary  BiPAP qHS and prn during day     Bilateral pleural effusions  Still w/ considerable CT output from right CT  Management of CT by attending  Consider left thoracentesis     Hypernatremia  Sodium 139  D/C IVF    Hypokalemia  Replaced  Monitor     Sepsis, POA  ID following  Abx treatment complete     Atrial fibrillation  Rate controlled  Eliquis per attending     Pancytopenia  Thought d/t hepatic congestion  ID recommends hepatology consult     DM Type II  SSIC    Deconditioning  PT/OT         Subjective:     Seen with Dr. Suzy Soto    Pt resting in bed, denies complaints. More alert and clear this AM.  Tolerating increase in milrinone, labs improving. Tolerating 25% PO, working with PT/OT. BiPAP PRN. Past Medical History:   Diagnosis Date    Atrial fibrillation (HCC)     Chronic systolic heart failure (HCC)     ef 35-40%; hold ACE due to lower bp    Diabetes mellitus type 2, insulin dependent ([address]     Hypertension     Long term (current) use of anticoagulants     Malignant hypertensive heart disease with CHF ([address]     Mitral regurgitation     Pacemaker 10/25/2012    The pacemaker is a [employer] , model # [id number], serial [id number].     S/P mitral valve repair 1996    # 32 [place]    Thyroid disease     TR (tricuspid regurgitation)     moderate to severe echo 2009      Past Surgical History:   Procedure Laterality Date    CARDIAC SURG PROCEDURE UNLIST  1996    valve replacement    HX HEART CATHETERIZATION  10/4/2012    normal cors, LVEF 50%    HX HEART VALVE SURGERY      HX PACEMAKER      INS PPM/ICD LED DUAL ONLY  10/25/2012         INS PPM/ICD LED DUAL ONLY  9/9/2016          Family History   Problem Relation Age of Onset    Cancer Sister      breast    Heart Disease Brother     Heart Disease Brother     Diabetes Son     Stroke Son       Social History   Substance Use Topics    Smoking status: Never Smoker    Smokeless tobacco: Never Used    Alcohol use No      Current Facility-Administered Medications   Medication Dose Route Frequency    bumetanide (BUMEX) injection 2 mg  2 mg IntraVENous TID    milrinone (PRIMACOR) 20 MG/100 ML D5W infusion  0.375 mcg/kg/min IntraVENous CONTINUOUS    potassium chloride (KAON 10%) 20 mEq/15 mL oral liquid 40 mEq  40 mEq Per NG tube BID WITH MEALS    pantoprazole (PROTONIX) 2 mg/mL oral suspension 40 mg  40 mg Per NG tube ACB    0.9% sodium chloride infusion  10 mL/hr IntraVENous CONTINUOUS    spironolactone (ALDACTONE) tablet 25 mg  25 mg Oral DAILY    insulin lispro (HUMALOG) injection   SubCUTAneous Q6H    apixaban (ELIQUIS) tablet 2.5 mg  2.5 mg Per NG tube BID    ELECTROLYTE REPLACEMENT PROTOCOL  1 Each Other PRN    ELECTROLYTE REPLACEMENT PROTOCOL  1 Each Other PRN    albuterol-ipratropium (DUO-NEB) 2.5 MG-0.5 MG/3 ML  3 mL Nebulization TID PRN    chlorhexidine (PERIDEX) 0.12 % mouthwash 10 mL  10 mL Oral Q12H    bisacodyl (DULCOLAX) suppository 10 mg  10 mg Rectal DAILY PRN    sodium chloride (NS) flush 5-10 mL  5-10 mL IntraVENous Multiple    phosphorus (K PHOS NEUTRAL) 250 mg tablet 1 Tab  1 Tab Oral BID    albumin human 25% (BUMINATE) solution 12.5 g  12.5 g IntraVENous Q8H PRN    docusate sodium (COLACE) capsule 100 mg  100 mg Oral BID    polyvinyl alcohol (LIQUIFILM TEARS) 1.4 % ophthalmic solution 2 Drop  2 Drop Both Eyes TID    sodium chloride (NS) flush 10 mL  10 mL InterCATHeter PRN    alteplase (CATHFLO) 1 mg in sterile water (preservative free) 1 mL injection  1 mg InterCATHeter PRN    bacitracin 500 unit/gram packet 1 Packet  1 Packet Topical PRN    glucose chewable tablet 16 g  4 Tab Oral PRN    dextrose (D50W) injection syrg 12.5-25 g  12.5-25 g IntraVENous PRN    glucagon (GLUCAGEN) injection 1 mg  1 mg IntraMUSCular PRN    sodium chloride (NS) flush 5-10 mL  5-10 mL IntraVENous PRN          Objective:     Visit Vitals    BP 97/61    Pulse 87    Temp 98 °F (36.7 °C)    Resp 27    Ht 5' 6\" (1.676 m)    Wt 143 lb 8.3 oz (65.1 kg)    SpO2 100%    BMI 23.16 kg/m2      Physical Exam  Gen:  WA, WN. HEENT:  EOMI, FT in place  NECK: JVD about 12 cm  CVS:  V/VI systolic murmur, irregular, telemetry reviewed  Pul:  Bibasilar crackles. Tubular left mid-base.   Abd:  Soft, NT  Ext:  (+)2 non-pitting lower extremity edema      O2 Flow Rate (L/min): 2 l/min O2 Device: Room air    Temp (24hrs), Av.1 °F (36.7 °C), Min:97.9 °F (36.6 °C), Max:98.4 °F (36.9 °C)      Admission Weight: Last Weight   Weight: 150 lb (68 kg) Weight: 143 lb 8.3 oz (65.1 kg)         Intake/Output Summary (Last 24 hours) at 17 1526  Last data filed at 17 1000   Gross per 24 hour   Intake           3313.9 ml   Output             2435 ml   Net            878.9 ml       Data Review:     Lab Results   Component Value Date/Time    WBC 2.8 2017 02:33 AM    HGB 9.5 2017 02:33 AM    HCT 31.0 2017 02:33 AM    PLATELET 360  02:33 AM    MCV 74.0 2017 02:33 AM     Lab Results   Component Value Date/Time    Sodium 139 2017 01:33 AM    Potassium 3.3 2017 01:33 AM    Chloride 100 2017 01:33 AM    CO2 33 2017 01:33 AM    Anion gap 6 2017 01:33 AM    Glucose 139 2017 01:33 AM    BUN 12 04/05/2017 01:33 AM    Creatinine 0.50 04/05/2017 01:33 AM    BUN/Creatinine ratio 24 04/05/2017 01:33 AM    GFR est AA >60 04/05/2017 01:33 AM    GFR est non-AA >60 04/05/2017 01:33 AM    Calcium 9.6 04/05/2017 01:33 AM     Thank you for letting us see MsManuelito Bella Mc with you. Pauline Veliz, AGACNP-BC  [address]  Office: [phone]  24 hour VAD Pager: [phone]        Patient seen and examined. Data and note reviewed. I have discussed and agree with the plans as noted. 80year old female with a history of HFrEF, severe MR, who was admitted with decompensated heart failure. She has diuresed well with IV milrinone and bumex. Residual large left pleural effusion - recommend thoracentesis or chest tube. Undergoing evaluation for possible trans-cutaneous MV replacement. Thank you for allowing us to participate in your patient's care. Joycelyn Batista MD, [facility] [place] [id number] Director     [facility]  [address]  Office [phone]  Fax [phone]

## 2017-04-05 NOTE — INTERDISCIPLINARY ROUNDS
IDR/SLIDR Summary          Patient: Carissa Doe MRN: [id number]    Age: 80 y.o. YOB: 1930 Room/Bed: [address] [place] [address]   Admit Diagnosis: Hypothermia  Principal Diagnosis: Hypothermia   Goals: PT/OT, increase oral intake  Readmission: NO  Quality Measure: CHF  VTE Prophylaxis: Chemical eliquis  Influenza Vaccine screening completed? YES  Pneumococcal Vaccine screening completed? YES  Mobility needs: Yes   Nutrition plan:Yes cardiac reg diet and tube feeds  Consults:P.T, O.T., Speech, Respiratory and Case Management    Financial concerns:Yes  Escalated to CM? YES  RRAT Score: 25   Interventions:Palliative Care   Testing due for pt today? no  LOS: 28 days Expected length of stay ?? days  Discharge plan: rehab   PCP: Su Cornelius MD  Transportation needs: Yes    Days before discharge:longer than expected LOS  Discharge disposition: Rehab?     Signed:     Chino Newman RN  4/5/2017  6:49 AM

## 2017-04-05 NOTE — PROGRESS NOTES
Bedside, Verbal and Written shift change report given to Patricia (oncoming nurse) by BODØ (offgoing nurse). Report included the following information SBAR, Kardex, MAR and Recent Results.

## 2017-04-05 NOTE — PROGRESS NOTES
NUTRITION       Chart reviewed briefly for follow-up; discussed during interdisciplinary rounds. Diet advanced by SLP yesterday-patient ate ~30% of breakfast this morning. Will transition to nocturnal tube feeding to stimulate appetite/po intake during the day and add po supplements. No decision yet about proceeding with valve replacement. Agree that patient would benefit from consistent nutrient intake prior to surgery. Suggested feeding: Glucerna 1.2 @ 60 ml/hr from 7 pm to 7 am with 50 ml water flush q 4 hr during feeding. This will provide 720 ml, 865 calories, 43 gm protein and 730 ml free water (tube feeding/flush). RD to follow. Estimated Nutrition Needs:   Kcals/day: 1450 Kcals/day (0998-2389 (MSJ x 1.3-1.4))  Protein: 79 g (1.2g/kg)  Fluid:  (1 ml/kcal)  Based On:  [facility] Used:  (Office wt 1/10/17  66.4 kg)      Poppy Cabral RD [facility]

## 2017-04-05 NOTE — PROGRESS NOTES
continues to monitor and I don't have a clear idea if a TAVR procedure will be done. Pt is more alert this afternoon and the dietician has recommended adjusting tube feeds to the nite time. She opens eyes and does verbalize. At one point during her hospital course family was interested in [facility] and [address] [place] [address],[address]. I spoke with Andrzej Min at [facility] is on hold at this time. Family is still interested,however, patient is not ready for a different level of care. Care manager continues to monitor for transitions of care.

## 2017-04-05 NOTE — PROGRESS NOTES
Problem: Self Care Deficits Care Plan (Adult)  Goal: *Acute Goals and Plan of Care (Insert Text)  Occupational Therapy Goals  Continue Goals at re evaluation 4/3/17: All goals downgraded 3/24/17:  1. Patient will perform grooming in supported sitting with supervision/set-up within 7 day(s). 2. Patient will perform UB bathing with minimal assistance/contact guard assist within 7 day(s). 3. Patient will perform self feeding consistently with setup/adpative tools PRN within 7 day(s). 4. Patient will perform toilet transfers with maximal assistance within 7 day(s). 5. Patient will perform all aspects of toileting with maximal assistance within 7 day(s). 6. Patient will participate in upper extremity therapeutic exercise/activities with minimal assistance/contact guard assist for 10 minutes within 7 day(s). 7. Patient will utilize energy conservation techniques during functional activities with verbal cues within 7 day(s). Initiated 3/16/2017  1. Patient will perform grooming seated EOB for 5 minutes with supervision/set-up within 7 day(s). 2. Patient will perform UB bathing with minimal assistance/contact guard assist within 7 day(s). 3. Patient will perform lower body dressing with moderate assistance within 7 day(s). 4. Patient will perform toilet transfers with moderate assistance within 7 day(s). 5. Patient will perform all aspects of toileting with moderate assistance within 7 day(s). 6. Patient will participate in upper extremity therapeutic exercise/activities with minimal assistance/contact guard assist for 10 minutes within 7 day(s). 7. Patient will utilize energy conservation techniques during functional activities with verbal cues within 7 day(s). OCCUPATIONAL THERAPY TREATMENT  Patient: Lesia Lugo (18 y.o. female)  Date: 4/5/2017  Diagnosis: Hypothermia Hypothermia       Precautions: Fall      ASSESSMENT:  Patient motivated to be independent.  Patient progressing with upper body ADLs grooming with setup to max A. Patient tolerating bed/chair position. Recommend with nursing patient to complete as able in order to maintain strength, endurance and independence: ADLs with supervision/setup, bed/chair position 3x/day and mobilizing self in the bed for toileting. Thank you for your assistance. Progression toward goals:  [ ]       Improving appropriately and progressing toward goals  [X]       Improving slowly and progressing toward goals  [ ]       Not making progress toward goals and plan of care will be adjusted       PLAN:  Patient continues to benefit from skilled intervention to address the above impairments. Continue treatment per established plan of care. Discharge Recommendations:  Gera Parr  Further Equipment Recommendations for Discharge:  TBD       SUBJECTIVE:   Patient stated Okay.       OBJECTIVE DATA SUMMARY:   Cognitive/Behavioral Status:  Neurologic State: Alert  Orientation Level: Oriented to person;Oriented to place  Cognition: Follows commands (1 step 100%)              Functional Mobility and Transfers for ADLs:  Bed Mobility:        Transfers:           Balance:  Sitting: Impaired; Without support  Sitting - Static: Poor (constant support)  Sitting - Dynamic: Poor (constant support)     ADL Intervention:     Patient received supine, chest tube R, NGT at 71\" from nose with feeding on, and PICC R. Grooming  Washing Face: Supervision/set-up  Washing Hands: Supervision/set-up  Brushing Teeth: Moderate assistance (toothette setup, increased time to target )  Brushing/Combing Hair: Maximum assistance (cervical flexion, B shoulder flexion ~90* once, otherwise no)  Patient instruction as to OT and benefits. Nurse present and stating self feeding meals with setup containers only!  Patient demonstrated bed in chair position tolerance, pulling self off of back of bed with supervision cues hand placement on bed rails to assist.         Patient demonstrated donning socks with total A facilitation R hamstring to promote flexion and then R lateral hip to promote tailor sitting stretch and hold to don sock 2* rigid extension; less facilitation required L as patient able to demonstrate with max A. Cognitive Retraining  Attention to Task: Single task     Neuro Re-Education:           Therapeutic Exercises:      Pain:  Pain Scale 1: Numeric (0 - 10)  Pain Intensity 1: 0              Activity Tolerance:   40-84 HR, sats room air 94%, RR 22, /75  Please refer to the flowsheet for vital signs taken during this treatment.   After treatment:   [ ] Patient left in no apparent distress sitting up in chair  [X] Patient left in no apparent distress in bed  [X] Call bell left within reach  [X] Nursing notified  [ ] Caregiver present  [ ] Bed alarm activated      COMMUNICATION/COLLABORATION:   The patients plan of care was discussed with: Physical Therapist and Registered Nurse     Kylah Patton  Time Calculation: 28 mins

## 2017-04-05 NOTE — PROGRESS NOTES
Speech pathology  Spoke with RN who reports great tolerance with mechanical soft diet. Patient initially sleeping but immediately arouse to verbal stimuli. She nod when asked if she wanted some lunch. Placed patient in chair position in bed and set up tray and offered to assist patient with tray but she refused, asking me to save it for later. She was agreeable to several sips of water only which she tolerated with no overt s/s aspiration. SLP to f/u x1 to ensure diet tolerance.    Thelma Whitten M.S. CCC-SLP

## 2017-04-05 NOTE — PROGRESS NOTES
Problem: Nutrition Deficit  Goal: *Optimize nutritional status  Outcome: Not Progressing Towards Goal  Will continue with tube feeds until eating well

## 2017-04-06 NOTE — Clinical Note
NUTRITION COMPLETE ASSESSMENT    RECOMMENDATIONS:   Check Phosphorus-if BNL increase supplementation to 4 x/day    Check magnesium daily (d/t diuresis)    Therapeutic MVI    Interventions/Plan:   Food/Nutrient Delivery:  Modify diet/texture/consistency/nutrients-Mechanical soft, Commercial supplement-Magic cup and Ensure Enlive, Modify rate, concentration, composition, and schedule-nocturnal feedings    Assessment:   Reason for Assessment:    [x]Reassessment     Tube Feeding: Glucerna 1.2 @ 60 ml/hr x 12 hr (7pm to 7 am) with 50 ml water flush q 4 hr during feeding  Diet: Mechanical soft  Supplements: none  Nutritionally Significant Medications: [x] Reviewed & Includes: Bumex, Colace, correction scale insulin, K Phos, KCL, Aldactone  Meal Intake: Patient Vitals for the past 100 hrs:   % Diet Eaten   04/05/17 1826 25 %   04/05/17 0917 80 %   04/04/17 1900 25 %     Subjective:  I get full fast.    Objective:  Chart reviewed for follow-up; discussed with RN. Noted: Acute on chronic respiratory failure-improved, on NC; (L) pleural effusion-thoracentesis today; (R) CT removed yesterday; Severe MR but high surgical risk; Chronic systolic heart failure-EF; 25-30; passive liver congestion vs hepatitis. Diet advanced to Mechanical Soft by SLP 4/4. See intake above-question accuracy of 80% breakfast yesterday but suspect this is pt's best meal of the day. Very alert this morning-reports early satiety. Encouraged patient (and discussed with RN)-need to snack between meals to increase nutrient intake. Will order supplements. Ms Annalee Evans ate eggs for breakfast; saved diced pears for later. Switched tube feeding to nocturnal feedings yesterday in hopes of stimulating appetite during the day. Will monitor po intake-may need to change back to continuous feedings to insure adequate nutrient intake (especially if patient has surgery soon).      Tube feeding as ordered overnight will provide 720 ml, 865 calories, 43 gm protein and 730 ml free water (tube feeding/flush) to meet 56% of pt's estimated energy and protein needs. Weight down 13.5 kg in the past week! Suspect d/t thoracentesis and aggressive diuresis. Potassium replaced today; recommend checking magnesium daily as well. Estimated Nutrition Needs:   Kcals/day: 1530 Kcals/day (MSJ x 1.4)  Protein: 77 g (77-83 (1.2-1.3g/kg))  Fluid:  (1 ml/kcal)  Based On: [facility]  Weight Used: Actual wt (64 kg)    Pt expected to meet estimated nutrient needs:  [x]   Yes-may need to adjust tube feeding if oral intake inadequate     []  No  [] Unable to predict at this time    Nutrition Diagnosis:   1. Inadequate oral food/beverage intake related to prolonged illness/hospitalization as evidenced by decreased appetite and early satiety. 2.   related to   as evidenced by      3.   related to   as evidenced by      Goals:     Meet at least 90% estimated needs via tube feeding and/or oral intake.      Monitoring & Evaluation:    - Enteral/parenteral nutrition intake   - Electrolyte and renal profile, Weight/weight change   -      Previous Nutrition Goals Met:  Progressing  Previous Recommendations:      Yes    Education & Discharge Needs:   [] None Identified   [] Identified and addressed    [] Participated in care plan, discharge planning, and/or interdisciplinary rounds        Cultural, Moravian and ethnic food preferences identified:   None    Skin Integrity: []Intact  []Other  Edema: []None []Other  Last BM: ***  Food Allergies: []None []Other    Anthropometrics:    Weight Loss Metrics 4/6/2017 3/8/2017 1/10/2017 1/4/2017 12/27/2016 12/16/2016 11/12/2016   Today's Wt 141 lb 8.6 oz - 146 lb 3.2 oz 150 lb 157 lb 12.8 oz 152 lb 149 lb 6 oz   BMI - 22.84 kg/m2 23.6 kg/m2 24.21 kg/m2 25.47 kg/m2 24.53 kg/m2 24.11 kg/m2      Last 3 Recorded Weights in this Encounter    04/05/17 0600 04/06/17 0625 04/06/17 0919   Weight: 65.1 kg (143 lb 8.3 oz) 64.2 kg (141 lb 8.6 oz) 64.2 kg (141 lb 8.6 oz) Weight Source: Bed  Height: 5' 6\" (167.6 cm),    Body mass index is 22.84 kg/(m^2). IBW : 59 kg (130 lb), % IBW (Calculated): 108.87 %   ,      Labs:  Lab Results   Component Value Date/Time    Sodium 136 04/06/2017 05:17 AM    Potassium 3.5 04/06/2017 05:17 AM    Chloride 99 04/06/2017 05:17 AM    CO2 30 04/06/2017 05:17 AM    Glucose 143 04/06/2017 05:17 AM    BUN 13 04/06/2017 05:17 AM    Creatinine 0.61 04/06/2017 05:17 AM    Calcium 10.4 04/06/2017 05:17 AM    Magnesium 2.2 04/01/2017 04:03 AM    Phosphorus 1.9 04/01/2017 04:03 AM    Albumin 2.4 04/03/2017 02:33 AM     Lab Results   Component Value Date/Time    Glucose (POC) 140 04/06/2017 05:16 AM      Lab Results   Component Value Date/Time    ALT (SGPT) 11 04/03/2017 02:33 AM    AST (SGOT) 22 04/03/2017 02:33 AM    Alk.  phosphatase 55 04/03/2017 02:33 AM    Bilirubin, total 2.1 04/03/2017 02:33 AM        Nakita Slight, RD CNSC

## 2017-04-06 NOTE — PROGRESS NOTES
Hospitalist Progress Note  Bhavin Crandall MD   Office: [phone]          Date of Service:  2017  NAME:  Sri Gtz  :  1930  MRN:  [id number]      Admission Summary:   49-year-old female with history of chronic systolic congestive heart failure, atrial fibrillation, diabetes mellitus, hypertension, chronic anticoagulation use, history of pacemaker and ICD. She was sent from her primary care physician to the emergency room to be evaluated. History from the patient and her daughters at the bedside, also from the records. Per the daughter, she has been having on and off abdominal pain, epigastric pain for a while, over the last 3 days has been mostly pronounced. She said her pain at times is 9/10 in severity, it is nonradiating, associated with some nausea, but no vomiting. She denies any diarrhea. At her PCP's office,   they could not get her temperature, so they sent her to the emergency room. In the ER she has been hypothermic, her temperature was 91.4 on initial presentation. Also she was hypotensive. Her blood pressure   systolic was 26/76, so she was given about 1.5 liters of IV fluid, and now she is getting 1 bottle of albumin. She was placed on warming blanket, Lizzy Hugger. She denies any dysuria or frequency. Interval history / Subjective:   Little more drowsy this morning compared to yesterday. Chest tube still > 50 mL daily output. No pain currently. No other acute concerns.       Assessment & Plan:         Acute on chronic respiratory failure with hypercapnia due to pleural effusion/atelectasis and acute on chronic systolic heart failure   - NYHA class IV upon admission   - she had been intubated on 3/25, subsequently extubated 3/30; now on intermittent BiPAP   - diuresis with Lasix   - s/p chest tube placement on 3/31, still with significant output, D/C once daily output < 50 mL   - plan for left chest tube placement on 4/7   - on milrinone    Hypothermia and hypotension: - resolved   - completed empiric therapy with vancomycin and Zosyn    Bilateral Pleural effusions and significant dependent atx: likely related to CHF   - see above    Sepsis:  Unknown source, most likely lung with bilateral pleural effusions, possibly infectious.     - AMMON: no vegetations;    - blood culture 3/8 and 3/11: no growth x 5 days;    - urine culture: no growth;    - 3/21: afebrile, no leukocytosis, cultures negative   - off antibiotics. Patient completed 2 weeks of IV Abx since admission. Acute on Chronic Systolic congestive heart failure NYHA IV   - difficult situation as suspect there is a component of valvular disease contributing to her presentation (severe MR)   - Echo 3/9 EF 30%, diffuse hypokinesis. - AMMON 3/22 showed no vegetations   - not a candidate for MitraClip, possibly TA MV or transvenous-transeptal MVR, awaiting cardiac surgery input   - cautious diuresis with Bumex   - holding beta blocker / ACE inhibitor secondary to borderline BPs   - on spironolactone   - cardiology, advanced heart failure following    Hematuria: may be related to traumatic galarza placement with INR max 5.5,   - Urine noted on UA as yellow on 3/8,    - Galarza placed 3/9 and hematuria reported 3/10 UA. JUAN:  From  ATN and contrast nephropathy/Hypotension most likely etiology. - Ischemic heart disease, hypotension; and exposure to IV contrast.    - Appreciate nephro consult recommendations. - Creatinine now normalized. Hypoglycemia   - now on tube feeds    Ischemic hepatitis:    - Consulted GI. Plans for EGD when stable or outpatient.   - LFTs now normalized. - hepatology consulted - unfortunately unavailable until 4/13? Coagulopathy.   - Had hematuria. INR was supra therapeutic on warfarin which is now stopped.    - 3/21: started patient on Eliquis for stroke prophylaxis in the setting of chronic Atrial Fibrillation and [id number] score of 6.    Pancytopenia. - Appears to be chronic. However, patient and family unaware of her previous lab results. Will monitor her labs closely. Left cheek where it meets the nose pressure ulcer/injury:  - stage 2; measures 1 x 0.5 x 0 cm; 100% drying pink/red;  - with linear jac = 2 x 0.5 x 0 cm (smaller);  - Hospital Acquired;    Urinary retention:  - Richardson removed 3/21, decreased urine output, bladder scan revealed 900 ml of urine in the bladder per nursing report, Richardson was reinserted; (second attempt at removing Richardson during this hospitalization). - keep the Richardson in at discharge; outpatient f/u with Urology for voiding trials once patient is more ambulatory. Anemia:  - of chronic disease with slow decline in H&H during this hospitalization. - patient transfused 2 Units of PRBC's on 3/22, Hgb fairly stable currently    Afib   - rate controlled on no medications currently   - on Eliquis    Hypernatremia   - continue to trend    Code status: [address] [place] [address] discussed with: Patient/Family and Nurse  Disposition: TBD      Hospital Problems  Date Reviewed: 3/8/2017          Codes Class Noted POA    * (Principal)Hypothermia ICD-10-CM: T68. XXXA  ICD-9-CM: 991.6  3/8/2017 Yes                Review of Systems:   A comprehensive review of systems was negative. Vital Signs:    Last 24hrs VS reviewed since prior progress note.  Most recent are:  Visit Vitals    /76 (BP 1 Location: Left arm, BP Patient Position: At rest)    Pulse 94    Temp 98 °F (36.7 °C)    Resp 23    Ht 5' 6\" (1.676 m)    Wt 64.2 kg (141 lb 8.6 oz)    SpO2 100%    BMI 22.84 kg/m2         Intake/Output Summary (Last 24 hours) at 04/06/17 1813  Last data filed at 04/06/17 1812   Gross per 24 hour   Intake           1815.6 ml   Output             4325 ml   Net          -2509.4 ml        Physical Examination:             Constitutional:  Now off bipap, drowsy, but responsive   HEENT: Now off BiPAP, edentulous,    Resp: Basal dullness on left. Otherwise clear, but diminished. Right chest tube in place. CV:  irregularly irregular, soft systolic murmur. GI:  Soft, non-tender, normoactive bowel sounds;     Musculoskeletal:  2+ edema, no cyanosis    Neurologic: Awake, alert, non-focal otherwise. Data Review:    Review and/or order of clinical lab test   US abd:  IMPRESSION:  1. Distended gallbladder with sludge and a stone with moderate wall thickening of the gallbladder wall. 2. Dilated IVC suggesting right heart failure. 3. Small amount of free fluid surrounding the liver. 4. Small right pleural effusion      Labs:     Recent Labs      04/06/17 0517   WBC  3.9   HGB  10.0*   HCT  30.9*   PLT  118*     Recent Labs      04/06/17 0517 04/05/17   0133  04/04/17   0645   NA  136  139  144   K  3.5  3.3*  3.2*   CL  99  100  105   CO2  30  33*  33*   BUN  13  12  11   CREA  0.61  0.50*  0.49*   GLU  143*  139*  127*   CA  10.4*  9.6  9.1     No results for input(s): SGOT, GPT, ALT, AP, TBIL, TBILI, TP, ALB, GLOB, GGT, AML, LPSE in the last 72 hours. No lab exists for component: AMYP, HLPSE  No results for input(s): INR, PTP, APTT in the last 72 hours. No lab exists for component: INREXT, INREXT   No results for input(s): FE, TIBC, PSAT, FERR in the last 72 hours. Lab Results   Component Value Date/Time    Folate 14.5 03/09/2017 03:23 AM      No results for input(s): PH, PCO2, PO2 in the last 72 hours. No results for input(s): CPK, CKNDX, TROIQ in the last 72 hours.     No lab exists for component: CPKMB  Lab Results   Component Value Date/Time    Cholesterol, total 138 09/28/2011 09:13 AM    HDL Cholesterol 50 09/28/2011 09:13 AM    LDL, calculated 74 09/28/2011 09:13 AM    Triglyceride 69 09/28/2011 09:13 AM     Lab Results   Component Value Date/Time    Glucose (POC) 161 04/06/2017 06:04 PM    Glucose (POC) 124 04/06/2017 11:54 AM    Glucose (POC) 140 04/06/2017 05:16 AM    Glucose (POC) 205 04/05/2017 09:11 PM    Glucose (POC) 136 04/05/2017 05:28 PM     Lab Results   Component Value Date/Time    Color RED 03/10/2017 02:30 AM    Appearance BLOODY 03/10/2017 02:30 AM    Specific gravity 1.020 03/10/2017 02:30 AM    Specific gravity 1.016 03/08/2017 08:54 PM    pH (UA) 6.0 03/10/2017 02:30 AM    Protein 100 03/10/2017 02:30 AM    Glucose NEGATIVE  03/10/2017 02:30 AM    Ketone NEGATIVE  03/10/2017 02:30 AM    Bilirubin NEGATIVE  03/10/2017 02:30 AM    Urobilinogen 1.0 03/10/2017 02:30 AM    Nitrites NEGATIVE  03/10/2017 02:30 AM    Leukocyte Esterase TRACE 03/10/2017 02:30 AM    Epithelial cells FEW 03/10/2017 02:30 AM    Bacteria NEGATIVE  03/10/2017 02:30 AM    WBC 20-50 03/10/2017 02:30 AM    RBC >100 03/10/2017 02:30 AM         Medications Reviewed:     Current Facility-Administered Medications   Medication Dose Route Frequency    bumetanide (BUMEX) injection 2 mg  2 mg IntraVENous TID    milrinone (PRIMACOR) 20 MG/100 ML D5W infusion  0.375 mcg/kg/min IntraVENous CONTINUOUS    potassium chloride (KAON 10%) 20 mEq/15 mL oral liquid 40 mEq  40 mEq Per NG tube BID WITH MEALS    pantoprazole (PROTONIX) 2 mg/mL oral suspension 40 mg  40 mg Per NG tube ACB    0.9% sodium chloride infusion  10 mL/hr IntraVENous CONTINUOUS    spironolactone (ALDACTONE) tablet 25 mg  25 mg Oral DAILY    insulin lispro (HUMALOG) injection   SubCUTAneous Q6H    apixaban (ELIQUIS) tablet 2.5 mg  2.5 mg Per NG tube BID    ELECTROLYTE REPLACEMENT PROTOCOL  1 Each Other PRN    ELECTROLYTE REPLACEMENT PROTOCOL  1 Each Other PRN    albuterol-ipratropium (DUO-NEB) 2.5 MG-0.5 MG/3 ML  3 mL Nebulization TID PRN    chlorhexidine (PERIDEX) 0.12 % mouthwash 10 mL  10 mL Oral Q12H    bisacodyl (DULCOLAX) suppository 10 mg  10 mg Rectal DAILY PRN    sodium chloride (NS) flush 5-10 mL  5-10 mL IntraVENous Multiple    phosphorus (K PHOS NEUTRAL) 250 mg tablet 1 Tab  1 Tab Oral BID    albumin human 25% (BUMINATE) solution 12.5 g  12.5 g IntraVENous Q8H PRN    docusate sodium (COLACE) capsule 100 mg  100 mg Oral BID    polyvinyl alcohol (LIQUIFILM TEARS) 1.4 % ophthalmic solution 2 Drop  2 Drop Both Eyes TID    sodium chloride (NS) flush 10 mL  10 mL InterCATHeter PRN    alteplase (CATHFLO) 1 mg in sterile water (preservative free) 1 mL injection  1 mg InterCATHeter PRN    bacitracin 500 unit/gram packet 1 Packet  1 Packet Topical PRN    glucose chewable tablet 16 g  4 Tab Oral PRN    dextrose (D50W) injection syrg 12.5-25 g  12.5-25 g IntraVENous PRN    glucagon (GLUCAGEN) injection 1 mg  1 mg IntraMUSCular PRN    sodium chloride (NS) flush 5-10 mL  5-10 mL IntraVENous PRN     ______________________________________________________________________  EXPECTED LENGTH OF STAY: 4d 21h  ACTUAL LENGTH OF STAY:          [facility], [place]

## 2017-04-06 NOTE — PROGRESS NOTES
Problem: Self Care Deficits Care Plan (Adult)  Goal: *Acute Goals and Plan of Care (Insert Text)  Occupational Therapy Goals  Continue Goals at re evaluation 4/3/17: All goals downgraded 3/24/17:  1. Patient will perform grooming in supported sitting with supervision/set-up within 7 day(s). 2. Patient will perform UB bathing with minimal assistance/contact guard assist within 7 day(s). 3. Patient will perform self feeding consistently with setup/adpative tools PRN within 7 day(s). 4. Patient will perform toilet transfers with maximal assistance within 7 day(s). 5. Patient will perform all aspects of toileting with maximal assistance within 7 day(s). 6. Patient will participate in upper extremity therapeutic exercise/activities with minimal assistance/contact guard assist for 10 minutes within 7 day(s). 7. Patient will utilize energy conservation techniques during functional activities with verbal cues within 7 day(s). Initiated 3/16/2017  1. Patient will perform grooming seated EOB for 5 minutes with supervision/set-up within 7 day(s). 2. Patient will perform UB bathing with minimal assistance/contact guard assist within 7 day(s). 3. Patient will perform lower body dressing with moderate assistance within 7 day(s). 4. Patient will perform toilet transfers with moderate assistance within 7 day(s). 5. Patient will perform all aspects of toileting with moderate assistance within 7 day(s). 6. Patient will participate in upper extremity therapeutic exercise/activities with minimal assistance/contact guard assist for 10 minutes within 7 day(s). 7. Patient will utilize energy conservation techniques during functional activities with verbal cues within 7 day(s).        OCCUPATIONAL THERAPY TREATMENT  Patient: Esthela Allen (83 y.o. female)  Date: 4/6/2017  Diagnosis: Hypothermia Hypothermia       Precautions: Fall      ASSESSMENT:  Pt continues to be limited by decreased functional strength, AROM, coordination and cardiopulmonary tolerance, limiting her independence with ADL tasks. Pt oriented to person and place. Pt with delayed response time and processing, needing additional time to complete tasks and answer verbal questions; able to maintain attention <30 seconds with cues. Pt participate in gentle AAROM exercises in prep for grooming, then able to comb hair with modA and proximal stability to BUEs; pt needing assistance for thoroughness as well. Pt then able to self-feed after set-up (maxA container management) from modified bed/chair position with foot board on. Pt encouraged to continue completing ADLs with less assistance. RN notified. Continue to recommend skilled nursing rehab at discharge. Progression toward goals:  [X]       Improving appropriately and progressing toward goals  [ ]       Improving slowly and progressing toward goals  [ ]       Not making progress toward goals and plan of care will be adjusted       PLAN:  Patient continues to benefit from skilled intervention to address the above impairments. Continue treatment per established plan of care. Discharge Recommendations:  Gera Parr  Further Equipment Recommendations for Discharge:  TBD at rehab       SUBJECTIVE:   Patient stated The hospital.      OBJECTIVE DATA SUMMARY:   Cognitive/Behavioral Status:  Neurologic State: Alert; Appropriate for age  Orientation Level: Oriented to person;Oriented to place; Disoriented to situation;Disoriented to time  Cognition: Follows commands;Decreased attention/concentration  Perception: Appears intact  Perseveration: No perseveration noted  Safety/Judgement: Decreased awareness of environment     Functional Mobility and Transfers for ADLs:  Bed Mobility:        NT received/remained in bed        ADL Intervention:  Feeding  Utensil Management: Supervision/set-up  Food to Mouth: Supervision/set-up     Grooming  Brushing/Combing Hair: Moderate assistance (for thoroughness, able to use BUE for R/L, A for back of hea)  Cues: Physical assistance;Verbal cues provided; Tactile cues provided                                   Cognitive Retraining  Orientation Retraining: Situation;Time  Organizing/Sequencing: Breaking task down  Attention to Task: Single task  Maintains Attention For (Time): 30 seconds  Following Commands: Follows one step commands/directions  Safety/Judgement: Decreased awareness of environment     Activity Tolerance:   Fair tolerance. HR  during session per monitor. Please refer to the flowsheet for vital signs taken during this treatment.   After treatment:   [ ] Patient left in no apparent distress sitting up in chair  [X] Patient left in no apparent distress in bed  [X] Call bell left within reach  [X] Nursing notified  [ ] Caregiver present  [ ] Bed alarm activated      COMMUNICATION/COLLABORATION:   The patients plan of care was discussed with: Physical Therapist and Registered Nurse     Sudha Patient, OT  Time Calculation: 17 mins

## 2017-04-06 NOTE — PROGRESS NOTES
Speech pathology  Followed up with RN. Patient just finished lunch. RN continues to report excellent tolerance; however, intake remains limited. Recommend continuing on current diet given that patient is edentulous and dentures are missing. No further ST needs.    Thanks, Lauren Gavin M.S. CCC-SLP

## 2017-04-06 NOTE — PROGRESS NOTES
Evening summary: Pt vitals stable and pleasant following commands. Tolerated Bipap at night well and continues to be on RA without bipap. Diuresed about 2250 cc's last night. CT on R had 70 cc's of output. Bedside shift change report given to Judi (oncoming nurse) by Ulices Arreaga (offgoing nurse). Report included the following information SBAR, Kardex, Procedure Summary, Intake/Output, MAR, Recent Results and Cardiac Rhythm Paced with PVCs.

## 2017-04-06 NOTE — PROGRESS NOTES
[facility]   Progress Note  NAME:  Buddy Sendnory   :   1930   MRN:   [id number]  PCP:  Akanksha Riggins MD    Date:  2017    Date of  Admission: 3/8/2017     IMPRESSION/Plan:   Acute on chronic systolic heart failure, NYHA Class IV, EF 25-30%  Tolerating milrinone 0.375 mcg/kg/min  Bumex 2 mg IV BID  Surgical plan TBD  Hold BB, ACEi d/t hypotension  Continue aldactone  BNP improving  Strict I/O, daily weights  Na+ restricted diet      MR, Severe  [facility] following  Surgical plan TBD - not candidate for MitraClip     Acute on chronic respiratory failure  Improving, on RA  Management per pulmonary  BiPAP qHS and prn during day     Bilateral pleural effusions  Still w/ considerable CT output from right CT  Management of CT by attending  Left thoracentesis in AM    Hypernatremia  Sodium 136  D/C IVF    Hypokalemia  Replaced  Monitor     Sepsis, POA  ID following  Abx complete     Atrial fibrillation  Rate controlled  Eliquis on hold for thoracentesis     Pancytopenia  Likely d/t hepatic congestion  ID recommends hepatology consult     DM Type II  SSIC    Deconditioning  PT/OT         Subjective:     Seen with Dr. Marj Heller    Pt resting in bed, more lethargic this AM. Tolerating increase in milrinone, labs improving. Currently on NC, BiPAP PRN. Eliquis on hold for thoracentesis tomorrow AM.     Past Medical History:   Diagnosis Date    Atrial fibrillation (HCC)     Chronic systolic heart failure (HCC)     ef 35-40%; hold ACE due to lower bp    Diabetes mellitus type 2, insulin dependent ([address]     Hypertension     Long term (current) use of anticoagulants     Malignant hypertensive heart disease with CHF ([address]     Mitral regurgitation     Pacemaker 10/25/2012    The pacemaker is a [employer] , model # [id number], serial [id number].     S/P mitral valve repair 1996    # 28 [place]-Manuel    Thyroid disease     TR (tricuspid regurgitation)     moderate to severe echo 2009      Past Surgical History:   Procedure Laterality Date    CARDIAC SURG PROCEDURE UNLIST  1996    valve replacement    HX HEART CATHETERIZATION  10/4/2012    normal cors, LVEF 50%    HX HEART VALVE SURGERY      HX PACEMAKER      INS PPM/ICD LED DUAL ONLY  10/25/2012         INS PPM/ICD LED DUAL ONLY  9/9/2016          Family History   Problem Relation Age of Onset    Cancer Sister      breast    Heart Disease Brother     Heart Disease Brother     Diabetes Son     Stroke Son       Social History   Substance Use Topics    Smoking status: Never Smoker    Smokeless tobacco: Never Used    Alcohol use No      Current Facility-Administered Medications   Medication Dose Route Frequency    bumetanide (BUMEX) injection 2 mg  2 mg IntraVENous TID    milrinone (PRIMACOR) 20 MG/100 ML D5W infusion  0.375 mcg/kg/min IntraVENous CONTINUOUS    potassium chloride (KAON 10%) 20 mEq/15 mL oral liquid 40 mEq  40 mEq Per NG tube BID WITH MEALS    pantoprazole (PROTONIX) 2 mg/mL oral suspension 40 mg  40 mg Per NG tube ACB    0.9% sodium chloride infusion  10 mL/hr IntraVENous CONTINUOUS    spironolactone (ALDACTONE) tablet 25 mg  25 mg Oral DAILY    insulin lispro (HUMALOG) injection   SubCUTAneous Q6H    apixaban (ELIQUIS) tablet 2.5 mg  2.5 mg Per NG tube BID    ELECTROLYTE REPLACEMENT PROTOCOL  1 Each Other PRN    ELECTROLYTE REPLACEMENT PROTOCOL  1 Each Other PRN    albuterol-ipratropium (DUO-NEB) 2.5 MG-0.5 MG/3 ML  3 mL Nebulization TID PRN    chlorhexidine (PERIDEX) 0.12 % mouthwash 10 mL  10 mL Oral Q12H    bisacodyl (DULCOLAX) suppository 10 mg  10 mg Rectal DAILY PRN    sodium chloride (NS) flush 5-10 mL  5-10 mL IntraVENous Multiple    phosphorus (K PHOS NEUTRAL) 250 mg tablet 1 Tab  1 Tab Oral BID    albumin human 25% (BUMINATE) solution 12.5 g  12.5 g IntraVENous Q8H PRN    docusate sodium (COLACE) capsule 100 mg  100 mg Oral BID    polyvinyl alcohol (LIQUIFILM TEARS) 1.4 % ophthalmic solution 2 Drop  2 Drop Both Eyes TID    sodium chloride (NS) flush 10 mL  10 mL InterCATHeter PRN    alteplase (CATHFLO) 1 mg in sterile water (preservative free) 1 mL injection  1 mg InterCATHeter PRN    bacitracin 500 unit/gram packet 1 Packet  1 Packet Topical PRN    glucose chewable tablet 16 g  4 Tab Oral PRN    dextrose (D50W) injection syrg 12.5-25 g  12.5-25 g IntraVENous PRN    glucagon (GLUCAGEN) injection 1 mg  1 mg IntraMUSCular PRN    sodium chloride (NS) flush 5-10 mL  5-10 mL IntraVENous PRN          Objective:     Visit Vitals    BP 98/45 (BP 1 Location: Right arm, BP Patient Position: At rest)    Pulse 94    Temp 97.4 °F (36.3 °C)    Resp 22    Ht 5' 6\" (1.676 m)    Wt 141 lb 8.6 oz (64.2 kg)    SpO2 100%    BMI 22.84 kg/m2      Physical Exam  Gen:  WA, WN, lethargic  HEENT:  EOMI, FT in place  CVS:  V/VI systolic murmur, irregular, telemetry reviewed  Pul:  Bibasilar crackles.   Diminished L - c/w effusion on CXR  Abd:  Soft, NT  Ext:  (+)1 non-pitting lower extremity edema      O2 Flow Rate (L/min): 2 l/min O2 Device: Room air    Temp (24hrs), Av.5 °F (36.4 °C), Min:97.4 °F (36.3 °C), Max:97.6 °F (36.4 °C)      Admission Weight: Last Weight   Weight: 150 lb (68 kg) Weight: 141 lb 8.6 oz (64.2 kg) (RN weighed)         Intake/Output Summary (Last 24 hours) at 17 1334  Last data filed at 17 1200   Gross per 24 hour   Intake           1830.2 ml   Output             5080 ml   Net          -3249.8 ml       Data Review:     Lab Results   Component Value Date/Time    WBC 3.9 2017 05:17 AM    HGB 10.0 2017 05:17 AM    HCT 30.9 2017 05:17 AM    PLATELET 043  05:17 AM    MCV 70.7 2017 05:17 AM     Lab Results   Component Value Date/Time    Sodium 136 2017 05:17 AM    Potassium 3.5 2017 05:17 AM    Chloride 99 2017 05:17 AM    CO2 30 2017 05:17 AM    Anion gap 7 2017 05:17 AM    Glucose 143 04/06/2017 05:17 AM    BUN 13 04/06/2017 05:17 AM    Creatinine 0.61 04/06/2017 05:17 AM    BUN/Creatinine ratio 21 04/06/2017 05:17 AM    GFR est AA >60 04/06/2017 05:17 AM    GFR est non-AA >60 04/06/2017 05:17 AM    Calcium 10.4 04/06/2017 05:17 AM     Thank you for letting us see Ms. Aby Pond with you. Sharyle Rm, AGAP-BC  Juan [address] [place] [id number] [address]  Office: [phone]  24 hour VAD Pager: [phone]          Patient seen and examined. Data and note reviewed. I have discussed and agree with the plans as noted. 80year old female with a history of HFrEF, severe MR, who was admitted with decompensated heart failure. She has diuresed well with IV milrinone and bumex. Residual large left pleural effusion - recommend thoracentesis or chest tube. Undergoing evaluation for possible trans-cutaneous MV replacement, await further assessment by Dr. Stephy Joshi. Thank you for allowing us to participate in your patient's care. Joycelyn Bolton MD, [facility] [place] [address] Director    [address] [place] [address], [address] [place] [address]  Office [phone]  Fax [phone]

## 2017-04-06 NOTE — INTERDISCIPLINARY ROUNDS
IDR/SLIDR Summary          Patient: Chsae Al MRN: [id number]    Age: 80 y.o. YOB: 1930 Room/Bed: [address] [place] [address]   Admit Diagnosis: Hypothermia  Principal Diagnosis: Hypothermia   Goals: PT/OT, increase oral intake  Readmission: NO  Quality Measure: CHF  VTE Prophylaxis: Chemical eliquis  Influenza Vaccine screening completed? YES  Pneumococcal Vaccine screening completed? YES  Mobility needs: Yes   Nutrition plan:Yes cardiac reg diet and tube feeds  Consults:P.T, O.T., Speech, Respiratory and Case Management    Financial concerns:Yes  Escalated to CM? YES  RRAT Score: 25   Interventions:Palliative Care   Testing due for pt today? no  LOS: 28 days Expected length of stay ?? days  Discharge plan: rehab   PCP: Kristine Yuen MD  Transportation needs: Yes    Days before discharge:longer than expected LOS  Discharge disposition: Rehab?     Signed:     Gracy Moreland RN  4/5/2017  6:49 AM

## 2017-04-06 NOTE — PROGRESS NOTES
Attended interdisciplinary rounds in CCU. : Rev. Fabricio Godinez.  Ilana Franzs; [facility]; to contact [phone] call: [phone]

## 2017-04-07 NOTE — PROGRESS NOTES
Problem: Mobility Impaired (Adult and Pediatric)  Goal: *Acute Goals and Plan of Care (Insert Text)  Physical Therapy Goals    Goals evaluated, and new goals formulated/ downgraded 4/5/2017  1. Patient will move from supine to sit and sit to supine, scoot up and down and roll side to side in bed with maximal assistance within 7 day(s). 2. Patient will participate in lower extremity therapeutic exercise/activities (in supine & sitting) with minimal assistance and set-up within 7 day(s). 3. Patient will maintain static sitting balance at EOB with no UE support for >/= 15 sec and with supervision within 7 day(s). 4. Patient will perform sit to stand x 2 consecutive repetitions with maximal assistance within 7 day(s). 5. Patient will transfer from bed to chair and chair to bed with maximal assistance x 2 using the least restrictive device within 7 day(s). Revisited 3/24/2017, goals remain appropriate, carry over  Initiated 3/16/2017  1. Patient will move from supine to sit and sit to supine, scoot up and down and roll side to side in bed with minimal assistance within 7 day(s). 2. Patient will transfer from bed to chair and chair to bed with moderate assistance using the least restrictive device within 7 day(s). 3. Patient will perform sit to stand with moderate assistance within 7 day(s). 4. Patient will ambulate with moderate assistance for 10 feet with the least restrictive device within 7 day(s). PHYSICAL THERAPY TREATMENT  Patient: Adebayo Ward (82 y.o. female)  Date: 4/7/2017  Diagnosis: Hypothermia Hypothermia       Precautions: Fall      ASSESSMENT:  Chart reviewed. Pt deemed appropriate to be seen by nursing staff. Pt received supine in bed, agreeable to session. Pt required maxA x2 to transfer supine to sitting EOB. Pt exhibits motor planning deficits, and difficulty following verbal cues, requiring additional time and tactile reinforcement.  Upon sitting EOB, pt initially retropulsive, expressing fear of falling off the edge of the bed. Pt was able to relax and weight shift forward with reassurance that PT would not let her fall. Pt able sit for 30 seconds x3 with hands in lap with supervision. Pt tolerated light perturbations in sitting, verbal cuing to weight shift forward. Attempted sit>stand x2, with maxA x2 and manual facilitation through RLE, pt leaning posteriorly and extending BLE initially. LE muscle contraction palpable, but pt unable to achieve full upright posture. HR elevated to 107 following attempts. Continue to recommend SNF placement following discharge. Recommend pt to be placed in chair position in bed 2-3x/day over weekend, PT will follow up on Monday. Progression toward goals:  [ ]    Improving appropriately and progressing toward goals  [X]    Improving slowly and progressing toward goals  [ ]    Not making progress toward goals and plan of care will be adjusted       PLAN:  Patient continues to benefit from skilled intervention to address the above impairments. Continue treatment per established plan of care. Discharge Recommendations:  Skilled Nursing Facility  Further Equipment Recommendations for Discharge:  TBD       SUBJECTIVE:   Patient stated I guess I'm feeling all right this morning.       OBJECTIVE DATA SUMMARY:   Critical Behavior:  Neurologic State: Alert, Confused  Orientation Level: Oriented to person  Cognition: Follows commands  Safety/Judgement: Decreased awareness of environment  Functional Mobility Training:  Bed Mobility:  Supine to Sit: Maximum assistance;Assist x2  Sit to Supine: Maximum assistance;Assist x2  Transfers:  Sit to Stand: Maximum assistance;Assist x2  Stand to Sit: Maximum assistance;Assist x2  Balance:  Sitting: Impaired  Sitting - Static: Good (unsupported)  Sitting - Dynamic: Fair (occasional)  Standing: Impaired  Standing - Static: Poor;Constant support     Pain:  Pain Scale 1: Numeric (0 - 10)  Pain Intensity 1: 0      Activity Tolerance:   Pt generally deconditioned, BP stable with position changes     Please refer to the flowsheet for vital signs taken during this treatment. After treatment:   [ ]    Patient left in no apparent distress sitting up in chair  [X]    Patient left in no apparent distress in bed  [X]    Call bell left within reach  [X]    Nursing notified  [ ]    Caregiver present  [ ]    Bed alarm activated      COMMUNICATION/COLLABORATION:   The patients plan of care was discussed with: Registered Nurse     Venkat Garcia SPT   Time Calculation: 21 mins  Regarding student involvement in patient care:  A student participated in this treatment session. Per CMS Medicare statements and APTA guidelines I certify that the following was true:  1. I was present and directly observed the entire session. 2. I made all skilled judgments and clinical decisions regarding care. 3. I am the practitioner responsible for assessment, treatment, and documentation.

## 2017-04-07 NOTE — PROGRESS NOTES
4/6/2017  9:46 PM  Cards  Now on 6th out of ICU  Plan for left CT in am  No complaints  Weak   Patient Vitals for the past 4 hrs:   Temp Pulse Resp BP SpO2   04/06/17 1858 97.7 °F (36.5 °C) 91 27 114/65 100 %      3/6 Sys mur  1+ edema  Coarse  Hgb 10  Cr 0.6  K 3.5  BNP down further to 380    MR severe await Structural heart team final report next week  CHF systolic severe -milrinone,bumex, aldactone  AF on eliquis  Bilateral pleural effusions right to 30 cc last shift

## 2017-04-07 NOTE — PROGRESS NOTES
Cardiology Progress Note            Admit Date: 3/8/2017  Admit Diagnosis: Hypothermia  Date: 4/7/2017     Time: 8:55 AM  Assessment/Plan/Discussion:Cardiology Attending:     Patient seen earlier today and examined  and agree with Advance Practice Provider (ANDI, NP,PA)  assessment and plans. Saqib Mohr is a 80 y.o. female   Able to eat with assistance of daughters  Able to response to questions  Still soft spoken and weak but a stead improvement this week  Denies pain or shortness of breath   On RA  Patient Vitals for the past 4 hrs:   Temp Pulse Resp BP SpO2   04/07/17 1900 98.2 °F (36.8 °C) 79 17 113/56 100 %      No JVD, coarse improved on left , Irregularly irregular rhythm, 3-4/6 Sys murmur at apex to base  Non pitting edema  Hgb 9.8  K 3.5  Cr 0.6  A/p below agree, check CXR Sunday am and then unclamp right sided chest tube and see if any drainage , if less 100-300 cc then remove  MR eval with structural heart for TMVR next week  Liver eval next week  Continue nutrition  S/p C-E MV repair ring in 1996,CHF, AFib, TR, MR  Group to follow    Nicole Medina MD 4/7/2017              Assessment and Plan     1. Systolic Heart Failure, severe:  Advanced Heart Failure service following. BNP trending down 380 4/6 from 602 4/3/17.- 2528 mls in 24 hours   -Has BiV AICD   -Continue milrinone (0.375 mc/kg/min), aldactone, Bumex (2 mg IV BID) per advanced heart failure team.   -ACE-I, ARB held for now due to low normal BP on milrinone. 2. Pleural effusions:  Pt with improved pulmonary status s/p diuresis, chest tube/pleural effusion drainage.    -Rt chest tube with 39 ml out in 24 hours. Recommend clamp right chest tube for 24 hours. Will repeat CXR in a.m after CT clamped.   -Left chest tube by IR today for left pleural effusion. 3. MR, severe:     -Valve team on consult- Consideriong TAMV in ring or Transvenous-Transeptal MVR.  But needs improved nutrition/conditioning.   -Dr. Juan Prince consulted for eval of Liver cirrhosis (for preop eval) but Dr. Juan Prince not available until 4/13. 4. Afib:    -CHA2DS= 6 (age, CHF, HTN, female, DM). Eliquis      5. Pancytopenia:  WBC 3.3, Hgb 9.8 today from 10 on 4/6/17. Plts stable 121.    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 Hepatology cx pending. 6. Deconditioned:  Continue PT/OT  7. Advanced directives:  Full code status. Subjective:   Chelsea Mojica Race denies SOB and chest pain. Objective:      Physical Exam:                Visit Vitals    /54 (BP 1 Location: Left arm, BP Patient Position: At rest)    Pulse 83    Temp 98.2 °F (36.8 °C)    Resp 24    Ht 5' 6\" (1.676 m)    Wt 60 kg (132 lb 4.4 oz)    SpO2 100%    BMI 21.35 kg/m2          General Appearance:   Well developed, weak, no acute distress   Ears/Nose/Mouth/Throat:    Hearing grossly normal.         Neck:  Supple. Chest:    Lungs diminished breath sounds bilateral lower lung files No wheeze   Cardiovascular:    Regular rate and rhythm, S1, S2 normal, grade II- III/VI systolic murmur   Abdomen:    Soft, non-tender, bowel sounds are active. Extremities:  No LE edema bilaterally. Skin:  Warm and dry.      Telemetry: currently 100% ventricular paced with  PVCs          Data Review:    Labs:    Recent Results (from the past 24 hour(s))   GLUCOSE, POC    Collection Time: 04/06/17 11:54 AM   Result Value Ref Range    Glucose (POC) 124 (H) 65 - 100 mg/dL    Performed by [address] [place] [address], POC    Collection Time: 04/06/17  6:04 PM   Result Value Ref Range    Glucose (POC) 161 (H) 65 - 100 mg/dL    Performed by Jailene Jane    GLUCOSE, POC    Collection Time: 04/07/17 12:43 AM   Result Value Ref Range    Glucose (POC) 156 (H) 65 - 100 mg/dL    Performed by [address] [place] [address], BASIC    Collection Time: 04/07/17  4:05 AM Result Value Ref Range    Sodium 140 136 - 145 mmol/L    Potassium 3.5 3.5 - 5.1 mmol/L    Chloride 102 97 - 108 mmol/L    CO2 31 21 - 32 mmol/L    Anion gap 7 5 - 15 mmol/L    Glucose 151 (H) 65 - 100 mg/dL    BUN 15 6 - 20 MG/DL    Creatinine 0.61 0.55 - 1.02 MG/DL    BUN/Creatinine ratio 25 (H) 12 - 20      GFR est AA >60 >60 ml/min/1.73m2    GFR est non-AA >60 >60 ml/min/1.73m2    Calcium 9.9 8.5 - 10.1 MG/DL   CBC WITH AUTOMATED DIFF    Collection Time: 04/07/17  4:05 AM   Result Value Ref Range    WBC 3.3 (L) 3.6 - 11.0 K/uL    RBC 4.24 3.80 - 5.20 M/uL    HGB 9.8 (L) 11.5 - 16.0 g/dL    HCT 30.0 (L) 35.0 - 47.0 %    MCV 70.8 (L) 80.0 - 99.0 FL    MCH 23.1 (L) 26.0 - 34.0 PG    MCHC 32.7 30.0 - 36.5 g/dL    RDW 20.2 (H) 11.5 - 14.5 %    PLATELET 127 (L) 253 - 400 K/uL    NEUTROPHILS 58 32 - 75 %    LYMPHOCYTES 21 12 - 49 %    MONOCYTES 16 (H) 5 - 13 %    EOSINOPHILS 4 0 - 7 %    BASOPHILS 1 0 - 1 %    ABS. NEUTROPHILS 2.0 1.8 - 8.0 K/UL    ABS. LYMPHOCYTES 0.7 (L) 0.8 - 3.5 K/UL    ABS. MONOCYTES 0.5 0.0 - 1.0 K/UL    ABS. EOSINOPHILS 0.1 0.0 - 0.4 K/UL    ABS.  BASOPHILS 0.0 0.0 - 0.1 K/UL    DF SMEAR SCANNED      PLATELET COMMENTS LARGE PLATELETS      RBC COMMENTS ANISOCYTOSIS  2+        RBC COMMENTS POLYCHROMASIA  PRESENT        RBC COMMENTS MICROCYTOSIS  1+        RBC COMMENTS HYPOCHROMIA  1+        RBC COMMENTS TARGET CELLS  PRESENT        RBC COMMENTS RBC FRAGMENTS  PRESENT  OVALOCYTES  PRESENT        RBC COMMENTS SPHEROCYTES  PRESENT       NUCLEATED RBC    Collection Time: 04/07/17  4:05 AM   Result Value Ref Range    NRBC 1.5 (H) 0  WBC    ABSOLUTE NRBC 0.05 (H) 0.00 - 0.01 K/uL    WBC CORRECTED FOR NR ADJUSTED FOR NUCLEATED RBC'S     GLUCOSE, POC    Collection Time: 04/07/17  6:46 AM   Result Value Ref Range    Glucose (POC) 136 (H) 65 - 100 mg/dL    Performed by Cat Enriquez           Radiology:        Current Facility-Administered Medications   Medication Dose Route Frequency    bumetanide (BUMEX) injection 2 mg  2 mg IntraVENous TID    milrinone (PRIMACOR) 20 MG/100 ML D5W infusion  0.375 mcg/kg/min IntraVENous CONTINUOUS    potassium chloride (KAON 10%) 20 mEq/15 mL oral liquid 40 mEq  40 mEq Per NG tube BID WITH MEALS    pantoprazole (PROTONIX) 2 mg/mL oral suspension 40 mg  40 mg Per NG tube ACB    0.9% sodium chloride infusion  10 mL/hr IntraVENous CONTINUOUS    spironolactone (ALDACTONE) tablet 25 mg  25 mg Oral DAILY    insulin lispro (HUMALOG) injection   SubCUTAneous Q6H    apixaban (ELIQUIS) tablet 2.5 mg  2.5 mg Per NG tube BID    ELECTROLYTE REPLACEMENT PROTOCOL  1 Each Other PRN    ELECTROLYTE REPLACEMENT PROTOCOL  1 Each Other PRN    albuterol-ipratropium (DUO-NEB) 2.5 MG-0.5 MG/3 ML  3 mL Nebulization TID PRN    chlorhexidine (PERIDEX) 0.12 % mouthwash 10 mL  10 mL Oral Q12H    bisacodyl (DULCOLAX) suppository 10 mg  10 mg Rectal DAILY PRN    sodium chloride (NS) flush 5-10 mL  5-10 mL IntraVENous Multiple    phosphorus (K PHOS NEUTRAL) 250 mg tablet 1 Tab  1 Tab Oral BID    albumin human 25% (BUMINATE) solution 12.5 g  12.5 g IntraVENous Q8H PRN    docusate sodium (COLACE) capsule 100 mg  100 mg Oral BID    polyvinyl alcohol (LIQUIFILM TEARS) 1.4 % ophthalmic solution 2 Drop  2 Drop Both Eyes TID    sodium chloride (NS) flush 10 mL  10 mL InterCATHeter PRN    alteplase (CATHFLO) 1 mg in sterile water (preservative free) 1 mL injection  1 mg InterCATHeter PRN    bacitracin 500 unit/gram packet 1 Packet  1 Packet Topical PRN    glucose chewable tablet 16 g  4 Tab Oral PRN    dextrose (D50W) injection syrg 12.5-25 g  12.5-25 g IntraVENous PRN    glucagon (GLUCAGEN) injection 1 mg  1 mg IntraMUSCular PRN    sodium chloride (NS) flush 5-10 mL  5-10 mL IntraVENous PRN          Rhoda Kid.  IRLANDA Wilkinson     [facility] [address] [place] [address], [address] [place] [address] [address]   Wesley Malone   [phone]

## 2017-04-07 NOTE — PROGRESS NOTES
Bedside shift change report given to MECHELLE Herbert (oncoming nurse) by Levonia Mcburney, RN (offgoing nurse). Report included the following information SBAR, Kardex, ED Summary, Procedure Summary, Intake/Output, MAR, Accordion, Recent Results, Med Rec Status and Cardiac Rhythm Paced.

## 2017-04-07 NOTE — PROGRESS NOTES
Hospitalist Progress Note            Date of Service:  2017  NAME:  Chase Al  :  1930  MRN:  [id number]      Admission Summary:   68-year-old female with history of chronic systolic congestive heart failure, atrial fibrillation, diabetes mellitus, hypertension, chronic anticoagulation use, history of pacemaker and ICD. She was sent from her primary care physician to the emergency room to be evaluated. History from the patient and her daughters at the bedside, also from the records. Per the daughter, she has been having on and off abdominal pain, epigastric pain for a while, over the last 3 days has been mostly pronounced. She said her pain at times is 9/10 in severity, it is nonradiating, associated with some nausea, but no vomiting. She denies any diarrhea. At her PCP's office,   they could not get her temperature, so they sent her to the emergency room. In the ER she has been hypothermic, her temperature was 91.4 on initial presentation. Also she was hypotensive. Her blood pressure   systolic was 46/50, so she was given about 1.5 liters of IV fluid, and now she is getting 1 bottle of albumin. She was placed on warming blanket, Lizzy Hugger. She denies any dysuria or frequency. Interval history / Subjective:   States she's breathing ok. Denies any pain, n/v/d.      Assessment & Plan:         Acute on chronic respiratory failure with hypercapnia due to pleural effusion/atelectasis and acute on chronic systolic heart failure   - NYHA class IV upon admission   - she had been intubated on 3/25, subsequently extubated 3/30; now on intermittent BiPAP   - diuresis with Lasix   - s/p chest tube placement on 3/31, still with significant output, D/C once daily output < 50 mL   - plan for left chest tube placement on    - on milrinone    Hypothermia and hypotension: - resolved   - received empiric therapy with vancomycin and Zosyn    Bilateral Pleural effusions and significant dependent atx: likely related to CHF    Sepsis:  Unknown source, most likely lung with bilateral pleural effusions, possibly infectious.     - AMMON: no vegetations;    - blood culture 3/8 and 3/11: no growth x 5 days;    - urine culture: no growth;    - 3/21: afebrile, no leukocytosis, cultures negative   - off antibiotics. Patient completed 2 weeks of IV Abx since admission. Acute on Chronic Systolic congestive heart failure NYHA IV   - difficult situation as suspect there is a component of valvular disease contributing to her presentation (severe MR)   - Echo 3/9 EF 30%, diffuse hypokinesis. - AMMON 3/22 showed no vegetations   - not a candidate for MitraClip, possibly TA MV or transvenous-transeptal MVR, awaiting cardiac surgery input   - cautious diuresis with Bumex   - holding beta blocker / ACE inhibitor secondary to borderline BPs   - on spironolactone   - cardiology, advanced heart failure following    Hematuria: may be related to traumatic galarza placement with INR max 5.5,   - Urine noted on UA as yellow on 3/8,    - Galarza placed 3/9 and hematuria reported 3/10 UA. JUAN:  From  ATN and contrast nephropathy/Hypotension most likely etiology. - Ischemic heart disease, hypotension; and exposure to IV contrast.    - Appreciate nephro consult recommendations. - Creatinine now normalized. Hypoglycemia   - now on tube feeds    Ischemic hepatitis:    - Consulted GI. Plans for EGD when stable or outpatient.   - LFTs now normalized. - hepatology consulted - unfortunately unavailable until 4/13? Coagulopathy.   - Had hematuria. INR was supra therapeutic on warfarin which is now stopped. - 3/21: started patient on Eliquis for stroke prophylaxis in the setting of chronic Atrial Fibrillation and [id number] score of 6. Pancytopenia. - Appears to be chronic. However, patient and family unaware of her previous lab results.  Will monitor her labs closely. Left facial pressure ulcer/injury:  - stage 2; measures 1 x 0.5 x 0 cm; 100% drying pink/red;  - with linear jac = 2 x 0.5 x 0 cm (smaller);  - Hospital Acquired;    Urinary retention:  - Richardson removed 3/21, decreased urine output, bladder scan revealed 900 ml of urine in the bladder per nursing report, Richardson was reinserted; (second attempt at removing Richardson during this hospitalization). - keep the Richardson in at discharge; outpatient f/u with Urology for voiding trials once patient is more ambulatory. Anemia:  - of chronic disease with slow decline in H&H during this hospitalization. - patient transfused 2 Units of PRBC's on 3/22, Hgb fairly stable currently    Afib   - rate controlled on no medications currently   - on Eliquis    Hypernatremia   - continue to trend    Code status: FULL    Disposition: TBD      Hospital Problems  Date Reviewed: 3/8/2017          Codes Class Noted POA    * (Principal)Hypothermia ICD-10-CM: T68. XXXA  ICD-9-CM: 991.6  3/8/2017 Yes                Review of Systems:   Pertinent items are noted in HPI. Vital Signs:    Last 24hrs VS reviewed since prior progress note. Most recent are:  Visit Vitals    /61 (BP 1 Location: Left arm, BP Patient Position: At rest)    Pulse 80    Temp 97.7 °F (36.5 °C)    Resp 24    Ht 5' 6\" (1.676 m)    Wt 60 kg (132 lb 4.4 oz)    SpO2 100%    BMI 21.35 kg/m2         Intake/Output Summary (Last 24 hours) at 04/07/17 1257  Last data filed at 04/07/17 0400   Gross per 24 hour   Intake            659.2 ml   Output             2519 ml   Net          -1859.8 ml        Physical Examination:             Constitutional:  NAD, sleeping easily awakened   HEENT: Now off BiPAP, edentulous,    Resp: R chest tube.  Lungs w/ diminished breath sounds b/l, R basilar crackles, normal work of breathing   CV:  irregularly irregular, IV/VI HSM    GI:  Soft, non-tender, normoactive bowel sounds;     Musculoskeletal:  trace BLE edema Neurologic: Sleeping easily awakened            Data Review:    Review and/or order of clinical lab test   US abd:  IMPRESSION:  1. Distended gallbladder with sludge and a stone with moderate wall thickening of the gallbladder wall. 2. Dilated IVC suggesting right heart failure. 3. Small amount of free fluid surrounding the liver. 4. Small right pleural effusion      Labs:     Recent Labs      04/07/17 0405  04/06/17   0517   WBC  3.3*  3.9   HGB  9.8*  10.0*   HCT  30.0*  30.9*   PLT  121*  118*     Recent Labs      04/07/17   0405  04/06/17   0517  04/05/17   0133   NA  140  136  139   K  3.5  3.5  3.3*   CL  102  99  100   CO2  31  30  33*   BUN  15  13  12   CREA  0.61  0.61  0.50*   GLU  151*  143*  139*   CA  9.9  10.4*  9.6     No results for input(s): SGOT, GPT, ALT, AP, TBIL, TBILI, TP, ALB, GLOB, GGT, AML, LPSE in the last 72 hours. No lab exists for component: AMYP, HLPSE  No results for input(s): INR, PTP, APTT in the last 72 hours. No lab exists for component: INREXT, INREXT   No results for input(s): FE, TIBC, PSAT, FERR in the last 72 hours. Lab Results   Component Value Date/Time    Folate 14.5 03/09/2017 03:23 AM      No results for input(s): PH, PCO2, PO2 in the last 72 hours. No results for input(s): CPK, CKNDX, TROIQ in the last 72 hours.     No lab exists for component: CPKMB  Lab Results   Component Value Date/Time    Cholesterol, total 138 09/28/2011 09:13 AM    HDL Cholesterol 50 09/28/2011 09:13 AM    LDL, calculated 74 09/28/2011 09:13 AM    Triglyceride 69 09/28/2011 09:13 AM     Lab Results   Component Value Date/Time    Glucose (POC) 175 04/07/2017 12:04 PM    Glucose (POC) 136 04/07/2017 06:46 AM    Glucose (POC) 156 04/07/2017 12:43 AM    Glucose (POC) 161 04/06/2017 06:04 PM    Glucose (POC) 124 04/06/2017 11:54 AM     Lab Results   Component Value Date/Time    Color RED 03/10/2017 02:30 AM    Appearance BLOODY 03/10/2017 02:30 AM    Specific gravity 1.020 03/10/2017 02:30 AM    Specific gravity 1.016 03/08/2017 08:54 PM    pH (UA) 6.0 03/10/2017 02:30 AM    Protein 100 03/10/2017 02:30 AM    Glucose NEGATIVE  03/10/2017 02:30 AM    Ketone NEGATIVE  03/10/2017 02:30 AM    Bilirubin NEGATIVE  03/10/2017 02:30 AM    Urobilinogen 1.0 03/10/2017 02:30 AM    Nitrites NEGATIVE  03/10/2017 02:30 AM    Leukocyte Esterase TRACE 03/10/2017 02:30 AM    Epithelial cells FEW 03/10/2017 02:30 AM    Bacteria NEGATIVE  03/10/2017 02:30 AM    WBC 20-50 03/10/2017 02:30 AM    RBC >100 03/10/2017 02:30 AM         Medications Reviewed:     Current Facility-Administered Medications   Medication Dose Route Frequency    bumetanide (BUMEX) injection 2 mg  2 mg IntraVENous TID    milrinone (PRIMACOR) 20 MG/100 ML D5W infusion  0.375 mcg/kg/min IntraVENous CONTINUOUS    potassium chloride (KAON 10%) 20 mEq/15 mL oral liquid 40 mEq  40 mEq Per NG tube BID WITH MEALS    pantoprazole (PROTONIX) 2 mg/mL oral suspension 40 mg  40 mg Per NG tube ACB    0.9% sodium chloride infusion  10 mL/hr IntraVENous CONTINUOUS    spironolactone (ALDACTONE) tablet 25 mg  25 mg Oral DAILY    insulin lispro (HUMALOG) injection   SubCUTAneous Q6H    apixaban (ELIQUIS) tablet 2.5 mg  2.5 mg Per NG tube BID    ELECTROLYTE REPLACEMENT PROTOCOL  1 Each Other PRN    ELECTROLYTE REPLACEMENT PROTOCOL  1 Each Other PRN    albuterol-ipratropium (DUO-NEB) 2.5 MG-0.5 MG/3 ML  3 mL Nebulization TID PRN    chlorhexidine (PERIDEX) 0.12 % mouthwash 10 mL  10 mL Oral Q12H    bisacodyl (DULCOLAX) suppository 10 mg  10 mg Rectal DAILY PRN    sodium chloride (NS) flush 5-10 mL  5-10 mL IntraVENous Multiple    phosphorus (K PHOS NEUTRAL) 250 mg tablet 1 Tab  1 Tab Oral BID    albumin human 25% (BUMINATE) solution 12.5 g  12.5 g IntraVENous Q8H PRN    docusate sodium (COLACE) capsule 100 mg  100 mg Oral BID    polyvinyl alcohol (LIQUIFILM TEARS) 1.4 % ophthalmic solution 2 Drop  2 Drop Both Eyes TID    sodium chloride (NS) flush 10 mL  10 mL InterCATHeter PRN    alteplase (CATHFLO) 1 mg in sterile water (preservative free) 1 mL injection  1 mg InterCATHeter PRN    bacitracin 500 unit/gram packet 1 Packet  1 Packet Topical PRN    glucose chewable tablet 16 g  4 Tab Oral PRN    dextrose (D50W) injection syrg 12.5-25 g  12.5-25 g IntraVENous PRN    glucagon (GLUCAGEN) injection 1 mg  1 mg IntraMUSCular PRN    sodium chloride (NS) flush 5-10 mL  5-10 mL IntraVENous PRN     ______________________________________________________________________  EXPECTED LENGTH OF STAY: 4d 21h  ACTUAL LENGTH OF STAY:          30                 Chelsey Anna MD

## 2017-04-07 NOTE — PROGRESS NOTES
NUTRITION    Recommendations:   1. Continue tube feeds at current rate throughout the weekend  2. CALORIE COUNT 4/7 thru 4/9  3. Continue to encourage PO intake at meals    - please use supplements to give medications       Diet: mechanical soft  Supplements: Ensure, Magic Cup  Tube feeds: Glucerna 1.2 @ 60ml/hr x 12hr (7a-7p) + 50ml flush q4hr  Meal Intake:   Patient Vitals for the past 100 hrs:   % Diet Eaten   04/06/17 1600 20 %   04/06/17 0800 10 %   04/05/17 1826 25 %   04/05/17 0917 80 %   04/04/17 1900 25 %   Meds: bumex, colace, SSI, KPhos, KCl, aldactone, milrinone drip    Milrinone drip continues. Surgery plans for MR pending. CT remains in place, output reduced. On tube feeds since 3/30. Tube feeds adjusted to nocturnal regimen on 4/6 but intake remains low at meals and with supplements. Ate only banana and bites of Icelandic toast this morning per RN reports. Current tube feeds provide: 720ml, 865kcal, 43g protein, 730ml free water (tube feeding/flush). Meets 56% estimated energy and protein needs. Will start calorie count to continue over there weekend with hopeful improvement in PO intake. Continue current tube feeds until PO meeting at least 60% needs. Discussed w/ RN and sheets left at bedside. Pt sleeping during visit so unable to interview. Wt continues to trend down with bumex rx. Extensive edema still noted. Will continue to follow trends. Estimated Nutrition Needs:   Kcals/day: 1530 Kcals/day (MSJ x 1.4)  Protein: 77 g (77-83 (1.2-1.3g/kg))  Fluid:  (1 ml/kcal)  Based On:  Jareth López  Weight Used: Actual wt (64 kg)    Janelle Carpio RD

## 2017-04-07 NOTE — PROGRESS NOTES
[facility]   Progress Note  NAME:  Nacho Becerra   :   1930   MRN:   [id number]  PCP:  Mariza Lewis MD    Date:  2017    Date of  Admission: 3/8/2017     IMPRESSION/Plan:   Acute on chronic systolic heart failure, NYHA Class IV, EF 25-30%  Tolerating milrinone 0.375 mcg/kg/min  Bumex 2 mg IV BID  Surgical plan TBD  Hold BB, ACEi d/t hypotension  Continue aldactone  BNP improving  Strict I/O, daily weights  Na+ restricted diet      MR, Severe  [facility] following  Surgical plan TBD - not candidate for MitraClip     Acute on chronic respiratory failure  Improving, on RA  Management per pulmonary  BiPAP qHS and prn during day     Bilateral pleural effusions  Improved with diuresis  Clamped right CT prior to removal    Hypernatremia  Sodium 140  D/C IVF    Hypokalemia  Replaced  Monitor     Sepsis, POA  ID following  Abx complete     Atrial fibrillation  Rate controlled  Eliquis on hold for thoracentesis     Pancytopenia  Likely d/t hepatic congestion  ID recommends hepatology consult     DM Type II  SSIC    Deconditioning  PT/OT         Subjective:     Seen with Dr. Marianna Sacks    Pt resting in bed, more lethargic this AM. Tolerating increase in milrinone, labs improving. Currently on NC, BiPAP PRN. Eliquis on hold for thoracentesis tomorrow AM.     Past Medical History:   Diagnosis Date    Atrial fibrillation (HCC)     Chronic systolic heart failure (HCC)     ef 35-40%; hold ACE due to lower bp    Diabetes mellitus type 2, insulin dependent ([address]     Hypertension     Long term (current) use of anticoagulants     Malignant hypertensive heart disease with CHF ([address]     Mitral regurgitation     Pacemaker 10/25/2012    The pacemaker is a [employer] , model # [id number], serial [id number].     S/P mitral valve repair 1996    # 28 [place]-Manuel    Thyroid disease     TR (tricuspid regurgitation)     moderate to severe echo  Past Surgical History:   Procedure Laterality Date    CARDIAC SURG PROCEDURE UNLIST  1996    valve replacement    HX HEART CATHETERIZATION  10/4/2012    normal cors, LVEF 50%    HX HEART VALVE SURGERY      HX PACEMAKER      INS PPM/ICD LED DUAL ONLY  10/25/2012         INS PPM/ICD LED DUAL ONLY  9/9/2016          Family History   Problem Relation Age of Onset    Cancer Sister      breast    Heart Disease Brother     Heart Disease Brother     Diabetes Son     Stroke Son       Social History   Substance Use Topics    Smoking status: Never Smoker    Smokeless tobacco: Never Used    Alcohol use No      Current Facility-Administered Medications   Medication Dose Route Frequency    bumetanide (BUMEX) injection 2 mg  2 mg IntraVENous TID    milrinone (PRIMACOR) 20 MG/100 ML D5W infusion  0.375 mcg/kg/min IntraVENous CONTINUOUS    potassium chloride (KAON 10%) 20 mEq/15 mL oral liquid 40 mEq  40 mEq Per NG tube BID WITH MEALS    pantoprazole (PROTONIX) 2 mg/mL oral suspension 40 mg  40 mg Per NG tube ACB    0.9% sodium chloride infusion  10 mL/hr IntraVENous CONTINUOUS    spironolactone (ALDACTONE) tablet 25 mg  25 mg Oral DAILY    insulin lispro (HUMALOG) injection   SubCUTAneous Q6H    apixaban (ELIQUIS) tablet 2.5 mg  2.5 mg Per NG tube BID    ELECTROLYTE REPLACEMENT PROTOCOL  1 Each Other PRN    ELECTROLYTE REPLACEMENT PROTOCOL  1 Each Other PRN    albuterol-ipratropium (DUO-NEB) 2.5 MG-0.5 MG/3 ML  3 mL Nebulization TID PRN    chlorhexidine (PERIDEX) 0.12 % mouthwash 10 mL  10 mL Oral Q12H    bisacodyl (DULCOLAX) suppository 10 mg  10 mg Rectal DAILY PRN    sodium chloride (NS) flush 5-10 mL  5-10 mL IntraVENous Multiple    phosphorus (K PHOS NEUTRAL) 250 mg tablet 1 Tab  1 Tab Oral BID    albumin human 25% (BUMINATE) solution 12.5 g  12.5 g IntraVENous Q8H PRN    docusate sodium (COLACE) capsule 100 mg  100 mg Oral BID    polyvinyl alcohol (LIQUIFILM TEARS) 1.4 % ophthalmic solution 2 Drop  2 Drop Both Eyes TID    sodium chloride (NS) flush 10 mL  10 mL InterCATHeter PRN    alteplase (CATHFLO) 1 mg in sterile water (preservative free) 1 mL injection  1 mg InterCATHeter PRN    bacitracin 500 unit/gram packet 1 Packet  1 Packet Topical PRN    glucose chewable tablet 16 g  4 Tab Oral PRN    dextrose (D50W) injection syrg 12.5-25 g  12.5-25 g IntraVENous PRN    glucagon (GLUCAGEN) injection 1 mg  1 mg IntraMUSCular PRN    sodium chloride (NS) flush 5-10 mL  5-10 mL IntraVENous PRN          Objective:     Visit Vitals    /56    Pulse 90    Temp 98.5 °F (36.9 °C)    Resp 15    Ht 5' 6\" (1.676 m)    Wt 132 lb 4.4 oz (60 kg)    SpO2 100%    BMI 21.35 kg/m2      Physical Exam  Gen:  WA, WN, lethargic  HEENT:  EOMI, FT in place  CVS:  V/VI systolic murmur, irregular, telemetry reviewed  Pul:  Bibasilar crackles.   Diminished L - c/w effusion on CXR  Abd:  Soft, NT  Ext:  (+)1 non-pitting lower extremity edema      O2 Flow Rate (L/min): 2 l/min O2 Device: Room air    Temp (24hrs), Av °F (36.7 °C), Min:97.4 °F (36.3 °C), Max:98.5 °F (36.9 °C)      Admission Weight: Last Weight   Weight: 150 lb (68 kg) Weight: 132 lb 4.4 oz (60 kg)         Intake/Output Summary (Last 24 hours) at 17 1711  Last data filed at 17 1600   Gross per 24 hour   Intake              800 ml   Output             2369 ml   Net            -1569 ml       Data Review:     Lab Results   Component Value Date/Time    WBC 3.3 2017 04:05 AM    HGB 9.8 2017 04:05 AM    HCT 30.0 2017 04:05 AM    PLATELET 578  04:05 AM    MCV 70.8 2017 04:05 AM     Lab Results   Component Value Date/Time    Sodium 140 2017 04:05 AM    Potassium 3.5 2017 04:05 AM    Chloride 102 2017 04:05 AM    CO2 31 2017 04:05 AM    Anion gap 7 2017 04:05 AM    Glucose 151 2017 04:05 AM    BUN 15 2017 04:05 AM    Creatinine 0.61 2017 04:05 AM    BUN/Creatinine ratio 25 04/07/2017 04:05 AM    GFR est AA >60 04/07/2017 04:05 AM    GFR est non-AA >60 04/07/2017 04:05 AM    Calcium 9.9 04/07/2017 04:05 AM     Thank you for letting us see Ms. Ondina Easton with you. Ashley Jones, AGACNP-BC  [address]  [address]  [facility], [address] [place] [address]  Office: [phone]  24 hour VAD Pager: [phone]          Patient seen and examined. Data and note reviewed. I have discussed and agree with the plans as noted. 80year old female with a history of HFrEF, severe MR, who was admitted with decompensated heart failure. She has diuresed well with IV milrinone and bumex. Dramatic reduction in size of left pleural effusion - hold on thoracentesis or chest tube. Undergoing evaluation for possible trans-cutaneous MV replacement, await further assessment by Dr. Elisabet Qeuen. Available over the weekend, please call if needed. Will see her again on Monday. Thank you for allowing us to participate in your patient's care. Joycelyn Castellon MD, [facility] [address] Director     Tania Fowler  [address] [facility], [address] [place] [address], [address] [place] [address]  Office [phone]  Fax [phone]

## 2017-04-07 NOTE — PROGRESS NOTES
Bedside and Verbal shift change report given to Jennifer Palumbo RN (oncoming nurse) by Nikos Fiore (offgoing nurse). Report included the following information SBAR, Kardex, Procedure Summary, Intake/Output, Med Rec Status and Cardiac Rhythm Paced.

## 2017-04-07 NOTE — PROGRESS NOTES
Occupational Therapy: Nurse reports that staff has just arrived to patient's room to perform a thoracentesis. Defer per nurse. Will follow.   LYNNE Kirby/ARIELLA

## 2017-04-08 NOTE — PROGRESS NOTES
Hospitalist Progress Note            Date of Service:  2017  NAME:  Yudi Gurrola  :  1930  MRN:  [id number]      Admission Summary:   79-year-old female with history of chronic systolic congestive heart failure, atrial fibrillation, diabetes mellitus, hypertension, chronic anticoagulation use, history of pacemaker and ICD. She was sent from her primary care physician to the emergency room to be evaluated. History from the patient and her daughters at the bedside, also from the records. Per the daughter, she has been having on and off abdominal pain, epigastric pain for a while, over the last 3 days has been mostly pronounced. She said her pain at times is 9/10 in severity, it is nonradiating, associated with some nausea, but no vomiting. She denies any diarrhea. At her PCP's office,   they could not get her temperature, so they sent her to the emergency room. In the ER she has been hypothermic, her temperature was 91.4 on initial presentation. Also she was hypotensive. Her blood pressure   systolic was 04/92, so she was given about 1.5 liters of IV fluid, and now she is getting 1 bottle of albumin. She was placed on warming blanket, Lizzy Hugger. She denies any dysuria or frequency. Interval history / Subjective:   Feels \"good\" today, denies any pain, no n/v/d. Breathing \"good. \"     Assessment & Plan:         Acute on chronic respiratory failure with hypercapnia due to pleural effusion/atelectasis and acute on chronic systolic heart failure   - NYHA class IV upon admission   - she had been intubated on 3/25, subsequently extubated 3/30; now on intermittent BiPAP   - diuresis with Lasix   - s/p chest tube placement on 3/31, still with significant output, D/C once daily output < 50 mL   - plan for left chest tube placement on    - on milrinone    Hypothermia and hypotension: - resolved   - received empiric therapy with vancomycin and Zosyn    Bilateral Pleural effusions and significant dependent atx: likely related to CHF    Sepsis:  Unknown source, most likely lung with bilateral pleural effusions, possibly infectious.     - AMMON: no vegetations;    - blood culture 3/8 and 3/11: no growth x 5 days;    - urine culture: no growth;    - 3/21: afebrile, no leukocytosis, cultures negative   - off antibiotics. Patient completed 2 weeks of IV Abx since admission. Acute on Chronic Systolic congestive heart failure NYHA IV   - difficult situation as suspect there is a component of valvular disease contributing to her presentation (severe MR)   - Echo 3/9 EF 30%, diffuse hypokinesis. - AMMON 3/22 showed no vegetations   - not a candidate for MitraClip, possibly TA MV or transvenous-transeptal MVR, awaiting cardiac surgery input   - cautious diuresis with Bumex   - holding beta blocker / ACE inhibitor secondary to borderline BPs   - on spironolactone   - cardiology, advanced heart failure following    Hematuria: may be related to traumatic galarza placement with INR max 5.5,   - Urine noted on UA as yellow on 3/8,    - Galarza placed 3/9 and hematuria reported 3/10 UA. JUAN:  From  ATN and contrast nephropathy/Hypotension most likely etiology. - Ischemic heart disease, hypotension; and exposure to IV contrast.    - Appreciate nephro consult recommendations. - Creatinine now normalized. Hypoglycemia   - now on tube feeds    Ischemic hepatitis:    - Consulted GI. Plans for EGD when stable or outpatient.   - LFTs now normalized. - hepatology consulted - unfortunately unavailable until 4/13? Coagulopathy.   - Had hematuria. INR was supra therapeutic on warfarin which is now stopped. - 3/21: started patient on Eliquis for stroke prophylaxis in the setting of chronic Atrial Fibrillation and [id number] score of 6. Pancytopenia. - Appears to be chronic. However, patient and family unaware of her previous lab results.  Will monitor her labs closely. Left facial pressure ulcer/injury:  - stage 2; measures 1 x 0.5 x 0 cm; 100% drying pink/red;  - with linear jac = 2 x 0.5 x 0 cm (smaller);  - Hospital Acquired;    Urinary retention:  - Richardson removed 3/21, decreased urine output, bladder scan revealed 900 ml of urine in the bladder per nursing report, Richardson was reinserted; (second attempt at removing Richardson during this hospitalization). - keep the Richardson in at discharge; outpatient f/u with Urology for voiding trials once patient is more ambulatory. Anemia:  - of chronic disease with slow decline in H&H during this hospitalization. - patient transfused 2 Units of PRBC's on 3/22, Hgb fairly stable currently    Afib   - rate controlled on no medications currently   - on Eliquis    Hypernatremia   - continue to trend    Code status: FULL    Disposition: TBD      Hospital Problems  Date Reviewed: 4/8/2017          Codes Class Noted POA    * (Principal)Hypothermia ICD-10-CM: T68. XXXA  ICD-9-CM: 991.6  3/8/2017 Yes                Review of Systems:   Pertinent items are noted in HPI. Vital Signs:    Last 24hrs VS reviewed since prior progress note. Most recent are:  Visit Vitals    /75 (BP 1 Location: Left arm, BP Patient Position: At rest)    Pulse 83    Temp 98.2 °F (36.8 °C)    Resp 21    Ht 5' 6\" (1.676 m)    Wt 60.5 kg (133 lb 6.1 oz)    SpO2 96%    BMI 21.53 kg/m2         Intake/Output Summary (Last 24 hours) at 04/08/17 1306  Last data filed at 04/08/17 0704   Gross per 24 hour   Intake           1484.4 ml   Output             2550 ml   Net          -1065.6 ml        Physical Examination:             Constitutional:  NAD, sleeping easily awakened   HEENT: Now off BiPAP, edentulous,    Resp: R chest tube.  Lungs w/ diminished breath sounds b/l, R basilar crackles, normal work of breathing   CV:  irregularly irregular, IV/VI HSM    GI:  Soft, non-tender, normoactive bowel sounds;     Musculoskeletal:  non-edematous Neurologic: Sleeping easily awakened            Data Review:    Review and/or order of clinical lab test   US abd:  IMPRESSION:  1. Distended gallbladder with sludge and a stone with moderate wall thickening of the gallbladder wall. 2. Dilated IVC suggesting right heart failure. 3. Small amount of free fluid surrounding the liver. 4. Small right pleural effusion      Labs:     Recent Labs      04/08/17   0124  04/07/17   0405   WBC  3.8  3.3*   HGB  10.2*  9.8*   HCT  31.8*  30.0*   PLT  123*  121*     Recent Labs      04/08/17   0124  04/07/17   0405  04/06/17   0517   NA  135*  140  136   K  3.9  3.5  3.5   CL  98  102  99   CO2  31  31  30   BUN  16  15  13   CREA  0.59  0.61  0.61   GLU  205*  151*  143*   CA  10.4*  9.9  10.4*   MG  2.0   --    --    PHOS  2.5*   --    --      No results for input(s): SGOT, GPT, ALT, AP, TBIL, TBILI, TP, ALB, GLOB, GGT, AML, LPSE in the last 72 hours. No lab exists for component: AMYP, HLPSE  No results for input(s): INR, PTP, APTT in the last 72 hours. No lab exists for component: INREXT, INREXT   No results for input(s): FE, TIBC, PSAT, FERR in the last 72 hours. Lab Results   Component Value Date/Time    Folate 14.5 03/09/2017 03:23 AM      No results for input(s): PH, PCO2, PO2 in the last 72 hours. No results for input(s): CPK, CKNDX, TROIQ in the last 72 hours.     No lab exists for component: CPKMB  Lab Results   Component Value Date/Time    Cholesterol, total 138 09/28/2011 09:13 AM    HDL Cholesterol 50 09/28/2011 09:13 AM    LDL, calculated 74 09/28/2011 09:13 AM    Triglyceride 69 09/28/2011 09:13 AM     Lab Results   Component Value Date/Time    Glucose (POC) 148 04/08/2017 12:05 PM    Glucose (POC) 165 04/08/2017 06:07 AM    Glucose (POC) 129 04/08/2017 01:26 AM    Glucose (POC) 146 04/07/2017 05:21 PM    Glucose (POC) 175 04/07/2017 12:04 PM     Lab Results   Component Value Date/Time    Color RED 03/10/2017 02:30 AM    Appearance BLOODY 03/10/2017 02:30 AM    Specific gravity 1.020 03/10/2017 02:30 AM    Specific gravity 1.016 03/08/2017 08:54 PM    pH (UA) 6.0 03/10/2017 02:30 AM    Protein 100 03/10/2017 02:30 AM    Glucose NEGATIVE  03/10/2017 02:30 AM    Ketone NEGATIVE  03/10/2017 02:30 AM    Bilirubin NEGATIVE  03/10/2017 02:30 AM    Urobilinogen 1.0 03/10/2017 02:30 AM    Nitrites NEGATIVE  03/10/2017 02:30 AM    Leukocyte Esterase TRACE 03/10/2017 02:30 AM    Epithelial cells FEW 03/10/2017 02:30 AM    Bacteria NEGATIVE  03/10/2017 02:30 AM    WBC 20-50 03/10/2017 02:30 AM    RBC >100 03/10/2017 02:30 AM         Medications Reviewed:     Current Facility-Administered Medications   Medication Dose Route Frequency    bumetanide (BUMEX) injection 2 mg  2 mg IntraVENous TID    milrinone (PRIMACOR) 20 MG/100 ML D5W infusion  0.375 mcg/kg/min IntraVENous CONTINUOUS    potassium chloride (KAON 10%) 20 mEq/15 mL oral liquid 40 mEq  40 mEq Per NG tube BID WITH MEALS    pantoprazole (PROTONIX) 2 mg/mL oral suspension 40 mg  40 mg Per NG tube ACB    0.9% sodium chloride infusion  10 mL/hr IntraVENous CONTINUOUS    spironolactone (ALDACTONE) tablet 25 mg  25 mg Oral DAILY    insulin lispro (HUMALOG) injection   SubCUTAneous Q6H    apixaban (ELIQUIS) tablet 2.5 mg  2.5 mg Per NG tube BID    ELECTROLYTE REPLACEMENT PROTOCOL  1 Each Other PRN    ELECTROLYTE REPLACEMENT PROTOCOL  1 Each Other PRN    albuterol-ipratropium (DUO-NEB) 2.5 MG-0.5 MG/3 ML  3 mL Nebulization TID PRN    chlorhexidine (PERIDEX) 0.12 % mouthwash 10 mL  10 mL Oral Q12H    bisacodyl (DULCOLAX) suppository 10 mg  10 mg Rectal DAILY PRN    sodium chloride (NS) flush 5-10 mL  5-10 mL IntraVENous Multiple    phosphorus (K PHOS NEUTRAL) 250 mg tablet 1 Tab  1 Tab Oral BID    albumin human 25% (BUMINATE) solution 12.5 g  12.5 g IntraVENous Q8H PRN    docusate sodium (COLACE) capsule 100 mg  100 mg Oral BID    polyvinyl alcohol (LIQUIFILM TEARS) 1.4 % ophthalmic solution 2 Drop  2 Drop Both Eyes TID    sodium chloride (NS) flush 10 mL  10 mL InterCATHeter PRN    alteplase (CATHFLO) 1 mg in sterile water (preservative free) 1 mL injection  1 mg InterCATHeter PRN    bacitracin 500 unit/gram packet 1 Packet  1 Packet Topical PRN    glucose chewable tablet 16 g  4 Tab Oral PRN    dextrose (D50W) injection syrg 12.5-25 g  12.5-25 g IntraVENous PRN    glucagon (GLUCAGEN) injection 1 mg  1 mg IntraMUSCular PRN    sodium chloride (NS) flush 5-10 mL  5-10 mL IntraVENous PRN     ______________________________________________________________________  EXPECTED LENGTH OF STAY: 4d 21h  ACTUAL LENGTH OF STAY:          31                 Gabrielle Means MD

## 2017-04-08 NOTE — PROGRESS NOTES
Loreto Boyd MD   [facility] [address]    Office [phone]  Mobile [phone]                          Cardiology progress note    IMPRESSION:   1: Valvular heart disease, cardiomyopathy:       LVEF 25%, severe MR, moderate TR and pulm HTN, biAE       Chronic AF/flutter, BiV ICD       Systolic CHF with bilateral pleural effusions, right chest tube, ?out tomorrow  2: Pancytopenia, ?etiology  3: Mild hypercalcemia, ?etiology       RECOMMENDATIONS / PLANS   Continue current Rx.  CXR tomorrow, if no sig fluid, then D/C chest tube       Subjective:     No complaints        Objective:    Physical Exam:    Visit Vitals    /66    Pulse 67    Temp 98.7 °F (37.1 °C)    Resp 17    Ht 5' 6\" (1.676 m)    Wt 133 lb 6.1 oz (60.5 kg)    SpO2 98%    BMI 21.53 kg/m2        Appearance: frail    Cardiovascular: 5/6 systolic murmur apex    Lungs: clear but limited exam    Extremities: no edema      Laboratory and Imaging have been personally reviewed and are notable for:    EKG: Telem AFlutter, V-paced    Labs:    Recent Labs      04/08/17   0124   NA  135*   K  3.9   CL  98   BUN  16   CREA  0.59   GLU  205*   PHOS  2.5*   CA  10.4*     Recent Labs      04/08/17   0124   WBC  3.8   HGB  10.2*   HCT  31.8*   PLT  123*

## 2017-04-08 NOTE — PROGRESS NOTES
Bedside and Verbal shift change report given to Timoteo Oconnell RN (oncoming nurse) by Tiffany Arevalo RN (offgoing nurse). Report included the following information SBAR, Kardex, Intake/Output and MAR.

## 2017-04-08 NOTE — PROGRESS NOTES
Bedside and Verbal shift change report given to Piedad (oncoming nurse) by Yared Miranda (offgoing nurse). Report included the following information SBAR, Kardex, Intake/Output, MAR and Recent Results.

## 2017-04-09 NOTE — PROGRESS NOTES
Bedside shift change report given to MECHELLE Herbert (oncoming nurse) by Derick Ambriz RN (offgoing nurse). Report included the following information SBAR, Kardex, ED Summary, Procedure Summary, Intake/Output, MAR, Accordion, Recent Results, Med Rec Status and Cardiac Rhythm Paced.

## 2017-04-09 NOTE — PROGRESS NOTES
IR  Went to see patient to remove chest tube. CXR suggested mild increase fluid. I aspirated prior to planned removal.  Aspirated 420 ml clear yellow fluid. Decided to leave in place add reestablish pleurevac drainage. Nursing will restart pleurevac was suction with pleurevac at 20 cm H2O. Informed Dr. Guillermo Paula of new plans. Can re-evaluate and remove tomorrow after reassessment of total volume out.

## 2017-04-09 NOTE — PROGRESS NOTES
Judy Maurer MD   [facility] [address]    Office [phone]  Mobile [phone]                          Cardiology progress note    IMPRESSION:   1: Valvular heart disease, cardiomyopathy:  LVEF 25%, severe MR, moderate TR and pulm HTN, biAE  Chronic AF/flutter, BiV ICD  Systolic CHF with bilateral pleural effusions, right chest tube, now clamped --> minimal effusion now on Right. 2: Pancytopenia, ?etiology  3: Mild hypercalcemia, ?etiology       RECOMMENDATIONS / PLANS   Chest tube out, either by IR or myself. I/O very negative, if believable, with stable renal function and BP.        Subjective:     No complaints, but seems a bit confused        Objective:    Physical Exam:    Visit Vitals    /68 (BP 1 Location: Left arm, BP Patient Position: At rest)    Pulse 76    Temp 98.5 °F (36.9 °C)    Resp 28    Ht 5' 6\" (1.676 m)    Wt 135 lb 5.8 oz (61.4 kg)    SpO2 92%    BMI 21.85 kg/m2      Cardiovascular: 3/6 apical systolic murmur    Lungs: fairly clear but very limited exam    Extremities: no edema      Laboratory and Imaging have been personally reviewed and are notable for:    EKG: Telem AF, some V-paced, rate okay    X Ray: minimal right effusion    Labs:    Recent Labs      04/09/17   0332  04/08/17   0124   NA  136  135*   K  3.4*  3.9   CL  98  98   BUN  18  16   CREA  0.72  0.59   GLU  193*  205*   PHOS   --   2.5*   CA  10.7*  10.4*     Recent Labs      04/08/17   0124   WBC  3.8   HGB  10.2*   HCT  31.8*   PLT  123*

## 2017-04-09 NOTE — PROGRESS NOTES
Hospitalist Progress Note            Date of Service:  2017  NAME:  Negrita Plasencia  :  1930  MRN:  [id number]      Admission Summary:   77-year-old female with history of chronic systolic congestive heart failure, atrial fibrillation, diabetes mellitus, hypertension, chronic anticoagulation use, history of pacemaker and ICD. She was sent from her primary care physician to the emergency room to be evaluated. History from the patient and her daughters at the bedside, also from the records. Per the daughter, she has been having on and off abdominal pain, epigastric pain for a while, over the last 3 days has been mostly pronounced. She said her pain at times is 9/10 in severity, it is nonradiating, associated with some nausea, but no vomiting. She denies any diarrhea. At her PCP's office,   they could not get her temperature, so they sent her to the emergency room. In the ER she has been hypothermic, her temperature was 91.4 on initial presentation. Also she was hypotensive. Her blood pressure   systolic was 41/01, so she was given about 1.5 liters of IV fluid, and now she is getting 1 bottle of albumin. She was placed on warming blanket, Lizzy Hugger. She denies any dysuria or frequency. Interval history / Subjective:    She feels generally well, no dyspnea, chest pain or palpitations. She reports that she had gotten used to the chest tube being there (not causing pain) and was surprised that there were plans for possible removal of chest tube.      Assessment & Plan:         Acute on chronic respiratory failure with hypercapnia due to pleural effusion/atelectasis and acute on chronic systolic heart failure   - NYHA class IV upon admission   - she had been intubated on 3/25, subsequently extubated 3/30; now on intermittent BiPAP   - diuresis with Lasix   - s/p chest tube placement on 3/31, initially planned for removal , but CXR with increased fluid, with aspiration of 420 cc   - on milrinone    Hypothermia and hypotension: - resolved   - received empiric therapy with vancomycin and Zosyn    Bilateral Pleural effusions and significant dependent atx: likely related to CHF    Sepsis:  Unknown source, most likely lung with bilateral pleural effusions, possibly infectious.     - AMMON: no vegetations;    - blood culture 3/8 and 3/11: no growth x 5 days;    - urine culture: no growth;    - 3/21: afebrile, no leukocytosis, cultures negative   - off antibiotics. Patient completed 2 weeks of IV Abx since admission. Acute on Chronic Systolic congestive heart failure NYHA IV   - difficult situation as suspect there is a component of valvular disease contributing to her presentation (severe MR)   - Echo 3/9 EF 30%, diffuse hypokinesis. - AMMON 3/22 showed no vegetations   - not a candidate for MitraClip, possibly TA MV or transvenous-transeptal MVR, awaiting cardiac surgery input   - cautious diuresis with Bumex   - holding beta blocker / ACE inhibitor secondary to borderline BPs   - on spironolactone   - cardiology, advanced heart failure following    Hematuria: may be related to traumatic galarza placement with INR max 5.5,   - Urine noted on UA as yellow on 3/8,    - Galarza placed 3/9 and hematuria reported 3/10 UA. JUAN:  From  ATN and contrast nephropathy/Hypotension most likely etiology. - Ischemic heart disease, hypotension; and exposure to IV contrast.    - Appreciate nephro consult recommendations. - Creatinine now normalized. Hypoglycemia   - now on tube feeds    Ischemic hepatitis:    - Consulted GI. Plans for EGD when stable or outpatient.   - LFTs now normalized. - hepatology consulted     Coagulopathy.   - Had hematuria. INR was supra therapeutic on warfarin which is now stopped. - 3/21: started patient on Eliquis for stroke prophylaxis in the setting of chronic Atrial Fibrillation and [id number] score of 6. Pancytopenia. - Appears to be chronic. However, patient and family unaware of her previous lab results. Will monitor her labs closely. Left facial pressure ulcer/injury:  - stage 2; measures 1 x 0.5 x 0 cm; 100% drying pink/red;  - with linear jac = 2 x 0.5 x 0 cm (smaller);  - Hospital Acquired;    Urinary retention:  - Richardson removed 3/21, decreased urine output, bladder scan revealed 900 ml of urine in the bladder per nursing report, Richardson was reinserted; (second attempt at removing Richardson during this hospitalization). - keep the Richardson in at discharge; outpatient f/u with Urology for voiding trials once patient is more ambulatory. Anemia:  - of chronic disease with slow decline in H&H during this hospitalization. - patient transfused 2 Units of PRBC's on 3/22, Hgb fairly stable currently    Afib   - rate controlled on no medications currently   - on Eliquis    Hypernatremia   - continue to trend    Code status: FULL    Disposition: TBD      Hospital Problems  Date Reviewed: 4/8/2017          Codes Class Noted POA    * (Principal)Hypothermia ICD-10-CM: T68. XXXA  ICD-9-CM: 991.6  3/8/2017 Yes                Review of Systems:   Pertinent items are noted in HPI. Vital Signs:    Last 24hrs VS reviewed since prior progress note. Most recent are:  Visit Vitals    /71 (BP 1 Location: Left arm, BP Patient Position: At rest)    Pulse 86    Temp 97.7 °F (36.5 °C)    Resp 23    Ht 5' 6\" (1.676 m)    Wt 61.4 kg (135 lb 5.8 oz)    SpO2 99%    BMI 21.85 kg/m2         Intake/Output Summary (Last 24 hours) at 04/09/17 1523  Last data filed at 04/09/17 1200   Gross per 24 hour   Intake              750 ml   Output             2150 ml   Net            -1400 ml        Physical Examination:             Constitutional:  NAD, awake,   HEENT: Now off BiPAP, edentulous,    Resp: R chest tube.  Lungs w/ diminished breath sounds b/l, R basilar crackles, normal work of breathing   CV:  irregularly irregular, IV/VI HSM GI:  Soft, non-tender, normoactive bowel sounds;     Musculoskeletal:  non-edematous    Neurologic: Non-focal            Data Review:    Review and/or order of clinical lab test   US abd:  IMPRESSION:  1. Distended gallbladder with sludge and a stone with moderate wall thickening of the gallbladder wall. 2. Dilated IVC suggesting right heart failure. 3. Small amount of free fluid surrounding the liver. 4. Small right pleural effusion      Labs:     Recent Labs      04/08/17   0124  04/07/17   0405   WBC  3.8  3.3*   HGB  10.2*  9.8*   HCT  31.8*  30.0*   PLT  123*  121*     Recent Labs      04/09/17   0332  04/08/17   0124  04/07/17   0405   NA  136  135*  140   K  3.4*  3.9  3.5   CL  98  98  102   CO2  30  31  31   BUN  18  16  15   CREA  0.72  0.59  0.61   GLU  193*  205*  151*   CA  10.7*  10.4*  9.9   MG   --   2.0   --    PHOS   --   2.5*   --      No results for input(s): SGOT, GPT, ALT, AP, TBIL, TBILI, TP, ALB, GLOB, GGT, AML, LPSE in the last 72 hours. No lab exists for component: AMYP, HLPSE  No results for input(s): INR, PTP, APTT in the last 72 hours. No lab exists for component: INREXT, INREXT   No results for input(s): FE, TIBC, PSAT, FERR in the last 72 hours. Lab Results   Component Value Date/Time    Folate 14.5 03/09/2017 03:23 AM      No results for input(s): PH, PCO2, PO2 in the last 72 hours. No results for input(s): CPK, CKNDX, TROIQ in the last 72 hours.     No lab exists for component: CPKMB  Lab Results   Component Value Date/Time    Cholesterol, total 138 09/28/2011 09:13 AM    HDL Cholesterol 50 09/28/2011 09:13 AM    LDL, calculated 74 09/28/2011 09:13 AM    Triglyceride 69 09/28/2011 09:13 AM     Lab Results   Component Value Date/Time    Glucose (POC) 152 04/09/2017 11:41 AM    Glucose (POC) 178 04/09/2017 08:08 AM    Glucose (POC) 171 04/09/2017 06:05 AM    Glucose (POC) 165 04/08/2017 11:23 PM    Glucose (POC) 130 04/08/2017 06:01 PM     Lab Results   Component Value Date/Time    Color RED 03/10/2017 02:30 AM    Appearance BLOODY 03/10/2017 02:30 AM    Specific gravity 1.020 03/10/2017 02:30 AM    Specific gravity 1.016 03/08/2017 08:54 PM    pH (UA) 6.0 03/10/2017 02:30 AM    Protein 100 03/10/2017 02:30 AM    Glucose NEGATIVE  03/10/2017 02:30 AM    Ketone NEGATIVE  03/10/2017 02:30 AM    Bilirubin NEGATIVE  03/10/2017 02:30 AM    Urobilinogen 1.0 03/10/2017 02:30 AM    Nitrites NEGATIVE  03/10/2017 02:30 AM    Leukocyte Esterase TRACE 03/10/2017 02:30 AM    Epithelial cells FEW 03/10/2017 02:30 AM    Bacteria NEGATIVE  03/10/2017 02:30 AM    WBC 20-50 03/10/2017 02:30 AM    RBC >100 03/10/2017 02:30 AM         Medications Reviewed:     Current Facility-Administered Medications   Medication Dose Route Frequency    bumetanide (BUMEX) injection 2 mg  2 mg IntraVENous TID    milrinone (PRIMACOR) 20 MG/100 ML D5W infusion  0.375 mcg/kg/min IntraVENous CONTINUOUS    potassium chloride (KAON 10%) 20 mEq/15 mL oral liquid 40 mEq  40 mEq Per NG tube BID WITH MEALS    pantoprazole (PROTONIX) 2 mg/mL oral suspension 40 mg  40 mg Per NG tube ACB    0.9% sodium chloride infusion  10 mL/hr IntraVENous CONTINUOUS    spironolactone (ALDACTONE) tablet 25 mg  25 mg Oral DAILY    insulin lispro (HUMALOG) injection   SubCUTAneous Q6H    apixaban (ELIQUIS) tablet 2.5 mg  2.5 mg Per NG tube BID    albuterol-ipratropium (DUO-NEB) 2.5 MG-0.5 MG/3 ML  3 mL Nebulization TID PRN    chlorhexidine (PERIDEX) 0.12 % mouthwash 10 mL  10 mL Oral Q12H    bisacodyl (DULCOLAX) suppository 10 mg  10 mg Rectal DAILY PRN    sodium chloride (NS) flush 5-10 mL  5-10 mL IntraVENous Multiple    phosphorus (K PHOS NEUTRAL) 250 mg tablet 1 Tab  1 Tab Oral BID    albumin human 25% (BUMINATE) solution 12.5 g  12.5 g IntraVENous Q8H PRN    docusate sodium (COLACE) capsule 100 mg  100 mg Oral BID    polyvinyl alcohol (LIQUIFILM TEARS) 1.4 % ophthalmic solution 2 Drop  2 Drop Both Eyes TID    sodium chloride (NS) flush 10 mL  10 mL InterCATHeter PRN    alteplase (CATHFLO) 1 mg in sterile water (preservative free) 1 mL injection  1 mg InterCATHeter PRN    bacitracin 500 unit/gram packet 1 Packet  1 Packet Topical PRN    glucose chewable tablet 16 g  4 Tab Oral PRN    dextrose (D50W) injection syrg 12.5-25 g  12.5-25 g IntraVENous PRN    glucagon (GLUCAGEN) injection 1 mg  1 mg IntraMUSCular PRN    sodium chloride (NS) flush 5-10 mL  5-10 mL IntraVENous PRN     ______________________________________________________________________  EXPECTED LENGTH OF STAY: 4d 21h  ACTUAL LENGTH OF STAY:          [address] Frederick MD

## 2017-04-10 NOTE — PROGRESS NOTES
NUTRITION brief  CALORIE COUNT SUMMARY:    1. Continue current tube feeds until PO meeting at least 60% needs  2. Consider trial of appetite stimulant    3. Assist with meals and encourage PO intake with meals and supplements    CALORIE COUNT SUMMARY:      Diet Order: mechanical soft  Supplements: Ensure, Magic Cup  Tube feeds: Glucerna 1.2 @ 60ml/hr x 12hr (7a-7p) + 50ml flush q4hr    Current tube feeds continue to provide: 720ml, 865kcal, 43g protein, 730ml free water (tube feeding/flush). Meets 56% estimated energy and protein needs. Calorie Count results show pt meeting only about 35-40% energy and protein needs orally. Sleeping during visit, predict this has large impact on PO. Hypercalcemia noted: Glucerna 1.2 (tube feeds) = 576mg; Ensure Enlive = 500mg; Boost Pudding = 250mg; magic Cup = 150mg. Pt's family visited, pt sleeping during visit. They report pt liking Magic Cup and CC show pt accepting of Ensure Enlive. Food preferences discussed and dinner order placed. Will trial Boost Pudding daily + Prosource powder to mix with applesauce for added protein. Will provide and addition 270kcal, 12g protein. Discussed briefly with family if they have ever discussed with patient if long-term feeding tube would be something the pt would want. They have not discussed, encourage to bring up with pt so that pt wishes would at least be known if intake remains inadequate. Will continue to follow for PO intake. Recommend continuing current tube feeds for now. Can revisit calorie count at later date when pt consistently consuming at least 50% of meals.      Estimated Calorie Needs:1530 Kcals/day (MSJ x 1.4)   Estimated Protein Needs: Protein (g): 77 g (77-83 (1.2-1.3g/kg))     Day 1 - 4/7  Meal Calories Protein Comments   Breakfast 205 8    Lunch 265 10    Dinner 40 7    Snacks/Supplements 60 0    Total 570 35       37%  Kcal needs met 45%  Protein needs met      Day 2 - 4/8  Meal Calories Protein Comments Breakfast 30 0    Lunch 190 6    Dinner 375 22    Snacks/Supplements - -    Total 595 28       39%  Kcal needs met 36%  Protein needs met      Day 3 - 4/9  Meal Calories Protein Comments   Breakfast na na No documentation for breakfast or lunch completed.    Lunch na na    Dinner 155 2    Snacks/Supplements - -    Total 155+ 2+       10+%  Kcal needs met  3+%  Protein needs met      Morgan Jones RD

## 2017-04-10 NOTE — PROGRESS NOTES
Chart reviewed. Pt deemed appropriate to be seen by nursing staff. Administered the [facility] Frailty Scale to pt who scored 13/17, which is correlated with severe frailty. Will see pt later today for functional mobility treatment/assessment. Thank you.     Edda Wagner SPT  9 Minutes

## 2017-04-10 NOTE — PROGRESS NOTES
Hospitalist Progress Note            Date of Service:  4/10/2017  NAME:  Marcelo Anderson  :  1930  MRN:  [id number]      Admission Summary:   80-year-old female with history of chronic systolic congestive heart failure, atrial fibrillation, diabetes mellitus, hypertension, chronic anticoagulation use, history of pacemaker and ICD. She was sent from her primary care physician to the emergency room to be evaluated. History from the patient and her daughters at the bedside, also from the records. Per the daughter, she has been having on and off abdominal pain, epigastric pain for a while, over the last 3 days has been mostly pronounced. She said her pain at times is 9/10 in severity, it is nonradiating, associated with some nausea, but no vomiting. She denies any diarrhea. At her PCP's office,   they could not get her temperature, so they sent her to the emergency room. In the ER she has been hypothermic, her temperature was 91.4 on initial presentation. Also she was hypotensive. Her blood pressure   systolic was 78/44, so she was given about 1.5 liters of IV fluid, and now she is getting 1 bottle of albumin. She was placed on warming blanket, Lizzy Hugger. She denies any dysuria or frequency. Interval history / Subjective:   She has no complaints. Denies any pain or dyspnea.      Assessment & Plan:         Acute on chronic respiratory failure with hypercapnia due to pleural effusion/atelectasis and acute on chronic systolic heart failure   - NYHA class IV upon admission   - intubated on 3/25, extubated 3/30; required intermittent bipap after   - diuresis with bumex   - s/p chest tube placement on 3/31, initially planned for removal , but CXR with increased fluid, with aspiration of 420 cc   - on milrinone    Hypothermia and hypotension: - resolved   - received empiric therapy with vancomycin and Zosyn    Bilateral Pleural effusions and significant dependent atx: due to CHF    Sepsis:  Unknown source, most likely lung with bilateral pleural effusions, possibly infectious.     - AMMON: no vegetations;    - blood culture 3/8 and 3/11: no growth x 5 days;    - urine culture: no growth;    - 3/21: afebrile, no leukocytosis, cultures negative   - off antibiotics. Patient completed 2 weeks of IV Abx since admission. Acute on Chronic Systolic congestive heart failure NYHA IV   - with severe MR   - Echo 3/9 EF 30%, diffuse hypokinesis. - AMMON 3/22 showed no vegetations   - seen by CT surgery today - she is a poor candidate for any intervention   - cautious diuresis with Bumex   - holding beta blocker / ACE inhibitor secondary to borderline BPs   - on spironolactone   - cardiology, advanced heart failure following    Hematuria: may be related to traumatic galarza placement with INR max 5.5,   - Urine noted on UA as yellow on 3/8,    - Galarza placed 3/9 and hematuria reported 3/10 UA. JUAN:  From  ATN and contrast nephropathy/Hypotension most likely etiology. - Ischemic heart disease, hypotension; and exposure to IV contrast.    - Appreciate nephro consult recommendations. - Creatinine now normalized. Hypoglycemia   - now on tube feeds    Ischemic hepatitis:    - Consulted GI. Plans for EGD when stable or outpatient.   - LFTs now normalized. - hepatology consulted     Coagulopathy.   - Had hematuria. INR was supra therapeutic on warfarin which is now stopped. - 3/21: started patient on Eliquis for stroke prophylaxis in the setting of chronic Atrial Fibrillation and [id number] score of 6. Pancytopenia. - Appears to be chronic. However, patient and family unaware of her previous lab results. Will monitor her labs closely.     Left facial pressure ulcer/injury:  - stage 2; measures 1 x 0.5 x 0 cm; 100% drying pink/red;  - with linear jac = 2 x 0.5 x 0 cm (smaller);  [facility] Acquired;    Urinary retention:  - Galarza removed 3/21, decreased urine output, bladder scan revealed 900 ml of urine in the bladder per nursing report, Richardson was reinserted; (second attempt at removing Richardson during this hospitalization). - keep the Richardson in at discharge; outpatient f/u with Urology for voiding trials once patient is more ambulatory. Anemia:  - of chronic disease with slow decline in H&H during this hospitalization. - patient transfused 2 Units of PRBC's on 3/22, Hgb fairly stable currently    Afib   - rate controlled on no medications currently   - on Eliquis    Hypernatremia   - continue to trend    Code status: FULL - will need to discuss with her family    Disposition: [facility]      Hospital Problems  Date Reviewed: 4/8/2017          Codes Class Noted POA    * (Principal)Hypothermia ICD-10-CM: T68. XXXA  ICD-9-CM: 991.6  3/8/2017 Yes                Review of Systems:   Pertinent items are noted in HPI. Vital Signs:    Last 24hrs VS reviewed since prior progress note. Most recent are:  Visit Vitals    /57    Pulse 77    Temp 98.2 °F (36.8 °C)    Resp 19    Ht 5' 6\" (1.676 m)    Wt 60.2 kg (132 lb 11.5 oz)    SpO2 95%    BMI 21.42 kg/m2         Intake/Output Summary (Last 24 hours) at 04/10/17 0944  Last data filed at 04/10/17 0400   Gross per 24 hour   Intake              810 ml   Output             1125 ml   Net             -315 ml        Physical Examination:             Constitutional:  NAD, awake,   HEENT: Anicteric sclerae   Resp: R chest tube. Lungs w/ diminished breath sounds b/l, R basilar crackles, normal work of breathing   CV:  irregularly irregular, IV/VI HSM    GI:  Soft, non-tender, normoactive bowel sounds;     Musculoskeletal:  non-edematous    Neurologic: Non-focal            Data Review:    Review and/or order of clinical lab test   US abd:  IMPRESSION:  1. Distended gallbladder with sludge and a stone with moderate wall thickening of the gallbladder wall. 2. Dilated IVC suggesting right heart failure.   3. Small amount of free fluid surrounding the liver. 4. Small right pleural effusion      Labs:     Recent Labs      04/10/17   0300  04/08/17   0124   WBC  6.2  3.8   HGB  9.4*  10.2*   HCT  29.2*  31.8*   PLT  126*  123*     Recent Labs      04/10/17   0300  04/09/17   0332  04/08/17   0124   NA  139  136  135*   K  3.3*  3.4*  3.9   CL  99  98  98   CO2  30  30  31   BUN  23*  18  16   CREA  0.75  0.72  0.59   GLU  167*  193*  205*   CA  11.3*  10.7*  10.4*   MG   --    --   2.0   PHOS   --    --   2.5*     No results for input(s): SGOT, GPT, ALT, AP, TBIL, TBILI, TP, ALB, GLOB, GGT, AML, LPSE in the last 72 hours. No lab exists for component: AMYP, HLPSE  No results for input(s): INR, PTP, APTT in the last 72 hours. No lab exists for component: INREXT, INREXT   No results for input(s): FE, TIBC, PSAT, FERR in the last 72 hours. Lab Results   Component Value Date/Time    Folate 14.5 03/09/2017 03:23 AM      No results for input(s): PH, PCO2, PO2 in the last 72 hours. No results for input(s): CPK, CKNDX, TROIQ in the last 72 hours.     No lab exists for component: CPKMB  Lab Results   Component Value Date/Time    Cholesterol, total 138 09/28/2011 09:13 AM    HDL Cholesterol 50 09/28/2011 09:13 AM    LDL, calculated 74 09/28/2011 09:13 AM    Triglyceride 69 09/28/2011 09:13 AM     Lab Results   Component Value Date/Time    Glucose (POC) 181 04/10/2017 05:51 AM    Glucose (POC) 192 04/09/2017 11:20 PM    Glucose (POC) 163 04/09/2017 06:13 PM    Glucose (POC) 152 04/09/2017 11:41 AM    Glucose (POC) 178 04/09/2017 08:08 AM     Lab Results   Component Value Date/Time    Color RED 03/10/2017 02:30 AM    Appearance BLOODY 03/10/2017 02:30 AM    Specific gravity 1.020 03/10/2017 02:30 AM    Specific gravity 1.016 03/08/2017 08:54 PM    pH (UA) 6.0 03/10/2017 02:30 AM    Protein 100 03/10/2017 02:30 AM    Glucose NEGATIVE  03/10/2017 02:30 AM    Ketone NEGATIVE  03/10/2017 02:30 AM    Bilirubin NEGATIVE  03/10/2017 02:30 AM    Urobilinogen 1.0 03/10/2017 02:30 AM    Nitrites NEGATIVE  03/10/2017 02:30 AM    Leukocyte Esterase TRACE 03/10/2017 02:30 AM    Epithelial cells FEW 03/10/2017 02:30 AM    Bacteria NEGATIVE  03/10/2017 02:30 AM    WBC 20-50 03/10/2017 02:30 AM    RBC >100 03/10/2017 02:30 AM         Medications Reviewed:     Current Facility-Administered Medications   Medication Dose Route Frequency    bumetanide (BUMEX) injection 2 mg  2 mg IntraVENous TID    milrinone (PRIMACOR) 20 MG/100 ML D5W infusion  0.375 mcg/kg/min IntraVENous CONTINUOUS    potassium chloride (KAON 10%) 20 mEq/15 mL oral liquid 40 mEq  40 mEq Per NG tube BID WITH MEALS    pantoprazole (PROTONIX) 2 mg/mL oral suspension 40 mg  40 mg Per NG tube ACB    0.9% sodium chloride infusion  10 mL/hr IntraVENous CONTINUOUS    spironolactone (ALDACTONE) tablet 25 mg  25 mg Oral DAILY    insulin lispro (HUMALOG) injection   SubCUTAneous Q6H    apixaban (ELIQUIS) tablet 2.5 mg  2.5 mg Per NG tube BID    albuterol-ipratropium (DUO-NEB) 2.5 MG-0.5 MG/3 ML  3 mL Nebulization TID PRN    chlorhexidine (PERIDEX) 0.12 % mouthwash 10 mL  10 mL Oral Q12H    bisacodyl (DULCOLAX) suppository 10 mg  10 mg Rectal DAILY PRN    sodium chloride (NS) flush 5-10 mL  5-10 mL IntraVENous Multiple    phosphorus (K PHOS NEUTRAL) 250 mg tablet 1 Tab  1 Tab Oral BID    albumin human 25% (BUMINATE) solution 12.5 g  12.5 g IntraVENous Q8H PRN    docusate sodium (COLACE) capsule 100 mg  100 mg Oral BID    polyvinyl alcohol (LIQUIFILM TEARS) 1.4 % ophthalmic solution 2 Drop  2 Drop Both Eyes TID    sodium chloride (NS) flush 10 mL  10 mL InterCATHeter PRN    alteplase (CATHFLO) 1 mg in sterile water (preservative free) 1 mL injection  1 mg InterCATHeter PRN    bacitracin 500 unit/gram packet 1 Packet  1 Packet Topical PRN    glucose chewable tablet 16 g  4 Tab Oral PRN    dextrose (D50W) injection syrg 12.5-25 g  12.5-25 g IntraVENous PRN    glucagon (GLUCAGEN) injection 1 mg  1 mg IntraMUSCular PRN    sodium chloride (NS) flush 5-10 mL  5-10 mL IntraVENous PRN     ______________________________________________________________________  EXPECTED LENGTH OF STAY: 4d 21h  ACTUAL LENGTH OF STAY:          33                 Kevin Tang MD

## 2017-04-10 NOTE — PROGRESS NOTES
Bedside shift change report given to Jonel Davis RN (oncoming nurse) by Eloina Short RN (offgoing nurse). Report included the following information SBAR, Kardex, Intake/Output, MAR, Recent Results and Cardiac Rhythm Paced.

## 2017-04-10 NOTE — PROGRESS NOTES
Problem: Falls - Risk of  Goal: *Knowledge of fall prevention  Outcome: Not Progressing Towards Goal  Variance: Patient Condition  Comments: Pt nonverbal

## 2017-04-10 NOTE — PROGRESS NOTES
[facility]   Progress Note  NAME:  Esthela Allen   :   1930   MRN:   [id number]  PCP:  Ivan Escudero MD    Date:  April 10, 2017    Date of  Admission: 3/8/2017     IMPRESSION/Plan:   Acute on chronic systolic heart failure, NYHA Class IV, EF 25-30%  Tolerating milrinone 0.375 mcg/kg/min- would wean if not a surgical candidate   Bumex 2 mg IV BID  Hold BB, ACEi d/t hypotension  Continue aldactone  BNP improving  Strict I/O, daily weights  Na+ restricted diet  Surgical plan pending, she is a high risk candidate for intervention due to FTT, deconditioning, will sign off at this time. Please call if needed.       MR, Severe  [facility] following  Surgical plan TBD - not candidate for MitraClip     Acute on chronic respiratory failure  Improving, on RA  Management per pulmonary  BiPAP qHS and prn during day     Bilateral pleural effusions  Improved with diuresis  CT removal on hold yesterday due to increased drainage, IR to see. Hypernatremia- improved  NA stable       Hypokalemia  Replaced  Monitor     Sepsis, POA  ID following  Abx complete     Atrial fibrillation  Rate controlled  On Eliquis     Pancytopenia  Likely d/t hepatic congestion  ID recommends hepatology consult     DM Type II  SSIC    Deconditioning  PT/OT         Subjective:     Seen with Dr. Finn Azevedo    Pt resting in bed, difficult to arouse this afternoon. Remains on same dose of milrinone, labs stable. Currently on NC, BiPAP PRN. Still has CT in place.      Past Medical History:   Diagnosis Date    Atrial fibrillation (HCC)     Chronic systolic heart failure (HCC)     ef 35-40%; hold ACE due to lower bp    Diabetes mellitus type 2, insulin dependent ([address]     Hypertension     Long term (current) use of anticoagulants     Malignant hypertensive heart disease with CHF ([address]     Mitral regurgitation     Pacemaker 10/25/2012    The pacemaker is a [employer] , model # [id number], serial #[id number].    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, 1996    # 28 [place]uel    Thyroid disease     TR (tricuspid regurgitation)     moderate to severe echo 2009      Past Surgical History:   Procedure Laterality Date    CARDIAC SURG PROCEDURE UNLIST  1996    valve replacement    HX HEART CATHETERIZATION  10/4/2012    normal cors, LVEF 50%    HX HEART VALVE SURGERY      HX PACEMAKER      INS PPM/ICD LED DUAL ONLY  10/25/2012         INS PPM/ICD LED DUAL ONLY  9/9/2016          Family History   Problem Relation Age of Onset    Cancer Sister      breast    Heart Disease Brother     Heart Disease Brother     Diabetes Son     Stroke Son       Social History   Substance Use Topics    Smoking status: Never Smoker    Smokeless tobacco: Never Used    Alcohol use No      Current Facility-Administered Medications   Medication Dose Route Frequency    bumetanide (BUMEX) injection 2 mg  2 mg IntraVENous BID    milrinone (PRIMACOR) 20 MG/100 ML D5W infusion  0.375 mcg/kg/min IntraVENous CONTINUOUS    potassium chloride (KAON 10%) 20 mEq/15 mL oral liquid 40 mEq  40 mEq Per NG tube BID WITH MEALS    pantoprazole (PROTONIX) 2 mg/mL oral suspension 40 mg  40 mg Per NG tube ACB    0.9% sodium chloride infusion  10 mL/hr IntraVENous CONTINUOUS    spironolactone (ALDACTONE) tablet 25 mg  25 mg Oral DAILY    insulin lispro (HUMALOG) injection   SubCUTAneous Q6H    apixaban (ELIQUIS) tablet 2.5 mg  2.5 mg Per NG tube BID    albuterol-ipratropium (DUO-NEB) 2.5 MG-0.5 MG/3 ML  3 mL Nebulization TID PRN    chlorhexidine (PERIDEX) 0.12 % mouthwash 10 mL  10 mL Oral Q12H    bisacodyl (DULCOLAX) suppository 10 mg  10 mg Rectal DAILY PRN    sodium chloride (NS) flush 5-10 mL  5-10 mL IntraVENous Multiple    phosphorus (K PHOS NEUTRAL) 250 mg tablet 1 Tab  1 Tab Oral BID    albumin human 25% (BUMINATE) solution 12.5 g  12.5 g IntraVENous Q8H PRN    docusate sodium (COLACE) capsule 100 mg  100 mg Oral BID    polyvinyl alcohol (LIQUIFILM TEARS) 1.4 % ophthalmic solution 2 Drop  2 Drop Both Eyes TID    sodium chloride (NS) flush 10 mL  10 mL InterCATHeter PRN    alteplase (CATHFLO) 1 mg in sterile water (preservative free) 1 mL injection  1 mg InterCATHeter PRN    bacitracin 500 unit/gram packet 1 Packet  1 Packet Topical PRN    glucose chewable tablet 16 g  4 Tab Oral PRN    dextrose (D50W) injection syrg 12.5-25 g  12.5-25 g IntraVENous PRN    glucagon (GLUCAGEN) injection 1 mg  1 mg IntraMUSCular PRN    sodium chloride (NS) flush 5-10 mL  5-10 mL IntraVENous PRN          Objective:     Visit Vitals    /55 (BP 1 Location: Left arm, BP Patient Position: At rest)    Pulse 82    Temp 98.1 °F (36.7 °C)    Resp 22    Ht 5' 6\" (1.676 m)    Wt 132 lb 11.5 oz (60.2 kg)    SpO2 96%    BMI 21.42 kg/m2      Physical Exam  Gen:  WA, WN, lethargic, difficult to arouse. HEENT:  EOMI, FT in place  CVS:  V/VI systolic murmur, irregular, telemetry reviewed  Pul:  Bibasilar crackles.   Diminished L - c/w effusion on CXR  Abd:  Soft, NT  Ext:  (+)1 non-pitting lower extremity edema      O2 Flow Rate (L/min): 2 l/min O2 Device: Room air    Temp (24hrs), Av.6 °F (37 °C), Min:97.6 °F (36.4 °C), Max:100.8 °F (38.2 °C)      Admission Weight: Last Weight   Weight: 150 lb (68 kg) Weight: 132 lb 11.5 oz (60.2 kg)         Intake/Output Summary (Last 24 hours) at 04/10/17 1521  Last data filed at 04/10/17 1507   Gross per 24 hour   Intake              860 ml   Output             2575 ml   Net            -1715 ml       Data Review:     Lab Results   Component Value Date/Time    WBC 6.2 04/10/2017 03:00 AM    HGB 9.4 04/10/2017 03:00 AM    HCT 29.2 04/10/2017 03:00 AM    PLATELET 668  03:00 AM    MCV 71.7 04/10/2017 03:00 AM     Lab Results   Component Value Date/Time    Sodium 139 04/10/2017 03:00 AM    Potassium 3.3 04/10/2017 03:00 AM    Chloride 99 04/10/2017 03:00 AM    CO2 30 04/10/2017 03:00 AM    Anion gap 10 04/10/2017 03:00 AM    Glucose 167 04/10/2017 03:00 AM    BUN 23 04/10/2017 03:00 AM    Creatinine 0.75 04/10/2017 03:00 AM    BUN/Creatinine ratio 31 04/10/2017 03:00 AM    GFR est AA >60 04/10/2017 03:00 AM    GFR est non-AA >60 04/10/2017 03:00 AM    Calcium 11.3 04/10/2017 03:00 AM     Thank you for letting us see Ms. Mauricio Ceja with you. Read Osler, NP    [facility] Heart Failure [address]  Office: [phone]  24 hour VAD Pager: [phone]        Patient seen and examined. Data and note reviewed. I have discussed and agree with the plans as noted. 80 Year old with HFrEF, severe MR who was admitted with ADHF. She has responded to IV inotropes and IV diuretics with marked improvement in her edema. She remains frail and debilitated. Await final recommendation by Dr. Klaus Olmos for mitral valve procedure. If she is deemed too high risk, would recommend weaning of her inotropes and transition to oral diuretic regimen. We are available as needed. Thank you for allowing us to participate in your patient's care. Joycelyn Cox MD, [place] [address] Director    [address] [place] [address], [address] [place] [address], [address] [place] [address]  Office [phone]  Fax [phone]

## 2017-04-10 NOTE — INTERDISCIPLINARY ROUNDS
IDR/SLIDR Summary          Patient: Sheridan Diaz MRN: [id number]    Age: 80 y.o. YOB: 1930 Room/Bed: [facility]   Admit Diagnosis: Hypothermia  Principal Diagnosis: Hypothermia   Goals: Safety, D/c planning  Readmission: NO  Quality Measure: CHF  VTE Prophylaxis: Chemical  Influenza Vaccine screening completed? YES  Pneumococcal Vaccine screening completed? YES  Mobility needs: Yes   Nutrition plan:Yes  Consults:P.T, O.T., Speech, Respiratory and Case Management    Financial concerns:Yes  Escalated to CM? YES  RRAT Score: 25   Interventions:Palliative Care   Testing due for pt today?  NO  LOS: 33 days Expected length of stay TBD days  Discharge plan: TBD   PCP: Sandeep Huang MD  Transportation needs: Yes    Days before discharge:longer than expected LOS  Discharge disposition: TBD    Signed:     Sandra Chavez RN  4/10/2017  9:00 AM

## 2017-04-10 NOTE — PROGRESS NOTES
Pt discussed in rounds. When she is ready for d/c, she will most likely go to [place] or [facility]. Cm will follow.  REJI Dunn,ACMELLISSA-SW

## 2017-04-10 NOTE — PROGRESS NOTES
Assumed care of patient at 1600. Patient resting quietly in bed, in no obvious distress. Family at bedside identified themselves as two daughters and two grandsons.

## 2017-04-10 NOTE — PROGRESS NOTES
Cardiology Progress Note            Admit Date: 3/8/2017  Admit Diagnosis: Hypothermia  Date: 4/10/2017     Time: 8:55 AM       Assessment and Plan     1. Systolic Heart Failure, severe:  [facility] service following. LVEF 25%-30%  on TTE on 3/31/17. BNP trending was trending down 380 4/6 from 602 4/3/17. Net -265 in 24 hours out.    -Has BiV AICD   -Continue milrinone (0.375 mc/kg/min), aldactone, Bumex (2 mg IV BID) (note- pt was receiving TID but AHF recommends BID- changed to BID  per advanced heart failure team.   -ACE-I, ARB held for now due to low normal BP on milrinone. 2. Pleural effusions:     -Rt chest tube removal held yesterday as IR aspirated 420 mls yesterday. CT was replaced to suction. IR to re-evaluate for possible removal.   -Sm. Left pleural effusion not amenable to drainage- .per IR     3. MR, severe:     -Valve team on consult- Considering TAMV in ring or Transvenous-Transeptal MVR. But needs improved nutrition/conditioning.- per Dr. Lakesha Farley note today- intervention would be high risk, - he will d/w Dr. Reyna Collazo.    -Dr. Deondre Khan consulted for eval of Liver cirrhosis (for preop eval) but Dr. Deondre Khan not available until 4/13. 4. Afib:    -CHA2DS= 6 (age, CHF, HTN, female, DM). Eliquis      5. Pancytopenia:  WBC up to 6.2 from 3.8 on 4/8, Hgb 9.4 today from 10.2 on 4/8/17. Plts stable 126 from 123. .    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 Hepatology cx pending.    -Had low grade temp x 1 (100.8) at 7 PM last night. No further temps- monitor closely. 6. Hypokalemia- K= 3.3  -Repleted  7. Deconditioned:  Continue PT/OT  8. Advanced directives:  Full code status. Cardiology Attending:Patient seen and examined. I agree with NP assessment and plans. Status quo.     Cinthya Arredondo MD 4/10/2017 1:22 PM Subjective:   Chelsea Carlton Points denies SOB and chest pain. Objective:      Physical Exam:                Visit Vitals    /66 (BP 1 Location: Left arm, BP Patient Position: At rest)    Pulse 87    Temp 97.9 °F (36.6 °C)    Resp 23    Ht 5' 6\" (1.676 m)    Wt 60.2 kg (132 lb 11.5 oz)    SpO2 97%    BMI 21.42 kg/m2          General Appearance:   Well developed, weak, no acute distress   Ears/Nose/Mouth/Throat:    Hearing grossly normal.         Neck:  Supple. Chest:    Lungs diminished breath sounds bilateral lower lung files No wheeze   Cardiovascular:    Regular rate and rhythm, S1, S2 normal, grade III- III/VI systolic murmur, heard best at apex. Abdomen:    Soft, non-tender, bowel sounds are active. Extremities:  No LE edema bilaterally. Skin:  Warm and dry. Telemetry: currently 100% ventricular paced with  PVCs          Data Review:    Labs:    Recent Results (from the past 24 hour(s))   GLUCOSE, POC    Collection Time: 04/09/17  6:13 PM   Result Value Ref Range    Glucose (POC) 163 (H) 65 - 100 mg/dL    Performed by Ross Littlejohn    GLUCOSE, POC    Collection Time: 04/09/17 11:20 PM   Result Value Ref Range    Glucose (POC) 192 (H) 65 - 100 mg/dL    Performed by Travis Todd    CBC WITH AUTOMATED DIFF    Collection Time: 04/10/17  3:00 AM   Result Value Ref Range    WBC 6.2 3.6 - 11.0 K/uL    RBC 4.07 3.80 - 5.20 M/uL    HGB 9.4 (L) 11.5 - 16.0 g/dL    HCT 29.2 (L) 35.0 - 47.0 %    MCV 71.7 (L) 80.0 - 99.0 FL    MCH 23.1 (L) 26.0 - 34.0 PG    MCHC 32.2 30.0 - 36.5 g/dL    RDW 20.1 (H) 11.5 - 14.5 %    PLATELET 262 (L) 176 - 400 K/uL    NEUTROPHILS 69 32 - 75 %    LYMPHOCYTES 17 12 - 49 %    MONOCYTES 14 (H) 5 - 13 %    EOSINOPHILS 0 0 - 7 %    BASOPHILS 0 0 - 1 %    ABS. NEUTROPHILS 4.2 1.8 - 8.0 K/UL    ABS. LYMPHOCYTES 1.1 0.8 - 3.5 K/UL    ABS. MONOCYTES 0.9 0.0 - 1.0 K/UL    ABS. EOSINOPHILS 0.0 0.0 - 0.4 K/UL    ABS.  BASOPHILS 0.0 0.0 - 0.1 K/UL    DF MANUAL PLATELET COMMENTS LARGE PLATELETS      RBC COMMENTS POLYCHROMASIA  1+        RBC COMMENTS ANISOCYTOSIS  2+       METABOLIC PANEL, BASIC    Collection Time: 04/10/17  3:00 AM   Result Value Ref Range    Sodium 139 136 - 145 mmol/L    Potassium 3.3 (L) 3.5 - 5.1 mmol/L    Chloride 99 97 - 108 mmol/L    CO2 30 21 - 32 mmol/L    Anion gap 10 5 - 15 mmol/L    Glucose 167 (H) 65 - 100 mg/dL    BUN 23 (H) 6 - 20 MG/DL    Creatinine 0.75 0.55 - 1.02 MG/DL    BUN/Creatinine ratio 31 (H) 12 - 20      GFR est AA >60 >60 ml/min/1.73m2    GFR est non-AA >60 >60 ml/min/1.73m2    Calcium 11.3 (H) 8.5 - 10.1 MG/DL   NUCLEATED RBC    Collection Time: 04/10/17  3:00 AM   Result Value Ref Range    NRBC 0.6 (H) 0  WBC    ABSOLUTE NRBC 0.04 (H) 0.00 - 0.01 K/uL    WBC CORRECTED FOR NR ADJUSTED FOR NUCLEATED RBC'S     GLUCOSE, POC    Collection Time: 04/10/17  5:51 AM   Result Value Ref Range    Glucose (POC) 181 (H) 65 - 100 mg/dL    Performed by Dory DIAZ    GLUCOSE, POC    Collection Time: 04/10/17 11:54 AM   Result Value Ref Range    Glucose (POC) 203 (H) 65 - 100 mg/dL    Performed by William Vo           Radiology:        Current Facility-Administered Medications   Medication Dose Route Frequency    bumetanide (BUMEX) injection 2 mg  2 mg IntraVENous BID    milrinone (PRIMACOR) 20 MG/100 ML D5W infusion  0.375 mcg/kg/min IntraVENous CONTINUOUS    potassium chloride (KAON 10%) 20 mEq/15 mL oral liquid 40 mEq  40 mEq Per NG tube BID WITH MEALS    pantoprazole (PROTONIX) 2 mg/mL oral suspension 40 mg  40 mg Per NG tube ACB    0.9% sodium chloride infusion  10 mL/hr IntraVENous CONTINUOUS    spironolactone (ALDACTONE) tablet 25 mg  25 mg Oral DAILY    insulin lispro (HUMALOG) injection   SubCUTAneous Q6H    apixaban (ELIQUIS) tablet 2.5 mg  2.5 mg Per NG tube BID    albuterol-ipratropium (DUO-NEB) 2.5 MG-0.5 MG/3 ML  3 mL Nebulization TID PRN    chlorhexidine (PERIDEX) 0.12 % mouthwash 10 mL  10 mL Oral Q12H  bisacodyl (DULCOLAX) suppository 10 mg  10 mg Rectal DAILY PRN    sodium chloride (NS) flush 5-10 mL  5-10 mL IntraVENous Multiple    phosphorus (K PHOS NEUTRAL) 250 mg tablet 1 Tab  1 Tab Oral BID    albumin human 25% (BUMINATE) solution 12.5 g  12.5 g IntraVENous Q8H PRN    docusate sodium (COLACE) capsule 100 mg  100 mg Oral BID    polyvinyl alcohol (LIQUIFILM TEARS) 1.4 % ophthalmic solution 2 Drop  2 Drop Both Eyes TID    sodium chloride (NS) flush 10 mL  10 mL InterCATHeter PRN    alteplase (CATHFLO) 1 mg in sterile water (preservative free) 1 mL injection  1 mg InterCATHeter PRN    bacitracin 500 unit/gram packet 1 Packet  1 Packet Topical PRN    glucose chewable tablet 16 g  4 Tab Oral PRN    dextrose (D50W) injection syrg 12.5-25 g  12.5-25 g IntraVENous PRN    glucagon (GLUCAGEN) injection 1 mg  1 mg IntraMUSCular PRN    sodium chloride (NS) flush 5-10 mL  5-10 mL IntraVENous PRN          Yajaira Wilkinson NP     [facility], [address] [facility] [address]   [facility]   [phone]

## 2017-04-10 NOTE — PROGRESS NOTES
Chart reviewed. Pt deemed appropriate to be seen by nursing staff. Pt received supine in bed, sleeping, daughter at bedside. Attempted to rouse pt with verbal cuing, elevating HOB, and sternal rub. Pt opened eyes, and stated multiple times \"I want you all to leave me be. \" Pt offered encouragement to participate with PT and educated on the benefits of mobility, but pt adamant that she does not wish to work with PT at the moment. PT will continue to follow and will attempt to see pt tomorrow morning as able and appropriate. Thank you. Shefali Chauhan.  Lasha Crum, SPT

## 2017-04-10 NOTE — PROGRESS NOTES
Bedside shift change report given to MECHELLE Morales (oncoming nurse) by Barbara Pond RN (offgoing nurse). Report included the following information SBAR, Kardex, ED Summary, Procedure Summary, Intake/Output, MAR, Accordion, Med Rec Status and Cardiac Rhythm Paced; Underlying A fib.

## 2017-04-10 NOTE — CONSULTS
CSS   History and Physical    Subjective: Information obtained from patient's chart as patient is non communicative. Kaushik Lara is a 80 y.o. female who was referred for cardiac evaluation from Dr. Ty Boss [address] [place] [address] for severe mitral valve regurgitation. Her past medical history is significant for mitral valve repair in 1996, s/p [employer] pacemaker in 9139, chronic systolic CHF stage III/IV, Hx a fib on coumadin, DM, and pancytopenia. Patient was initially admitted on 3-8-17 with a chief complaint of epigastric abdominal pain accompanied by fatigue and generalized weakness. This had occurred for 3 days before coming to the ED and had been going on for the past 3-4 months. She had seen her PCP that day and was found to be hypotensive, hypoglycemic, and hypothermic in their office and was sent to the emergency department. Initially started on vanc and zosyn empirically for assumed sepsis (completed 2 week course) and started on pressors for hypotension. Source of sepsis not found (potentially PNA), possible AV vegetation on initial ehco but this was not seen on formal AMMON. Blood cultures negative to date. CT showed massive fluid overload with ascites and b/l pleural effusions. Went into respiratory failure and was intubated from 3-11-17 to 3-13-17 and again from 3-25-17 to 3-30-17. Chest tube placed for pleural effusion on right on 3-31-17. She is on and off BiPAP for hypercarbia. Patient has had JUAN likely related to hypotension since she has been here, but creatinine now normalized. On diuretic therapy. Liver enzymes were also elevated, but have now normalized. GI consulted and found distended gallbladder with stones and sludge. They are not intervening for now as patient is too ill. Rate controlled a fib while here and now on eliquis from coumadin therapy. Attempted to speak with patient today, but patient not very communicative. Sitting in bed eating with the help of aide.   Per physical therapy notes, patient is a 2 person maximal assist to sit on edge of bed and cannot stand upright. [place] Frail Scale performed by PT with score of 14/17, which is correlated with severe frailty. Cardiac Testing    Cardiac catheterization (2012):    CORONARY CIRCULATION: Left main: Normal. The vessel was large sized. LAD:  Normal. The vessel was large sized. 1st diagonal: Normal. 1st septal:  Normal. Circumflex: Normal. The vessel was large sized (co-dominant). 1st  obtuse marginal: Normal. 2nd obtuse marginal: Normal. 3rd obtuse marginal:  Normal. RCA: Normal. The vessel was large sized. Right PDA: Normal. The  vessel was medium sized (co-dominant). RV marginal 1: Normal.    ECHO 3-20-17:  LEFT VENTRICLE: Size was normal. Systolic function was moderately reduced. Ejection fraction was estimated to be 35 %. There was moderate diffuse  hypokinesis. Wall thickness was normal.    RIGHT VENTRICLE: The ventricle was mildly dilated. Systolic function was  normal. Wall thickness was normal.    LEFT ATRIUM: The atrium was moderately dilated. No thrombus was  identified. APPENDAGE: The appendage was markedly dilated. No thrombus was  identified. DOPPLER: The function was normal (normal emptying velocity). ATRIAL SEPTUM: No defect or patent foramen ovale was identified. There was  no evidence of intracardiac shunt by peripherally-administered agitated  saline contrast.    RIGHT ATRIUM: No thrombus was identified. MITRAL VALVE: There was severely restricted mobility of the posterior  leaflet. Prior repair procedures: surgical repair No obvious mass,  vegetation or thrombus noted. DOPPLER: There was no evidence for stenosis. There was mild to moderate regurgitation. The regurgitant jet was  eccentric and directed anteriorly. AORTIC VALVE: The valve was trileaflet. Leaflets exhibited normal  thickness and normal cuspal separation. No obvious mass, vegetation or  thrombus noted. DOPPLER: Transaortic velocity was within the normal range. There was no stenosis. There was trivial regurgitation. TRICUSPID VALVE: Normal valve structure. There was normal leaflet  separation. No obvious mass, vegetation or thrombus noted. DOPPLER: There  was mild regurgitation. PULMONIC VALVE: Leaflets exhibited normal thickness, no calcification, and  normal cuspal separation. No obvious mass, vegetation or thrombus noted. DOPPLER: The transpulmonic velocity was within the normal range. There was  no regurgitation. AORTA: The root exhibited normal size. There was no atheroma. There was no  evidence for dissection. There was no evidence for aneurysm. SYSTEMIC VEINS: IVC: The inferior vena cava was normal in size. PERICARDIUM: There was no pericardial effusion. CTA abd/pelvis 3-8-17  FINDINGS:   The heart is enlarged. There is a small left pleural effusion a mild to  moderate-sized right pleural effusion. Patient prior cardiac surgery, a  pacemaker is also in place. There is anasarca. There is a mild-to-moderate amount of ascites. Liver and  spleen appear normal in size. There is no free air. The pancreas appears  unremarkable. The gallbladder is somewhat distended and there are some  gallstones. Kidneys show no definite obstruction. There is no definite bowel  wall thickening or obstruction. The uterus appears unremarkable. The bladder is  Midline. CT Head  There are scattered areas of hypodensity in the cerebral white matter. There is  sulcal and ventricular prominence. . Remote tiny basal ganglia infarction on the  right. . There is no intracranial hemorrhage, extra-axial collection, mass, mass  effect or midline shift. The basilar cisterns are open. No acute infarct is  identified. The bone windows demonstrate no abnormalities. The visualized  portions of the paranasal sinuses and mastoid air cells are clear.     CTA Chest  Trachea is displaced to the right but not narrowed, by large intrathoracic  goiter.     Mitral valve annuloplasty ring is noted. ICD and sternal suture wires are  present. There is no pericardial effusion. There is left atrial enlargement. No  aortic aneurysm.     There are moderate to large size bilateral pleural effusions, on the left  appearing partly loculated, with bilateral associated compressive atelectasis. There is a small patch of nonspecific airspace disease in the anterior right  upper lung. There is no pulmonary edema. Visualized upper abdomen is  unremarkable. Past Medical History:   Diagnosis Date    Atrial fibrillation (HCC)     Chronic systolic heart failure (HCC)     ef 35-40%; hold ACE due to lower bp    Diabetes mellitus type 2, insulin dependent ([address]     Hypertension     Long term (current) use of anticoagulants     Malignant hypertensive heart disease with CHF ([address]     Mitral regurgitation     Pacemaker 10/25/2012    The pacemaker is a [employer], model # [id number], serial [id number]. 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, 1996    # 28 [place]    Thyroid disease     TR (tricuspid regurgitation)     moderate to severe echo 2009     Past Surgical History:   Procedure Laterality Date    CARDIAC SURG PROCEDURE UNLIST  1996    valve replacement    HX HEART CATHETERIZATION  10/4/2012    normal cors, LVEF 50%    HX HEART VALVE SURGERY      HX PACEMAKER      INS PPM/ICD LED DUAL ONLY  10/25/2012         INS PPM/ICD LED DUAL ONLY  9/9/2016           Social History   Substance Use Topics    Smoking status: Never Smoker    Smokeless tobacco: Never Used    Alcohol use No      Family History   Problem Relation Age of Onset    Cancer Sister      breast    Heart Disease Brother     Heart Disease Brother     Diabetes Son     Stroke Son      Prior to Admission medications    Medication Sig Start Date End Date Taking?  Authorizing Provider   KLOR-CON M20 20 mEq tablet TAKE 2 TABS BY MOUTH TWO (2) TIMES A DAY. 2/15/17  Yes Ame Nicholson MD   furosemide (LASIX) 80 mg tablet TAKE 1 TAB BY MOUTH TWO (2) TIMES A DAY. 1/10/17  Yes Ame Nicholson MD   enalapril (VASOTEC) 10 mg tablet Take 5 mg by mouth daily. Yes Historical Provider   carvedilol (COREG) 25 mg tablet TAKE 1 TAB BY MOUTH TWO (2) TIMES DAILY (WITH MEALS). 12/1/16  Yes Gabrielle Benjamin MD   warfarin (COUMADIN) 5 mg tablet Take half tablet or 2.5 on Tues and Thursdays, whole tab or 5 mg on all other days or as directed by Coumadin clinic 9/1/15  Yes Gabrielle Benjamin MD   insulin glargine (LANTUS SOLOSTAR) 100 unit/mL (3 mL) pen 22 Units by SubCUTAneous route daily. Yes Historical Provider   acetaminophen (TYLENOL) 325 mg tablet Take  by mouth every four (4) hours as needed. Yes Historical Provider       No Known Allergies    Never smoker and does not drink alcohol    2 brothers with heart disease. Review of Systems:   Consititutional: Denies fever or chills. Eyes:  Denies use of glasses or vision problems(cataracts). ENT:  Denies hearing or swallowing difficulty. CV: Denies CP, claudication, HTN. Resp: + dyspnea, -productive cough. : Denies dialysis or kidney problems. GI: Denies ulcers, esophageal strictures, + liver problems. M/S: Denies joint or bone problems, or implanted artificial hardware. Skin: Denies varicose veins, edema. Neuro: Denies strokes, + TIAs. Psych: Denies anxiety or depression. Endocrine: + thyroid problems or diabetes. Heme/Lymphatic: Denies easy bruising or lymphedema. Objective:     VS:   Visit Vitals    /57    Pulse 77    Temp 98.2 °F (36.8 °C)    Resp 19    Ht 5' 6\" (1.676 m)    Wt 132 lb 11.5 oz (60.2 kg)    SpO2 95%    BMI 21.42 kg/m2     Physical Exam:    General appearance: frail, in bed eating with help of aide  Head: normocephalic, without obvious abnormality; atraumatic  Eyes: conjunctivae/corneas clear  Nose: nares normal; no drainage.   Neck: no carotid bruit and no JVD  Lungs: Diminished bases  Heart: irregular rate and rhythm; + murmur  Abdomen: soft, non-tender; bowel sounds normal  Extremities: moves all extremities, very weak  Skin: Skin color normal; No varicose veins or edema. Neurologic: Unable to assess    Labs:   Recent Labs      04/10/17   0551  04/10/17   0300   WBC   --   6.2   HGB   --   9.4*   HCT   --   29.2*   PLT   --   126*   NA   --   139   K   --   3.3*   BUN   --   23*   CREA   --   0.75   GLU   --   167*   GLUCPOC  181*   --        Assessment:     Principal Problem:    Hypothermia (3/8/2017)        Plan:     STS Risk Calculator V2.81 - Discussed by surgeon with patient. Procedure: MV Replacement Only     Risk of Mortality: 35.559%   Morbidity or Mortality: 81.455%   Long Length of Stay: 63.975%   Short Length of Stay: 0.877%   Permanent Stroke: 4.063%   Prolonged Ventilation: 76.437%   DSW Infection: 0.569%   Renal Failure: 38.366%   Reoperation: 30.681%     Treatment Plan:    1. Severe Mitral Regurgitation s/p MVr 1996:  Patient is prohibitive surgical risk. Further plan per Margret Solo/Dandy   2. Acute on chronic systolic CHF class IV: On diuretic. No BB or ACEi for hypotension. Now on Milrinone IV as well. 3. Hx [employer] pacemaker: Stable  4. B/L Pleural effusions: Diuresing. Right sided chest tube in place. 5. A fib: On eliquis. Rate controlled without medications. 6. Hx Sepsis this admission: Completed 2 weeks of empiric vanc/zosyn. No clear source, but likely pulmonary. 7. Acute on chronic respiratory failure: Intubated 3-11-17 to 3-13-17 and again 3-25-17 to 3-30-17. Now on intermittent BiPAP for hypercarbia. 8. JUAN during this admission: Cr has normalized. On diuresis. 9. Elevated liver enzymes this admission: Now normal.  GI following. 10. Anemia of chronic disease: Transfusion 3-22-17. Has been stable since  11. Thrombocytopenia: Has been in 80s since admission  12. Hx hypothyroidism: Not on replacement currently  13. DM: On insulin  14.  Nutrition: On TFs and soft mechanical diet      Signed By: MADELAINE Navarrete     April 10, 2017      Consult reviewed and confirmed   Pt examined  See note in chart  She is not a candidate for intervention

## 2017-04-10 NOTE — PROGRESS NOTES
Family at bedside concerned that the patient is not tolerating the mechanical soft diet and asked if she could try \"pureed food\" tomorrow. Diet modified to pureed.

## 2017-04-10 NOTE — PROGRESS NOTES
Bedside shift change report given to Christopher Myers RN (oncoming nurse) by Salud Gonzalez (offgoing nurse). Report included the following information SBAR, Intake/Output, MAR, Recent Results and Cardiac Rhythm Paced.

## 2017-04-11 NOTE — PROGRESS NOTES
Problem: Mobility Impaired (Adult and Pediatric)  Goal: *Acute Goals and Plan of Care (Insert Text)  Physical Therapy Goals    Goals evaluated, and new goals formulated/ downgraded 4/5/2017  1. Patient will move from supine to sit and sit to supine, scoot up and down and roll side to side in bed with maximal assistance within 7 day(s). 2. Patient will participate in lower extremity therapeutic exercise/activities (in supine & sitting) with minimal assistance and set-up within 7 day(s). 3. Patient will maintain static sitting balance at EOB with no UE support for >/= 15 sec and with supervision within 7 day(s). 4. Patient will perform sit to stand x 2 consecutive repetitions with maximal assistance within 7 day(s). 5. Patient will transfer from bed to chair and chair to bed with maximal assistance x 2 using the least restrictive device within 7 day(s). Revisited 3/24/2017, goals remain appropriate, carry over  Initiated 3/16/2017  1. Patient will move from supine to sit and sit to supine, scoot up and down and roll side to side in bed with minimal assistance within 7 day(s). 2. Patient will transfer from bed to chair and chair to bed with moderate assistance using the least restrictive device within 7 day(s). 3. Patient will perform sit to stand with moderate assistance within 7 day(s). 4. Patient will ambulate with moderate assistance for 10 feet with the least restrictive device within 7 day(s). PHYSICAL THERAPY TREATMENT  Patient: Chase Al (27 y.o. female)  Date: 4/11/2017  Diagnosis: Hypothermia Hypothermia       Precautions: Fall      ASSESSMENT:  Chart reviewed. Pt cleared to be seen by nursing staff. Pt received supine in bed, reporting she wanted to sit in the chair. Pt required maxA x2 to transfer supine to sit, but demonstrating improved mobility in RLE, requiring only Ozzy with RLE, maxA with LLE.  Pt strongly retropulsive  And rigid in sitting, reporting she is afraid of falling. Pt able to maintain sitting balance for short intervals without physical assistance, but generally requiring maxA due to strong posterior pushing. Attempted to stand, but pt requiring total assistance, pulling with arms, but otherwise strongly retropulsive and requiring total assistance to safely return to supine. While in standing, pt had BM, pt ends session with nurse and tech present performing hygiene. Continue to recommend discharge to SNF. PT will continue to follow and progress mobility as tolerated by pt. Progression toward goals:  -       Improving appropriately and progressing toward goals  -       Improving slowly and progressing toward goalsh  -       Not making progress toward goals and plan of care will be adjusted       PLAN:  Patient continues to benefit from skilled intervention to address the above impairments. Continue treatment per established plan of care. Discharge Recommendations:  Gera Parr  Further Equipment Recommendations for Discharge:  TBD       SUBJECTIVE:   Patient stated You see that chair? I want to sit in it.       OBJECTIVE DATA SUMMARY:   Critical Behavior:  Neurologic State: Alert  Orientation Level: Oriented to person (\"hospital\")  Cognition: Decreased command following, Decreased attention/concentration  Safety/Judgement: Decreased awareness of environment, Decreased insight into deficits  Functional Mobility Training:  Bed Mobility:  Supine to Sit: Maximum assistance;Assist x2  Sit to Supine: Total assistance  Transfers:  Sit to Stand: Total assistance  Stand to Sit: Total assistance  Balance:  Sitting: Impaired  Sitting - Static: Fair (occasional)  Sitting - Dynamic: Fair (occasional)  Standing: Impaired  Standing - Static: Poor;Constant support  Pain:  Pain Scale 1: Numeric (0 - 10)  Pain Intensity 1: 0  Activity Tolerance:   Pt frail and extremely deconditioned. Please refer to the flowsheet for vital signs taken during this treatment.   After treatment:   [ ] Patient left in no apparent distress sitting up in chair  [X] Patient left in no apparent distress in bed  [X] Call bell left within reach  [X] Nursing notified  [X] Caregiver present  [ ] Bed alarm activated      COMMUNICATION/EDUCATION:   The patients plan of care was discussed with: Registered Nurse. [ ]  Fall prevention education was provided and the patient/caregiver indicated understanding. [ ]  Patient/family have participated as able in goal setting and plan of care. [X]  Patient/family agree to work toward stated goals and plan of care. [ ]  Patient understands intent and goals of therapy, but is neutral about his/her participation. [ ]  Patient is unable to participate in goal setting and plan of care. Thank you for this referral.  Mark Richard SPT   Time Calculation: 26 mins  Regarding student involvement in patient care:  A student participated in this treatment session. Per CMS Medicare statements and APTA guidelines I certify that the following was true:  1. I was present and directly observed the entire session. 2. I made all skilled judgments and clinical decisions regarding care. 3. I am the practitioner responsible for assessment, treatment, and documentation.

## 2017-04-11 NOTE — PROGRESS NOTES
TN to bedside to assist primary RN with family question - family requesting the phone number to reach Dr Gregg Shay.  TN (the writer of this note) provided family with the phone number [phone] and directed the family to ask for the Adult Hospitalist Department.

## 2017-04-11 NOTE — INTERDISCIPLINARY ROUNDS
IDR/SLIDR Summary          Patient: Xin Harding MRN: [id number]    Age: 80 y.o. YOB: 1930 Room/Bed: [facility]   Admit Diagnosis: Hypothermia  Principal Diagnosis: Hypothermia   Goals: Safety, D/c planning  Readmission: NO  Quality Measure: CHF  VTE Prophylaxis: Chemical  Influenza Vaccine screening completed? YES  Pneumococcal Vaccine screening completed? YES  Mobility needs: Yes   Nutrition plan:Yes  Consults:P.T, O.T., Speech, Respiratory and Case Management    Financial concerns:Yes  Escalated to CM? YES  RRAT Score: 25   Interventions:Palliative Care   Testing due for pt today?  NO  LOS: 34 days Expected length of stay TBD days  Discharge plan: TBD   PCP: Breann Vela MD  Transportation needs: Yes    Days before discharge:longer than expected LOS  Discharge disposition: TBD    Signed:     Clover Patricio RN  4/11/2017  9:00 AM

## 2017-04-11 NOTE — PROGRESS NOTES
Bedside shift change report given to Pavan MIN (oncoming nurse) by [address] (offgoing nurse). Report included the following information SBAR, Intake/Output, MAR and Recent Results.

## 2017-04-11 NOTE — PROGRESS NOTES
Problem: Self Care Deficits Care Plan (Adult)  Goal: *Acute Goals and Plan of Care (Insert Text)  Occupational Therapy Goals:  Goals reviewed and continued 4/11/17  Continue Goals at re evaluation 4/3/17: All goals downgraded 3/24/17:  1. Patient will perform grooming in supported sitting with supervision/set-up within 7 day(s). 2. Patient will perform UB bathing with minimal assistance/contact guard assist within 7 day(s). 3. Patient will perform self feeding consistently with setup/adpative tools PRN within 7 day(s). 4. Patient will perform toilet transfers with maximal assistance within 7 day(s). 5. Patient will perform all aspects of toileting with maximal assistance within 7 day(s). 6. Patient will participate in upper extremity therapeutic exercise/activities with minimal assistance/contact guard assist for 10 minutes within 7 day(s). 7. Patient will utilize energy conservation techniques during functional activities with verbal cues within 7 day(s). Initiated 3/16/2017  1. Patient will perform grooming seated EOB for 5 minutes with supervision/set-up within 7 day(s). 2. Patient will perform UB bathing with minimal assistance/contact guard assist within 7 day(s). 3. Patient will perform lower body dressing with moderate assistance within 7 day(s). 4. Patient will perform toilet transfers with moderate assistance within 7 day(s). 5. Patient will perform all aspects of toileting with moderate assistance within 7 day(s). 6. Patient will participate in upper extremity therapeutic exercise/activities with minimal assistance/contact guard assist for 10 minutes within 7 day(s). 7. Patient will utilize energy conservation techniques during functional activities with verbal cues within 7 day(s).        OCCUPATIONAL THERAPY TREATMENT: WEEKLY REASSESSMENT  Patient: Santiago Carter (47 y.o. female)  Date: 4/11/2017  Diagnosis: Hypothermia Hypothermia       Precautions: Fall  Chart, occupational therapy assessment, plan of care, and goals were reviewed. ASSESSMENT:  Cleared by nurse. Received in bed. Oriented to person and \"hospital\". Participated in AAROM bilateral UEs and grooming with mod assist overall. Performance impacted by impaired strength, endurance, mobility, and cognition. No goals met. Continue present goals. Recommend SNF. Recommend next visit: upper body bathing in supported sitting   Progression toward goals:  [ ]          Improving appropriately and progressing toward goals  [X]          Improving slowly and progressing toward goals  [ ]          Not making progress toward goals and plan of care will be adjusted       PLAN:  Goals have been updated based on progression since last assessment. Patient continues to benefit from skilled intervention to address the above impairments. Continue to follow patient 3 times a week to address goals. Planned Interventions:  [X]                  Self Care Training                  [X]           Therapeutic Activities  [X]                  Functional Mobility Training    [X]           Cognitive Retraining  [X]                  Therapeutic Exercises           [X]           Endurance Activities  [X]                  Balance Training                   [ ]           Neuromuscular Re-Education  [ ]                  Visual/Perceptual Training     [ ]      Home Safety Training  [X]                  Patient Education                 [ ]           Family Training/Education  [ ]                  Other (comment):  Discharge Recommendations: Skilled Nursing Facility  Further Equipment Recommendations for Discharge: none       SUBJECTIVE:   Patient stated yes.  when asked if she is right handed      OBJECTIVE DATA SUMMARY:   Cognitive/Behavioral Status:  Neurologic State: Alert  Orientation Level: Oriented to person (\"hospital\")  Cognition: Decreased command following;Decreased attention/concentration  Perception: Appears intact  Perseveration: No perseveration noted  Safety/Judgement: Decreased awareness of environment;Decreased insight into deficits  Functional Mobility and Transfers for ADLs:                 ADL Intervention:        Grooming  Washing Face: Moderate assistance  Washing Hands: Moderate assistance           Lower Body Bathing  Bathing Assistance: Total assistance(dependent) (inferred from mobility)           Lower Body Dressing Assistance  Dressing Assistance: Total assistance(dependent) (inferred from mobiliity)           Cognitive Retraining  Orientation Retraining: Reorienting;Place;Time  Organizing/Sequencing: Breaking task down  Attention to Task: Distractibility  Following Commands: Follows one step commands/directions (with extra cues and assist to initiate)  Safety/Judgement: Decreased awareness of environment;Decreased insight into deficits  Cues: Tactile cues provided;Verbal cues provided;Visual cues provided              Activity Tolerance:    fair  Please refer to the flowsheet for vital signs taken during this treatment.   After treatment:   [ ] Patient left in no apparent distress sitting up in chair  [X] Patient left in no apparent distress in bed  [X] Call bell left within reach  [X] Nursing notified  [ ] Caregiver present  [ ] Bed alarm activated      COMMUNICATION/COLLABORATION:   The patients plan of care was discussed with: Occupational Therapist and Registered Nurse     LYNNE Parry  Time Calculation: 13 mins

## 2017-04-11 NOTE — PROGRESS NOTES
Bedside and Verbal shift change report given to Smith Schultz RN (oncoming nurse) by Vicenta Arriola RN (offgoing nurse). Report included the following information SBAR, Kardex, Intake/Output, MAR, Accordion, Recent Results and Cardiac Rhythm NSR.

## 2017-04-11 NOTE — PROGRESS NOTES
Hospitalist Progress Note            Date of Service:  2017  NAME:  Meng Nunez  :  1930  MRN:  [id number]      Admission Summary:   54-year-old female with history of chronic systolic congestive heart failure, atrial fibrillation, diabetes mellitus, hypertension, chronic anticoagulation use, history of pacemaker and ICD. She was sent from her primary care physician to the emergency room to be evaluated. History from the patient and her daughters at the bedside, also from the records. Per the daughter, she has been having on and off abdominal pain, epigastric pain for a while, over the last 3 days has been mostly pronounced. She said her pain at times is 9/10 in severity, it is nonradiating, associated with some nausea, but no vomiting. She denies any diarrhea. At her PCP's office,   they could not get her temperature, so they sent her to the emergency room. In the ER she has been hypothermic, her temperature was 91.4 on initial presentation. Also she was hypotensive. Her blood pressure   systolic was 26/49, so she was given about 1.5 liters of IV fluid, and now she is getting 1 bottle of albumin. She was placed on warming blanket, Lizzy Hugger. She denies any dysuria or frequency. Interval history / Subjective:   She has no complaints. Denies any pain or dyspnea.      Assessment & Plan:         Acute on chronic respiratory failure with hypercapnia due to pleural effusion/atelectasis and acute on chronic systolic heart failure   - NYHA class IV upon admission   - intubated on 3/25, extubated 3/30; required intermittent bipap after   - diuresis with bumex   - s/p chest tube placement on 3/31, initially planned for removal , but CXR with increased fluid   - remains on milrinone    Sepsis:  Hypothermia/hypotension, Unknown source, most likely lung with bilateral pleural effusions, possibly infectious.     - AMMON: no vegetations;    - blood culture 3/8 and 3/11: no growth x 5 days;    - urine culture: no growth;    - 3/21: afebrile, no leukocytosis, cultures negative   - off antibiotics. Patient completed 2 weeks of IV Abx since admission. Acute on Chronic Systolic congestive heart failure NYHA IV   - with severe MR   - Echo 3/9 EF 30%, diffuse hypokinesis. - AMMON 3/22 showed no vegetations   - seen by CT surgery - she is a poor candidate for any intervention, high risk, rec palliative care   - diuresis with Bumex   - holding beta blocker / ACE inhibitor secondary to borderline BPs   - on spironolactone   - cardiology, advanced heart failure following    Hematuria: may be related to traumatic galarza placement with INR max 5.5,   - Urine noted on UA as yellow on 3/8,    - Galarza placed 3/9 and hematuria reported 3/10 UA. JUAN:  From  ATN and contrast nephropathy/Hypotension most likely etiology. - Ischemic heart disease, hypotension; and exposure to IV contrast.    - Appreciate nephro consult recommendations. - Creatinine now normalized. Hypoglycemia   - now on tube feeds    Elevated transaminases: due to ischemia vs hepatic congestion    - Consulted GI. Plans for EGD when stable or outpatient, but this likely won't be achieved at this point   - LFTs normalized. Coagulopathy.   - Had hematuria. INR was supra therapeutic on warfarin which is now stopped. - 3/21: started patient on Eliquis for stroke prophylaxis in the setting of chronic Atrial Fibrillation and [id number] score of 6. Pancytopenia. - Appears to be chronic. However, patient and family unaware of her previous lab results. Will monitor her labs closely.     Left facial pressure ulcer/injury:  - stage 2; measures 1 x 0.5 x 0 cm; 100% drying pink/red;  - with linear jac = 2 x 0.5 x 0 cm (smaller);  - Hospital Acquired;    Urinary retention:  - Galarza removed 3/21, decreased urine output, bladder scan revealed 900 ml of urine in the bladder per nursing report, Richardson was reinserted; (second attempt at removing Richardson during this hospitalization). - keep the Richardson in at discharge; outpatient f/u with Urology for voiding trials once patient is more ambulatory. Anemia:  - of chronic disease with slow decline in H&H during this hospitalization. - patient transfused 2 Units of PRBC's on 3/22, Hgb fairly stable currently    Afib   - rate controlled on no medications currently   - on Eliquis    Hypernatremia   - continue to trend    Code status: FULL - will need to discuss with her family. Attempted calling daughter Amy Juarez today, left VM    Disposition: TBD      Hospital Problems  Date Reviewed: 4/8/2017          Codes Class Noted POA    * (Principal)Hypothermia ICD-10-CM: T68. XXXA  ICD-9-CM: 991.6  3/8/2017 Yes                Review of Systems:   Pertinent items are noted in HPI. Vital Signs:    Last 24hrs VS reviewed since prior progress note. Most recent are:  Visit Vitals    /61    Pulse 91    Temp 98.6 °F (37 °C)    Resp 23    Ht 5' 6\" (1.676 m)    Wt 60.6 kg (133 lb 9.6 oz)    SpO2 100%    BMI 21.56 kg/m2         Intake/Output Summary (Last 24 hours) at 04/11/17 1040  Last data filed at 04/11/17 0800   Gross per 24 hour   Intake             1820 ml   Output             2295 ml   Net             -475 ml        Physical Examination:             Constitutional:  NAD, awake,   HEENT: Anicteric sclerae   Resp: R chest tube.  Lungs w/ diminished breath sounds b/l, R basilar crackles, normal work of breathing   CV:  irregularly irregular, IV/VI HSM    GI:  Soft, non-tender, normoactive bowel sounds;     Musculoskeletal:  non-edematous    Neurologic: Non-focal            Data Review:   Labs reivewed    Labs:     Recent Labs      04/10/17   0300   WBC  6.2   HGB  9.4*   HCT  29.2*   PLT  126*     Recent Labs      04/11/17   0309  04/10/17   0300  04/09/17   0332   NA  140  139  136   K  3.9  3.3*  3.4*   CL  102  99  98   CO2  33*  30  30   BUN  26*  23* 18   CREA  0.73  0.75  0.72   GLU  167*  167*  193*   CA  10.8*  11.3*  10.7*   MG  2.5*   --    --      No results for input(s): SGOT, GPT, ALT, AP, TBIL, TBILI, TP, ALB, GLOB, GGT, AML, LPSE in the last 72 hours. No lab exists for component: AMYP, HLPSE  No results for input(s): INR, PTP, APTT in the last 72 hours. No lab exists for component: INREXT, INREXT   No results for input(s): FE, TIBC, PSAT, FERR in the last 72 hours. Lab Results   Component Value Date/Time    Folate 14.5 03/09/2017 03:23 AM      No results for input(s): PH, PCO2, PO2 in the last 72 hours. No results for input(s): CPK, CKNDX, TROIQ in the last 72 hours.     No lab exists for component: CPKMB  Lab Results   Component Value Date/Time    Cholesterol, total 138 09/28/2011 09:13 AM    HDL Cholesterol 50 09/28/2011 09:13 AM    LDL, calculated 74 09/28/2011 09:13 AM    Triglyceride 69 09/28/2011 09:13 AM     Lab Results   Component Value Date/Time    Glucose (POC) 187 04/11/2017 06:23 AM    Glucose (POC) 170 04/10/2017 11:44 PM    Glucose (POC) 148 04/10/2017 04:59 PM    Glucose (POC) 203 04/10/2017 11:54 AM    Glucose (POC) 181 04/10/2017 05:51 AM     Lab Results   Component Value Date/Time    Color RED 03/10/2017 02:30 AM    Appearance BLOODY 03/10/2017 02:30 AM    Specific gravity 1.020 03/10/2017 02:30 AM    Specific gravity 1.016 03/08/2017 08:54 PM    pH (UA) 6.0 03/10/2017 02:30 AM    Protein 100 03/10/2017 02:30 AM    Glucose NEGATIVE  03/10/2017 02:30 AM    Ketone NEGATIVE  03/10/2017 02:30 AM    Bilirubin NEGATIVE  03/10/2017 02:30 AM    Urobilinogen 1.0 03/10/2017 02:30 AM    Nitrites NEGATIVE  03/10/2017 02:30 AM    Leukocyte Esterase TRACE 03/10/2017 02:30 AM    Epithelial cells FEW 03/10/2017 02:30 AM    Bacteria NEGATIVE  03/10/2017 02:30 AM    WBC 20-50 03/10/2017 02:30 AM    RBC >100 03/10/2017 02:30 AM         Medications Reviewed:     Current Facility-Administered Medications   Medication Dose Route Frequency    bumetanide (BUMEX) injection 2 mg  2 mg IntraVENous BID    milrinone (PRIMACOR) 20 MG/100 ML D5W infusion  0.375 mcg/kg/min IntraVENous CONTINUOUS    potassium chloride (KAON 10%) 20 mEq/15 mL oral liquid 40 mEq  40 mEq Per NG tube BID WITH MEALS    pantoprazole (PROTONIX) 2 mg/mL oral suspension 40 mg  40 mg Per NG tube ACB    0.9% sodium chloride infusion  10 mL/hr IntraVENous CONTINUOUS    spironolactone (ALDACTONE) tablet 25 mg  25 mg Oral DAILY    insulin lispro (HUMALOG) injection   SubCUTAneous Q6H    apixaban (ELIQUIS) tablet 2.5 mg  2.5 mg Per NG tube BID    albuterol-ipratropium (DUO-NEB) 2.5 MG-0.5 MG/3 ML  3 mL Nebulization TID PRN    chlorhexidine (PERIDEX) 0.12 % mouthwash 10 mL  10 mL Oral Q12H    bisacodyl (DULCOLAX) suppository 10 mg  10 mg Rectal DAILY PRN    sodium chloride (NS) flush 5-10 mL  5-10 mL IntraVENous Multiple    phosphorus (K PHOS NEUTRAL) 250 mg tablet 1 Tab  1 Tab Oral BID    albumin human 25% (BUMINATE) solution 12.5 g  12.5 g IntraVENous Q8H PRN    docusate sodium (COLACE) capsule 100 mg  100 mg Oral BID    polyvinyl alcohol (LIQUIFILM TEARS) 1.4 % ophthalmic solution 2 Drop  2 Drop Both Eyes TID    sodium chloride (NS) flush 10 mL  10 mL InterCATHeter PRN    alteplase (CATHFLO) 1 mg in sterile water (preservative free) 1 mL injection  1 mg InterCATHeter PRN    bacitracin 500 unit/gram packet 1 Packet  1 Packet Topical PRN    glucose chewable tablet 16 g  4 Tab Oral PRN    dextrose (D50W) injection syrg 12.5-25 g  12.5-25 g IntraVENous PRN    glucagon (GLUCAGEN) injection 1 mg  1 mg IntraMUSCular PRN    sodium chloride (NS) flush 5-10 mL  5-10 mL IntraVENous PRN     ______________________________________________________________________  EXPECTED LENGTH OF STAY: 4d 21h  ACTUAL LENGTH OF STAY:          34                 Clarence Conte MD

## 2017-04-11 NOTE — PROGRESS NOTES
Cardiology Progress Note            Admit Date: 3/8/2017  Admit Diagnosis: Hypothermia  Date: 4/11/2017     Time: 8:55 AM       Assessment and Plan     1. Systolic Heart Failure, severe:   LVEF 25%-30%  on TTE on 3/31/17. BNP trending was trending down 380 4/6 from 602 4/3/17. Net -475 in 24 hours out.    -Advanced Heart Failure service signed off.- recommends weaning milrinone if not surgical candidate   -Has BiV AICD   -Will start to wean milrinone (to 0.2 mcg/kg/min)per AHF recommendations,  Continue aldactone, Bumex (2 mg IV BID)    -ACE-I, ARB held for now due to normal BP on milrinone. 2. Pleural effusions:     -Rt chest tube removal held 4/9 as IR aspirated 420 mls 4/9. CT output 30 mls in 24 hours. CT was replaced to suction. Recommend that IR re-evaluate for possible removal. CXR unchanged.    -Sm. Left pleural effusion not amenable to drainage- .per IR     3. MR, severe:     -Valve team on consult- Was considering TAMV in ring or Transvenous-Transeptal MVR- however, Dr. Ellen Ngo, Dr. Deng Steward and the Advanced Heart Failure team feel patient is too high risk for any intervention (transcatheter or surgical). Recommending palliative care at this point. CT surgery services signing off. Cardiology agrees with other CT surg/valve/AHF teams. 4. Afib:    -CHA2DS= 6 (age, CHF, HTN, female, DM). Eliquis      5. Pancytopenia:  WBC up to 6.2 yesterday from 3.8 on 4/8, Hgb 9.4 on 4/8  from 10.2 on 4/8/17. Plts stable 126 from 123. .    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 Hepatology cx pending.    -No further low grade temps    6.. Deconditioned:  Continue PT/OT  7. Advanced directives:  Full code status. Recommend Palliative care reconsult and discuss with family as pt not a surgical/valve procedural candidate. Cardiology Attending:Patient seen and examined. I agree with NP assessment and plans. Not a candidate for mitral repair, will stop milrinone, suggest Palliative revisit care options. Bhavana Fitch MD 4/11/2017 3:12 PM       Subjective:   Jyothi Winston denies SOB and chest pain. Objective:      Physical Exam:                Visit Vitals    /72 (BP 1 Location: Left arm, BP Patient Position: At rest)    Pulse 96    Temp 97.5 °F (36.4 °C)    Resp 22    Ht 5' 6\" (1.676 m)    Wt 60.6 kg (133 lb 9.6 oz)    SpO2 99%    BMI 21.56 kg/m2          General Appearance:   Well developed, weak, no acute distress   Ears/Nose/Mouth/Throat:    Hearing grossly normal.         Neck:  Supple. Chest:    Lungs diminished breath sounds bilateral lower lung files No wheeze   Cardiovascular:    Regular rate and rhythm, S1, S2 normal, grade III-IV/VI systolic murmur, heard best at apex. Abdomen:    Soft, mildly distended, non-tender, bowel sounds are active. Extremities:  Left ankle edema only, otherwise no LE edema   Skin:  Warm and dry.      Telemetry: currently 100% ventricular paced with  PVCs          Data Review:    Labs:    Recent Results (from the past 24 hour(s))   GLUCOSE, POC    Collection Time: 04/10/17  4:59 PM   Result Value Ref Range    Glucose (POC) 148 (H) 65 - 100 mg/dL    Performed by Aruna Lundberg    GLUCOSE, POC    Collection Time: 04/10/17 11:44 PM   Result Value Ref Range    Glucose (POC) 170 (H) 65 - 100 mg/dL    Performed by Gena Quinterocher    METABOLIC PANEL, BASIC    Collection Time: 04/11/17  3:09 AM   Result Value Ref Range    Sodium 140 136 - 145 mmol/L    Potassium 3.9 3.5 - 5.1 mmol/L    Chloride 102 97 - 108 mmol/L    CO2 33 (H) 21 - 32 mmol/L    Anion gap 5 5 - 15 mmol/L    Glucose 167 (H) 65 - 100 mg/dL    BUN 26 (H) 6 - 20 MG/DL    Creatinine 0.73 0.55 - 1.02 MG/DL    BUN/Creatinine ratio 36 (H) 12 - 20      GFR est AA >60 >60 ml/min/1.73m2    GFR est non-AA >60 >60 ml/min/1.73m2    Calcium 10.8 (H) 8.5 - 10.1 MG/DL MAGNESIUM    Collection Time: 04/11/17  3:09 AM   Result Value Ref Range    Magnesium 2.5 (H) 1.6 - 2.4 mg/dL   GLUCOSE, POC    Collection Time: 04/11/17  6:23 AM   Result Value Ref Range    Glucose (POC) 187 (H) 65 - 100 mg/dL    Performed by Garth Barros    GLUCOSE, POC    Collection Time: 04/11/17 11:52 AM   Result Value Ref Range    Glucose (POC) 179 (H) 65 - 100 mg/dL    Performed by Christiano Giles           Radiology:        Current Facility-Administered Medications   Medication Dose Route Frequency    bumetanide (BUMEX) injection 2 mg  2 mg IntraVENous BID    milrinone (PRIMACOR) 20 MG/100 ML D5W infusion  0.375 mcg/kg/min IntraVENous CONTINUOUS    potassium chloride (KAON 10%) 20 mEq/15 mL oral liquid 40 mEq  40 mEq Per NG tube BID WITH MEALS    pantoprazole (PROTONIX) 2 mg/mL oral suspension 40 mg  40 mg Per NG tube ACB    0.9% sodium chloride infusion  10 mL/hr IntraVENous CONTINUOUS    spironolactone (ALDACTONE) tablet 25 mg  25 mg Oral DAILY    insulin lispro (HUMALOG) injection   SubCUTAneous Q6H    apixaban (ELIQUIS) tablet 2.5 mg  2.5 mg Per NG tube BID    albuterol-ipratropium (DUO-NEB) 2.5 MG-0.5 MG/3 ML  3 mL Nebulization TID PRN    chlorhexidine (PERIDEX) 0.12 % mouthwash 10 mL  10 mL Oral Q12H    bisacodyl (DULCOLAX) suppository 10 mg  10 mg Rectal DAILY PRN    sodium chloride (NS) flush 5-10 mL  5-10 mL IntraVENous Multiple    phosphorus (K PHOS NEUTRAL) 250 mg tablet 1 Tab  1 Tab Oral BID    albumin human 25% (BUMINATE) solution 12.5 g  12.5 g IntraVENous Q8H PRN    docusate sodium (COLACE) capsule 100 mg  100 mg Oral BID    polyvinyl alcohol (LIQUIFILM TEARS) 1.4 % ophthalmic solution 2 Drop  2 Drop Both Eyes TID    sodium chloride (NS) flush 10 mL  10 mL InterCATHeter PRN    alteplase (CATHFLO) 1 mg in sterile water (preservative free) 1 mL injection  1 mg InterCATHeter PRN    bacitracin 500 unit/gram packet 1 Packet  1 Packet Topical PRN    glucose chewable tablet 16 g  4 Tab Oral PRN    dextrose (D50W) injection syrg 12.5-25 g  12.5-25 g IntraVENous PRN    glucagon (GLUCAGEN) injection 1 mg  1 mg IntraMUSCular PRN    sodium chloride (NS) flush 5-10 mL  5-10 mL IntraVENous PRN          Babatunde Wilkinson NP     [facility] [address] [place] [address], [address] [place] [address],[address]   [facility], [address] [place] [address]   [phone]

## 2017-04-11 NOTE — PROGRESS NOTES
Cardiac Surgery Update:    After further discussion with Dr. Rex Romeo, Dr. Eleuterio Rogers and the Advanced Heart Failure team, patient is too high risk for any intervention (transcatheter or surgical). Recommending palliative care at this point. Will sign off, thank you.

## 2017-04-12 NOTE — PROGRESS NOTES
04/12/17 1900   Vitals   Temp 97.5 °F (36.4 °C)   Temp Source Axillary   Pulse (Heart Rate) (!) 141   Heart Rate Source Monitor   Resp Rate 24   O2 Sat (%) 99 %   Level of Consciousness Alert   /85   MAP (Calculated) 98   BP 1 Location Left arm   BP 1 Method Automatic   BP Patient Position At rest   Cardiac Rhythm A Fib   MEWS Score 5   Alarms Set and Audible Cardiac alarms   Box Number 664   Electrodes Replaced No   Patient in afib. Dr. Renate Hassan made aware and new orders received. Patient resting in bed with family at bedside in no distress. Cardiology and palliative following.

## 2017-04-12 NOTE — PROGRESS NOTES
Care manager called daughter Yari Morris and spoke with her at great length. [facility] She also has the phone for the hospitalist .  Daughter has the phone number for the Palliative Care. She states she understands that her mom is failing, however, she can't speak for other family members. Yari Morris will now follow up with the Palliative care team and will consider sitting down and discuss the situation. I relayed this information to Henri Palacio who was updated.

## 2017-04-12 NOTE — PROGRESS NOTES
1835: Patient noted with elevated HR in 130's 140's. Patient now in AFib. Dr. Jennie Hernandez paged and notified. New orders received. 1907: Cardizem given per order. 1925: Patient back to being AV paced 70's 80s. Resting quietly in bed with family at bedside. No complaints. Bedside shift change report given to Lorna W Mary Gambino (oncoming nurse) by Jamila Duarte (offgoing nurse). Report included the following information SBAR, Intake/Output, MAR, Recent Results and Cardiac Rhythm Paced.

## 2017-04-12 NOTE — INTERDISCIPLINARY ROUNDS
IDR/SLIDR Summary          Patient: Keith Lopez MRN: [id number]    Age: 80 y.o. YOB: 1930 Room/Bed: [facility]   Admit Diagnosis: Hypothermia  Principal Diagnosis: Hypothermia   Goals: Safety, D/c planning  Readmission: NO  Quality Measure: CHF  VTE Prophylaxis: Chemical  Influenza Vaccine screening completed? YES  Pneumococcal Vaccine screening completed? YES  Mobility needs: Yes   Nutrition plan:Yes  Consults:P.T, O.T., Speech, Respiratory and Case Management    Financial concerns:Yes  Escalated to CM? YES  RRAT Score: 25   Interventions:Palliative Care   Testing due for pt today?  NO  LOS: 35 days Expected length of stay TBD days  Discharge plan: TBD   PCP: Leonard Melendez MD  Transportation needs: Yes    Days before discharge:longer than expected LOS  Discharge disposition: TBD    Signed:     Andrew Goodman RN  4/12/2017  9:00 AM

## 2017-04-12 NOTE — PROGRESS NOTES
Hospitalist Progress Note            Date of Service:  2017  NAME:  Carito Tapia  :  1930  MRN:  [id number]      Admission Summary:   49-year-old female with history of chronic systolic congestive heart failure, atrial fibrillation, diabetes mellitus, hypertension, chronic anticoagulation use, history of pacemaker and ICD. She was sent from her primary care physician to the emergency room to be evaluated. History from the patient and her daughters at the bedside, also from the records. Per the daughter, she has been having on and off abdominal pain, epigastric pain for a while, over the last 3 days has been mostly pronounced. She said her pain at times is 9/10 in severity, it is nonradiating, associated with some nausea, but no vomiting. She denies any diarrhea. At her PCP's office,   they could not get her temperature, so they sent her to the emergency room. In the ER she has been hypothermic, her temperature was 91.4 on initial presentation. Also she was hypotensive. Her blood pressure   systolic was 23/27, so she was given about 1.5 liters of IV fluid, and now she is getting 1 bottle of albumin. She was placed on warming blanket, Lizzy Hugger. She denies any dysuria or frequency. Interval history / Subjective:   She has no complaints. Denies any pain or dyspnea. Not eating much. Assessment & Plan:         Acute on chronic respiratory failure with hypercapnia due to pleural effusion/atelectasis and acute on chronic systolic heart failure   - NYHA class IV upon admission   - intubated on 3/25, extubated 3/30; required intermittent bipap after   - diuresis with bumex - reduce dose to 1 mg IV BID as she's developing mild azotemia   - s/p chest tube placement on 3/31, initially planned for removal , but CXR with increased fluid.  Last CXR w/ stable small effusion; will ask IR to eval for removal   - remains on milrinone    Sepsis: Hypothermia/hypotension, Unknown source, most likely lung with bilateral pleural effusions, possibly infectious.     - AMMON: no vegetations;    - blood culture 3/8 and 3/11: no growth x 5 days;    - urine culture: no growth;    - 3/21: afebrile, no leukocytosis, cultures negative   - off antibiotics. Patient completed 2 weeks of IV Abx since admission. Acute on Chronic Systolic congestive heart failure NYHA IV   - with severe MR   - Echo 3/9 EF 30%, diffuse hypokinesis. - AMMON 3/22 showed no vegetations   - seen by CT surgery - she is a poor candidate for any intervention, high risk, rec palliative care   - diuresis with Bumex   - holding beta blocker / ACE inhibitor secondary to borderline BPs   - on spironolactone   - cardiology, advanced heart failure following    Hematuria: may be related to traumatic galarza placement with INR max 5.5,   - Urine noted on UA as yellow on 3/8,    - Galarza placed 3/9 and hematuria reported 3/10 UA. JUAN:  From  ATN and contrast nephropathy/Hypotension most likely etiology. - Ischemic heart disease, hypotension; and exposure to IV contrast.    - Appreciate nephro consult recommendations. - Creatinine now normalized. Hypoglycemia   - now on tube feeds    Elevated transaminases: due to ischemia vs hepatic congestion    - Consulted GI. Plans for EGD when stable or outpatient, but this likely won't be achieved at this point   - LFTs normalized. Coagulopathy.   - Had hematuria. INR was supra therapeutic on warfarin which is now stopped. - 3/21: started patient on Eliquis for stroke prophylaxis in the setting of chronic Atrial Fibrillation and [id number] score of 6. Pancytopenia. - Appears to be chronic. However, patient and family unaware of her previous lab results. Will monitor her labs closely.     Left facial pressure ulcer/injury:  - stage 2; measures 1 x 0.5 x 0 cm; 100% drying pink/red;  - with linear jac = 2 x 0.5 x 0 cm (smaller);  [facility] Acquired;    Urinary retention:  - Richardson removed 3/21, decreased urine output, bladder scan revealed 900 ml of urine in the bladder per nursing report, Richardson was reinserted; (second attempt at removing Richardson during this hospitalization). - keep the Richardson in at discharge; outpatient f/u with Urology for voiding trials once patient is more ambulatory. Anemia:  - of chronic disease with slow decline in H&H during this hospitalization. - patient transfused 2 Units of PRBC's on 3/22, Hgb fairly stable currently    Afib   - rate controlled on no medications currently   - on Eliquis    Hypernatremia   - continue to trend    Code status: FULL - discussed w/ Daughter Kathie Noble at length today over the phone, she recognizes her mother's poor prognosis, agrees with transition to comfort, but would like her siblings to be on the same page. I called her sister Kevin at her request but received voicemail. She is also open to seeing/talking to the palliative care team.    Disposition: D      Hospital Problems  Date Reviewed: 4/8/2017          Codes Class Noted POA    * (Principal)Hypothermia ICD-10-CM: T68. XXXA  ICD-9-CM: 991.6  3/8/2017 Yes                Review of Systems:   Pertinent items are noted in HPI. Vital Signs:    Last 24hrs VS reviewed since prior progress note. Most recent are:  Visit Vitals    /67    Pulse 85    Temp 97.4 °F (36.3 °C)    Resp 27    Ht 5' 6\" (1.676 m)    Wt 58.6 kg (129 lb 3 oz)    SpO2 98%    BMI 20.85 kg/m2         Intake/Output Summary (Last 24 hours) at 04/12/17 0951  Last data filed at 04/12/17 0400   Gross per 24 hour   Intake             1490 ml   Output              718 ml   Net              772 ml        Physical Examination:             Constitutional:  NAD, awake,   HEENT: Anicteric sclerae   Resp: R chest tube.  Lungs w/ diminished breath sounds b/l, R basilar crackles, normal work of breathing   CV:  irregularly irregular, IV/VI HSM    GI:  Soft, non-tender, normoactive bowel sounds;     Musculoskeletal:  non-edematous    Neurologic: Non-focal            Data Review:   Labs reivewed    Labs:     Recent Labs      04/10/17   0300   WBC  6.2   HGB  9.4*   HCT  29.2*   PLT  126*     Recent Labs      04/12/17   0303  04/11/17   0309  04/10/17   0300   NA  143  140  139   K  4.1  3.9  3.3*   CL  103  102  99   CO2  33*  33*  30   BUN  35*  26*  23*   CREA  0.94  0.73  0.75   GLU  181*  167*  167*   CA  12.4*  10.8*  11.3*   MG   --   2.5*   --      No results for input(s): SGOT, GPT, ALT, AP, TBIL, TBILI, TP, ALB, GLOB, GGT, AML, LPSE in the last 72 hours. No lab exists for component: AMYP, HLPSE  No results for input(s): INR, PTP, APTT in the last 72 hours. No lab exists for component: INREXT, INREXT   No results for input(s): FE, TIBC, PSAT, FERR in the last 72 hours. Lab Results   Component Value Date/Time    Folate 14.5 03/09/2017 03:23 AM      No results for input(s): PH, PCO2, PO2 in the last 72 hours. No results for input(s): CPK, CKNDX, TROIQ in the last 72 hours.     No lab exists for component: CPKMB  Lab Results   Component Value Date/Time    Cholesterol, total 138 09/28/2011 09:13 AM    HDL Cholesterol 50 09/28/2011 09:13 AM    LDL, calculated 74 09/28/2011 09:13 AM    Triglyceride 69 09/28/2011 09:13 AM     Lab Results   Component Value Date/Time    Glucose (POC) 196 04/12/2017 06:24 AM    Glucose (POC) 170 04/11/2017 04:42 PM    Glucose (POC) 179 04/11/2017 11:52 AM    Glucose (POC) 187 04/11/2017 06:23 AM    Glucose (POC) 170 04/10/2017 11:44 PM     Lab Results   Component Value Date/Time    Color RED 03/10/2017 02:30 AM    Appearance BLOODY 03/10/2017 02:30 AM    Specific gravity 1.020 03/10/2017 02:30 AM    Specific gravity 1.016 03/08/2017 08:54 PM    pH (UA) 6.0 03/10/2017 02:30 AM    Protein 100 03/10/2017 02:30 AM    Glucose NEGATIVE  03/10/2017 02:30 AM    Ketone NEGATIVE  03/10/2017 02:30 AM    Bilirubin NEGATIVE  03/10/2017 02:30 AM Urobilinogen 1.0 03/10/2017 02:30 AM    Nitrites NEGATIVE  03/10/2017 02:30 AM    Leukocyte Esterase TRACE 03/10/2017 02:30 AM    Epithelial cells FEW 03/10/2017 02:30 AM    Bacteria NEGATIVE  03/10/2017 02:30 AM    WBC 20-50 03/10/2017 02:30 AM    RBC >100 03/10/2017 02:30 AM         Medications Reviewed:     Current Facility-Administered Medications   Medication Dose Route Frequency    bumetanide (BUMEX) injection 2 mg  2 mg IntraVENous BID    milrinone (PRIMACOR) 20 MG/100 ML D5W infusion  0.2 mcg/kg/min IntraVENous CONTINUOUS    potassium chloride (KAON 10%) 20 mEq/15 mL oral liquid 40 mEq  40 mEq Per NG tube BID WITH MEALS    pantoprazole (PROTONIX) 2 mg/mL oral suspension 40 mg  40 mg Per NG tube ACB    0.9% sodium chloride infusion  10 mL/hr IntraVENous CONTINUOUS    spironolactone (ALDACTONE) tablet 25 mg  25 mg Oral DAILY    insulin lispro (HUMALOG) injection   SubCUTAneous Q6H    apixaban (ELIQUIS) tablet 2.5 mg  2.5 mg Per NG tube BID    albuterol-ipratropium (DUO-NEB) 2.5 MG-0.5 MG/3 ML  3 mL Nebulization TID PRN    chlorhexidine (PERIDEX) 0.12 % mouthwash 10 mL  10 mL Oral Q12H    bisacodyl (DULCOLAX) suppository 10 mg  10 mg Rectal DAILY PRN    sodium chloride (NS) flush 5-10 mL  5-10 mL IntraVENous Multiple    phosphorus (K PHOS NEUTRAL) 250 mg tablet 1 Tab  1 Tab Oral BID    albumin human 25% (BUMINATE) solution 12.5 g  12.5 g IntraVENous Q8H PRN    docusate sodium (COLACE) capsule 100 mg  100 mg Oral BID    polyvinyl alcohol (LIQUIFILM TEARS) 1.4 % ophthalmic solution 2 Drop  2 Drop Both Eyes TID    sodium chloride (NS) flush 10 mL  10 mL InterCATHeter PRN    alteplase (CATHFLO) 1 mg in sterile water (preservative free) 1 mL injection  1 mg InterCATHeter PRN    bacitracin 500 unit/gram packet 1 Packet  1 Packet Topical PRN    glucose chewable tablet 16 g  4 Tab Oral PRN    dextrose (D50W) injection syrg 12.5-25 g  12.5-25 g IntraVENous PRN    glucagon (GLUCAGEN) injection 1 mg 1 mg IntraMUSCular PRN    sodium chloride (NS) flush 5-10 mL  5-10 mL IntraVENous PRN     ______________________________________________________________________  EXPECTED LENGTH OF STAY: 4d 21h  ACTUAL LENGTH OF STAY:          35                 Víctor Mckenna MD

## 2017-04-12 NOTE — PROGRESS NOTES
Bedside and Verbal shift change report given to Alesha Crouch RN (oncoming nurse) by Trina Connor RN (offgoing nurse). Report included the following information SBAR, Kardex, Intake/Output, MAR, Accordion, Recent Results and Cardiac Rhythm paced.

## 2017-04-12 NOTE — PROGRESS NOTES
Cardiology Progress Note            Admit Date: 3/8/2017  Admit Diagnosis: Hypothermia  Date: 4/12/2017     Time: 8:55 AM       Assessment and Plan     1. Systolic Heart Failure, severe:   LVEF 25%-30%  on TTE on 3/31/17.  today from 380 on 4/6       -Advanced Heart Failure service signed off.- recommended weaning off milrinone. Milrinone DC'd. .   -Advanced Heart Failure recommended Bumex 2 mg IV BID - dose was reduced to 1 mg IV BID today due to mild azotemia per primary team. Consider increase to 1 mg TID.   -Has BiV AICD   -Continue aldactone, Continue Bumex- will increase dosing to 1 mg TID. -ACE-I, ARB held for now due to normal BP, consider resuming if BP remains stable off milrinone   -Will check BNP tomorrow. 2. Pleural effusions:     -IR to evaluate today if Rt CT can be removed.    -Sm. Left pleural effusion not amenable to drainage- .per IR     3. MR, severe:     -Per valve team, CT surgery, AHF- pt is too  too high risk for any intervention (transcatheter or surgical). Recommending palliative care at this point. CT surgery services signed off. Cardiology agrees with other CT surg/valve/AHF teams. 4. Afib: currently afib with ventricular pacing noted- intermittent tachycardia. .     -CHA2DS= 6 (age, CHF, HTN, female, DM). Eliquis 2.5 mg BID     5. Pancytopenia:  WBC up to 6.2 on 4/10 from 3.8 on 4/8, Hgb 9.4 on 4/10  from 10.2 on 4/8/17. Plts stable 126 from 123 on 4/10 . No temp   -Chronic leukopenia Since 2011- likely due to cirrhosis with underlying benign ethnic neutropenia (per ID cx). Hepatology cx pending. 6.. Deconditioned:  Continue PT/OT  7. Advanced directives:  Full code status. Recommend Palliative care reconsult and discuss with family as pt not a surgical/valve procedural candidate. Fercho Hamlin. IRLANDA Wilkinson 4/12/2017 3:12 PM   Cardiology Attending:Patient seen and examined.   I agree with NP assessment and plans. Off milrinone. Krysten Shearer MD 4/12/2017 2:10 PM           Subjective:   Keith Lopez denies SOB and chest pain. Objective:      Physical Exam:                Visit Vitals    /67    Pulse 85    Temp 97.4 °F (36.3 °C)    Resp 27    Ht 5' 6\" (1.676 m)    Wt 58.6 kg (129 lb 3 oz)    SpO2 98%    BMI 20.85 kg/m2          General Appearance:   Well developed, weak, no acute distress   Ears/Nose/Mouth/Throat:    Hearing grossly normal. DHT in Left nare         Neck:  Supple. Chest:    Lungs diminished breath sounds bilateral lower lung files No wheeze   Cardiovascular:    Irregular  rate and rhythm, S1, S2 normal, grade IV/VI systolic murmur, heard best at apex. Abdomen:    Soft, mildly distended, non-tender, bowel sounds are present. Extremities:  Left ankle edema only, otherwise no LE edema   Skin:  Warm and dry.      Telemetry: currently 100% ventricular paced with  PVCs          Data Review:    Labs:    Recent Results (from the past 24 hour(s))   GLUCOSE, POC    Collection Time: 04/11/17 11:52 AM   Result Value Ref Range    Glucose (POC) 179 (H) 65 - 100 mg/dL    Performed by Rudy Mendosa    GLUCOSE, POC    Collection Time: 04/11/17  4:42 PM   Result Value Ref Range    Glucose (POC) 170 (H) 65 - 100 mg/dL    Performed by Juve Chadwick    METABOLIC PANEL, BASIC    Collection Time: 04/12/17  3:03 AM   Result Value Ref Range    Sodium 143 136 - 145 mmol/L    Potassium 4.1 3.5 - 5.1 mmol/L    Chloride 103 97 - 108 mmol/L    CO2 33 (H) 21 - 32 mmol/L    Anion gap 7 5 - 15 mmol/L    Glucose 181 (H) 65 - 100 mg/dL    BUN 35 (H) 6 - 20 MG/DL    Creatinine 0.94 0.55 - 1.02 MG/DL    BUN/Creatinine ratio 37 (H) 12 - 20      GFR est AA >60 >60 ml/min/1.73m2    GFR est non-AA 56 (L) >60 ml/min/1.73m2    Calcium 12.4 (H) 8.5 - 10.1 MG/DL   BNP    Collection Time: 04/12/17  3:03 AM   Result Value Ref Range     (H) 0 - 100 pg/mL   GLUCOSE, POC    Collection Time: 04/12/17  6:24 AM   Result Value Ref Range    Glucose (POC) 196 (H) 65 - 100 mg/dL    Performed by [facility]           Radiology:        Current Facility-Administered Medications   Medication Dose Route Frequency    bumetanide (BUMEX) injection 2 mg  2 mg IntraVENous BID    milrinone (PRIMACOR) 20 MG/100 ML D5W infusion  0.2 mcg/kg/min IntraVENous CONTINUOUS    potassium chloride (KAON 10%) 20 mEq/15 mL oral liquid 40 mEq  40 mEq Per NG tube BID WITH MEALS    pantoprazole (PROTONIX) 2 mg/mL oral suspension 40 mg  40 mg Per NG tube ACB    0.9% sodium chloride infusion  10 mL/hr IntraVENous CONTINUOUS    spironolactone (ALDACTONE) tablet 25 mg  25 mg Oral DAILY    insulin lispro (HUMALOG) injection   SubCUTAneous Q6H    apixaban (ELIQUIS) tablet 2.5 mg  2.5 mg Per NG tube BID    albuterol-ipratropium (DUO-NEB) 2.5 MG-0.5 MG/3 ML  3 mL Nebulization TID PRN    chlorhexidine (PERIDEX) 0.12 % mouthwash 10 mL  10 mL Oral Q12H    bisacodyl (DULCOLAX) suppository 10 mg  10 mg Rectal DAILY PRN    sodium chloride (NS) flush 5-10 mL  5-10 mL IntraVENous Multiple    phosphorus (K PHOS NEUTRAL) 250 mg tablet 1 Tab  1 Tab Oral BID    albumin human 25% (BUMINATE) solution 12.5 g  12.5 g IntraVENous Q8H PRN    docusate sodium (COLACE) capsule 100 mg  100 mg Oral BID    polyvinyl alcohol (LIQUIFILM TEARS) 1.4 % ophthalmic solution 2 Drop  2 Drop Both Eyes TID    sodium chloride (NS) flush 10 mL  10 mL InterCATHeter PRN    alteplase (CATHFLO) 1 mg in sterile water (preservative free) 1 mL injection  1 mg InterCATHeter PRN    bacitracin 500 unit/gram packet 1 Packet  1 Packet Topical PRN    glucose chewable tablet 16 g  4 Tab Oral PRN    dextrose (D50W) injection syrg 12.5-25 g  12.5-25 g IntraVENous PRN    glucagon (GLUCAGEN) injection 1 mg  1 mg IntraMUSCular PRN    sodium chloride (NS) flush 5-10 mL  5-10 mL IntraVENous PRN          Deidre Nics.  Aniceto [id number], NP     [facility] [address] [place] [address] [address] [facility] [address]   [facility], [facility]   [phone]

## 2017-04-12 NOTE — PROGRESS NOTES
Physical Therapy  Attempted to see Ms Darlyn Serve for PT services this afternoon. Pt attempting to defer though niece in room and encouraging. Pt agreeable to LE therex though not actively participating-PROM attempted and pt resisting vs abnormal tone. Focus switched to UEs which pt seems to tolerate a little better. Brief run of asystole per monitor, when asked if she was OK, pt stated \"no\" but did not elaborate. Noted MD input regarding possible transfer to comfort care once additional family is available for inclusion. Pt left in NAD with caregiver present and VSS. RN informed of above. Will change pt to PRN status until official comfort care status has been made.   Thank you,  Garth Quesada, PT, DPT

## 2017-04-13 NOTE — PROGRESS NOTES
TRANSFER - IN REPORT:    Verbal report received from Aleshia Hoffman RN on Valerieo  being received from 6S for urgent transfer      Report consisted of patients Situation, Background, Assessment and   Recommendations(SBAR). Information from the following report(s) Kardex, MAR and Recent Results was reviewed with the receiving nurse. Opportunity for questions and clarification was provided. Assessment completed upon patients arrival to unit and care assumed. Primary Nurse Beverly Burton RN and Angela Plascencia RN performed a dual skin assessment on this patient Impairment noted- see wound doc flow sheet Stanley score is 13      Bedside and Verbal shift change report given to Michelle Mckinley RN by Rosy Paz RN Report included the following information SBAR, Procedure Summary and Recent Results.

## 2017-04-13 NOTE — PROGRESS NOTES
04/13/17 0300   Vital Signs   Temp 98.3 °F (36.8 °C)   Temp Source Axillary   Pulse (Heart Rate) (!) 104   Heart Rate Source Monitor   Cardiac Rhythm Paced   Resp Rate (!) 31   O2 Sat (%) 99 %   Level of Consciousness Alert   /75   MAP (Calculated) 92   BP 1 Method Automatic   BP 1 Location Left arm   BP Patient Position At rest   MEWS Score 4   Alarms Set and Audible Cardiac alarms;Pulse ox alarms; Respiratory alarms   Box Number 664   Electrodes Replaced No   Pain 1   Pain Scale 1 Numeric (0 - 10)   Pain Intensity 1 0   Patient Stated Pain Goal 0     MEW 4, , Respiration 31. MD aware of pt. Condition. RN will continue to monitor pt.

## 2017-04-13 NOTE — PROGRESS NOTES
04/13/17 1351   Vitals   Temp 97.9 °F (36.6 °C)   Temp Source Axillary   Pulse (Heart Rate) (!) 160   Heart Rate Source Monitor   Resp Rate (!) 36   Level of Consciousness Alert   /74   MAP (Calculated) 90   BP 1 Location Left arm   BP 1 Method Automatic   BP Patient Position At rest   Cardiac Rhythm Paced; A Fib  (Underlying a fib)   MEWS Score 6   Alarms Set and Audible Cardiac alarms   Box Number 664   Electrodes Replaced No   Dr. Peggy Lo aware. Patient transferred to higher level of care.

## 2017-04-13 NOTE — PROGRESS NOTES
notes that patient was transferred from NSTU to bed 18 in CCU. I had spoken to patient's daughter Gurdeep yesterday afternoon and she was planning to discuss the situation with her sisters and her grandaughter Jessica Brito sana grandauter Cristofer han. I note that the Palliative care team is to meet with family tentatively on Friday. Care manager will continue to monitor for transitions of care.

## 2017-04-13 NOTE — PROGRESS NOTES
Bedside shift change report given to Kevin Bello RN (oncoming nurse) by Brady Keita RN (offgoing nurse). Report included the following information SBAR, Kardex, ED Summary, OR Summary, Procedure Summary, Intake/Output, MAR, Accordion, Recent Results, Med Rec Status and Cardiac Rhythm Paced.

## 2017-04-13 NOTE — PROGRESS NOTES
Patient with sustained elevated heart rate. Dr. Ok Bailey paged and notified that patient has a sustained elevated heart rate in the 160's and saturations are in the upper 80's and patient is still a full code and needs a higher level of care. New orders obtained to transfer patient to ICU. Oxygen 2l NC placed. 1353: Bed board notified of need for ICU bed.   1413 Report called to Aga in CCU.  1435 Dr. Elliott Sparks called daughters Jenn Sapp and Kevin and notified of patients condition and room change. TRANSFER - OUT REPORT:    Verbal report given to Aga RN(name) on Erin Cuba  being transferred to CCU(unit) for change in patient condition(Elevated heart rate)       Report consisted of patients Situation, Background, Assessment and   Recommendations(SBAR). Information from the following report(s) SBAR, MAR, Recent Results and Cardiac Rhythm paced,afib was reviewed with the receiving nurse. Lines:   PICC Triple Lumen 45/27/67 Right;Basilic (Active)   Central Line Being Utilized Yes 4/13/2017 12:00 PM   Criteria for Appropriate Use Limited/no vessel suitable for conventional peripheral access 4/13/2017 12:00 PM   Site Assessment Clean, dry, & intact 4/13/2017 12:00 PM   Phlebitis Assessment 0 4/13/2017 12:00 PM   Infiltration Assessment 0 4/13/2017 12:00 PM   Date of Last Dressing Change 04/13/17 4/13/2017 12:00 PM   Dressing Status Clean, dry, & intact 4/13/2017 12:00 PM   External Catheter Length (cm) 0 centimeters 4/11/2017  9:58 AM   Dressing Type Transparent;Disk with Chlorhexadine gluconate (CHG) 4/13/2017 12:00 PM   Action Taken Open ports on tubing capped 4/13/2017 12:00 PM   Hub Color/Line Status Gray;Capped 4/13/2017 12:00 PM   Positive Blood Return (Site #1) Yes 4/13/2017 12:00 PM   Hub Color/Line Status Red; Infusing 4/13/2017 12:00 PM   Positive Blood Return (Site #2) Yes 4/13/2017 12:00 PM   Hub Color/Line Status White;Capped 4/13/2017 12:00 PM   Positive Blood Return (Site #3) Yes 4/13/2017 12:00 PM   Alcohol Cap Used Yes 4/13/2017 12:00 PM        Opportunity for questions and clarification was provided.       Patient transported with:   Monitor  O2 @ 2 liters  Patient-specific medications from Pharmacy  Registered Nurse

## 2017-04-13 NOTE — PROGRESS NOTES
Palliative Medicine Consult  [place]: [phone] ([phone])    Patient Name: Xin Harding  YOB: 1930    Date of Initial Consult: 3/13/17, reconsulted 4/12/17  Reason for Consult: Care decisions   Requesting Provider: Rolf  Primary Care Physician: Breann Vela MD      SUMMARY:   Xin Harding \"Warner\" is a 80 y.o. with a past history of systolic CHF with severe MR (EF 30% on 3/8/17, 3/31, diffuse hypokinesis), AICD, DM, HTN, a fib who was admitted on 3/8/2017 from her PCP office w/ epigastric pain and nausea, in ED was hypothermic to 91.4 degrees and hypotensive. Treated in ICU with empiric abx, echo concerning for possible aortic valve vegetation but BCx x 3 NGTD and AMMON neg for vegetations. Has been diuresed. CT abd showing gallstones, no  Cholecystitis obstruction. GI has signed off but plan on seeing for EGD when stable. Pt was intubated 3/10-3/12, had large mucous plug. Renal following for JUAN. Known well to Dr Judit Conde. CT head wnl. Had improved well throughout this stay, had 2 family meetings. However due to resp distress required re-intubation 3/27. Has since been extubated but cont to decline, had chest tube placed 3/31. Has severe MR has been worked up by CT surgery team and advanced HF team- too high risk for any interventions and recommends palliative measures. Current medical issues leading to Palliative Medicine involvement include:care decisions. Reconsulted on 4/12 due to overall poor prognosis, not surgical candidate. Social: Pt was 1 of 13 children, she has 7 children and about 20 grandchildren, and great grands. Very involved in the [employer] in [facility] where she lives and is very loved. Before admission was alert/oriented and high functioning. No AMD but family tends to defer to Samy Davalos ([phone]) (dtr). PALLIATIVE DIAGNOSES:     1. Shortness of breath   2. Encephalopathy   3. Fatigue  4. Edema  5. Debility   6. Goals of care       PLAN:   1.  Earlier this stay have had family meetings w/ pt's large family. There were variable levels of understanding and of thoughts surrounding code status and decisions. Pt was quite functional prior to admission and people having a hard time understanding that her function will be very different after this prolonged stay. 2. Reconsulted, as pt doing more poorly and is not a surgical candidate. Cardiology/CT surgery teams are recommending palliative care approach. 3. Speak to Car Brady dtr, who has been speaking w/ Dr Yariel Tuttle and agrees that pt is doing poorly, and realizes that may not even survive this hospital stay. As before, she would support a DNR- however some of her siblings don't even feel that they need to meet w/ my team again, as in their mind goals are clear to cont all measures. 4. As Yoly helps to set up a meeting ( I have asked for today or tmrw, likely it will be tmrw although she states Good Friday may make it difficult)- I told her to share w/ her siblings that things are very different than they were a month ago. We know more- that she cont to have one thing after another occur that leads her to a more deconditioned state (which I warned about last month), she is not a surgical candidate, and that all teams are recommending a less aggressive approach in order to focus on quality. 5. Hopefully will have a meeting tmrw.    6. Communicated plan of care with: Palliative IDT; Dr Kincaid Daily / TREATMENT PREFERENCES:   [====Goals of Care====]  GOALS OF CARE:  Patient / health care proxy stated goals: recovery as possible       TREATMENT PREFERENCES:   Code Status: Full Code    Advance Care Planning:  Advance Care Planning 3/28/2017   Patient's Healthcare Decision Maker is: Legal Next of Kin   Primary Decision Maker Name Reece Sebastian (but there are 8 children)   Primary Decision Maker Phone Number [phone]   Primary Decision Maker Relationship to Patient Adult child   Confirm Advance Directive -   Patient Would Like to Complete Advance Directive -       Other:    The palliative care team has discussed with patient / health care proxy about goals of care / treatment preferences for patient.  [====Goals of Care====]    Family incl dtrs Matthew Wilcox and sharladtmanjinder Martinez. HISTORY:     HPI/SUBJECTIVE:    The patient is:   [] Verbal and participatory  [x] Non-participatory due to: medical condition - confused    Pt very weak, able to mumble a bit to me, but seems confused. Has dobhoff in place. Compared to several weeks ago when I last saw her, seems more debilitated. Clinical Pain Assessment (nonverbal scale for severity on nonverbal patients):   [++++ Clinical Pain Assessment++++]  [++++Pain Severity++++]: Pain: 0  [++++Pain Character++++]:   [++++Pain Duration++++]:   [++++Pain Effect++++]:   [++++Pain Factors++++]:   [++++Pain Frequency++++]:   [++++Pain Location++++]:   [++++ Clinical Pain Assessment++++]     FUNCTIONAL ASSESSMENT:     Palliative Performance Scale (PPS):  PPS: 30       PSYCHOSOCIAL/SPIRITUAL SCREENING:     Advance Care Planning:  Advance Care Planning 3/28/2017   Patient's Healthcare Decision Maker is: Legal Next dereje Timmons 69   Primary Decision Maker Name Sean Pope (but there are 8 children)   Primary Decision Maker Phone Number [phone]   Primary Decision Maker Relationship to Patient Adult child   Confirm Advance Directive -   Patient Would Like to Complete Advance Directive -        Any spiritual / Nondenominational concerns:  [] Yes /  [x] No    Caregiver Burnout:  [] Yes /  [x] No /  [] No Caregiver Present      Anticipatory grief assessment:   [x] Normal  / [] Maladaptive       ESAS Anxiety:   Cannot obtain due to patient factors    ESAS Depression:   Cannot obtain due to patient factors       REVIEW OF SYSTEMS:     Positive and pertinent negative findings in ROS are noted above in HPI.   The following systems were [] reviewed / [x] unable to be reviewed as noted in HPI  Other findings are noted below. Systems: constitutional, ears/nose/mouth/throat, respiratory, gastrointestinal, genitourinary, musculoskeletal, integumentary, neurologic, psychiatric, endocrine. Positive findings noted below. Modified ESAS Completed by: provider   Fatigue: 8 Drowsiness: 3     Pain: 0         Anorexia: 0 Dyspnea: 0           Stool Occurrence(s): 1        PHYSICAL EXAM:     From RN flowsheet:  Wt Readings from Last 3 Encounters:   04/13/17 138 lb 7.2 oz (62.8 kg)   01/10/17 146 lb 3.2 oz (66.3 kg)   01/04/17 150 lb (68 kg)     Blood pressure 123/76, pulse 89, temperature 97.4 °F (36.3 °C), resp. rate (!) 31, height 5' 6\" (1.676 m), weight 138 lb 7.2 oz (62.8 kg), SpO2 97 %. Pain Scale 1: Numeric (0 - 10)  Pain Intensity 1: 0     Pain Location 1: Abdomen  Pain Orientation 1: Mid     Pain Intervention(s) 1: Medication (see MAR)    Constitutional: fatigued, mumbling, weak  Eyes: pupils equal, anicteric  ENMT: no nasal discharge, dry mucous membranes  Respiratory: breathing not labored  Gastrointestinal: soft non-tender, +bowel sounds  Musculoskeletal: no deformity, no tenderness to palpation  Skin: warm, dry  Neurologic: moving all extremities          HISTORY:     Principal Problem:    Hypothermia (3/8/2017)      Past Medical History:   Diagnosis Date    Atrial fibrillation (HCC)     Chronic systolic heart failure (HCC)     ef 35-40%; hold ACE due to lower bp    Diabetes mellitus type 2, insulin dependent ([address]     Hypertension     Long term (current) use of anticoagulants     Malignant hypertensive heart disease with CHF ([address]     Mitral regurgitation     Pacemaker 10/25/2012    The pacemaker is a [employer] CASSIDY, model # [id number], serial [id number].    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, 1996    # 28 Turner-Manuel    Thyroid disease     TR (tricuspid regurgitation)     moderate to severe echo 2009      Past Surgical History:   Procedure Laterality Date    CARDIAC SURG PROCEDURE UNLIST  1996    valve replacement    HX HEART CATHETERIZATION  10/4/2012    normal cors, LVEF 50%    HX HEART VALVE SURGERY      HX PACEMAKER      INS PPM/ICD LED DUAL ONLY  10/25/2012         INS PPM/ICD LED DUAL ONLY  9/9/2016           Family History   Problem Relation Age of Onset    Cancer Sister      breast    Heart Disease Brother     Heart Disease Brother     Diabetes Son     Stroke Son       History reviewed, no pertinent family history.   Social History   Substance Use Topics    Smoking status: Never Smoker    Smokeless tobacco: Never Used    Alcohol use No     No Known Allergies   Current Facility-Administered Medications   Medication Dose Route Frequency    bumetanide (BUMEX) injection 1 mg  1 mg IntraVENous BID    potassium chloride (KAON 10%) 20 mEq/15 mL oral liquid 40 mEq  40 mEq Per NG tube BID WITH MEALS    pantoprazole (PROTONIX) 2 mg/mL oral suspension 40 mg  40 mg Per NG tube ACB    0.9% sodium chloride infusion  50 mL/hr IntraVENous CONTINUOUS    spironolactone (ALDACTONE) tablet 25 mg  25 mg Oral DAILY    insulin lispro (HUMALOG) injection   SubCUTAneous Q6H    apixaban (ELIQUIS) tablet 2.5 mg  2.5 mg Per NG tube BID    albuterol-ipratropium (DUO-NEB) 2.5 MG-0.5 MG/3 ML  3 mL Nebulization TID PRN    chlorhexidine (PERIDEX) 0.12 % mouthwash 10 mL  10 mL Oral Q12H    bisacodyl (DULCOLAX) suppository 10 mg  10 mg Rectal DAILY PRN    sodium chloride (NS) flush 5-10 mL  5-10 mL IntraVENous Multiple    phosphorus (K PHOS NEUTRAL) 250 mg tablet 1 Tab  1 Tab Oral BID    albumin human 25% (BUMINATE) solution 12.5 g  12.5 g IntraVENous Q8H PRN    docusate sodium (COLACE) capsule 100 mg  100 mg Oral BID    polyvinyl alcohol (LIQUIFILM TEARS) 1.4 % ophthalmic solution 2 Drop  2 Drop Both Eyes TID    sodium chloride (NS) flush 10 mL  10 mL InterCATHeter PRN    alteplase (CATHFLO) 1 mg in sterile water (preservative free) 1 mL injection  1 mg InterCATHeter PRN    bacitracin 500 unit/gram packet 1 Packet  1 Packet Topical PRN    glucose chewable tablet 16 g  4 Tab Oral PRN    dextrose (D50W) injection syrg 12.5-25 g  12.5-25 g IntraVENous PRN    glucagon (GLUCAGEN) injection 1 mg  1 mg IntraMUSCular PRN    sodium chloride (NS) flush 5-10 mL  5-10 mL IntraVENous PRN          LAB AND IMAGING FINDINGS:     Lab Results   Component Value Date/Time    WBC 6.2 04/10/2017 03:00 AM    HGB 9.4 04/10/2017 03:00 AM    PLATELET 818 93/97/4281 03:00 AM     Lab Results   Component Value Date/Time    Sodium 151 04/13/2017 03:58 AM    Potassium 3.9 04/13/2017 03:58 AM    Chloride 120 04/13/2017 03:58 AM    CO2 24 04/13/2017 03:58 AM    BUN 45 04/13/2017 03:58 AM    Creatinine 1.17 04/13/2017 03:58 AM    Calcium 8.6 04/13/2017 03:58 AM    Calcium 8.6 04/13/2017 03:58 AM    Magnesium 2.7 04/13/2017 12:43 AM    Phosphorus 4.6 04/13/2017 12:43 AM      Lab Results   Component Value Date/Time    AST (SGOT) 22 04/03/2017 02:33 AM    Alk. phosphatase 55 04/03/2017 02:33 AM    Protein, total 6.2 04/03/2017 02:33 AM    Albumin 2.4 04/03/2017 02:33 AM    Globulin 3.8 04/03/2017 02:33 AM     Lab Results   Component Value Date/Time    INR 1.4 03/29/2017 04:49 AM    Prothrombin time 14.2 03/29/2017 04:49 AM    aPTT 33.5 03/29/2017 04:49 AM      No results found for: IRON, FE, TIBC, IBCT, PSAT, FERR   No results found for: PH, PCO2, PO2  No components found for: [place]   Lab Results   Component Value Date/Time    CK 88 09/01/2012 04:00 AM    CK - MB 2.2 09/01/2012 04:00 AM                Total time: 35min  Counseling / coordination time:  30 min  > 50% counseling / coordination?: yes    Prolonged service was provided for  []30 min   []75 min in face to face time in the presence of the patient. Time Start:    Time End:   Note: this can only be billed with 67373 (initial) or 17833 (follow up). If multiple start / stop times, list each separately.

## 2017-04-13 NOTE — PROGRESS NOTES
Pt discussed in rounds today. Palliative to meet with family today or tomorrow. CM will await results of this meeting. Will follow.  Jason Rosa

## 2017-04-13 NOTE — PROGRESS NOTES
Report received from \[facility]\"".     5301: Family questioning pt's code status as DNR and questioning whether comfort measures are in place. Call placed to Dr. Josefa Nelson, hospitalist, and spoke with family to clarify. Orders received and comfort measures initiated. 1930: Bedside shift change report given to Maria Del Carmen Leone RN (oncoming nurse) by Stacey Ramos RN (offgoing nurse). Report included the following information SBAR, Kardex, ED Summary, Procedure Summary, Intake/Output, MAR, Recent Results and Cardiac Rhythm Paced/Afib.

## 2017-04-13 NOTE — PROGRESS NOTES
04/13/17 1100   Vitals   Temp 97.8 °F (36.6 °C)   Temp Source Axillary   Pulse (Heart Rate) (!) 104   Heart Rate Source Monitor   Resp Rate 30   O2 Sat (%) 96 %   Level of Consciousness Alert   /86   MAP (Calculated) 104   BP 1 Location Left arm   BP 1 Method Automatic   BP Patient Position At rest   Cardiac Rhythm Paced; A Fib   MEWS Score 4   Alarms Set and Audible Cardiac alarms   Box Number 664   Electrodes Replaced Yes   Patient with elevated MEWS score. Dr Romario Montes on unit and aware. Patient does not appear in distress and is resting comfortably in bed. Palliative is following but family is not in agreement about care decisions at this time.  No change in patient from this AM.

## 2017-04-13 NOTE — WOUND CARE
WOCN Note:     Follow-up visit for buttocks. Chart shows:  Admitted for hypothermia & respiratory failure, sepsis; history of CHF, DM, a-fib, pacer      Assessment:   Patient is alert and verbal but confused. NG tube. Bed: total care sport  Has a Richardson  Patient reports no pain and does not guard or grimace with turning      Heels intact and without erythema.       Upper and inner thigh ruptured bullae, partial thickness wounds now resurfaced and beginning to repigment. Left open to air.      1. Right medial buttock = 0.9 x 0.9 x 0 cm 100% resurfaced pink skin tear. Recommendations:     Skin Care & Pressure Prevention:  Minimize layers of linen/pads under patient to optimize support surface. Turn/reposition approximately every 2 hours and offload heels. Manage incontinence / promote continence; Aloe Vesta to buttocks with incont care. Specialty bed: total care sport. Use only flat sheet and one incontinence pad.     Transition of Care: Plan to follow weekly and as needed while admitted to hospital.    NERY Velez, RN, [address]  Certified Wound, Ostomy, Continence Nurse  office [phone]  pager [phone] or call  to page

## 2017-04-13 NOTE — PROGRESS NOTES
04/12/17 2317   Vital Signs   Temp 97.9 °F (36.6 °C)   Temp Source Axillary   Pulse (Heart Rate) (!) 126   Heart Rate Source Monitor   Cardiac Rhythm Paced   Resp Rate (!) 34   O2 Sat (%) 97 %   Level of Consciousness Alert   /83   MAP (Calculated) 98   BP 1 Method Automatic   BP 1 Location Left arm   BP Patient Position At rest   MEWS Score 5   Alarms Set and Audible Cardiac alarms;Pulse ox alarms; Respiratory alarms   Box Number 664   Electrodes Replaced No   Pain 1   Pain Scale 1 Numeric (0 - 10)   Pain Intensity 1 0   Patient Stated Pain Goal 0     MEWS at 5, Pulse 100s-130s, Respiration 30s. MD notified. RN continue to monitor pt. Condition. Pt. On Bipap.

## 2017-04-13 NOTE — PROGRESS NOTES
Requested by nurse to provide support to family of Ms Jose Gaona in [address] [place] [address], who was being transitioned to comfort care. When  arrived there were multiple family members in patient's room; patient was lying quietly in bed and did not respond when  spoke to her. Provided emotional support to family members who expressed concern that plans were to have Ms Jose Gaona moved to another unit. Provided active listening as family processed their feelings of being overwhelmed and angered by all that had taken place over the past few weeks since patient had been in the hospital. Nurse arrived while  was present and informed them that patient would not be transferred out of CCU at this time. Several family members continued to be tearful but expressed they felt better since that decision had been made. With family's permission,  joined family members around patient's bed and had prayer on behalf of patient and family. Reassured them of continued prayers on their behalf and of on-going availability of chaplains for support. Continued to provide emotional support to tearful children and grandchildren. Plan: Chaplains will continue to be available for patient/family support as needed/able. : Rev. Roxane Rodrigez.  [place]; [facility], to contact [phone] [address] call: [phone]

## 2017-04-13 NOTE — PROGRESS NOTES
Cardiology Progress Note            Admit Date: 3/8/2017  Admit Diagnosis: Hypothermia  Date: 4/13/2017     Time: 8:55 AM       Assessment and Plan     1. Systolic Heart Failure, NYHA class IV:   LVEF 25%-30%  on TTE on 3/31/17. BNP increasing 1198 today from 782 yesterday. Now off milrinone.     -Has BiV AICD    -Advanced Heart Failure service signed off.- recommended wean off Milrinone and palliative care   -Advanced Heart Failure recommended Bumex 2 mg IV BID - dose was reduced to 1 mg IV BID yesterday due to azotemia. Worsened renal fxn today, yet BNP increasing. Delicate fluid balance.   -Continue aldactone cautiously   -ACE-I, ARB held for now due to worsening creatinine. 2. JUAN:  Creatinine 1.17 from 0.94 yesterday. - lasix was decreased yesterday. 3. MR, severe:     -Per valve team, CT surgery, AHF- pt is too  too high risk for any intervention (transcatheter or surgical). Recommending palliative care at this point. CT surgery services signed off. Cardiology agrees with other CT surg/valve/AHF teams. 4. Pleural effusions: s/p Rt chest tube,   Rt chest tube discontinued yesterday. 02 sats %   -Sm. Left pleural effusion not amenable to drainage- .per IR     5. Chronic Afib: currently afib with ventricular pacing noted- intermittent but more frequent tachycardia. -CHA2DS= 6 (age, CHF, HTN, female, DM). Eliquis 2.5 mg BID     6. . Hypernatremia: sodium 151- may need increased free water flush. Management per primary team.     7.  TSH low:  Repeat TSH today 0.07   -Will order free T4- primary team to manage. 8. Pancytopenia:  WBC up to 6.2 on 4/10 from 3.8 on 4/8, Hgb 9.4 on 4/10  from 10.2 on 4/8/17. Plts stable 126 from 123 on 4/10 . No temp   -Chronic leukopenia Since 2011- likely due to cirrhosis with underlying benign ethnic neutropenia (per ID cx). Hepatology cx pending.       9. Deconditioned:  Continue PT/OT  10. Advanced directives:  Full code status. Palliative care to meet with pt's family today or tomorrow- pt is no longer candidate for any valve procedure, poor prognosis. Cardiology Attending:Patient seen and examined. I agree with NP assessment and plans. Would repeat BMP. Needs loop diuretic but hypernatremia noted. Antonio Meng MD 4/13/2017 1:50 PM         Ila Reid. IRLANDA Wilkinson 4/13/2017 12:30PM          Subjective:   Chelsea West denies SOB and chest pain. Had SOB and tachycardia overnight, required biPAP    Objective:      Physical Exam:                Visit Vitals    /86 (BP 1 Location: Left arm, BP Patient Position: At rest)    Pulse (!) 104    Temp 97.8 °F (36.6 °C)    Resp 30    Ht 5' 6\" (1.676 m)    Wt 62.8 kg (138 lb 7.2 oz)    SpO2 96%    BMI 22.35 kg/m2          General Appearance:   Well developed, weak. Ears/Nose/Mouth/Throat:    Hearing grossly normal. DHT in Left nare         Neck:  Supple. Chest:    Lungs diminished breath sounds bilateral lower lung fields No wheeze   Cardiovascular:    Irregular  rate and rhythm, mildly tachycardic,  S1, S2 normal, grade IV/VI systolic murmur, heard best at apex. Abdomen:    Soft, mildly distended, non-tender, bowel sounds are present. Extremities:  No LE edema noted   Skin:  Warm and dry. Telemetry: Afib with ventricular pacing.            Data Review:    Labs:    Recent Results (from the past 24 hour(s))   GLUCOSE, POC    Collection Time: 04/12/17 12:01 PM   Result Value Ref Range    Glucose (POC) 216 (H) 65 - 100 mg/dL    Performed by Aileen Gerrozs    GLUCOSE, POC    Collection Time: 04/12/17  5:08 PM   Result Value Ref Range    Glucose (POC) 172 (H) 65 - 100 mg/dL    Performed by Carmellancbest Gerrozs    GLUCOSE, POC    Collection Time: 04/12/17 11:55 PM   Result Value Ref Range    Glucose (POC) 195 (H) 65 - 100 mg/dL    Performed by Neema Valentin    BNP    Collection Time: 04/13/17 12:43 AM   Result Value Ref Range BNP 1198 (H) 0 - 937 pg/mL   METABOLIC PANEL, BASIC    Collection Time: 04/13/17 12:43 AM   Result Value Ref Range    Sodium 142 136 - 145 mmol/L    Potassium 5.7 (H) 3.5 - 5.1 mmol/L    Chloride 104 97 - 108 mmol/L    CO2 31 21 - 32 mmol/L    Anion gap 7 5 - 15 mmol/L    Glucose 220 (H) 65 - 100 mg/dL    BUN 58 (H) 6 - 20 MG/DL    Creatinine 1.69 (H) 0.55 - 1.02 MG/DL    BUN/Creatinine ratio 34 (H) 12 - 20      GFR est AA 35 (L) >60 ml/min/1.73m2    GFR est non-AA 29 (L) >60 ml/min/1.73m2    Calcium 13.4 (HH) 8.5 - 10.1 MG/DL   MAGNESIUM    Collection Time: 04/13/17 12:43 AM   Result Value Ref Range    Magnesium 2.7 (H) 1.6 - 2.4 mg/dL   PHOSPHORUS    Collection Time: 04/13/17 12:43 AM   Result Value Ref Range    Phosphorus 4.6 2.6 - 4.7 MG/DL   METABOLIC PANEL, BASIC    Collection Time: 04/13/17  3:58 AM   Result Value Ref Range    Sodium 151 (H) 136 - 145 mmol/L    Potassium 3.9 3.5 - 5.1 mmol/L    Chloride 120 (H) 97 - 108 mmol/L    CO2 24 21 - 32 mmol/L    Anion gap 7 5 - 15 mmol/L    Glucose 148 (H) 65 - 100 mg/dL    BUN 45 (H) 6 - 20 MG/DL    Creatinine 1.17 (H) 0.55 - 1.02 MG/DL    BUN/Creatinine ratio 38 (H) 12 - 20      GFR est AA 53 (L) >60 ml/min/1.73m2    GFR est non-AA 44 (L) >60 ml/min/1.73m2    Calcium 8.6 8.5 - 10.1 MG/DL   PTH INTACT    Collection Time: 04/13/17  3:58 AM   Result Value Ref Range    Calcium 8.6 8.5 - 10.1 MG/DL    PTH, Intact 361.2 (H) 14.0 - 72.0 pg/mL   TSH 3RD GENERATION    Collection Time: 04/13/17  3:58 AM   Result Value Ref Range    TSH 0.07 (L) 0.36 - 3.74 uIU/mL   TROPONIN I    Collection Time: 04/13/17  3:58 AM   Result Value Ref Range    Troponin-I, Qt. 0.12 (H) <0.05 ng/mL   CALCIUM, UR, RANDOM    Collection Time: 04/13/17  3:58 AM   Result Value Ref Range    Calcium,urine random 9.9 MG/DL   POC EG7    Collection Time: 04/13/17  4:23 AM   Result Value Ref Range    Calcium, ionized (POC) 1.46 (HH) 1.12 - 1.32 mmol/L    FIO2 (POC) 30 %    pH (POC) 7.391 7.35 - 7.45 pCO2 (POC) 37.2 35.0 - 45.0 MMHG    pO2 (POC) 38 (LL) 80 - 100 MMHG    HCO3 (POC) 22.6 22 - 26 MMOL/L    Base deficit (POC) 2 mmol/L    sO2 (POC) 72 (L) 92 - 97 %    Site OTHER      Device: BIPAP      PEEP/CPAP (POC) 6 cmH2O    PIP (POC) 14      Pressure support 8 cmH2O    Allens test (POC) N/A      Specimen type (POC) VENOUS BLOOD      Total resp.  rate 35     LACTIC ACID, PLASMA    Collection Time: 04/13/17  5:40 AM   Result Value Ref Range    Lactic acid 1.7 0.4 - 2.0 MMOL/L   GLUCOSE, POC    Collection Time: 04/13/17  6:07 AM   Result Value Ref Range    Glucose (POC) 193 (H) 65 - 100 mg/dL    Performed by Marlena Roper    GLUCOSE, POC    Collection Time: 04/13/17  6:15 AM   Result Value Ref Range    Glucose (POC) 205 (H) 65 - 100 mg/dL    Performed by Iwona Leija           Radiology:        Current Facility-Administered Medications   Medication Dose Route Frequency    bumetanide (BUMEX) injection 1 mg  1 mg IntraVENous BID    potassium chloride (KAON 10%) 20 mEq/15 mL oral liquid 40 mEq  40 mEq Per NG tube BID WITH MEALS    pantoprazole (PROTONIX) 2 mg/mL oral suspension 40 mg  40 mg Per NG tube ACB    0.9% sodium chloride infusion  50 mL/hr IntraVENous CONTINUOUS    spironolactone (ALDACTONE) tablet 25 mg  25 mg Oral DAILY    insulin lispro (HUMALOG) injection   SubCUTAneous Q6H    apixaban (ELIQUIS) tablet 2.5 mg  2.5 mg Per NG tube BID    albuterol-ipratropium (DUO-NEB) 2.5 MG-0.5 MG/3 ML  3 mL Nebulization TID PRN    chlorhexidine (PERIDEX) 0.12 % mouthwash 10 mL  10 mL Oral Q12H    bisacodyl (DULCOLAX) suppository 10 mg  10 mg Rectal DAILY PRN    sodium chloride (NS) flush 5-10 mL  5-10 mL IntraVENous Multiple    phosphorus (K PHOS NEUTRAL) 250 mg tablet 1 Tab  1 Tab Oral BID    albumin human 25% (BUMINATE) solution 12.5 g  12.5 g IntraVENous Q8H PRN    docusate sodium (COLACE) capsule 100 mg  100 mg Oral BID    polyvinyl alcohol (LIQUIFILM TEARS) 1.4 % ophthalmic solution 2 Drop  2 Drop Both Eyes TID    sodium chloride (NS) flush 10 mL  10 mL InterCATHeter PRN    alteplase (CATHFLO) 1 mg in sterile water (preservative free) 1 mL injection  1 mg InterCATHeter PRN    bacitracin 500 unit/gram packet 1 Packet  1 Packet Topical PRN    glucose chewable tablet 16 g  4 Tab Oral PRN    dextrose (D50W) injection syrg 12.5-25 g  12.5-25 g IntraVENous PRN    glucagon (GLUCAGEN) injection 1 mg  1 mg IntraMUSCular PRN    sodium chloride (NS) flush 5-10 mL  5-10 mL IntraVENous PRN          Yajaira Gabriel.  IRLANDA Wilkinson     [facility] [address] [place] [address], [address] [place] [address] [address]   [address] [facility]   [phone]

## 2017-04-13 NOTE — DIABETES MGMT
DTC Progress Note    Recommendations/ Comments: Chart review for BG control  Over the past three days:  FBG range 180 - 205,   BG's during the day 170 - 200, lowest results 148, highest 270  On TF which have been increased and run from 7PM - 7AM. PO intake is poor     BG control is acceptable for her condition. However, if could consider addition of low dose of Lantus 10 units each evening to coincide with TF. Dietitian and Palliative Care notes appreciated. Pt's condition has declined and she is not a surgical candidate  Family meeting with Palliative Care scheduled for 4/14/2017    Chart reviewed on [employer]. Patient is a 80 y.o. female with known DM on Lantus 22 units at home. A1c:   No results found for: HBA1C, HGBE8, USH8MRLH    Recent Glucose Results: Lab Results   Component Value Date/Time     (H) 04/13/2017 03:58 AM     (H) 04/13/2017 12:43 AM    GLUCPOC 148 (H) 04/13/2017 11:58 AM    GLUCPOC 205 (H) 04/13/2017 06:15 AM    GLUCPOC 193 (H) 04/13/2017 06:07 AM        Lab Results   Component Value Date/Time    Creatinine 1.17 04/13/2017 03:58 AM       Active Orders   Diet    DIET PUREED        PO intake: Patient Vitals for the past 72 hrs:   % Diet Eaten   04/11/17 1813 5 %   04/11/17 1600 10 %   04/10/17 1600 25 %       Current hospital DM medication: lispro high sensitivity scale    Will continue to follow as needed.     Thank you  Linn Young RN, CDE

## 2017-04-13 NOTE — PROGRESS NOTES
Hospitalist Progress Note            Date of Service:  2017  NAME:  Jessica Greene  :  1930  MRN:  [id number]      Admission Summary:   75-year-old female with history of chronic systolic congestive heart failure, atrial fibrillation, diabetes mellitus, hypertension, chronic anticoagulation use, history of pacemaker and ICD. She was sent from her primary care physician to the emergency room to be evaluated. History from the patient and her daughters at the bedside, also from the records. Per the daughter, she has been having on and off abdominal pain, epigastric pain for a while, over the last 3 days has been mostly pronounced. She said her pain at times is 9/10 in severity, it is nonradiating, associated with some nausea, but no vomiting. She denies any diarrhea. At her PCP's office,   they could not get her temperature, so they sent her to the emergency room. In the ER she has been hypothermic, her temperature was 91.4 on initial presentation. Also she was hypotensive. Her blood pressure   systolic was 87/62, so she was given about 1.5 liters of IV fluid, and now she is getting 1 bottle of albumin. She was placed on warming blanket, Lizzy Hugger. She denies any dysuria or frequency. Interval history / Subjective:   She has no complaints, but is lethargic and doesn't provide any history. Spoke to daughter Marti Haile over phone, and son at bedside. Discussed current clinical state at length, poor prognosis, lack of curative options. They would like to make her DNR. I recommended comfort care and they are in agreement but would like to discuss with the rest of her family.      Assessment & Plan:         Acute on chronic respiratory failure with hypercapnia due to pleural effusion/atelectasis and acute on chronic systolic heart failure   - NYHA class IV upon admission   - intubated on 3/25, extubated 3/30; required intermittent bipap after   - diuresis with bumex - reduce dose to 1 mg IV BID as she's developing mild azotemia   - s/p chest tube placement on 3/31, initially planned for removal 4/9, but CXR with increased fluid. Last CXR w/ stable small effusion; CT removed on 4/12   - milrinone stopped 4/12    Sepsis:  Hypothermia/hypotension, Unknown source, most likely lung with bilateral pleural effusions, possibly infectious.     - AMMON: no vegetations;    - blood culture 3/8 and 3/11: no growth x 5 days;    - urine culture: no growth;    - 3/21: afebrile, no leukocytosis, cultures negative   - off antibiotics. Patient completed 2 weeks of IV Abx since admission. Acute on Chronic Systolic congestive heart failure NYHA IV   - with severe MR   - Echo 3/9 EF 30%, diffuse hypokinesis. - AMMON 3/22 showed no vegetations   - seen by CT surgery - she is a poor candidate for any intervention, high risk, rec palliative care   - diuresis with Bumex   - holding beta blocker / ACE inhibitor secondary to borderline BPs   - on spironolactone   - cardiology, advanced heart failure following    Hematuria: may be related to traumatic galarza placement with INR max 5.5,   - Galarza placed 3/9 and hematuria reported 3/10 UA, resolved    JUAN:  From  ATN and contrast nephropathy/Hypotension most likely etiology. - Ischemic heart disease, hypotension; and exposure to IV contrast.    - Appreciate nephro consult recommendations. - Creatinine now normalized. Hypoglycemia   - now on tube feeds    Elevated transaminases: due to ischemia vs hepatic congestion, resolved    Coagulopathy.   - Had hematuria. INR was supra therapeutic on warfarin which is now stopped. - 3/21: started patient on Eliquis for stroke prophylaxis in the setting of chronic Atrial Fibrillation and [id number] score of 6. Pancytopenia.    - Appears to be chronic    Left facial pressure ulcer/injury:  - stage 2; measures 1 x 0.5 x 0 cm; 100% drying pink/red;  - with linear jac = 2 x 0.5 x 0 cm (smaller);  [facility] Acquired;    Urinary retention:  - Richardson removed 3/21, decreased urine output, bladder scan revealed 900 ml of urine in the bladder per nursing report, Richardson was reinserted; (second attempt at removing Richardson during this hospitalization). - keep the Richardson in at discharge; outpatient f/u with Urology for voiding trials once patient is more ambulatory. Anemia:  - of chronic disease with slow decline in H&H during this hospitalization. - patient transfused 2 Units of PRBC's on 3/22, Hgb fairly stable currently    Afib had rvr yesterday, given IV diltiazem   - on Eliquis    Hypernatremia unable to give free water due to volume overload   - monitor    Hypercalcemia: unclear etiology; immobility? Unable to give IV fluids due to volume overload    Code status: DNR    Disposition: D      Hospital Problems  Date Reviewed: 4/8/2017          Codes Class Noted POA    * (Principal)Hypothermia ICD-10-CM: T68. XXXA  ICD-9-CM: 991.6  3/8/2017 Yes                Review of Systems:   Review of systems not obtained due to patient factors. Vital Signs:    Last 24hrs VS reviewed since prior progress note.  Most recent are:  Visit Vitals    /77    Pulse (!) 104    Temp 97.9 °F (36.6 °C)    Resp (!) 35    Ht 5' 6\" (1.676 m)    Wt 62.8 kg (138 lb 7.2 oz)    SpO2 99%    BMI 22.35 kg/m2         Intake/Output Summary (Last 24 hours) at 04/13/17 1639  Last data filed at 04/13/17 1307   Gross per 24 hour   Intake              530 ml   Output              300 ml   Net              230 ml        Physical Examination:             Constitutional:  NAD, awake,   HEENT: Anicteric sclerae   Resp:  Lungs w/ diminished breath sounds b/l, R basilar crackles, normal work of breathing   CV:  irregularly irregular, IV/VI HSM    GI:  Soft, non-tender, moderately distended normoactive bowel sounds;     Musculoskeletal:  non-edematous    Neurologic: Non-focal            Data Review:   Labs reivewed    Labs:     No results for input(s): WBC, HGB, HCT, PLT, HGBEXT, HCTEXT, PLTEXT, HGBEXT, HCTEXT, PLTEXT in the last 72 hours. Recent Labs      04/13/17   0358  04/13/17   0043  04/12/17   0303  04/11/17   0309   NA  151*  142  143  140   K  3.9  5.7*  4.1  3.9   CL  120*  104  103  102   CO2  24  31  33*  33*   BUN  45*  58*  35*  26*   CREA  1.17*  1.69*  0.94  0.73   GLU  148*  220*  181*  167*   CA  8.6  8.6  13.4*  12.4*  10.8*   MG   --   2.7*   --   2.5*   PHOS   --   4.6   --    --      No results for input(s): SGOT, GPT, ALT, AP, TBIL, TBILI, TP, ALB, GLOB, GGT, AML, LPSE in the last 72 hours. No lab exists for component: AMYP, HLPSE  No results for input(s): INR, PTP, APTT in the last 72 hours. No lab exists for component: INREXT, INREXT   No results for input(s): FE, TIBC, PSAT, FERR in the last 72 hours. Lab Results   Component Value Date/Time    Folate 14.5 03/09/2017 03:23 AM      No results for input(s): PH, PCO2, PO2 in the last 72 hours.   Recent Labs      04/13/17 0358   TROIQ  0.12*     Lab Results   Component Value Date/Time    Cholesterol, total 138 09/28/2011 09:13 AM    HDL Cholesterol 50 09/28/2011 09:13 AM    LDL, calculated 74 09/28/2011 09:13 AM    Triglyceride 69 09/28/2011 09:13 AM     Lab Results   Component Value Date/Time    Glucose (POC) 148 04/13/2017 11:58 AM    Glucose (POC) 205 04/13/2017 06:15 AM    Glucose (POC) 193 04/13/2017 06:07 AM    Glucose (POC) 195 04/12/2017 11:55 PM    Glucose (POC) 172 04/12/2017 05:08 PM     Lab Results   Component Value Date/Time    Color RED 03/10/2017 02:30 AM    Appearance BLOODY 03/10/2017 02:30 AM    Specific gravity 1.020 03/10/2017 02:30 AM    Specific gravity 1.016 03/08/2017 08:54 PM    pH (UA) 6.0 03/10/2017 02:30 AM    Protein 100 03/10/2017 02:30 AM    Glucose NEGATIVE  03/10/2017 02:30 AM    Ketone NEGATIVE  03/10/2017 02:30 AM    Bilirubin NEGATIVE  03/10/2017 02:30 AM    Urobilinogen 1.0 03/10/2017 02:30 AM Nitrites NEGATIVE  03/10/2017 02:30 AM    Leukocyte Esterase TRACE 03/10/2017 02:30 AM    Epithelial cells FEW 03/10/2017 02:30 AM    Bacteria NEGATIVE  03/10/2017 02:30 AM    WBC 20-50 03/10/2017 02:30 AM    RBC >100 03/10/2017 02:30 AM         Medications Reviewed:     Current Facility-Administered Medications   Medication Dose Route Frequency    bumetanide (BUMEX) injection 1 mg  1 mg IntraVENous BID    potassium chloride (KAON 10%) 20 mEq/15 mL oral liquid 40 mEq  40 mEq Per NG tube BID WITH MEALS    pantoprazole (PROTONIX) 2 mg/mL oral suspension 40 mg  40 mg Per NG tube ACB    0.9% sodium chloride infusion  50 mL/hr IntraVENous CONTINUOUS    spironolactone (ALDACTONE) tablet 25 mg  25 mg Oral DAILY    insulin lispro (HUMALOG) injection   SubCUTAneous Q6H    apixaban (ELIQUIS) tablet 2.5 mg  2.5 mg Per NG tube BID    albuterol-ipratropium (DUO-NEB) 2.5 MG-0.5 MG/3 ML  3 mL Nebulization TID PRN    chlorhexidine (PERIDEX) 0.12 % mouthwash 10 mL  10 mL Oral Q12H    bisacodyl (DULCOLAX) suppository 10 mg  10 mg Rectal DAILY PRN    sodium chloride (NS) flush 5-10 mL  5-10 mL IntraVENous Multiple    phosphorus (K PHOS NEUTRAL) 250 mg tablet 1 Tab  1 Tab Oral BID    albumin human 25% (BUMINATE) solution 12.5 g  12.5 g IntraVENous Q8H PRN    docusate sodium (COLACE) capsule 100 mg  100 mg Oral BID    polyvinyl alcohol (LIQUIFILM TEARS) 1.4 % ophthalmic solution 2 Drop  2 Drop Both Eyes TID    sodium chloride (NS) flush 10 mL  10 mL InterCATHeter PRN    alteplase (CATHFLO) 1 mg in sterile water (preservative free) 1 mL injection  1 mg InterCATHeter PRN    bacitracin 500 unit/gram packet 1 Packet  1 Packet Topical PRN    glucose chewable tablet 16 g  4 Tab Oral PRN    dextrose (D50W) injection syrg 12.5-25 g  12.5-25 g IntraVENous PRN    glucagon (GLUCAGEN) injection 1 mg  1 mg IntraMUSCular PRN    sodium chloride (NS) flush 5-10 mL  5-10 mL IntraVENous PRN ______________________________________________________________________  EXPECTED LENGTH OF STAY: 4d 21h  ACTUAL LENGTH OF STAY:          36                 Roma Mclain MD

## 2017-04-13 NOTE — INTERDISCIPLINARY ROUNDS
IDR/SLIDR Summary          Patient: Carito Tapia MRN: [id number]    Age: 80 y.o. YOB: 1930 Room/Bed: [facility]   Admit Diagnosis: Hypothermia  Principal Diagnosis: Hypothermia   Goals: Palliative  Readmission: NO  Quality Measure: CHF  VTE Prophylaxis: Mechanical  Influenza Vaccine screening completed? YES  Pneumococcal Vaccine screening completed? YES  Mobility needs: Yes   Nutrition plan:Yes  Consults:P.T, O.T., Speech and Case Management    Financial concerns:No  Escalated to CM? YES  RRAT Score: 25   Interventions:Palliative Care   Testing due for pt today?  NO  LOS: 36 days Expected length of stay TBD days  Discharge plan: TBD   PCP: Emelyn May MD  Transportation needs: Yes    Days before discharge:longer than expected LOS  Discharge disposition: TBD    Signed:     Zarina Blum RN  4/13/2017  9:00 AM

## 2017-04-13 NOTE — PROGRESS NOTES
NUTRITION COMPLETE ASSESSMENT    RECOMMENDATIONS:   1. Increase tube feeds to meet 100% needs with minimal PO:   - Glucerna 1.2 @ 106ml/hr x 12 hr (7pm to 7 am) + 170ml water flush q4hr during feeding   - Further fluid flush adjustment per MD  2. Monitor Mg and K+ levels  3. Continue daily weights      Interventions/Plan:   Food/Nutrient Delivery: increase tube feeds to continuous  Assessment:   Reason for Assessment:  [x]Reassessment     Diet: Puree  Supplements: Ensure, Boost Pudding, Magic Cup, Prosource w/ applesauce  Tube Feeding: Glucerna 1.2 @ 60 ml/hr x 12 hr (7pm to 7 am) with 50 ml water flush q 4 hr during feeding  Nutritionally Significant Medications: [x] Reviewed & Includes: Bumex, Colace, SSI,protonix, K Phos, KCl, Aldactone, NS @ 50ml/hr  Meal Intake:   Patient Vitals for the past 100 hrs:   % Diet Eaten   04/11/17 1813 5 %   04/11/17 1600 10 %   04/10/17 1600 25 %     Subjective:  n/a    Objective:  Pt admitted with Hypothermia. PMHx: CHF, DM 2, HTN, others noted. Chronic CHF with severe MR and not a surgical candidate. Milrinone drip continues. Worsening renal function noted today with transfer to CCU. Cardiology noted may need increase in fluid flushes with hypernatremia. Plans for Palliative meeting with family today or tomorrow. Varying thoughts among children about comfort care vs continuation of current measures. On tube feeds since 3/30. Tube feeds adjusted to nocturnal regimen on 4/6 but intake remains minimal (see above). Tube feeding as ordered provide: 720ml, 865kcal, 43g protein, 730ml free water (tube feeding/flush). Meets 56% estimated energy and protein needs. Will pt care plans moving forward pending recommend increase in tube feeds to meet goals since oral intake inadequate. Increase tube feeds to: Glucerna 1.2 @ 106ml/hr x 12 hr (7pm to 7 am) + 170ml water flush q4hr during feeding. Provides: 1272ml, 1526kcal, 76g protein, 1023ml free fluid + 510ml flush = 1533ml fluid.  Meets 100% energy and protein needs. Further fluid flush adjustment per MD.     Weight down 13.1kg since admit. Suspect d/t aggressive diuresis. Hypomagnesemia today, will need to monitor with tube feeds. Hyperkalemia also noted with repletion earlier. Estimated Nutrition Needs:   Kcals/day: 1530 Kcals/day (MSJ x 1.4)  Protein: 77 g (77-83 (1.2-1.3g/kg))  Fluid:  (1 ml/kcal)  Based On: [place] [address] [place]  Weight Used: Actual wt (64 kg)    Pt expected to meet estimated nutrient needs:  []   Yes   [x]  No  [] Unable to predict at this time  Nutrition Diagnosis:   1. Inadequate oral food/beverage intake related to prolonged illness/hospitalization as evidenced by less than 25% od meals; nocturnal TF    Goals:     EN to meet at least 90% needs in 1-2 days     Monitoring & Evaluation:    - Enteral/parenteral nutrition intake, Oral fluids amount, IV fluids   - Weight/weight change    Previous Nutrition Goals Met:   No  Previous Recommendations:    N/A    Education & Discharge Needs:   [x] None Identified   [] Identified and addressed    [] Participated in care plan, discharge planning, and/or interdisciplinary rounds        Cultural, Restoration and ethnic food preferences identified:   None    Skin Integrity: [x]Intact  []Other  Edema: []None [x] 2+ pitting BLE's  Last BM: 4/11 - loose  Food Allergies: [x]None []Other    Anthropometrics:    Weight Loss Metrics 4/13/2017 3/8/2017 1/10/2017 1/4/2017 12/27/2016 12/16/2016 11/12/2016   Today's Wt 138 lb 7.2 oz - 146 lb 3.2 oz 150 lb 157 lb 12.8 oz 152 lb 149 lb 6 oz   BMI - 22.35 kg/m2 23.6 kg/m2 24.21 kg/m2 25.47 kg/m2 24.53 kg/m2 24.11 kg/m2      Last 3 Recorded Weights in this Encounter    04/11/17 0300 04/12/17 0300 04/13/17 0300   Weight: 60.6 kg (133 lb 9.6 oz) 58.6 kg (129 lb 3 oz) 62.8 kg (138 lb 7.2 oz)      Weight Source: Bed  Height: 5' 6\" (167.6 cm),    Body mass index is 22.35 kg/(m^2).   IBW : 59 kg (130 lb), % IBW (Calculated): 108.87 %   ,      Labs:    Lab Results Component Value Date/Time    Sodium 151 04/13/2017 03:58 AM    Potassium 3.9 04/13/2017 03:58 AM    Chloride 120 04/13/2017 03:58 AM    CO2 24 04/13/2017 03:58 AM    Glucose 148 04/13/2017 03:58 AM    BUN 45 04/13/2017 03:58 AM    Creatinine 1.17 04/13/2017 03:58 AM    Calcium 8.6 04/13/2017 03:58 AM    Calcium 8.6 04/13/2017 03:58 AM    Magnesium 2.7 04/13/2017 12:43 AM    Phosphorus 4.6 04/13/2017 12:43 AM    Albumin 2.4 04/03/2017 02:33 AM     Fawn Estrella RD

## 2017-04-13 NOTE — ROUTINE PROCESS
RT at bedside to adjust Bipap mask on pt. PT assessed. .. does not need a neb treatment at this time. Heart rate increased and irregular 108bpm . No wheezing or respiratory distress noted. RT will continue to monitor.

## 2017-04-14 PROBLEM — I50.9 CONGESTIVE HEART FAILURE (HCC): Status: ACTIVE | Noted: 2017-01-01

## 2017-04-14 NOTE — HOSPICE
[facility]  [phone]    Patient Name: Bossman Ortez  YOB: 1930  Age: 80 y.o. Hospice meeting scheduled with family today at 3:30pm.    Thank you for the opportunity to be of service to this patient.     Randy Payton RN

## 2017-04-14 NOTE — PROGRESS NOTES
Palliative Medicine Consult  [place]: [phone] ([phone]    Patient Name: Quinton Shaver  YOB: 1930    Date of Initial Consult: 3/13/17, reconsulted 4/12/17  Reason for Consult: Care decisions   Requesting Provider: Rolf  Primary Care Physician: Daryle Samuel, MD      SUMMARY:   Quinton Shaver \"Warner\" is a 80 y.o. with a past history of systolic CHF with severe MR (EF 30% on 3/8/17, 3/31, diffuse hypokinesis), AICD, DM, HTN, a fib who was admitted on 3/8/2017 from her PCP office w/ epigastric pain and nausea, in ED was hypothermic to 91.4 degrees and hypotensive. Treated in ICU with empiric abx, echo concerning for possible aortic valve vegetation but BCx x 3 NGTD and AMMON neg for vegetations. Has been diuresed. CT abd showing gallstones, no  Cholecystitis obstruction. GI has signed off but plan on seeing for EGD when stable. Pt was intubated 3/10-3/12, had large mucous plug. Renal following for JUAN. Known well to Dr Olayinka Duarte. CT head wnl. Had improved well throughout this stay, had 2 family meetings. However due to resp distress required re-intubation 3/27. Has since been extubated but cont to decline, had chest tube placed 3/31. Has severe MR has been worked up by CT surgery team and advanced HF team- too high risk for any interventions and recommends palliative measures. Current medical issues leading to Palliative Medicine involvement include:care decisions. Reconsulted on 4/12 due to overall poor prognosis, not surgical candidate. Social: Pt was 1 of 13 children, she has 7 children and about 20 grandchildren, and great grands. Very involved in the Islam in [place] where she lives and is very loved. Before admission was alert/oriented and high functioning. No AMD but family tends to defer to Imelda Francois ([phone]) (dtr). PALLIATIVE DIAGNOSES:     1. Shortness of breath   2. Encephalopathy   3. Fatigue  4. Edema  5. Debility   6. Goals of care       PLAN:   1.  Appreciate Dr Renate Hassan speaking w/ family yest. Dtr Vitaliy Dumas at bedside, all children are in agreement w/ comfort measures. She is grateful for all care and pleased that pt is much more comfortable today. 2. Comfort measures only at this time. 3. Cont O2 for comfort. 4. Prn Morphine IV for pain or SOB. Also w/ nebs. 5. Prn Ativan for anxiety. 6. Remove dobhoff. Bites/sips for comfort. 7. Hospice consult has been placed- possible comfort bed given acute change. 8. Appreciate CCU staff and cardiology helping turn off ICD. Family aware and in agreement. Cont pacemaker. 9. Cont pastoral care- very important for family miko given the fact it is Good Friday and one of the granddtr's birthdays today. 10. Communicated plan of care with: Palliative IDT; Dr Renate Hassan ; Baljeet Carver RN       GOALS OF CARE / TREATMENT PREFERENCES:   [====Goals of Care====]  GOALS OF CARE:  Patient / health care proxy stated goals: comfort measures       TREATMENT PREFERENCES:   Code Status: DNR- will need if survives hospital stay    Advance Care Planning:  Advance Care Planning 3/28/2017   Patient's Healthcare Decision Maker is: Legal Next of Shanelle 69   Primary Decision Maker Name Rhona Giles (but there are 8 children)   Primary Decision Maker Phone Number [phone]   Primary Decision Maker Relationship to Patient Adult child   Confirm Advance Directive -   Patient Would Like to Complete Advance Directive -       Other:    The palliative care team has discussed with patient / health care proxy about goals of care / treatment preferences for patient.  [====Goals of Care====]    Family incl dtrs Eliot Smith and bessie Martinez. HISTORY:     HPI/SUBJECTIVE:    The patient is:   [] Verbal and participatory  [x] Non-participatory due to: medical condition - confused    Pt nonverbal and not communicating, but quite comfortable and resting.      Clinical Pain Assessment (nonverbal scale for severity on nonverbal patients): [++++ Clinical Pain Assessment++++]  [++++Pain Severity++++]: Pain: 0  [++++Pain Character++++]:   [++++Pain Duration++++]:   [++++Pain Effect++++]:   [++++Pain Factors++++]:   [++++Pain Frequency++++]:   [++++Pain Location++++]:   [++++ Clinical Pain Assessment++++]     FUNCTIONAL ASSESSMENT:     Palliative Performance Scale (PPS):  PPS: 20       PSYCHOSOCIAL/SPIRITUAL SCREENING:     Advance Care Planning:  Advance Care Planning 3/28/2017   Patient's Healthcare Decision Maker is: Legal Next of Shanelle Vazquez   Primary Decision Maker Name Bertrand Aranda (but there are 8 children)   Primary Decision Maker Phone Number [phone]   Primary Decision Maker Relationship to Patient Adult child   Confirm Advance Directive -   Patient Would Like to Complete Advance Directive -        Any spiritual / Islam concerns:  [] Yes /  [x] No    Caregiver Burnout:  [] Yes /  [x] No /  [] No Caregiver Present      Anticipatory grief assessment:   [x] Normal  / [] Maladaptive       ESAS Anxiety:   Cannot obtain due to patient factors    ESAS Depression:   Cannot obtain due to patient factors       REVIEW OF SYSTEMS:     Positive and pertinent negative findings in ROS are noted above in HPI. The following systems were [] reviewed / [x] unable to be reviewed as noted in HPI  Other findings are noted below. Systems: constitutional, ears/nose/mouth/throat, respiratory, gastrointestinal, genitourinary, musculoskeletal, integumentary, neurologic, psychiatric, endocrine. Positive findings noted below. Modified ESAS Completed by: provider   Fatigue: 8 Drowsiness: 3     Pain: 0         Anorexia: 0 Dyspnea: 1           Stool Occurrence(s): 1        PHYSICAL EXAM:     From RN flowsheet:  Wt Readings from Last 3 Encounters:   04/14/17 132 lb 0.9 oz (59.9 kg)   01/10/17 146 lb 3.2 oz (66.3 kg)   01/04/17 150 lb (68 kg)     Blood pressure 117/60, pulse (!) 113, temperature 98.3 °F (36.8 °C), resp.  rate 24, height 5' 6\" (1.676 m), weight 132 lb 0.9 oz (59.9 kg), SpO2 98 %. Pain Scale 1: Adult Nonverbal Pain Scale  Pain Intensity 1: 0     Pain Location 1: Abdomen  Pain Orientation 1: Mid     Pain Intervention(s) 1: Medication (see MAR)    Constitutional: fatigued, quiet, calm  Eyes: pupils equal, anicteric  ENMT: no nasal discharge, dry mucous membranes  Respiratory: breathing not labored  Gastrointestinal: soft non-tender, +bowel sounds  Musculoskeletal: no deformity, no tenderness to palpation  Skin: warm, dry  Neurologic: moving all extremities          HISTORY:     Principal Problem:    Hypothermia (3/8/2017)      Past Medical History:   Diagnosis Date    Atrial fibrillation (HCC)     Chronic systolic heart failure (HCC)     ef 35-40%; hold ACE due to lower bp    Diabetes mellitus type 2, insulin dependent ([address]     Hypertension     Long term (current) use of anticoagulants     Malignant hypertensive heart disease with CHF [facility] [address]     Mitral regurgitation     Pacemaker 10/25/2012    The pacemaker is a [employer], model # [id number], serial [id number]. 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, 1996    # 28 [place]    Thyroid disease     TR (tricuspid regurgitation)     moderate to severe echo 2009      Past Surgical History:   Procedure Laterality Date    CARDIAC SURG PROCEDURE UNLIST  1996    valve replacement    HX HEART CATHETERIZATION  10/4/2012    normal cors, LVEF 50%    HX HEART VALVE SURGERY      HX PACEMAKER      INS PPM/ICD LED DUAL ONLY  10/25/2012         INS PPM/ICD LED DUAL ONLY  9/9/2016           Family History   Problem Relation Age of Onset    Cancer Sister      breast    Heart Disease Brother     Heart Disease Brother     Diabetes Son     Stroke Son       History reviewed, no pertinent family history.   Social History   Substance Use Topics    Smoking status: Never Smoker    Smokeless tobacco: Never Used    Alcohol use No     No Known Allergies   Current Facility-Administered Medications   Medication Dose Route Frequency    LORazepam (ATIVAN) injection 1 mg  1 mg IntraVENous Q15MIN PRN    glycopyrrolate (ROBINUL) injection 0.2 mg  0.2 mg IntraVENous Q4H PRN    ondansetron (ZOFRAN) injection 4 mg  4 mg IntraVENous Q4H PRN    acetaminophen (TYLENOL) tablet 650 mg  650 mg Oral Q4H PRN    Or    acetaminophen (TYLENOL) solution 650 mg  650 mg Oral Q4H PRN    Or    acetaminophen (TYLENOL) suppository 650 mg  650 mg Rectal Q4H PRN    scopolamine (TRANSDERM-SCOP) 1.5 mg  1.5 mg TransDERmal Q72H PRN    morphine injection 10 mg  10 mg Inhalation Q1H PRN    morphine injection 2 mg  2 mg IntraVENous Q15MIN PRN    albuterol-ipratropium (DUO-NEB) 2.5 MG-0.5 MG/3 ML  3 mL Nebulization TID PRN    bisacodyl (DULCOLAX) suppository 10 mg  10 mg Rectal DAILY PRN    sodium chloride (NS) flush 5-10 mL  5-10 mL IntraVENous Multiple    sodium chloride (NS) flush 10 mL  10 mL InterCATHeter PRN    alteplase (CATHFLO) 1 mg in sterile water (preservative free) 1 mL injection  1 mg InterCATHeter PRN    bacitracin 500 unit/gram packet 1 Packet  1 Packet Topical PRN    sodium chloride (NS) flush 5-10 mL  5-10 mL IntraVENous PRN          LAB AND IMAGING FINDINGS:     Lab Results   Component Value Date/Time    WBC 6.2 04/10/2017 03:00 AM    HGB 9.4 04/10/2017 03:00 AM    PLATELET 834 92/61/7706 03:00 AM     Lab Results   Component Value Date/Time    Sodium 151 04/13/2017 03:58 AM    Potassium 3.9 04/13/2017 03:58 AM    Chloride 120 04/13/2017 03:58 AM    CO2 24 04/13/2017 03:58 AM    BUN 45 04/13/2017 03:58 AM    Creatinine 1.17 04/13/2017 03:58 AM    Calcium 8.6 04/13/2017 03:58 AM    Calcium 8.6 04/13/2017 03:58 AM    Magnesium 2.7 04/13/2017 12:43 AM    Phosphorus 4.6 04/13/2017 12:43 AM      Lab Results   Component Value Date/Time    AST (SGOT) 22 04/03/2017 02:33 AM    Alk.  phosphatase 55 04/03/2017 02:33 AM    Protein, total 6.2 04/03/2017 02:33 AM    Albumin 2.4 04/03/2017 02:33 AM    Globulin 3.8 04/03/2017 02:33 AM     Lab Results   Component Value Date/Time    INR 1.4 03/29/2017 04:49 AM    Prothrombin time 14.2 03/29/2017 04:49 AM    aPTT 33.5 03/29/2017 04:49 AM      No results found for: IRON, FE, TIBC, IBCT, PSAT, FERR   No results found for: PH, PCO2, PO2  No components found for: [place]   Lab Results   Component Value Date/Time    CK 88 09/01/2012 04:00 AM    CK - MB 2.2 09/01/2012 04:00 AM                Total time: 35min  Counseling / coordination time:  30 min  > 50% counseling / coordination?: yes    Prolonged service was provided for  []30 min   []75 min in face to face time in the presence of the patient. Time Start:    Time End:   Note: this can only be billed with 97527 (initial) or 12672 (follow up). If multiple start / stop times, list each separately.

## 2017-04-14 NOTE — HOSPICE
[facility]  [phone]     Patient Name: Xin Harding  YOB: 1930  Age: 80 y.o. 190 Kamala Moyer RN Note:  Hospice consult received, reviewing chart. Will follow up with Unit Nurse and Care Manager to discuss plan of care, patient status and discharge disposition within the hour. Thank you for the opportunity to be of service to this patient.     Monae Parker RN

## 2017-04-14 NOTE — ROUTINE PROCESS
TRANSFER - OUT REPORT:    Verbal report given to Giovanni Larson CRM being transferred to [facility] for routine progression of care   Report consisted of patients Situation, Background, Assessment and   Recommendations(SBAR).

## 2017-04-14 NOTE — PROGRESS NOTES
Problem: Falls - Risk of  Goal: *Absence of falls  Outcome: Progressing Towards Goal  Bedrest  Q1 hour safety checks      Goal: *Knowledge of fall prevention  Outcome: Progressing Towards Goal  Bedrest  Q1 hour safety checks        Problem: Pressure Ulcer - Risk of  Goal: *Prevention of pressure ulcer  Outcome: Progressing Towards Goal  Turning every 2 hours and PRN via the mobility team  Barrier cream/Incontinence creams applied to skin PRN  Monitoring moisture in the bed hourly and PRN

## 2017-04-14 NOTE — PROGRESS NOTES
Patient comfort care since yesterday evening after discussing with son and 3 daughters.     S: doesn't provide any history, lethargic    O:   Visit Vitals    /60 (BP 1 Location: Left arm, BP Patient Position: At rest)    Pulse (!) 109    Temp 98.3 °F (36.8 °C)    Resp 28    Ht 5' 6\" (1.676 m)    Wt 59.9 kg (132 lb 0.9 oz)    SpO2 96%    BMI 21.31 kg/m2     Appears comfortable    P:   Continue comfort measures  Hospice consulted  Noted ICD function to be switched off, pacemaker to continue

## 2017-04-14 NOTE — HOSPICE
[facility]  [facility]  [phone]    Inpatient Nursing Admission   Patient Name: Yudi Gurrola  YOB: 1930  Age: 80 y.o. Date of Hospice Admission: 4/14/2017  Hospice Attending Elected by Patient: Chloe Dupont MD  Primary Care Physician: Rukhsana Alba MD  Admitting RN: Ellyn Aquino RN  : ANNALEE Hsu     Level of Care (GIP/Routine/Respite): Routine  Facility of Care: [facility]  Patient Room: 216/01     HOSPICE SUMMARY   ER Visits/ Hospitalizations in past year: 4  Hospice Diagnosis: CHF  Congestive heart failure ([facility] [address]  Onset Date of Hospice Diagnosis: 3/8/17  Summary of Disease Progression Leading to Hospice Diagnosis:   Per Dr. Lennox Barba 3/9/17: This is an 55-year-old female with   history of chronic systolic congestive heart failure, atrial fibrillation,   diabetes mellitus, hypertension, chronic anticoagulation use, history of   pacemaker and ICD. She was sent from her primary care physician to   the emergency room to be evaluated. History from the patient and her   daughters at the bedside, also from the records. Per the daughter, she   has been having on and off abdominal pain, epigastric pain for awhile,   over the last 3 days has been mostly pronounced. She said her pain at   times is 9/10 in severity, it is nonradiating, associated with some   nausea, but no vomiting. She denies any diarrhea. At her PCP's office,   they could not get her temperature, so they sent her to the emergency   room. In the ER she has been hypothermic, her temperature was 91.4   on initial presentation. Also she was hypotensive. Her blood pressure   systolic was 70/26, so she was given about 1.5 liters of IV fluid, and   now she is getting 1 bottle of albumin. She was placed on warming   blanket, Lizzy Hugger. She denies any dysuria or frequency. Denies any   shortness of breath, chest pain or cough. No recent hospitalization.    She has chronic leg edema, which is unchanged. In the ER, her CT of   the abdomen showed anasarca with bilateral pleural effusion and   ascites, cardiomegaly and cholelithiasis. She was ordered to have an   ultrasound, which showed cholelithiasis, no other   sonographic suggests acute cholecystitis. Also, she had   hematogenous liver with irregular border, raised possibility of cirrhosis. In the ER also she was hypoglycemic. She was treated with   D50 glucose, and empirically she was started on IV Zosyn after blood   culture was obtained. Her lactic acid was 1. Lipase was normal.    Co-Morbidities:   Patient Active Problem List   Diagnosis Code    Chronic systolic heart failure (HCC) I50.22    Rupture of chordae tendineae (HCC) I51.1    Mitral regurgitation I34.0    S/P mitral valve repair Z98.890    TR (tricuspid regurgitation) I07.1    Malignant hypertensive heart disease with CHF ([facility] [address] I11.0    PAF (paroxysmal atrial fibrillation) (HCC) I48.0    Diabetes mellitus type 2, 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 Bettylou Duty    Congestive heart failure (HCC) I50.9     Diagnoses RELATED to the terminal prognosis: Heart failure, Pleural Effusion, Afib   Other Diagnoses: DM, Epigastric pain, malnutrition, debility    Rationale for a prognosis of life expectancy of 6 months or less if the disease follows its normal course (Disease Specific History):   Per Dr. Antoine Tomas, 4/14/17:  Janel Cash \"Warner\" is a 80 y.o. with a past history of systolic CHF with severe MR (EF 30% on 3/8/17, 3/31, diffuse hypokinesis), AICD, DM, HTN, a fib who was admitted on 3/8/2017 from her PCP office w/ epigastric pain and nausea, in ED was hypothermic to 91.4 degrees and hypotensive. Treated in ICU with empiric abx, echo concerning for possible aortic valve vegetation but BCx x 3 NGTD and AMMON neg for vegetations. Has been diuresed. CT abd showing gallstones, no Cholecystitis obstruction. GI has signed off but plan on seeing for EGD when stable. Pt was intubated 3/10-3/12, had large mucous plug. Renal following for JUAN. Known well to Dr Rell Joy. CT head wnl. Had improved well throughout this stay, had 2 family meetings. However due to resp distress required re-intubation 3/27. Has since been extubated but cont to decline, had chest tube placed 3/31. Has severe MR has been worked up by CT surgery team and advanced HF team- too high risk for any interventions and recommends palliative measures. _______________________________    Erin Cuba is a 80 y.o. who was admitted to [facility]. The patient's principle diagnosis of Congestive Heart Failure, Respiratory Fx has resulted in dyspnea, debility, minimal responsiveness. Functionally, the patient's Palliative Performance Scale has declined over a period of 6 weeks and is estimated at 20%. Objective information that support this patients limited prognosis includes:    CT Abd/Pelvis 3/8/17:  MPRESSION  impression: Anasarca with bilateral pleural effusion and ascites. Cholelithiasis. Cardiomegaly. The patient/family chose comfort measures with the support of Hospice. Patient meets for Routine LOC as evidenced by respiratory distress, irregular heart rhythm, unstable to transfer.      Prognosis estimated based on 04/14/17 clinical assessment is:   [] Few to Many Hours  [] Hours to Days   [x] Few to Many Days   [] Days to Weeks   [] Few to Many Weeks   [] Weeks to Months   [] Few to Many Months    ASSESSMENT    Patient self-reports:  []  Yes    [x] No    SYMPTOMS: Pt bedbound, unable to express needs, short of breath, grimacing with movement, does not follow command. SIGNS/PHYSICAL FINDINGS: Pt is ill-appearing elderly female, minimally responsive to verbal and physical stimuli, eyes open at times with fixed gaze, mouth breathing on 2L O2 NC, RR-24, irregular, using accessory muscles, lung sounds diminished, + irregular, extra beats. Left-chest pacemaker. Right upper arm PICC triple lumen. Abd firm and distended, tender to touch, bowel sounds hypo, galarza in place draining clear dark ron urine. Skin dry, warm, open area on right buttock with dsg in place, Pedals +. KARNOFSKY: 20%    FAST for all dementia:     CLINICAL INFORMATION     Wt Readings from Last 3 Encounters:   04/14/17 59.9 kg (132 lb 0.9 oz)   01/10/17 66.3 kg (146 lb 3.2 oz)   01/04/17 68 kg (150 lb)     Ht Readings from Last 3 Encounters:   04/13/17 5' 6\" (1.676 m)   01/10/17 5' 6\" (1.676 m)   01/04/17 5' 6\" (1.676 m)     There is no height or weight on file to calculate BMI. Visit Vitals    /78 (BP 1 Location: Left arm, BP Patient Position: At rest)    Pulse (!) 102    Temp 99.5 °F (37.5 °C)    Resp 22    SpO2 98%       LAB VALUES     Ref.  Range 4/13/2017 00:43   Sodium Latest Ref Range: 136 - 145 mmol/L 142   Potassium Latest Ref Range: 3.5 - 5.1 mmol/L 5.7 (H)   Chloride Latest Ref Range: 97 - 108 mmol/L 104   CO2 Latest Ref Range: 21 - 32 mmol/L 31   Anion gap Latest Ref Range: 5 - 15 mmol/L 7   Glucose Latest Ref Range: 65 - 100 mg/dL 220 (H)   BUN Latest Ref Range: 6 - 20 MG/DL 58 (H)   Creatinine Latest Ref Range: 0.55 - 1.02 MG/DL 1.69 (H)   BUN/Creatinine ratio Latest Ref Range: 12 - 20   34 (H)   Calcium Latest Ref Range: 8.5 - 10.1 MG/DL 13.4 (HH)   Phosphorus Latest Ref Range: 2.6 - 4.7 MG/DL 4.6   Magnesium Latest Ref Range: 1.6 - 2.4 mg/dL 2.7 (H)   GFR est non-AA Latest Ref Range: >60 ml/min/1.73m2 29 (L)   GFR est AA Latest Ref Range: >60 ml/min/1.73m2 35 (L)   BNP Latest Ref Range: 0 - 100 pg/mL 1198 (H)     Results for NOY GUERRERO Karishma Barger (MRN [id number]) as of 4/15/2017 00:31   Ref. Range 4/10/2017 03:00   WBC Latest Ref Range: 3.6 - 11.0 K/uL 6.2   WBC CORRECTED FOR NR Latest Units:   ADJUSTED FOR NUCL. .. NRBC Latest Ref Range: 0  WBC 0.6 (H)   RBC Latest Ref Range: 3.80 - 5.20 M/uL 4.07   HGB Latest Ref Range: 11.5 - 16.0 g/dL 9.4 (L)   HCT Latest Ref Range: 35.0 - 47.0 % 29.2 (L)   MCV Latest Ref Range: 80.0 - 99.0 FL 71.7 (L)   MCH Latest Ref Range: 26.0 - 34.0 PG 23.1 (L)   MCHC Latest Ref Range: 30.0 - 36.5 g/dL 32.2   RDW Latest Ref Range: 11.5 - 14.5 % 20.1 (H)   PLATELET Latest Ref Range: 150 - 400 K/uL 126 (L)   NEUTROPHILS Latest Ref Range: 32 - 75 % 69   LYMPHOCYTES Latest Ref Range: 12 - 49 % 17   MONOCYTES Latest Ref Range: 5 - 13 % 14 (H)   EOSINOPHILS Latest Ref Range: 0 - 7 % 0       Lab Results   Component Value Date/Time    Protein, total 6.2 04/03/2017 02:33 AM    Albumin 2.4 04/03/2017 02:33 AM       Currently this patient has:  [x] Supplemental O2 [] Peripheral IV  [x] PICC    [] PORT   [x] Richardson Catheter [] NG Tube   [] PEG Tube [] Ostomy    [x] AICD: Has ICD been deactivated? [x] Yes [] No:______    PLAN     1. Admit to Routine level of care, inpatient. 2.  Dyspnea. Start scheduled Morphine 2mg IV every 4hr and PRN. Continue O2 NC. 3.  Nonverbal pain. Morphine IV. 4.  Secretions. Scopolamine patch, Robinul IV PRN  5. Debility. Frequent turns, check skin frequently. 6.  Support and educate family. End of life symptoms, medication changes. 7. Dis[facility]  [facility]. Hospice Team Frequency Orders:  Skilled Nurse -  Every other day x 7 days /every other day x 7 days with 5 PRN visits for symptom control. ANNALEE  1 visit for initial assessment/evaluation for family support and need for volunteer services. Albert Wagner  1 visit for initial assessment/evaluation for spiritual support.      ADVANCE CARE PLANNING (Complete in ACP Flow Sheet)   Code Status: DNR  Durable DNR: [x]  Yes  [] No  Advance Care Planning 3/28/2017   Patient's Healthcare Decision Maker is: Legal Next of Kin   Primary Decision Maker Name Eliot Lee (but there are 8 children)   Primary Decision Maker Phone Number [phone]   Primary Decision Maker Relationship to Patient Adult child   Confirm Advance Directive -   Patient Would Like to Complete Advance Directive -        Service: [] Yes  [x]  No      [] Unknown  Appropriate for Pinning Ceremony:  [] Yes     [] No  Mormonism: Temple   Home:  Mikaela Burger     1. Discharge Plan: Transfer to [facility] if safe to transfer. 2. Patient/Family teaching:  Hospice philosophy on artificial hydration, nutrution. 3. Response to patient/family teaching: Good understanding, supportive. SOCIAL/EMOTIONAL/SPIRITUAL NEEDS     Spiritual Issues Identified:  Strong libby,  visits welcomed    Psych/ Social/ Emotional Issues Identified: Strong family support, 7 children, 20 grands and some great-grands. Caregiver Support:  [x] Provided information on End of Life Care   [] Material Provided: Gone From My Sight or Benito Navas contacted, discharge to hospice order received  Dr. Elmira Bejarano  contacted, agrees to serve as attending provider for hospice and provided verbal certification of terminal illness. Orders for hospice admission, medications and plan of treatment received. Medication reconciliation completed.   MEDS: See medication list below  DME: Per hospital  Supplies: Per hospital  IDT communication to include MD, SN, SW, CH and support team    ALLERGIES AND MEDICATIONS     Allergies: No Known Allergies  Current Facility-Administered Medications   Medication Dose Route Frequency    LORazepam (ATIVAN) injection 0.5 mg  0.5 mg IntraVENous Q15MIN PRN    acetaminophen (TYLENOL) suppository 650 mg  650 mg Rectal Q4H PRN    scopolamine (TRANSDERM-SCOP) 1.5 mg  1.5 mg TransDERmal Q72H    glycopyrrolate (ROBINUL) injection 0.2 mg  0.2 mg IntraVENous Q4H PRN    bisacodyl (DULCOLAX) suppository 10 mg  10 mg Rectal DAILY PRN    morphine injection 2 mg  2 mg IntraVENous Q15MIN PRN    morphine injection 2 mg  2 mg IntraVENous Q4H    haloperidol lactate (HALDOL) injection 2 mg  2 mg IntraVENous Q6H PRN    saline peripheral flush soln 5 mL  5 mL InterCATHeter PRN

## 2017-04-14 NOTE — PROGRESS NOTES
[employer] notified the patients family has decided to have ICD function of the device turned off  Alexx with [employer] [employer] notified   He will be by this afternoon to turn ICD function off     Alee Vazquez NP  [phone]  [facility]       Addendum  I was informed that family would like shock therapy off

## 2017-04-14 NOTE — PROGRESS NOTES
0730- Report obtained from [address] [facility].  0930- Family at the bedside. 1200- Uneventful morning. Waiting for hospice to come evaluation patient for admission. [address] [facility] is here to drop off information. 1500- TRANSFER - OUT REPORT:    Verbal report given to Yashira(name) on Chelsea Mendoza  being transferred to [address] [place] [address](unit) for routine progression of care       Report consisted of patients Situation, Background, Assessment and   Recommendations(SBAR). Information from the following report(s) SBAR, Kardex and MAR was reviewed with the receiving nurse. Lines:   PICC Triple Lumen 63/04/84 Right;Basilic (Active)   Central Line Being Utilized Yes 4/14/2017 12:00 PM   Criteria for Appropriate Use Limited/no vessel suitable for conventional peripheral access 4/14/2017 12:00 PM   Site Assessment Clean, dry, & intact 4/14/2017 12:00 PM   Phlebitis Assessment 0 4/14/2017  8:00 AM   Infiltration Assessment 0 4/14/2017  8:00 AM   Date of Last Dressing Change 04/13/17 4/14/2017 12:00 PM   Dressing Status Clean, dry, & intact 4/14/2017 12:00 PM   External Catheter Length (cm) 0 centimeters 4/11/2017  9:58 AM   Dressing Type Disk with Chlorhexadine gluconate (CHG) 4/14/2017 12:00 PM   Action Taken Open ports on tubing capped 4/14/2017 12:00 PM   Hub Color/Line Status Raiza Vázquezn 4/14/2017 12:00 PM   Positive Blood Return (Site #1) Yes 4/13/2017  8:00 PM   Hub Color/Line Status Red 4/14/2017 12:00 PM   Positive Blood Return (Site #2) Yes 4/13/2017  8:00 PM   Hub Color/Line Status White 4/14/2017 12:00 PM   Positive Blood Return (Site #3) Yes 4/13/2017  8:00 PM   Alcohol Cap Used Yes 4/14/2017 12:00 PM        Opportunity for questions and clarification was provided.       Patient transported with:   Monitor  O2 @ 2 liters  Registered Nurse  [facility]

## 2017-04-14 NOTE — PROGRESS NOTES
spoke with Hillary Kocher CCU nurse as patient transferred to room 216 and there is a Hospice meeting with family at [place] [id number] today. I met briefly with alex and offered support.

## 2017-04-14 NOTE — H&P
[address] [place] [address] [facility]  [phone]    Patient Name: Carissa Doe  YOB: 1930    Date of Provider Hospice Visit: 04/14/17    Level of Care:   [] General Inpatient (GIP)    [x] Routine   [] Respite    Location of Care:  [x] [facility] [] [facility] [] [facility] [] [facility] [] [facility]  [] Home [] Other:      Date of Original Hospice Admission: 4-14-17  Hospice Attending: Dr. Terrie Ramos Diagnosis: congestive heart failure, end stage heart disease  Diagnoses RELATED to the terminal prognosis: respiratory failure, MR SSS 3rd degree AV block, PAF, HTN        HOSPICE NARRATIVE COMPOSED BY PHYSICIAN   Rationale for a prognosis of life expectancy of 6 months or less if the disease follows its normal course:    Carissa Doe is a 80y.o. year old who was admitted to [facility]. The patient's principle diagnosis of systolic CHF with severe MR  has resulted in respiratory and heart failure, hypotension. Functionally, the patient's Palliative Performance Scale has declined over a period of several weeks decline and is estimated at 10%. Objective information that support this patients limited prognosis includes: pt was recently admitted to [facility] ICU, has AICD, DM, HTN, a fib who was admitted on 3/8/2017 from her PCP office w/ epigastric pain and nausea, in ED was hypothermic to 91.4 degrees and hypotensive. Treated in ICU with empiric abx, echo concerning for possible aortic valve vegetation but BCx x 3 NGTD and AMMON neg for vegetations. Has been diuresed. CT abd showing gallstones, no Cholecystitis obstruction. GI has signed off but plan on seeing for EGD when stable. Pt was intubated 3/10-3/12, had large mucous plug.,  The patient/family chose comfort measures with the support of Hospice. HOSPICE DIAGNOSES   Active Symptoms:  1. Actively dying  2. Shortness of breath  3. Airway secretions  4. Generalized pain     PLAN   1.  Morphine scheduled and ativan scheduled  2. All comfort medications  3. Remove dobhoff  4. Scop patch as needed  5. Haldol, robinul   and SW to support family needs  Disposition: comfort as pt should not be transferred while actively dying    Prognosis estimated based on 04/14/17 clinical assessment is:   [x] Few to Many Hours  [] Few to Many Days    [] Few to Many Weeks    [] Few to Many Months    Communicated plan of care with: Hospice Case Manager;  Hospice IDT; Care Team     GOALS OF CARE     Resuscitation Status: DNR  Durable DNR: [x] Yes [] No    Advance Care Planning 3/28/2017   Patient's Healthcare Decision Maker is: Legal Next of Shanelle 69   Primary Decision Maker Name Geraldo Brown (but there are 8 children)   Primary Decision Maker Phone Number [phone]   Primary Decision Maker Relationship to Patient Adult child   Confirm Advance Directive -   Patient Would Like to Complete Advance Directive -        HISTORY     History obtained from: chart, family nursing    CHIEF COMPLAINT: 80year old with weakness, altered mental status and end stage heart failure  The patient is:   [] Verbal  [] Nonverbal  [x] Unresponsive    HPI/SUBJECTIVE:  Pt is actively dying       REVIEW OF SYSTEMS     The following systems were: [] reviewed  [x] unable to be reviewed    Positive ROS include:  Constitutional:  Ears/nose/mouth/throat:  Respiratory:  Gastrointestinal:  Musculoskeletal:  Neurologic:  Psychiatric:  Endocrine:     Adult Non-Verbal Pain Assessment Score: 0    Face  [] 0   No particular expression or smile  [] 1   Occasional grimace, tearing, frowning, wrinkled forehead  [] 2   Frequent grimace, tearing, frowning, wrinkled forehead    Activity (movement)  [] 0   Lying quietly, normal position  [] 1   Seeking attention through movement or slow, cautious movement  [] 2   Restless, excessive activity and/or withdrawal reflexes    Guarding  [] 0   Lying quietly, no positioning of hands over areas of body  [] 1   Splinting areas of the body, tense  [] 2   Rigid, stiff    Physiology (vital signs)  [] 0   Stable vital signs  [] 1   Change in any of the following: SBP > 20mm Hg; HR > 20/minute  [] 2   Change in any of the following: SBP > 30mm Hg; HR > 25/minute    Respiratory  [] 0   Baseline RR/SpO2, compliant with ventilator  [] 1   RR > 10 above baseline, or 5% drop SpO2, mild asynchrony with ventilator  [] 2   RR > 20 above baseline, or 10% drop SpO2, asynchrony with ventilator     FUNCTIONAL ASSESSMENT     Palliative Performance Scale (PPS): 10%     PSYCHOSOCIAL/SPIRITUAL ASSESSMENT     Active Problems:    Congestive heart failure ([facility] (4/14/2017)      Past Medical History:   Diagnosis Date    Atrial fibrillation (HCC)     Chronic systolic heart failure (HCC)     ef 35-40%; hold ACE due to lower bp    Diabetes mellitus type 2, insulin dependent ([facility]     Hypertension     Long term (current) use of anticoagulants     Malignant hypertensive heart disease with CHF ([facility]     Mitral regurgitation     Pacemaker 10/25/2012    The pacemaker is a [employer] , model # [id number], serial [id number].    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, 1996    # 28 Turner-Manuel    Thyroid disease     TR (tricuspid regurgitation)     moderate to severe echo 2009      Past Surgical History:   Procedure Laterality Date    CARDIAC SURG PROCEDURE UNLIST  1996    valve replacement    HX HEART CATHETERIZATION  10/4/2012    normal cors, LVEF 50%    HX HEART VALVE SURGERY      HX PACEMAKER      INS PPM/ICD LED DUAL ONLY  10/25/2012         INS PPM/ICD LED DUAL ONLY  9/9/2016           Social History   Substance Use Topics    Smoking status: Never Smoker    Smokeless tobacco: Never Used    Alcohol use No     Family History   Problem Relation Age of Onset    Cancer Sister      breast    Heart Disease Brother     Heart Disease Brother     Diabetes Son     Stroke Son       No Known Allergies   Current Facility-Administered Medications   Medication Dose Route Frequency    LORazepam (ATIVAN) injection 0.5 mg  0.5 mg IntraVENous Q15MIN PRN    acetaminophen (TYLENOL) suppository 650 mg  650 mg Rectal Q4H PRN    scopolamine (TRANSDERM-SCOP) 1.5 mg  1.5 mg TransDERmal Q72H    glycopyrrolate (ROBINUL) injection 0.2 mg  0.2 mg IntraVENous Q4H PRN    bisacodyl (DULCOLAX) suppository 10 mg  10 mg Rectal DAILY PRN    morphine injection 2 mg  2 mg IntraVENous Q15MIN PRN    morphine injection 2 mg  2 mg IntraVENous Q4H    haloperidol lactate (HALDOL) injection 2 mg  2 mg IntraVENous Q6H PRN    saline peripheral flush soln 5 mL  5 mL InterCATHeter PRN        PHYSICAL EXAM     Wt Readings from Last 3 Encounters:   04/14/17 59.9 kg (132 lb 0.9 oz)   01/10/17 66.3 kg (146 lb 3.2 oz)   01/04/17 68 kg (150 lb)       There were no vitals taken for this visit.     Supplemental O2  [x] Yes  [] NO  Last bowel movement:     Currently this patient has:  [x] Peripheral IV [] PICC  [] PORT [] ICD    [x] Richardson Catheter [] NG Tube   [] PEG Tube    [] Rectal Tube [] Drain  [x] Other: dobhoff    Constitutional:unresponsive  Eyes: cl  ENMT: airway secretions  Cardiovascular: tachycardia  Respiratory: shortness of breath  Gastrointestinal: snt  Musculoskeletal: weakness  Skin: warm, dry  Neurologic: na  Psychiatric: calm  Other:    Pertinent Lab and or Imaging Tests:  Lab Results   Component Value Date/Time    Sodium 151 04/13/2017 03:58 AM    Potassium 3.9 04/13/2017 03:58 AM    Chloride 120 04/13/2017 03:58 AM    CO2 24 04/13/2017 03:58 AM    Anion gap 7 04/13/2017 03:58 AM    Glucose 148 04/13/2017 03:58 AM    BUN 45 04/13/2017 03:58 AM    Creatinine 1.17 04/13/2017 03:58 AM    BUN/Creatinine ratio 38 04/13/2017 03:58 AM    GFR est AA 53 04/13/2017 03:58 AM    GFR est non-AA 44 04/13/2017 03:58 AM    Calcium 8.6 04/13/2017 03:58 AM    Calcium 8.6 04/13/2017 03:58 AM     Lab Results   Component Value Date/Time    Protein, total 6.2 04/03/2017 02:33 AM    Albumin 2.4 04/03/2017 02:33 AM Total time: 54  Counseling / coordination time: 45  > 50% counseling / coordination?: edinson Diamond NP MD Addendum    Pt's chart reviewed and case discussed with Ms. Vela. Agree with current care plan as outlined in her note. Will continue to follow for comfort and adjust medications/care plan accordingly.

## 2017-04-14 NOTE — PROGRESS NOTES
Note transfer to [facility]. SBAR received. Conferred with [employer] [facility] post their meeting with family. Pt to enter inpt hospice services. Disposition will be followed by hospice staff.   Javan Foy, ALEE

## 2017-04-14 NOTE — PROGRESS NOTES
Care manager spoke with alex Willoughby late yesterday pm after patient's grandmother was transferred from [facility] to bed 18. Support given to family members. I spoke with Dr Arun Vyas this am and a hospice consult has been sent via [facility].

## 2017-04-14 NOTE — PROGRESS NOTES
Follow up support for patient in 4218. Patient barelyawake but did not appear to be alert and oriented. Accompanied by Cindy Lundberg at bedside. Proceeded to lead Jelly Schmidt in processing. Jelly Schmidt shared how close she is with patient especially as they share the same birthday every February. Elaborated on patient's family and support sharing a picture of patient's grand-children celebrating patient on mother's day. Jelly Schmidt expressed appreciation for care and communication received on this unit after a couple of apparent issues on other units. Provided active listening and explored plan of care. Jelly Schmidt spoke of upcoming EOL decisions. Advised of availability. Will follow as able/needed. AIDEN Cai

## 2017-04-15 NOTE — ROUTINE PROCESS
Bedside and Verbal shift change report given to [place]DER[place], [place] (oncoming nurse) by Dino Muro RN (offgoing nurse). Report included the following information SBAR, Kardex and MAR.

## 2017-04-15 NOTE — PROGRESS NOTES
Bedside shift change report given to Daniela Gonzalez (oncoming nurse) by Frederick Gtz (offgoing nurse). Report included the following information SBAR, Kardex and MAR.

## 2017-04-15 NOTE — PROGRESS NOTES
Bedside shift change report given to [facility] (oncoming nurse) by Devi Suarez (offgoing nurse). Report included the following information SBAR, Kardex, Intake/Output, MAR and Recent Results.

## 2017-04-15 NOTE — PROGRESS NOTES
04/14/17 2145   Vital Signs   Temp 99.5 °F (37.5 °C)   Temp Source Axillary   Pulse (Heart Rate) (!) 102   Heart Rate Source Monitor   Resp Rate 22   O2 Sat (%) 98 %   Level of Consciousness (!) Responds to Pain   /78   MAP (Calculated) 93   BP 1 Method Automatic   BP 1 Location Left arm   BP Patient Position At rest   MEWS Score 5     Hospice Patient

## 2017-04-16 NOTE — PROGRESS NOTES
Bedside shift change report given to Roslyn Boone (oncoming nurse) by Courtney Severin (offgoing nurse). Report included the following information SBAR, Kardex and MAR.

## 2017-04-16 NOTE — HOSPICE
[facility]  [phone]    Routine Nursing Note   Patient Name: Misael Sandhu  YOB: 1930  Age: 80 y.o. Date of Visit: 04/16/17  Facility of Care: [facility]  Patient Room: 216/01     Hospice Attending: Feroz Sloan MD  Hospice Diagnosis: CHF  Congestive heart failure ([facility] [address]    Level of Care: Routine    ASSESSMENT, PLAN AND INTERVENTIONS     1. Patient admitted to Routine Level of Care  2. Terminal secretions: Scoplomine patch in place, prn Robinul  3. Generalized pain: appears well palliated on schedule Morphine 2 mg IV every 6 hours    Spiritual Interventions: Patients Advent  visiting     Psych/ Social/ Emotional Interventions: Patient has family present at bedside at all times    Care Coordination Needs: Reviewed plan of care with primary RN Kevan Jimenez. Care plan and New Orders discussed / approved with Mami Turcios MD.    Description History and Chart Review     List number of doses of PRN medications in last 24 hours:  Medication 1: Ativan 0.5 mg  Number of doses: 1    Medication 2:   Number of doses:    Medication 3:   Number of doses:    DISCHARGE PLANNING     1. Discharge Plan: Discuss transfer to [facility] with family today. Routine bed not available today. 2. Patient/Family teaching: Reviewed plan of care, end of life process. 3. Response to patient/family teaching: receptive    ASSESSMENT    KARNOFSKY: 10  Prognosis estimated based on 04/16/17 clinical assessment is:   [] Few to Many Hours  [x] Hours to Days   [] Few to Many Days   [] Days to Weeks   [] Few to Many Weeks   [] Weeks to Months   [] Few to Many Months    Quality Measure: Patient self-reports:  [] Yes    [x] No    ESAS:   Time of Assessment: 10:30  Pain (1-10): 0  Fatigue (1-10): 10  Shortness of breath (1-10): 3  Nausea (1-10): 0  Appetite (1-10):    Anxiety: (1-10):   Depression: (1-10):   Well-being: (1-10):   Constipation: _ Yes  X No  LAST BM: 4/15/17    CLINICAL INFORMATION   Patient Vitals for the past 12 hrs:   Temp Pulse Resp BP SpO2   04/16/17 0809 97.1 °F (36.2 °C) 82 16 109/61 91 %       Currently this patient has:  [] Supplemental O2   [x] IV    [] PICC      [] PORT   [] NG Tube    [] PEG Tube   [] Ostomy     [x] Richardson draining dark yellow urine  [] Other:     SIGNS/PHYSICAL FINDINGS     Skin (including wound):  [] Warm, dry, supple, intact and color normal for race  [x] Warm   [] Dry   [] Cool     [] Clammy       [] Diaphoretic    Turgor   [] Normal   [x] Decreased  Color:   [] Pink   [] Pale   [] Cyanotic   [] Erythema   [] Jaundice   [x] Normal for Race  []  Wounds:    Neuro:  [] Lethargy  [] Restlessness / agitation  [] Confusion / delirium  [] Hallucinations  [] Responds to maximal stimulation  [x] Unresponsive  [] Seizures     Cardiac:  [] Dyspnea on Exertion  [] JVD  [] Murmur  [] Palpitations  [] Hypotension  [] Hypertension  [] Tachycardia  [] Bradycardia  [] Irregular HR  [] Pulses Decreased  [] Pulses Absent  [] Edema:       (Location, Grade and Pitting)  [] Mottling:      (Location)    Respiratory:  Breath sounds: shallow, regular   [x] Diminished   [] Wheeze   [x] Rhonchi   [] Rales   [] Even and unlabored  [] Labored:            [] Cough   [] Non Productive   [] Productive    [] Description:           [] Deep suctioned   [] O2 at ___ LPM  [] High flow oxygen greater than 10 LPM  [] Bi-Pap    GI  [x] Abdomen (describe) soft  [] Ascites  [] Nausea  [] Vomiting  [] Incontinent of bowels  [x] Bowel sounds present, hyopoactive  [] Diarrhea  [] Constipation (see above including last bowel movement)  [] Checked for impaction  [x] Last BM 4/15/17  Nutrition  Diet: NPO  Appetite:   [] Good   [] Fair   [] Poor   [] Tube Feeding       [] Voiding  [] Incontinent   [x] Richardson    Musculoskeletal  [] Balance/Scheller Unsteady   [] Weak   Strength:    [] Normal    [] Limited    [x] Decreasing   Activities:    [] Up as tolerated   [x] Bedridden    [] Specify:    SAFETY  [] 24 hr. Caregiver [x] Side rails ?     [x] Hospital bed   [x] Reviewed Falls & Safety     ALLERGIES AND MEDICATIONS     Allergies: No Known Allergies    Current Facility-Administered Medications   Medication Dose Route Frequency    alteplase (CATHFLO) 1 mg in sterile water (preservative free) 1 mL injection  1 mg InterCATHeter PRN    LORazepam (ATIVAN) injection 0.5 mg  0.5 mg IntraVENous Q15MIN PRN    acetaminophen (TYLENOL) suppository 650 mg  650 mg Rectal Q4H PRN    scopolamine (TRANSDERM-SCOP) 1.5 mg  1.5 mg TransDERmal Q72H    glycopyrrolate (ROBINUL) injection 0.2 mg  0.2 mg IntraVENous Q4H PRN    bisacodyl (DULCOLAX) suppository 10 mg  10 mg Rectal DAILY PRN    morphine injection 2 mg  2 mg IntraVENous Q15MIN PRN    morphine injection 2 mg  2 mg IntraVENous Q4H    haloperidol lactate (HALDOL) injection 2 mg  2 mg IntraVENous Q6H PRN    saline peripheral flush soln 5 mL  5 mL InterCATHeter PRN          Visit Time In: 10:30  Visit Time Out: 11:00

## 2017-04-17 NOTE — PROGRESS NOTES
Responded to call from unit staff that family had arrived. Provided supportive presence to patient's two daughters and one granddaughter. All were comforted by the ending of the patient's suffering and recounted events of the patient's hospitalization. Helped to process grief and lead in life review. Continued to other family members as they arrived. Family is grieving appropriately and self-supporting at this time. Chaplain Gi Trejo M.Div.    Paging Service [phone])

## 2017-04-17 NOTE — HOSPICE
[facility]  [phone]    Discharge/Death Nursing Note   Patient Name: Xin Harding  YOB: 1930  Age: 80 y.o. Date of Death: 17  Admitted Date: 2017  Time of Death: [address] of Care: [facility]  Level of Care: Routine  Patient Room: [facility]/     Hospice Attending: Leighann Francisco MD  Hospice Diagnosis: CHF  Congestive heart failure ([facility] [address]    Death Pronouncement   Pronouncement of death completed by:  Dr. Pool Parents staff was not present at the time of death    At the time of death the patient was documented as pulseless, not breathing. The pt  within . The following were notified of the patient's death: Pt's children Vogt Pals    Medications were disposed of per facility protocol     Discharge Summary   Discharge Reason: Death    Summary of Care Provided:    [x] Post mortem care provided by nursing staff  [x] Notification of  home by Nursing supervisor  [x] Referrals/Community resources provided: bereavement materials  [x] Goals completed  [] Durable Medical Equipment vendor notified     Disciplines involved: [x] RN [x] SW [x]  [] ELIAS [] Vol [] PT [] OT [] ST [] [facility]    [x] IDT communication/notification    Attending Physician, Dr. Thaddeus Knight, notified of death    Bereaved        Advance Care Planning 3/28/2017   Patient's Healthcare Decision Maker is: Legal Next of Shanelle 69   Primary Decision Maker Name Samy Davalos (but there are 8 children)   Primary Decision Maker Phone Number [phone]   Primary Decision Maker Relationship to Patient Adult child   Confirm Advance Directive -   Patient Would Like to Complete Advance Directive -     Bereavement risk is low.     [facility] Daughter   [address], [address] [place] [address]   H: [phone]  W: [phone]  C: [phone]

## 2017-04-17 NOTE — PROGRESS NOTES
Responded to call from MECHELLE Parker at time of patient's death. Family had informed nursing staff that they were coming to the hospital but after almost one full hour no family has arrived. Prayer of commendation said at bedside. Nursing staff will page chaplains to provide support as needed if/when family arrives. Chaplain Aditya Nugent M.Div.    Paging Service [phone])

## 2017-04-19 NOTE — DISCHARGE SUMMARY
Discharge Summary    [facility]  [facility]  [phone]      Date of Admission: 4/14/2017  Date of Discharge: 4/17/2017    Carito Tapia is a 80y.o. year old who was admitted to [facility] at [facility] with a Hospice diagnosis of CHF  Congestive heart failure ([facility] [address] The patient's care was focused on comfort and the patient passed away on 4/17/2017.

## 2017-04-25 NOTE — DISCHARGE SUMMARY
Discharge Summary     Patient: Kobe Lock MRN: [id number]  SSN: [id number]    YOB: 1930  Age: 80 y.o. Sex: female       Admit Date: 3/8/2017    Discharge Date: 4/14/17    Admission Diagnoses: Hypothermia    Discharge Diagnoses:   Problem List as of 4/14/2017  Date Reviewed: 4/8/2017          Codes Class Noted - Resolved    Congestive heart failure ([facility] [address] ICD-10-CM: I50.9  ICD-9-CM: 428.0  4/14/2017 - Present        * (Principal)Hypothermia ICD-10-CM: T68. Reyna Cervantes  ICD-9-CM: 991.6  3/8/2017 - Present        Non-rheumatic mitral regurgitation ICD-10-CM: I34.0  ICD-9-CM: 424.0  1/12/2017 - Present        Biventricular ICD (implantable cardioverter-defibrillator) in place ICD-10-CM: Z95.810  ICD-9-CM: V45.02  9/9/2016 - Present    Overview Signed 9/9/2016  5:30 PM by Katrin Paez MD     Upgrade dual chamber pacemaker to BIV ICD  DFT <= 25J             Third degree AV block ([facility] [address] ICD-10-CM: I44.2  ICD-9-CM: 426.0  9/9/2016 - Present        Cardiac pacemaker ICD-10-CM: Z95.0  ICD-9-CM: V45.01  11/8/2012 - Present    Overview Signed 11/8/2012  9:58 PM by Katrin Paez MD     The pacemaker is a [place] [employer] DR, model # [id number], serial [id number].  DOI 10/25/2012 for sick sinus and AV block               Mobitz I AV block, second degree ICD-10-CM: I44.1  ICD-9-CM: 426.13  10/25/2012 - Present        PAT (paroxysmal atrial tachycardia) with slow ventricular response ICD-10-CM: I47.1  ICD-9-CM: 427.0  10/25/2012 - Present        Sick sinus syndrome ([facility] [address] ICD-10-CM: I49.5  ICD-9-CM: 427.81  10/25/2012 - Present    Overview Signed 11/8/2012  9:57 PM by Katrin Paez MD     4.95 second pause             Thyroid mass ICD-10-CM: E07.9  ICD-9-CM: 246.9  10/1/2012 - Present        Mitral regurgitation ICD-10-CM: I34.0  ICD-9-CM: 424.0  Unknown - Present        S/P mitral valve repair ICD-10-CM: A02.840  ICD-9-CM: V45.89  Unknown - Present    Overview Signed 9/27/2011 10:19 AM by Joy Fernandez MD     [address]             TR (tricuspid regurgitation) ICD-10-CM: I07.1  ICD-9-CM: 397.0  Unknown - Present    Overview Signed 9/27/2011 10:19 AM by Heron Gonzales MD     moderate to severe echo 2009             Malignant hypertensive heart disease with CHF ([facility] ICD-10-CM: I11.0  ICD-9-CM: 402.01, 428.0  Unknown - Present        PAF (paroxysmal atrial fibrillation) ([facility] ICD-10-CM: I48.0  ICD-9-CM: 427.31  Unknown - Present        Diabetes mellitus type 2, insulin dependent ([facility] ICD-10-CM: E11.9, Z79.4  ICD-9-CM: 250.00, V58.67  Unknown - Present        Chronic systolic heart failure ([facility] ICD-10-CM: I50.22  ICD-9-CM: 428.22  9/24/2010 - Present        Rupture of chordae tendineae (HCC) ICD-10-CM: I51.1  ICD-9-CM: 429.5  9/24/2010 - Present        RESOLVED: Chest pain ICD-10-CM: R07.9  ICD-9-CM: 786.50  10/30/2012 - 11/8/2012        RESOLVED: CHF exacerbation ([facility] ICD-10-CM: I50.9  ICD-9-CM: 428.0  8/30/2012 - 9/1/2012        RESOLVED: Chest pain, unspecified ICD-10-CM: R07.9  ICD-9-CM: 786.50  8/30/2012 - 9/1/2012               Hospital Course: Ms. Daniela Higuera presented with hypotension, hypothermia, and abdominal pain. There were no infectious sources identified and she was treated empirically with abx. She developed respiratory failure that was thought to be multifactorial - due to heart failure, pleural effusions, severe mitral regurgitation. CT surgery was consulted and determined that she was not a surgical candidate for mitral valve surgery. She had a long and complicated hospital course and continued to worsen despite aggressive management. Multiple discussions with family members held and it was decided to transition the patient to comfort measures. She was transferred to the inpatient hospice service on 4/14/17.  See progress note from 4/13/17 for details by problem list.    Disposition: transferred to inpatient hospice service    Signed By: Franky Lai MD     April 25, 2017

## 2017-05-15 LAB
ACID FAST STN SPEC: NEGATIVE
MYCOBACTERIUM SPEC QL CULT: NEGATIVE
SPECIMEN PREPARATION: NORMAL
SPECIMEN SOURCE: NORMAL

## 2019-01-02 NOTE — PROGRESS NOTES
NUTRITION COMPLETE ASSESSMENT    RECOMMENDATIONS:   Check Phosphorus-if BNL increase supplementation to 4 x/day    Check magnesium daily (d/t diuresis)    Therapeutic MVI    Interventions/Plan:   Food/Nutrient Delivery:  Modify diet/texture/consistency/nutrients-Mechanical soft, Commercial supplement-Magic cup and Ensure Enlive, Modify rate, concentration, composition, and schedule-nocturnal feedings    Assessment:   Reason for Assessment:    [x]Reassessment     Tube Feeding: Glucerna 1.2 @ 60 ml/hr x 12 hr (7pm to 7 am) with 50 ml water flush q 4 hr during feeding  Diet: Mechanical soft  Supplements: none  Nutritionally Significant Medications: [x] Reviewed & Includes: Bumex, Colace, correction scale insulin, K Phos, KCL, Aldactone  Meal Intake: Patient Vitals for the past 100 hrs:   % Diet Eaten   04/05/17 1826 25 %   04/05/17 0917 80 %   04/04/17 1900 25 %     Subjective:  I get full fast.    Objective:  Chart reviewed for follow-up; discussed with RN. Noted: Acute on chronic respiratory failure-improved, on NC; (L) pleural effusion-thoracentesis today; (R) CT removed yesterday; Severe MR but high surgical risk; Chronic systolic heart failure-EF; 25-30; passive liver congestion vs hepatitis. Diet advanced to Mechanical Soft by SLP 4/4. See intake above-question accuracy of 80% breakfast yesterday but suspect this is pt's best meal of the day. Very alert this morning-reports early satiety. Encouraged patient (and discussed with RN)-need to snack between meals to increase nutrient intake. Will order supplements. Ms Lucien Abrams ate eggs for breakfast; saved diced pears for later. Switched tube feeding to nocturnal feedings yesterday in hopes of stimulating appetite during the day. Will monitor po intake-may need to change back to continuous feedings to insure adequate nutrient intake (especially if patient has surgery soon).      Tube feeding as ordered overnight will provide 720 ml, 865 calories, 43 gm protein and 730 ml free water (tube feeding/flush) to meet 56% of pt's estimated energy and protein needs. Weight down 13.5 kg in the past week! Suspect d/t thoracentesis and aggressive diuresis. Potassium replaced today; recommend checking magnesium daily as well. Also suggest checking phosphorus (last value 1.9 mg/dl on 4/1)-may need to increase supplementation if BNL. Estimated Nutrition Needs:   Kcals/day: 1530 Kcals/day (MSJ x 1.4)  Protein: 77 g (77-83 (1.2-1.3g/kg))  Fluid:  (1 ml/kcal)  Based On: [place]  Weight Used: Actual wt (64 kg)    Pt expected to meet estimated nutrient needs:  [x]   Yes-may need to adjust tube feeding if oral intake inadequate     []  No  [] Unable to predict at this time    Nutrition Diagnosis:   1. Inadequate oral food/beverage intake related to prolonged illness/hospitalization as evidenced by decreased appetite, early satiety and supplemental enteral nutrition support. Goals:     Meet at least 90% estimated needs via tube feeding and/or oral intake.      Monitoring & Evaluation:    - Enteral/parenteral nutrition intake   - Electrolyte and renal profile, Weight/weight change    Previous Nutrition Goals Met:  Progressing  Previous Recommendations:      Yes    Education & Discharge Needs:   [x] None Identified   [] Identified and addressed    [x] Participated in care plan, discharge planning, and/or interdisciplinary rounds        Cultural, Voodoo and ethnic food preferences identified:   None    Skin Integrity: [x]Intact  []Other  Edema: []None [x] 2+ pitting BLE's  Last BM: 4/4  Food Allergies: [x]None []Other    Anthropometrics:    Weight Loss Metrics 4/6/2017 3/8/2017 1/10/2017 1/4/2017 12/27/2016 12/16/2016 11/12/2016   Today's Wt 141 lb 8.6 oz - 146 lb 3.2 oz 150 lb 157 lb 12.8 oz 152 lb 149 lb 6 oz   BMI - 22.84 kg/m2 23.6 kg/m2 24.21 kg/m2 25.47 kg/m2 24.53 kg/m2 24.11 kg/m2      Last 3 Recorded Weights in this Encounter    04/05/17 0600 04/06/17 0625 04/06/17 6690 Weight: 65.1 kg (143 lb 8.3 oz) 64.2 kg (141 lb 8.6 oz) 64.2 kg (141 lb 8.6 oz)      Weight Source: Bed  Height: 5' 6\" (167.6 cm),    Body mass index is 22.84 kg/(m^2). IBW : 59 kg (130 lb), % IBW (Calculated): 108.87 %   ,      Labs:  Lab Results   Component Value Date/Time    Sodium 136 04/06/2017 05:17 AM    Potassium 3.5 04/06/2017 05:17 AM    Chloride 99 04/06/2017 05:17 AM    CO2 30 04/06/2017 05:17 AM    Glucose 143 04/06/2017 05:17 AM    BUN 13 04/06/2017 05:17 AM    Creatinine 0.61 04/06/2017 05:17 AM    Calcium 10.4 04/06/2017 05:17 AM    Magnesium 2.2 04/01/2017 04:03 AM    Phosphorus 1.9 04/01/2017 04:03 AM    Albumin 2.4 04/03/2017 02:33 AM     Lab Results   Component Value Date/Time    Glucose (POC) 140 04/06/2017 05:16 AM      Lab Results   Component Value Date/Time    ALT (SGPT) 11 04/03/2017 02:33 AM    AST (SGOT) 22 04/03/2017 02:33 AM    Alk.  phosphatase 55 04/03/2017 02:33 AM    Bilirubin, total 2.1 04/03/2017 02:33 AM        Preston Huerta RD [facility] [address]

## 2019-01-18 NOTE — PROGRESS NOTES
HISTORY AND PHYSICAL EXAM - OUTPATIENT      CHIEF COMPLAINT  Establish Care; Cough; Elbow (aches, right); and Ear Problem         HISTORY OF PRESENT ILLNESS  HPI     María Nails presents today to establish care. I have met her during Medicare wellness visit in October.    We briefly reviewed her past medical history and chart. Nothing new to add or change.    Cough -  Constant draining. Using Claritin and Benadryl, without much relief. Has only been doing nasal spray every few days.  But she is concerned about the risk of glaucoma with nasal spray. She said she is on the verge of being diagnosed with glaucoma. I advised her to ask her eye doctor if 2-3 weeks of Flonase would be safe to help decrease the drainage.    Hit right elbow 2 weeks ago, and it still is a little bothersome but it is improving overall. No bruise. She is able to have full range of motion and do her normal activities, but it does get aggravated when she uses it too much. I advised her to decrease pulling and rotating motion is much as possible for the next few weeks. Let me know if there is no improvement.    She admits to a very stressful situation at home. She lives with her daughter and granddaughter, but also lives with her daughters grandmother from the other side of the family who is a . He is very negative and grumpy and puts everyone down. This is very stressful for her. She is wondering if she could see a therapist for a few brief sessions. She has done therapy in the remote past when her  and son passed away within the same year. She thought was helpful at that time.    Soreness in the right upper back that has improved with massage, but this is becoming costly. Wondering if insurance will cover cover massage, told her to call and find out. Sometimes you can use flex spending accounts for this, I would be happy to write her a note to help cover it.      MEDICATIONS  Current Outpatient Medications   Medication Sig Dispense  1930 - Bedside and Verbal shift change report given to Nubia Carmona RN (oncoming nurse) by Corrine Roth RN (offgoing nurse). Report included the following information SBAR, Kardex, OR Summary, Procedure Summary, MAR, Recent Results and Cardiac Rhythm Paced. Shift Report    2000 - pt oral suction w/ 14F d/t low saturations, and unable to bring up secretions - sats improved  2100 - pt oral suction w/ 14F d/t low saturations, and unable to bring up secretions - sats improved  2130 - pt oral suction w/ 14F d/t low saturations, and unable to bring up secretions - sats improved. Levo increased to 5 d/t MAP<65  2200 - pt oral and nasal suction with 14F d/t low saturations and unable to bring up secretions [ sats improved  2230 - pt oral and nasal suction w/ 14F d/t low saturations and unable to bring up secretions - sats not improving - levo increased to 6 d/t MAP<65  2245 - pt placed on bipap and ABG obtained d/t low saturations. PH=7.0. Levo increased to 10 d/t MAP<65  3181 - hospitalist ordered intubation  2315 - anesthesia and respiratory at bedside for intubation  2330 - pt not maintaining tidal volume, intermittant ambu bagging to hyperventilate and reduce CO2  2340 - called family to notify of emergent need for intubation, and current condition of pt. Kathie Noble (daughter) will call sister and other family to inform of change. 0230 - updated pulmonology with status of intubation.  Orders to repeat ABG in am. Refill   • meloxicam (MOBIC) 7.5 MG tablet TAKE 1 TABLET BY MOUTH DAILY. 30 tablet 4   • fluticasone (FLONASE) 50 MCG/ACT nasal spray Spray 1 spray in each nostril daily.     • rosuvastatin (CRESTOR) 20 MG tablet TAKE 1 TABLET BY MOUTH DAILY. 30 tablet 5   • acetaminophen (TYLENOL) 500 MG tablet 2 tablets two to three times as discussed 30 tablet 0   • cyclobenzaprine (FLEXERIL) 5 MG tablet TAKE 1 TO 2 TABLETS BY MOUTH UP TO 3 TIMES A DAY AS NEEDED 60 tablet 3   • Cholecalciferol (VITAMIN D) 2000 UNITS tablet Take 2,000 Units by mouth daily. 2 tablets daily  0   • metroNIDAZOLE (METROCREAM) 0.75 % cream Apply to skin once daily until improved 15 g 1   • loratadine (CLARITIN) 10 MG tablet Take 1 tablet by mouth daily. 30 tablet 5   • CALCIUM 600 600 MG PO TABS ONE TABLET BY MOUTH DAILY 30 0   • ASPIRIN 325 MG PO TBEC one by mouth every day 0 0   • valsartan-hydroCHLOROthiazide (DIOVAN-HCT) 320-12.5 MG per tablet Take 1 tablet by mouth daily. 30 tablet 6   • triamcinolone (NASACORT AQ) 55 MCG/ACT nasal inhaler 1-2 sprays in each nostril daily with PROPER TECHNIQUE 1 Inhaler 4   • CO Q-10 100 MG PO CAPS ONE TABLET TWICE DAILY 60 0     No current facility-administered medications for this visit.         ALLERGIES  ALLERGIES:   Allergen Reactions   • Arthrotec [Diclofenac-Misoprostol]      diarrhea   • Erythromycin Base VOMITING   • Meloxicam      GI upset   • Rhinocort [Budesonide]      Burning     • Simvastatin GI UPSET       HISTORIES  Past Medical History:   Diagnosis Date   • Allergic Rhinitis     seasonal   • Cervical Spine Spondylosis 1/2004   • h/o rotator cuff injury 02/14/2007    right, fell in kitchen   • Herpes Zoster 1993   • HYPERGLYCEMIA     borderline in 2006   • KERATOSIS SEBORRHEIC 07/21/2008    back   • Low back pain 2007   • Osteopenia    • Other and unspecified hyperlipidemia    • PLANTAR FASCIITIS 10/18/2006    previously on 08/19/1994   • Rosacea    • TENOSYNOVITIS 02/15/2005    right wrist   •  Unspecified essential hypertension     LVH by echo 2003       Past Surgical History:   Procedure Laterality Date   • Dexa bone density axial skeleton  2007    results: osteopenia by WHO criteria   • Echocardiogram  2008    results: ejection fraction 77%, no aortic stenosis   • Pap smear  2006    results: negative       Family History   Problem Relation Age of Onset   • Stroke Mother         Cerebral Hemm.  age 54   • Hypertension Mother    • Heart Father         ASCAD age 81 .  91   • Heart Brother         MI  39       Social History     Socioeconomic History   • Marital status:      Spouse name: Not on file   • Number of children: Not on file   • Years of education: Not on file   • Highest education level: Not on file   Social Needs   • Financial resource strain: Not on file   • Food insecurity - worry: Not on file   • Food insecurity - inability: Not on file   • Transportation needs - medical: Not on file   • Transportation needs - non-medical: Not on file   Occupational History   • Occupation: High school    Tobacco Use   • Smoking status: Former Smoker     Packs/day: 1.00     Years: 10.00     Pack years: 10.00     Types: Cigarettes     Last attempt to quit: 1967     Years since quittin.0   • Smokeless tobacco: Never Used   Substance and Sexual Activity   • Alcohol use: No   • Drug use: No   • Sexual activity: No   Other Topics Concern   • Not on file   Social History Narrative   • Not on file        REVIEW OF SYSTEMS    Review of Systems   Constitutional: Negative for chills, fever, malaise/fatigue and weight loss.   HENT: Negative for congestion and ear pain.    Eyes: Negative for blurred vision.   Respiratory: Negative for cough, shortness of breath and wheezing.    Cardiovascular: Negative for chest pain, palpitations and leg swelling.   Gastrointestinal: Negative for abdominal pain, blood in stool, constipation, diarrhea, heartburn and  melena.   Genitourinary: Negative for dysuria and urgency.   Musculoskeletal: Negative for joint pain and myalgias.        Side of her right elbow, more in the forearm is slightly sore to the touch. She also has some soreness in her right upper back   Skin: Negative for itching and rash.   Neurological: Negative for dizziness, tingling, sensory change, focal weakness and headaches.   Endo/Heme/Allergies: Does not bruise/bleed easily.   Psychiatric/Behavioral: Negative for depression. The patient is not nervous/anxious.         Situational stress, but denies anxiety or depression       PHYSICAL EXAM  Blood pressure 132/70, pulse 80, weight 76.7 kg, last menstrual period 08/02/1995.   Physical Exam   Constitutional: She is oriented to person, place, and time and well-developed, well-nourished, and in no distress.   HENT:   Right Ear: Tympanic membrane, external ear and ear canal normal.   Left Ear: Tympanic membrane, external ear and ear canal normal.   Nose: Nose normal.   Mouth/Throat: Oropharynx is clear and moist and mucous membranes are normal.   Eyes: Conjunctivae and EOM are normal. Pupils are equal, round, and reactive to light. No scleral icterus.   Neck: Neck supple. No JVD present. Carotid bruit is not present. No thyromegaly present.   Cardiovascular: Normal rate, regular rhythm, normal heart sounds and intact distal pulses.   No murmur heard.  Pulmonary/Chest: Effort normal and breath sounds normal. No respiratory distress. She has no wheezes. She has no rhonchi. She has no rales.   Abdominal: Soft. Bowel sounds are normal. She exhibits no distension, no abdominal bruit and no mass. There is no hepatosplenomegaly. There is no tenderness.   Musculoskeletal: She exhibits no edema.   Lymphadenopathy:        Head (right side): No submental, no submandibular and no tonsillar adenopathy present.        Head (left side): No submental, no submandibular and no tonsillar adenopathy present.     She has no cervical  adenopathy.        Right: No supraclavicular adenopathy present.        Left: No supraclavicular adenopathy present.   Neurological: She is alert and oriented to person, place, and time. She has intact cranial nerves. She displays no weakness. No cranial nerve deficit.   Skin: Skin is warm, dry and intact. No rash noted.   Psychiatric: Mood and affect normal.       ASSESSMENT/PLAN  Essential hypertension  Well-controlled continue current medication  - BASIC METABOLIC PANEL; Future    Situational stress  She is interested in therapy and I encouraged her to look into meditation.   - SERVICE TO BEHAVIORAL HEALTH      Health Maintenance Summary     Topic Due On Due Status Completed On Postpone Until Reason    Osteoporosis Screening  Completed Jan 17, 2017      Medicare Wellness Visit Oct 8, 2019 Not Due Oct 8, 2018      IMMUNIZATION - DTaP/Tdap/Td Jun 14, 2022 Not Due Jun 14, 2012      Immunization-Influenza Aug 1, 2018 Postponed  Apr 1, 2019 Patient Refused    Depression Screening Oct 8, 2019 Not Due Oct 8, 2018      Pneumococcal Vaccine 65+ Low/Medium Risk  Completed Mar 17, 2017      Immunization - Shingles Jun 18, 1990 Overdue       Immunization - MMR  Hidden             Return in about 9 months (around 10/18/2019) for Medicare wellness visit.        Melissa Agustin, DO   Internal Medicine

## 2019-04-24 NOTE — PROGRESS NOTES
"Labor Progress Note:    S: Pt is uncomfortable with contractions.     O;  /74   Temp 98.8  F (37.1  C) (Oral)   Resp 18   Ht 1.803 m (5' 11\")   Wt 87.1 kg (192 lb)   Breastfeeding? No   BMI 26.78 kg/m    SVE: 4.5/80/-2 AROM with clear fluid.   TOCO: Irregular  FHr: 135 baseline cat 1.       A\P: 32 yo  at 37+4/7 wks. Marginal cord insertion  1. Labor. Anticipate . Manage placenta with known marginal insertion.   2. Epidural for pain management.   3. GBS negative. RH positive.     Margaret Burr    " Chelsea Drake     2/25/1930       Maciej Paez MD, [facility] [place]  Date of Visit-1/12/2017   PCP is Werner Egan MD   [facility]  [facility]  HPI:  Madi Vera is a 80 y.o. female     Follow up of CHF and MR with AF. No new complaints  Continues with edema   i had seen mid October-doing well  ER visit in November with abdominal bloating and again with pain in abdomen in December  Had CT abdomen in November with pancreatic cyst , gallstones and pleural effusions while in ER  Then saw Dr Nhi Viveros on 12-27 with complaints of increase edema  He changed diuretic   Pt returns today      Review of Systems   Constitutional: Negative for fever and weight loss. Respiratory: Negative for shortness of breath. Cardiovascular: Positive for leg swelling. Negative for chest pain, palpitations and claudication. Negative for tachycardia, claudication   Gastrointestinal: Positive for abdominal pain. Negative for blood in stool and nausea. Genitourinary: Negative for hematuria. Musculoskeletal: Negative for joint pain. Neurological: Negative for dizziness. Assessment/Plan:       CHF  -recent mild decompensation  -EF 36%, now 30-35 per Dr Nhi Viveros with 3 +TR indicating volume overload  -Continue ACE inhibitor and beta blocker  -Furosemide as needed-increase by Dr Nhi Viveros from 40 mg bid to 80 mg bid and increase in KCL  -Has device-pacemaker  -not really much change by edema but weight is down a lot over past 1-2 weeks so I think diuretic higher dose is doing her some good    Chronic atrial fibrillation with ventricular pacing, on warfarin  -Rate controlled  --device check reviewed -one short NSVT  --INR is 6.2- will hold for 3 days and recheck  Tricuspid regurgitation, MV repair  -stable    Malignant hypertension  -responds to treatment    Sick sinus syndrome s/p PPI    Follow up in 6 months      Impression:   1. Chronic systolic heart failure ([address]    2. Non-rheumatic mitral regurgitation    3. Malignant hypertensive heart disease with CHF ([address]    4. PAF (paroxysmal atrial fibrillation) ([address]    5. Rupture of chordae tendineae (HCC)    6. Third degree AV block ([address]    7. Biventricular ICD (implantable cardioverter-defibrillator) in place    8. S/P mitral valve repair       Cardiac History:   ECHO 11/30/15  EF of 36%, moderate diffuse hypokinesia to severe hypokinesia in the basal and mid-septal walls, mild reduction of right ventricular function, severe LAE, mild JOAN, MAC, mitral atherosclerosis, mitral valve repair with #32 Turner-Manuel ring, mild to moderate sclerosis of the aortic valve with mild regurgitation and mild tricuspid sclerosis, and mild prolapse to the posterior leaflet. PA pressure of 61 with moderate pulmonary hypertension. 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 Moderate to severe TR, moderate to severe pulmonary hypertension. EF 35-40%, marked LAE, mild JOAN    PACER check 2-11-13 39,288 episodes of atrial tach/flutter and 11 with high rates  1/2014 noises noted on pacer, 260 thousands AMS , some are noises and some are AT, some PMT (adjustment made, Dr Aruna Luna discussed with pacer clinic)  Pacer check in May 2013 showed NSVT and AF the latter as long as 1 day with occasional RVR  Pacer check 9/9/13: persistent AT with V paced. 92%  (asymptomatic with AT)    CATH 10-24-12= Normal cors and EF 50 %. -- A annular ring mitral position. Mitral valve repair on July 29, 1996, for mitral regurgitation with a #32 Turner-Manuel ring. Chronic systolic CHF, reduced ejection fraction. NYHA 1-2  . ..admit 7/16/09 mild chf exacerbation  #32 Turner Manuel ring MV repair 1996     ROS-except as noted above. . A complete cardiac and respiratory are reviewed and negative except as above ; Resp-denies wheezing  or productive cough,.  Const- No unusual weight loss or fever; Neuro-no recent seizure or CVA ; GI- No BRBPR, abdom pain, bloating ; - no  hematuria   see supplement sheet, initialed and to be scanned by staff  Past Medical History   Diagnosis Date    [facility]     Chronic systolic heart failure (HCC)      ef 35-40%; hold ACE due to lower bp    Diabetes mellitus type 2, insulin dependent ([facility]     Hypertension     Long term (current) use of anticoagulants     Malignant hypertensive heart disease with CHF ([facility]     Mitral regurgitation     Pacemaker 10/25/2012     The pacemaker is a [employer], model # [id number], serial [id number]. 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, 1996     # 28 Turner-Manuel    Thyroid disease     TR (tricuspid regurgitation)      moderate to severe echo 2009      Social Hx= reports that she has never smoked. She has never used smokeless tobacco. She reports that she does not drink alcohol or use illicit drugs. Exam and Labs:    Visit Vitals    /70 (BP 1 Location: Right arm, BP Patient Position: Sitting)    Pulse 60    Resp 16    Ht 5' 6\" (1.676 m)    Wt 146 lb 3.2 oz (66.3 kg)    BMI 23.6 kg/m2      Constitutional:  NAD, comfortable  Head: NC,AT. Eyes: No scleral icterus. Neck:  Neck supple. No JVD present. Throat: moist mucous membranes. Chest: defibrillator scar healing, no pus or redness. Effort normal & normal respiratory excursion . Neurological: alert, conversant and oriented . Skin: Skin is not cold. No obvious systemic rash noted. Not diaphoretic. No erythema. Psychiatric:  Grossly normal mood and affect. Behavior appears normal. Extremities:  no clubbing or cyanosis. Abdomen: non distended    Lungs:breath sounds normal. No stridor. distress, wheezes or  Rales. Heart:  normal rate, regular rhythm, normal S1, S2, 2/6 murmur to apex, rubs, clicks or gallops , PMI non displaced. Edema: 2+ to mid shin.   Lab Results   Component Value Date/Time    Cholesterol, total 138 09/28/2011 09:13 AM    HDL Cholesterol 50 09/28/2011 09:13 AM    LDL, calculated 74 09/28/2011 09:13 AM    Triglyceride 69 09/28/2011 09:13 AM     No results found for this or any previous visit. Lab Results   Component Value Date/Time    Sodium 145 12/16/2016 03:01 PM    Potassium 4.1 12/16/2016 03:01 PM    Chloride 105 12/16/2016 03:01 PM    CO2 36 12/16/2016 03:01 PM    Anion gap 4 12/16/2016 03:01 PM    Glucose 110 12/16/2016 03:01 PM    BUN 21 12/16/2016 03:01 PM    Creatinine 0.85 12/16/2016 03:01 PM    BUN/Creatinine ratio 25 12/16/2016 03:01 PM    GFR est AA >60 12/16/2016 03:01 PM    GFR est non-AA >60 12/16/2016 03:01 PM    Calcium 9.6 12/16/2016 03:01 PM      Wt Readings from Last 3 Encounters:   01/10/17 146 lb 3.2 oz (66.3 kg)   01/04/17 150 lb (68 kg)   12/27/16 157 lb 12.8 oz (71.6 kg)      BP Readings from Last 3 Encounters:   01/10/17 122/70   01/04/17 102/70   12/27/16 100/80        Current Outpatient Prescriptions   Medication Sig    enalapril (VASOTEC) 10 mg tablet Take 5 mg by mouth daily.  carvedilol (COREG) 25 mg tablet TAKE 1 TAB BY MOUTH TWO (2) TIMES DAILY (WITH MEALS).  warfarin (COUMADIN) 5 mg tablet Take half tablet or 2.5 on Tues and Thursdays, whole tab or 5 mg on all other days or as directed by Coumadin clinic    insulin glargine (LANTUS SOLOSTAR) 100 unit/mL (3 mL) pen 22 Units by SubCUTAneous route daily.  acetaminophen (TYLENOL) 325 mg tablet Take  by mouth every four (4) hours as needed.  ACCU-CHEK MARQUITA PLUS strip     BD INSULIN PEN NEEDLE UF SHORT 31 X 5/16 \" Ndle     KLOR-CON M20 20 mEq tablet TAKE 2 TABS BY MOUTH TWO (2) TIMES A DAY.  furosemide (LASIX) 80 mg tablet TAKE 1 TAB BY MOUTH TWO (2) TIMES A DAY. No current facility-administered medications for this visit. Impression see above.     Written by Olivia Santos, as dictated by Avery Officer, MD.

## 2021-01-20 NOTE — PROGRESS NOTES
Called to pronounce patient dead. No response to verbal and tactile stimuli. No respiratory effort. Absent heart sounds and pulses. Pupils fixed and dilated. Patient pronounced dead at 0306 hours. [facility]  [facility]

## 2022-04-28 NOTE — PROGRESS NOTES
1000 = Thoracentesis will happen tomorrow so patient can be off Eliquis for 24 hours. Consent has been signed and daughter notified of procedure time change. Size Of Lesion In Cm: 0 Validate That Anesthesia Is Not 0: No Post-Care Instructions: I reviewed with the patient in detail post-care instructions. Patient received copy of wound care sheet. Biopsy Method: Dermablade Consent: Verbal consent was obtained and risks were reviewed including but not limited to scarring, infection, bleeding, scabbing, incomplete removal, nerve damage and allergy to anesthesia. Anesthesia Type: 1% lidocaine without epinephrine Type Of Destruction Used: Silver Nitrate Validate Note Data (See Information Below): Yes Information: Selecting Yes will display possible errors in your note based on the variables you have selected. This validation is only offered as a suggestion for you. PLEASE NOTE THAT THE VALIDATION TEXT WILL BE REMOVED WHEN YOU FINALIZE YOUR NOTE. IF YOU WANT TO FAX A PRELIMINARY NOTE YOU WILL NEED TO TOGGLE THIS TO 'NO' IF YOU DO NOT WANT IT IN YOUR FAXED NOTE. Notification Instructions: Patient will be notified of biopsy results. However, patient instructed to call the office if not contacted within 2 weeks. Biopsy Type: H and E Billing Type: Third-Party Bill Wound Care: Aquaphor Depth Of Biopsy: dermis Dressing: Band-Aid Detail Level: Detailed Hemostasis: Aluminum Chloride Anesthesia Volume In Cc (Will Not Render If 0): 0.4

## 2022-06-09 NOTE — PROGRESS NOTES
NUTRITION COMPLETE ASSESSMENT    RECOMMENDATIONS:   Increase K Phos to 4 x/day    100 mg Thiamine x 7 days and a daily MVI    DHT placement for either full enteral support or nocturnal feedings (until po intake adequate)    SLP-swallow evaluation     Interventions/Plan:   Follow-Discontinue tube feeding order    Assessment:   Reason for Assessment:   [x] Provider Consult-Tube feeding management  [x]Reassessment     Diet: NPO  Nutritionally Significant Medications: [x] Reviewed & Includes: Colace, correction scale insulin, magnesium sulfate, K Phos, KCL, sodium phosphate  Meal Intake: Patient Vitals for the past 100 hrs:   % Diet Eaten   03/27/17 0918 30 %   03/26/17 1255 20 %     Subjective:  Patient smiled but no verbal response. Appears tired-eyelids heavy. Objective:  Chart reviewed; discussed with RN. Noted: Acute on chronic respiratory failure-pt intubated 3/27, extubated earlier today; WOCN following for partial thickness wound (R) thigh, stage 2 (L) cheek; chronic systolic CHF/pulmonary edema/pleural effusions-being diuresed. Ms Alicja Nick was started on tube feeding yesterday but OG tube removed d/t extubation. Appetite and po intake has been poor overall during this hospitalization (less than 50% of meals) and suspect will continue to be so now. Recommend swallow evaluation prior to diet advancement. May also wish to consider [place] placement for enteral nutrition support. If swallow okay and able to advance diet-recommend nocturnal tube feedings otherwise full enteral support. Suggested tube feeding (full support): Glucerna 1.2 @ 55 ml/hr with 80 ml water flush q 4 hr. This will provide 1320 ml, 1585 calories, 79 gm protein and 1540 ml free water (tube feeding/flush) per day. Nocturnal feeding goal: Glucerna 1.2 @ 70 ml/hr x 12 hr with 50 ml water flush q 4 hr during feeding. Phosphorus is running BNL chronically; daily supplementation ordered on the 19th but value still remains BNL.  May wish to increase to 4 x/day. Suggest supplementing with 100 mg Thiamine x 7 days as well (d/t inadequate nutrient intake for at least 2 weeks) and a daily MVI. BG well controlled-may need to increase coverage prn. Estimated Nutrition Needs:   Kcals/day: 1450 Kcals/day (5446-2509 (MSJ x 1.3-1.4))  Protein: 79 g (1.2g/kg)  Fluid:  (1 ml/kcal)  Based On: [address]  Weight Used:  (Office wt 1/10/17  66.4 kg)    Pt expected to meet estimated nutrient needs:  []   Yes     []  No  [x] Unable to predict at this time    Nutrition Diagnosis:   1. Inadequate protein-energy intake related to prolonged hosptialization; re-intubated for acute on chronic respiratory failure as evidenced by poor po intake prior to intubation (tube feeding x 24 hr).      Goals:     Resume enteral feeding if unable to advance diet in next 24 hr.     Monitoring & Evaluation:    - Total energy intake   - Weight/weight change, Glucose profile     Previous Nutrition Goals Met:  N/A  Previous Recommendations:      Yes    Education & Discharge Needs:   [x] None Identified   [] Identified and addressed    [x] Participated in care plan, discharge planning, and/or interdisciplinary rounds        Cultural, Judaism and ethnic food preferences identified:   None    Skin Integrity: []Intact  [x] WOCN following-see note  Edema: []None [x] 1-2+ generalized, 1+BUE's, 1+ pitting BLE's  Last BM: 3/26  Food Allergies: [x]None []Other    Anthropometrics:    Weight Loss Metrics 3/30/2017 3/8/2017 1/10/2017 1/4/2017 12/27/2016 12/16/2016 11/12/2016   Today's Wt 171 lb 4.8 oz - 146 lb 3.2 oz 150 lb 157 lb 12.8 oz 152 lb 149 lb 6 oz   BMI - 27.65 kg/m2 23.6 kg/m2 24.21 kg/m2 25.47 kg/m2 24.53 kg/m2 24.11 kg/m2      Last 3 Recorded Weights in this Encounter    03/29/17 1600 03/30/17 0500 03/30/17 1426   Weight: 78.6 kg (173 lb 4.5 oz) 77.7 kg (171 lb 4.8 oz) 77.7 kg (171 lb 4.8 oz)      Weight Source: Bed  Height: 5' 6\" (167.6 cm),    Body mass index is 27.65 kg/(m^2). IBW : 59 kg (130 lb), % IBW (Calculated): 131.77 %   ,      Labs:  Lab Results   Component Value Date/Time    Sodium 144 03/30/2017 03:50 AM    Potassium 3.8 03/30/2017 03:50 AM    Chloride 107 03/30/2017 03:50 AM    CO2 34 03/30/2017 03:50 AM    Glucose 151 03/30/2017 03:50 AM    BUN 13 03/30/2017 03:50 AM    Creatinine 0.60 03/30/2017 03:50 AM    Calcium 9.8 03/30/2017 03:50 AM    Magnesium 2.2 03/30/2017 03:50 AM    Phosphorus 2.1 03/30/2017 03:50 AM    Albumin 2.4 03/30/2017 03:50 AM     Lab Results   Component Value Date/Time    Glucose (POC) 128 03/30/2017 12:29 PM      Lab Results   Component Value Date/Time    ALT (SGPT) 13 03/30/2017 03:50 AM    AST (SGOT) 19 03/30/2017 03:50 AM    Alk.  phosphatase 66 03/30/2017 03:50 AM    Bilirubin, direct 1.1 03/21/2017 03:53 AM    Bilirubin, total 1.8 03/30/2017 03:50 AM        Raquel JASON Malcolm [facility] Spontaneous, unlabored and symmetrical

## 2023-03-03 NOTE — CONSULTS
Full note to follow  Pt seen and examined with Yanni Wallace PA  Images reviewed  She is extremely debilitated.   Recognizing I am at a disadvantage just seeing her this hospitalization and not knowing her prior, nevertheless I have a difficult time supporting any intervention at this point as it would unlikely provide meaningful functional recovery and would be high to extreme risk post intervention for a significant complication  Plan to discuss with Dr. Melanie Gallegos and Jalil Rasmussen stated

## 2023-09-13 NOTE — PROGRESS NOTES
Primary Nurse Shu Kimbrough and Brian Ortiz, RN performed a dual skin assessment on this patient Impairment noted- see wound doc flow sheet  Stanley score is 15 SEE BELOW FOR OUR NEW PHONE NUMBER!!!      Thanks for visiting us today!    Remember these important phone numbers:    [phone] for phone nurses during the day and our nurse answering service at night    [phone]   for scheduling or changing future appointments    [phone] for the Poison Control Center    When leaving a message for our staff, please include:   the spelling of your child’s full name and date of birth  your full name and relationship to child  best phone number and time to reach you   reason for the call      We strongly recommend that all children age 6 months and older receive the COVID-19 vaccine. Call our office at [phone]  to schedule.      Where's the best place on the internet to look up health information about kids?  HealthyChildren.org  From the [employer]        Is your child signed up for [employer]? If you do this, you can message us rather than playing phone tag! You can also look at labs, pay your bills, and do some scheduling. Go to your own account first. (If you don't have one yet, you can set one up at the website below or at your doctor's office).  Using a web browser (not the phone arnulfo), on the right hand side of your page, click the button marked \"Request Access to my Child's Records.\" Fill out the information. In a few days your child's information will be linked to your account. It's that simple!! Here is the website for more information:    [email or web address]        --------------------------------------------------------------------------------------------------------------------

## 2025-02-10 NOTE — PROGRESS NOTES
Problem: Mobility Impaired (Adult and Pediatric)  Goal: *Acute Goals and Plan of Care (Insert Text)  Physical Therapy Goals  Revisited 3/24/2017, goals remain appropriate, carry over        Physical Therapy Goals  Initiated 3/16/2017  1. Patient will move from supine to sit and sit to supine , scoot up and down and roll side to side in bed with minimal assistance within 7 day(s). 2. Patient will transfer from bed to chair and chair to bed with moderate assistance using the least restrictive device within 7 day(s). 3. Patient will perform sit to stand with moderate assistance within 7 day(s). 4. Patient will ambulate with moderate assistance for 10 feet with the least restrictive device within 7 day(s). PHYSICAL THERAPY TREATMENT  Patient: Luis Mccarty (66 y.o. female)  Date: 4/3/2017  Diagnosis: Hypothermia Hypothermia       Precautions: Fall      ASSESSMENT:  Patient continues to be profoundly weak and unable to mobilize much at this time. She was stable on n/c O2 with sat's stable and RR in the teens and 20's during exercises. Started session just as OT was ending and this may have impacted her overall endurance. Practiced trunk and LE ROM while in the chair position in the bed. She has tightness in her heel cords and knees and hips with ROM beyond about 30-40 degrees. She was able to hold her torso upright away from the support of the bed for a few minutes at a time, but this did fatigue her. She would benefit from continued ROM to regain comfortable functional ROM in her knees and hips to prepare her for more progressive mobility such as sitting EOB. Progression toward goals:  [ ]    Improving appropriately and progressing toward goals  [X]    Improving slowly and progressing toward goals  [ ]    Not making progress toward goals and plan of care will be adjusted       PLAN:  Patient continues to benefit from skilled intervention to address the above impairments.   Continue treatment per established plan of care. Discharge Recommendations:  Gera Parr  Further Equipment Recommendations for Discharge: To be determined       SUBJECTIVE:   Patient stated I just want something to drink, just a little something.       OBJECTIVE DATA SUMMARY:   Critical Behavior:  Neurologic State: Confused, Drowsy  Orientation Level: Disoriented to place, Disoriented to situation, Disoriented to time, Oriented to person  Cognition: Appropriate safety awareness, Decreased attention/concentration, Decreased command following  Safety/Judgement: Fall prevention  Functional Mobility Training:  Bed Mobility:                    Transfers:                                   Balance:  Sitting: Impaired; With support  Sitting - Static: Poor (constant support) (but able to support herself for a few moments)  Sitting - Dynamic: Poor (constant support)  Standing:  (unable to attempt standing)  Ambulation/Gait Training:                                                                                      Therapeutic Exercises:   AAROM to ankles, knees, hips and torso in all planes  Pain:  Pain Scale 1: Numeric (0 - 10)  Pain Intensity 1: 0              Activity Tolerance:   Limited in terms of her muscular endurance  Please refer to the flowsheet for vital signs taken during this treatment.   After treatment:   [ ]    Patient left in no apparent distress sitting up in chair  [X]    Patient left in no apparent distress in bed in chair position  [X]    Call bell left within reach  [X]    Nursing notified  [ ]    Caregiver present  [ ]    Bed alarm activated      COMMUNICATION/COLLABORATION:   The patients plan of care was discussed with: Occupational Therapist and Registered Nurse     Olive Trevino PT   Time Calculation: 19 mins no